# Patient Record
Sex: FEMALE | Race: WHITE | NOT HISPANIC OR LATINO | Employment: UNEMPLOYED | URBAN - METROPOLITAN AREA
[De-identification: names, ages, dates, MRNs, and addresses within clinical notes are randomized per-mention and may not be internally consistent; named-entity substitution may affect disease eponyms.]

---

## 2017-01-21 DIAGNOSIS — E11.9 TYPE 2 DIABETES MELLITUS WITHOUT COMPLICATION: ICD-10-CM

## 2017-01-23 RX ORDER — GLIPIZIDE 10 MG/1
TABLET ORAL
Qty: 20 TABLET | Refills: 0 | Status: SHIPPED | OUTPATIENT
Start: 2017-01-23 | End: 2017-02-22 | Stop reason: SDUPTHER

## 2017-01-24 NOTE — TELEPHONE ENCOUNTER
Called patient and left message to contact office to schedule an appointment with DM and then follow up with Dr. Aquino.

## 2017-01-26 ENCOUNTER — OFFICE VISIT (OUTPATIENT)
Dept: RHEUMATOLOGY | Facility: CLINIC | Age: 42
End: 2017-01-26
Payer: MEDICAID

## 2017-01-26 DIAGNOSIS — M25.561 ACUTE PAIN OF RIGHT KNEE: ICD-10-CM

## 2017-01-26 DIAGNOSIS — M54.32 SCIATICA OF LEFT SIDE: ICD-10-CM

## 2017-01-26 DIAGNOSIS — M17.0 PRIMARY OSTEOARTHRITIS OF BOTH KNEES: Chronic | ICD-10-CM

## 2017-01-26 DIAGNOSIS — E55.9 VITAMIN D DEFICIENCY: ICD-10-CM

## 2017-01-26 DIAGNOSIS — G89.29 OTHER CHRONIC PAIN: Primary | ICD-10-CM

## 2017-01-26 DIAGNOSIS — M50.30 DDD (DEGENERATIVE DISC DISEASE), CERVICAL: Chronic | ICD-10-CM

## 2017-01-26 PROCEDURE — 99213 OFFICE O/P EST LOW 20 MIN: CPT | Mod: 25,S$PBB,, | Performed by: INTERNAL MEDICINE

## 2017-01-26 PROCEDURE — 99211 OFF/OP EST MAY X REQ PHY/QHP: CPT | Mod: PBBFAC,PO | Performed by: INTERNAL MEDICINE

## 2017-01-26 PROCEDURE — 99999 PR PBB SHADOW E&M-EST. PATIENT-LVL I: CPT | Mod: PBBFAC,,, | Performed by: INTERNAL MEDICINE

## 2017-01-26 PROCEDURE — 20610 DRAIN/INJ JOINT/BURSA W/O US: CPT | Mod: PBBFAC,PO | Performed by: INTERNAL MEDICINE

## 2017-01-26 PROCEDURE — 20610 DRAIN/INJ JOINT/BURSA W/O US: CPT | Mod: S$PBB,RT,, | Performed by: INTERNAL MEDICINE

## 2017-01-27 ENCOUNTER — TELEPHONE (OUTPATIENT)
Dept: RHEUMATOLOGY | Facility: CLINIC | Age: 42
End: 2017-01-27

## 2017-01-27 DIAGNOSIS — M25.561 ACUTE PAIN OF RIGHT KNEE: ICD-10-CM

## 2017-01-27 PROBLEM — M17.0 PRIMARY OSTEOARTHRITIS OF BOTH KNEES: Chronic | Status: ACTIVE | Noted: 2017-01-27

## 2017-01-27 PROBLEM — M50.30 DDD (DEGENERATIVE DISC DISEASE), CERVICAL: Chronic | Status: ACTIVE | Noted: 2017-01-27

## 2017-01-27 RX ORDER — METHYLPREDNISOLONE ACETATE 80 MG/ML
80 INJECTION, SUSPENSION INTRA-ARTICULAR; INTRALESIONAL; INTRAMUSCULAR; SOFT TISSUE
Status: COMPLETED | OUTPATIENT
Start: 2017-01-27 | End: 2017-01-27

## 2017-01-27 RX ORDER — BETAMETHASONE SODIUM PHOSPHATE AND BETAMETHASONE ACETATE 3; 3 MG/ML; MG/ML
3 INJECTION, SUSPENSION INTRA-ARTICULAR; INTRALESIONAL; INTRAMUSCULAR; SOFT TISSUE
Status: COMPLETED | OUTPATIENT
Start: 2017-01-27 | End: 2017-01-27

## 2017-01-27 RX ORDER — DICLOFENAC SODIUM 10 MG/G
2 GEL TOPICAL 4 TIMES DAILY
Qty: 300 G | Refills: 3 | Status: SHIPPED | OUTPATIENT
Start: 2017-01-27 | End: 2018-05-17

## 2017-01-27 RX ADMIN — METHYLPREDNISOLONE ACETATE 80 MG: 80 INJECTION, SUSPENSION INTRALESIONAL; INTRAMUSCULAR; INTRASYNOVIAL; SOFT TISSUE at 12:01

## 2017-01-27 RX ADMIN — BETAMETHASONE SODIUM PHOSPHATE AND BETAMETHASONE ACETATE 3 MG: 3; 3 INJECTION, SUSPENSION INTRA-ARTICULAR; INTRALESIONAL; INTRAMUSCULAR at 12:01

## 2017-01-27 NOTE — PROGRESS NOTES
Subjective:       Patient ID: Jenifer Westfall is a 41 y.o. female.    Chief Complaint: knee pain-6/10    HPI Comments: Jenifer is here for routine follow up.  She has chronic pain with severe sciatica on the left side. MRI l spine shows nerve impingement.  She also has had issues with left hip bursitis with injections in the past that help and this is currently controlled.  She also has vit D deficiency and Marfans.  She has had prednisone tapers in the past which help her sciatic pain but it returns when she is off the steroids.  She takes gabapentin 400mg TID and tramadol prn for her pain.    Today she is complaining of sever right knee pain  mainly on the lateral aspect of her knee. She denies trauma to her knee.  Her pain has been present for about 3-4 days.  She can't take nsaids due to GI upset.  She also has issues with neck pain and has DDD on Cspine xray.     Review of Systems   Constitutional: Negative for activity change, fatigue, fever and unexpected weight change.   HENT: Negative for mouth sores, tinnitus and trouble swallowing.    Eyes: Negative for itching and visual disturbance.   Respiratory: Negative for cough and shortness of breath.    Cardiovascular: Negative for chest pain, palpitations and leg swelling.   Gastrointestinal: Negative for blood in stool, diarrhea, nausea and vomiting.   Genitourinary: Negative for dysuria, flank pain, frequency and hematuria.   Musculoskeletal: Positive for arthralgias, back pain, gait problem and joint swelling. Negative for myalgias.   Skin: Negative for rash and wound.   Neurological: Negative for dizziness, weakness, light-headedness and headaches.   Hematological: Negative for adenopathy. Does not bruise/bleed easily.   Psychiatric/Behavioral: Negative for confusion and sleep disturbance.         Objective:   There were no vitals taken for this visit.     Physical Exam   Constitutional: She is oriented to person, place, and time and well-developed,  well-nourished, and in no distress. No distress.   HENT:   Head: Normocephalic.   Eyes: EOM are normal. Pupils are equal, round, and reactive to light.   Neck: Normal range of motion. Neck supple. No thyromegaly present.   Cardiovascular: Normal rate, regular rhythm and normal heart sounds.  Exam reveals no gallop and no friction rub.    No murmur heard.  Pulmonary/Chest: Breath sounds normal. She has no wheezes. She has no rales. She exhibits no tenderness.   Abdominal: Soft. There is no tenderness. There is no rebound.       Right Side Rheumatological Exam     Hip Exam   Tenderness Location: no tenderness    Left Side Rheumatological Exam     Hip Exam   Tenderness Location: no tenderness      Lymphadenopathy:     She has no cervical adenopathy.   Neurological: She is alert and oriented to person, place, and time. She displays normal reflexes. She exhibits normal muscle tone. Gait normal.   Skin: Skin is warm and dry. No rash noted. No erythema.     Psychiatric: Mood, memory and affect normal.   Musculoskeletal: Normal range of motion. She exhibits no edema or tenderness.   Right knee has no real effusion, but her right leg is swollen compared to the left just proximal to the knee.  She has significant tenderness to the lateral knee along the LCL.  R knee is slightly warm compared to left.  Positive crepitus.      Right hip with decreased internal rotation, left hip normal           Assessment:       1. Other chronic pain    2. Sciatica of left side    3. Acute pain of right knee    4. Vitamin D deficiency    5. Primary osteoarthritis of both knees        1. Left side sciatica: MRI evidence of nerve impingement, unable to take nsaids due to GI upset, improves with steroids, gabapentin 400mg tid  2. Right knee pain, acute: due to osteoarthritis  3. Chronic pain:  Tramadol prn,   4. Vit D deficiency: 10/15, level was 24, taking ergocalciferol daily  5. DDD C spine by xray: C6-7, 7-1  Plan:        Inject right knee as  below  Will order xrays catherine knees for next visit  Will refer to dr. Stein for DENY evaluation  Recommend weight loss, exersice more, mediterranean diet  Voltaren Gel for knee pain  Return in 6 mos with vit D level    Procedure note: After verbal consent was obtained.  The right knee was prepared with sterile prep.  The skin was anesthetized with 1% ethyl chloride.  The knee joint was injected with 2.5 cc of 1% lidocaine to anesthetize the knee.  The joint was then injected with 0.5 mL celestone/soluspan 6 mg/ mL, 1.0 mL Depo-Medrol 80 mg/mL and 1.5 mL of 1% lidocaine.  Hemostasis was obtained.  The patient tolerated procedure well with no complications.  discharge and  icing instructions given to jeffrey

## 2017-01-29 ENCOUNTER — HOSPITAL ENCOUNTER (EMERGENCY)
Facility: HOSPITAL | Age: 42
Discharge: HOME | End: 2017-01-30
Attending: EMERGENCY MEDICINE
Payer: MEDICAID

## 2017-01-29 DIAGNOSIS — M25.561 RIGHT KNEE PAIN: ICD-10-CM

## 2017-01-29 LAB
ALBUMIN SERPL BCP-MCNC: 3.9 G/DL
ALP SERPL-CCNC: 100 U/L
ALT SERPL W/O P-5'-P-CCNC: 27 U/L
ANION GAP SERPL CALC-SCNC: 14 MMOL/L
AST SERPL-CCNC: 18 U/L
BASOPHILS # BLD AUTO: 0.03 K/UL
BASOPHILS NFR BLD: 0.2 %
BILIRUB SERPL-MCNC: 0.3 MG/DL
BUN SERPL-MCNC: 11 MG/DL
CALCIUM SERPL-MCNC: 9.6 MG/DL
CHLORIDE SERPL-SCNC: 104 MMOL/L
CO2 SERPL-SCNC: 18 MMOL/L
CREAT SERPL-MCNC: 0.9 MG/DL
CRP SERPL-MCNC: 3.3 MG/L
DIFFERENTIAL METHOD: ABNORMAL
EOSINOPHIL # BLD AUTO: 0 K/UL
EOSINOPHIL NFR BLD: 0.2 %
ERYTHROCYTE [DISTWIDTH] IN BLOOD BY AUTOMATED COUNT: 12.8 %
ERYTHROCYTE [SEDIMENTATION RATE] IN BLOOD BY WESTERGREN METHOD: 20 MM/HR
EST. GFR  (AFRICAN AMERICAN): >60 ML/MIN/1.73 M^2
EST. GFR  (NON AFRICAN AMERICAN): >60 ML/MIN/1.73 M^2
GLUCOSE SERPL-MCNC: 289 MG/DL
HCT VFR BLD AUTO: 44.4 %
HGB BLD-MCNC: 15.7 G/DL
LYMPHOCYTES # BLD AUTO: 4.3 K/UL
LYMPHOCYTES NFR BLD: 22.8 %
MCH RBC QN AUTO: 31.9 PG
MCHC RBC AUTO-ENTMCNC: 35.4 %
MCV RBC AUTO: 90 FL
MONOCYTES # BLD AUTO: 1.3 K/UL
MONOCYTES NFR BLD: 6.6 %
NEUTROPHILS # BLD AUTO: 13.3 K/UL
NEUTROPHILS NFR BLD: 70.2 %
PLATELET # BLD AUTO: 315 K/UL
PMV BLD AUTO: 10.2 FL
POTASSIUM SERPL-SCNC: 4.1 MMOL/L
PROT SERPL-MCNC: 7.6 G/DL
RBC # BLD AUTO: 4.92 M/UL
SODIUM SERPL-SCNC: 136 MMOL/L
WBC # BLD AUTO: 18.92 K/UL

## 2017-01-29 PROCEDURE — 96376 TX/PRO/DX INJ SAME DRUG ADON: CPT

## 2017-01-29 PROCEDURE — 96361 HYDRATE IV INFUSION ADD-ON: CPT

## 2017-01-29 PROCEDURE — 99284 EMERGENCY DEPT VISIT MOD MDM: CPT | Mod: 25

## 2017-01-29 PROCEDURE — 80053 COMPREHEN METABOLIC PANEL: CPT

## 2017-01-29 PROCEDURE — 86140 C-REACTIVE PROTEIN: CPT

## 2017-01-29 PROCEDURE — 63600175 PHARM REV CODE 636 W HCPCS: Performed by: EMERGENCY MEDICINE

## 2017-01-29 PROCEDURE — 36415 COLL VENOUS BLD VENIPUNCTURE: CPT

## 2017-01-29 PROCEDURE — 96374 THER/PROPH/DIAG INJ IV PUSH: CPT

## 2017-01-29 PROCEDURE — 87070 CULTURE OTHR SPECIMN AEROBIC: CPT

## 2017-01-29 PROCEDURE — 87205 SMEAR GRAM STAIN: CPT

## 2017-01-29 PROCEDURE — 25000003 PHARM REV CODE 250: Performed by: EMERGENCY MEDICINE

## 2017-01-29 PROCEDURE — 96375 TX/PRO/DX INJ NEW DRUG ADDON: CPT

## 2017-01-29 PROCEDURE — 85025 COMPLETE CBC W/AUTO DIFF WBC: CPT

## 2017-01-29 PROCEDURE — 85651 RBC SED RATE NONAUTOMATED: CPT

## 2017-01-29 PROCEDURE — 20610 DRAIN/INJ JOINT/BURSA W/O US: CPT

## 2017-01-29 RX ORDER — HYDROCODONE BITARTRATE AND ACETAMINOPHEN 10; 325 MG/1; MG/1
1 TABLET ORAL EVERY 4 HOURS PRN
Qty: 11 TABLET | Refills: 0 | Status: SHIPPED | OUTPATIENT
Start: 2017-01-29 | End: 2017-03-02 | Stop reason: ALTCHOICE

## 2017-01-29 RX ORDER — HYDROMORPHONE HYDROCHLORIDE 2 MG/ML
0.5 INJECTION, SOLUTION INTRAMUSCULAR; INTRAVENOUS; SUBCUTANEOUS
Status: COMPLETED | OUTPATIENT
Start: 2017-01-29 | End: 2017-01-29

## 2017-01-29 RX ORDER — ONDANSETRON 2 MG/ML
4 INJECTION INTRAMUSCULAR; INTRAVENOUS
Status: COMPLETED | OUTPATIENT
Start: 2017-01-29 | End: 2017-01-29

## 2017-01-29 RX ADMIN — HYDROMORPHONE HYDROCHLORIDE 0.5 MG: 2 INJECTION, SOLUTION INTRAMUSCULAR; INTRAVENOUS; SUBCUTANEOUS at 05:01

## 2017-01-29 RX ADMIN — ONDANSETRON 4 MG: 2 INJECTION INTRAMUSCULAR; INTRAVENOUS at 05:01

## 2017-01-29 RX ADMIN — SODIUM CHLORIDE 1000 ML: 0.9 INJECTION, SOLUTION INTRAVENOUS at 05:01

## 2017-01-29 RX ADMIN — HYDROMORPHONE HYDROCHLORIDE 0.5 MG: 2 INJECTION, SOLUTION INTRAMUSCULAR; INTRAVENOUS; SUBCUTANEOUS at 08:01

## 2017-01-29 NOTE — ED PROVIDER NOTES
SCRIBE #1 NOTE: I, Martin Perkins, am scribing for, and in the presence of, Clint Bardales MD. I have scribed the entire note.      History      Chief Complaint   Patient presents with    Knee Pain     saw rheumatologist on Thursday, got cortizone shot, since yesterday pt unable to put weight on knee d/t pain        Review of patient's allergies indicates:   Allergen Reactions    Compazine [prochlorperazine edisylate] Rash    Levaquin [levofloxacin] Hives and Itching    Metformin      Upset stomach    Ibuprofen      Stomach pain    Morphine      Irritable        HPI   HPI    1/29/2017, 4:37 PM   History obtained from the patient      History of Present Illness: Jenifer Westfall is a 41 y.o. female patient who presents to the Emergency Department for right knee pain which onset gradually several days ago. Sx are constant and moderate in severity. Sx are described as stabbing pain. Pain exacerbated when bending the joint, and bearing weight. Pt states she was seen by her rheumatologist 3 days ago with same complaint and was tx with Cortizone shot to right lateral knee with relief to sx for a couple hours before pain has returned. She has also been taking prednisone. Associated sx includes gait problem.  Pt denies any fever, chills, knee trauma, CP, SOB, cough, calf pain, N/V, and all other sx at this time. She also reports trying ice, elevation and tylenol.  No further complaints or concerns at this time.       Arrival mode: Personal vehicle    PCP: Evelyn Wynn MD       Past Medical History:  Past Medical History   Diagnosis Date    Arthritis     Diabetes mellitus 2009      11/09/2015 Insulin x 2 years 2013    Hepatitis C     Hyperlipidemia     Hypertension     IBS (irritable bowel syndrome)     Kidney stone     Marfan syndrome     Mitral valve prolapse        Past Surgical History:  Past Surgical History   Procedure Laterality Date    Cholecystectomy      Bunionectomy       both feet  "   Tubal ligation       section       x 2    Appendectomy           Family History:  Family History   Problem Relation Age of Onset    Heart disease Mother     Thrombophilia Father      DVT    Hypertension Father     Stroke Maternal Aunt     Heart disease Maternal Aunt     Stroke Maternal Uncle     Heart disease Maternal Uncle     Breast cancer Neg Hx     Colon cancer Neg Hx     Ovarian cancer Neg Hx        Social History:  Social History     Social History Main Topics    Smoking status: Current Every Day Smoker     Packs/day: 0.20     Years: 20.00     Types: Cigarettes     Last attempt to quit: 2015    Smokeless tobacco: Never Used      Comment: "once in a blue moon"    Alcohol use No    Drug use: No    Sexual activity: No       ROS   Review of Systems   Constitutional: Negative for chills and fever.   HENT: Negative for sore throat and trouble swallowing.    Respiratory: Negative for cough and shortness of breath.    Cardiovascular: Negative for chest pain.   Gastrointestinal: Negative for abdominal pain, diarrhea, nausea and vomiting.   Genitourinary: Negative for dysuria.   Musculoskeletal: Positive for gait problem. Negative for back pain.        + R knee pain  - trauma   Skin: Negative for rash and wound.   Neurological: Negative for weakness and numbness.   Hematological: Does not bruise/bleed easily.   All other systems reviewed and are negative.      Physical Exam    Initial Vitals   BP Pulse Resp Temp SpO2   17 1511 17 1511 17 1511 17 1511 17 1511   132/77 80 16 97.9 °F (36.6 °C) 95 %      Physical Exam  Nursing Notes and Vital Signs Reviewed.  Constitutional: Patient is in no acute distress. Awake and alert. Well-developed and well-nourished.  Head: Atraumatic. Normocephalic.  Eyes: PERRL. EOM intact. Conjunctivae are not pale. No scleral icterus.  ENT: Mucous membranes are moist. Oropharynx is clear and symmetric.    Neck: Supple. Full ROM. No " lymphadenopathy.  Cardiovascular: Regular rate. Regular rhythm. No murmurs, rubs, or gallops. Distal pulses are 2+ and symmetric.  Pulmonary/Chest: No respiratory distress. Clear to auscultation bilaterally. No wheezing, rales, or rhonchi.  Abdominal: Soft and non-distended.     Musculoskeletal: Moves all extremities. No obvious deformities. No edema. No calf tenderness.  Right Knee:  No obvious deformity. There is no swelling.  There is  Tenderness to lateral R knee. Mildly Increased warmth compared to left knee. No erythema, induration or fluctuance.  No ligament laxity.    DP and PT pulses are 2+.  Normal capillary refill.  Distal sensation is intact. ROM is very limited, Pt will barely flex or extend the knee.   Skin: Warm and dry.  Neurological:  Alert, awake, and appropriate.  Normal speech.  No acute focal neurological deficits are appreciated.  Psychiatric: Normal affect. Good eye contact. Appropriate in content.    ED Course    Arthrocentesis  Date/Time: 1/29/2017 9:14 PM  Performed by: DEEPA IRIZARRY.  Authorized by: DEEPA IRIZARRY   Consent Done: Not Needed  Indications: possible septic joint   Body area: knee  Joint: right knee  Local anesthesia used: no    Anesthesia:  Local anesthesia used: no  Patient sedated: no  Preparation: Patient was prepped and draped in the usual sterile fashion.  Needle size: 20 G  Approach: lateral  Aspirate amount: 5 mL  Aspirate: yellow (very slightly clougy, thick, yellow in color)  Patient tolerance: Patient tolerated the procedure well with no immediate complications  Complications: No  Specimens: Yes (5 cc thick, slightly cloudy, yellowish, aspirate )        ED Vital Signs:  Vitals:    01/29/17 1511 01/29/17 2152 01/30/17 0000 01/30/17 0146   BP: 132/77 138/60 132/70 134/60   Pulse: 80 76 80 68   Resp: 16 18 17 16   Temp: 97.9 °F (36.6 °C) 98.2 °F (36.8 °C) 98 °F (36.7 °C) 97.2 °F (36.2 °C)   TempSrc: Oral Oral Oral Oral   SpO2: 95% 96% 98% 99%   Weight: 102.1 kg  "(225 lb)      Height: 5' 10" (1.778 m)          Abnormal Lab Results:  Labs Reviewed   CBC W/ AUTO DIFFERENTIAL - Abnormal; Notable for the following:        Result Value    WBC 18.92 (*)     MCH 31.9 (*)     Gran # 13.3 (*)     Mono # 1.3 (*)     All other components within normal limits   COMPREHENSIVE METABOLIC PANEL - Abnormal; Notable for the following:     CO2 18 (*)     Glucose 289 (*)     All other components within normal limits   CULTURE, BODY FLUID - BACTEC   GRAM STAIN   SEDIMENTATION RATE, MANUAL   C-REACTIVE PROTEIN   WBC & DIFF,BODY FLUID        All Lab Results:  Results for orders placed or performed during the hospital encounter of 01/29/17   Culture, Body Fluid - Bactec   Result Value Ref Range    Body Fluid Culture, Sterile No Growth to date    Gram stain   Result Value Ref Range    Gram Stain Result Rare WBC's     Gram Stain Result No organisms seen    CBC auto differential   Result Value Ref Range    WBC 18.92 (H) 3.90 - 12.70 K/uL    RBC 4.92 4.00 - 5.40 M/uL    Hemoglobin 15.7 12.0 - 16.0 g/dL    Hematocrit 44.4 37.0 - 48.5 %    MCV 90 82 - 98 fL    MCH 31.9 (H) 27.0 - 31.0 pg    MCHC 35.4 32.0 - 36.0 %    RDW 12.8 11.5 - 14.5 %    Platelets 315 150 - 350 K/uL    MPV 10.2 9.2 - 12.9 fL    Gran # 13.3 (H) 1.8 - 7.7 K/uL    Lymph # 4.3 1.0 - 4.8 K/uL    Mono # 1.3 (H) 0.3 - 1.0 K/uL    Eos # 0.0 0.0 - 0.5 K/uL    Baso # 0.03 0.00 - 0.20 K/uL    Gran% 70.2 38.0 - 73.0 %    Lymph% 22.8 18.0 - 48.0 %    Mono% 6.6 4.0 - 15.0 %    Eosinophil% 0.2 0.0 - 8.0 %    Basophil% 0.2 0.0 - 1.9 %    Differential Method Automated    Comprehensive metabolic panel   Result Value Ref Range    Sodium 136 136 - 145 mmol/L    Potassium 4.1 3.5 - 5.1 mmol/L    Chloride 104 95 - 110 mmol/L    CO2 18 (L) 23 - 29 mmol/L    Glucose 289 (H) 70 - 110 mg/dL    BUN, Bld 11 6 - 20 mg/dL    Creatinine 0.9 0.5 - 1.4 mg/dL    Calcium 9.6 8.7 - 10.5 mg/dL    Total Protein 7.6 6.0 - 8.4 g/dL    Albumin 3.9 3.5 - 5.2 g/dL    Total " Bilirubin 0.3 0.1 - 1.0 mg/dL    Alkaline Phosphatase 100 55 - 135 U/L    AST 18 10 - 40 U/L    ALT 27 10 - 44 U/L    Anion Gap 14 8 - 16 mmol/L    eGFR if African American >60 >60 mL/min/1.73 m^2    eGFR if non African American >60 >60 mL/min/1.73 m^2   Sedimentation rate, manual   Result Value Ref Range    Sed Rate 20 0 - 20 mm/Hr   C-reactive protein   Result Value Ref Range    CRP 3.3 0.0 - 8.2 mg/L         Imaging Results:  Imaging Results         X-Ray Knee 3 View Right (Final result) Result time:  01/29/17 17:26:58    Final result by Mimi Calderon MD (Timothy) (01/29/17 17:26:58)    Impression:        Unremarkable right knee.      Electronically signed by: MIMI CALDERON MD  Date:     01/29/17  Time:    17:26     Narrative:    Right knee, 4 views    Clinical History:  Right knee pain    Findings:     There is  no acute bony or joint abnormality. No acute fracture or dislocation.                 The Emergency Provider reviewed the vital signs and test results, which are outlined above.    ED Discussion     7:08 PM: Dr. Bardales discussed the pt's case with Dr. Hook (orthopedics) who recommends arthocentesis.    1:23 AM - Re-evaluation: The patient is resting comfortably and is in no acute distress. She states that her symptoms have improved after treatment within ER. Discussed test results, shared treatment plan, specific conditions for return, and importance of follow up with patient and family.  She understands and agrees with the plan as discussed. Answered  her questions at this time. She has remained hemodynamically stable throughout the ED course and is appropriate for discharge home.     I discussed with patient and/or family/caretaker that negative X-ray does not rule out occult fracture or other soft tissue injury.  Persistent pain greater than 7-10 days or increased pain requires follow up, specifically with orthopedics.         I discussed with patient and/or family/caretaker that evaluation in  the ED does not suggest any emergent or life threatening medical conditions requiring immediate intervention beyond what was provided in the ED, and I believe patient is safe for discharge.  Regardless, an unremarkable evaluation in the ED does not preclude the development or presence of a serious of life threatening condition. As such, patient was instructed to return immediately for any worsening or change in current symptoms.      ED Medication(s):  Medications   sodium chloride 0.9% bolus 1,000 mL (0 mLs Intravenous Stopped 1/29/17 1932)   hydromorphone (PF) injection 0.5 mg (0.5 mg Intravenous Given 1/29/17 1747)   ondansetron injection 4 mg (4 mg Intravenous Given 1/29/17 1747)   hydromorphone (PF) injection 0.5 mg (0.5 mg Intravenous Given 1/29/17 2006)   hydromorphone (PF) injection 0.5 mg (0.5 mg Intravenous Given 1/30/17 0123)       Discharge Medication List as of 1/30/2017  1:47 AM      START taking these medications    Details   clindamycin (CLEOCIN) 150 MG capsule Take 2 capsules (300 mg total) by mouth 3 (three) times daily., Starting 1/30/2017, Until Mon 2/6/17, Print      hydrocodone-acetaminophen 10-325mg (NORCO)  mg Tab Take 1 tablet by mouth every 4 (four) hours as needed., Starting 1/29/2017, Until Discontinued, Print             Follow-up Information     Follow up with Evelyn Wynn MD In 3 days.    Specialty:  Family Medicine    Contact information:    [address] [address] [address] [phone]          Follow up with [facility] - .    Specialty:  Emergency Medicine    Why:  If symptoms worsen, Or worsening condition or any other major concern    Contact information:    [address]  [facility] [address] [phone]             Medical Decision Making              Scribe Attestation:   Scribe #1: I performed the above scribed service and the documentation accurately describes the services I performed. I attest to the  accuracy of the note.    Attending:   Physician Attestation Statement for Scribe #1: I, Clint Bardales MD, personally performed the services described in this documentation, as scribed by Martin Perkins in my presence, and it is both accurate and complete.          Clinical Impression       ICD-10-CM ICD-9-CM   1. Right knee pain M25.561 719.46       Disposition:   Disposition: Discharged  Condition: Stable         Clint Bardales MD  01/30/17 1944

## 2017-01-30 VITALS
DIASTOLIC BLOOD PRESSURE: 60 MMHG | HEIGHT: 70 IN | HEART RATE: 68 BPM | WEIGHT: 225 LBS | TEMPERATURE: 97 F | SYSTOLIC BLOOD PRESSURE: 134 MMHG | RESPIRATION RATE: 16 BRPM | BODY MASS INDEX: 32.21 KG/M2 | OXYGEN SATURATION: 99 %

## 2017-01-30 LAB
GRAM STN SPEC: NORMAL
GRAM STN SPEC: NORMAL

## 2017-01-30 PROCEDURE — 63600175 PHARM REV CODE 636 W HCPCS: Performed by: PHYSICIAN ASSISTANT

## 2017-01-30 RX ORDER — HYDROMORPHONE HYDROCHLORIDE 2 MG/ML
0.5 INJECTION, SOLUTION INTRAMUSCULAR; INTRAVENOUS; SUBCUTANEOUS
Status: COMPLETED | OUTPATIENT
Start: 2017-01-30 | End: 2017-01-30

## 2017-01-30 RX ORDER — CLINDAMYCIN HYDROCHLORIDE 150 MG/1
300 CAPSULE ORAL 3 TIMES DAILY
Qty: 42 CAPSULE | Refills: 0 | Status: SHIPPED | OUTPATIENT
Start: 2017-01-30 | End: 2017-02-06

## 2017-01-30 RX ADMIN — HYDROMORPHONE HYDROCHLORIDE 0.5 MG: 2 INJECTION, SOLUTION INTRAMUSCULAR; INTRAVENOUS; SUBCUTANEOUS at 01:01

## 2017-01-30 NOTE — ED NOTES
"Pt sitting up in bed resting.  Aa0x4, resp e/u skin w/d.  Pt reports, "pain is starting to come back".      "

## 2017-01-30 NOTE — DISCHARGE INSTRUCTIONS
Knee Pain  Knee pain is very common. Its especially common in active people who put a lot of pressure on their knees, like runners. It affects women more often than men.  Your kneecap (patella) is a thick, round bone. It covers and protects the front portion of your knee joint. It moves along a groove in your thighbone (femur) as part of the patellofemoral joint. A layer of cartilage surrounds the underside of your kneecap. This layer protects it from grinding against your femur.  When this cartilage softens and breaks down, it can cause knee pain. This is partly because of repetitive stress. The stress irritates the lining of the joint. This causes pain in the underlying bone.  What causes knee pain?  Many things can cause knee pain. You may have more than one cause. Some of these include:  · Overuse of the knee joint  · The kneecap doesnt line up with the tissue around it  · Damage to small nerves in the area  · Damage to the ligament-like structure that holds the kneecap in place (retinaculum)  · Breakdown of the bone under the cartilage  · Swelling in the soft tissues around the kneecap  · Injury  You might be more likely to have knee pain if you:  · Exercise a lot  · Recently increased the intensity of your workouts  · Have a body mass index (BMI) greater than 25  · Have poor alignment of your kneecap  · Walk with your feet turned overly outward or inward  · Have weakness in surrounding muscle groups (inner quad or hip adductor muscles)  · Have too much tightness in surrounding muscle groups (hamstrings or iliotibial band)  · Have a recent history of injury to the area  · Are female  Symptoms of knee pain  This type of knee pain is a dull, aching pain in the front of the knee in the area under and around the kneecap. This pain may start quickly or slowly. Your pain might be worse when you squat, run, or sit for a long time. You might also sometimes feel like your knee is giving out. You may have symptoms in  one or both of your knees.  Diagnosing knee pain  Your health care provider will ask about your medical history and your symptoms. Be sure to describe any activities that make your knee pain worse. He or she will look at your knee. This will include tests of your range of motion, strength, and areas of pain of your knee. Your knee alignment will be checked.  Your health care provider will need to rule out other causes of your knee pain, such as arthritis. You may need an imaging test, such as an X-ray or MRI.  Treatment for knee pain  Treatments that can help ease your symptoms may include:  · Avoiding activities for a while that make your pain worse, returning to activity over time  · Icing the outside of your knee when it causes you pain  · Taking over-the-counter pain medicine  · Wearing a knee brace or taping your knee to support it  · Wearing special shoe inserts to help keep your feet in the proper alignment  · Doing special exercises to stretch and strengthen the muscles around your hip and your knee  These steps help most people manage knee pain. But some cases of knee pain need to be treated with surgery. You may need surgery right away. Or you may need it later if other treatments dont work. Your health care provider may refer you to an orthopedic surgeon. He or she will talk with you about your choices.  Preventing knee pain  Losing weight and correcting excess muscle tightness or muscle weakness may help lower your risk.  In some cases, you can prevent knee pain. To help prevent a flare-up of knee pain, you do these things:  · Regularly do all the exercises your doctor or physical therapist advises  · Support your knee as advised by your doctor or physical therapist  · Increase training gradually, and ease up on training when needed  · Have an expert check your gait for running or other sporting activities  · Stretch properly before and after exercise  · Replace your running shoes regularly  · Lose  excess weight     When to call your health care provider  Call your health care provider right away if:  · Your symptoms dont get better after a few weeks of treatment  · You have any new symptoms      © 9730-5916 [employer]. [address] [place] [address]. All rights reserved. This information is not intended as a substitute for professional medical care. Always follow your healthcare professional's instructions.          Reducing Knee Pain and Swelling    Many treatments can help reduce pain and swelling in your knee. Your healthcare provider or physical therapist may suggest one or more of the following treatments:  · Icing your knee helps reduce swelling. You may be asked to ice your knee once a day or more. Apply ice for about 15 to 20 minutes at a time, with at least 40 minutes between sessions. Always keep a towel between the ice and your skin.   · Keeping your leg raised above your heart helps excess fluid flow out of your knee joint. This reduces swelling.  · Compression means wrapping an elastic bandage or neoprene sleeve snugly around your knees. This keeps fluid from collecting in your knee joint.  · Electrical stimulation, done by a physical therapist or , can help reduce excess fluid in your knee joint.  · Anti-inflammatory medicines may be prescribed by your healthcare provider. You may take pills or receive injections in your knee.  · Isometric (maru) exercises strengthen the muscles that support your knee joint. They also help reduce excess fluid in your knee.  · Massage helps fluid drain away from your knee.  © 6950-0780 [employer] [address] [place] [address]. All rights reserved. This information is not intended as a substitute for professional medical care. Always follow your healthcare professional's instructions.          Knee Pain of Uncertain Cause    There are several common causes for knee pain. These can  include:  · A sprain of the ligaments that support the joint  · An injury to the cartilage lining of the joint  · Arthritis from wear-and-tear or inflammation  There are other causes as well. There may also be swelling, reduced movement of the knee joint, and pain with walking. A definite diagnosis will still need to be made. If your symptoms do not improve, further follow-up and testing may be needed.  Home care  · Stay off the injured leg as much as possible until pain improves.  · Apply an ice pack over the injured area for 15 to 20 minutes every 3 to 6 hours. You should do this for the first 24 to 48 hours. You can make an ice pack by filling a plastic bag that seals at the top with ice cubes and then wrapping it with a thin towel. Continue to use ice packs for relief of pain and swelling as needed. As the ice melts, be careful to avoid getting your wrap, splint, or cast wet. After 48 hours, apply heat (warm shower or warm bath) for 15 to 20 minutes several times a day, or alternate ice and heat. If you have to wear a hook-and-loop knee brace, you can open it to apply the ice pack, or heat, directly to the knee. Never put ice directly on the skin. Always wrap the ice in a towel or other type of cloth.  · You may use over-the-counter pain medicine to control pain, unless another pain medicine was prescribed. If you have chronic liver or kidney disease or ever had a stomach ulcer or GI bleeding, talk with your healthcare provider using these medicines.  · If crutches or a walker have been recommended, do not put weight on the injured leg until you can do so without pain. Check with your healthcare provider before returning to sports or full work duties.  · If you have a hook-and-loop knee brace, you can remove it to bathe and sleep, unless told otherwise.  Follow-up care  Follow up with your healthcare provider as advised. This is usually within 1-2 weeks.  If X-rays were taken, you will be told of any new  findings that may affect your care.  Call 911  Call 911 if you have:  · Shortness of breath  · Chest pain  When to seek medical advice  Call your healthcare provider right away if any of these occur:  · Toes or foot becomes swollen, cold, blue, numb, or tingly  · Pain or swelling spreads over the knee or calf  · Warmth or redness appears over the knee or calf  · Other joints become painful  · Rash appears  · Fever of 100.4°F (38°C) or above lasting for 24 to 48 hours  © 0370-6348 [employer]. [address] [place] [address]. All rights reserved. This information is not intended as a substitute for professional medical care. Always follow your healthcare professional's instructions.          Medicine for Pain  Medicines can help to block pain, decrease inflammation, and treat related problems. More than one medicine may be used to treat your pain. Medicines may be changed as you feel better, or if they cause side effects.  Medicines What they do Possible side effects   Non-opioid NSAIDs, aspirin, acetaminophen Reduce pain chemicals at the site of pain. NSAIDs can reduce joint and soft tissue inflammation. Nausea, stomach pain, ulcers, indigestion, bleeding, kidney, or liver problems. Certain NSAIDs may increase cardiovascular risk in some patients. Talk with your health care provider.   Opioids (morphine and similar medicines often called narcotics) Reduce feelings or perception of pain. Used for moderate to severe pain. Nausea, vomiting, itching, drowsiness, constipation, slowed breathing.   Other medicines (corticosteroids, antinausea, antidepressant, and antiseizure medicines) Reduce swelling, burning or tingling pain or limit certain side effects of pain medicines, like nausea or vomiting. Your health care provider will explain the possible side effects of these medicines.   Anesthetics (local, injected) include lidocaine, benzocaine, and medicines used by anesthesiologists Stop pain  signals from reaching the brain by blocking feeling in the treated area. Nausea, low blood pressure, fever, slowed breathing, fainting, seizures, heart attack.   When to call the healthcare provider  Call your healthcare provider right away (or have a family member call) if you have:  · Unrelieved pain  · Side effects, including constipation or uncontrolled nausea, that interfere with daily activities  If you have extreme sleepiness or breathing problems, call 911.   © 9962-7882 [employer]. [address] [place] [address], [place] [address]. All rights reserved. This information is not intended as a substitute for professional medical care. Always follow your healthcare professional's instructions.

## 2017-01-31 ENCOUNTER — OFFICE VISIT (OUTPATIENT)
Dept: FAMILY MEDICINE | Facility: CLINIC | Age: 42
End: 2017-01-31
Payer: MEDICAID

## 2017-01-31 VITALS
DIASTOLIC BLOOD PRESSURE: 78 MMHG | BODY MASS INDEX: 31.8 KG/M2 | WEIGHT: 222.13 LBS | SYSTOLIC BLOOD PRESSURE: 120 MMHG | HEIGHT: 70 IN | TEMPERATURE: 98 F | HEART RATE: 94 BPM | OXYGEN SATURATION: 97 % | RESPIRATION RATE: 18 BRPM

## 2017-01-31 DIAGNOSIS — M25.561 CHRONIC PAIN OF RIGHT KNEE: Primary | ICD-10-CM

## 2017-01-31 DIAGNOSIS — E66.9 OBESITY (BMI 30.0-34.9): ICD-10-CM

## 2017-01-31 DIAGNOSIS — J02.9 VIRAL PHARYNGITIS: ICD-10-CM

## 2017-01-31 DIAGNOSIS — E11.49 TYPE II OR UNSPECIFIED TYPE DIABETES MELLITUS WITH NEUROLOGICAL MANIFESTATIONS, NOT STATED AS UNCONTROLLED(250.60): ICD-10-CM

## 2017-01-31 DIAGNOSIS — Z72.0 TOBACCO ABUSE: ICD-10-CM

## 2017-01-31 DIAGNOSIS — G89.29 CHRONIC PAIN OF RIGHT KNEE: Primary | ICD-10-CM

## 2017-01-31 DIAGNOSIS — M00.9 PYOGENIC ARTHRITIS OF RIGHT KNEE JOINT, DUE TO UNSPECIFIED ORGANISM: ICD-10-CM

## 2017-01-31 LAB
CTP QC/QA: YES
S PYO RRNA THROAT QL PROBE: NEGATIVE

## 2017-01-31 PROCEDURE — 99215 OFFICE O/P EST HI 40 MIN: CPT | Mod: PBBFAC,PO | Performed by: NURSE PRACTITIONER

## 2017-01-31 PROCEDURE — 99999 PR PBB SHADOW E&M-EST. PATIENT-LVL V: CPT | Mod: PBBFAC,,, | Performed by: NURSE PRACTITIONER

## 2017-01-31 PROCEDURE — 99213 OFFICE O/P EST LOW 20 MIN: CPT | Mod: S$PBB,,, | Performed by: NURSE PRACTITIONER

## 2017-01-31 RX ORDER — FLUTICASONE PROPIONATE 50 MCG
1 SPRAY, SUSPENSION (ML) NASAL DAILY
Qty: 16 G | Refills: 0 | Status: SHIPPED | OUTPATIENT
Start: 2017-01-31 | End: 2017-07-05

## 2017-01-31 NOTE — MR AVS SNAPSHOT
[facility]  [address]  [facility] [address]  Phone: [phone]  Fax: [phone]                  Jenifer Westfall   2017 2:20 PM   Office Visit    Description:  Female : 1975   Provider:  Stephanie Hernández NP   Department:  [facility]           Reason for Visit     Follow-up     Sore Throat           Diagnoses this Visit        Comments    Chronic pain of right knee    -  Primary     Pyogenic arthritis of right knee joint, due to unspecified organism         Type II or unspecified type diabetes mellitus with neurological manifestations, not stated as uncontrolled         Tobacco abuse         Obesity (BMI 30.0-34.9)         Viral pharyngitis                To Do List           Future Appointments        Provider Department Dept Phone    2/3/2017 8:00 AM LABORATORY, [facility]     2/3/2017 8:10 AM SPECIMEN, [facility] [phone]      Goals (5 Years of Data)     None       These Medications        Disp Refills Start End    fluticasone (FLONASE) 50 mcg/actuation nasal spray 16 g 0 2017     1 spray by Each Nare route once daily. - Each Nare    Pharmacy: [facility] [address] [facility] [address], [place] [address] Ph #: [phone]         [facility]     [facility] Care [facility] -  Assistance  Registered nurses in the [facility] provide clinical advisement, health education, appointment booking, and other advisory services.  Call for this free service at [phone].             Medications           Message regarding Medications     Verify the changes and/or additions to your medication regime listed below are the same as discussed with your clinician today.  If any of these changes or additions are incorrect, please notify your healthcare provider.        START taking these NEW medications        Refills    fluticasone (FLONASE)  "50 mcg/actuation nasal spray 0    Si spray by Each Nare route once daily.    Class: Normal    Route: Each Nare      STOP taking these medications     methylPREDNISolone (MEDROL DOSEPACK) 4 mg tablet use as directed           Verify that the below list of medications is an accurate representation of the medications you are currently taking.  If none reported, the list may be blank. If incorrect, please contact your healthcare provider. Carry this list with you in case of emergency.           Current Medications     ascorbic acid (VITAMIN C) 100 MG tablet Take 100 mg by mouth once daily.    aspirin (ECOTRIN) 81 MG EC tablet Take 81 mg by mouth once daily.    atenolol (TENORMIN) 50 MG tablet Take 1 tablet (50 mg total) by mouth once daily.    atorvastatin (LIPITOR) 40 MG tablet TAKE 1 TABLET BY MOUTH ONCE DAILY    clindamycin (CLEOCIN) 150 MG capsule Take 2 capsules (300 mg total) by mouth 3 (three) times daily.    clopidogrel (PLAVIX) 75 mg tablet TAKE 1 TABLET BY MOUTH ONCE DAILY    colestipol (COLESTID) 1 gram Tab TAKE 2 TABLETS BY MOUTH THREE TIMES DAILY. DO NOT TAKE OTHER MEDICATION WITHIN 1 HOUR BEFORE OR 4 HOURS AFTER TAKING COLESTIPOL.    diclofenac sodium (VOLTAREN) 1 % Gel Apply 2 g topically 4 (four) times daily.    EASY TOUCH 31 gauge x 5/16" Ndle USE FIVE TIMES DAILY WITH LANTUS AND HUMALOG    ERGOCALCIFEROL, VITAMIN D2, (VITAMIN D ORAL) Take 1 tablet by mouth once daily.     fluoxetine (PROZAC) 40 MG capsule TAKE 1 CAPSULE BY MOUTH ONCE DAILY    gabapentin (NEURONTIN) 400 MG capsule TAKE 1 CAPSULE BY MOUTH 3 TIMES DAILY    glipiZIDE (GLUCOTROL) 10 MG tablet TAKE 1 TABLET BY MOUTH TWICE DAILY    hydrocodone-acetaminophen 10-325mg (NORCO)  mg Tab Take 1 tablet by mouth every 4 (four) hours as needed.    insulin glargine (LANTUS SOLOSTAR) 100 unit/mL (3 mL) InPn pen 68 units on am and 66 units on pm    insulin glulisine (APIDRA SOLOSTAR) 100 unit/mL InPn pen Inject 0.15 mLs (15 Units total) into " "the skin 3 (three) times daily.    insulin needles, disposable, (BD INSULIN PEN NEEDLE UF SHORT) 31 X 5/16 " Ndle USE TWICE DAILY AS DIRECTED    LANTUS SOLOSTAR 100 unit/mL (3 mL) InPn pen INJECT 60 UNITS UNDER THE SKIN EVERY MORNING AND AT BEDTIME NOTIFY MEDICAL PROVIDER IF BLOOD SUGAR IS LESS THAN 100    lisinopril 10 MG tablet Take 1 tablet (10 mg total) by mouth once daily.    nicotine (NICODERM CQ) 21 mg/24 hr Place 1 patch onto the skin once daily.    tizanidine (ZANAFLEX) 4 MG tablet Take 1 tablet (4 mg total) by mouth every evening.    fluticasone (FLONASE) 50 mcg/actuation nasal spray 1 spray by Each Nare route once daily.           Clinical Reference Information           Vital Signs - Last Recorded  Most recent update: 1/31/2017  2:46 PM by Zandra Gr LPN    BP Pulse Temp Resp Ht Wt    (!) 169/82 94 97.7 °F (36.5 °C) (Tympanic) 18 5' 10" (1.778 m) 100.8 kg (222 lb 1.8 oz)    SpO2 BMI             97% 31.87 kg/m2         Blood Pressure          Most Recent Value    BP  (!)  169/82      Allergies as of 1/31/2017     Compazine [Prochlorperazine Edisylate]    Levaquin [Levofloxacin]    Metformin    Ibuprofen    Morphine      Immunizations Administered on Date of Encounter - 1/31/2017     None      Orders Placed During Today's Visit      Normal Orders This Visit    POCT Rapid Strep A     Future Labs/Procedures Expected by Expires    CBC auto differential  1/31/2017 4/1/2018    Hemoglobin A1c  1/31/2017 4/1/2018    Lipid panel  1/31/2017 1/31/2018    Microalbumin/creatinine urine ratio  1/31/2017 4/1/2018      Instructions    Return to clinic on Friday for fasting labs.        Smoking Cessation     If you would like to quit smoking:   You may be eligible for free services if you are a [place] resident and started smoking cigarettes before September 1, 1988.  Call the [employer]oking [employer]ess[employer] ([employer]) toll free at ([phone] 539-9873 or ([phone] 775-2266.   Call 4-800-QUIT-NOW if you do not meet the above " criteria.

## 2017-01-31 NOTE — PATIENT INSTRUCTIONS
Ice knee for 30 min 3x day for 3 days    Consider dietary changes for weight loss    To pain management for your back

## 2017-01-31 NOTE — PROGRESS NOTES
Subjective:       Patient ID: Jenifer Westfall is a 41 y.o. female.    Chief Complaint: Follow-up and Sore Throat   Pt reports to clinic with chief complaint of ER follow up and sore throat.  Pt reports she visited the ER 3 days ago for right knee pain.  Pt has chronic right knee pain.  Received steroid injection in the knee prior to onset of pain.  While in ER, knee was drained.  Negative culture as of today.  CBC does show and elevated WBC of 18.92.  Pt was placed on oral clindamycin.  Today pt denies relief of knee pain, continues to complain of intermittent swelling and decreased ROM, denies fever or chills.  Pt reports new onset sore throat.  Has not taken anything for relief.  Denies other URI symptoms.   Sore Throat    This is a new problem. The current episode started today. The problem has been unchanged. There has been no fever. The pain is at a severity of 4/10. Pertinent negatives include no congestion, coughing, headaches, stridor or swollen glands. She has tried nothing for the symptoms.   Knee Pain    The incident occurred more than 1 week ago. The incident occurred at home. There was no injury mechanism. The pain is present in the right knee. The pain is at a severity of 8/10. The pain is severe. Associated symptoms include an inability to bear weight. Pertinent negatives include no numbness or tingling. She reports no foreign bodies present. The symptoms are aggravated by movement and weight bearing.     Review of Systems   Constitutional: Negative for chills and fever.   HENT: Positive for sore throat. Negative for congestion.    Respiratory: Negative for cough and stridor.    Cardiovascular: Negative.    Genitourinary: Negative.    Musculoskeletal: Positive for arthralgias.   Neurological: Negative for tingling, numbness and headaches.   Psychiatric/Behavioral: Negative.        Objective:      Physical Exam   Constitutional: She is oriented to person, place, and time. She appears well-developed and  well-nourished.   HENT:   Head: Normocephalic.   Right Ear: Tympanic membrane normal.   Left Ear: Tympanic membrane normal.   Nose: Rhinorrhea present. Right sinus exhibits no maxillary sinus tenderness and no frontal sinus tenderness. Left sinus exhibits no maxillary sinus tenderness and no frontal sinus tenderness.   Mouth/Throat: No posterior oropharyngeal edema or posterior oropharyngeal erythema.   Eyes: EOM are normal.   Neck: Neck supple.   Cardiovascular: Normal rate and normal heart sounds.    Pulmonary/Chest: Effort normal and breath sounds normal.   Musculoskeletal:        Right knee: She exhibits decreased range of motion. She exhibits no swelling, no effusion and no erythema. Tenderness found. Lateral joint line tenderness noted.   Lymphadenopathy:     She has no cervical adenopathy.   Neurological: She is alert and oriented to person, place, and time.   Skin: Skin is warm and dry.   Psychiatric: She has a normal mood and affect.   Vitals reviewed.      Assessment:       1. Chronic pain of right knee    2. Pyogenic arthritis of right knee joint, due to unspecified organism    3. Type II or unspecified type diabetes mellitus with neurological manifestations, not stated as uncontrolled    4. Tobacco abuse    5. Obesity (BMI 30.0-34.9)    6. Viral pharyngitis        Plan:   Chronic pain of right knee    Pyogenic arthritis of right knee joint, due to unspecified organism  -     CBC auto differential; Future; Expected date: 1/31/17  Complete prescribed dose of clindamycin.  Purchase over the counter probiotics to avoid C.Dfif  Type II or unspecified type diabetes mellitus with neurological manifestations, not stated as uncontrolled  -     Hemoglobin A1c; Future; Expected date: 1/31/17  -     Microalbumin/creatinine urine ratio; Future; Expected date: 1/31/17  UNCONTROLLED.  Last A1C 6/2016=11  -diet and exercise to aid in management  -pt instructed to return to clinic for fasting labs in 5 days.   Tobacco  abuse  -smoking cessation  Obesity (BMI 30.0-34.9)  -     Lipid panel; Future; Expected date: 1/31/17  -uncontrolled. Diet and exercise to aid in management.   Viral pharyngitis  -     POCT Rapid Strep A  -     fluticasone (FLONASE) 50 mcg/actuation nasal spray; 1 spray by Each Nare route once daily.  Dispense: 16 g; Refill: 0  -symptomatic treatment discussed. Verbalized understanding.     Follow up pending lab results

## 2017-02-03 ENCOUNTER — LAB VISIT (OUTPATIENT)
Dept: LAB | Facility: HOSPITAL | Age: 42
End: 2017-02-03
Attending: FAMILY MEDICINE
Payer: MEDICAID

## 2017-02-03 DIAGNOSIS — E11.49 TYPE II OR UNSPECIFIED TYPE DIABETES MELLITUS WITH NEUROLOGICAL MANIFESTATIONS, NOT STATED AS UNCONTROLLED(250.60): ICD-10-CM

## 2017-02-03 DIAGNOSIS — E66.9 OBESITY (BMI 30.0-34.9): ICD-10-CM

## 2017-02-03 DIAGNOSIS — M00.9 PYOGENIC ARTHRITIS OF RIGHT KNEE JOINT, DUE TO UNSPECIFIED ORGANISM: ICD-10-CM

## 2017-02-03 LAB
BASOPHILS # BLD AUTO: 0.02 K/UL
BASOPHILS NFR BLD: 0.2 %
DIFFERENTIAL METHOD: ABNORMAL
EOSINOPHIL # BLD AUTO: 0.3 K/UL
EOSINOPHIL NFR BLD: 3 %
ERYTHROCYTE [DISTWIDTH] IN BLOOD BY AUTOMATED COUNT: 12.4 %
HCT VFR BLD AUTO: 41.7 %
HGB BLD-MCNC: 14.6 G/DL
LYMPHOCYTES # BLD AUTO: 3.3 K/UL
LYMPHOCYTES NFR BLD: 33 %
MCH RBC QN AUTO: 31.4 PG
MCHC RBC AUTO-ENTMCNC: 35 %
MCV RBC AUTO: 90 FL
MONOCYTES # BLD AUTO: 1.1 K/UL
MONOCYTES NFR BLD: 10.8 %
NEUTROPHILS # BLD AUTO: 5.2 K/UL
NEUTROPHILS NFR BLD: 52.8 %
PLATELET # BLD AUTO: 265 K/UL
PMV BLD AUTO: 10.4 FL
RBC # BLD AUTO: 4.65 M/UL
WBC # BLD AUTO: 9.92 K/UL

## 2017-02-03 PROCEDURE — 36415 COLL VENOUS BLD VENIPUNCTURE: CPT | Mod: PO

## 2017-02-03 PROCEDURE — 85025 COMPLETE CBC W/AUTO DIFF WBC: CPT

## 2017-02-03 PROCEDURE — 80061 LIPID PANEL: CPT

## 2017-02-03 PROCEDURE — 83036 HEMOGLOBIN GLYCOSYLATED A1C: CPT

## 2017-02-04 LAB
BACTERIA FLD CULT: NORMAL
CHOLEST/HDLC SERPL: 5.5 {RATIO}
ESTIMATED AVG GLUCOSE: 246 MG/DL
HBA1C MFR BLD HPLC: 10.2 %
HDL/CHOLESTEROL RATIO: 18.3 %
HDLC SERPL-MCNC: 164 MG/DL
HDLC SERPL-MCNC: 30 MG/DL
LDLC SERPL CALC-MCNC: 103.8 MG/DL
NONHDLC SERPL-MCNC: 134 MG/DL
TRIGL SERPL-MCNC: 151 MG/DL

## 2017-02-21 DIAGNOSIS — Q87.40 MARFAN'S SYNDROME: ICD-10-CM

## 2017-02-21 DIAGNOSIS — I10 ESSENTIAL HYPERTENSION: ICD-10-CM

## 2017-02-21 RX ORDER — FLUOXETINE HYDROCHLORIDE 40 MG/1
CAPSULE ORAL
Qty: 30 CAPSULE | Refills: 11 | Status: SHIPPED | OUTPATIENT
Start: 2017-02-21 | End: 2017-11-15 | Stop reason: SDUPTHER

## 2017-02-21 RX ORDER — ATENOLOL 50 MG/1
TABLET ORAL
Qty: 30 TABLET | Refills: 11 | Status: SHIPPED | OUTPATIENT
Start: 2017-02-21 | End: 2020-10-09 | Stop reason: ALTCHOICE

## 2017-02-22 ENCOUNTER — OFFICE VISIT (OUTPATIENT)
Dept: DIABETES | Facility: CLINIC | Age: 42
End: 2017-02-22
Payer: MEDICAID

## 2017-02-22 ENCOUNTER — LAB VISIT (OUTPATIENT)
Dept: LAB | Facility: HOSPITAL | Age: 42
End: 2017-02-22
Attending: NURSE PRACTITIONER
Payer: MEDICAID

## 2017-02-22 VITALS
BODY MASS INDEX: 32.34 KG/M2 | WEIGHT: 225.88 LBS | DIASTOLIC BLOOD PRESSURE: 76 MMHG | HEIGHT: 70 IN | SYSTOLIC BLOOD PRESSURE: 106 MMHG

## 2017-02-22 DIAGNOSIS — Z79.4 TYPE 2 DIABETES MELLITUS WITHOUT COMPLICATION, WITH LONG-TERM CURRENT USE OF INSULIN: ICD-10-CM

## 2017-02-22 DIAGNOSIS — I10 ESSENTIAL HYPERTENSION: ICD-10-CM

## 2017-02-22 DIAGNOSIS — E11.49 TYPE II OR UNSPECIFIED TYPE DIABETES MELLITUS WITH NEUROLOGICAL MANIFESTATIONS, NOT STATED AS UNCONTROLLED(250.60): ICD-10-CM

## 2017-02-22 DIAGNOSIS — G62.9 NEUROPATHY: ICD-10-CM

## 2017-02-22 DIAGNOSIS — E11.49 TYPE II OR UNSPECIFIED TYPE DIABETES MELLITUS WITH NEUROLOGICAL MANIFESTATIONS, NOT STATED AS UNCONTROLLED(250.60): Primary | ICD-10-CM

## 2017-02-22 DIAGNOSIS — E78.5 HYPERLIPIDEMIA, UNSPECIFIED HYPERLIPIDEMIA TYPE: ICD-10-CM

## 2017-02-22 DIAGNOSIS — E11.9 TYPE 2 DIABETES MELLITUS WITHOUT COMPLICATION, WITH LONG-TERM CURRENT USE OF INSULIN: ICD-10-CM

## 2017-02-22 LAB — GLUCOSE SERPL-MCNC: 353 MG/DL (ref 70–110)

## 2017-02-22 PROCEDURE — 84681 ASSAY OF C-PEPTIDE: CPT

## 2017-02-22 PROCEDURE — 86341 ISLET CELL ANTIBODY: CPT

## 2017-02-22 PROCEDURE — 99999 PR PBB SHADOW E&M-EST. PATIENT-LVL III: CPT | Mod: PBBFAC,,, | Performed by: NURSE PRACTITIONER

## 2017-02-22 PROCEDURE — 99214 OFFICE O/P EST MOD 30 MIN: CPT | Mod: S$PBB,,, | Performed by: NURSE PRACTITIONER

## 2017-02-22 PROCEDURE — 36415 COLL VENOUS BLD VENIPUNCTURE: CPT | Mod: PO

## 2017-02-22 PROCEDURE — 86337 INSULIN ANTIBODIES: CPT

## 2017-02-22 RX ORDER — ATORVASTATIN CALCIUM 40 MG/1
TABLET, FILM COATED ORAL
Qty: 30 TABLET | Refills: 11 | Status: SHIPPED | OUTPATIENT
Start: 2017-02-22 | End: 2018-08-10 | Stop reason: SDUPTHER

## 2017-02-22 RX ORDER — GLIPIZIDE 10 MG/1
10 TABLET ORAL 2 TIMES DAILY
Qty: 60 TABLET | Refills: 3 | Status: SHIPPED | OUTPATIENT
Start: 2017-02-22 | End: 2017-11-15 | Stop reason: SDUPTHER

## 2017-02-22 NOTE — PROGRESS NOTES
"PCP: Evelyn Wynn MD    Subjective:     HISTORY OF PRESENT ILLNESS: 41 year old   female patient is in clinic today for diabetes.  She has been lost to follow up > 2 years.  Patient has had Type II diabetes since 2008.  She is to be enrolled in diabetes education classes. Most recent A1C is 10.2, ADA recommends less than 7.0.  She is allergic to metformin.  Blood glucose testing is performed regularly at least 2 x day.  No records today.  Per recall, fasting BGs are 150 - 214; hs 250 - 300.  She reports medication noncompliance.  She has only been taking Lantus once a day and only takes her Apidra once a day.  She voices frustration with having to take multiple shots a day and is wanting an easier regimen.      She has gained 1 pound since last visit.  She currently has social stressors.  She just lost her job and is trying to get on disability. She is also the primary caregiver of her teenage son who has bipolar and  ADHD.  Last month, she was seen in the ER for right knee pain, now followed by rheumatology.      Patient denies polyuria, polydipsia, polyphagia, or blurred vision.  Has a history of IBS, so has frequent episodes of diarrhea.  She has completed Harvoni.  Denies nausea, vomiting, diarrhea or hypoglycemia.    Height: 5" 10', Weight: 225 pounds, BMI 32.88  Blood Glucose reading this AM: Not Taken  Blood Glucose reading in clinic: 353 mg/dl at 11:54 am ( just finished eating sausage biscuit, coffee )    DM MEDICATIONS:   Lantus 60 units once a day ( not BID as prescribed )  Apidra 15 units, usually only once a day ( usually breakfast ), occasionally twice.   Glipizide 10 mg BID ac     Labs Reviewed.    REVIEW OF SYSTEMS:  General: Denies fever, chills, change in appetite.  HEENT: Denies impaired hearing, dysphagia.  Respiratory: Denies shortness of breath, cough or wheezing.  Cardiovascular: Denies chest pain, palpitations.  GI: Denies hematochezia.  : Denies hematuria, dysuria or " frequency. Has hx of IBS.  MS:  Normal gait. Denies difficulty with mobility, muscle or joint pain.  SKIN: Denies rashes and lesions.  Neuro: Has numbness or tingling in the hands or feet.   PSYCH: Denies depression or anxiety. No suicidal ideations.  ENDO: See HPI.    STANDARDS OF CARE:  Eye doctor: Dr. Hopson, Last exam 11 / 2015.  Needs appointment scheduled.   Dental exam: Recommend regular exams; denies gums bleeding.  Podiatry doctor: No podiatrist    ACTIVITY LEVEL: No routine exercise.  MEAL PLANNING: Number of meals per day - 3. Breakfast can be 2 slices of toast with 2 scrambled eggs OR bowl of honey nuts cereal. Lunch can be ham sandwich with occasional chips OR can of soup. Dinner can be baked chicken with macaroni. Number of snacks per day - 2.  Per dietary recall, patient is not limiting carbohydrates, saturated fats and sodium.     BLOOD GLUCOSE TESTING: Self-monitoring with TRUE RESULT meter  SOCIAL HISTORY: Single. Lives with children.  No longer working.  She does not drink. Current smoker.     Objective:     PHYSICAL EXAMINATION:  GENERAL: WDWN  female in no acute distress, ambulatory, responds appropriately. AAO X 3.   NECK: Supple, no thyromegaly, no cervical or supraclavicular lymphadenopathy, no carotid bruit.  HEART: Regular rate and rhythm. No rubs, murmurs or gallops.  LUNGS: CTA bilaterally. Unlabored breathing, no use of accessory muscles.  MUSCULOSKELETAL: Normal gait and muscle strength.  ABDOMEN: Active bowel sounds X 4, no masses or tenderness.   SKIN: Warm, dry skin. No lesions or abrasions. Clean, dry well-healed injection sites.   NEUROLOGIC: Cranial nerves II-XII grossly intact.   FOOT EXAM: .Protective Sensation (w/ 10 gram monofilament):  Right: Intact  Left: Intact  //  Visual Inspection: Normal -  Bilateral  //  Pedal Pulses:   Right: Present  Left: Present    Assessment:     EDUCATIONAL ASSESSMENT:  1. Impediments in learning class environment - NONE.  2. Needs  improvement in self-care management skills.    (1.) Diabetes Type II, neuropathy - uncontrolled on Lantus, Apidra, A1C 10.2  (2.) Hypertension - controlled lisinopril, atenolol  (3.) Hyperlipidemia - controlled on Lipitor  (4.) Obesity, BMI 32.88    Plan:     1.) Patient was instructed to monitor blood glucose 2 - 3 x daily, fasting and ac dinner or at bedtime.   Discussed ADA goal for fasting blood sugar, 80 - 130 mg/dL; pp blood sugars below 180 mg/dl. Also, discussed prevention of hypoglycemia and the need to adjust goals to higher levels if persistent hypoglycemia.  Reminded to bring BG records or meter to each visit for review.  2.) Reviewed pathophysiology of Type 2 diabetes, complications related to the disease, importance of annual dilated eye exam and daily foot examination.  3.) She is no longer counting carbs.  She reports noncompliance with insulin regimen.  She has only been taking Lantus once a day and only takes her Apidra once a day.  She voices frustration with having to take multiple shots a day and is wanting an easier regimen.  Will stop Lantus, Apidra and switch patient to mixed insulin, Humalog 50/50.  Discussed MOA, onset, side effects, dosage of meds.  She agrees to take 40 units BID ac.  I explained that she needs to eat within 15 - 20 minutes of taking insulin.  Continue Glipizide 10 mg BID ac.    4.) Reviewed carb counting, portion control, importance of spacing meals throughout the day to prevent post prandial elevations. Recommended low saturated fat, low sodium diet to aid in control of hypertension and cholesterol.  5.) Discussed activity, benefits, methods, and precautions. Recommended patient start some form of exercise and increase as tolerated to 30 minutes per day to facilitate weight loss and aid in control of BGs.   6.) Labs Today: C peptide, ARNOLD, and insulin antibody.   7.) Discussed need for smoking cessation, ways to break habit.  Patient will try to start cutting back with  intentions of quitting.   Discussed complications associated with diabetes and smoking.  8.) Return to clinic in 2 weeks  for follow up.  Advised patient to call clinic with any questions or concerns.    GERI BaezC, CDE

## 2017-02-23 ENCOUNTER — TELEPHONE (OUTPATIENT)
Dept: GASTROENTEROLOGY | Facility: CLINIC | Age: 42
End: 2017-02-23

## 2017-02-23 ENCOUNTER — TELEPHONE (OUTPATIENT)
Dept: OBSTETRICS AND GYNECOLOGY | Facility: CLINIC | Age: 42
End: 2017-02-23

## 2017-02-23 LAB — C PEPTIDE SERPL-MCNC: 5.3 NG/ML

## 2017-02-23 NOTE — TELEPHONE ENCOUNTER
----- Message from Liz Couch sent at 2/23/2017 10:15 AM CST -----  Patient would like an appointment for a follow up.  Call her at [phone].                                    barrera

## 2017-02-24 NOTE — TELEPHONE ENCOUNTER
----- Message from Jenifer Naranjo sent at 2/24/2017 11:59 AM CST -----  Contact: Patient  Patient is returning a call, please call her back at [phone]. Thank you

## 2017-02-24 NOTE — TELEPHONE ENCOUNTER
Patient states that she wants to schedule next depo.  She has not received a injection since her last appointment 10/05/2016.  What should patient do before she receives next depo blood work or will she need to come in to discuss.  Please advise

## 2017-02-27 LAB — GAD65 AB SER-SCNC: 0 NMOL/L

## 2017-02-28 LAB — INSULIN AB SER-SCNC: 0 NMOL/L (ref 0–0.02)

## 2017-03-02 ENCOUNTER — OFFICE VISIT (OUTPATIENT)
Dept: OPHTHALMOLOGY | Facility: CLINIC | Age: 42
End: 2017-03-02
Payer: MEDICAID

## 2017-03-02 DIAGNOSIS — E11.9 DIABETES MELLITUS TYPE 2 WITHOUT RETINOPATHY: Primary | ICD-10-CM

## 2017-03-02 DIAGNOSIS — H52.4 BILATERAL PRESBYOPIA: ICD-10-CM

## 2017-03-02 DIAGNOSIS — H52.13 MYOPIA, BILATERAL: ICD-10-CM

## 2017-03-02 PROCEDURE — 99211 OFF/OP EST MAY X REQ PHY/QHP: CPT | Mod: PBBFAC | Performed by: OPTOMETRIST

## 2017-03-02 PROCEDURE — 92015 DETERMINE REFRACTIVE STATE: CPT | Mod: ,,, | Performed by: OPTOMETRIST

## 2017-03-02 PROCEDURE — 99999 PR PBB SHADOW E&M-EST. PATIENT-LVL I: CPT | Mod: PBBFAC,,, | Performed by: OPTOMETRIST

## 2017-03-02 PROCEDURE — 92014 COMPRE OPH EXAM EST PT 1/>: CPT | Mod: S$PBB,,, | Performed by: OPTOMETRIST

## 2017-03-02 RX ORDER — CHLORHEXIDINE GLUCONATE ORAL RINSE 1.2 MG/ML
SOLUTION DENTAL
COMMUNITY
Start: 2017-02-20 | End: 2017-11-01

## 2017-03-02 NOTE — PROGRESS NOTES
HPI     IDDM exam. No visual complaints.  Last eye exam 11/10/2015 [facility]. Patient   wears reading glasses .        Last edited by Matilde Espinosa on 3/2/2017 10:54 AM.         Assessment /Plan     For exam results, see Encounter Report.    Diabetes mellitus type 2 without retinopathy    Myopia, bilateral    Bilateral presbyopia      No Background Diabetic Retinopathy    Dispense Final Rx for glasses.  RTC 1 year

## 2017-03-06 ENCOUNTER — OFFICE VISIT (OUTPATIENT)
Dept: GASTROENTEROLOGY | Facility: CLINIC | Age: 42
End: 2017-03-06
Payer: MEDICAID

## 2017-03-06 ENCOUNTER — LAB VISIT (OUTPATIENT)
Dept: LAB | Facility: HOSPITAL | Age: 42
End: 2017-03-06
Attending: NURSE PRACTITIONER
Payer: MEDICAID

## 2017-03-06 VITALS
SYSTOLIC BLOOD PRESSURE: 122 MMHG | DIASTOLIC BLOOD PRESSURE: 70 MMHG | WEIGHT: 224.88 LBS | BODY MASS INDEX: 32.19 KG/M2 | HEIGHT: 70 IN | HEART RATE: 60 BPM

## 2017-03-06 DIAGNOSIS — B18.2 CHRONIC HEPATITIS C WITHOUT HEPATIC COMA: Primary | ICD-10-CM

## 2017-03-06 DIAGNOSIS — B18.2 CHRONIC HEPATITIS C WITHOUT HEPATIC COMA: ICD-10-CM

## 2017-03-06 LAB
ALBUMIN SERPL BCP-MCNC: 3.4 G/DL
ALP SERPL-CCNC: 110 U/L
ALT SERPL W/O P-5'-P-CCNC: 21 U/L
ANION GAP SERPL CALC-SCNC: 11 MMOL/L
AST SERPL-CCNC: 14 U/L
BASOPHILS # BLD AUTO: 0.01 K/UL
BASOPHILS NFR BLD: 0.1 %
BILIRUB SERPL-MCNC: 0.2 MG/DL
BUN SERPL-MCNC: 7 MG/DL
CALCIUM SERPL-MCNC: 9.1 MG/DL
CHLORIDE SERPL-SCNC: 104 MMOL/L
CO2 SERPL-SCNC: 22 MMOL/L
CREAT SERPL-MCNC: 0.9 MG/DL
DIFFERENTIAL METHOD: ABNORMAL
EOSINOPHIL # BLD AUTO: 0.2 K/UL
EOSINOPHIL NFR BLD: 1.6 %
ERYTHROCYTE [DISTWIDTH] IN BLOOD BY AUTOMATED COUNT: 12.7 %
EST. GFR  (AFRICAN AMERICAN): >60 ML/MIN/1.73 M^2
EST. GFR  (NON AFRICAN AMERICAN): >60 ML/MIN/1.73 M^2
GLUCOSE SERPL-MCNC: 380 MG/DL
HCT VFR BLD AUTO: 42.9 %
HGB BLD-MCNC: 14.2 G/DL
INR PPP: 0.9
LYMPHOCYTES # BLD AUTO: 3.9 K/UL
LYMPHOCYTES NFR BLD: 39 %
MCH RBC QN AUTO: 31.1 PG
MCHC RBC AUTO-ENTMCNC: 33.1 %
MCV RBC AUTO: 94 FL
MONOCYTES # BLD AUTO: 0.7 K/UL
MONOCYTES NFR BLD: 7 %
NEUTROPHILS # BLD AUTO: 5.2 K/UL
NEUTROPHILS NFR BLD: 52.1 %
PLATELET # BLD AUTO: 266 K/UL
PMV BLD AUTO: 11 FL
POTASSIUM SERPL-SCNC: 4.2 MMOL/L
PROT SERPL-MCNC: 6.9 G/DL
PROTHROMBIN TIME: 9.9 SEC
RBC # BLD AUTO: 4.56 M/UL
SODIUM SERPL-SCNC: 137 MMOL/L
WBC # BLD AUTO: 9.92 K/UL

## 2017-03-06 PROCEDURE — 99214 OFFICE O/P EST MOD 30 MIN: CPT | Mod: S$PBB,,, | Performed by: NURSE PRACTITIONER

## 2017-03-06 PROCEDURE — 80053 COMPREHEN METABOLIC PANEL: CPT

## 2017-03-06 PROCEDURE — 87522 HEPATITIS C REVRS TRNSCRPJ: CPT

## 2017-03-06 PROCEDURE — 85610 PROTHROMBIN TIME: CPT | Mod: PO

## 2017-03-06 PROCEDURE — 85025 COMPLETE CBC W/AUTO DIFF WBC: CPT

## 2017-03-06 PROCEDURE — 99999 PR PBB SHADOW E&M-EST. PATIENT-LVL III: CPT | Mod: PBBFAC,,, | Performed by: NURSE PRACTITIONER

## 2017-03-06 PROCEDURE — 36415 COLL VENOUS BLD VENIPUNCTURE: CPT | Mod: PO

## 2017-03-06 NOTE — PROGRESS NOTES
Clinic Follow Up:  [facility] Follow Up Note    Reason for Follow Up:  The encounter diagnosis was Chronic hepatitis C without hepatic coma.    PCP: Evelyn Wynn       HPI:  This is a 41 y.o. female here for follow up of the above  She reprots that since her last visit she has been feeling overall well without any complaints.  She was previously treated with Harvoni for her HCV.  SVR as of 3/16.   Denies any abdominal pain.  No nausea or vomiting.  No change in bowel pattern.  No melena or hematochezia. No weight loss.  No upper GI bleeding.  No ascites or BLE.  No overt confusion.      Review of Systems   Constitutional: Negative for chills, fever, malaise/fatigue and weight loss.   Respiratory: Negative for cough.    Cardiovascular: Negative for chest pain.   Gastrointestinal:        Per HPI   Musculoskeletal: Negative for myalgias.   Skin: Negative for itching and rash.   Neurological: Negative for headaches.   Psychiatric/Behavioral: The patient is not nervous/anxious.        Medical History:  Past Medical History:   Diagnosis Date    Arthritis     Diabetes mellitus      2015 Insulin x 2 years     DM (diabetes mellitus)      2017 Insulin x 3 years 2013    Hepatitis C     Hyperlipidemia     Hypertension     IBS (irritable bowel syndrome)     Kidney stone     Marfan syndrome     Mitral valve prolapse        Surgical History:   Past Surgical History:   Procedure Laterality Date    APPENDECTOMY      BUNIONECTOMY      both feet     SECTION      x 2    CHOLECYSTECTOMY      TUBAL LIGATION         Family History:   Family History   Problem Relation Age of Onset    Heart disease Mother     Thrombophilia Father      DVT    Hypertension Father     Stroke Maternal Aunt     Heart disease Maternal Aunt     Stroke Maternal Uncle     Heart disease Maternal Uncle     Breast cancer Neg Hx     Colon cancer Neg Hx     Ovarian cancer Neg  "Hx        Social History:   Social History   Substance Use Topics    Smoking status: Current Every Day Smoker     Packs/day: 0.20     Years: 20.00     Types: Cigarettes     Last attempt to quit: 4/9/2015    Smokeless tobacco: Never Used      Comment: "once in a blue moon"    Alcohol use No       Allergies: Reviewed    Home Medications:  Current Outpatient Prescriptions on File Prior to Visit   Medication Sig Dispense Refill    ascorbic acid (VITAMIN C) 100 MG tablet Take 100 mg by mouth once daily.      aspirin (ECOTRIN) 81 MG EC tablet Take 81 mg by mouth once daily.      atenolol (TENORMIN) 50 MG tablet TAKE 1 TABLET BY MOUTH ONCE DAILY 30 tablet 11    atorvastatin (LIPITOR) 40 MG tablet TAKE 1 TABLET BY MOUTH ONCE DAILY 30 tablet 11    chlorhexidine (PERIDEX) 0.12 % solution       clopidogrel (PLAVIX) 75 mg tablet TAKE 1 TABLET BY MOUTH ONCE DAILY 30 tablet 11    colestipol (COLESTID) 1 gram Tab TAKE 2 TABLETS BY MOUTH THREE TIMES DAILY. DO NOT TAKE OTHER MEDICATION WITHIN 1 HOUR BEFORE OR 4 HOURS AFTER TAKING COLESTIPOL. 180 tablet 5    diclofenac sodium (VOLTAREN) 1 % Gel Apply 2 g topically 4 (four) times daily. 300 g 3    EASY TOUCH 31 gauge x 5/16" Ndle USE FIVE TIMES DAILY WITH LANTUS AND HUMALOG 30 each 3    ERGOCALCIFEROL, VITAMIN D2, (VITAMIN D ORAL) Take 1 tablet by mouth once daily.       fluoxetine (PROZAC) 40 MG capsule TAKE 1 CAPSULE BY MOUTH ONCE DAILY 30 capsule 11    fluticasone (FLONASE) 50 mcg/actuation nasal spray 1 spray by Each Nare route once daily. 16 g 0    gabapentin (NEURONTIN) 400 MG capsule TAKE 1 CAPSULE BY MOUTH 3 TIMES DAILY 90 capsule 6    glipiZIDE (GLUCOTROL) 10 MG tablet Take 1 tablet (10 mg total) by mouth 2 (two) times daily. 60 tablet 3    insulin lispro protamin-lispro 100 unit/mL (50-50) InPn Inject 40 Units into the skin 2 (two) times daily before meals. 2 Box 3    insulin needles, disposable, (BD INSULIN PEN NEEDLE UF SHORT) 31 X 5/16 " Ndle USE TWICE " "DAILY AS DIRECTED 100 each 0    lisinopril 10 MG tablet Take 1 tablet (10 mg total) by mouth once daily. 30 tablet 3    nicotine (NICODERM CQ) 21 mg/24 hr Place 1 patch onto the skin once daily. 28 patch 1    tizanidine (ZANAFLEX) 4 MG tablet Take 1 tablet (4 mg total) by mouth every evening. 30 tablet 4     Current Facility-Administered Medications on File Prior to Visit   Medication Dose Route Frequency Provider Last Rate Last Dose    medroxyPROGESTERone (DEPO-PROVERA) injection 150 mg  150 mg Intramuscular Q90 Days Ky Peralta MD   150 mg at 10/05/16 1154       Physical Exam:  Vital Signs:  /70  Pulse 60  Ht 5' 10" (1.778 m)  Wt 102 kg (224 lb 13.9 oz)  BMI 32.27 kg/m2  Body mass index is 32.27 kg/(m^2).  Physical Exam   Constitutional: She is oriented to person, place, and time. She appears well-developed and well-nourished.   HENT:   Head: Normocephalic.   Eyes: No scleral icterus.   Neck: Normal range of motion.   Cardiovascular: Normal rate and regular rhythm.    Pulmonary/Chest: Effort normal and breath sounds normal.   Abdominal: Soft. Bowel sounds are normal. She exhibits no distension. There is no tenderness.   Musculoskeletal: Normal range of motion.   Neurological: She is alert and oriented to person, place, and time.   Skin: Skin is warm and dry.   Psychiatric: She has a normal mood and affect.   Vitals reviewed.      Labs: Pertinent labs reviewed.    Assessment:  1. Chronic hepatitis C without hepatic coma        Recommendations:  Chronic hepatitis C without hepatic coma  - Will get repeat labs for sureveillance  -     CBC auto differential; Future; Expected date: 3/6/17  -     Comprehensive metabolic panel; Future; Expected date: 3/6/17  -     Protime-INR; Future; Expected date: 3/6/17  -     HEPATITIS C RNA, QUANTITATIVE, PCR; Future; Expected date: 3/6/17        Return to Clinic:    Return if symptoms worsen or fail to improve.      "

## 2017-03-06 NOTE — MR AVS SNAPSHOT
[facility]  [address] [place] [address]  Phone: [phone]  Fax: [phone]                  Jenifer Westfall   3/6/2017 1:00 PM   Office Visit    Description:  Female : 1975   Provider:  DILIP Gupta   Department:  [facility]           Reason for Visit     Hepatitis C           Diagnoses this Visit        Comments    Chronic hepatitis C without hepatic coma    -  Primary            To Do List           Future Appointments        Provider Department Dept Phone    3/6/2017 3:00 PM LAB, SAME DAY [facility] [phone]    3/13/2017 11:30 AM Karla Leon NP-C, [facility] - [facility] [phone]    3/15/2017 11:30 AM Ky Peralta MD [facility]/ -[phone]      Goals (5 Years of Data)     None      Follow-Up and Disposition     Return if symptoms worsen or fail to improve.      [facility]     [facility] Nurse Care Line -  Assistance  Registered nurses in the [facility] provide clinical advisement, health education, appointment booking, and other advisory services.  Call for this free service at [phone].             Medications           Message regarding Medications     Verify the changes and/or additions to your medication regime listed below are the same as discussed with your clinician today.  If any of these changes or additions are incorrect, please notify your healthcare provider.             Verify that the below list of medications is an accurate representation of the medications you are currently taking.  If none reported, the list may be blank. If incorrect, please contact your healthcare provider. Carry this list with you in case of emergency.           Current Medications     ascorbic acid (VITAMIN C) 100 MG tablet Take 100 mg by mouth once daily.    aspirin (ECOTRIN) 81 MG EC tablet Take 81 mg by mouth once daily.    atenolol (TENORMIN) 50 MG tablet TAKE 1  "TABLET BY MOUTH ONCE DAILY    atorvastatin (LIPITOR) 40 MG tablet TAKE 1 TABLET BY MOUTH ONCE DAILY    chlorhexidine (PERIDEX) 0.12 % solution     clopidogrel (PLAVIX) 75 mg tablet TAKE 1 TABLET BY MOUTH ONCE DAILY    colestipol (COLESTID) 1 gram Tab TAKE 2 TABLETS BY MOUTH THREE TIMES DAILY. DO NOT TAKE OTHER MEDICATION WITHIN 1 HOUR BEFORE OR 4 HOURS AFTER TAKING COLESTIPOL.    diclofenac sodium (VOLTAREN) 1 % Gel Apply 2 g topically 4 (four) times daily.    EASY TOUCH 31 gauge x 5/16" Ndle USE FIVE TIMES DAILY WITH LANTUS AND HUMALOG    ERGOCALCIFEROL, VITAMIN D2, (VITAMIN D ORAL) Take 1 tablet by mouth once daily.     fluoxetine (PROZAC) 40 MG capsule TAKE 1 CAPSULE BY MOUTH ONCE DAILY    fluticasone (FLONASE) 50 mcg/actuation nasal spray 1 spray by Each Nare route once daily.    gabapentin (NEURONTIN) 400 MG capsule TAKE 1 CAPSULE BY MOUTH 3 TIMES DAILY    glipiZIDE (GLUCOTROL) 10 MG tablet Take 1 tablet (10 mg total) by mouth 2 (two) times daily.    insulin lispro protamin-lispro 100 unit/mL (50-50) InPn Inject 40 Units into the skin 2 (two) times daily before meals.    insulin needles, disposable, (BD INSULIN PEN NEEDLE UF SHORT) 31 X 5/16 " Ndle USE TWICE DAILY AS DIRECTED    lisinopril 10 MG tablet Take 1 tablet (10 mg total) by mouth once daily.    nicotine (NICODERM CQ) 21 mg/24 hr Place 1 patch onto the skin once daily.    tizanidine (ZANAFLEX) 4 MG tablet Take 1 tablet (4 mg total) by mouth every evening.           Clinical Reference Information           Your Vitals Were     BP Pulse Height Weight BMI    122/70 60 5' 10" (1.778 m) 102 kg (224 lb 13.9 oz) 32.27 kg/m2      Blood Pressure          Most Recent Value    BP  122/70      Allergies as of 3/6/2017     Compazine [Prochlorperazine Edisylate]    Levaquin [Levofloxacin]    Metformin    Ibuprofen    Morphine      Immunizations Administered on Date of Encounter - 3/6/2017     None      Orders Placed During Today's Visit     Future Labs/Procedures " Expected by Expires    CBC auto differential  3/6/2017 5/5/2018    Comprehensive metabolic panel  3/6/2017 5/5/2018    HEPATITIS C RNA, QUANTITATIVE, PCR  3/6/2017 5/5/2018    Protime-INR  3/6/2017 5/5/2018      Smoking Cessation     If you would like to quit smoking:   You may be eligible for free services if you are a [place] resident and started smoking cigarettes before September 1, 1988.  Call the Smoking Cessation Trust (SCT) toll free at ([phone] 572-6294 or (775) 608-5994.   Call 5-428-QUIT-NOW if you do not meet the above criteria.            Language Assistance Services     ATTENTION: Language assistance services are available, free of charge. Please call [phone].      ATENCIÓN: Si nereidala judit, tiene a wilson disposición servicios gratuitos de asistencia lingüística. Llame al [phone].     CHÚ Ý: N?u b?n nói Ti?ng Vi?t, có các d?ch v? h? tr? ngôn ng? mi?n phí dành cho b?n. G?i s? [phone].         [facility] complies with applicable Federal civil rights laws and does not discriminate on the basis of race, color, national origin, age, disability, or sex.

## 2017-03-08 LAB
HCV LOG: <1.08 LOG (10) IU/ML
HCV RNA QUANT PCR: <12 IU/ML
HCV, QUALITATIVE: NOT DETECTED IU/ML

## 2017-03-13 ENCOUNTER — OFFICE VISIT (OUTPATIENT)
Dept: DIABETES | Facility: CLINIC | Age: 42
End: 2017-03-13
Payer: MEDICAID

## 2017-03-13 VITALS
HEIGHT: 70 IN | DIASTOLIC BLOOD PRESSURE: 86 MMHG | BODY MASS INDEX: 31.86 KG/M2 | WEIGHT: 222.56 LBS | SYSTOLIC BLOOD PRESSURE: 132 MMHG

## 2017-03-13 DIAGNOSIS — E78.5 HYPERLIPIDEMIA, UNSPECIFIED HYPERLIPIDEMIA TYPE: ICD-10-CM

## 2017-03-13 DIAGNOSIS — E11.49 TYPE II OR UNSPECIFIED TYPE DIABETES MELLITUS WITH NEUROLOGICAL MANIFESTATIONS, NOT STATED AS UNCONTROLLED(250.60): Primary | ICD-10-CM

## 2017-03-13 DIAGNOSIS — I10 ESSENTIAL HYPERTENSION: ICD-10-CM

## 2017-03-13 LAB — GLUCOSE SERPL-MCNC: 333 MG/DL (ref 70–110)

## 2017-03-13 PROCEDURE — 99213 OFFICE O/P EST LOW 20 MIN: CPT | Mod: PBBFAC,PO | Performed by: NURSE PRACTITIONER

## 2017-03-13 PROCEDURE — 99214 OFFICE O/P EST MOD 30 MIN: CPT | Mod: S$PBB,,, | Performed by: NURSE PRACTITIONER

## 2017-03-13 PROCEDURE — 82948 REAGENT STRIP/BLOOD GLUCOSE: CPT | Mod: PBBFAC,PO | Performed by: NURSE PRACTITIONER

## 2017-03-13 PROCEDURE — 99999 PR PBB SHADOW E&M-EST. PATIENT-LVL III: CPT | Mod: PBBFAC,,, | Performed by: NURSE PRACTITIONER

## 2017-03-13 NOTE — MR AVS SNAPSHOT
[facility]  [address]  [facility] [address]  Phone: [phone]  Fax: [phone]                  Jenifer Westfall   3/13/2017 11:30 AM   Office Visit    Description:  Female : 1975   Provider:  CHRISTIANA Mitchell, CYRIL   Department:  [facility]           Reason for Visit     Diabetes           Diagnoses this Visit        Comments    Type II or unspecified type diabetes mellitus with neurological manifestations, not stated as uncontrolled    -  Primary     Essential hypertension                To Do List           Future Appointments        Provider Department Dept Phone    3/15/2017 11:30 AM MD LANI [facility]/ -[phone]    2017 11:30 AM CHRISTIANA Mitchell, CYRIL [facility] [phone]      Goals (5 Years of Data)     None      Follow-Up and Disposition     Return in about 3 months (around 2017).      [facility]     [facility] Nurse Care Line - 24/ Assistance  Registered nurses in the [facility] Center provide clinical advisement, health education, appointment booking, and other advisory services.  Call for this free service at [phone].             Medications           Message regarding Medications     Verify the changes and/or additions to your medication regime listed below are the same as discussed with your clinician today.  If any of these changes or additions are incorrect, please notify your healthcare provider.             Verify that the below list of medications is an accurate representation of the medications you are currently taking.  If none reported, the list may be blank. If incorrect, please contact your healthcare provider. Carry this list with you in case of emergency.           Current Medications     ascorbic acid (VITAMIN C) 100 MG tablet Take 100 mg by mouth once daily.    aspirin (ECOTRIN) 81 MG EC tablet Take 81 mg by mouth once daily.    atenolol  "(TENORMIN) 50 MG tablet TAKE 1 TABLET BY MOUTH ONCE DAILY    atorvastatin (LIPITOR) 40 MG tablet TAKE 1 TABLET BY MOUTH ONCE DAILY    chlorhexidine (PERIDEX) 0.12 % solution     clopidogrel (PLAVIX) 75 mg tablet TAKE 1 TABLET BY MOUTH ONCE DAILY    colestipol (COLESTID) 1 gram Tab TAKE 2 TABLETS BY MOUTH THREE TIMES DAILY. DO NOT TAKE OTHER MEDICATION WITHIN 1 HOUR BEFORE OR 4 HOURS AFTER TAKING COLESTIPOL.    diclofenac sodium (VOLTAREN) 1 % Gel Apply 2 g topically 4 (four) times daily.    EASY TOUCH 31 gauge x 5/16" Ndle USE FIVE TIMES DAILY WITH LANTUS AND HUMALOG    ERGOCALCIFEROL, VITAMIN D2, (VITAMIN D ORAL) Take 1 tablet by mouth once daily.     fluoxetine (PROZAC) 40 MG capsule TAKE 1 CAPSULE BY MOUTH ONCE DAILY    fluticasone (FLONASE) 50 mcg/actuation nasal spray 1 spray by Each Nare route once daily.    gabapentin (NEURONTIN) 400 MG capsule TAKE 1 CAPSULE BY MOUTH 3 TIMES DAILY    glipiZIDE (GLUCOTROL) 10 MG tablet Take 1 tablet (10 mg total) by mouth 2 (two) times daily.    insulin lispro protamin-lispro 100 unit/mL (50-50) InPn Inject 40 Units into the skin 2 (two) times daily before meals.    insulin needles, disposable, (BD INSULIN PEN NEEDLE UF SHORT) 31 X 5/16 " Ndle USE TWICE DAILY AS DIRECTED    lisinopril 10 MG tablet Take 1 tablet (10 mg total) by mouth once daily.    nicotine (NICODERM CQ) 21 mg/24 hr Place 1 patch onto the skin once daily.    tizanidine (ZANAFLEX) 4 MG tablet Take 1 tablet (4 mg total) by mouth every evening.           Clinical Reference Information           Your Vitals Were     BP Height Weight BMI       132/86 5' 10" (1.778 m) 101 kg (222 lb 8.9 oz) 31.93 kg/m2       Blood Pressure          Most Recent Value    BP  132/86      Allergies as of 3/13/2017     Compazine [Prochlorperazine Edisylate]    Levaquin [Levofloxacin]    Metformin    Ibuprofen    Morphine      Immunizations Administered on Date of Encounter - 3/13/2017     None      Orders Placed During Today's Visit     "  Normal Orders This Visit    POCT glucose          3/13/2017 12:02 PM - Jeffrey Kim LPN      Component Results     Component Value Flag Ref Range Units Status    POC Glucose 333 (A) 70 - 110 mg/dL Final            Smoking Cessation     If you would like to quit smoking:   You may be eligible for free services if you are a [place] resident and started smoking cigarettes before September 1, 1988.  Call the Smoking Cessation Trust ([facility]) toll free at ([phone] [phone] or ([phone] 872-0172.   Call 6-565-QUIT-NOW if you do not meet the above criteria.            Language Assistance Services     ATTENTION: Language assistance services are available, free of charge. Please call [phone].      ATENCIÓN: Si habla español, tiene a wilson disposición servicios gratuitos de asistencia lingüística. Llame al [phone].     CHÚ Ý: N?u b?n nói Ti?ng Vi?t, có các d?ch v? h? tr? ngôn ng? mi?n phí dành cho b?n. G?i s? [phone].         [facility] complies with applicable Federal civil rights laws and does not discriminate on the basis of race, color, national origin, age, disability, or sex.

## 2017-03-13 NOTE — PROGRESS NOTES
"PCP: Evelyn Wynn MD    Subjective:     HISTORY OF PRESENT ILLNESS: 41 year old   female patient is in clinic today for diabetes.   Patient has had Type II diabetes since 2008.  She is to be enrolled in diabetes education classes. Most recent A1C is 10.2, ADA recommends less than 7.0.  She is allergic to metformin.  Blood glucose testing is performed regularly at least 2 x day. She forgot her records.    Per recall, fasting BGs are < 130; ac dinner, low 200. She has lost 3 pounds since last visit.  She continues to have social stressors. She is unemployed and is trying to get on disability. She is also the primary caregiver of her teenage son who has bipolar and  ADHD.  Since switching to mixed insulin, patient admits compliance with BID dosing.     Patient denies polyuria, polydipsia, polyphagia, or blurred vision.  Has a history of IBS, therefore she has frequent episodes of diarrhea. She has completed Harvoni.  Denies nausea, vomiting, or diarrhea. Has occasional hypoglycemia.    Height: 5" 10', Weight:  222 pounds, BMI 31.93  Blood Glucose reading this AM:124 mg/dl fasting  Blood Glucose reading in clinic: 333 mg/dl at 11:54 am ( currently, having IBS flare up)     DM MEDICATIONS:   Humalog 50 / 50  - 40 units BID   Glipizide 10 mg BID ac     Labs Reviewed.    REVIEW OF SYSTEMS:  General: Denies fever, chills, change in appetite.  HEENT: Denies impaired hearing, dysphagia.  Respiratory: Denies shortness of breath, cough or wheezing.  Cardiovascular: Denies chest pain, palpitations.  GI: Denies hematochezia.  : Denies hematuria, dysuria or frequency. Has hx of IBS.  MS:  Normal gait. Denies difficulty with mobility, muscle or joint pain.  SKIN: Denies rashes and lesions.  Neuro: Has numbness or tingling in the hands or feet.   PSYCH: Denies depression or anxiety. No suicidal ideations.  ENDO: See HPI.    STANDARDS OF CARE:  Eye doctor: Dr. Hopson, Last exam 11 / 2016  Dental exam: Recommend " regular exams; denies gums bleeding.  Podiatry doctor: No podiatrist    ACTIVITY LEVEL: No routine exercise.  MEAL PLANNING: Number of meals per day - 3. Breakfast can be 2 slices of toast with 2 scrambled eggs OR bowl of honey nuts cereal. Lunch can be ham sandwich with occasional chips OR can of soup. Dinner can be baked chicken with macaroni. Number of snacks per day - 2.  Per dietary recall, patient is not limiting carbohydrates, saturated fats and sodium.     BLOOD GLUCOSE TESTING: Self-monitoring with TRUE RESULT meter  SOCIAL HISTORY: Single. Lives with children.  No longer working.  She does not drink. Current smoker.     Objective:     PHYSICAL EXAMINATION:  GENERAL: WDWN  female in no acute distress, ambulatory, responds appropriately. AAO X 3.   NECK: Supple, no thyromegaly, no cervical or supraclavicular lymphadenopathy, no carotid bruit.  HEART: Regular rate and rhythm. No rubs, murmurs or gallops.  LUNGS: CTA bilaterally. Unlabored breathing, no use of accessory muscles.  MUSCULOSKELETAL: Normal gait and muscle strength.  ABDOMEN: Active bowel sounds X 4, no masses or tenderness.   SKIN: Warm, dry skin. No lesions or abrasions. Clean, dry well-healed injection sites.   NEUROLOGIC: Cranial nerves II-XII grossly intact.   FOOT EXAM: Deferred, Last exam 2/ 2017    Assessment:     EDUCATIONAL ASSESSMENT:  1. Impediments in learning class environment - NONE.  2. Needs improvement in self-care management skills.    (1.) Diabetes Type II, neuropathy - per patient, improving control on Humalog 50/50, glipizide ( however, no records today ) A1C 10.2  (2.) Hypertension - controlled lisinopril, atenolol  (3.) Hyperlipidemia - controlled on Lipitor  (4.) Obesity, BMI 31.93    Plan:     1.) Patient was instructed to monitor blood glucose 2 - 3 x daily, fasting and ac dinner or at bedtime.   Discussed ADA goal for fasting blood sugar, 80 - 130 mg/dL; pp blood sugars below 180 mg/dl. Also, discussed  prevention of hypoglycemia and the need to adjust goals to higher levels if persistent hypoglycemia.  Reminded to bring BG records or meter to each visit for review.  2.) Reviewed pathophysiology of Type 2 diabetes, complications related to the disease, importance of annual dilated eye exam and daily foot examination.  3.)  Increase Humalog 50 / 50, 45 units before breakfast to help trim before dinner blood sugars; however, continue 40 units before dinner.  Per patient fasting BG are within range.  Patient forgot records today so I am unable to make any other  Adjustments.  I advised to send BG readings via my [email or web address] for review.  Continue Glipizide 10 mg BID ac.    4.) Reviewed carb counting, portion control, importance of spacing meals throughout the day to prevent post prandial elevations. Recommended low saturated fat, low sodium diet to aid in control of hypertension and cholesterol.  5.) Discussed activity, benefits, methods, and precautions. Recommended patient start some form of exercise and increase as tolerated to 30 minutes per day to facilitate weight loss and aid in control of BGs.   6.) Discussed need for smoking cessation, ways to break habit.  Patient will try to start cutting back with intentions of quitting.   Discussed complications associated with diabetes and smoking.  7.) Return to clinic in 3 months for follow up.  Advised patient to call clinic with any questions or concerns.    Karla Leon, NP-C, CDE

## 2017-03-15 ENCOUNTER — OFFICE VISIT (OUTPATIENT)
Dept: OBSTETRICS AND GYNECOLOGY | Facility: CLINIC | Age: 42
End: 2017-03-15
Payer: MEDICAID

## 2017-03-15 VITALS
DIASTOLIC BLOOD PRESSURE: 80 MMHG | SYSTOLIC BLOOD PRESSURE: 130 MMHG | HEIGHT: 70 IN | WEIGHT: 230.63 LBS | BODY MASS INDEX: 33.02 KG/M2

## 2017-03-15 DIAGNOSIS — Z01.419 WELL WOMAN EXAM WITH ROUTINE GYNECOLOGICAL EXAM: Primary | ICD-10-CM

## 2017-03-15 DIAGNOSIS — B35.6 TINEA CRURIS: ICD-10-CM

## 2017-03-15 DIAGNOSIS — N94.6 DYSMENORRHEA: ICD-10-CM

## 2017-03-15 PROCEDURE — 99396 PREV VISIT EST AGE 40-64: CPT | Mod: S$PBB,,, | Performed by: OBSTETRICS & GYNECOLOGY

## 2017-03-15 PROCEDURE — 99999 PR PBB SHADOW E&M-EST. PATIENT-LVL III: CPT | Mod: PBBFAC,,, | Performed by: OBSTETRICS & GYNECOLOGY

## 2017-03-15 PROCEDURE — 99213 OFFICE O/P EST LOW 20 MIN: CPT | Mod: PBBFAC | Performed by: OBSTETRICS & GYNECOLOGY

## 2017-03-15 RX ORDER — KETOCONAZOLE 20 MG/G
CREAM TOPICAL
Qty: 30 G | Refills: 1 | Status: SHIPPED | OUTPATIENT
Start: 2017-03-15 | End: 2017-07-05 | Stop reason: ALTCHOICE

## 2017-03-15 NOTE — MR AVS SNAPSHOT
[facility]  [address] [facility]  [place] [address]  Phone: [phone]  Fax: [phone]                  Jenifer Westfall   3/15/2017 11:30 AM   Office Visit    Description:  Female : 1975   Provider:  Ky Peralta MD   Department:  [facility]           Reason for Visit     Gynecologic Exam           Diagnoses this Visit        Comments    Well woman exam with routine gynecological exam    -  Primary     Dysmenorrhea         Tinea cruris                To Do List           Future Appointments        Provider Department Dept Phone    3/16/2017 3:20 PM ON MAMMO1 [facility] [phone]    2017 11:30 AM GERI MitchellC, FARTUNE [place] [facility] [phone]      Goals (5 Years of Data)     None      Follow-Up and Disposition     Return in about 1 year (around 3/15/2018).       These Medications        Disp Refills Start End    ketoconazole (NIZORAL) 2 % cream 30 g 1 3/15/2017 3/15/2018    Apply to affected area daily for 14 days.    Pharmacy: [facility] in [facility] [address] [place] [address]  #: [phone]         [facility]     [facility] Nurse Care Line - 24/7 Assistance  Registered nurses in the [facility] provide clinical advisement, health education, appointment booking, and other advisory services.  Call for this free service at [phone].             Medications           Message regarding Medications     Verify the changes and/or additions to your medication regime listed below are the same as discussed with your clinician today.  If any of these changes or additions are incorrect, please notify your healthcare provider.        START taking these NEW medications        Refills    ketoconazole (NIZORAL) 2 % cream 1    Sig: Apply to affected area daily for 14 days.    Class: Normal           Verify that the below list of medications is an accurate representation of  "the medications you are currently taking.  If none reported, the list may be blank. If incorrect, please contact your healthcare provider. Carry this list with you in case of emergency.           Current Medications     ascorbic acid (VITAMIN C) 100 MG tablet Take 100 mg by mouth once daily.    aspirin (ECOTRIN) 81 MG EC tablet Take 81 mg by mouth once daily.    atenolol (TENORMIN) 50 MG tablet TAKE 1 TABLET BY MOUTH ONCE DAILY    atorvastatin (LIPITOR) 40 MG tablet TAKE 1 TABLET BY MOUTH ONCE DAILY    clopidogrel (PLAVIX) 75 mg tablet TAKE 1 TABLET BY MOUTH ONCE DAILY    colestipol (COLESTID) 1 gram Tab TAKE 2 TABLETS BY MOUTH THREE TIMES DAILY. DO NOT TAKE OTHER MEDICATION WITHIN 1 HOUR BEFORE OR 4 HOURS AFTER TAKING COLESTIPOL.    diclofenac sodium (VOLTAREN) 1 % Gel Apply 2 g topically 4 (four) times daily.    EASY TOUCH 31 gauge x 5/16" Ndle USE FIVE TIMES DAILY WITH LANTUS AND HUMALOG    ERGOCALCIFEROL, VITAMIN D2, (VITAMIN D ORAL) Take 1 tablet by mouth once daily.     fluoxetine (PROZAC) 40 MG capsule TAKE 1 CAPSULE BY MOUTH ONCE DAILY    fluticasone (FLONASE) 50 mcg/actuation nasal spray 1 spray by Each Nare route once daily.    gabapentin (NEURONTIN) 400 MG capsule TAKE 1 CAPSULE BY MOUTH 3 TIMES DAILY    glipiZIDE (GLUCOTROL) 10 MG tablet Take 1 tablet (10 mg total) by mouth 2 (two) times daily.    insulin lispro protamin-lispro 100 unit/mL (50-50) InPn Inject 40 Units into the skin 2 (two) times daily before meals.    insulin needles, disposable, (BD INSULIN PEN NEEDLE UF SHORT) 31 X 5/16 " Ndle USE TWICE DAILY AS DIRECTED    lisinopril 10 MG tablet Take 1 tablet (10 mg total) by mouth once daily.    nicotine (NICODERM CQ) 21 mg/24 hr Place 1 patch onto the skin once daily.    tizanidine (ZANAFLEX) 4 MG tablet Take 1 tablet (4 mg total) by mouth every evening.    chlorhexidine (PERIDEX) 0.12 % solution     ketoconazole (NIZORAL) 2 % cream Apply to affected area daily for 14 days.           Clinical " "Reference Information           Your Vitals Were     BP Height Weight BMI       130/80 5' 10" (1.778 m) 104.6 kg (230 lb 9.6 oz) 33.09 kg/m2       Blood Pressure          Most Recent Value    BP  130/80      Allergies as of 3/15/2017     Compazine [Prochlorperazine Edisylate]    Levaquin [Levofloxacin]    Metformin    Ibuprofen    Morphine      Immunizations Administered on Date of Encounter - 3/15/2017     None      Orders Placed During Today's Visit     Future Labs/Procedures Expected by Expires    Mammo Digital Screening Bilat with CAD  3/15/2017 5/15/2018      Smoking Cessation     If you would like to quit smoking:   You may be eligible for free services if you are a [place] resident and started smoking cigarettes before September 1, 1988.  Call the Smoking Cessation Trust ([facility]) toll free at ([phone] [phone] or ([phone].   Call 5-832-QUIT-NOW if you do not meet the above criteria.            Language Assistance Services     ATTENTION: Language assistance services are available, free of charge. Please call [phone].      ATENCIÓN: Si habla español, tiene a wilson disposición servicios gratuitos de asistencia lingüística. Llame al [phone].     CHÚ Ý: N?u b?n nói Ti?ng Vi?t, có các d?ch v? h? tr? ngôn ng? mi?n phí dành cho b?n. G?i s? [phone].         [facility] complies with applicable Federal civil rights laws and does not discriminate on the basis of race, color, national origin, age, disability, or sex.        "

## 2017-03-15 NOTE — PROGRESS NOTES
Subjective:       Patient ID: Jenifer Westfall is a 41 y.o. female.    Chief Complaint:  Gynecologic Exam      History of Present Illness  HPI  Annual Exam-Premenopausal  Patient presents for annual exam. Pt complains of external genital pruritis since recent change to her insulin regimen. The patient is not sexually active. GYN screening history: last pap: approximate date  and was normal and last mammogram: approximate date  and was normal. The patient wears seatbelts: yes. The patient participates in regular exercise: no. Has the patient ever been transfused or tattooed?: no. The patient reports that there is not domestic violence in her life.  Pt has been doing well on Depo but did not come in on time for most recent dose.  Pt has no bleeding since Depo and no pains.  Pt would like to discuss options.      GYN & OB History  No LMP recorded. Patient has had an injection.   Date of Last Pap: 2016    OB History    Para Term  AB SAB TAB Ectopic Multiple Living   2 2 2             # Outcome Date GA Lbr Renato/2nd Weight Sex Delivery Anes PTL Lv   2 Term      CS-Unspec      1 Term      CS-Unspec             Review of Systems  Review of Systems   Constitutional: Negative for activity change, appetite change, fatigue, fever and unexpected weight change.   Respiratory: Negative for shortness of breath.    Cardiovascular: Negative for palpitations and leg swelling.   Gastrointestinal: Negative for abdominal pain, constipation, diarrhea, nausea and vomiting.   Genitourinary: Negative for dysuria, genital sores, hematuria, menorrhagia, menstrual problem, pelvic pain, vaginal bleeding, vaginal pain, dysmenorrhea and vaginal odor.   Musculoskeletal: Negative for back pain.   Neurological: Negative for syncope and headaches.   Breast: Negative for breast mass, breast pain, nipple discharge and skin changes          Objective:    Physical Exam:   Constitutional: She is oriented to person, place, and  time. She appears well-developed and well-nourished. No distress.    HENT:   Head: Normocephalic and atraumatic.    Eyes: EOM are normal. Pupils are equal, round, and reactive to light.    Neck: Normal range of motion. Neck supple.    Cardiovascular: Normal rate, regular rhythm and normal heart sounds.     Pulmonary/Chest: Effort normal and breath sounds normal.        Abdominal: Soft. Bowel sounds are normal. She exhibits no distension. There is no tenderness.     Genitourinary: Vagina normal and uterus normal.       Pelvic exam was performed with patient supine. There is rash on the right labia. There is no tenderness, lesion or injury on the right labia. There is rash on the left labia. There is no tenderness, lesion or injury on the left labia. Uterus is not deviated, not enlarged and not tender. Cervix is normal. Right adnexum displays no mass, no tenderness and no fullness. Left adnexum displays no mass, no tenderness and no fullness. No erythema, tenderness or bleeding in the vagina. No foreign body in the vagina. No signs of injury around the vagina. No vaginal discharge found. Cervix exhibits no motion tenderness, no discharge and no friability.           Musculoskeletal: Normal range of motion and moves all extremeties. She exhibits no edema or tenderness.       Neurological: She is alert and oriented to person, place, and time.    Skin: Skin is warm and dry.    Psychiatric: She has a normal mood and affect. Her behavior is normal. Thought content normal.          Assessment:        1. Well woman exam with routine gynecological exam    2. Dysmenorrhea    3. Tinea cruris             Plan:      Well woman exam with routine gynecological exam  -     Mammo Digital Screening Bilat with CAD; Future; Expected date: 3/15/17  -     Pt was counseled on cervical/vaginal screening guidelines and recommendations.  Last pap NILM on 2016.  As per current ASCCP guidelines, next pap is due 2019.  -     Follow up with PCP  for routine health maintenance needs.  -     Pt was advised on current breast cancer screening recommendations.  Pt desires to proceed with screening MMG.    Dysmenorrhea  -     Pt had a favorable response to Depo Provera for over 1 yr.  Pt is not sexually active and was using Depo for symptom relief (cramps and ovarian cysts).  Pt was counseled on treatment options.  Pt desires to discontinue Depo Provera and proceed with expectant management.  Pt may consider restarting Depo if symptoms recur.    Tinea cruris  -     ketoconazole (NIZORAL) 2 % cream; Apply to affected area daily for 14 days.  Dispense: 30 g; Refill: 1      Return in about 1 year (around 3/15/2018).

## 2017-03-16 ENCOUNTER — HOSPITAL ENCOUNTER (OUTPATIENT)
Dept: RADIOLOGY | Facility: HOSPITAL | Age: 42
Discharge: HOME | End: 2017-03-16
Attending: OBSTETRICS & GYNECOLOGY
Payer: MEDICAID

## 2017-03-16 DIAGNOSIS — Z01.419 WELL WOMAN EXAM WITH ROUTINE GYNECOLOGICAL EXAM: ICD-10-CM

## 2017-03-16 PROCEDURE — 77067 SCR MAMMO BI INCL CAD: CPT | Mod: 26,,, | Performed by: RADIOLOGY

## 2017-03-16 PROCEDURE — 77067 SCR MAMMO BI INCL CAD: CPT | Mod: TC

## 2017-03-16 PROCEDURE — 77063 BREAST TOMOSYNTHESIS BI: CPT | Mod: 26,,, | Performed by: RADIOLOGY

## 2017-03-17 NOTE — TELEPHONE ENCOUNTER
Spoke with Ms. Burgess and scheduled the follow up appointments below. Reminder letter mailed for Rheumatology follow up.

## 2017-03-21 ENCOUNTER — OFFICE VISIT (OUTPATIENT)
Dept: PAIN MEDICINE | Facility: CLINIC | Age: 42
End: 2017-03-21
Payer: MEDICAID

## 2017-03-21 ENCOUNTER — HOSPITAL ENCOUNTER (OUTPATIENT)
Dept: RADIOLOGY | Facility: HOSPITAL | Age: 42
Discharge: HOME | End: 2017-03-21
Attending: INTERNAL MEDICINE
Payer: MEDICAID

## 2017-03-21 VITALS
DIASTOLIC BLOOD PRESSURE: 62 MMHG | HEART RATE: 72 BPM | HEIGHT: 70 IN | RESPIRATION RATE: 18 BRPM | WEIGHT: 230 LBS | SYSTOLIC BLOOD PRESSURE: 114 MMHG | BODY MASS INDEX: 32.93 KG/M2

## 2017-03-21 DIAGNOSIS — M47.817 SPONDYLOSIS OF LUMBOSACRAL REGION WITHOUT MYELOPATHY OR RADICULOPATHY: Primary | ICD-10-CM

## 2017-03-21 DIAGNOSIS — M25.561 ACUTE PAIN OF RIGHT KNEE: ICD-10-CM

## 2017-03-21 DIAGNOSIS — M79.18 MYOFASCIAL PAIN: ICD-10-CM

## 2017-03-21 PROCEDURE — 99999 PR PBB SHADOW E&M-EST. PATIENT-LVL IV: CPT | Mod: PBBFAC,,, | Performed by: ANESTHESIOLOGY

## 2017-03-21 PROCEDURE — 73562 X-RAY EXAM OF KNEE 3: CPT | Mod: 26,50,, | Performed by: RADIOLOGY

## 2017-03-21 PROCEDURE — 73562 X-RAY EXAM OF KNEE 3: CPT | Mod: TC,50

## 2017-03-21 PROCEDURE — 99204 OFFICE O/P NEW MOD 45 MIN: CPT | Mod: S$PBB,,, | Performed by: ANESTHESIOLOGY

## 2017-03-21 RX ORDER — HYDROCODONE BITARTRATE AND ACETAMINOPHEN 5; 325 MG/1; MG/1
1 TABLET ORAL 2 TIMES DAILY PRN
Qty: 60 TABLET | Refills: 0 | Status: SHIPPED | OUTPATIENT
Start: 2017-03-21 | End: 2017-03-28 | Stop reason: ALTCHOICE

## 2017-03-21 NOTE — LETTER
March 21, 2017      Romaine Lawrence MD  [address] [facility] [address]  [facility] [address]           [facility]  [id number] [place] [address]  Phone: [phone]  Fax: [phone]          Patient: Jenifer Westfall   MR Number: [id number]   YOB: 1975   Date of Visit: 3/21/2017       Dear Dr. Romaine Lawrence:    Thank you for referring Jenifer Westfall to me for evaluation. Attached you will find relevant portions of my assessment and plan of care.    If you have questions, please do not hesitate to call me. I look forward to following Jenifer Westfall along with you.    Sincerely,    Rick Stein MD    Enclosure  CC:  No Recipients    If you would like to receive this communication electronically, please contact [email or web address] or [phone] to request more information on [facility] access.    For providers and/or their staff who would like to refer a patient to [facility], please contact us through our one-stop-shop provider referral line, [facility], at [phone].    If you feel you have received this communication in error or would no longer like to receive these types of communications, please e-mail [email or web address]
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
## 2017-03-21 NOTE — MR AVS SNAPSHOT
[facility]  [address]  [place] [address]  Phone: [phone]  Fax: [phone]                  Jenifer Westfall   3/21/2017 1:00 PM   Office Visit    Description:  Female : 1975   Provider:  Rick Stein MD   Department:  [facility]           Reason for Visit     Low-back Pain                To Do List           Future Appointments        Provider Department Dept Phone    3/21/2017  2:30 PM ON[facility] XR1- [facility] [phone]    2017 1:20 PM Rick Stein MD [facility] [phone]    2017 11:30 AM Karla Leon NP-C, [facility] - Diabetes [phone]    2017 3:00 PM Adriana Mcneal PA-C [facility] - Rheumatology [phone]    3/8/2018 10:45 AM Dale Hopson OD [facility] [phone]      Goals (5 Years of Data)     None      Follow-Up and Disposition     Return in about 4 weeks (around 2017).       These Medications        Disp Refills Start End    hydrocodone-acetaminophen 5-325mg (NORCO) 5-325 mg per tablet 60 tablet 0 3/21/2017 2017    Take 1 tablet by mouth 2 (two) times daily as needed. - Oral    Pharmacy: [facility] in [facility] [address] [address]  #: [phone]         [facility]     [facility] Nurse Care [facility] -  Assistance  Registered nurses in the [facility] Center provide clinical advisement, health education, appointment booking, and other advisory services.  Call for this free service at [phone].             Medications           Message regarding Medications     Verify the changes and/or additions to your medication regime listed below are the same as discussed with your clinician today.  If any of these changes or additions are incorrect, please notify your healthcare provider.        START taking these NEW medications        Refills    hydrocodone-acetaminophen  "5-325mg (NORCO) 5-325 mg per tablet 0    Sig: Take 1 tablet by mouth 2 (two) times daily as needed.    Class: Print    Route: Oral           Verify that the below list of medications is an accurate representation of the medications you are currently taking.  If none reported, the list may be blank. If incorrect, please contact your healthcare provider. Carry this list with you in case of emergency.           Current Medications     ascorbic acid (VITAMIN C) 100 MG tablet Take 100 mg by mouth once daily.    aspirin (ECOTRIN) 81 MG EC tablet Take 81 mg by mouth once daily.    atenolol (TENORMIN) 50 MG tablet TAKE 1 TABLET BY MOUTH ONCE DAILY    atorvastatin (LIPITOR) 40 MG tablet TAKE 1 TABLET BY MOUTH ONCE DAILY    chlorhexidine (PERIDEX) 0.12 % solution     clopidogrel (PLAVIX) 75 mg tablet TAKE 1 TABLET BY MOUTH ONCE DAILY    colestipol (COLESTID) 1 gram Tab TAKE 2 TABLETS BY MOUTH THREE TIMES DAILY. DO NOT TAKE OTHER MEDICATION WITHIN 1 HOUR BEFORE OR 4 HOURS AFTER TAKING COLESTIPOL.    diclofenac sodium (VOLTAREN) 1 % Gel Apply 2 g topically 4 (four) times daily.    EASY TOUCH 31 gauge x 5/16" Ndle USE FIVE TIMES DAILY WITH LANTUS AND HUMALOG    ERGOCALCIFEROL, VITAMIN D2, (VITAMIN D ORAL) Take 1 tablet by mouth once daily.     fluoxetine (PROZAC) 40 MG capsule TAKE 1 CAPSULE BY MOUTH ONCE DAILY    fluticasone (FLONASE) 50 mcg/actuation nasal spray 1 spray by Each Nare route once daily.    gabapentin (NEURONTIN) 400 MG capsule TAKE 1 CAPSULE BY MOUTH 3 TIMES DAILY    glipiZIDE (GLUCOTROL) 10 MG tablet Take 1 tablet (10 mg total) by mouth 2 (two) times daily.    insulin lispro protamin-lispro 100 unit/mL (50-50) InPn Inject 40 Units into the skin 2 (two) times daily before meals.    insulin needles, disposable, (BD INSULIN PEN NEEDLE UF SHORT) 31 X 5/16 " Ndle USE TWICE DAILY AS DIRECTED    ketoconazole (NIZORAL) 2 % cream Apply to affected area daily for 14 days.    lisinopril 10 MG tablet Take 1 tablet (10 mg " "total) by mouth once daily.    nicotine (NICODERM CQ) 21 mg/24 hr Place 1 patch onto the skin once daily.    tizanidine (ZANAFLEX) 4 MG tablet Take 1 tablet (4 mg total) by mouth every evening.    hydrocodone-acetaminophen 5-325mg (NORCO) 5-325 mg per tablet Take 1 tablet by mouth 2 (two) times daily as needed.           Clinical Reference Information           Your Vitals Were     BP Pulse Resp Height Weight BMI    114/62 (BP Location: Right arm, Patient Position: Sitting, BP Method: Automatic) 72 18 5' 10" (1.778 m) 104.3 kg (230 lb) 33 kg/m2      Blood Pressure          Most Recent Value    BP  114/62      Allergies as of 3/21/2017     Compazine [Prochlorperazine Edisylate]    Levaquin [Levofloxacin]    Metformin    Ibuprofen    Morphine      Immunizations Administered on Date of Encounter - 3/21/2017     None      Smoking Cessation     If you would like to quit smoking:   You may be eligible for free services if you are a [place] resident and started smoking cigarettes before September 1, 1988.  Call the Smoking Cessation Trust ([facility]) toll free at ([phone] or ([phone].   Call 0-812-QUIT-NOW if you do not meet the above criteria.            Language Assistance Services     ATTENTION: Language assistance services are available, free of charge. Please call [phone].      ATENCIÓN: Si stefania spain, tiene a wilson disposición servicios gratuitos de asistencia lingüística. Llame al [phone].     CHÚ Ý: N?u b?n nói Ti?ng Vi?t, có các d?ch v? h? tr? ngôn ng? mi?n phí dành cho b?n. G?i s? [phone].         [facility] complies with applicable Federal civil rights laws and does not discriminate on the basis of race, color, national origin, age, disability, or sex.        "

## 2017-03-24 DIAGNOSIS — I10 ESSENTIAL HYPERTENSION: ICD-10-CM

## 2017-03-27 ENCOUNTER — HOSPITAL ENCOUNTER (EMERGENCY)
Facility: HOSPITAL | Age: 42
Discharge: HOME | End: 2017-03-27
Payer: MEDICAID

## 2017-03-27 VITALS
WEIGHT: 225 LBS | DIASTOLIC BLOOD PRESSURE: 56 MMHG | HEART RATE: 71 BPM | RESPIRATION RATE: 16 BRPM | OXYGEN SATURATION: 98 % | SYSTOLIC BLOOD PRESSURE: 137 MMHG | TEMPERATURE: 98 F | HEIGHT: 70 IN | BODY MASS INDEX: 32.21 KG/M2

## 2017-03-27 DIAGNOSIS — L02.214 SOFT TISSUE ABSCESS OF INGUINAL REGION: Primary | ICD-10-CM

## 2017-03-27 DIAGNOSIS — Z86.39 HISTORY OF DIABETES MELLITUS: ICD-10-CM

## 2017-03-27 DIAGNOSIS — F41.9 ANXIETY: ICD-10-CM

## 2017-03-27 PROCEDURE — 10061 I&D ABSCESS COMP/MULTIPLE: CPT

## 2017-03-27 PROCEDURE — 99283 EMERGENCY DEPT VISIT LOW MDM: CPT | Mod: 25

## 2017-03-27 PROCEDURE — 25000003 PHARM REV CODE 250: Performed by: PHYSICIAN ASSISTANT

## 2017-03-27 RX ORDER — TRAMADOL HYDROCHLORIDE 50 MG/1
50 TABLET ORAL EVERY 4 HOURS PRN
Qty: 15 TABLET | Refills: 0 | Status: SHIPPED | OUTPATIENT
Start: 2017-03-27 | End: 2017-03-28 | Stop reason: ALTCHOICE

## 2017-03-27 RX ORDER — LORAZEPAM 0.5 MG/1
0.5 TABLET ORAL
Status: DISCONTINUED | OUTPATIENT
Start: 2017-03-27 | End: 2017-03-27

## 2017-03-27 RX ORDER — HYDROCODONE BITARTRATE AND ACETAMINOPHEN 5; 325 MG/1; MG/1
1 TABLET ORAL
Status: DISCONTINUED | OUTPATIENT
Start: 2017-03-27 | End: 2017-03-27

## 2017-03-27 RX ORDER — SULFAMETHOXAZOLE AND TRIMETHOPRIM 800; 160 MG/1; MG/1
1 TABLET ORAL EVERY 12 HOURS
Qty: 19 TABLET | Refills: 0 | Status: SHIPPED | OUTPATIENT
Start: 2017-03-27 | End: 2017-06-16

## 2017-03-27 RX ORDER — LIDOCAINE HYDROCHLORIDE 10 MG/ML
10 INJECTION INFILTRATION; PERINEURAL
Status: COMPLETED | OUTPATIENT
Start: 2017-03-27 | End: 2017-03-27

## 2017-03-27 RX ORDER — SULFAMETHOXAZOLE AND TRIMETHOPRIM 800; 160 MG/1; MG/1
1 TABLET ORAL
Status: COMPLETED | OUTPATIENT
Start: 2017-03-27 | End: 2017-03-27

## 2017-03-27 RX ADMIN — LIDOCAINE HYDROCHLORIDE 10 ML: 10 INJECTION, SOLUTION INFILTRATION; PERINEURAL at 04:03

## 2017-03-27 RX ADMIN — SULFAMETHOXAZOLE AND TRIMETHOPRIM 1 TABLET: 800; 160 TABLET ORAL at 04:03

## 2017-03-27 NOTE — ED NOTES
Pt states she doesn't want to wait for a ride home and will get her prescription filled on the way home.

## 2017-03-27 NOTE — ED NOTES
Srinivasan DAVIDSON advised pt that she needed to have  here at the hospital before giving pain medication.

## 2017-03-27 NOTE — DISCHARGE INSTRUCTIONS
Abscess (Incision & Drainage)  An abscess (sometimes called a boil) occurs when bacteria get trapped under the skin and start to grow. Pus forms inside the abscess as the body responds to the bacteria. An abscess can happen with an insect bite, ingrown hair, blocked oil gland, pimple, cyst, or puncture wound.  Your healthcare provider has drained the pus from your abscess. If the abscess pocket was large, your healthcare provider may have inserted gauze packing. Your provider will need to remove and possibly replace it on your next visit. You may not need antibiotics to treat a simple abscess, unless the infection is spreading into the skin around the wound (cellulitis).  Healing of the wound will take about 1 to 2 weeks, depending on the size of the abscess. Healthy tissue will grow from the bottom and sides of the opening until it seals over.  Home care  These tips can help your wound heal:  · The wound may drain for the first 2 days. Cover the wound with a clean dry dressing. If the dressing becomes soaked with blood or pus, change it.  · If a gauze packing was placed inside the abscess cavity, you may be told to remove it yourself. You may do this in the shower. Once the packing is removed, you should wash the area in the shower or bath 3 to 4 times a day, until the skin opening has closed. Make sure you wash your hands after changing the packing or cleaning the wound.  · If you were prescribed antibiotics, take them as directed until they are all gone.  · You may use acetaminophen or ibuprofen to control pain, unless another pain medicine was prescribed. If you have liver disease or ever had a stomach ulcer, talk with your doctor before using these medicines.  Follow-up care  Follow up with your healthcare provider, or as advised. If a gauze packing was inserted in your wound, it should be removed in 1 to 2 days. Check your wound every day for the signs of worsening infection listed below.  When to seek  medical advice  Call your healthcare provider right away if any of these occur:  · Increasing redness or swelling  · Red streaks in the skin leading away from the wound  · Increasing local pain or swelling  · Continued pus draining from the wound 2 days after treatment  · Fever of 100.4ºF (38ºC) or higher, or as directed by your healthcare provider  · Boil returns when you are at home  Date Last Reviewed: 9/1/2016  © 6858-0447 [employer]. [address] [place] [address]. All rights reserved. This information is not intended as a substitute for professional medical care. Always follow your healthcare professional's instructions.

## 2017-03-27 NOTE — ED NOTES
Abscess to right upper thigh in crease of groin, started this morning,  Now pain is severe and unable to put on underwear.

## 2017-03-27 NOTE — ED AVS SNAPSHOT
[facility]  [address] [address]               Jenifer Westfall   3/27/2017  4:09 PM   ED    Description:  Female : 1975   Department:  [facility]            Your Care was Coordinated By:     Provider Role From To    Nelson Pope PA-C Physician Assistant 17 [id number] --      Reason for Visit     Abscess           Diagnoses this Visit        Comments    Soft tissue abscess of inguinal region    -  Primary     History of diabetes mellitus         Anxiety           ED Disposition     ED Disposition Condition Comment    Discharge             To Do List           Follow-up Information     Follow up with Evelyn Wynn MD In 1 day.    Specialty:  Family Medicine    Why:  For wound re-check and packing removal. If unable to get in with PCP tomorrow, return to the ER for packing removal and wound re-check.     Contact information:    [address] [address] [address] [phone]         These Medications        Disp Refills Start End    sulfamethoxazole-trimethoprim 800-160mg (BACTRIM DS) 800-160 mg Tab 19 tablet 0 3/27/2017     Take 1 tablet by mouth every 12 (twelve) hours. - Oral    tramadol (ULTRAM) 50 mg tablet 15 tablet 0 3/27/2017 2017    Take 1 tablet (50 mg total) by mouth every 4 (four) hours as needed for Pain. - Oral      [facility]     [facility] On Call Nurse Care Line -  Assistance  Registered nurses in the [facility] provide clinical advisement, health education, appointment booking, and other advisory services.  Call for this free service at [phone].             Medications           Message regarding Medications     Verify the changes and/or additions to your medication regime listed below are the same as discussed with your clinician today.  If any of these changes or additions are incorrect, please notify your healthcare provider.        START taking these NEW  "medications        Refills    sulfamethoxazole-trimethoprim 800-160mg (BACTRIM DS) 800-160 mg Tab 0    Sig: Take 1 tablet by mouth every 12 (twelve) hours.    Class: Print    Route: Oral    tramadol (ULTRAM) 50 mg tablet 0    Sig: Take 1 tablet (50 mg total) by mouth every 4 (four) hours as needed for Pain.    Class: Print    Route: Oral      These medications were administered today        Dose Freq    lidocaine HCL 10 mg/ml (1%) injection 10 mL 10 mL ED 1 Time    Sig: 10 mLs by Infiltration route ED 1 Time.    Class: Normal    Route: Infiltration    Cosign for Ordering: Required by Raj Garcia MD    sulfamethoxazole-trimethoprim 800-160mg per tablet 1 tablet 1 tablet ED 1 Time    Sig: Take 1 tablet by mouth ED 1 Time.    Class: Normal    Route: Oral    Cosign for Ordering: Required by Raj Garcia MD           Verify that the below list of medications is an accurate representation of the medications you are currently taking.  If none reported, the list may be blank. If incorrect, please contact your healthcare provider. Carry this list with you in case of emergency.           Current Medications     ascorbic acid (VITAMIN C) 100 MG tablet Take 100 mg by mouth once daily.    aspirin (ECOTRIN) 81 MG EC tablet Take 81 mg by mouth once daily.    atenolol (TENORMIN) 50 MG tablet TAKE 1 TABLET BY MOUTH ONCE DAILY    atorvastatin (LIPITOR) 40 MG tablet TAKE 1 TABLET BY MOUTH ONCE DAILY    clopidogrel (PLAVIX) 75 mg tablet TAKE 1 TABLET BY MOUTH ONCE DAILY    colestipol (COLESTID) 1 gram Tab TAKE 2 TABLETS BY MOUTH THREE TIMES DAILY. DO NOT TAKE OTHER MEDICATION WITHIN 1 HOUR BEFORE OR 4 HOURS AFTER TAKING COLESTIPOL.    diclofenac sodium (VOLTAREN) 1 % Gel Apply 2 g topically 4 (four) times daily.    EASY TOUCH 31 gauge x 5/16" Ndle USE FIVE TIMES DAILY WITH LANTUS AND HUMALOG    ERGOCALCIFEROL, VITAMIN D2, (VITAMIN D ORAL) Take 1 tablet by mouth once daily.     fluoxetine (PROZAC) 40 MG capsule TAKE 1 CAPSULE BY " "MOUTH ONCE DAILY    fluticasone (FLONASE) 50 mcg/actuation nasal spray 1 spray by Each Nare route once daily.    gabapentin (NEURONTIN) 400 MG capsule TAKE 1 CAPSULE BY MOUTH 3 TIMES DAILY    glipiZIDE (GLUCOTROL) 10 MG tablet Take 1 tablet (10 mg total) by mouth 2 (two) times daily.    hydrocodone-acetaminophen 5-325mg (NORCO) 5-325 mg per tablet Take 1 tablet by mouth 2 (two) times daily as needed.    insulin lispro protamin-lispro 100 unit/mL (50-50) InPn Inject 40 Units into the skin 2 (two) times daily before meals.    insulin needles, disposable, (BD INSULIN PEN NEEDLE UF SHORT) 31 X 5/16 " Ndle USE TWICE DAILY AS DIRECTED    ketoconazole (NIZORAL) 2 % cream Apply to affected area daily for 14 days.    lisinopril 10 MG tablet Take 1 tablet (10 mg total) by mouth once daily.    tizanidine (ZANAFLEX) 4 MG tablet Take 1 tablet (4 mg total) by mouth every evening.    chlorhexidine (PERIDEX) 0.12 % solution     medroxyPROGESTERone (DEPO-PROVERA) injection 150 mg Inject 1 mL (150 mg total) into the muscle every 3 (three) months.    nicotine (NICODERM CQ) 21 mg/24 hr Place 1 patch onto the skin once daily.    sulfamethoxazole-trimethoprim 800-160mg (BACTRIM DS) 800-160 mg Tab Take 1 tablet by mouth every 12 (twelve) hours.    tramadol (ULTRAM) 50 mg tablet Take 1 tablet (50 mg total) by mouth every 4 (four) hours as needed for Pain.           Clinical Reference Information           Your Vitals Were     BP Pulse Temp Resp Height Weight    136/66 (BP Location: Left arm, Patient Position: Sitting) 80 98.2 °F (36.8 °C) (Oral) 18 5' 10" (1.778 m) 102.1 kg (225 lb)    SpO2 BMI             95% 32.28 kg/m2         Allergies as of 3/27/2017        Reactions    Compazine [Prochlorperazine Edisylate] Rash    Levaquin [Levofloxacin] Hives, Itching    Metformin     Upset stomach    Ibuprofen     Stomach pain    Morphine     Irritable      Immunizations Administered on Date of Encounter - 3/27/2017     None      ED Micro, Lab, " POCT     None      ED Imaging Orders     None        Discharge Instructions         Abscess (Incision & Drainage)  An abscess (sometimes called a boil) occurs when bacteria get trapped under the skin and start to grow. Pus forms inside the abscess as the body responds to the bacteria. An abscess can happen with an insect bite, ingrown hair, blocked oil gland, pimple, cyst, or puncture wound.  Your healthcare provider has drained the pus from your abscess. If the abscess pocket was large, your healthcare provider may have inserted gauze packing. Your provider will need to remove and possibly replace it on your next visit. You may not need antibiotics to treat a simple abscess, unless the infection is spreading into the skin around the wound (cellulitis).  Healing of the wound will take about 1 to 2 weeks, depending on the size of the abscess. Healthy tissue will grow from the bottom and sides of the opening until it seals over.  Home care  These tips can help your wound heal:  · The wound may drain for the first 2 days. Cover the wound with a clean dry dressing. If the dressing becomes soaked with blood or pus, change it.  · If a gauze packing was placed inside the abscess cavity, you may be told to remove it yourself. You may do this in the shower. Once the packing is removed, you should wash the area in the shower or bath 3 to 4 times a day, until the skin opening has closed. Make sure you wash your hands after changing the packing or cleaning the wound.  · If you were prescribed antibiotics, take them as directed until they are all gone.  · You may use acetaminophen or ibuprofen to control pain, unless another pain medicine was prescribed. If you have liver disease or ever had a stomach ulcer, talk with your doctor before using these medicines.  Follow-up care  Follow up with your healthcare provider, or as advised. If a gauze packing was inserted in your wound, it should be removed in 1 to 2 days. Check your  wound every day for the signs of worsening infection listed below.  When to seek medical advice  Call your healthcare provider right away if any of these occur:  · Increasing redness or swelling  · Red streaks in the skin leading away from the wound  · Increasing local pain or swelling  · Continued pus draining from the wound 2 days after treatment  · Fever of 100.4ºF (38ºC) or higher, or as directed by your healthcare provider  · Boil returns when you are at home  Date Last Reviewed: 9/1/2016 © 2000-2016 [employer]. [address] [place] [address]. All rights reserved. This information is not intended as a substitute for professional medical care. Always follow your healthcare professional's instructions.          Your Scheduled Appointments     Apr 18, 2017  1:20 PM CDT   Established Patient Visit with MD LANI [facility] ([facility]    [address] [place] [id number] [phone]            Jun 19, 2017 11:30 AM CDT   Established Patient Visit with CHRISTIANA Mitchell, FARTUNE   [place] - Diabetes ([facility])    [address] [facility] [id number] [phone]            Jul 05, 2017  3:00 PM CDT   Established Patient Visit with Adriana Mcneal PA-C   [facility] - Rheumatology ([facility])    [address] [facility] [id number] [phone]            Mar 08, 2018 10:45 AM CST   Established Patient Visit with MYRNA Posada - Ophthalmology ([facility])    [address] [place] [address] [phone]              Smoking Cessation     If you would like to quit smoking:   You may be eligible for free services if you are a [place] resident and started smoking cigarettes before September 1, 1988.  Call the Smoking Cessation Trust ([facility]) toll free at (644) 981-4198 or (159) 888-6934.   Call 2-177-QUIT-NOW if you do not meet the above criteria.             [facility] complies with applicable Federal civil rights laws and does not discriminate on the basis of race, color, national origin, age, disability, or sex.        Language Assistance Services     ATTENTION: Language assistance services are available, free of charge. Please call [phone].      ATENCIÓN: Si habla español, tiene a wilson disposición servicios gratuitos de asistencia lingüística. Llame al [phone].     CHÚ Ý: N?u b?n nói Ti?ng Vi?t, có các d?ch v? h? tr? ngôn ng? mi?n phí dành cho b?n. G?i s? [phone].

## 2017-03-27 NOTE — ED PROVIDER NOTES
SCRIBE #1 NOTE: I, Ana Luisa Hunter, am scribing for, and in the presence of, STUART Betancourt. I have scribed the entire note.      History      Chief Complaint   Patient presents with    Abscess     R inner thigh       Review of patient's allergies indicates:   Allergen Reactions    Compazine [prochlorperazine edisylate] Rash    Levaquin [levofloxacin] Hives and Itching    Metformin      Upset stomach    Ibuprofen      Stomach pain    Morphine      Irritable        HPI   HPI    3/27/2017, 4:30 PM   History obtained from the patient      History of Present Illness: Jenifer Westfall is a 41 y.o. female patient with DM who presents to the Emergency Department for R inguinal crease abscess which she noticed this morning while showering. She describes it as painful and red. Symptoms are constant and moderate in severity. No pre-arrival treatment reported. Pain is exacerbated with palpation. No mitigating factors reported. Patient denies fever, chills, drainage, and all other sxs at this time. Patient has DM and reports compliance with insulin and Glipizide. She did not check her blood glucose this morning but did take her medications. Patient is a smoker. No further complaints or concerns at this time.     Arrival mode: Personal vehicle    PCP: Evelyn Wynn MD       Past Medical History:  Past Medical History:   Diagnosis Date    Arthritis     Diabetes mellitus      2015 Insulin x 2 years     DM (diabetes mellitus)      2017 Insulin x 3 years     Hepatitis C     Hyperlipidemia     Hypertension     IBS (irritable bowel syndrome)     Kidney stone     Marfan syndrome     Mitral valve prolapse        Past Surgical History:  Past Surgical History:   Procedure Laterality Date    APPENDECTOMY      BUNIONECTOMY      both feet     SECTION      x 2    CHOLECYSTECTOMY      TUBAL LIGATION           Family History:  Family History   Problem Relation Age of  "Onset    Heart disease Mother     Thrombophilia Father      DVT    Hypertension Father     Stroke Maternal Aunt     Heart disease Maternal Aunt     Stroke Maternal Uncle     Heart disease Maternal Uncle     Breast cancer Neg Hx     Colon cancer Neg Hx     Ovarian cancer Neg Hx        Social History:  Social History     Social History Main Topics    Smoking status: Current Every Day Smoker     Packs/day: 0.20     Years: 20.00     Types: Cigarettes     Last attempt to quit: 4/9/2015    Smokeless tobacco: Never Used      Comment: "once in a blue moon"    Alcohol use No    Drug use: No    Sexual activity: No       ROS   Review of Systems   Constitutional: Negative for chills and fever.   HENT: Negative for sore throat.    Respiratory: Negative for shortness of breath.    Cardiovascular: Negative for chest pain.   Gastrointestinal: Negative for nausea.   Genitourinary: Negative for dysuria.   Musculoskeletal: Negative for back pain.   Skin: Positive for color change (erythema). Negative for rash.        (+) R painful inguinal crease abscess, (-) drainage   Neurological: Negative for weakness.   Hematological: Does not bruise/bleed easily.   All other systems reviewed and are negative.      Physical Exam    Initial Vitals   BP Pulse Resp Temp SpO2   03/27/17 1502 03/27/17 1502 03/27/17 1502 03/27/17 1502 03/27/17 1502   136/66 80 18 98.2 °F (36.8 °C) 95 %      Physical Exam  Nursing Notes and Vital Signs Reviewed.  Constitutional: Patient is in mild distress. Awake and alert. Well-developed and well-nourished. Antalgic gait.   Head: Atraumatic. Normocephalic.  Eyes: Conjunctivae are not pale. No scleral icterus.  ENT: Mucous membranes are moist. Oropharynx is clear and symmetric.    Neck: Supple.   Cardiovascular: Regular rate. Regular rhythm. Distal pulses are 2+ and symmetric.  Pulmonary/Chest: No respiratory distress.  Abdominal: Soft. Non-distended.   Musculoskeletal: Moves all extremities.   Female : " "A female chaperone was present for this examination. 3 cm fluctuant, erythematous abscess to R inguinal crease. No evidence of cellulitis. No vaginal swelling.  Skin: Warm and dry.   Neurological:  Alert, awake, and appropriate.  Normal speech. No acute focal neurological deficits are appreciated.  Psychiatric: Normal affect. Good eye contact. Appropriate in content.    ED Course    I & D - Incision and Drainage  Date/Time: 3/27/2017 5:00 PM  Performed by: TY FIELDS  Authorized by: TERRENCE PALACIOS   Consent Done: Yes  Consent: Verbal consent obtained.  Risks and benefits: risks, benefits and alternatives were discussed  Consent given by: patient  Type: abscess  Location: R inguinal crease abscess.  Anesthesia: local infiltration    Anesthesia:  Anesthesia: local infiltration  Local Anesthetic: lidocaine 1% without epinephrine   Scalpel size: 11  Incision type: single straight  Complexity: complex  Drainage: pus  Drainage amount: moderate  Wound treatment: incision,  drainage,  expression of material,  deloculation and  wound packed  Packing material: 1/4 in gauze  Complications: No  Specimens: No  Implants: No  Patient tolerance: Patient tolerated the procedure well with no immediate complications        ED Vital Signs:  Vitals:    03/27/17 1502   BP: 136/66   Pulse: 80   Resp: 18   Temp: 98.2 °F (36.8 °C)   TempSrc: Oral   SpO2: 95%   Weight: 102.1 kg (225 lb)   Height: 5' 10" (1.778 m)     The Emergency Provider reviewed the vital signs and test results, which are outlined above.    ED Discussion     5:13 PM: Reassessed pt at this time. Pt states her condition has improved at this time. Discussed pt dx and plan of tx. Informed pt to follow up to have packing removed. All questions and concerns were addressed at this time. Pt expresses understanding of information and instructions, and is comfortable with plan to discharge. Pt is stable for discharge.    I discussed wound care precautions with " patient and/or family/caretaker; specifically that all wounds have risk of infection despite efforts to cleanse and debride the wound; and there is a risk of an occult foreign body (and thus increased risk of infection) despite a negative examination.  I discussed with patient need to return for any signs of infection, specifically redness, increased pain, fever, drainage of pus, or any concern, immediately.    ED Medication(s):  Medications   lidocaine HCL 10 mg/ml (1%) injection 10 mL (10 mLs Infiltration Given by Other 3/27/17 1632)   sulfamethoxazole-trimethoprim 800-160mg per tablet 1 tablet (1 tablet Oral Given 3/27/17 1632)       New Prescriptions    SULFAMETHOXAZOLE-TRIMETHOPRIM 800-160MG (BACTRIM DS) 800-160 MG TAB    Take 1 tablet by mouth every 12 (twelve) hours.    TRAMADOL (ULTRAM) 50 MG TABLET    Take 1 tablet (50 mg total) by mouth every 4 (four) hours as needed for Pain.       Follow-up Information     Follow up with Evelyn Wynn MD In 1 day.    Specialty:  Family Medicine    Why:  For wound re-check and packing removal. If unable to get in with PCP tomorrow, return to the ER for packing removal and wound re-check.     Contact information:    [address] [address] [address]  [phone]             Medical Decision Making        Additional MDM:   Smoking Cessation: The patient is a smoker. The patient was counseled on smoking cessation for: 3 minutes. The patient was counseled on tobacco related  health complications. Appropriate patient literature was given to the patient concerning tobacco cessation. The patient was counseled on the adverse effects of second hand smoke.        Scribe Attestation:   Scribe #1: I performed the above scribed service and the documentation accurately describes the services I performed. I attest to the accuracy of the note.    Attending:   Physician Attestation Statement for Scribe #1: I, STUART Betancourt, personally performed the services  described in this documentation, as scribed by Ana Luisa Hunter, in my presence, and it is both accurate and complete.          Clinical Impression       ICD-10-CM ICD-9-CM   1. Soft tissue abscess of inguinal region L02.214 682.2   2. History of diabetes mellitus Z86.39 V12.29   3. Anxiety F41.9 300.00       Disposition:   Disposition: Discharged  Condition: Stable           Nelson Pope PA-C  03/27/17 1717

## 2017-03-28 ENCOUNTER — TELEPHONE (OUTPATIENT)
Dept: FAMILY MEDICINE | Facility: CLINIC | Age: 42
End: 2017-03-28

## 2017-03-28 ENCOUNTER — HOSPITAL ENCOUNTER (EMERGENCY)
Facility: HOSPITAL | Age: 42
Discharge: HOME | End: 2017-03-28
Attending: SPECIALIST
Payer: MEDICAID

## 2017-03-28 VITALS
DIASTOLIC BLOOD PRESSURE: 65 MMHG | SYSTOLIC BLOOD PRESSURE: 144 MMHG | WEIGHT: 220 LBS | OXYGEN SATURATION: 95 % | RESPIRATION RATE: 18 BRPM | TEMPERATURE: 98 F | BODY MASS INDEX: 31.5 KG/M2 | HEART RATE: 71 BPM | HEIGHT: 70 IN

## 2017-03-28 DIAGNOSIS — L02.214 ABSCESS OF GROIN, RIGHT: Primary | ICD-10-CM

## 2017-03-28 PROCEDURE — 99281 EMR DPT VST MAYX REQ PHY/QHP: CPT

## 2017-03-28 RX ORDER — OXYCODONE AND ACETAMINOPHEN 10; 325 MG/1; MG/1
.5-1 TABLET ORAL EVERY 4 HOURS PRN
Qty: 15 TABLET | Refills: 0 | Status: SHIPPED | OUTPATIENT
Start: 2017-03-28 | End: 2017-06-16

## 2017-03-28 RX ORDER — LISINOPRIL 10 MG/1
TABLET ORAL
Qty: 30 TABLET | Refills: 3 | Status: SHIPPED | OUTPATIENT
Start: 2017-03-28 | End: 2017-07-27 | Stop reason: SDUPTHER

## 2017-03-28 NOTE — ED AVS SNAPSHOT
[facility]  [address] [facility] [address]               Jenifer Westfall   3/28/2017  3:53 PM   ED    Description:  Female : 1975   Department:  [facility]           Your Care was Coordinated By:     Provider Role From To    Inocencia Weiner MD Attending Provider 17 1536 --      Reason for Visit     Wound Check           Diagnoses this Visit        Comments    Abscess of groin, right    -  Primary       ED Disposition     None           To Do List           Follow-up Information     Schedule an appointment as soon as possible for a visit with Evelyn Wynn MD.    Specialty:  Family Medicine    Why:  For wound re-check in 48 hours    Contact information:    [address] [address] [address] [phone]          Follow up with [facility].    Specialty:  Emergency Medicine    Why:  If symptoms worsen or if you can't see PCP in 48hours, For wound re-check    Contact information:    [address] [place] [address] [phone]       These Medications        Disp Refills Start End    oxycodone-acetaminophen (PERCOCET)  mg per tablet 15 tablet 0 3/28/2017     Take 0.5-1 tablets by mouth every 4 (four) hours as needed for Pain. - Oral    Pharmacy: [facility] [address] [facility] [address], [place] [address] Ph #: [phone]         [facility]     [facility] Care [facility] -  Assistance  Registered nurses in the [facility] provide clinical advisement, health education, appointment booking, and other advisory services.  Call for this free service at [phone].             Medications           Message regarding Medications     Verify the changes and/or additions to your medication regime listed below are the same as discussed with your clinician today.  If any of these changes or additions are incorrect, please notify  "your healthcare provider.        START taking these NEW medications        Refills    oxycodone-acetaminophen (PERCOCET)  mg per tablet 0    Sig: Take 0.5-1 tablets by mouth every 4 (four) hours as needed for Pain.    Class: Print    Route: Oral      STOP taking these medications     hydrocodone-acetaminophen 5-325mg (NORCO) 5-325 mg per tablet Take 1 tablet by mouth 2 (two) times daily as needed.    tramadol (ULTRAM) 50 mg tablet Take 1 tablet (50 mg total) by mouth every 4 (four) hours as needed for Pain.           Verify that the below list of medications is an accurate representation of the medications you are currently taking.  If none reported, the list may be blank. If incorrect, please contact your healthcare provider. Carry this list with you in case of emergency.           Current Medications     ascorbic acid (VITAMIN C) 100 MG tablet Take 100 mg by mouth once daily.    aspirin (ECOTRIN) 81 MG EC tablet Take 81 mg by mouth once daily.    atenolol (TENORMIN) 50 MG tablet TAKE 1 TABLET BY MOUTH ONCE DAILY    atorvastatin (LIPITOR) 40 MG tablet TAKE 1 TABLET BY MOUTH ONCE DAILY    chlorhexidine (PERIDEX) 0.12 % solution     clopidogrel (PLAVIX) 75 mg tablet TAKE 1 TABLET BY MOUTH ONCE DAILY    colestipol (COLESTID) 1 gram Tab TAKE 2 TABLETS BY MOUTH THREE TIMES DAILY. DO NOT TAKE OTHER MEDICATION WITHIN 1 HOUR BEFORE OR 4 HOURS AFTER TAKING COLESTIPOL.    diclofenac sodium (VOLTAREN) 1 % Gel Apply 2 g topically 4 (four) times daily.    EASY TOUCH 31 gauge x 5/16" Ndle USE FIVE TIMES DAILY WITH LANTUS AND HUMALOG    ERGOCALCIFEROL, VITAMIN D2, (VITAMIN D ORAL) Take 1 tablet by mouth once daily.     fluoxetine (PROZAC) 40 MG capsule TAKE 1 CAPSULE BY MOUTH ONCE DAILY    fluticasone (FLONASE) 50 mcg/actuation nasal spray 1 spray by Each Nare route once daily.    gabapentin (NEURONTIN) 400 MG capsule TAKE 1 CAPSULE BY MOUTH 3 TIMES DAILY    glipiZIDE (GLUCOTROL) 10 MG tablet Take 1 tablet (10 mg total) by " "mouth 2 (two) times daily.    insulin lispro protamin-lispro 100 unit/mL (50-50) InPn Inject 40 Units into the skin 2 (two) times daily before meals.    insulin needles, disposable, (BD INSULIN PEN NEEDLE UF SHORT) 31 X 5/16 " Ndle USE TWICE DAILY AS DIRECTED    ketoconazole (NIZORAL) 2 % cream Apply to affected area daily for 14 days.    lisinopril 10 MG tablet TAKE 1 TABLET (10 MG TOTAL) BY MOUTH ONCE DAILY.    nicotine (NICODERM CQ) 21 mg/24 hr Place 1 patch onto the skin once daily.    oxycodone-acetaminophen (PERCOCET)  mg per tablet Take 0.5-1 tablets by mouth every 4 (four) hours as needed for Pain.    sulfamethoxazole-trimethoprim 800-160mg (BACTRIM DS) 800-160 mg Tab Take 1 tablet by mouth every 12 (twelve) hours.    tizanidine (ZANAFLEX) 4 MG tablet Take 1 tablet (4 mg total) by mouth every evening.           Clinical Reference Information           Your Vitals Were     BP Pulse Temp Resp Height Weight    144/65 (BP Location: Right arm, Patient Position: Sitting) 71 98.2 °F (36.8 °C) (Oral) 18 5' 10" (1.778 m) 99.8 kg (220 lb)    SpO2 BMI             95% 31.57 kg/m2         Allergies as of 3/28/2017        Reactions    Compazine [Prochlorperazine Edisylate] Rash    Levaquin [Levofloxacin] Hives, Itching    Metformin     Upset stomach    Ibuprofen     Stomach pain    Morphine     Irritable      Immunizations Administered on Date of Encounter - 3/28/2017     None      ED Micro, Lab, POCT     None      ED Imaging Orders     None        Discharge Instructions         Wound Care After Packing Removal or Replacement  Packing is a special type of dressing placed inside a wound to help it heal. Once the packing is removed, you need to care for your wound. Good wound care helps prevent infection. Be sure to keep all follow-up appointments with your healthcare provider. Follow these instructions to take care of the wound once youre at home.  Home care  Your healthcare provider may prescribe medicines for " pain. Or he or she may suggest an over-the-counter (OTC) pain reliever, such as ibuprofen or acetaminophen. Talk with your healthcare provider before taking any OTC medicines if you have chronic liver or kidney disease, a stomach ulcer, or gastrointestinal bleeding. In certain cases, you may also need to take antibiotics to help prevent infection. If so, take them exactly as directed for as long as directed. Dont stop taking your antibiotics until they are all gone, even if you feel better.  Here are some general care guidelines for your wound:  · Follow your healthcare providers instructions on how to care for your wound. Always wash your hands with soap and warm water before and after tending to your wound.  · If a bandage was put on, remove and change it once a day or as directed. If the bandage gets wet or dirty, replace it as soon as possible with a new bandage. Use a clean cloth to gently pat the wound dry.  · If your packing was replaced, a small piece of gauze may hang from the wound. It allows fluid to continue draining from the wound. You may need to use an ointment or cream to keep the packing from sticking to the bandage.  · Don't bathe in a tub or soak your wound until your healthcare provider says its OK. Take showers or sponge baths instead. Avoid swimming.  · Dont scratch, rub, scrub, or pick your wound.  · Check your wound daily for the signs of infection listed below.  If your wound was closed, it was likely with one of four types of closures. These include stitches (sutures), strips of surgical tape, skin glue, and staples. Your healthcare provider will decide on the best closure based on the size and location of your wound. Each type of closure needs specific care.  · Sutures. You may want to clean the wound daily after the first 2 to 3 days. To do this, remove the bandage and gently wash the area with soap and warm water. After cleaning, apply a thin layer of antibiotic ointment if  recommended. Then put on a new bandage. Sutures on the outside of the skin usually need to be removed by your healthcare provider.  · Surgical tape. Keep the area dry. If it gets wet, blot it dry with a towel. Surgical tape closures usually fall off within 7 to 10 days. If they have not fallen off after 10 days, you can remove them yourself. To remove the tape, use mineral oil or petroleum jelly on a cotton ball to gently rub the adhesive.  · Skin glue. You may shower or bathe as usual. But do not use soaps, lotions, or ointments on the wound area. Do not scrub the wound. After bathing, pat the wound dry with a soft towel. Do not apply liquids like peroxide, ointments, or creams to the wound while the strips or film is in place. Don't scratch, rub, or pick at the strips or film. Don't put tape directly over the strips or film. Skin adhesive film will fall off naturally in 5 to 10 days. If it does not peel off in 10 days, gently rub petroleum jelly or an ointment onto the film.  · Staples. Take showers or sponge baths. Don't take tub baths. Don't use lotions on the wound area. The area may be cleaned with soap and water 2 to 3 days after the wound was stapled. Don't scrub the wound. Pat it dry with a clean soft cloth or towel. You can use antibiotic ointment if your healthcare provider tells you to. Staples will need to be removed in 10 to 14 days.  Follow-up care  Follow up with your healthcare provider, or as directed. If your packing was replaced, you may need another visit within 1 to 3 days to remove or replace it. If you have sutures or staples, return for their removal as directed.  When to seek medical advice  Call your health care provider right away if you have signs of infection:  · Fever of 1 degree or more above your normal temperature, or as directed by your healthcare provider  · Increasing pain in the wound or pain that doesnt get better even with pain medicine  · Increasing redness or  swelling  · Pus or bad-smelling drainage from the wound  Also call your healthcare provider right away if any of these occur:  · Your wound bleeds more than a small amount or wont stop bleeding.  · The edges of your wound come apart.  · You have numbness or weakness in the wound area that doesnt go away.  Date Last Reviewed: 10/2/2015  © 5259-6787 [employer]. [address] [place] [address], [place] [address]. All rights reserved. This information is not intended as a substitute for professional medical care. Always follow your healthcare professional's instructions.          Your Scheduled Appointments     Apr 18, 2017  1:20 PM CDT   Established Patient Visit with MD LANI [facility] - Interventional Pain ([facility])    [address] [place] [id number] [phone]            Jun 19, 2017 11:30 AM CDT   Established Patient Visit with CHRISTIANA Mitchell, FARTUNE   [place] - Diabetes ([facility])    [address] [facility] [id number] [phone]            Jul 05, 2017  3:00 PM CDT   Established Patient Visit with Adriana Mcneal PA-C   [facility] - Rheumatology ([facility])    [address] [facility] [id number] [phone]            Mar 08, 2018 10:45 AM CST   Established Patient Visit with MYRNA [facility] - Ophthalmology ([facility])    [address] [place] [address] [phone]              Smoking Cessation     If you would like to quit smoking:   You may be eligible for free services if you are a [place] resident and started smoking cigarettes before September 1, 1988.  Call the Smoking Cessation Trust (SCT) toll free at (139) 243-1137 or (805) 478-4089.   Call 4-800-QUIT-NOW if you do not meet the above criteria.             [facility] complies with applicable Federal civil rights laws and does not discriminate on the basis of race, color, national origin, age, disability, or  sex.        Language Assistance Services     ATTENTION: Language assistance services are available, free of charge. Please call [phone].      ATENCIÓN: Si habla español, tiene a wilson disposición servicios gratuitos de asistencia lingüística. Llame al [phone].     CHÚ Ý: N?u b?n nói Ti?ng Vi?t, có các d?ch v? h? tr? ngôn ng? mi?n phí dành cho b?n. G?i s? [phone].

## 2017-03-28 NOTE — TELEPHONE ENCOUNTER
----- Message from Jonnathan Logan sent at 3/27/2017  1:48 PM CDT -----  Pt is requesting a call from nurse to discuss a possible boil on right side growing area.            Please call pt back at [phone]

## 2017-03-28 NOTE — ED NOTES
Bed: Dispo  Expected date:   Expected time:   Means of arrival:   Comments:     Inocencia Weiner MD  03/28/17 2334

## 2017-03-28 NOTE — DISCHARGE INSTRUCTIONS
Wound Care After Packing Removal or Replacement  Packing is a special type of dressing placed inside a wound to help it heal. Once the packing is removed, you need to care for your wound. Good wound care helps prevent infection. Be sure to keep all follow-up appointments with your healthcare provider. Follow these instructions to take care of the wound once youre at home.  Home care  Your healthcare provider may prescribe medicines for pain. Or he or she may suggest an over-the-counter (OTC) pain reliever, such as ibuprofen or acetaminophen. Talk with your healthcare provider before taking any OTC medicines if you have chronic liver or kidney disease, a stomach ulcer, or gastrointestinal bleeding. In certain cases, you may also need to take antibiotics to help prevent infection. If so, take them exactly as directed for as long as directed. Dont stop taking your antibiotics until they are all gone, even if you feel better.  Here are some general care guidelines for your wound:  · Follow your healthcare providers instructions on how to care for your wound. Always wash your hands with soap and warm water before and after tending to your wound.  · If a bandage was put on, remove and change it once a day or as directed. If the bandage gets wet or dirty, replace it as soon as possible with a new bandage. Use a clean cloth to gently pat the wound dry.  · If your packing was replaced, a small piece of gauze may hang from the wound. It allows fluid to continue draining from the wound. You may need to use an ointment or cream to keep the packing from sticking to the bandage.  · Don't bathe in a tub or soak your wound until your healthcare provider says its OK. Take showers or sponge baths instead. Avoid swimming.  · Dont scratch, rub, scrub, or pick your wound.  · Check your wound daily for the signs of infection listed below.  If your wound was closed, it was likely with one of four types of closures. These include  stitches (sutures), strips of surgical tape, skin glue, and staples. Your healthcare provider will decide on the best closure based on the size and location of your wound. Each type of closure needs specific care.  · Sutures. You may want to clean the wound daily after the first 2 to 3 days. To do this, remove the bandage and gently wash the area with soap and warm water. After cleaning, apply a thin layer of antibiotic ointment if recommended. Then put on a new bandage. Sutures on the outside of the skin usually need to be removed by your healthcare provider.  · Surgical tape. Keep the area dry. If it gets wet, blot it dry with a towel. Surgical tape closures usually fall off within 7 to 10 days. If they have not fallen off after 10 days, you can remove them yourself. To remove the tape, use mineral oil or petroleum jelly on a cotton ball to gently rub the adhesive.  · Skin glue. You may shower or bathe as usual. But do not use soaps, lotions, or ointments on the wound area. Do not scrub the wound. After bathing, pat the wound dry with a soft towel. Do not apply liquids like peroxide, ointments, or creams to the wound while the strips or film is in place. Don't scratch, rub, or pick at the strips or film. Don't put tape directly over the strips or film. Skin adhesive film will fall off naturally in 5 to 10 days. If it does not peel off in 10 days, gently rub petroleum jelly or an ointment onto the film.  · Staples. Take showers or sponge baths. Don't take tub baths. Don't use lotions on the wound area. The area may be cleaned with soap and water 2 to 3 days after the wound was stapled. Don't scrub the wound. Pat it dry with a clean soft cloth or towel. You can use antibiotic ointment if your healthcare provider tells you to. Staples will need to be removed in 10 to 14 days.  Follow-up care  Follow up with your healthcare provider, or as directed. If your packing was replaced, you may need another visit within 1 to  3 days to remove or replace it. If you have sutures or staples, return for their removal as directed.  When to seek medical advice  Call your health care provider right away if you have signs of infection:  · Fever of 1 degree or more above your normal temperature, or as directed by your healthcare provider  · Increasing pain in the wound or pain that doesnt get better even with pain medicine  · Increasing redness or swelling  · Pus or bad-smelling drainage from the wound  Also call your healthcare provider right away if any of these occur:  · Your wound bleeds more than a small amount or wont stop bleeding.  · The edges of your wound come apart.  · You have numbness or weakness in the wound area that doesnt go away.  Date Last Reviewed: 10/2/2015  © 7525-4544 [employer]. [address] [place] [address], [place] [address]. All rights reserved. This information is not intended as a substitute for professional medical care. Always follow your healthcare professional's instructions.

## 2017-03-28 NOTE — ED PROVIDER NOTES
SCRIBE #1 NOTE: I, Senthil Francis, am scribing for, and in the presence of, Inocencia Weiner MD. I have scribed the entire note.      History      Chief Complaint   Patient presents with    Wound Check     abscess was lanced and packed and told to come back here to remove packing        Review of patient's allergies indicates:   Allergen Reactions    Compazine [prochlorperazine edisylate] Rash    Levaquin [levofloxacin] Hives and Itching    Metformin      Upset stomach    Ibuprofen      Stomach pain    Morphine      Irritable        HPI   HPI    3/28/2017, 3:56 PM   History obtained from the patient      History of Present Illness: Jenifer Westfall is a 41 y.o. female patient who presents to the Emergency Department for a wound recheck of an abscess to her right groin. Pt states she had a abscess laced and packed yesterday evening and that she was told to come into the ER to have the packing removed. Pt states the pain medication she was prescribed yesterday is not helping. No further complaints or concerns at this time.      Arrival mode: Personal vehicle    PCP: Evelyn yWnn MD       Past Medical History:  Past Medical History:   Diagnosis Date    Arthritis     Diabetes mellitus      2015 Insulin x 2 years     DM (diabetes mellitus)      2017 Insulin x 3 years     Hepatitis C     Hyperlipidemia     Hypertension     IBS (irritable bowel syndrome)     Kidney stone     Marfan syndrome     Mitral valve prolapse        Past Surgical History:  Past Surgical History:   Procedure Laterality Date    APPENDECTOMY      BUNIONECTOMY      both feet     SECTION      x 2    CHOLECYSTECTOMY      TUBAL LIGATION           Family History:  Family History   Problem Relation Age of Onset    Heart disease Mother     Thrombophilia Father      DVT    Hypertension Father     Stroke Maternal Aunt     Heart disease Maternal Aunt     Stroke Maternal Uncle      "Heart disease Maternal Uncle     Breast cancer Neg Hx     Colon cancer Neg Hx     Ovarian cancer Neg Hx        Social History:  Social History     Social History Main Topics    Smoking status: Current Every Day Smoker     Packs/day: 0.20     Years: 20.00     Types: Cigarettes     Last attempt to quit: 4/9/2015    Smokeless tobacco: Never Used      Comment: "once in a blue moon"    Alcohol use No    Drug use: No    Sexual activity: No       ROS   Review of Systems   Constitutional: Negative for fever.   HENT: Negative for sore throat.    Respiratory: Negative for shortness of breath.    Cardiovascular: Negative for chest pain.   Gastrointestinal: Negative for nausea.   Genitourinary: Negative for dysuria.   Musculoskeletal: Negative for back pain, neck pain and neck stiffness.   Skin: Negative for rash.   Neurological: Negative for weakness.   Hematological: Does not bruise/bleed easily.     Physical Exam    Initial Vitals   BP Pulse Resp Temp SpO2   03/28/17 1446 03/28/17 1446 03/28/17 1446 03/28/17 1446 03/28/17 1446   144/65 71 18 98.2 °F (36.8 °C) 95 %      Physical Exam  Nursing Notes and Vital Signs Reviewed.  Constitutional: Patient is in no acute distress. Awake and alert. Well-developed and well-nourished.  Head: Atraumatic. Normocephalic.  Eyes: PERRL. EOM intact. Conjunctivae are not pale. No scleral icterus.  ENT: Mucous membranes are moist. Oropharynx is clear and symmetric.    Neck: Supple. Full ROM. No lymphadenopathy.  Cardiovascular: Regular rate. Regular rhythm.  Pulmonary/Chest: No respiratory distress. Clear to auscultation bilaterally. No wheezing, rales, or rhonchi.  Abdominal: Soft and non-distended.  There is no tenderness.  No rebound, guarding, or rigidity. Good bowel sounds.  Musculoskeletal: Moves all extremities. No obvious deformities. No edema. No calf tenderness.  Skin: Warm and dry. Well healing abscess to right groin noted. Packing in place.  Neurological:  Alert, awake, and " "appropriate.  Normal speech.  No acute focal neurological deficits are appreciated.  Psychiatric: Normal affect. Good eye contact. Appropriate in content.    ED Course    Procedures  ED Vital Signs:  Vitals:    03/28/17 1446   BP: (!) 144/65   Pulse: 71   Resp: 18   Temp: 98.2 °F (36.8 °C)   TempSrc: Oral   SpO2: 95%   Weight: 99.8 kg (220 lb)   Height: 5' 10" (1.778 m)            The Emergency Provider reviewed the vital signs and test results, which are outlined above.    ED Discussion     4:23 PM: Packing was removed from abscess and the wound was dressed.      4:28 PM: Reassessed pt at this time. Pt is awake, alert, and in NAD. Discussed with pt all pertinent ED information. Abscess was examined by PA who examined pt yesterday who states pt's abscess has improved. Discussed pt dx of abscess of right groin and plan of tx. Gave pt all f/u and return to the ED instructions. Pt was advised to f/u in 48 hours for  Another wound check due to her DM. All questions and concerns were addressed at this time. Pt expresses understanding of information and instructions, and is comfortable with plan to discharge. Pt is stable for discharge.    I discussed wound care precautions with patient and/or family/caretaker; specifically that all wounds have risk of infection despite efforts to cleanse and debride the wound; and there is a risk of an occult foreign body (and thus increased risk of infection) despite a negative examination.  I discussed with patient need to return for any signs of infection, specifically redness, increased pain, fever, drainage of pus, or any concern, immediately.    Pre-hypertension/Hypertension: The pt has been informed that they may have pre-hypertension or hypertension based on a blood pressure reading in the ED. I recommend that the pt call the PCP listed on their discharge instructions or a physician of their choice this week to arrange f/u for further evaluation of possible pre-hypertension or " hypertension.       ED Medication(s):  Medications - No data to display    Discharge Medication List as of 3/28/2017  4:21 PM      START taking these medications    Details   oxycodone-acetaminophen (PERCOCET)  mg per tablet Take 0.5-1 tablets by mouth every 4 (four) hours as needed for Pain., Starting 3/28/2017, Until Discontinued, Print             Follow-up Information     Schedule an appointment as soon as possible for a visit with Evelyn Wynn MD.    Specialty:  Family Medicine    Why:  For wound re-check in 48 hours    Contact information:    [address] [address] [address] [phone]          Follow up with [facility] - .    Specialty:  Emergency Medicine    Why:  If symptoms worsen or if you can't see PCP in 48hours, For wound re-check    Contact information:    [address] [facility] [address] [phone]            Medical Decision Making              Scribe Attestation:   Scribe #1: I performed the above scribed service and the documentation accurately describes the services I performed. I attest to the accuracy of the note.    Attending:   Physician Attestation Statement for Scribe #1: I, Inocencia Weiner MD, personally performed the services described in this documentation, as scribed by Senthil Francis, in my presence, and it is both accurate and complete.          Clinical Impression       ICD-10-CM ICD-9-CM   1. Abscess of groin, right L02.214 682.2               Inocencia Weiner MD  03/28/17 1940

## 2017-03-29 ENCOUNTER — OFFICE VISIT (OUTPATIENT)
Dept: FAMILY MEDICINE | Facility: CLINIC | Age: 42
End: 2017-03-29
Payer: MEDICAID

## 2017-03-29 VITALS
SYSTOLIC BLOOD PRESSURE: 110 MMHG | TEMPERATURE: 98 F | HEIGHT: 70 IN | OXYGEN SATURATION: 96 % | HEART RATE: 85 BPM | DIASTOLIC BLOOD PRESSURE: 58 MMHG | WEIGHT: 222 LBS | BODY MASS INDEX: 31.78 KG/M2

## 2017-03-29 DIAGNOSIS — L02.214 ABSCESS OF RIGHT GROIN: Primary | ICD-10-CM

## 2017-03-29 DIAGNOSIS — E66.9 OBESITY (BMI 30.0-34.9): ICD-10-CM

## 2017-03-29 DIAGNOSIS — Z72.0 TOBACCO ABUSE: ICD-10-CM

## 2017-03-29 PROCEDURE — 99213 OFFICE O/P EST LOW 20 MIN: CPT | Mod: PBBFAC,PO | Performed by: FAMILY MEDICINE

## 2017-03-29 PROCEDURE — 99213 OFFICE O/P EST LOW 20 MIN: CPT | Mod: S$PBB,,, | Performed by: FAMILY MEDICINE

## 2017-03-29 PROCEDURE — 99999 PR PBB SHADOW E&M-EST. PATIENT-LVL III: CPT | Mod: PBBFAC,,, | Performed by: FAMILY MEDICINE

## 2017-03-29 RX ORDER — MUPIROCIN 20 MG/G
OINTMENT TOPICAL 3 TIMES DAILY
Qty: 30 G | Refills: 0 | Status: SHIPPED | OUTPATIENT
Start: 2017-03-29 | End: 2017-04-08

## 2017-03-29 NOTE — PROGRESS NOTES
"Subjective:       Patient ID: Jenifer Westfall is a 41 y.o. female.    Chief Complaint: Abscess    HPI   Abscess  42yo female presents today for assessment of an abscess to the right groin. She was assessed initially in the ER on 3/27 initially at which time incision and drainage was performed. Septra was prescribed and patient reports compliance with use. She returned the next day (yesterday, 3/28) for a packing change and reassessment and was advised to be seen in 2 days for wound check. No wound cultures were obtained. Patient is reporting concern with regard to redness and swelling to the area since I&D. There is no packing in place at present. She has been taking Bactrim without adverse effects. Pain is rated at 2/10 and she has been provided with Ultram as well as Percocet for pain relief. No additional areas of concern are reported elsewhere to the body.    Review of Systems   Constitutional: Positive for fatigue. Negative for chills and fever.   Cardiovascular: Negative for leg swelling.   Gastrointestinal: Negative for abdominal pain, diarrhea and nausea.   Genitourinary: Negative for decreased urine volume and difficulty urinating.   Musculoskeletal: Negative for arthralgias.   Skin: Positive for color change. Negative for rash.   Hematological: Negative for adenopathy.   Psychiatric/Behavioral: Negative for sleep disturbance. The patient is nervous/anxious.        Objective:   BP (!) 110/58  Pulse 85  Temp 98.3 °F (36.8 °C) (Oral)   Ht 5' 10" (1.778 m)  Wt 100.7 kg (222 lb)  LMP Comment: depo provera  SpO2 96%  BMI 31.85 kg/m2  Physical Exam   Constitutional: She appears well-developed. No distress.   Obese, uncomfortable appearing, non-toxic   HENT:   Head: Normocephalic and atraumatic.   Mouth/Throat: Oropharynx is clear and moist.   Cardiovascular: Normal rate, regular rhythm and normal heart sounds.    Pulmonary/Chest: Effort normal and breath sounds normal.   Abdominal: Soft. Bowel sounds are " normal.   Musculoskeletal: She exhibits no edema.   Skin: Skin is warm and dry. She is not diaphoretic.        Psychiatric: She has a normal mood and affect.       Assessment:       1. Abscess of right groin    2. Obesity (BMI 30.0-34.9)    3. Tobacco abuse        Plan:   Abscess of right groin  ER notes reviewed from visit date 3/27 and 3/28. Area appears to be improving clinically. Reassured patient with regard to concerns of induration. Have advised warm compress application. Discussed Bactroban along the outer margins. Continuation of Bactrim is recommended until course completion. Follow-up in 2 days with BANDAR Hernández.  -     mupirocin (BACTROBAN) 2 % ointment; Apply topically 3 (three) times daily.  Dispense: 30 g; Refill: 0    Obesity (BMI 30.0-34.9)  Weight loss encouraged.    Tobacco abuse  Smoking cessation is advised.

## 2017-03-29 NOTE — PATIENT INSTRUCTIONS
Abscess (Incision & Drainage)  An abscess (sometimes called a boil) occurs when bacteria get trapped under the skin and start to grow. Pus forms inside the abscess as the body responds to the bacteria. An abscess can happen with an insect bite, ingrown hair, blocked oil gland, pimple, cyst, or puncture wound.  Your healthcare provider has drained the pus from your abscess. If the abscess pocket was large, your healthcare provider may have inserted gauze packing. Your provider will need to remove and possibly replace it on your next visit. You may not need antibiotics to treat a simple abscess, unless the infection is spreading into the skin around the wound (cellulitis).  Healing of the wound will take about 1 to 2 weeks, depending on the size of the abscess. Healthy tissue will grow from the bottom and sides of the opening until it seals over.  Home care  These tips can help your wound heal:  · The wound may drain for the first 2 days. Cover the wound with a clean dry dressing. If the dressing becomes soaked with blood or pus, change it.  · If a gauze packing was placed inside the abscess cavity, you may be told to remove it yourself. You may do this in the shower. Once the packing is removed, you should wash the area in the shower or bath 3 to 4 times a day, until the skin opening has closed. Make sure you wash your hands after changing the packing or cleaning the wound.  · If you were prescribed antibiotics, take them as directed until they are all gone.  · You may use acetaminophen or ibuprofen to control pain, unless another pain medicine was prescribed. If you have liver disease or ever had a stomach ulcer, talk with your doctor before using these medicines.  Follow-up care  Follow up with your healthcare provider, or as advised. If a gauze packing was inserted in your wound, it should be removed in 1 to 2 days. Check your wound every day for the signs of worsening infection listed below.  When to seek  medical advice  Call your healthcare provider right away if any of these occur:  · Increasing redness or swelling  · Red streaks in the skin leading away from the wound  · Increasing local pain or swelling  · Continued pus draining from the wound 2 days after treatment  · Fever of 100.4ºF (38ºC) or higher, or as directed by your healthcare provider  · Boil returns when you are at home  Date Last Reviewed: 9/1/2016  © 3114-6953 [employer] [address] [place] [address], [place] [address]. All rights reserved. This information is not intended as a substitute for professional medical care. Always follow your healthcare professional's instructions.      Hibiclens  Warm compresses

## 2017-04-25 RX ORDER — HYDROCODONE BITARTRATE AND ACETAMINOPHEN 5; 325 MG/1; MG/1
1 TABLET ORAL 2 TIMES DAILY PRN
Qty: 60 TABLET | Refills: 0 | Status: SHIPPED | OUTPATIENT
Start: 2017-05-24 | End: 2017-04-27 | Stop reason: CLARIF

## 2017-04-25 RX ORDER — HYDROCODONE BITARTRATE AND ACETAMINOPHEN 5; 325 MG/1; MG/1
1 TABLET ORAL 2 TIMES DAILY PRN
Qty: 60 TABLET | Refills: 0 | Status: SHIPPED | OUTPATIENT
Start: 2017-04-25 | End: 2017-05-25

## 2017-04-25 RX ORDER — CLOPIDOGREL BISULFATE 75 MG/1
TABLET ORAL
Qty: 30 TABLET | Refills: 11 | Status: SHIPPED | OUTPATIENT
Start: 2017-04-25 | End: 2018-04-18 | Stop reason: SDUPTHER
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
## 2017-04-26 ENCOUNTER — OFFICE VISIT (OUTPATIENT)
Dept: PAIN MEDICINE | Facility: CLINIC | Age: 42
End: 2017-04-26
Payer: MEDICAID

## 2017-04-26 VITALS
DIASTOLIC BLOOD PRESSURE: 80 MMHG | BODY MASS INDEX: 31.78 KG/M2 | SYSTOLIC BLOOD PRESSURE: 136 MMHG | WEIGHT: 222 LBS | HEIGHT: 70 IN | HEART RATE: 101 BPM | RESPIRATION RATE: 16 BRPM

## 2017-04-26 DIAGNOSIS — M06.9 RHEUMATOID ARTHRITIS INVOLVING MULTIPLE SITES, UNSPECIFIED RHEUMATOID FACTOR PRESENCE: ICD-10-CM

## 2017-04-26 DIAGNOSIS — M79.18 MYOFASCIAL PAIN: ICD-10-CM

## 2017-04-26 DIAGNOSIS — M54.16 LUMBAR RADICULOPATHY: ICD-10-CM

## 2017-04-26 DIAGNOSIS — G89.4 CHRONIC PAIN DISORDER: ICD-10-CM

## 2017-04-26 DIAGNOSIS — M47.817 SPONDYLOSIS OF LUMBOSACRAL REGION WITHOUT MYELOPATHY OR RADICULOPATHY: Primary | ICD-10-CM

## 2017-04-26 PROCEDURE — 99214 OFFICE O/P EST MOD 30 MIN: CPT | Mod: S$PBB,,, | Performed by: PHYSICIAN ASSISTANT

## 2017-04-26 PROCEDURE — 99214 OFFICE O/P EST MOD 30 MIN: CPT | Mod: PBBFAC | Performed by: PHYSICIAN ASSISTANT

## 2017-04-26 PROCEDURE — 99999 PR PBB SHADOW E&M-EST. PATIENT-LVL IV: CPT | Mod: PBBFAC,,, | Performed by: PHYSICIAN ASSISTANT

## 2017-04-26 NOTE — TELEPHONE ENCOUNTER
Patient's prescriptions were printed and signed by Dr. Stein prior to the patient's clinic appointment.     Patient only given 1 month of Rx for April. Rx for May was shredded.
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
## 2017-04-26 NOTE — MR AVS SNAPSHOT
[facility]  [address]  [place] [address]  Phone: [phone]  Fax: [phone]                  Jenifer Westfall   2017 2:00 PM   Office Visit    Description:  Female : 1975   Provider:  Marilyn Cotto PA-C   Department:  [facility]           Reason for Visit     Low-back Pain                To Do List           Future Appointments        Provider Department Dept Phone    2017 2:00 PM Marilyn Cotto PA-C [facility] [phone]    2017 11:30 AM CHRISTIANA Mitchell, [facility] - Diabetes [phone]    2017 3:00 PM Adriana Mcneal PA-C [facility] - Rheumatology [phone]    3/8/2018 10:45 AM Dale Hopson OD [facility] Ophthalmology [phone]      Goals (5 Years of Data)     None      [facility]     [facility] On Call Nurse Care Line -  Assistance  Unless otherwise directed by your provider, please contact [facility], our nurse care line that is available for  assistance.     Registered nurses in the [facility] provide: appointment scheduling, clinical advisement, health education, and other advisory services.  Call: [phone] (toll free)               Medications           Message regarding Medications     Verify the changes and/or additions to your medication regime listed below are the same as discussed with your clinician today.  If any of these changes or additions are incorrect, please notify your healthcare provider.             Verify that the below list of medications is an accurate representation of the medications you are currently taking.  If none reported, the list may be blank. If incorrect, please contact your healthcare provider. Carry this list with you in case of emergency.           Current Medications     ascorbic acid (VITAMIN C) 100 MG tablet Take 100 mg by mouth once daily.    aspirin (ECOTRIN) 81 MG EC tablet Take 81 mg by  "mouth once daily.    atenolol (TENORMIN) 50 MG tablet TAKE 1 TABLET BY MOUTH ONCE DAILY    atorvastatin (LIPITOR) 40 MG tablet TAKE 1 TABLET BY MOUTH ONCE DAILY    chlorhexidine (PERIDEX) 0.12 % solution     clopidogrel (PLAVIX) 75 mg tablet TAKE 1 TABLET BY MOUTH ONCE DAILY    colestipol (COLESTID) 1 gram Tab TAKE 2 TABLETS BY MOUTH THREE TIMES DAILY. DO NOT TAKE OTHER MEDICATION WITHIN 1 HOUR BEFORE OR 4 HOURS AFTER TAKING COLESTIPOL.    diclofenac sodium (VOLTAREN) 1 % Gel Apply 2 g topically 4 (four) times daily.    EASY TOUCH 31 gauge x 5/16" Ndle USE FIVE TIMES DAILY WITH LANTUS AND HUMALOG    ERGOCALCIFEROL, VITAMIN D2, (VITAMIN D ORAL) Take 1 tablet by mouth once daily.     fluoxetine (PROZAC) 40 MG capsule TAKE 1 CAPSULE BY MOUTH ONCE DAILY    fluticasone (FLONASE) 50 mcg/actuation nasal spray 1 spray by Each Nare route once daily.    gabapentin (NEURONTIN) 400 MG capsule TAKE 1 CAPSULE BY MOUTH 3 TIMES DAILY    glipiZIDE (GLUCOTROL) 10 MG tablet Take 1 tablet (10 mg total) by mouth 2 (two) times daily.    hydrocodone-acetaminophen 5-325mg (NORCO) 5-325 mg per tablet Take 1 tablet by mouth 2 (two) times daily as needed for Pain.    hydrocodone-acetaminophen 5-325mg (NORCO) 5-325 mg per tablet Starting on May 24, 2017. Take 1 tablet by mouth 2 (two) times daily as needed for Pain.    insulin lispro protamin-lispro 100 unit/mL (50-50) InPn Inject 40 Units into the skin 2 (two) times daily before meals.    insulin needles, disposable, (BD INSULIN PEN NEEDLE UF SHORT) 31 X 5/16 " Ndle USE TWICE DAILY AS DIRECTED    ketoconazole (NIZORAL) 2 % cream Apply to affected area daily for 14 days.    lisinopril 10 MG tablet TAKE 1 TABLET (10 MG TOTAL) BY MOUTH ONCE DAILY.    nicotine (NICODERM CQ) 21 mg/24 hr Place 1 patch onto the skin once daily.    oxycodone-acetaminophen (PERCOCET)  mg per tablet Take 0.5-1 tablets by mouth every 4 (four) hours as needed for Pain.    sulfamethoxazole-trimethoprim 800-160mg " "(BACTRIM DS) 800-160 mg Tab Take 1 tablet by mouth every 12 (twelve) hours.    tizanidine (ZANAFLEX) 4 MG tablet Take 1 tablet (4 mg total) by mouth every evening.           Clinical Reference Information           Your Vitals Were     BP Pulse Resp Height Weight BMI    136/80 (BP Location: Right arm, Patient Position: Sitting, BP Method: Automatic) 101 16 5' 10" (1.778 m) 100.7 kg (222 lb) 31.85 kg/m2      Blood Pressure          Most Recent Value    BP  136/80      Allergies as of 4/26/2017     Compazine [Prochlorperazine Edisylate]    Levaquin [Levofloxacin]    Metformin    Ibuprofen    Morphine      Immunizations Administered on Date of Encounter - 4/26/2017     None      Smoking Cessation     If you would like to quit smoking:   You may be eligible for free services if you are a [place] resident and started smoking cigarettes before September 1, 1988.  Call the Smoking Cessation Trust ([employer]UST) toll free at (172) 820-6373 or (079) 093-9070.   Call 1-800-QUIT-NOW if you do not meet the above criteria.   Contact us via email: [email or web address]"   View our website for more information: [email or web address]        Language Assistance Services     ATTENTION: Language assistance services are available, free of charge. Please call [phone].      ATENCIÓN: Si habla bashirañol, tiene a wilson disposición servicios gratuitos de asistencia lingüística. Llame al [phone].     CHÚ Ý: N?u b?n nói Ti?ng Vi?t, có các d?ch v? h? tr? ngôn ng? mi?n phí dành cho b?n. G?i s? [phone].         [facility] complies with applicable Federal civil rights laws and does not discriminate on the basis of race, color, national origin, age, disability, or sex.        "

## 2017-04-27 ENCOUNTER — DOCUMENTATION ONLY (OUTPATIENT)
Dept: PAIN MEDICINE | Facility: CLINIC | Age: 42
End: 2017-04-27

## 2017-04-27 NOTE — PROGRESS NOTES
Patient came in for an office visit on 4-26-17. She requested to be switched from [place] to Percocet. Per , only Rx shown to be filled since last visit was a refill of tramadol on 3-21-17 from Dr. Lawrence (written in January).   YANIRA Siddiqui, called [facility], and they verified 2 other Rx filled ([place] from Dr. Stein on 3-21-17 and Percocet 5mg #15 from ER on 4-18-17).  After talking with Dr. Stein, we decided best that we will no longer continue opioids.     * Discharge letter submitted today on 4-27-17.

## 2017-05-01 ENCOUNTER — PATIENT MESSAGE (OUTPATIENT)
Dept: PAIN MEDICINE | Facility: CLINIC | Age: 42
End: 2017-05-01

## 2017-05-18 ENCOUNTER — HOSPITAL ENCOUNTER (EMERGENCY)
Facility: HOSPITAL | Age: 42
Discharge: HOME | End: 2017-05-18
Attending: EMERGENCY MEDICINE
Payer: MEDICAID

## 2017-05-18 VITALS
HEART RATE: 74 BPM | WEIGHT: 220 LBS | OXYGEN SATURATION: 99 % | DIASTOLIC BLOOD PRESSURE: 76 MMHG | RESPIRATION RATE: 18 BRPM | SYSTOLIC BLOOD PRESSURE: 132 MMHG | BODY MASS INDEX: 31.5 KG/M2 | TEMPERATURE: 99 F | HEIGHT: 70 IN

## 2017-05-18 DIAGNOSIS — G89.29 CHRONIC MIDLINE LOW BACK PAIN WITHOUT SCIATICA: ICD-10-CM

## 2017-05-18 DIAGNOSIS — M54.50 CHRONIC MIDLINE LOW BACK PAIN WITHOUT SCIATICA: ICD-10-CM

## 2017-05-18 DIAGNOSIS — R10.32 ABDOMINAL WALL PAIN IN LEFT LOWER QUADRANT: Primary | ICD-10-CM

## 2017-05-18 LAB
ANION GAP SERPL CALC-SCNC: 8 MMOL/L
B-HCG UR QL: NEGATIVE
BASOPHILS # BLD AUTO: 0.02 K/UL
BASOPHILS NFR BLD: 0.2 %
BILIRUB UR QL STRIP: NEGATIVE
BUN SERPL-MCNC: 8 MG/DL
CALCIUM SERPL-MCNC: 9.6 MG/DL
CHLORIDE SERPL-SCNC: 106 MMOL/L
CLARITY UR: CLEAR
CO2 SERPL-SCNC: 24 MMOL/L
COLOR UR: YELLOW
CREAT SERPL-MCNC: 0.8 MG/DL
DIFFERENTIAL METHOD: ABNORMAL
EOSINOPHIL # BLD AUTO: 0.2 K/UL
EOSINOPHIL NFR BLD: 1.6 %
ERYTHROCYTE [DISTWIDTH] IN BLOOD BY AUTOMATED COUNT: 12.5 %
EST. GFR  (AFRICAN AMERICAN): >60 ML/MIN/1.73 M^2
EST. GFR  (NON AFRICAN AMERICAN): >60 ML/MIN/1.73 M^2
GLUCOSE SERPL-MCNC: 173 MG/DL
GLUCOSE UR QL STRIP: ABNORMAL
HCT VFR BLD AUTO: 41.9 %
HGB BLD-MCNC: 14.7 G/DL
HGB UR QL STRIP: NEGATIVE
KETONES UR QL STRIP: NEGATIVE
LEUKOCYTE ESTERASE UR QL STRIP: NEGATIVE
LYMPHOCYTES # BLD AUTO: 4 K/UL
LYMPHOCYTES NFR BLD: 40.1 %
MCH RBC QN AUTO: 31.6 PG
MCHC RBC AUTO-ENTMCNC: 35.1 %
MCV RBC AUTO: 90 FL
MONOCYTES # BLD AUTO: 0.8 K/UL
MONOCYTES NFR BLD: 7.5 %
NEUTROPHILS # BLD AUTO: 5.1 K/UL
NEUTROPHILS NFR BLD: 50.6 %
NITRITE UR QL STRIP: NEGATIVE
PH UR STRIP: 6 [PH] (ref 5–8)
PLATELET # BLD AUTO: 271 K/UL
PMV BLD AUTO: 10.1 FL
POTASSIUM SERPL-SCNC: 3.8 MMOL/L
PROT UR QL STRIP: NEGATIVE
RBC # BLD AUTO: 4.65 M/UL
SODIUM SERPL-SCNC: 138 MMOL/L
SP GR UR STRIP: >=1.03 (ref 1–1.03)
URN SPEC COLLECT METH UR: ABNORMAL
UROBILINOGEN UR STRIP-ACNC: NEGATIVE EU/DL
WBC # BLD AUTO: 10.06 K/UL

## 2017-05-18 PROCEDURE — 81003 URINALYSIS AUTO W/O SCOPE: CPT

## 2017-05-18 PROCEDURE — 96375 TX/PRO/DX INJ NEW DRUG ADDON: CPT

## 2017-05-18 PROCEDURE — 25000003 PHARM REV CODE 250: Performed by: EMERGENCY MEDICINE

## 2017-05-18 PROCEDURE — 63600175 PHARM REV CODE 636 W HCPCS: Performed by: EMERGENCY MEDICINE

## 2017-05-18 PROCEDURE — 81025 URINE PREGNANCY TEST: CPT

## 2017-05-18 PROCEDURE — 85025 COMPLETE CBC W/AUTO DIFF WBC: CPT

## 2017-05-18 PROCEDURE — 96376 TX/PRO/DX INJ SAME DRUG ADON: CPT

## 2017-05-18 PROCEDURE — 96374 THER/PROPH/DIAG INJ IV PUSH: CPT

## 2017-05-18 PROCEDURE — 80048 BASIC METABOLIC PNL TOTAL CA: CPT

## 2017-05-18 PROCEDURE — 99284 EMERGENCY DEPT VISIT MOD MDM: CPT | Mod: 25

## 2017-05-18 RX ORDER — ONDANSETRON 2 MG/ML
4 INJECTION INTRAMUSCULAR; INTRAVENOUS
Status: COMPLETED | OUTPATIENT
Start: 2017-05-18 | End: 2017-05-18

## 2017-05-18 RX ORDER — MEPERIDINE HYDROCHLORIDE 50 MG/ML
50 INJECTION INTRAMUSCULAR; INTRAVENOUS; SUBCUTANEOUS
Status: COMPLETED | OUTPATIENT
Start: 2017-05-18 | End: 2017-05-18

## 2017-05-18 RX ORDER — MEPERIDINE HYDROCHLORIDE 50 MG/ML
25 INJECTION INTRAMUSCULAR; INTRAVENOUS; SUBCUTANEOUS
Status: COMPLETED | OUTPATIENT
Start: 2017-05-18 | End: 2017-05-18

## 2017-05-18 RX ADMIN — MEPERIDINE HYDROCHLORIDE 50 MG: 50 INJECTION INTRAMUSCULAR; INTRAVENOUS; SUBCUTANEOUS at 05:05

## 2017-05-18 RX ADMIN — MEPERIDINE HYDROCHLORIDE 25 MG: 50 INJECTION INTRAMUSCULAR; INTRAVENOUS; SUBCUTANEOUS at 04:05

## 2017-05-18 RX ADMIN — ONDANSETRON 4 MG: 2 INJECTION INTRAMUSCULAR; INTRAVENOUS at 04:05

## 2017-05-18 NOTE — ED AVS SNAPSHOT
[facility] -   [address] [address]               Jenifer Westfall   2017  3:31 PM   ED    Description:  Female : 1975   Department:  [facility]           Your Care was Coordinated By:     Provider Role From To    Carla Astorga MD Attending Provider 17 1603 --      Reason for Visit     Back Pain           Diagnoses this Visit        Comments    Abdominal wall pain in left lower quadrant    -  Primary     Chronic midline low back pain without sciatica           ED Disposition     None           To Do List           Follow-up Information     Follow up with Evelyn Wynn MD. Schedule an appointment as soon as possible for a visit in 1 day.    Specialty:  Family Medicine    Why:  Return to the Emergency Room, If symptoms worsen    Contact information:    [address] [address] [address]  [phone]        [facility]     [facility] Nurse Care [facility] -  Assistance  Unless otherwise directed by your provider, please contact [facility], our nurse care line that is available for  assistance.     Registered nurses in the [facility] provide: appointment scheduling, clinical advisement, health education, and other advisory services.  Call: [phone] (toll free)               Medications           Message regarding Medications     Verify the changes and/or additions to your medication regime listed below are the same as discussed with your clinician today.  If any of these changes or additions are incorrect, please notify your healthcare provider.        These medications were administered today        Dose Freq    meperidine 50 mg/mL injection 25 mg 25 mg ED 1 Time    Sig: Inject 0.5 mLs (25 mg total) into the vein ED 1 Time.    Class: Normal    Route: Intravenous    ondansetron injection 4 mg 4 mg ED 1 Time    Sig: Inject 4 mg into the vein ED 1 Time.    Class: Normal     "Route: Intravenous    meperidine 50 mg/mL injection 50 mg 50 mg ED 1 Time    Sig: Inject 1 mL (50 mg total) into the vein ED 1 Time.    Class: Normal    Route: Intravenous           Verify that the below list of medications is an accurate representation of the medications you are currently taking.  If none reported, the list may be blank. If incorrect, please contact your healthcare provider. Carry this list with you in case of emergency.           Current Medications     ascorbic acid (VITAMIN C) 100 MG tablet Take 100 mg by mouth once daily.    aspirin (ECOTRIN) 81 MG EC tablet Take 81 mg by mouth once daily.    atenolol (TENORMIN) 50 MG tablet TAKE 1 TABLET BY MOUTH ONCE DAILY    atorvastatin (LIPITOR) 40 MG tablet TAKE 1 TABLET BY MOUTH ONCE DAILY    chlorhexidine (PERIDEX) 0.12 % solution     clopidogrel (PLAVIX) 75 mg tablet TAKE 1 TABLET BY MOUTH ONCE DAILY    colestipol (COLESTID) 1 gram Tab TAKE 2 TABLETS BY MOUTH THREE TIMES DAILY. DO NOT TAKE OTHER MEDICATION WITHIN 1 HOUR BEFORE OR 4 HOURS AFTER TAKING COLESTIPOL.    diclofenac sodium (VOLTAREN) 1 % Gel Apply 2 g topically 4 (four) times daily.    EASY TOUCH 31 gauge x 5/16" Ndle USE FIVE TIMES DAILY WITH LANTUS AND HUMALOG    ERGOCALCIFEROL, VITAMIN D2, (VITAMIN D ORAL) Take 1 tablet by mouth once daily.     fluoxetine (PROZAC) 40 MG capsule TAKE 1 CAPSULE BY MOUTH ONCE DAILY    fluticasone (FLONASE) 50 mcg/actuation nasal spray 1 spray by Each Nare route once daily.    gabapentin (NEURONTIN) 400 MG capsule TAKE 1 CAPSULE BY MOUTH 3 TIMES DAILY    glipiZIDE (GLUCOTROL) 10 MG tablet Take 1 tablet (10 mg total) by mouth 2 (two) times daily.    hydrocodone-acetaminophen 5-325mg (NORCO) 5-325 mg per tablet Take 1 tablet by mouth 2 (two) times daily as needed for Pain.    insulin lispro protamin-lispro 100 unit/mL (50-50) InPn Inject 40 Units into the skin 2 (two) times daily before meals.    insulin needles, disposable, (BD INSULIN PEN NEEDLE UF SHORT) 31 X " "5/16 " Ndle USE TWICE DAILY AS DIRECTED    ketoconazole (NIZORAL) 2 % cream Apply to affected area daily for 14 days.    lisinopril 10 MG tablet TAKE 1 TABLET (10 MG TOTAL) BY MOUTH ONCE DAILY.    nicotine (NICODERM CQ) 21 mg/24 hr Place 1 patch onto the skin once daily.    oxycodone-acetaminophen (PERCOCET)  mg per tablet Take 0.5-1 tablets by mouth every 4 (four) hours as needed for Pain.    sulfamethoxazole-trimethoprim 800-160mg (BACTRIM DS) 800-160 mg Tab Take 1 tablet by mouth every 12 (twelve) hours.    tizanidine (ZANAFLEX) 4 MG tablet Take 1 tablet (4 mg total) by mouth every evening.           Clinical Reference Information           Your Vitals Were     BP Pulse Temp Resp Height Weight    135/66 (BP Location: Left arm, Patient Position: Sitting) 71 98.7 °F (37.1 °C) (Oral) 18 5' 10" (1.778 m) 99.8 kg (220 lb)    SpO2 BMI             97% 31.57 kg/m2         Allergies as of 5/18/2017        Reactions    Compazine [Prochlorperazine Edisylate] Rash    Levaquin [Levofloxacin] Hives, Itching    Metformin     Upset stomach    Ibuprofen     Stomach pain    Morphine     Irritable      Immunizations Administered on Date of Encounter - 5/18/2017     None      ED Micro, Lab, POCT     Start Ordered       Status Ordering Provider    05/18/17 1610 05/18/17 1609  CBC auto differential  STAT      Final result     05/18/17 1610 05/18/17 1609  Basic metabolic panel  STAT      Final result     05/18/17 1610 05/18/17 1609  Urinalysis Clean Catch  STAT      Final result     05/18/17 1610 05/18/17 1610  Pregnancy, urine rapid (UPT)  Once      Final result       ED Imaging Orders     Start Ordered       Status Ordering Provider    05/18/17 1610 05/18/17 1609  CT Renal Stone Study ABD Pelvis WO  1 time imaging      Final result       Discharge References/Attachments     ABDOMINAL PAIN, UNKNOWN CAUSE, (FEMALE) (ENGLISH)    BACK PAIN (LOW): SELF-CARE (ENGLISH)      Your Scheduled Appointments     May 25, 2017  2:00 PM CDT "   Established Patient Visit with ABENA Franks - Interventional Pain ([facility])    [address] [facility] [id number] [phone]            Jun 19, 2017 11:30 AM CDT   Established Patient Visit with CHRISTIANA Mitchell, CDE   [facility] Diabetes ([facility])    [address]  [facility] [address]   [phone]            Jul 05, 2017  3:00 PM CDT   Established Patient Visit with Adriana Mcneal PA-C   [facility] - Rheumatology ([facility])    [address] [address] [facility] [id number] [phone]            Mar 08, 2018 10:45 AM CST   Established Patient Visit with MYRNA Posada - Ophthalmology ([facility]    [address] [facility] [id number] [phone]              Smoking Cessation     If you would like to quit smoking:   You may be eligible for free services if you are a [place] resident and started smoking cigarettes before September 1, 1988.  Call the Smoking Cessation Trust ([facility]) toll free at (136) 793-1609 or (256) 823-8543.   Call 1-800-QUIT-NOW if you do not meet the above criteria.   Contact us via email: [email or web address]   View our website for more information: [email or web address]         [facility] complies with applicable Federal civil rights laws and does not discriminate on the basis of race, color, national origin, age, disability, or sex.        Language Assistance Services     ATTENTION: Language assistance services are available, free of charge. Please call [phone].      ATENCIÓN: Si habla judit, tiene a wilson disposición servicios gratuitos de asistencia lingüística. Llame al [phone].     CHÚ Ý: N?u b?n nói Ti?ng Vi?t, có các d?ch v? h? tr? ngôn ng? mi?n phí dành cho b?n. G?i s? [phone].

## 2017-05-18 NOTE — ED PROVIDER NOTES
SCRIBE #1 NOTE: I, Myriam Hunter, am scribing for, and in the presence of, Carla Astorga MD. I have scribed the entire note.      History      Chief Complaint   Patient presents with    Back Pain     generalized back pains, pt smells of urine, she states she has hx of kidney stones       Review of patient's allergies indicates:   Allergen Reactions    Compazine [prochlorperazine edisylate] Rash    Levaquin [levofloxacin] Hives and Itching    Metformin      Upset stomach    Ibuprofen      Stomach pain    Morphine      Irritable        HPI   HPI    2017, 4:10 PM   History obtained from the patient      History of Present Illness: Jenifer Westfall is a 41 y.o. female patient who presents to the Emergency Department for generalized chronic back pain which worsened this am. Symptoms are constant and moderate in severity.  No mitigating or exacerbating factors reported. Pt reports a hx of kidney stones and states that today's sxs feel similar to a kidney stone. Associated sxs include nausea and abd pain. Pt states the pain actually radiates from her back to her abd. Patient denies any fever, chills, v/d, dysuria, hematuria, and all other sxs at this time. Prior Tx includes Norco 5mg with no relief. No further complaints or concerns at this time.       Arrival mode: Personal vehicle     PCP: Evelyn Wynn MD       Past Medical History:  Past Medical History:   Diagnosis Date    Arthritis     Diabetes mellitus      2015 Insulin x 2 years     DM (diabetes mellitus)      2017 Insulin x 3 years 2013    Hepatitis C     Hyperlipidemia     Hypertension     IBS (irritable bowel syndrome)     Kidney stone     Marfan syndrome     Mitral valve prolapse        Past Surgical History:  Past Surgical History:   Procedure Laterality Date    APPENDECTOMY      BUNIONECTOMY      both feet     SECTION      x 2    CHOLECYSTECTOMY      TUBAL LIGATION        "    Family History:  Family History   Problem Relation Age of Onset    Heart disease Mother     Thrombophilia Father      DVT    Hypertension Father     Stroke Maternal Aunt     Heart disease Maternal Aunt     Stroke Maternal Uncle     Heart disease Maternal Uncle     Breast cancer Neg Hx     Colon cancer Neg Hx     Ovarian cancer Neg Hx        Social History:  Social History     Social History Main Topics    Smoking status: Current Every Day Smoker     Packs/day: 0.20     Years: 20.00     Types: Cigarettes     Last attempt to quit: 4/9/2015    Smokeless tobacco: Never Used      Comment: "once in a blue moon"    Alcohol use No    Drug use: No    Sexual activity: No       ROS   Review of Systems   Constitutional: Negative for chills and fever.   HENT: Negative for sore throat.    Respiratory: Negative for shortness of breath.    Cardiovascular: Negative for chest pain.   Gastrointestinal: Positive for abdominal pain (generalized) and nausea. Negative for diarrhea and vomiting.   Genitourinary: Negative for dysuria and hematuria.   Musculoskeletal: Positive for back pain (generalized).   Skin: Negative for rash.   Neurological: Negative for weakness.   Hematological: Does not bruise/bleed easily.   All other systems reviewed and are negative.    Physical Exam    Initial Vitals   BP Pulse Resp Temp SpO2   05/18/17 1530 05/18/17 1530 05/18/17 1530 05/18/17 1530 05/18/17 1530   135/66 71 18 98.7 °F (37.1 °C) 97 %      Physical Exam  Nursing Notes and Vital Signs Reviewed.  Constitutional: Patient is in mild distress. Well-developed and well-nourished. Morbidly obese.  Head: Atraumatic. Normocephalic.  Eyes: PERRL. EOM intact. Conjunctivae are not pale. No scleral icterus.  ENT: Mucous membranes are moist. Oropharynx is clear and symmetric.    Neck: Supple. Full ROM. No lymphadenopathy.  Cardiovascular: Regular rate. Regular rhythm. No murmurs, rubs, or gallops. Distal pulses are 2+ and " "symmetric.  Pulmonary/Chest: No respiratory distress. Clear to auscultation bilaterally. No wheezing, rales, or rhonchi.  Abdominal: Soft and non-distended.  There is LLQ tenderness.  No rebound, guarding, or rigidity. Good bowel sounds.  Musculoskeletal: Moves all extremities. No obvious deformities. No edema. No calf tenderness.  Skin: Warm and dry.  Neurological:  Alert, awake, and appropriate.  Normal speech.  No acute focal neurological deficits are appreciated.  Psychiatric: Normal affect. Good eye contact. Appropriate in content.    ED Course    Procedures  ED Vital Signs:  Vitals:    05/18/17 1529 05/18/17 1530   BP:  135/66   Pulse:  71   Resp:  18   Temp:  98.7 °F (37.1 °C)   TempSrc: Oral Oral   SpO2:  97%   Weight:  99.8 kg (220 lb)   Height:  5' 10" (1.778 m)       Abnormal Lab Results:  Labs Reviewed   CBC W/ AUTO DIFFERENTIAL - Abnormal; Notable for the following:        Result Value    MCH 31.6 (*)     All other components within normal limits   BASIC METABOLIC PANEL - Abnormal; Notable for the following:     Glucose 173 (*)     All other components within normal limits   URINALYSIS - Abnormal; Notable for the following:     Specific Gravity, UA >=1.030 (*)     Glucose, UA 1+ (*)     All other components within normal limits   PREGNANCY TEST, URINE RAPID        All Lab Results:  Results for orders placed or performed during the hospital encounter of 05/18/17   CBC auto differential   Result Value Ref Range    WBC 10.06 3.90 - 12.70 K/uL    RBC 4.65 4.00 - 5.40 M/uL    Hemoglobin 14.7 12.0 - 16.0 g/dL    Hematocrit 41.9 37.0 - 48.5 %    MCV 90 82 - 98 fL    MCH 31.6 (H) 27.0 - 31.0 pg    MCHC 35.1 32.0 - 36.0 %    RDW 12.5 11.5 - 14.5 %    Platelets 271 150 - 350 K/uL    MPV 10.1 9.2 - 12.9 fL    Gran # 5.1 1.8 - 7.7 K/uL    Lymph # 4.0 1.0 - 4.8 K/uL    Mono # 0.8 0.3 - 1.0 K/uL    Eos # 0.2 0.0 - 0.5 K/uL    Baso # 0.02 0.00 - 0.20 K/uL    Gran% 50.6 38.0 - 73.0 %    Lymph% 40.1 18.0 - 48.0 %    " Mono% 7.5 4.0 - 15.0 %    Eosinophil% 1.6 0.0 - 8.0 %    Basophil% 0.2 0.0 - 1.9 %    Differential Method Automated    Basic metabolic panel   Result Value Ref Range    Sodium 138 136 - 145 mmol/L    Potassium 3.8 3.5 - 5.1 mmol/L    Chloride 106 95 - 110 mmol/L    CO2 24 23 - 29 mmol/L    Glucose 173 (H) 70 - 110 mg/dL    BUN, Bld 8 6 - 20 mg/dL    Creatinine 0.8 0.5 - 1.4 mg/dL    Calcium 9.6 8.7 - 10.5 mg/dL    Anion Gap 8 8 - 16 mmol/L    eGFR if African American >60 >60 mL/min/1.73 m^2    eGFR if non African American >60 >60 mL/min/1.73 m^2   Urinalysis Clean Catch   Result Value Ref Range    Specimen UA Urine, Clean Catch     Color, UA Yellow Yellow, Straw, Ashli    Appearance, UA Clear Clear    pH, UA 6.0 5.0 - 8.0    Specific Gravity, UA >=1.030 (A) 1.005 - 1.030    Protein, UA Negative Negative    Glucose, UA 1+ (A) Negative    Ketones, UA Negative Negative    Bilirubin (UA) Negative Negative    Occult Blood UA Negative Negative    Nitrite, UA Negative Negative    Urobilinogen, UA Negative <2.0 EU/dL    Leukocytes, UA Negative Negative   Pregnancy, urine rapid (UPT)   Result Value Ref Range    Preg Test, Ur Negative          Imaging Results:  Imaging Results         CT Renal Stone Study ABD Pelvis WO (Final result) Result time:  05/18/17 17:43:36    Final result by SHAWN Mayberry Sr., MD (05/18/17 17:43:36)    Impression:      1.  The liver has become heterogeneous in appearance.  If additional imaging evaluation is clinically indicated, I recommend consideration of an MRI examination of the liver without and with IV contrast.  2.  The common bile duct is dilated.  It has a diameter of 12 mm.  If additional imaging evaluation is clinically indicated, I recommend consideration of an ERCP or MRCP.  3.  The liver is enlarged.  It measures 21.9 cm in craniocaudal dimension.    4.  The uterus and ovaries are not well seen.  If additional imaging evaluation is clinically indicated, I recommend consideration of  an ultrasound examination of the pelvis.      All CT scans at this facility use dose modulation, iterative reconstruction, and/or weight base dosing when appropriate to reduce radiation dose when appropriate to reduce radiation dose to as low as reasonably achievable.      Electronically signed by: SHAWN GEORGE MD  Date:     05/18/17  Time:    17:43     Narrative:    CT of abdomen  and pelvis without IV Contrast    History: Left flank pain    Technique: Standard abdomen and pelvis CT protocol without oral or IV contrast was performed.    Finding: Comparison was made to a prior examination performed on 11/6/2016.  The size of the heart is within normal limits. The lungs are clear. There is no pneumothorax or pleural effusion.    The liver has become heterogeneous in appearance.  The liver is enlarged.  It measures 21.9 cm in craniocaudal dimension.  The gallbladder is absent.  There are surgical clips in the gallbladder fossa.  The common bile duct is dilated.  It has a diameter of 12 mm.  The pancreas, spleen, adrenals, and kidneys are normal in appearance. The ureters and the urinary bladder are normal in appearance. The appendix is absent.  There are surgical changes in the expected location of the appendix.  The rest of the gastrointestinal system is normal in appearance. The uterus and ovaries are not well seen. There is no free fluid within the abdomen or pelvis. There is no pneumoperitoneum.  There is a moderate amount of atherosclerosis.                      The Emergency Provider reviewed the vital signs and test results, which are outlined above.    ED Discussion     Ct results reviewed clinically symptoms do not correlate with CT findings, patient with low back pain and left lower abdominal pain.     5:53 PM: Reassessed pt at this time.  Pt is resting comfortably in ED bed and is in NAD at this time. Discussed with pt all pertinent ED information and results. Discussed pt dx and plan of tx. Gave pt all f/u  and return to the ED instructions. All questions and concerns were addressed at this time. Pt expresses understanding of information and instructions, and is comfortable with plan to discharge. Pt is stable for discharge.    I discussed with patient and/or family/caretaker that evaluation in the ED does not suggest any emergent or life threatening medical conditions requiring immediate intervention beyond what was provided in the ED, and I believe patient is safe for discharge.  Regardless, an unremarkable evaluation in the ED does not preclude the development or presence of a serious of life threatening condition. As such, patient was instructed to return immediately for any worsening or change in current symptoms.      ED Medication(s):  Medications   meperidine 50 mg/mL injection 25 mg (25 mg Intravenous Given 5/18/17 1621)   ondansetron injection 4 mg (4 mg Intravenous Given 5/18/17 1620)   meperidine 50 mg/mL injection 50 mg (50 mg Intravenous Given 5/18/17 1740)       New Prescriptions    No medications on file       Follow-up Information     Follow up with Evelyn Wynn MD. Schedule an appointment as soon as possible for a visit in 1 day.    Specialty:  Family Medicine    Why:  Return to the Emergency Room, If symptoms worsen    Contact information:    [address] [address] [address]  [phone]              Medical Decision Making    Medical Decision Making:   Clinical Tests:   Lab Tests: Ordered and Reviewed  Radiological Study: Ordered and Reviewed           Scribe Attestation:   Scribe #1: I performed the above scribed service and the documentation accurately describes the services I performed. I attest to the accuracy of the note.    Attending:   Physician Attestation Statement for Scribe #1: I, Carla Astorga MD, personally performed the services described in this documentation, as scribed by Myriam Hunter, in my presence, and it is both accurate and complete.           Clinical Impression       ICD-10-CM ICD-9-CM   1. Abdominal wall pain in left lower quadrant R10.32 789.04   2. Chronic midline low back pain without sciatica M54.5 724.2    G89.29 338.29       Disposition:   Disposition: Discharged  Condition: Stable       Carla Astorga MD  05/18/17 [id number]

## 2017-05-18 NOTE — ED NOTES
Inquired with daughter regarding code status of patient. Daughter not aware of code status, she stated that her brother is medical power of  and he has her advanced directives, the brother is en route to the hospital at this time.

## 2017-06-16 ENCOUNTER — OFFICE VISIT (OUTPATIENT)
Dept: FAMILY MEDICINE | Facility: CLINIC | Age: 42
End: 2017-06-16
Payer: MEDICAID

## 2017-06-16 VITALS
HEIGHT: 70 IN | RESPIRATION RATE: 18 BRPM | BODY MASS INDEX: 31.59 KG/M2 | DIASTOLIC BLOOD PRESSURE: 64 MMHG | WEIGHT: 220.69 LBS | OXYGEN SATURATION: 98 % | HEART RATE: 97 BPM | SYSTOLIC BLOOD PRESSURE: 99 MMHG | TEMPERATURE: 99 F

## 2017-06-16 DIAGNOSIS — G89.29 CHRONIC RIGHT SHOULDER PAIN: Primary | ICD-10-CM

## 2017-06-16 DIAGNOSIS — M25.511 CHRONIC RIGHT SHOULDER PAIN: Primary | ICD-10-CM

## 2017-06-16 PROCEDURE — 99215 OFFICE O/P EST HI 40 MIN: CPT | Mod: PBBFAC,PO | Performed by: NURSE PRACTITIONER

## 2017-06-16 PROCEDURE — 99999 PR PBB SHADOW E&M-EST. PATIENT-LVL V: CPT | Mod: PBBFAC,,, | Performed by: NURSE PRACTITIONER

## 2017-06-16 PROCEDURE — 99213 OFFICE O/P EST LOW 20 MIN: CPT | Mod: S$PBB,,, | Performed by: NURSE PRACTITIONER

## 2017-06-16 RX ORDER — HYDROCODONE BITARTRATE AND ACETAMINOPHEN 5; 325 MG/1; MG/1
1 TABLET ORAL EVERY 6 HOURS PRN
Qty: 20 TABLET | Refills: 0 | Status: SHIPPED | OUTPATIENT
Start: 2017-06-16 | End: 2017-06-26

## 2017-06-16 RX ORDER — TRAMADOL HYDROCHLORIDE 50 MG/1
TABLET ORAL
COMMUNITY
Start: 2017-05-25 | End: 2018-02-27 | Stop reason: SDUPTHER

## 2017-06-16 NOTE — PROGRESS NOTES
Subjective:       Patient ID: Jenifer Westfall is a 41 y.o. female.    Chief Complaint: Shoulder Pain  Pt reports to clinic with chief complaint of right shoulder pain. This is a reoccurring issue.  Reports she was evaluated by ortho several months ago and was told she had significant arthritis.  Negative for remote trauma or heavy lifting.  Has applied diclofenac gel and taken tramadol without any relief. Pain worsens with movement.   Shoulder Pain    The pain is present in the right shoulder. This is a chronic problem. The current episode started in the past 7 days. There has been no history of extremity trauma. The problem occurs constantly. The problem has been gradually worsening. The quality of the pain is described as aching. The pain is at a severity of 10/10. The symptoms are aggravated by activity. She has tried oral narcotics for the symptoms. The treatment provided no relief. Family history includes arthritis.     Review of Systems   Constitutional: Negative.    HENT: Negative.    Respiratory: Negative.    Cardiovascular: Negative.    Musculoskeletal: Positive for arthralgias.       Objective:      Physical Exam   Constitutional: She appears well-developed and well-nourished.   HENT:   Head: Normocephalic.   Eyes: EOM are normal.   Neck: Neck supple.   Cardiovascular: Normal rate and normal heart sounds.    Pulmonary/Chest: Effort normal and breath sounds normal.   Musculoskeletal:        Right shoulder: She exhibits decreased range of motion, tenderness and crepitus. She exhibits no effusion.       Assessment:       1. Chronic right shoulder pain        Plan:   Chronic right shoulder pain  -     hydrocodone-acetaminophen 5-325mg (NORCO) 5-325 mg per tablet; Take 1 tablet by mouth every 6 (six) hours as needed for Pain.  Dispense: 20 tablet; Refill: 0  RICE. Continue application of diclofenac gel.  Keep follow up appt with orth    No Follow-up on file.

## 2017-06-20 DIAGNOSIS — M25.511 RIGHT SHOULDER PAIN, UNSPECIFIED CHRONICITY: Primary | ICD-10-CM

## 2017-06-23 ENCOUNTER — HOSPITAL ENCOUNTER (OUTPATIENT)
Dept: RADIOLOGY | Facility: HOSPITAL | Age: 42
Discharge: HOME | End: 2017-06-23
Attending: PHYSICIAN ASSISTANT
Payer: MEDICAID

## 2017-06-23 ENCOUNTER — OFFICE VISIT (OUTPATIENT)
Dept: ORTHOPEDICS | Facility: CLINIC | Age: 42
End: 2017-06-23
Payer: MEDICAID

## 2017-06-23 VITALS
DIASTOLIC BLOOD PRESSURE: 66 MMHG | HEART RATE: 77 BPM | BODY MASS INDEX: 31.78 KG/M2 | SYSTOLIC BLOOD PRESSURE: 115 MMHG | HEIGHT: 70 IN | WEIGHT: 222 LBS

## 2017-06-23 DIAGNOSIS — M25.511 RIGHT SHOULDER PAIN, UNSPECIFIED CHRONICITY: ICD-10-CM

## 2017-06-23 DIAGNOSIS — M19.019 AC JOINT ARTHROPATHY: ICD-10-CM

## 2017-06-23 DIAGNOSIS — M75.31 CALCIFIC TENDINITIS OF RIGHT SHOULDER REGION: Primary | ICD-10-CM

## 2017-06-23 DIAGNOSIS — M75.41 IMPINGEMENT SYNDROME OF RIGHT SHOULDER: ICD-10-CM

## 2017-06-23 DIAGNOSIS — M54.2 CERVICAL PAIN (NECK): ICD-10-CM

## 2017-06-23 PROCEDURE — 99213 OFFICE O/P EST LOW 20 MIN: CPT | Mod: S$PBB,,, | Performed by: PHYSICIAN ASSISTANT

## 2017-06-23 PROCEDURE — 99215 OFFICE O/P EST HI 40 MIN: CPT | Mod: PBBFAC,25 | Performed by: PHYSICIAN ASSISTANT

## 2017-06-23 PROCEDURE — 99999 PR PBB SHADOW E&M-EST. PATIENT-LVL V: CPT | Mod: PBBFAC,,, | Performed by: PHYSICIAN ASSISTANT

## 2017-06-23 PROCEDURE — 73030 X-RAY EXAM OF SHOULDER: CPT | Mod: 26,RT,, | Performed by: RADIOLOGY

## 2017-06-23 RX ORDER — METHYLPREDNISOLONE 4 MG/1
TABLET ORAL
Qty: 21 PACKAGE | Refills: 0 | Status: SHIPPED | OUTPATIENT
Start: 2017-06-23 | End: 2017-07-05 | Stop reason: ALTCHOICE

## 2017-06-23 NOTE — PROGRESS NOTES
Subjective:      Patient ID: Jenifer Westfall is a 41 y.o. female.    Chief Complaint: Pain of the Right Shoulder    HPI  The patient presents to clinic for follow-up of dominant right shoulder pain. She's had shoulder pain for about 2 years. Her pain is now worse.   She was seen by me in the fall of 2016 and also Eri Broussard PA-C. She states she has a shooting pain all down her right arm. The patient uses a cream from her rheumatologist (believes it's diclofenac) and states that is it not giving her lasting relief.     Her ROM has also decreased. Her pain is also affecting her right elbow and neck. The patient denies numbness and tingling. X-rays were taken of her shoulder today.       Review of Systems   Constitution: Negative for chills, fever and night sweats.   Respiratory: Negative for cough, shortness of breath and wheezing.    Musculoskeletal: Positive for joint pain and stiffness.   Gastrointestinal: Negative for diarrhea, nausea and vomiting.   Neurological: Negative for brief paralysis.   Psychiatric/Behavioral: Negative for altered mental status.         Objective:            General    Constitutional: She is oriented to person, place, and time. She appears well-developed and well-nourished.   Neck: Normal range of motion.   Cardiovascular: Normal rate and regular rhythm.    Pulmonary/Chest: Effort normal.   Abdominal: Soft.   Neurological: She is alert and oriented to person, place, and time.   Psychiatric: She has a normal mood and affect. Her behavior is normal.         Back (L-Spine & T-Spine) / Neck (C-Spine) Exam     Tenderness   The patient is tender to palpation of the right trapezial, right scapular and right SCM.   Right Elbow Exam     Tenderness   The patient is tender to palpation of the lateral epicondyle.     Range of Motion   The patient has normal right elbow ROM.      Right Shoulder Exam     Tenderness   The patient is tender to palpation of the acromioclavicular joint, acromion and  biceps tendon.    Range of Motion   Active Abduction:  60 abnormal   Forward Flexion:  90 abnormal   Forward Elevation: 90 abnormal  External Rotation 0 degrees: 50   Internal Rotation 0 degrees: L4     Tests & Signs   Cross Arm: positive  Impingement: positive  Bear Hug: positive                Right shoulder three views.    Findings: There is mild degenerative change of the glenohumeral and acromioclavicular joints.  Soft tissue calcification noted adjacent to the greater tuberosity of the humerus suggesting calcific tendinitis or bursitis.  No acute fracture or dislocation.    Assessment:       Encounter Diagnoses   Name Primary?    Calcific tendinitis of right shoulder region Yes    AC joint arthropathy     Cervical pain (neck)     Right shoulder pain, unspecified chronicity     Impingement syndrome of right shoulder           Plan:       Jenifer was seen today for pain.    Diagnoses and all orders for this visit:    Calcific tendinitis of right shoulder region  -     Ambulatory Referral to Physical/Occupational Therapy    AC joint arthropathy  -     Ambulatory Referral to Physical/Occupational Therapy    Cervical pain (neck)  -     Ambulatory Referral to Physical/Occupational Therapy    Right shoulder pain, unspecified chronicity    Impingement syndrome of right shoulder    Other orders  -     methylPREDNISolone (MEDROL DOSEPACK) 4 mg tablet; use as directed        Patient presents with multiple issues regarding her neck, right shoulder and elbow.  I would like to treat her with a Medrol Dosepak to help with this inflammation and flareup of pain.  I would also like the patient to return to physical therapy twice a week to work on her neck and right shoulder.  In addition to the above measures, she will also utilize warm, damp compresses.    The patient will return for a follow-up in 4 weeks.  If her symptoms have not improved, we'll consider further imaging for shoulder and/or neck with possible injections  for shoulder and elbow.          Lucero Landeros PA-C

## 2017-06-24 DIAGNOSIS — G89.4 CHRONIC PAIN DISORDER: ICD-10-CM

## 2017-06-25 RX ORDER — TIZANIDINE 4 MG/1
4 TABLET ORAL NIGHTLY
Qty: 30 TABLET | Refills: 4 | Status: SHIPPED | OUTPATIENT
Start: 2017-06-25 | End: 2018-02-27 | Stop reason: SDUPTHER

## 2017-06-26 RX ORDER — PEN NEEDLE, DIABETIC 31 GX5/16"
NEEDLE, DISPOSABLE MISCELLANEOUS
Qty: 100 EACH | Refills: 3 | Status: ON HOLD | OUTPATIENT
Start: 2017-06-26 | End: 2018-06-27 | Stop reason: HOSPADM

## 2017-07-05 ENCOUNTER — OFFICE VISIT (OUTPATIENT)
Dept: RHEUMATOLOGY | Facility: CLINIC | Age: 42
End: 2017-07-05
Payer: MEDICAID

## 2017-07-05 VITALS
SYSTOLIC BLOOD PRESSURE: 128 MMHG | DIASTOLIC BLOOD PRESSURE: 69 MMHG | HEART RATE: 74 BPM | BODY MASS INDEX: 31.59 KG/M2 | HEIGHT: 70 IN | WEIGHT: 220.69 LBS

## 2017-07-05 DIAGNOSIS — M75.51 CHRONIC BURSITIS OF RIGHT SHOULDER: Chronic | ICD-10-CM

## 2017-07-05 DIAGNOSIS — M70.62 TROCHANTERIC BURSITIS OF LEFT HIP: Primary | ICD-10-CM

## 2017-07-05 DIAGNOSIS — M50.30 DDD (DEGENERATIVE DISC DISEASE), CERVICAL: Chronic | ICD-10-CM

## 2017-07-05 DIAGNOSIS — M54.32 SCIATICA OF LEFT SIDE: ICD-10-CM

## 2017-07-05 PROCEDURE — 20610 DRAIN/INJ JOINT/BURSA W/O US: CPT | Mod: S$PBB,,, | Performed by: PHYSICIAN ASSISTANT

## 2017-07-05 PROCEDURE — 99214 OFFICE O/P EST MOD 30 MIN: CPT | Mod: PBBFAC,PO | Performed by: PHYSICIAN ASSISTANT

## 2017-07-05 PROCEDURE — 20610 DRAIN/INJ JOINT/BURSA W/O US: CPT | Mod: PBBFAC,PO | Performed by: PHYSICIAN ASSISTANT

## 2017-07-05 PROCEDURE — 99999 PR PBB SHADOW E&M-EST. PATIENT-LVL IV: CPT | Mod: PBBFAC,,, | Performed by: PHYSICIAN ASSISTANT

## 2017-07-05 PROCEDURE — 99214 OFFICE O/P EST MOD 30 MIN: CPT | Mod: 25,S$PBB,, | Performed by: PHYSICIAN ASSISTANT

## 2017-07-05 RX ORDER — METHYLPREDNISOLONE ACETATE 80 MG/ML
80 INJECTION, SUSPENSION INTRA-ARTICULAR; INTRALESIONAL; INTRAMUSCULAR; SOFT TISSUE
Status: COMPLETED | OUTPATIENT
Start: 2017-07-05 | End: 2017-07-05

## 2017-07-05 RX ORDER — BETAMETHASONE SODIUM PHOSPHATE AND BETAMETHASONE ACETATE 3; 3 MG/ML; MG/ML
3 INJECTION, SUSPENSION INTRA-ARTICULAR; INTRALESIONAL; INTRAMUSCULAR; SOFT TISSUE
Status: COMPLETED | OUTPATIENT
Start: 2017-07-05 | End: 2017-07-05

## 2017-07-05 RX ADMIN — BETAMETHASONE ACETATE AND BETAMETHASONE SODIUM PHOSPHATE 3 MG: 3; 3 INJECTION, SUSPENSION INTRA-ARTICULAR; INTRALESIONAL; INTRAMUSCULAR; SOFT TISSUE at 02:07

## 2017-07-05 RX ADMIN — METHYLPREDNISOLONE ACETATE 80 MG: 80 INJECTION, SUSPENSION INTRALESIONAL; INTRAMUSCULAR; INTRASYNOVIAL; SOFT TISSUE at 02:07

## 2017-07-05 NOTE — PATIENT INSTRUCTIONS
Physical therapy dry needling, ask to address left hip bursitis     Over the counter topical with lidocaine, ask pharmacist    Ortho for right shoulder

## 2017-07-05 NOTE — PROGRESS NOTES
Subjective:       Patient ID: Jenifer Westfall is a 41 y.o. female.    Chief Complaint: shoulder pain and hip pain    Jenifer is here for routine follow up.  She has chronic pain with chronic sciatica on the left side. MRI l spine shows nerve impingement.  She also has had issues with chronic left hip bursitis with injections in the past.  She also has Marfans. She also has issues with neck pain and has DDD on Cspine xray.  She has had prednisone tapers in the past which help her sciatic pain but it returns when she is off the steroids.  She takes gabapentin 400mg TID and tramadol prn for her pain.  She has seen orthopedics recently for issues with right shoulder pain. They said she has tendonitis and treated with a steroid dosepack.  Ortho wants her to get into physical therapy for her right shoulder. They did discuss possible MRI if shoulder issues continue.  She was seen by Dr. Stein for possible DENY for her spine issues but insurance would not cover these.      She can't take nsaids due to GI upset. Today she complains of right shoulder pain and left hip pain.  The shoulder didn't improve much with the steroid dosepack given by ortho.  Pain with overhead activities.  Pain in the left hip laterally and tender to touch.   Pain 8/10.        Review of Systems   Constitutional: Negative for activity change, fatigue, fever and unexpected weight change.   HENT: Negative for mouth sores, tinnitus and trouble swallowing.    Eyes: Negative for itching and visual disturbance.   Respiratory: Negative for cough and shortness of breath.    Cardiovascular: Negative for chest pain, palpitations and leg swelling.   Gastrointestinal: Negative for blood in stool, diarrhea, nausea and vomiting.   Genitourinary: Negative for dysuria, flank pain, frequency and hematuria.   Musculoskeletal: Positive for arthralgias, back pain, gait problem, joint swelling and neck pain. Negative for myalgias.   Skin: Negative for rash and wound.  "  Neurological: Negative for dizziness, weakness, light-headedness and headaches.   Hematological: Negative for adenopathy. Does not bruise/bleed easily.   Psychiatric/Behavioral: Negative for confusion and sleep disturbance.         Objective:   /69   Pulse 74   Ht 5' 10" (1.778 m)   Wt 100.1 kg (220 lb 10.9 oz)   LMP  (Approximate)   BMI 31.66 kg/m²      Physical Exam   Constitutional: She is oriented to person, place, and time and well-developed, well-nourished, and in no distress. No distress.   HENT:   Head: Normocephalic.   Eyes: EOM are normal. Pupils are equal, round, and reactive to light.   Neck: Normal range of motion. Neck supple. No thyromegaly present.   Cardiovascular: Normal rate, regular rhythm and normal heart sounds.  Exam reveals no gallop and no friction rub.    No murmur heard.  Pulmonary/Chest: Breath sounds normal. She has no wheezes. She has no rales. She exhibits no tenderness.   Abdominal: Soft. There is no tenderness. There is no rebound.   Lymphadenopathy:     She has no cervical adenopathy.   Neurological: She is alert and oriented to person, place, and time. She displays normal reflexes. She exhibits normal muscle tone. Gait normal.   Skin: Skin is warm and dry. No rash noted. No erythema.     Psychiatric: Mood, memory and affect normal.   Musculoskeletal: Normal range of motion. She exhibits tenderness. She exhibits no edema.   Left low  to bilateral paraspinal muscles    Left hip significant tenderness to palpation over the greater trochanter, left hip rom full   right hip normal    Right shoulder limited forward flexion due to pain, passive rom is better but still painful around 100-140 degrees, painful internal rotation and adduction, tender to palpation to the shoulder diffusely, no swelling appreciated         no labs today  Assessment:       1. Trochanteric bursitis of left hip    2. DDD (degenerative disc disease), cervical    3. Chronic bursitis of right " shoulder    4. Sciatica of left side        Impression:    Left hip bursitis: acute exacerbation today, cortisone injections in the past  Chronic Left side sciatica: MRI evidence of nerve impingement, unable to take nsaids due to GI upset, improves with steroids, gabapentin 400mg tid, saw Dr. Stein, insurance denied DENY; this is ok today  DDD C spine by xray: C6-7, 7-1, could also be contributing to shoulder with referred pain  Right shoulder pain: seen by ortho, dx's with bursitis/calcific tendonitis, ortho wants to start PT, had medrol dosepack with no relief  Plan:       Will defer right shoulder to ortho as they are seeing her and addressing, they have discussed MRI as possible next step after PT  Agree w/ physical therapy for shoulder as ortho recommended and also PT can address lumbar and cervical spine issue and left hip bursitis and sciatic issues  Inject left hip as below     Procedure note: After verbal consent was obtained.  The left greater trochanteric bursa area was prepared with sterile prep.  The skin was anesthetized with 1% ethyl chloride.  The greater trochanteric bursa injected with 2.5 cc of 1% lidocaine to anesthetize the area.  Followed by a mixture of 3mg celestone/soluspan, 80mg Depo-Medrol and 1 mL of 1% lidocaine.  Hemostasis was obtained.  The patient tolerated procedure well with no complications.  discharge and icing instructions given to patient    rtc 6 mos no labs

## 2017-07-19 ENCOUNTER — CLINICAL SUPPORT (OUTPATIENT)
Dept: REHABILITATION | Facility: HOSPITAL | Age: 42
End: 2017-07-19
Attending: ORTHOPAEDIC SURGERY
Payer: MEDICAID

## 2017-07-19 DIAGNOSIS — M25.511 CHRONIC RIGHT SHOULDER PAIN: Primary | ICD-10-CM

## 2017-07-19 DIAGNOSIS — G89.29 CHRONIC RIGHT SHOULDER PAIN: Primary | ICD-10-CM

## 2017-07-19 PROCEDURE — 97014 ELECTRIC STIMULATION THERAPY: CPT | Performed by: PHYSICAL THERAPIST

## 2017-07-19 PROCEDURE — 97162 PT EVAL MOD COMPLEX 30 MIN: CPT | Performed by: PHYSICAL THERAPIST

## 2017-07-19 PROCEDURE — 97140 MANUAL THERAPY 1/> REGIONS: CPT | Performed by: PHYSICAL THERAPIST

## 2017-07-20 NOTE — PROGRESS NOTES
PHYSICAL THERAPY INITIAL OUTPATIENT EVALUATION    Referring Provider:  Dr. Brian Gotti    Diagnosis:       ICD-10-CM ICD-9-CM    1. Chronic right shoulder pain M25.511 719.41     G89.29 338.29             Orders:  Evaluate and Treat    Date of Initial Evaluation: 7/19/17      Visit # 1 of    SUBJECTIVE:  Patient reports long history of R shoulder pain that she has managed with OTC meds but recently, 2-3 months ago, pain has progressively worsening. Pt reports that she was given dx of R RTC mm tear  Main complaint: pain when lifting the arm any direction. Pt also reports upper shoulder pain that radiates into neck when moving her arm. She denies pain below her elbow, pain numbness into R hand.    Current Pain: 8 /10, located anterior and lateral shoulder  Worse pain  10/10  Best pain 4/10    OBJECTIVE:    Sitting posture fair  Standing posture   Moderate Protruded head  Increased t/sp Kyphosis  SCAPULAR DYSKINESIA- unable to assess today      Sensation: WNL     Shoulder A/PROM: Flexion     90/150      Extension   NT     Abduction   60/160      Adduction   NT     Internal Rotation  30/ 45     External Rotation  50/70  Moderate to severe mm guarding during PROM    Strength: NT today due to pain    STT (selective tension test) pain and weakness in abd/ER/IR    Special test:  NT    GHJ mobility: decreased inferior and AP glides  AC mobility  ST mobility      Tenderness to palpation:  Moderate tenderness to all RTC mm        Function: Patient reports 81% disability based on score of the DASH        ASSESSMENT:  The patient is a 41 y.o. year old female who presents to physical therapy with complaints of R shoulder pain.  Patient's impairments include decreased active and passive ROM, moderate to severe pain, moderate to severe mm guarding.  These impairments are limiting patient's ability to perform ADL/IADL  Patient's prognosis is fair to good.  Patient will benefit from skilled physical therapy intervention to improve  mobility, pain and function. Pt has Co-morbidities which may impact the plan of care and potentially impede the patient's progress in therapy include:  DM, possible frozen shoulder, RTC tear, OA  Patients CLINICAL PRESENTATION is evolving.     Short Term Goals:  1-3 weeks  1. I with HEP  2. Full and pain free R Shoulder PROM     Long Term Goals: 5-12 weeks  1. Return to activities with R arm without complains of pain and dysfunction  2.Pt demo fair to good RTC mm strength and scapula stabilizer strength to perform overhead activities without pain  3. Pt demo full AROM to perform ADL's without pain    TREATMENT PROVIDED:  -Manual Therapy:  PROM to R shoulder, STM to RTC mm 15 min  -Therapeutic Exercise:  NT  -Modalities: MHP and estim to R shoulder  -Evaluation  -Education on HEP, condition and posture, activity modification 10 min    PLAN:  Patient will benefit from physical therapy (2) x/week for (8) weeks including manual therapy, therapeutic exercise, functional activities, modalities, and patient education.    Thank you for this referral.    These services are reasonable and necessary for the conditions set forth above while under my care.

## 2017-07-26 ENCOUNTER — CLINICAL SUPPORT (OUTPATIENT)
Dept: REHABILITATION | Facility: HOSPITAL | Age: 42
End: 2017-07-26
Attending: ORTHOPAEDIC SURGERY
Payer: MEDICAID

## 2017-07-26 DIAGNOSIS — M25.511 CHRONIC RIGHT SHOULDER PAIN: Primary | ICD-10-CM

## 2017-07-26 DIAGNOSIS — G89.29 CHRONIC RIGHT SHOULDER PAIN: Primary | ICD-10-CM

## 2017-07-26 PROCEDURE — 97140 MANUAL THERAPY 1/> REGIONS: CPT | Performed by: PHYSICAL THERAPIST

## 2017-07-26 PROCEDURE — 97110 THERAPEUTIC EXERCISES: CPT | Performed by: PHYSICAL THERAPIST

## 2017-07-26 NOTE — PROGRESS NOTES
PHYSICAL THERAPY INITIAL OUTPATIENT EVALUATION    Referring Provider:  Dr. Brian Gotti    Diagnosis:       ICD-10-CM ICD-9-CM    1. Chronic right shoulder pain M25.511 719.41     G89.29 338.29             Orders:  Evaluate and Treat    Date of Initial Evaluation: 7/19/17      Visit # 2 of    SUBJECTIVE: Pt reports no new complaints    Hx:  Patient reports long history of R shoulder pain that she has managed with OTC meds but recently, 2-3 months ago, pain has progressively worsening. Pt reports that she was given dx of R RTC mm tear  Main complaint: pain when lifting the arm any direction. Pt also reports upper shoulder pain that radiates into neck when moving her arm. She denies pain below her elbow, pain numbness into R hand.    Current Pain: 8 /10, located anterior and lateral shoulder  Worse pain  10/10  Best pain 4/10    OBJECTIVE:    Sitting posture fair  Standing posture   Moderate Protruded head  Increased t/sp Kyphosis  SCAPULAR DYSKINESIA- unable to assess today      Sensation: WNL     Shoulder A/PROM: Flexion     90/150      Extension   NT     Abduction   60/160      Adduction   NT     Internal Rotation  30/ 45     External Rotation  50/70  Moderate to severe mm guarding during PROM    Strength: NT today due to pain    STT (selective tension test) pain and weakness in abd/ER/IR    Special test:  NT    GHJ mobility: decreased inferior and AP glides  AC mobility  ST mobility      Tenderness to palpation:  Moderate tenderness to all RTC mm        Function: Patient reports 81% disability based on score of the DASH        ASSESSMENT:  Pt demo increased passive ROM today to WNL- slight pain at end range flexion    Short Term Goals:  1-3 weeks  1. I with HEP  2. Full and pain free R Shoulder PROM     Long Term Goals: 5-12 weeks  1. Return to activities with R arm without complains of pain and dysfunction  2.Pt demo fair to good RTC mm strength and scapula stabilizer strength to perform overhead activities  without pain  3. Pt demo full AROM to perform ADL's without pain    TREATMENT PROVIDED:  -Manual Therapy:  PROM to R shoulder, STM to RTC mm and rhythmic stab 15 min  -Therapeutic Exercise:  UBE 3 min, pulley abd and flex 3/3, scap retractions 20x, shoulder rolls 20x, supine IR/ER 30x, bicep curls 20x, isomerics IR/ER 10x  -Modalities: MHP  R shoulder  -Education on HEP, condition and posture, activity modification 0 min    PLAN:  Patient will benefit from physical therapy (2) x/week for (8) weeks including manual therapy, therapeutic exercise, functional activities, modalities, and patient education.    Thank you for this referral.    These services are reasonable and necessary for the conditions set forth above while under my care.

## 2017-07-27 DIAGNOSIS — I10 ESSENTIAL HYPERTENSION: ICD-10-CM

## 2017-07-27 RX ORDER — LISINOPRIL 10 MG/1
TABLET ORAL
Qty: 30 TABLET | Refills: 3 | Status: SHIPPED | OUTPATIENT
Start: 2017-07-27

## 2017-07-31 ENCOUNTER — CLINICAL SUPPORT (OUTPATIENT)
Dept: REHABILITATION | Facility: HOSPITAL | Age: 42
End: 2017-07-31
Attending: ORTHOPAEDIC SURGERY
Payer: MEDICAID

## 2017-07-31 DIAGNOSIS — M25.511 CHRONIC RIGHT SHOULDER PAIN: Primary | ICD-10-CM

## 2017-07-31 DIAGNOSIS — G89.29 CHRONIC RIGHT SHOULDER PAIN: Primary | ICD-10-CM

## 2017-07-31 PROCEDURE — 97140 MANUAL THERAPY 1/> REGIONS: CPT | Performed by: PHYSICAL THERAPIST

## 2017-07-31 PROCEDURE — 97110 THERAPEUTIC EXERCISES: CPT | Performed by: PHYSICAL THERAPIST

## 2017-07-31 NOTE — PROGRESS NOTES
PHYSICAL THERAPY INITIAL OUTPATIENT EVALUATION    Referring Provider:  Dr. Brian Gotti    Diagnosis:       ICD-10-CM ICD-9-CM    1. Chronic right shoulder pain M25.511 719.41     G89.29 338.29             Orders:  Evaluate and Treat    Date of Initial Evaluation: 7/19/17      Visit # 3 of    SUBJECTIVE: Pt reports no new complaints    Hx:  Patient reports long history of R shoulder pain that she has managed with OTC meds but recently, 2-3 months ago, pain has progressively worsening. Pt reports that she was given dx of R RTC mm tear  Main complaint: pain when lifting the arm any direction. Pt also reports upper shoulder pain that radiates into neck when moving her arm. She denies pain below her elbow, pain numbness into R hand.    Current Pain: 8 /10, located anterior and lateral shoulder  Worse pain  10/10  Best pain 4/10    OBJECTIVE:    Sitting posture fair  Standing posture   Moderate Protruded head  Increased t/sp Kyphosis  SCAPULAR DYSKINESIA- unable to assess today      Sensation: WNL     Shoulder A/PROM: Flexion     90/150      Extension   NT     Abduction   60/160      Adduction   NT     Internal Rotation  30/ 45     External Rotation  50/70  Moderate to severe mm guarding during PROM    Strength: NT today due to pain    STT (selective tension test) pain and weakness in abd/ER/IR    Special test:  NT    GHJ mobility: decreased inferior and AP glides  AC mobility  ST mobility      Tenderness to palpation:  Moderate tenderness to all RTC mm        Function: Patient reports 81% disability based on score of the DASH        ASSESSMENT:  Pt demo increased passive ROM today to WNL- slight pain at end range flexion    Short Term Goals:  1-3 weeks  1. I with HEP  2. Full and pain free R Shoulder PROM     Long Term Goals: 5-12 weeks  1. Return to activities with R arm without complains of pain and dysfunction  2.Pt demo fair to good RTC mm strength and scapula stabilizer strength to perform overhead activities  without pain  3. Pt demo full AROM to perform ADL's without pain    TREATMENT PROVIDED:  -Manual Therapy:  PROM to R shoulder, STM to RTC mm and rhythmic stab 15 min  -Therapeutic Exercise: 20 min UBE 3 min, pulley abd and flex 3/3, scap retractions 20x, shoulder rolls 20x, supine IR/ER 30x, bicep curls 20x, isomerics IR/ER 10x  -Modalities: MHP  R shoulder  -Education on HEP, condition and posture, activity modification 0 min    PLAN:  Patient will benefit from physical therapy (2) x/week for (8) weeks including manual therapy, therapeutic exercise, functional activities, modalities, and patient education.    Thank you for this referral.    These services are reasonable and necessary for the conditions set forth above while under my care.

## 2017-08-02 ENCOUNTER — OFFICE VISIT (OUTPATIENT)
Dept: ORTHOPEDICS | Facility: CLINIC | Age: 42
End: 2017-08-02
Payer: MEDICAID

## 2017-08-02 VITALS
HEIGHT: 70 IN | SYSTOLIC BLOOD PRESSURE: 104 MMHG | HEART RATE: 73 BPM | DIASTOLIC BLOOD PRESSURE: 60 MMHG | WEIGHT: 220.69 LBS | BODY MASS INDEX: 31.59 KG/M2

## 2017-08-02 DIAGNOSIS — M54.2 CERVICAL PAIN (NECK): ICD-10-CM

## 2017-08-02 DIAGNOSIS — M75.51 BURSITIS OF RIGHT SHOULDER: ICD-10-CM

## 2017-08-02 DIAGNOSIS — M75.31 CALCIFIC TENDINITIS OF RIGHT SHOULDER REGION: ICD-10-CM

## 2017-08-02 DIAGNOSIS — M25.511 RIGHT SHOULDER PAIN, UNSPECIFIED CHRONICITY: ICD-10-CM

## 2017-08-02 DIAGNOSIS — M75.41 IMPINGEMENT SYNDROME OF RIGHT SHOULDER: ICD-10-CM

## 2017-08-02 DIAGNOSIS — M19.019 AC JOINT ARTHROPATHY: Primary | ICD-10-CM

## 2017-08-02 PROCEDURE — 99214 OFFICE O/P EST MOD 30 MIN: CPT | Mod: PBBFAC | Performed by: PHYSICIAN ASSISTANT

## 2017-08-02 PROCEDURE — 99999 PR PBB SHADOW E&M-EST. PATIENT-LVL IV: CPT | Mod: PBBFAC,,, | Performed by: PHYSICIAN ASSISTANT

## 2017-08-02 PROCEDURE — 99213 OFFICE O/P EST LOW 20 MIN: CPT | Mod: S$PBB,,, | Performed by: PHYSICIAN ASSISTANT

## 2017-08-02 NOTE — LETTER
August 2, 2017      Evelyn Bello MD  [address] [facility] [address]           [facility]  [address] [place] [id number]  Phone: [phone]  Fax: [phone]          Patient: Jenifer Westfall   MR Number: [id number]   YOB: 1975   Date of Visit: 8/2/2017       Dear Dr. Evelyn Bello:    Thank you for referring Jenifer Westfall to me for evaluation. Attached you will find relevant portions of my assessment and plan of care.    If you have questions, please do not hesitate to call me. I look forward to following Jenifer Westfall along with you.    Sincerely,    Lucero Landeros PA-C    Enclosure  CC:  No Recipients    If you would like to receive this communication electronically, please contact [email or web address] or [phone] to request more information on [employer] Link access.    For providers and/or their staff who would like to refer a patient to [facility], please contact us through our one-stop-shop provider referral line, [facility], at [phone].    If you feel you have received this communication in error or would no longer like to receive these types of communications, please e-mail [email or web address]

## 2017-08-02 NOTE — PROGRESS NOTES
Subjective:      Patient ID: Jenifer Westfall is a 42 y.o. female.    Chief Complaint: Pain of the Right Shoulder    HPI  The patient presents to clinic for follow-up of dominant right shoulder pain. She's had shoulder pain for about 2 years. Her pain is now worse.   She was seen by me in the fall of 2016 and also Eri Broussard PA-C.      The patient has been attending physical therapy at [facility] for about 2-3 weeks and has not noticed significant difference so for however it is still early on to  anything.  The patient was prescribed a Medrol Dosepak during her last visit which has helped but did not give her lasting relief.  The patient continues to have shoulder and neck pain.  She is also being treated for her left hip with rheumatology.  She's had several injections before but not for her shoulder but states that she does not want to try any more injections since they have not been working for her.    Patient also has a history of back pain.  She was being seen by pain management but was dismissed since she was treated in the emergency room and had pain medications.    Patient rates her current pain a 5 on a pain scale of 1-10.  She's had a MRI but it has been several years ago which did not show any evidence for rotator cuff tear but it did show bursitis and degenerative changes.    Review of Systems   Constitution: Negative for chills, fever and night sweats.   Respiratory: Negative for cough, shortness of breath and wheezing.    Musculoskeletal: Positive for back pain, joint pain and neck pain.   Gastrointestinal: Negative for diarrhea, nausea and vomiting.   Neurological: Negative for brief paralysis.   Psychiatric/Behavioral: Negative for altered mental status.         Objective:            General    Constitutional: She is oriented to person, place, and time. She appears well-developed and well-nourished.   Neck: Normal range of motion.   Cardiovascular: Normal rate and regular rhythm.     Pulmonary/Chest: Effort normal.   Abdominal: Soft.   Neurological: She is alert and oriented to person, place, and time.   Psychiatric: She has a normal mood and affect. Her behavior is normal.         Back (L-Spine & T-Spine) / Neck (C-Spine) Exam     Tenderness   The patient is tender to palpation of the right trapezial, right scapular and right SCM. Posterior midline palpation reveals tenderness of the Lower C-Spine and Upper C-Spine. Right paramedian tenderness of the Upper C-Spine and Lower C-Spine.     Neck (C-Spine) Range of Motion   Flexion:     Mild  Extension: Mild    Neck (C-Spine) Tests   Spurling's Test   Left:  Negative  Right: negative  Right Shoulder Exam     Tenderness   The patient is tender to palpation of the acromioclavicular joint, acromion and biceps tendon.    Range of Motion   Active Abduction:  80 abnormal   Forward Flexion:  50 abnormal   Forward Elevation: 50 abnormal  External Rotation 0 degrees:  40 abnormal   Internal Rotation 0 degrees:  L5 abnormal     Tests & Signs   Cross Arm: positive  Impingement: positive  Rotator Cuff Painful Arc/Range: moderate                    Assessment:       Encounter Diagnoses   Name Primary?    AC joint arthropathy Yes    Right shoulder pain, unspecified chronicity     Calcific tendinitis of right shoulder region     Impingement syndrome of right shoulder     Bursitis of right shoulder     Cervical pain (neck)           Plan:       Jenifer was seen today for pain.    Diagnoses and all orders for this visit:    AC joint arthropathy  -     MRI Upper Extremity Joint Without Contraast Right; Future    Right shoulder pain, unspecified chronicity  -     MRI Upper Extremity Joint Without Contraast Right; Future    Calcific tendinitis of right shoulder region  -     MRI Upper Extremity Joint Without Contraast Right; Future    Impingement syndrome of right shoulder  -     MRI Upper Extremity Joint Without Contraast Right; Future    Bursitis of right  shoulder  -     MRI Upper Extremity Joint Without Contraast Right; Future    Cervical pain (neck)        The patient was instructed to continue going to outpatient physical therapy.  I will put in an order for her to obtain a MRI of her right shoulder since it has been several years.    I offered to give the patient an injection today however she refuses to have another injection even though she has not had one for her shoulder area.  I reminded the patient that she has multiple issues regarding joint pain and is being seen by several specialists.  I do not have an easy or fast fix for her.  She needs to get back with pain management but has been dismissed from [facility].  Due to her insurance, it will be very hard for her to find a pain clinic that will accept her.    Patient will return in approximately 4 weeks after she has had some more therapy and also to review her MRI results once her study has been completed.          Lucero Landeros PA-C

## 2017-08-09 ENCOUNTER — HOSPITAL ENCOUNTER (OUTPATIENT)
Dept: RADIOLOGY | Facility: HOSPITAL | Age: 42
Discharge: HOME | End: 2017-08-09
Attending: ORTHOPAEDIC SURGERY
Payer: MEDICAID

## 2017-08-09 DIAGNOSIS — M19.019 AC JOINT ARTHROPATHY: ICD-10-CM

## 2017-08-09 DIAGNOSIS — M25.511 RIGHT SHOULDER PAIN, UNSPECIFIED CHRONICITY: ICD-10-CM

## 2017-08-09 DIAGNOSIS — M75.41 IMPINGEMENT SYNDROME OF RIGHT SHOULDER: ICD-10-CM

## 2017-08-09 DIAGNOSIS — M75.31 CALCIFIC TENDINITIS OF RIGHT SHOULDER REGION: ICD-10-CM

## 2017-08-09 DIAGNOSIS — M75.51 BURSITIS OF RIGHT SHOULDER: ICD-10-CM

## 2017-08-09 PROCEDURE — 73221 MRI JOINT UPR EXTREM W/O DYE: CPT | Mod: TC,RT

## 2017-08-14 DIAGNOSIS — E11.9 TYPE 2 DIABETES MELLITUS WITHOUT COMPLICATION: ICD-10-CM

## 2017-08-30 DIAGNOSIS — K52.9 CHRONIC DIARRHEA: ICD-10-CM

## 2017-08-31 RX ORDER — MONTELUKAST SODIUM 4 MG/1
TABLET, CHEWABLE ORAL
Qty: 180 TABLET | Refills: 5 | Status: SHIPPED | OUTPATIENT
Start: 2017-08-31 | End: 2018-11-19 | Stop reason: SDUPTHER

## 2017-09-06 ENCOUNTER — OFFICE VISIT (OUTPATIENT)
Dept: ORTHOPEDICS | Facility: CLINIC | Age: 42
End: 2017-09-06
Payer: MEDICAID

## 2017-09-06 VITALS
BODY MASS INDEX: 31.59 KG/M2 | HEIGHT: 70 IN | HEART RATE: 74 BPM | SYSTOLIC BLOOD PRESSURE: 123 MMHG | WEIGHT: 220.69 LBS | DIASTOLIC BLOOD PRESSURE: 71 MMHG

## 2017-09-06 DIAGNOSIS — M19.019 AC JOINT ARTHROPATHY: ICD-10-CM

## 2017-09-06 DIAGNOSIS — M75.51 BURSITIS OF RIGHT SHOULDER: ICD-10-CM

## 2017-09-06 DIAGNOSIS — M54.2 CERVICAL PAIN (NECK): ICD-10-CM

## 2017-09-06 DIAGNOSIS — M75.101 TEAR OF RIGHT ROTATOR CUFF, UNSPECIFIED TEAR EXTENT: Primary | ICD-10-CM

## 2017-09-06 DIAGNOSIS — M75.41 IMPINGEMENT SYNDROME OF RIGHT SHOULDER: ICD-10-CM

## 2017-09-06 PROCEDURE — 3008F BODY MASS INDEX DOCD: CPT | Mod: ,,, | Performed by: PHYSICIAN ASSISTANT

## 2017-09-06 PROCEDURE — 99214 OFFICE O/P EST MOD 30 MIN: CPT | Mod: PBBFAC | Performed by: PHYSICIAN ASSISTANT

## 2017-09-06 PROCEDURE — 3074F SYST BP LT 130 MM HG: CPT | Mod: ,,, | Performed by: PHYSICIAN ASSISTANT

## 2017-09-06 PROCEDURE — 3078F DIAST BP <80 MM HG: CPT | Mod: ,,, | Performed by: PHYSICIAN ASSISTANT

## 2017-09-06 PROCEDURE — 99213 OFFICE O/P EST LOW 20 MIN: CPT | Mod: S$PBB,,, | Performed by: PHYSICIAN ASSISTANT

## 2017-09-06 PROCEDURE — 99999 PR PBB SHADOW E&M-EST. PATIENT-LVL IV: CPT | Mod: PBBFAC,,, | Performed by: PHYSICIAN ASSISTANT

## 2017-09-06 NOTE — PROGRESS NOTES
Subjective:      Patient ID: Jenifer Westfall is a 42 y.o. female.    Chief Complaint: Pain of the Right Shoulder    HPI   The patient presents to clinic for follow-up of dominant right shoulder pain. She's had shoulder pain for about 2 years. Her pain is now worse.   She was seen by me in the fall of 2016 and also Eri Broussard PA-C.  The patient had a recent MRI which shows a focal full-thickness tear of the supraspinatus tendon, bursitis of the subacromial region, arthritic changes of her acromioclavicular joint and a lateral downsloping type II acromion.  Her pain has not improved.  She is at an unacceptable level of discomfort and function.        The patient has been attending physical therapy at [facility]. The patient was prescribed a Medrol Dosepak several weeks ago which  helped but did not give her lasting relief.  The patient continues to have shoulder and neck pain.  She is also being treated for her left hip with rheumatology.  She's had several injections before but not for her shoulder but states that she does not want to try any more injections since they have not been working for her.     Patient also has a history of back pain.  She was being seen by pain management but was dismissed since she was treated in the emergency room and had pain medications.      Review of Systems   Constitution: Negative for chills, fever and night sweats.   Respiratory: Negative for cough, shortness of breath and wheezing.    Musculoskeletal: Positive for arthritis, back pain, joint pain and neck pain.   Gastrointestinal: Negative for diarrhea, nausea and vomiting.   Neurological: Negative for brief paralysis.   Psychiatric/Behavioral: Negative for altered mental status.         Objective:            General    Constitutional: She is oriented to person, place, and time. She appears well-developed and well-nourished.   Neck: Normal range of motion.   Cardiovascular: Normal rate and regular rhythm.    Pulmonary/Chest:  Effort normal.   Abdominal: Soft.   Neurological: She is alert and oriented to person, place, and time.   Psychiatric: She has a normal mood and affect. Her behavior is normal.         Back (L-Spine & T-Spine) / Neck (C-Spine) Exam     Tenderness   The patient is tender to palpation of the right trapezial.   Right Shoulder Exam     Tenderness   The patient is tender to palpation of the acromioclavicular joint, acromion, biceps tendon and supraspinatus.    Range of Motion   Active Abduction:  90 abnormal   Forward Flexion:  60 abnormal   Forward Elevation: 60 abnormal  External Rotation 0 degrees:  40 abnormal     Tests & Signs   Cross Arm: positive  Hawkin's test: positive  Impingement: positive  Rotator Cuff Painful Arc/Range: moderate        MRI of the right shoulder without IV contrast    Clinical History: M25.9 Joint disorder, unspecified; M25.511 Pain in right shoulder; M75.31 Calcific tendinitis of right shoulder; M75.41 Impingement syndrome of right shoulder; M75.51 Bursitis of right shoulder    Technique: Standard shoulder MRI protocol without IV contrast was performed.       Finding: There is a focal full-thickness tear involving the anterior half of the distal portion of the supraspinatus tendon. The rest of the rotator cuff appears to be intact. There is a small joint effusion in the right glenohumeral joint. This communicates with the subacromial/subdeltoid bursa. The glenoid labrum is normal in appearance. The intra-articular portion of the long head of the biceps tendon is normal in appearance. There are mild osteoarthritic changes in the right acromioclavicular joint with a mild amount of downward proliferation. There is a type II acromion process with a mild amount of lateral downsloping.   Impression       1. There is a focal full-thickness tear involving the anterior half of the distal portion of the supraspinatus tendon. There is a small joint effusion in the right glenohumeral joint. This  communicates with the subacromial/subdeltoid bursa.   2. There are mild osteoarthritic changes in the right acromioclavicular joint with a mild amount of downward proliferation.  3. There is a type II acromion process with a mild amount of lateral downsloping.                            Assessment:       Encounter Diagnoses   Name Primary?    AC joint arthropathy     Bursitis of right shoulder     Impingement syndrome of right shoulder     Cervical pain (neck)     Tear of right rotator cuff, unspecified tear extent Yes          Plan:       Jenifer was seen today for pain.    Diagnoses and all orders for this visit:    Tear of right rotator cuff, unspecified tear extent    AC joint arthropathy    Bursitis of right shoulder    Impingement syndrome of right shoulder    Cervical pain (neck)        The patient has failed conservative treatment including physical therapy and prescription medications.  The patient is at an unacceptable level of discomfort and function and has made the decision to address the above issues with an outpatient, elective right shoulder arthroscopy.  The patient is scheduled for this for October 19, 2017.  She understands the material risks of surgery, what all is involved and desires to proceed.          Lucero Landeros PA-C

## 2017-09-06 NOTE — LETTER
September 9, 2017      Evelyn Bello MD  [address] [facility] [address]           [facility]  [address] [place] [address]  Phone: [phone]  Fax: [phone]          Patient: Jenifer Westfall   MR Number: [id number]   YOB: 1975   Date of Visit: 9/6/2017       Dear Dr. Evelyn Bello:    Thank you for referring Jenifer Westfall to me for evaluation. Attached you will find relevant portions of my assessment and plan of care.    If you have questions, please do not hesitate to call me. I look forward to following Jenifer Westfall along with you.    Sincerely,        Enclosure  CC:  No Recipients    If you would like to receive this communication electronically, please contact [email or web address] or [phone] to request more information on [employer] access.    For providers and/or their staff who would like to refer a patient to [facility], please contact us through our one-stop-shop provider referral line, [facility], at [phone].    If you feel you have received this communication in error or would no longer like to receive these types of communications, please e-mail [email or web address]

## 2017-09-12 ENCOUNTER — TELEPHONE (OUTPATIENT)
Dept: ORTHOPEDICS | Facility: CLINIC | Age: 42
End: 2017-09-12

## 2017-09-12 DIAGNOSIS — Z01.818 PREOP TESTING: Primary | ICD-10-CM

## 2017-09-12 DIAGNOSIS — M75.41 IMPINGEMENT SYNDROME OF RIGHT SHOULDER: Primary | ICD-10-CM

## 2017-09-12 NOTE — TELEPHONE ENCOUNTER
----- Message from Sherman Mcgrath sent at 9/12/2017 11:11 AM CDT -----  Contact: pt  Pt is calling nurse staff regarding when is patient surgery schedule. Pt call back [phone] thanks

## 2017-09-14 ENCOUNTER — HOSPITAL ENCOUNTER (OUTPATIENT)
Dept: RADIOLOGY | Facility: HOSPITAL | Age: 42
Discharge: HOME | End: 2017-09-14
Attending: NURSE PRACTITIONER
Payer: MEDICAID

## 2017-09-14 ENCOUNTER — OFFICE VISIT (OUTPATIENT)
Dept: URGENT CARE | Facility: CLINIC | Age: 42
End: 2017-09-14
Payer: MEDICAID

## 2017-09-14 VITALS
HEART RATE: 76 BPM | SYSTOLIC BLOOD PRESSURE: 112 MMHG | DIASTOLIC BLOOD PRESSURE: 64 MMHG | OXYGEN SATURATION: 97 % | TEMPERATURE: 97 F | HEIGHT: 70 IN | WEIGHT: 223 LBS | BODY MASS INDEX: 31.92 KG/M2

## 2017-09-14 DIAGNOSIS — M25.562 LEFT KNEE PAIN, UNSPECIFIED CHRONICITY: Primary | ICD-10-CM

## 2017-09-14 PROCEDURE — 99213 OFFICE O/P EST LOW 20 MIN: CPT | Mod: PBBFAC,25,PO | Performed by: NURSE PRACTITIONER

## 2017-09-14 PROCEDURE — 96372 THER/PROPH/DIAG INJ SC/IM: CPT | Mod: PBBFAC,PO

## 2017-09-14 PROCEDURE — 73562 X-RAY EXAM OF KNEE 3: CPT | Mod: 26,LT,, | Performed by: RADIOLOGY

## 2017-09-14 PROCEDURE — 3008F BODY MASS INDEX DOCD: CPT | Mod: ,,, | Performed by: NURSE PRACTITIONER

## 2017-09-14 PROCEDURE — 3078F DIAST BP <80 MM HG: CPT | Mod: ,,, | Performed by: NURSE PRACTITIONER

## 2017-09-14 PROCEDURE — 99999 PR PBB SHADOW E&M-EST. PATIENT-LVL III: CPT | Mod: PBBFAC,,, | Performed by: NURSE PRACTITIONER

## 2017-09-14 PROCEDURE — 99213 OFFICE O/P EST LOW 20 MIN: CPT | Mod: S$PBB,,, | Performed by: NURSE PRACTITIONER

## 2017-09-14 PROCEDURE — 73560 X-RAY EXAM OF KNEE 1 OR 2: CPT | Mod: TC,PO,RT

## 2017-09-14 PROCEDURE — 3074F SYST BP LT 130 MM HG: CPT | Mod: ,,, | Performed by: NURSE PRACTITIONER

## 2017-09-14 PROCEDURE — 73560 X-RAY EXAM OF KNEE 1 OR 2: CPT | Mod: 26,RT,, | Performed by: RADIOLOGY

## 2017-09-14 RX ORDER — DIPHENOXYLATE HYDROCHLORIDE AND ATROPINE SULFATE 2.5; .025 MG/1; MG/1
1 TABLET ORAL
COMMUNITY
End: 2017-11-01

## 2017-09-14 RX ORDER — KETOROLAC TROMETHAMINE 30 MG/ML
30 INJECTION, SOLUTION INTRAMUSCULAR; INTRAVENOUS
Status: COMPLETED | OUTPATIENT
Start: 2017-09-14 | End: 2017-09-14

## 2017-09-14 RX ORDER — CETIRIZINE HYDROCHLORIDE 10 MG/1
10 TABLET ORAL
COMMUNITY
End: 2021-06-01

## 2017-09-14 RX ADMIN — KETOROLAC TROMETHAMINE 30 MG: 30 INJECTION, SOLUTION INTRAMUSCULAR; INTRAVENOUS at 02:09

## 2017-09-14 NOTE — PROGRESS NOTES
Subjective:       Patient ID: Jenifer Westfall is a 42 y.o. female.    Chief Complaint: Knee Pain    Knee Pain    The incident occurred 3 to 5 days ago. There was no injury mechanism. The pain is present in the left knee. The pain is severe. Pertinent negatives include no inability to bear weight (pain with standing), loss of motion, loss of sensation, muscle weakness, numbness or tingling. The symptoms are aggravated by movement, palpation and weight bearing. She has tried heat and acetaminophen (tramadol) for the symptoms. The treatment provided mild relief.     Review of Systems   Constitutional: Negative for fever.   Musculoskeletal: Positive for arthralgias and gait problem. Negative for joint swelling.   Skin: Negative for wound.   Neurological: Negative for tingling and numbness.       Objective:      Physical Exam   Constitutional: She is oriented to person, place, and time. She appears well-developed and well-nourished. No distress.   Musculoskeletal:        Right knee: She exhibits normal range of motion, no swelling, no effusion, no ecchymosis, no deformity, no laceration, no erythema, normal alignment and no LCL laxity. No tenderness found.        Left knee: She exhibits decreased range of motion (pain with extension; she can fully extend). She exhibits no swelling, no effusion, no ecchymosis, no deformity, no laceration, no erythema, normal alignment, no LCL laxity and normal patellar mobility. Tenderness (anterior/posterior ) found.        Right upper leg: Normal.        Left upper leg: Normal.        Right lower leg: Normal.        Left lower leg: Normal.   - bruising, - minimal warmth near anterior knee; remainder of LLE has normal warmth; - numbness; skin intact. Varicose veins noted;    Neurological: She is alert and oriented to person, place, and time. Gait normal.   Skin: Skin is warm and dry. She is not diaphoretic.       Assessment:       1. Left knee pain, unspecified chronicity        Plan:    Jenifer was seen today for knee pain.    Diagnoses and all orders for this visit:    Left knee pain, unspecified chronicity  Comments:  knee brace apply, RICE discussed   Orders:  -     X-ray Knee Ortho Left  -     Uric acid; Future  -     Sedimentation rate, manual; Future  -     CBC auto differential; Future  -     C-reactive protein; Future  -     ketorolac injection 30 mg; Inject 30 mg into the muscle one time.  -     Basic metabolic panel; Future        -     Diagnosis and treatment discussed, AVS provided  -     Will do testing based on possible warmth and significant pain  -     Knee hinge brace applied, patient reported moderate relief after applied  -     Has tolerated toradol in the past without adverse reactions; advised to hold NSAIDs  -     Follow up with PCP or ER immediately for worsening, new or no improvement of symptoms.   -     Patient understands and agrees with plan

## 2017-09-14 NOTE — PATIENT INSTRUCTIONS
R.I.C.E.    R.I.C.E. stands for Rest, Ice, Compression, and Elevation. Doing these things helps limit pain and swelling after an injury. R.I.C.E. also helps injuries heal faster. Use R.I.C.E. for sprains, strains, and severe bruises or bumps. Follow the tips on this handout and begin R.I.C.E. as soon as possible after an injury.  ? Rest  Pain is your bodys way of telling you to rest an injured area. Whether you have hurt an elbow, hand, foot, or knee, limiting its use will prevent further injury and help you heal.  ? Ice  Applying ice right after an injury helps prevent swelling and reduce pain. Dont place ice directly on your skin.  · Wrap a cold pack or bag of ice in a thin cloth. Place it over the injured area.  · Ice for 10 minutes every 3 hours. Dont ice for more than 20 minutes at a time.  ? Compression  Putting pressure (compression) on an injury helps prevent swelling and provides support.  · Wrap the injured area firmly with an elastic bandage. If your hand or foot tingles, becomes discolored, or feels cold to the touch, the bandage may be too tight. Rewrap it more loosely.  · If your bandage becomes too loose, rewrap it.  · Do not wear an elastic bandage overnight.  ? Elevation  Keeping an injury elevated helps reduce swelling, pain, and throbbing. Elevation is most effective when the injury is kept elevated higher than the heart.     Call your healthcare provider if you notice any of the following:  · Fingers or toes feel numb, are cold to the touch, or change color  · Skin looks shiny or tight  · Pain, swelling, or bruising worsens and is not improved with elevation   Date Last Reviewed: 9/3/2015  © 4390-0506 [employer]. [address] [place] [address], [place] [address]. All rights reserved. This information is not intended as a substitute for professional medical care. Always follow your healthcare professional's instructions.        Knee Pain  Knee pain is very common. Its especially  common in active people who put a lot of pressure on their knees, like runners. It affects women more often than men.  Your kneecap (patella) is a thick, round bone. It covers and protects the front portion of your knee joint. It moves along a groove in your thighbone (femur) as part of the patellofemoral joint. A layer of cartilage surrounds the underside of your kneecap. This layer protects it from grinding against your femur.  When this cartilage softens and breaks down, it can cause knee pain. This is partly because of repetitive stress. The stress irritates the lining of the joint. This causes pain in the underlying bone.  What causes knee pain?  Many things can cause knee pain. You may have more than one cause. Some of these include:  · Overuse of the knee joint  · The kneecap doesnt line up with the tissue around it  · Damage to small nerves in the area  · Damage to the ligament-like structure that holds the kneecap in place (retinaculum)  · Breakdown of the bone under the cartilage  · Swelling in the soft tissues around the kneecap  · Injury  You might be more likely to have knee pain if you:  · Exercise a lot  · Recently increased the intensity of your workouts  · Have a body mass index (BMI) greater than 25  · Have poor alignment of your kneecap  · Walk with your feet turned overly outward or inward  · Have weakness in surrounding muscle groups (inner quad or hip adductor muscles)  · Have too much tightness in surrounding muscle groups (hamstrings or iliotibial band)  · Have a recent history of injury to the area  · Are female  Symptoms of knee pain  This type of knee pain is a dull, aching pain in the front of the knee in the area under and around the kneecap. This pain may start quickly or slowly. Your pain might be worse when you squat, run, or sit for a long time. You might also sometimes feel like your knee is giving out. You may have symptoms in one or both of your knees.  Diagnosing knee pain  Your  healthcare provider will ask about your medical history and your symptoms. Be sure to describe any activities that make your knee pain worse. He or she will look at your knee. This will include tests of your range of motion, strength, and areas of pain of your knee. Your knee alignment will be checked.  Your healthcare provider will need to rule out other causes of your knee pain, such as arthritis. You may need an imaging test, such as an X-ray or MRI.  Treatment for knee pain  Treatments that can help ease your symptoms may include:  · Avoiding activities for a while that make your pain worse, returning to activity over time  · Icing the outside of your knee when it causes you pain  · Taking over-the-counter pain medicine  · Wearing a knee brace or taping your knee to support it  · Wearing special shoe inserts to help keep your feet in the proper alignment  · Doing special exercises to stretch and strengthen the muscles around your hip and your knee  These steps help most people manage knee pain. But some cases of knee pain need to be treated with surgery. You may need surgery right away. Or you may need it later if other treatments dont work. Your healthcare provider may refer you to an orthopedic surgeon. He or she will talk with you about your choices.  Preventing knee pain  Losing weight and correcting excess muscle tightness or muscle weakness may help lower your risk.  In some cases, you can prevent knee pain. To help prevent a flare-up of knee pain, you do these things:  · Regularly do all the exercises your doctor or physical therapist advises  · Support your knee as advised by your doctor or physical therapist  · Increase training gradually, and ease up on training when needed  · Have an expert check your gait for running or other sporting activities  · Stretch properly before and after exercise  · Replace your running shoes regularly  · Lose excess weight     When to call your healthcare provider  Call  your healthcare provider right away if:  · Your symptoms dont get better after a few weeks of treatment  · You have any new symptoms   Date Last Reviewed: 4/1/2017  © 3262-4240 [employer]. [address] [place] [address], [place] [address]. All rights reserved. This information is not intended as a substitute for professional medical care. Always follow your healthcare professional's instructions.

## 2017-09-20 ENCOUNTER — OFFICE VISIT (OUTPATIENT)
Dept: CARDIOLOGY | Facility: CLINIC | Age: 42
End: 2017-09-20
Payer: MEDICAID

## 2017-09-20 VITALS
SYSTOLIC BLOOD PRESSURE: 102 MMHG | HEART RATE: 90 BPM | DIASTOLIC BLOOD PRESSURE: 64 MMHG | WEIGHT: 220.69 LBS | OXYGEN SATURATION: 98 % | BODY MASS INDEX: 31.59 KG/M2 | HEIGHT: 70 IN

## 2017-09-20 DIAGNOSIS — Z79.4 DIABETES MELLITUS DUE TO UNDERLYING CONDITION WITH DIABETIC NEPHROPATHY, WITH LONG-TERM CURRENT USE OF INSULIN: ICD-10-CM

## 2017-09-20 DIAGNOSIS — Z01.810 PREOP CARDIOVASCULAR EXAM: Primary | ICD-10-CM

## 2017-09-20 DIAGNOSIS — E78.5 HYPERLIPIDEMIA, UNSPECIFIED HYPERLIPIDEMIA TYPE: ICD-10-CM

## 2017-09-20 DIAGNOSIS — F17.200 SMOKING: ICD-10-CM

## 2017-09-20 DIAGNOSIS — I73.9 PVD (PERIPHERAL VASCULAR DISEASE): ICD-10-CM

## 2017-09-20 DIAGNOSIS — M25.472 LEFT ANKLE SWELLING: ICD-10-CM

## 2017-09-20 DIAGNOSIS — Q87.40 MARFAN'S SYNDROME: ICD-10-CM

## 2017-09-20 DIAGNOSIS — I10 ESSENTIAL HYPERTENSION: ICD-10-CM

## 2017-09-20 DIAGNOSIS — E08.21 DIABETES MELLITUS DUE TO UNDERLYING CONDITION WITH DIABETIC NEPHROPATHY, WITH LONG-TERM CURRENT USE OF INSULIN: ICD-10-CM

## 2017-09-20 PROCEDURE — 3078F DIAST BP <80 MM HG: CPT | Mod: ,,, | Performed by: INTERNAL MEDICINE

## 2017-09-20 PROCEDURE — 99213 OFFICE O/P EST LOW 20 MIN: CPT | Mod: PBBFAC,PO | Performed by: INTERNAL MEDICINE

## 2017-09-20 PROCEDURE — 99214 OFFICE O/P EST MOD 30 MIN: CPT | Mod: S$PBB,,, | Performed by: INTERNAL MEDICINE

## 2017-09-20 PROCEDURE — 3008F BODY MASS INDEX DOCD: CPT | Mod: ,,, | Performed by: INTERNAL MEDICINE

## 2017-09-20 PROCEDURE — 99999 PR PBB SHADOW E&M-EST. PATIENT-LVL III: CPT | Mod: PBBFAC,,, | Performed by: INTERNAL MEDICINE

## 2017-09-20 PROCEDURE — 3074F SYST BP LT 130 MM HG: CPT | Mod: ,,, | Performed by: INTERNAL MEDICINE

## 2017-09-20 NOTE — PROGRESS NOTES
"Subjective:   Patient ID:  Jenifer Westfall is a 42 y.o. female who presents for follow up of Hypertension      40 yo ,female, came in for preop of right shoulder rotator cuff repair by Dr. Gotti.  PMH PAD (right SUBL occluded by Cath in ), Marfan's Dz, DM, HTN, HLD.   She has Marfan's Dz dx'ed at the age of 9, with ectopia lentis. She is the first one in the family having Marfan's and her younger son has Dx. Her ophthalmologist is at [facility].   Off work due to OA,   Smoking 1/2 ppd for 20 yrs and no drinking.   She denied chest pain, dyspnea on exertion, palpitation, fainting, PND, orthopnea, syncope and claudication.   Right arm weaker than left. She has right arm low BP compared to left one.  Left ankle swelling for a week. Left knee pain.    ECHO on 2015:  1 - Normal left ventricular systolic function (EF 55-60%).   2 - Normal left ventricular diastolic function.   3 - Normal right ventricular systolic function .   UE US on 2015:  Cannot exclude hemodynamically significant inflow stenosis on right side.   Left WBI 1.0; right WBI 0.87  EKG on 2014: NSR          Past Medical History:   Diagnosis Date    Arthritis     Diabetes mellitus      2015 Insulin x 2 years     DM (diabetes mellitus)      2017 Insulin x 3 years 2013    Hepatitis C     Hyperlipidemia     Hypertension     IBS (irritable bowel syndrome)     Kidney stone     Marfan syndrome     Mitral valve prolapse        Past Surgical History:   Procedure Laterality Date    APPENDECTOMY      BUNIONECTOMY      both feet     SECTION      x 2    CHOLECYSTECTOMY      TUBAL LIGATION         Social History   Substance Use Topics    Smoking status: Current Every Day Smoker     Packs/day: 0.20     Years: 20.00     Types: Cigarettes     Last attempt to quit: 2015    Smokeless tobacco: Never Used      Comment: "once in a blue moon"    Alcohol use No       Family History   Problem " Relation Age of Onset    Heart disease Mother     Thrombophilia Father      DVT    Hypertension Father     Stroke Maternal Aunt     Heart disease Maternal Aunt     Stroke Maternal Uncle     Heart disease Maternal Uncle     Breast cancer Neg Hx     Colon cancer Neg Hx     Ovarian cancer Neg Hx          Review of Systems   Constitution: Negative for decreased appetite, diaphoresis, fever, weakness, malaise/fatigue and night sweats.   HENT: Negative for nosebleeds.    Eyes: Negative for blurred vision and double vision.   Cardiovascular: Negative for chest pain, claudication, dyspnea on exertion, irregular heartbeat, leg swelling, near-syncope, orthopnea, palpitations, paroxysmal nocturnal dyspnea and syncope.   Respiratory: Negative for cough, shortness of breath, sleep disturbances due to breathing, snoring, sputum production and wheezing.    Endocrine: Negative for cold intolerance and polyuria.   Hematologic/Lymphatic: Does not bruise/bleed easily.   Skin: Negative for rash.   Musculoskeletal: Negative for back pain, falls, joint pain, joint swelling and neck pain.   Gastrointestinal: Negative for abdominal pain, heartburn, nausea and vomiting.   Genitourinary: Negative for dysuria, frequency and hematuria.   Neurological: Negative for difficulty with concentration, dizziness, focal weakness, headaches, light-headedness, numbness and seizures.   Psychiatric/Behavioral: Negative for depression, memory loss and substance abuse. The patient does not have insomnia.    Allergic/Immunologic: Negative for HIV exposure and hives.       Objective:   Physical Exam   Constitutional: She is oriented to person, place, and time. She appears well-developed and well-nourished.   HENT:   Head: Normocephalic.   Eyes: Pupils are equal, round, and reactive to light.   Neck: Normal carotid pulses and no JVD present. Carotid bruit is not present. No thyromegaly present.   Cardiovascular: Normal rate, regular rhythm, normal  heart sounds, intact distal pulses and normal pulses.   No extrasystoles are present. PMI is not displaced.  Exam reveals no gallop, no S3 and no friction rub.    No murmur heard.  Pulses:       Dorsalis pedis pulses are 2+ on the right side, and 2+ on the left side.   Pulmonary/Chest: Effort normal and breath sounds normal. No stridor. No respiratory distress. She has no wheezes. She has no rales.   Abdominal: Soft. Bowel sounds are normal. There is no tenderness. There is no rebound.   Musculoskeletal: Normal range of motion. She exhibits edema. She exhibits no tenderness.   + left ankle   Neurological: She is alert and oriented to person, place, and time.   Skin: Skin is warm, dry and intact. No rash noted. No erythema.   Psychiatric: She has a normal mood and affect. Her behavior is normal.   Nursing note and vitals reviewed.      Lab Results   Component Value Date    CHOL 164 02/03/2017    CHOL 175 10/07/2015    CHOL 184 07/08/2015     Lab Results   Component Value Date    HDL 30 (L) 02/03/2017    HDL 33 (L) 10/07/2015    HDL 32 (L) 07/08/2015     Lab Results   Component Value Date    LDLCALC 103.8 02/03/2017    LDLCALC 115.0 10/07/2015    LDLCALC 115.4 07/08/2015     Lab Results   Component Value Date    TRIG 151 (H) 02/03/2017    TRIG 135 10/07/2015    TRIG 183 (H) 07/08/2015     Lab Results   Component Value Date    CHOLHDL 18.3 (L) 02/03/2017    CHOLHDL 18.9 (L) 10/07/2015    CHOLHDL 17.4 (L) 07/08/2015       Chemistry        Component Value Date/Time     09/14/2017 1434    K 4.1 09/14/2017 1434     09/14/2017 1434    CO2 19 (L) 09/14/2017 1434    BUN 8 09/14/2017 1434    CREATININE 0.8 09/14/2017 1434     (H) 09/14/2017 1434        Component Value Date/Time    CALCIUM 8.7 09/14/2017 1434    ALKPHOS 110 03/06/2017 1346    AST 14 03/06/2017 1346    ALT 21 03/06/2017 1346    BILITOT 0.2 03/06/2017 1346    ESTGFRAFRICA >60.0 09/14/2017 1434    EGFRNONAA >60.0 09/14/2017 1434          Lab  Results   Component Value Date    TSH 0.849 10/07/2015     Lab Results   Component Value Date    INR 0.9 03/06/2017    INR 0.9 10/16/2016    INR 0.9 09/05/2016     Lab Results   Component Value Date    WBC 10.90 09/14/2017    HGB 14.2 09/14/2017    HCT 41.0 09/14/2017    MCV 90 09/14/2017     09/14/2017     BMP  Sodium   Date Value Ref Range Status   09/14/2017 138 136 - 145 mmol/L Final     Potassium   Date Value Ref Range Status   09/14/2017 4.1 3.5 - 5.1 mmol/L Final     Chloride   Date Value Ref Range Status   09/14/2017 107 95 - 110 mmol/L Final     CO2   Date Value Ref Range Status   09/14/2017 19 (L) 23 - 29 mmol/L Final     BUN, Bld   Date Value Ref Range Status   09/14/2017 8 6 - 20 mg/dL Final     Creatinine   Date Value Ref Range Status   09/14/2017 0.8 0.5 - 1.4 mg/dL Final     Calcium   Date Value Ref Range Status   09/14/2017 8.7 8.7 - 10.5 mg/dL Final     Anion Gap   Date Value Ref Range Status   09/14/2017 12 8 - 16 mmol/L Final     eGFR if    Date Value Ref Range Status   09/14/2017 >60.0 >60 mL/min/1.73 m^2 Final     eGFR if non    Date Value Ref Range Status   09/14/2017 >60.0 >60 mL/min/1.73 m^2 Final     Comment:     Calculation used to obtain the estimated glomerular filtration  rate (eGFR) is the CKD-EPI equation. Since race is unknown   in our information system, the eGFR values for   -American and Non--American patients are given   for each creatinine result.       Estimated Creatinine Clearance: 116.1 mL/min (based on SCr of 0.8 mg/dL).     Assessment:      1. Preop cardiovascular exam    2. Marfan's syndrome    3. Essential hypertension    4. Hyperlipidemia, unspecified hyperlipidemia type    5. PVD (peripheral vascular disease)    6. Diabetes mellitus due to underlying condition with diabetic nephropathy, with long-term current use of insulin    7. Smoking      BP controlled    Plan:   Repeat Echo before the surgery due ot [place]  LE  Venous US to r/o DVT  Continue ASA, Plavix, Lipitor, BB and Lisinopril  RTC in 6 months.      ADDENDUM ON 09/28  ECHO SHOWED NORMAL FUNCTION AND ASCENDING AORTA  LE US SHOWED NO DVT  LOW periop risk of CV events for moderate risk procedure.  Good functional and exercise capacity.  No chest pain, active arrhythmia and CHF symptoms.  Ok to proceed the scheduled surgery without further cardiac study.  OK to hold Aspirin 5 to 7 days before the procedure and resume ASAP postop.  Continue Atenolol and Statin periop.  Avoid periop fluid overloaded.

## 2017-09-26 ENCOUNTER — CLINICAL SUPPORT (OUTPATIENT)
Dept: CARDIOLOGY | Facility: CLINIC | Age: 42
End: 2017-09-26
Payer: MEDICAID

## 2017-09-26 ENCOUNTER — HOSPITAL ENCOUNTER (OUTPATIENT)
Dept: RADIOLOGY | Facility: HOSPITAL | Age: 42
Discharge: HOME | End: 2017-09-26
Attending: ORTHOPAEDIC SURGERY
Payer: MEDICAID

## 2017-09-26 DIAGNOSIS — Z01.818 PREOP TESTING: ICD-10-CM

## 2017-09-26 PROCEDURE — 93010 ELECTROCARDIOGRAM REPORT: CPT | Mod: S$PBB,,, | Performed by: INTERNAL MEDICINE

## 2017-09-26 PROCEDURE — 93005 ELECTROCARDIOGRAM TRACING: CPT | Mod: PBBFAC | Performed by: INTERNAL MEDICINE

## 2017-09-26 PROCEDURE — 71020 XR CHEST PA AND LATERAL PRE-OP: CPT | Mod: TC

## 2017-09-26 PROCEDURE — 71020 XR CHEST PA AND LATERAL PRE-OP: CPT | Mod: 26,,, | Performed by: RADIOLOGY

## 2017-09-27 ENCOUNTER — CLINICAL SUPPORT (OUTPATIENT)
Dept: CARDIOLOGY | Facility: CLINIC | Age: 42
End: 2017-09-27
Payer: MEDICAID

## 2017-09-27 DIAGNOSIS — Z01.810 PREOP CARDIOVASCULAR EXAM: ICD-10-CM

## 2017-09-27 DIAGNOSIS — M25.472 LEFT ANKLE SWELLING: ICD-10-CM

## 2017-09-27 DIAGNOSIS — Q87.40 MARFAN'S SYNDROME: ICD-10-CM

## 2017-09-27 LAB
DIASTOLIC DYSFUNCTION: NO
RETIRED EF AND QEF - SEE NOTES: 60 (ref 55–65)

## 2017-09-27 PROCEDURE — 93971 EXTREMITY STUDY: CPT | Mod: PBBFAC | Performed by: INTERNAL MEDICINE

## 2017-09-27 PROCEDURE — 93306 TTE W/DOPPLER COMPLETE: CPT | Mod: PBBFAC | Performed by: INTERNAL MEDICINE

## 2017-10-03 ENCOUNTER — TELEPHONE (OUTPATIENT)
Dept: CARDIOLOGY | Facility: CLINIC | Age: 42
End: 2017-10-03

## 2017-10-03 NOTE — TELEPHONE ENCOUNTER
----- Message from Kaleigh Espinosa sent at 10/3/2017  2:47 PM CDT -----  Contact: [facility]  Request a call concerning a clearance for the pt dental appt, can be reached at [phone]///[email or web address]

## 2017-10-06 ENCOUNTER — LAB VISIT (OUTPATIENT)
Dept: LAB | Facility: HOSPITAL | Age: 42
End: 2017-10-06
Attending: FAMILY MEDICINE
Payer: MEDICAID

## 2017-10-06 DIAGNOSIS — E11.9 TYPE 2 DIABETES MELLITUS WITHOUT COMPLICATION: ICD-10-CM

## 2017-10-06 LAB
ESTIMATED AVG GLUCOSE: 283 MG/DL
HBA1C MFR BLD HPLC: 11.5 %

## 2017-10-06 PROCEDURE — 36415 COLL VENOUS BLD VENIPUNCTURE: CPT | Mod: PO

## 2017-10-06 PROCEDURE — 83036 HEMOGLOBIN GLYCOSYLATED A1C: CPT

## 2017-10-12 ENCOUNTER — TELEPHONE (OUTPATIENT)
Dept: ORTHOPEDICS | Facility: CLINIC | Age: 42
End: 2017-10-12

## 2017-10-12 NOTE — TELEPHONE ENCOUNTER
----- Message from María Person sent at 10/12/2017 10:09 AM CDT -----  Contact: [phone]   Pt  Would like to consult with the nurse about scheduled surgery.  Please call back at [phone]. Thx-[facility]

## 2017-10-12 NOTE — TELEPHONE ENCOUNTER
Spoke with patient and informed her that we will advise Dr Gotti of her illness and let her know what he advises. Patient verbalized understanding.

## 2017-10-20 ENCOUNTER — TELEPHONE (OUTPATIENT)
Dept: ORTHOPEDICS | Facility: CLINIC | Age: 42
End: 2017-10-20

## 2017-10-20 NOTE — TELEPHONE ENCOUNTER
----- Message from Yuliya Leyva sent at 10/20/2017  1:10 PM CDT -----  Contact: Pt  States she's calling rg wanting to explain what another Dr is telling her / has been admitted into the hospital at [facility] and wants to discuss and can be reached at [phone]//[email or web address]/dbw

## 2017-10-20 NOTE — TELEPHONE ENCOUNTER
Spoke with patient and she will be seeing an [facility] Physician on 11/01/2017 to be checked out and cleared for surgery. Patient will call back after this appointment.

## 2017-10-20 NOTE — TELEPHONE ENCOUNTER
----- Message from Jade Tay sent at 10/20/2017  1:36 PM CDT -----  Contact: Pt  Pt was returning the nurse call. Please give pt a call at ..[phone] (home)

## 2017-11-01 ENCOUNTER — OFFICE VISIT (OUTPATIENT)
Dept: GASTROENTEROLOGY | Facility: CLINIC | Age: 42
End: 2017-11-01
Payer: MEDICAID

## 2017-11-01 VITALS
WEIGHT: 211.88 LBS | HEART RATE: 82 BPM | BODY MASS INDEX: 30.33 KG/M2 | SYSTOLIC BLOOD PRESSURE: 126 MMHG | HEIGHT: 70 IN | DIASTOLIC BLOOD PRESSURE: 78 MMHG

## 2017-11-01 DIAGNOSIS — Z90.49 STATUS POST CHOLECYSTECTOMY: ICD-10-CM

## 2017-11-01 DIAGNOSIS — K52.9 COLITIS: Primary | ICD-10-CM

## 2017-11-01 DIAGNOSIS — K58.0 IRRITABLE BOWEL SYNDROME WITH DIARRHEA: ICD-10-CM

## 2017-11-01 PROCEDURE — 99213 OFFICE O/P EST LOW 20 MIN: CPT | Mod: PBBFAC,PO | Performed by: INTERNAL MEDICINE

## 2017-11-01 PROCEDURE — 99999 PR PBB SHADOW E&M-EST. PATIENT-LVL III: CPT | Mod: PBBFAC,,, | Performed by: INTERNAL MEDICINE

## 2017-11-01 PROCEDURE — 99214 OFFICE O/P EST MOD 30 MIN: CPT | Mod: S$PBB,,, | Performed by: INTERNAL MEDICINE

## 2017-11-01 RX ORDER — ONDANSETRON 4 MG/1
8 TABLET, FILM COATED ORAL 2 TIMES DAILY
COMMUNITY
End: 2018-05-17

## 2017-11-01 RX ORDER — SODIUM, POTASSIUM,MAG SULFATES 17.5-3.13G
1 SOLUTION, RECONSTITUTED, ORAL ORAL ONCE
Qty: 1 BOTTLE | Refills: 0 | Status: SHIPPED | OUTPATIENT
Start: 2017-11-01 | End: 2017-11-01

## 2017-11-01 RX ORDER — FAMOTIDINE 20 MG/1
20 TABLET, FILM COATED ORAL 2 TIMES DAILY
COMMUNITY
End: 2018-12-28 | Stop reason: SDUPTHER

## 2017-11-01 RX ORDER — DICYCLOMINE HYDROCHLORIDE 20 MG/1
20 TABLET ORAL 4 TIMES DAILY
COMMUNITY
End: 2019-01-08 | Stop reason: SDUPTHER

## 2017-11-01 NOTE — PROGRESS NOTES
Clinic Follow Up:  [facility] Follow Up Note    Reason for Follow Up:  The primary encounter diagnosis was Colitis. Diagnoses of Irritable bowel syndrome with diarrhea and Status post cholecystectomy were also pertinent to this visit.    PCP: LIZABETH HUNTLEY       HPI:  This is a 42 y.o. female here for follow up of the above issues.  This my first time seeing this patient.  She has seen several other providers within our department in the past.  She has primarily been followed by the liver clinic because of a history of hepatitis C which was treated a few years ago.  She also saw Dr. Westfall at one point because of chronic diarrhea issues.  On October 7 she developed vomiting, severe abdominal pain, and diarrhea.  There was no blood in the stools and there were no sick contacts at that time.  She had a CT scan performed which showed a fatty liver as well as a large amount of fluid in the small and large intestines possibly consistent with enteritis or colitis.  She had an elevated white blood cell count as well as an elevated C-reactive protein.  Stool studies for C. difficile and routine cultures were negative.  She did have a positive fecal lactoferrin test.  She was admitted to the hospital for 3 days and was given IV antibiotics.  During the CT scan she actually had an anaphylactic reaction to the IV contrast.  She has been doing better since her discharge.  She reports that prior to this episode she was diagnosed with diverticulitis a little over a month prior to the admission.  She was given antibiotics to treat that.  A CT scan showed diverticulosis at that time but no evidence of active diverticulitis.  She continues to have diarrhea ranging anywhere from 1-3 times a day which is around her baseline for the last 20 years.  Most of her stools are loose but some are formed.  She's had chronic diarrhea and abdominal cramping since her cholecystectomy 21 years ago.  She takes  colestipol 1 g twice daily and reports that this helps but doesn't completely control her symptoms.  She was taking this 3 times daily at one point but it caused constipation issues.  She takes Imodium as needed.  She has some left-sided abdominal cramping and epigastric cramping that Bentyl helped relieve.  Her primary reason for scheduling a visit today is because she needs clearance prior to his shoulder surgery that has been rescheduled because of her issues with colitis.  Essentially her symptoms are back to her baseline and the symptoms been stable for over 20 years.    Review of Systems:  CONSTITUTIONAL: Denies weight change,  fatigue, fevers, chills, night sweats.  CARDIOVASCULAR: Denies chest pain, shortness of breath, orthopnea and edema.  RESPIRATORY: Denies cough, hemoptysis, dyspnea, and wheezing.  GI: See HPI.  : Denies dysuria and hematuria  MUSCULOSKELETAL: Positive for musculoskeletal pain.    Medical History:  Past Medical History:   Diagnosis Date    Arthritis     Diabetes mellitus      2015 Insulin x 2 years     DM (diabetes mellitus)      2017 Insulin x 3 years 2013    Hepatitis C     Hyperlipidemia     Hypertension     IBS (irritable bowel syndrome)     Kidney stone     Marfan syndrome     Mitral valve prolapse        Surgical History:   Past Surgical History:   Procedure Laterality Date    APPENDECTOMY      BUNIONECTOMY      both feet     SECTION      x 2    CHOLECYSTECTOMY      TUBAL LIGATION         Family History:   Family History   Problem Relation Age of Onset    Heart disease Mother     Thrombophilia Father      DVT    Hypertension Father     Stroke Maternal Aunt     Heart disease Maternal Aunt     Stroke Maternal Uncle     Heart disease Maternal Uncle     Breast cancer Neg Hx     Colon cancer Neg Hx     Ovarian cancer Neg Hx        Social History:   Social History   Substance Use Topics    Smoking status: Current  "Every Day Smoker     Packs/day: 0.20     Years: 20.00     Types: Cigarettes     Last attempt to quit: 4/9/2015    Smokeless tobacco: Never Used      Comment: "once in a blue moon"    Alcohol use No       Allergies: Reviewed    Home Medications:  Medication List with Changes/Refills   New Medications    SODIUM,POTASSIUM,MAG SULFATES (SUPREP BOWEL PREP KIT) 17.5-3.13-1.6 GRAM SOLR    Take 1 kit by mouth once.   Current Medications    ASCORBIC ACID (VITAMIN C) 100 MG TABLET    Take 100 mg by mouth once daily.    ASPIRIN (ECOTRIN) 81 MG EC TABLET    Take 81 mg by mouth once daily.    ATENOLOL (TENORMIN) 50 MG TABLET    TAKE 1 TABLET BY MOUTH ONCE DAILY    ATORVASTATIN (LIPITOR) 40 MG TABLET    TAKE 1 TABLET BY MOUTH ONCE DAILY    BD INSULIN PEN NEEDLE UF SHORT 31 GAUGE X 5/16" NDLE    USE 5 TIMES DAILY WITH LANTUS AND HUMALOG    CETIRIZINE (ZYRTEC) 10 MG TABLET    Take 10 mg by mouth.    CLOPIDOGREL (PLAVIX) 75 MG TABLET    TAKE 1 TABLET BY MOUTH ONCE DAILY    COLESTIPOL (COLESTID) 1 GRAM TAB    TAKE 2 TABLETS BY MOUTH THREE TIMES DAILY. DO NOT TAKE OTHER MEDICATION WITHIN 1 HOUR BEFORE OR 4 HOURS AFTER TAKING COLESTIPOL.    DICLOFENAC SODIUM (VOLTAREN) 1 % GEL    Apply 2 g topically 4 (four) times daily.    DICYCLOMINE (BENTYL) 20 MG TABLET    Take 20 mg by mouth 4 (four) times daily.    ERGOCALCIFEROL, VITAMIN D2, (VITAMIN D ORAL)    Take 1 tablet by mouth once daily.     FAMOTIDINE (PEPCID) 20 MG TABLET    Take 20 mg by mouth 2 (two) times daily.    FLUOXETINE (PROZAC) 40 MG CAPSULE    TAKE 1 CAPSULE BY MOUTH ONCE DAILY    GABAPENTIN (NEURONTIN) 400 MG CAPSULE    TAKE 1 CAPSULE BY MOUTH 3 TIMES DAILY    GLIPIZIDE (GLUCOTROL) 10 MG TABLET    Take 1 tablet (10 mg total) by mouth 2 (two) times daily.    INSULIN LISPRO PROTAMIN-LISPRO 100 UNIT/ML (50-50) INPN    Inject 40 Units into the skin 2 (two) times daily before meals.    INSULIN NEEDLES, DISPOSABLE, (BD INSULIN PEN NEEDLE UF SHORT) 31 X 5/16 " NDLE    USE TWICE " "DAILY AS DIRECTED    LISINOPRIL 10 MG TABLET    TAKE 1 TABLET (10 MG TOTAL) BY MOUTH ONCE DAILY.    ONDANSETRON (ZOFRAN) 4 MG TABLET    Take 8 mg by mouth 2 (two) times daily.    TIZANIDINE (ZANAFLEX) 4 MG TABLET    TAKE 1 TABLET (4 MG TOTAL) BY MOUTH EVERY EVENING.    TRAMADOL (ULTRAM) 50 MG TABLET       Discontinued Medications    CHLORHEXIDINE (PERIDEX) 0.12 % SOLUTION        DIPHENOXYLATE-ATROPINE 2.5-0.025 MG (LOMOTIL) 2.5-0.025 MG PER TABLET    Take 1 tablet by mouth.    NICOTINE (NICODERM CQ) 21 MG/24 HR    Place 1 patch onto the skin once daily.       Physical Exam:  Vital Signs:  /78   Pulse 82   Ht 5' 10" (1.778 m)   Wt 96.1 kg (211 lb 13.8 oz)   LMP  (Approximate) Comment: 2 years  BMI 30.40 kg/m²   Body mass index is 30.4 kg/m².      GENERAL: No acute distress, Alert and oriented x 4  EYES: Anicteric, no pallor noted.  ENT: Oropharynx is clear  NECK: Supple, no masses, no thyromegally.  CHEST: Equal breath sounds bilaterally, no wheezing.  CARDIOVASCULAR: Regular rate and rhythm. Murmurs, rubs and gallops absent.  ABDOMEN: soft, non-tender, non-distended, normal bowel sounds.  EXTREMITIES: No clubbing, cyanosis or edema.  SKIN: Without lesion.  LYMPH: No cervical, axillary lymphadenopathy palpable.   NEUROLOGICAL: Grossly normal.    Labs: Pertinent labs reviewed.  Elevated white blood cell count.  See history of present illness for summary of other lab abnormalities.    Endoscopy:  Not applicable    CRC Screening: Not applicable.  She has had a colonoscopy several years ago but doesn't recall how long ago it was.    Assessment:  1. Colitis    2. Irritable bowel syndrome with diarrhea    3. Status post cholecystectomy        Recommendations:  1.  Colitis: It sounds like she had an episode of infectious colitis.  Stool studies were negative for C. difficile.  She completed a course of antibiotics and her gastrointestinal symptoms seem to be back at baseline.  At this time no further " investigation is warranted but in the future she would likely benefit from a colonoscopy to follow-up the colitis.  A colonoscopy was recommended several years ago but never completed.  There was some question of colitis at that time as well.  Given the fact that her gastrointestinal symptoms have been chronic and stable for over 20 years I don't feel that a colonoscopy is urgent.  It should be completed no sooner than 6-8 weeks after resolution of her recent episode but can be delayed until after her shoulder surgery. The order for colonoscopy was placed and she was given the phone number to contact to schedule this in the future.    2.  IBS: She has a component of IBS as well as postcholecystectomy diarrhea.  Continue colestipol.  Evaluate with colonoscopy     Retu after her shoulder surgery.rn to Clinic:    Follow up based on the above evaluation.

## 2017-11-01 NOTE — Clinical Note
I saw her today for her recent episode of colitis. Her symptoms have returned back to her baseline. She has chronic diarrhea since her cholecystectomy and has a component of IBS. She needs a colonoscopy in the future, but given the chronic nature of her symptoms I would recommend deferring this until she has completed her shoulder surgery. From a GI standpoint she can proceed with surgery.  Asael Newton

## 2017-11-02 ENCOUNTER — TELEPHONE (OUTPATIENT)
Dept: ORTHOPEDICS | Facility: CLINIC | Age: 42
End: 2017-11-02

## 2017-11-06 ENCOUNTER — TELEPHONE (OUTPATIENT)
Dept: FAMILY MEDICINE | Facility: CLINIC | Age: 42
End: 2017-11-06

## 2017-11-06 DIAGNOSIS — Z01.818 PREOP TESTING: Primary | ICD-10-CM

## 2017-11-06 DIAGNOSIS — M75.41 IMPINGEMENT SYNDROME OF RIGHT SHOULDER: Primary | ICD-10-CM

## 2017-11-06 DIAGNOSIS — M75.101 TEAR OF RIGHT ROTATOR CUFF, UNSPECIFIED TEAR EXTENT: ICD-10-CM

## 2017-11-06 RX ORDER — CLOPIDOGREL BISULFATE 75 MG/1
75 TABLET ORAL
COMMUNITY
End: 2017-11-15 | Stop reason: SDUPTHER

## 2017-11-06 RX ORDER — SULFAMETHOXAZOLE AND TRIMETHOPRIM 800; 160 MG/1; MG/1
TABLET ORAL
COMMUNITY
Start: 2017-08-31 | End: 2018-04-18

## 2017-11-06 RX ORDER — HYDROCODONE BITARTRATE AND ACETAMINOPHEN 5; 325 MG/1; MG/1
TABLET ORAL
COMMUNITY
Start: 2017-10-11 | End: 2018-05-17

## 2017-11-06 RX ORDER — CIPROFLOXACIN 500 MG/1
TABLET ORAL
COMMUNITY
Start: 2017-10-11 | End: 2018-04-18

## 2017-11-06 RX ORDER — FLUOXETINE HYDROCHLORIDE 20 MG/1
40 CAPSULE ORAL
COMMUNITY
End: 2018-03-15 | Stop reason: SDUPTHER

## 2017-11-06 RX ORDER — ONDANSETRON 4 MG/1
TABLET, ORALLY DISINTEGRATING ORAL
COMMUNITY
Start: 2017-10-11 | End: 2017-11-15 | Stop reason: SDUPTHER

## 2017-11-06 RX ORDER — FAMOTIDINE 20 MG/1
20 TABLET, FILM COATED ORAL
COMMUNITY
Start: 2017-10-11 | End: 2017-11-15 | Stop reason: SDUPTHER

## 2017-11-06 RX ORDER — DICYCLOMINE HYDROCHLORIDE 20 MG/1
20 TABLET ORAL
COMMUNITY
Start: 2017-10-11 | End: 2017-11-15 | Stop reason: SDUPTHER

## 2017-11-06 RX ORDER — ATORVASTATIN CALCIUM 40 MG/1
40 TABLET, FILM COATED ORAL
COMMUNITY
End: 2018-03-15 | Stop reason: SDUPTHER

## 2017-11-06 NOTE — TELEPHONE ENCOUNTER
We will have her see DM educ . She has been very Non compliant  and has had several Cancellations which makes it difficult to address .

## 2017-11-06 NOTE — TELEPHONE ENCOUNTER
----- Message from Brian Gotti MD sent at 11/5/2017  6:16 AM CST -----  Evelyn,    I need your help.  This patient's outpatient right shoulder arthroscopy was canceled due to an elevated glucose of 581.  It appears that her glucose level has come down, however she still has significantly elevated hemoglobin A1c values.  I know that anesthesia is going to want to hear from you that her diabetes is under the best control we can expect.  Would you mind addressing her diabetes issue in a preoperative clearance please?    Thanks in advance.  I appreciate you!

## 2017-11-06 NOTE — TELEPHONE ENCOUNTER
Called patient. No answer. Left message for patient to call office back.     Will also send message to DM to see if they can try to contact patient also.       DM, please see previous messages in encounter. Patient is needing diabetic education. Dr Gotti states that patients right shoulder arthroscopy was canceled due to high glucose level of 581. Dr Aquino states that patient is very noncompliant and has multiple appt cancellations so care is difficult. I have called patient 3 times with no luck of reaching her. Left message for her to call office and speak with me, just want to forward message to DM to see if you guys could aid in helping trying to get this patient in for some help diabetes.

## 2017-11-07 ENCOUNTER — TELEPHONE (OUTPATIENT)
Dept: ORTHOPEDICS | Facility: CLINIC | Age: 42
End: 2017-11-07

## 2017-11-07 NOTE — TELEPHONE ENCOUNTER
Spoke with pt letting her know that Dr. Gotti has reviewed her chart and has decided that he will not do an out patient procedure on her unless her DM is controlled. Pt states that she has changed her insulin.

## 2017-11-07 NOTE — TELEPHONE ENCOUNTER
Attempted to call patient to schedule a diabetes follow up appointment.  No response.  Left VM to call back so we can schedule her appointment.  I also called her mother 's phone number and spoke with her.  I asked her to tell Ms. Burgess to call us back.

## 2017-11-15 ENCOUNTER — OFFICE VISIT (OUTPATIENT)
Dept: DIABETES | Facility: CLINIC | Age: 42
End: 2017-11-15
Payer: MEDICAID

## 2017-11-15 VITALS
DIASTOLIC BLOOD PRESSURE: 90 MMHG | WEIGHT: 212.94 LBS | BODY MASS INDEX: 30.49 KG/M2 | HEIGHT: 70 IN | SYSTOLIC BLOOD PRESSURE: 136 MMHG

## 2017-11-15 DIAGNOSIS — I10 ESSENTIAL HYPERTENSION: ICD-10-CM

## 2017-11-15 DIAGNOSIS — Z79.4 TYPE 2 DIABETES MELLITUS WITHOUT COMPLICATION, WITH LONG-TERM CURRENT USE OF INSULIN: ICD-10-CM

## 2017-11-15 DIAGNOSIS — E78.5 HYPERLIPIDEMIA, UNSPECIFIED HYPERLIPIDEMIA TYPE: ICD-10-CM

## 2017-11-15 DIAGNOSIS — E08.21 DIABETES MELLITUS DUE TO UNDERLYING CONDITION WITH DIABETIC NEPHROPATHY, WITH LONG-TERM CURRENT USE OF INSULIN: Primary | ICD-10-CM

## 2017-11-15 DIAGNOSIS — F17.200 SMOKING: ICD-10-CM

## 2017-11-15 DIAGNOSIS — E11.9 TYPE 2 DIABETES MELLITUS WITHOUT COMPLICATION, WITH LONG-TERM CURRENT USE OF INSULIN: ICD-10-CM

## 2017-11-15 DIAGNOSIS — Z79.4 DIABETES MELLITUS DUE TO UNDERLYING CONDITION WITH DIABETIC NEPHROPATHY, WITH LONG-TERM CURRENT USE OF INSULIN: Primary | ICD-10-CM

## 2017-11-15 DIAGNOSIS — G62.9 NEUROPATHY: ICD-10-CM

## 2017-11-15 LAB — GLUCOSE SERPL-MCNC: 127 MG/DL (ref 70–110)

## 2017-11-15 PROCEDURE — 99215 OFFICE O/P EST HI 40 MIN: CPT | Mod: PBBFAC,PO | Performed by: NURSE PRACTITIONER

## 2017-11-15 PROCEDURE — 82948 REAGENT STRIP/BLOOD GLUCOSE: CPT | Mod: PBBFAC,PO | Performed by: NURSE PRACTITIONER

## 2017-11-15 PROCEDURE — 99999 PR PBB SHADOW E&M-EST. PATIENT-LVL V: CPT | Mod: PBBFAC,,, | Performed by: NURSE PRACTITIONER

## 2017-11-15 PROCEDURE — 99214 OFFICE O/P EST MOD 30 MIN: CPT | Mod: S$PBB,,, | Performed by: NURSE PRACTITIONER

## 2017-11-15 RX ORDER — INSULIN GLARGINE 100 [IU]/ML
40 INJECTION, SOLUTION SUBCUTANEOUS 2 TIMES DAILY
COMMUNITY
End: 2017-12-13 | Stop reason: SDUPTHER

## 2017-11-15 RX ORDER — GLIPIZIDE 10 MG/1
10 TABLET ORAL 2 TIMES DAILY
Qty: 60 TABLET | Refills: 3 | Status: SHIPPED | OUTPATIENT
Start: 2017-11-15 | End: 2018-04-18 | Stop reason: SDUPTHER

## 2017-11-15 NOTE — PROGRESS NOTES
"PCP: LIZABETH HUNTLEY MD    Subjective:     HISTORY OF PRESENT ILLNESS: 42 year old   female patient is in clinic today for diabetes.  Patient has had Type II diabetes since 2008.  She is to be enrolled in diabetes education classes. Most recent A1C is 11.5, ADA recommends less than 7.0.  She is allergic to metformin. Blood glucose testing is not performed routinely.  The last time she recalls checking was a " couple of weeks ago. "  She has lost 10 pounds since last visit.      Last month ( October ), she was hospitalized with colitis and at that time was switched back to MDI with Lantus and Apidra.  However, she is only taking Apidra BID and Lantus once daily.  She is suppose to be taking Apidra TID and Lantus BID. She continues to have social stressors at home.  She is also the primary caregiver of her teenage son who has bipolar and  ADHD.      Patient denies polyuria, polydipsia, polyphagia, or blurred vision.  Has history of IBS, therefore she has frequent episodes of diarrhea. She has completed Harvoni.  Denies nausea, vomiting, or diarrhea. Has occasional hypoglycemia.    Height: 5 " 10', Weight: 212 pounds, BMI 30.56  Blood Glucose reading this AM: 124 mg/dl fasting  Blood Glucose reading in clinic: 127 mg/dl at 10:09 am     DM MEDICATIONS:   Lantus 40 units daily  Apidra per sliding scale ( she cannot recall the scale )  Glipizide 10 mg BID ac ( not taking )    Labs Reviewed.    REVIEW OF SYSTEMS:  General: Denies fever, chills, change in appetite.  HEENT: Denies impaired hearing, dysphagia.  Respiratory: Denies shortness of breath, cough or wheezing.  Cardiovascular: Denies chest pain, palpitations.  GI: Denies hematochezia.  : Denies hematuria, dysuria or frequency. Has hx of IBS.  MS:  Normal gait. Denies difficulty with mobility, muscle or joint pain.  SKIN: Denies rashes and lesions.  Neuro: Has numbness or tingling in the hands or feet.   PSYCH: Denies depression or anxiety. No " suicidal ideations.  ENDO: See HPI.    STANDARDS OF CARE:  Eye doctor: Dr. Hopson, Last exam 03 / 2017  Dental exam: Recommend regular exams; denies gums bleeding.  Podiatry doctor: No podiatrist    ACTIVITY LEVEL: No routine exercise.  MEAL PLANNING: Number of meals per day - 3. Breakfast can be 2 slices of toast with 2 scrambled eggs OR bowl of honey nuts cereal. Lunch can be ham sandwich with occasional chips OR can of soup. Dinner can be baked chicken with macaroni. Number of snacks per day - 2.  Per dietary recall, patient is not limiting carbohydrates, saturated fats and sodium.     BLOOD GLUCOSE TESTING: Self-monitoring with TRUE RESULT meter  SOCIAL HISTORY: Single. Lives with children.  No longer working.  She does not drink. Current smoker.     Objective:     PHYSICAL EXAMINATION:  GENERAL: WDWN  female in no acute distress, ambulatory, responds appropriately. AAO X 3.   NECK: Supple, no thyromegaly, no cervical or supraclavicular lymphadenopathy, no carotid bruit.  HEART: Regular rate and rhythm. No rubs, murmurs or gallops.  LUNGS: CTA bilaterally. Unlabored breathing, no use of accessory muscles.  MUSCULOSKELETAL: Normal gait and muscle strength.  ABDOMEN: Active bowel sounds X 4, no masses or tenderness.   SKIN: Warm, dry skin. No lesions or abrasions. Clean, dry well-healed injection sites.   NEUROLOGIC: Cranial nerves II-XII grossly intact.   FOOT EXAM: Protective Sensation (w/ 10 gram monofilament):  Right: Intact  Left: Intact  //  Visual Inspection: Normal -  Bilateral  //  Pedal Pulses:   Right: Present  Left: Present    Assessment:     (1.) Diabetes Type II, neuropathy - per patient, uncontrolled ( non compliant ) on Lantus, Apidra, glipizide ( not taking ) A1C 11.5  (2.) Hypertension - controlled lisinopril, atenolol  (3.) Hyperlipidemia - controlled on Lipitor  (4.) Obesity, BMI 30.56    Plan:     1.) Patient was instructed to monitor blood glucose 2 - 3 x daily, fasting and ac  dinner or at bedtime.   Discussed ADA goal for fasting blood sugar, 80 - 130 mg/dL; pp blood sugars below 180 mg/dl. Also, discussed prevention of hypoglycemia and the need to adjust goals to higher levels if persistent hypoglycemia.  Reminded to bring BG records or meter to each visit for review.  2.) Reviewed pathophysiology of Type 2 diabetes, complications related to the disease, importance of annual dilated eye exam and daily foot examination.  3.) Continue Lantus 40 units daily, same time every day.  Continue Apidra per sliding scale.   She cannot recall her sliding scale.  Patient did not bring BG records to clinic today, so once again, I am unable to make any other adjustments.  Resume glipizide 10 mg BID ac.  She has a history of IBS and has GI symptoms, so will avoid GLP-1 for now.  Compliance with lifestyle is a huge barrier to controlling her diabetes.  She inconsistently takes her insulin and is unable to follow strict diet.  She also does not monitor blood sugars routinely.  We had a long discussion about taking insulin as prescribed.  She voiced understanding.   4.) Meal planning teaching: Carbohydrate definition - one serving is 15 gms. Carbohydrate spacing - carbohydrates should be spaced into approximately 3 meals with 2 snacks ( of one carbohydrate ) between meals or at bedtime. Increase vegetable intake to 2 or more cups of vegetables per day as well as 2 fruit servings. Recommended low saturated fat, low sodium diet to aid in control of hypertension and cholesterol.  5.) Discussed activity, benefits, methods, and precautions. Recommended patient start some form of exercise and increase as tolerated to 30 minutes per day to facilitate weight loss and aid in control of BGs.   6.) Discussed need for smoking cessation, ways to break habit.  Patient will try to start cutting back with intentions of quitting.   Discussed complications associated with diabetes and smoking.  7.) Return to clinic in 2  weeks for follow up with BG readings for review.  Advised patient to call clinic with any questions or concerns.    Karla Leon, BANDAR-C, CDE

## 2017-12-07 ENCOUNTER — OUTPATIENT CASE MANAGEMENT (OUTPATIENT)
Dept: ADMINISTRATIVE | Facility: OTHER | Age: 42
End: 2017-12-07

## 2017-12-07 NOTE — PROGRESS NOTES
Please note the following patients information has been forwarded to Medicaid for Case Management.    Please see the  tab for documentation that was sent to the Medicaid case mgmt dept.    Please contact [facility] Management at ext [phone] with any questions.    Thank you      Elke Moran, SSC

## 2017-12-13 RX ORDER — INSULIN GLARGINE 100 [IU]/ML
40 INJECTION, SOLUTION SUBCUTANEOUS 2 TIMES DAILY
Qty: 15 ML | Refills: 2 | Status: SHIPPED | OUTPATIENT
Start: 2017-12-13 | End: 2018-04-13 | Stop reason: SDUPTHER

## 2017-12-13 NOTE — TELEPHONE ENCOUNTER
Last office visit 06/16/2017. Last refill date 11/22/2016. Pt is requesting a refill on lantus solostar 40 units bid, and apidra solostar 15 units tid

## 2017-12-27 DIAGNOSIS — M75.101 TEAR OF RIGHT ROTATOR CUFF, UNSPECIFIED TEAR EXTENT: Primary | ICD-10-CM

## 2018-02-15 ENCOUNTER — TELEPHONE (OUTPATIENT)
Dept: RHEUMATOLOGY | Facility: CLINIC | Age: 43
End: 2018-02-15

## 2018-02-19 ENCOUNTER — TELEPHONE (OUTPATIENT)
Dept: RHEUMATOLOGY | Facility: CLINIC | Age: 43
End: 2018-02-19

## 2018-02-19 NOTE — TELEPHONE ENCOUNTER
----- Message from Rosalina Foote sent at 2/19/2018  1:17 PM CST -----  Contact: pt  She's calling in regards to a missed call pls call pt back at [phone] (home)

## 2018-02-27 ENCOUNTER — OFFICE VISIT (OUTPATIENT)
Dept: RHEUMATOLOGY | Facility: CLINIC | Age: 43
End: 2018-02-27
Payer: MEDICAID

## 2018-02-27 VITALS
HEIGHT: 70 IN | HEART RATE: 81 BPM | SYSTOLIC BLOOD PRESSURE: 168 MMHG | WEIGHT: 228.63 LBS | BODY MASS INDEX: 32.73 KG/M2 | DIASTOLIC BLOOD PRESSURE: 84 MMHG

## 2018-02-27 DIAGNOSIS — Z79.4 TYPE 2 DIABETES MELLITUS WITH DIABETIC NEUROPATHY, WITH LONG-TERM CURRENT USE OF INSULIN: ICD-10-CM

## 2018-02-27 DIAGNOSIS — I10 ESSENTIAL HYPERTENSION: ICD-10-CM

## 2018-02-27 DIAGNOSIS — Q87.40 MARFAN'S SYNDROME: Primary | ICD-10-CM

## 2018-02-27 DIAGNOSIS — M17.0 PRIMARY OSTEOARTHRITIS OF BOTH KNEES: Chronic | ICD-10-CM

## 2018-02-27 DIAGNOSIS — M54.32 SCIATICA OF LEFT SIDE: ICD-10-CM

## 2018-02-27 DIAGNOSIS — M70.62 TROCHANTERIC BURSITIS OF LEFT HIP: ICD-10-CM

## 2018-02-27 DIAGNOSIS — M75.51 CHRONIC BURSITIS OF RIGHT SHOULDER: Chronic | ICD-10-CM

## 2018-02-27 DIAGNOSIS — E11.40 TYPE 2 DIABETES MELLITUS WITH DIABETIC NEUROPATHY, WITH LONG-TERM CURRENT USE OF INSULIN: ICD-10-CM

## 2018-02-27 DIAGNOSIS — G89.4 CHRONIC PAIN DISORDER: ICD-10-CM

## 2018-02-27 PROCEDURE — 20610 DRAIN/INJ JOINT/BURSA W/O US: CPT | Mod: S$PBB,LT,, | Performed by: INTERNAL MEDICINE

## 2018-02-27 PROCEDURE — 99213 OFFICE O/P EST LOW 20 MIN: CPT | Mod: PBBFAC,PO | Performed by: INTERNAL MEDICINE

## 2018-02-27 PROCEDURE — 99999 PR PBB SHADOW E&M-EST. PATIENT-LVL III: CPT | Mod: PBBFAC,,, | Performed by: INTERNAL MEDICINE

## 2018-02-27 PROCEDURE — 99214 OFFICE O/P EST MOD 30 MIN: CPT | Mod: 25,S$PBB,, | Performed by: INTERNAL MEDICINE

## 2018-02-27 PROCEDURE — 3008F BODY MASS INDEX DOCD: CPT | Mod: ,,, | Performed by: INTERNAL MEDICINE

## 2018-02-27 PROCEDURE — 20610 DRAIN/INJ JOINT/BURSA W/O US: CPT | Mod: PBBFAC,PO | Performed by: INTERNAL MEDICINE

## 2018-02-27 RX ORDER — BETAMETHASONE SODIUM PHOSPHATE AND BETAMETHASONE ACETATE 3; 3 MG/ML; MG/ML
6 INJECTION, SUSPENSION INTRA-ARTICULAR; INTRALESIONAL; INTRAMUSCULAR; SOFT TISSUE
Status: COMPLETED | OUTPATIENT
Start: 2018-02-27 | End: 2018-02-27

## 2018-02-27 RX ORDER — LIDOCAINE HYDROCHLORIDE 10 MG/ML
1 INJECTION INFILTRATION; PERINEURAL
Status: COMPLETED | OUTPATIENT
Start: 2018-02-27 | End: 2018-02-27

## 2018-02-27 RX ORDER — GABAPENTIN 400 MG/1
400 CAPSULE ORAL 3 TIMES DAILY
Qty: 270 CAPSULE | Refills: 4 | Status: SHIPPED | OUTPATIENT
Start: 2018-02-27 | End: 2019-04-04 | Stop reason: SDUPTHER

## 2018-02-27 RX ORDER — TIZANIDINE 4 MG/1
4 TABLET ORAL NIGHTLY
Qty: 90 TABLET | Refills: 4 | Status: SHIPPED | OUTPATIENT
Start: 2018-02-27 | End: 2018-03-06

## 2018-02-27 RX ORDER — TRAMADOL HYDROCHLORIDE 50 MG/1
50 TABLET ORAL EVERY 12 HOURS PRN
Qty: 60 TABLET | Refills: 3 | Status: SHIPPED | OUTPATIENT
Start: 2018-02-27 | End: 2018-03-06

## 2018-02-27 RX ORDER — BETAMETHASONE SODIUM PHOSPHATE AND BETAMETHASONE ACETATE 3; 3 MG/ML; MG/ML
3 INJECTION, SUSPENSION INTRA-ARTICULAR; INTRALESIONAL; INTRAMUSCULAR; SOFT TISSUE
Status: DISCONTINUED | OUTPATIENT
Start: 2018-02-27 | End: 2018-02-27

## 2018-02-27 RX ORDER — LIDOCAINE HYDROCHLORIDE 10 MG/ML
1.5 INJECTION INFILTRATION; PERINEURAL
Status: DISCONTINUED | OUTPATIENT
Start: 2018-02-27 | End: 2018-02-27

## 2018-02-27 RX ORDER — METHYLPREDNISOLONE ACETATE 80 MG/ML
80 INJECTION, SUSPENSION INTRA-ARTICULAR; INTRALESIONAL; INTRAMUSCULAR; SOFT TISSUE
Status: COMPLETED | OUTPATIENT
Start: 2018-02-27 | End: 2018-02-27

## 2018-02-27 RX ADMIN — BETAMETHASONE SODIUM PHOSPHATE AND BETAMETHASONE ACETATE 6 MG: 3; 3 INJECTION, SUSPENSION INTRA-ARTICULAR; INTRALESIONAL; INTRAMUSCULAR at 05:02

## 2018-02-27 RX ADMIN — LIDOCAINE HYDROCHLORIDE 1 ML: 10 INJECTION INFILTRATION; PERINEURAL at 05:02

## 2018-02-27 RX ADMIN — METHYLPREDNISOLONE ACETATE 80 MG: 80 INJECTION, SUSPENSION INTRALESIONAL; INTRAMUSCULAR; INTRASYNOVIAL; SOFT TISSUE at 05:02

## 2018-02-27 NOTE — PATIENT INSTRUCTIONS
Ice hip area for 30 min three x a day    See PT about hip and Orthro about your R shoulder    Get back on gabapentin

## 2018-03-06 ENCOUNTER — HOSPITAL ENCOUNTER (EMERGENCY)
Facility: HOSPITAL | Age: 43
Discharge: HOME | End: 2018-03-06
Payer: MEDICAID

## 2018-03-06 VITALS
BODY MASS INDEX: 31.5 KG/M2 | HEART RATE: 70 BPM | DIASTOLIC BLOOD PRESSURE: 69 MMHG | WEIGHT: 220 LBS | RESPIRATION RATE: 21 BRPM | TEMPERATURE: 99 F | SYSTOLIC BLOOD PRESSURE: 140 MMHG | OXYGEN SATURATION: 96 % | HEIGHT: 70 IN

## 2018-03-06 DIAGNOSIS — M75.51 CHRONIC BURSITIS OF RIGHT SHOULDER: Chronic | ICD-10-CM

## 2018-03-06 DIAGNOSIS — M54.32 SCIATICA OF LEFT SIDE: ICD-10-CM

## 2018-03-06 DIAGNOSIS — R07.81 RIB PAIN ON LEFT SIDE: ICD-10-CM

## 2018-03-06 DIAGNOSIS — G89.4 CHRONIC PAIN DISORDER: ICD-10-CM

## 2018-03-06 DIAGNOSIS — M70.62 TROCHANTERIC BURSITIS OF LEFT HIP: ICD-10-CM

## 2018-03-06 LAB
ALBUMIN SERPL BCP-MCNC: 3.6 G/DL
ALP SERPL-CCNC: 113 U/L
ALT SERPL W/O P-5'-P-CCNC: 26 U/L
ANION GAP SERPL CALC-SCNC: 11 MMOL/L
AST SERPL-CCNC: 24 U/L
B-HCG UR QL: NEGATIVE
BACTERIA #/AREA URNS HPF: NORMAL /HPF
BASOPHILS # BLD AUTO: 0.02 K/UL
BASOPHILS NFR BLD: 0.2 %
BILIRUB SERPL-MCNC: 0.1 MG/DL
BILIRUB UR QL STRIP: NEGATIVE
BUN SERPL-MCNC: 9 MG/DL
CALCIUM SERPL-MCNC: 9.1 MG/DL
CHLORIDE SERPL-SCNC: 101 MMOL/L
CLARITY UR: CLEAR
CO2 SERPL-SCNC: 24 MMOL/L
COLOR UR: YELLOW
CREAT SERPL-MCNC: 0.7 MG/DL
DIFFERENTIAL METHOD: ABNORMAL
EOSINOPHIL # BLD AUTO: 0.2 K/UL
EOSINOPHIL NFR BLD: 1.6 %
ERYTHROCYTE [DISTWIDTH] IN BLOOD BY AUTOMATED COUNT: 12.6 %
EST. GFR  (AFRICAN AMERICAN): >60 ML/MIN/1.73 M^2
EST. GFR  (NON AFRICAN AMERICAN): >60 ML/MIN/1.73 M^2
GLUCOSE SERPL-MCNC: 283 MG/DL
GLUCOSE UR QL STRIP: ABNORMAL
HCT VFR BLD AUTO: 42.4 %
HGB BLD-MCNC: 14.7 G/DL
HGB UR QL STRIP: ABNORMAL
KETONES UR QL STRIP: NEGATIVE
LEUKOCYTE ESTERASE UR QL STRIP: NEGATIVE
LIPASE SERPL-CCNC: 13 U/L
LYMPHOCYTES # BLD AUTO: 4.7 K/UL
LYMPHOCYTES NFR BLD: 35.6 %
MCH RBC QN AUTO: 31.9 PG
MCHC RBC AUTO-ENTMCNC: 34.7 G/DL
MCV RBC AUTO: 92 FL
MICROSCOPIC COMMENT: NORMAL
MONOCYTES # BLD AUTO: 0.9 K/UL
MONOCYTES NFR BLD: 7.1 %
NEUTROPHILS # BLD AUTO: 7.3 K/UL
NEUTROPHILS NFR BLD: 55.5 %
NITRITE UR QL STRIP: NEGATIVE
PH UR STRIP: 6 [PH] (ref 5–8)
PLATELET # BLD AUTO: 149 K/UL
PMV BLD AUTO: 11 FL
POTASSIUM SERPL-SCNC: 4.7 MMOL/L
PROT SERPL-MCNC: 7.2 G/DL
PROT UR QL STRIP: NEGATIVE
RBC # BLD AUTO: 4.61 M/UL
RBC #/AREA URNS HPF: 2 /HPF (ref 0–4)
SODIUM SERPL-SCNC: 136 MMOL/L
SP GR UR STRIP: 1.02 (ref 1–1.03)
SQUAMOUS #/AREA URNS HPF: 10 /HPF
TROPONIN I SERPL DL<=0.01 NG/ML-MCNC: <0.006 NG/ML
URN SPEC COLLECT METH UR: ABNORMAL
UROBILINOGEN UR STRIP-ACNC: NEGATIVE EU/DL
WBC # BLD AUTO: 13.16 K/UL
WBC #/AREA URNS HPF: 1 /HPF (ref 0–5)
YEAST URNS QL MICRO: NORMAL

## 2018-03-06 PROCEDURE — 93005 ELECTROCARDIOGRAM TRACING: CPT

## 2018-03-06 PROCEDURE — 99285 EMERGENCY DEPT VISIT HI MDM: CPT | Mod: 25

## 2018-03-06 PROCEDURE — 96374 THER/PROPH/DIAG INJ IV PUSH: CPT

## 2018-03-06 PROCEDURE — 81025 URINE PREGNANCY TEST: CPT

## 2018-03-06 PROCEDURE — 63600175 PHARM REV CODE 636 W HCPCS: Performed by: PHYSICIAN ASSISTANT

## 2018-03-06 PROCEDURE — 85025 COMPLETE CBC W/AUTO DIFF WBC: CPT

## 2018-03-06 PROCEDURE — 25000003 PHARM REV CODE 250: Performed by: PHYSICIAN ASSISTANT

## 2018-03-06 PROCEDURE — 96375 TX/PRO/DX INJ NEW DRUG ADDON: CPT

## 2018-03-06 PROCEDURE — 83690 ASSAY OF LIPASE: CPT

## 2018-03-06 PROCEDURE — 81000 URINALYSIS NONAUTO W/SCOPE: CPT

## 2018-03-06 PROCEDURE — 93010 ELECTROCARDIOGRAM REPORT: CPT | Mod: ,,, | Performed by: INTERNAL MEDICINE

## 2018-03-06 PROCEDURE — 80053 COMPREHEN METABOLIC PANEL: CPT

## 2018-03-06 PROCEDURE — 84484 ASSAY OF TROPONIN QUANT: CPT

## 2018-03-06 RX ORDER — HYDROCODONE BITARTRATE AND ACETAMINOPHEN 7.5; 325 MG/1; MG/1
1 TABLET ORAL ONCE
Status: COMPLETED | OUTPATIENT
Start: 2018-03-06 | End: 2018-03-06

## 2018-03-06 RX ORDER — FENTANYL CITRATE 50 UG/ML
25 INJECTION, SOLUTION INTRAMUSCULAR; INTRAVENOUS
Status: COMPLETED | OUTPATIENT
Start: 2018-03-06 | End: 2018-03-06

## 2018-03-06 RX ORDER — TRAMADOL HYDROCHLORIDE 50 MG/1
50 TABLET ORAL EVERY 12 HOURS PRN
Qty: 10 TABLET | Refills: 3 | Status: SHIPPED | OUTPATIENT
Start: 2018-03-06 | End: 2018-09-07 | Stop reason: SDUPTHER

## 2018-03-06 RX ORDER — ONDANSETRON 2 MG/ML
4 INJECTION INTRAMUSCULAR; INTRAVENOUS
Status: COMPLETED | OUTPATIENT
Start: 2018-03-06 | End: 2018-03-06

## 2018-03-06 RX ORDER — KETOROLAC TROMETHAMINE 30 MG/ML
30 INJECTION, SOLUTION INTRAMUSCULAR; INTRAVENOUS
Status: COMPLETED | OUTPATIENT
Start: 2018-03-06 | End: 2018-03-06

## 2018-03-06 RX ORDER — TIZANIDINE 4 MG/1
4 TABLET ORAL NIGHTLY
Qty: 12 TABLET | Refills: 4 | Status: SHIPPED | OUTPATIENT
Start: 2018-03-06 | End: 2019-03-11 | Stop reason: SDUPTHER

## 2018-03-06 RX ADMIN — FENTANYL CITRATE 25 MCG: 50 INJECTION, SOLUTION INTRAMUSCULAR; INTRAVENOUS at 05:03

## 2018-03-06 RX ADMIN — HYDROCODONE BITARTRATE AND ACETAMINOPHEN 1 TABLET: 7.5; 325 TABLET ORAL at 07:03

## 2018-03-06 RX ADMIN — KETOROLAC TROMETHAMINE 30 MG: 30 INJECTION, SOLUTION INTRAMUSCULAR; INTRAVENOUS at 07:03

## 2018-03-06 RX ADMIN — ONDANSETRON 4 MG: 2 INJECTION INTRAMUSCULAR; INTRAVENOUS at 05:03

## 2018-03-07 NOTE — ED PROVIDER NOTES
"   History      Chief Complaint   Patient presents with    Abdominal Pain     Pt states, "I amhaving severe pain on the left upper side up under my ribs."       Review of patient's allergies indicates:   Allergen Reactions    Compazine [prochlorperazine edisylate] Rash    Contrast media Anaphylaxis    Dye Anaphylaxis    Levaquin [levofloxacin] Hives and Itching    Metformin      Upset stomach    Ibuprofen      Stomach pain    Morphine      Irritable        HPI   HPI    3/6/2018, 6:27 PM   History obtained from the patient      History of Present Illness: Jenifer Westfall is a 42 y.o. female patient who presents to the Emergency Department for constant pain to left upper quadrant since yesterday. Symptoms are moderate in severity.  She denies f/n/v/d, dysuria, cough or sob.   No further complaints or concerns at this time.           PCP: LIZABETH HUNTLEY MD       Past Medical History:  Past Medical History:   Diagnosis Date    Arthritis     Diabetes mellitus      2015 Insulin x 2 years     DM (diabetes mellitus)      2017 Insulin x 3 years 2013    Hepatitis C     Hyperlipidemia     Hypertension     IBS (irritable bowel syndrome)     Kidney stone     Marfan syndrome     Mitral valve prolapse          Past Surgical History:  Past Surgical History:   Procedure Laterality Date    APPENDECTOMY      BUNIONECTOMY      both feet     SECTION      x 2    CHOLECYSTECTOMY      TUBAL LIGATION             Family History:  Family History   Problem Relation Age of Onset    Heart disease Mother     Thrombophilia Father      DVT    Hypertension Father     Stroke Maternal Aunt     Heart disease Maternal Aunt     Stroke Maternal Uncle     Heart disease Maternal Uncle     Breast cancer Neg Hx     Colon cancer Neg Hx     Ovarian cancer Neg Hx            Social History:  Social History     Social History Main Topics    Smoking status: Current Every Day " "Smoker     Packs/day: 0.20     Years: 20.00     Types: Cigarettes     Last attempt to quit: 4/9/2015    Smokeless tobacco: Never Used      Comment: "once in a blue moon"    Alcohol use No    Drug use: No    Sexual activity: No       ROS     Review of Systems   Constitutional: Negative for chills and fever.   HENT: Negative for sore throat.    Respiratory: Negative for cough and shortness of breath.    Cardiovascular: Negative for chest pain, palpitations and leg swelling.   Gastrointestinal: Positive for abdominal pain. Negative for nausea and vomiting.   Genitourinary: Negative for dysuria and flank pain.   Musculoskeletal: Negative for back pain.   Skin: Negative for rash.   Neurological: Negative for weakness.   Hematological: Does not bruise/bleed easily.   All other systems reviewed and are negative.      Physical Exam      Initial Vitals [03/06/18 1448]   BP Pulse Resp Temp SpO2   (!) 148/59 89 20 98.6 °F (37 °C) (!) 94 %      MAP       88.67         Physical Exam  Vital signs and nursing notes reviewed.  Constitutional: Patient is in NAD. Awake and alert. Well-developed and well-nourished.  Head: Atraumatic. Normocephalic.  Eyes: PERRL. EOM intact. Conjunctivae nl. No scleral icterus.  ENT: Mucous membranes are moist. Oropharynx is clear.  Neck: Supple. No JVD. No lymphadenopathy.  No meningismus  Cardiovascular: Regular rate and rhythm. No murmurs, rubs, or gallops. Distal pulses are 2+ and symmetric.  Pulmonary/Chest: No respiratory distress. Clear to auscultation bilaterally. No wheezing, rales, or rhonchi.  Abdominal: Soft. Non-distended. No TTP. No rebound, guarding, or rigidity. Good bowel sounds.  Genitourinary: No CVA tenderness  Musculoskeletal: Moves all extremities. No edema.  Patient tender over left lateral rib cage.  Skin: Warm and dry.  Neurological: Awake and alert. No acute focal neurological deficits are appreciated.  Psychiatric: Normal affect. Good eye contact. Appropriate in " "content.      ED Course          Procedures  ED Vital Signs:  Vitals:    03/06/18 1448 03/06/18 1838 03/06/18 2010   BP: (!) 148/59 (!) 140/69    Pulse: 89 70    Resp: 20 (!) 21    Temp: 98.6 °F (37 °C) 98.6 °F (37 °C) 98.6 °F (37 °C)   TempSrc: Oral Oral Oral   SpO2: (!) 94% 96%    Weight: 99.8 kg (220 lb)     Height: 5' 10" (1.778 m)           Results for orders placed or performed during the hospital encounter of 03/06/18   CBC auto differential   Result Value Ref Range    WBC 13.16 (H) 3.90 - 12.70 K/uL    RBC 4.61 4.00 - 5.40 M/uL    Hemoglobin 14.7 12.0 - 16.0 g/dL    Hematocrit 42.4 37.0 - 48.5 %    MCV 92 82 - 98 fL    MCH 31.9 (H) 27.0 - 31.0 pg    MCHC 34.7 32.0 - 36.0 g/dL    RDW 12.6 11.5 - 14.5 %    Platelets 149 (L) 150 - 350 K/uL    MPV 11.0 9.2 - 12.9 fL    Gran # (ANC) 7.3 1.8 - 7.7 K/uL    Lymph # 4.7 1.0 - 4.8 K/uL    Mono # 0.9 0.3 - 1.0 K/uL    Eos # 0.2 0.0 - 0.5 K/uL    Baso # 0.02 0.00 - 0.20 K/uL    Gran% 55.5 38.0 - 73.0 %    Lymph% 35.6 18.0 - 48.0 %    Mono% 7.1 4.0 - 15.0 %    Eosinophil% 1.6 0.0 - 8.0 %    Basophil% 0.2 0.0 - 1.9 %    Differential Method Automated    Comprehensive metabolic panel   Result Value Ref Range    Sodium 136 136 - 145 mmol/L    Potassium 4.7 3.5 - 5.1 mmol/L    Chloride 101 95 - 110 mmol/L    CO2 24 23 - 29 mmol/L    Glucose 283 (H) 70 - 110 mg/dL    BUN, Bld 9 6 - 20 mg/dL    Creatinine 0.7 0.5 - 1.4 mg/dL    Calcium 9.1 8.7 - 10.5 mg/dL    Total Protein 7.2 6.0 - 8.4 g/dL    Albumin 3.6 3.5 - 5.2 g/dL    Total Bilirubin 0.1 0.1 - 1.0 mg/dL    Alkaline Phosphatase 113 55 - 135 U/L    AST 24 10 - 40 U/L    ALT 26 10 - 44 U/L    Anion Gap 11 8 - 16 mmol/L    eGFR if African American >60 >60 mL/min/1.73 m^2    eGFR if non African American >60 >60 mL/min/1.73 m^2   Lipase   Result Value Ref Range    Lipase 13 4 - 60 U/L   Urinalysis   Result Value Ref Range    Specimen UA Urine, Clean Catch     Color, UA Yellow Yellow, Straw, Ashli    Appearance, UA Clear Clear "    pH, UA 6.0 5.0 - 8.0    Specific Gravity, UA 1.020 1.005 - 1.030    Protein, UA Negative Negative    Glucose, UA 3+ (A) Negative    Ketones, UA Negative Negative    Bilirubin (UA) Negative Negative    Occult Blood UA Trace (A) Negative    Nitrite, UA Negative Negative    Urobilinogen, UA Negative <2.0 EU/dL    Leukocytes, UA Negative Negative   Troponin I   Result Value Ref Range    Troponin I <0.006 0.000 - 0.026 ng/mL   Urinalysis Microscopic   Result Value Ref Range    RBC, UA 2 0 - 4 /hpf    WBC, UA 1 0 - 5 /hpf    Bacteria, UA Occasional None-Occ /hpf    Yeast, UA None None    Squam Epithel, UA 10 /hpf    Microscopic Comment SEE COMMENT    Pregnancy, urine rapid   Result Value Ref Range    Preg Test, Ur Negative              Imaging Results:  Imaging Results          CT Renal Stone Study ABD Pelvis WO (Final result)  Result time 03/06/18 19:26:55    Final result by Tomás Basilio MD (03/06/18 19:26:55)                 Impression:       No radiopaque renal calculus or evidence of urinary tract obstruction.    Hepatomegaly with attenuation of the liver that is somewhat heterogeneous in but less conspicuous on the current study. Correlate with LFTs and followup MRI or ultrasound can be obtained as clinically warranted.    All CT scans at this facility use dose modulation, iterative reconstruction and/or weight based dosing when appropriate to reduce radiation dose to as low as reasonably achievable.      Electronically signed by: TOMÁS BASILIO MD  Date:     03/06/18  Time:    19:26              Narrative:    Indication: Abdominal pain.    Routine noncontrast CT images of the abdomen and pelvis were obtained.    Findings:    The lung bases are well aerated.  Heart size is normal.  No pericardial or pleural effusion.      The liver is enlarged in size with heterogeneous attenuation on this noncontrast exam.  Gallbladder is surgically absent. The  stomach, spleen, pancreas, adrenal glands are normal.  Aorta  demonstrates moderate atherosclerotic disease.      The kidneys are normal in size shape and position without radiopaque calculus or signs of hydronephrosis. The bladder is incompletely distended.     The visualized loops of small bowel are unremarkable.The appendix is not visualized in the right lower quadrant.  There is no inflammation. Colon is unremarkable.      Bones appear intact .                             X-Ray Chest PA And Lateral (Final result)  Result time 03/06/18 16:31:54    Final result by Page Mccall MD (03/06/18 16:31:54)                 Impression:      No acute findings.      Electronically signed by: PAGE MCCALL MD  Date:     03/06/18  Time:    16:31              Narrative:    Exam: XR CHEST PA AND LATERAL,    Date:  03/06/18 16:09:54    History: Chest pain    Comparison:  09/26/2017.    Findings: Stable chest x-ray.  Heart size is normal.  Lung fields are clear.                             The EKG was ordered, reviewed, and independently interpreted by the ED provider.  Interpretation time: 16:09  Rate: 76 BPM  Rhythm: normal sinus rhythm  Interpretation: No acute ST changes. No STEMI.      The Emergency Provider reviewed the vital signs and test results, which are outlined above.    ED Discussion             Medication(s) given in the ER:  Medications   fentaNYL injection 25 mcg (25 mcg Intravenous Given 3/6/18 1717)   ondansetron injection 4 mg (4 mg Intravenous Given 3/6/18 1718)   ketorolac injection 30 mg (30 mg Intravenous Given 3/6/18 1956)   hydrocodone-acetaminophen 7.5-325mg per tablet 1 tablet (1 tablet Oral Given 3/6/18 1957)           Follow-up Information     LIZABETH HUNTLEY MD In 2 days.    Specialty:  Family Medicine  Contact information:  [address] [place] [id number]  [phone]                       Discharge Medication List as of 3/6/2018  7:53 PM             Medical Decision Making      Pain seems musculoskeletal.  Liver abnormality on  CT is chronic and patient is being followed for this.  No abdominal tenderness.  Only tenderness is over left ribs.  All findings were reviewed with the patient/family in detail.   All remaining questions and concerns were addressed at that time.  Patient/family has been counseled regarding the need for follow-up as well as the indication to return to the emergency room should new or worrisome developments occur.        MDM               Clinical Impression:        Rib pain on left side        Disposition  Stable  Discharged          Pily Barnard PA-C  03/06/18 3905

## 2018-03-12 ENCOUNTER — CLINICAL SUPPORT (OUTPATIENT)
Dept: REHABILITATION | Facility: HOSPITAL | Age: 43
End: 2018-03-12
Payer: MEDICAID

## 2018-03-12 DIAGNOSIS — M70.62 TROCHANTERIC BURSITIS OF LEFT HIP: Primary | ICD-10-CM

## 2018-03-12 DIAGNOSIS — M54.32 SCIATICA, LEFT SIDE: ICD-10-CM

## 2018-03-12 PROCEDURE — 97161 PT EVAL LOW COMPLEX 20 MIN: CPT

## 2018-03-13 RX ORDER — FLUOXETINE HYDROCHLORIDE 40 MG/1
CAPSULE ORAL
Qty: 30 CAPSULE | Refills: 11 | OUTPATIENT
Start: 2018-03-13

## 2018-03-13 RX ORDER — ATORVASTATIN CALCIUM 40 MG/1
TABLET, FILM COATED ORAL
Qty: 30 TABLET | Refills: 11 | OUTPATIENT
Start: 2018-03-13

## 2018-03-13 NOTE — TELEPHONE ENCOUNTER
Med refill request has been denied. patidenisset has not been seen in 6months. Schedule a chronic care appt with Dr. gross or myself

## 2018-03-14 NOTE — TELEPHONE ENCOUNTER
Attempted to call patient. Pt number listed has an automated response that caller has restrictions that will not allow our number to go through. No option to leave a message.

## 2018-03-15 ENCOUNTER — TELEPHONE (OUTPATIENT)
Dept: FAMILY MEDICINE | Facility: CLINIC | Age: 43
End: 2018-03-15

## 2018-03-15 RX ORDER — ATORVASTATIN CALCIUM 40 MG/1
40 TABLET, FILM COATED ORAL DAILY
Qty: 12 TABLET | Refills: 0 | Status: SHIPPED | OUTPATIENT
Start: 2018-03-15 | End: 2018-04-18 | Stop reason: SDUPTHER

## 2018-03-15 RX ORDER — FLUOXETINE HYDROCHLORIDE 20 MG/1
40 CAPSULE ORAL DAILY
Qty: 20 CAPSULE | Refills: 0 | Status: SHIPPED | OUTPATIENT
Start: 2018-03-15 | End: 2018-04-18 | Stop reason: SDUPTHER

## 2018-03-15 NOTE — TELEPHONE ENCOUNTER
Last office visit 06/16/2017. Last refill date 02/07/2018.Pt is requesting a refill on fluoxetine 40mg qd, atorvastatin 40mg qd

## 2018-03-15 NOTE — TELEPHONE ENCOUNTER
----- Message from Luci Hahn sent at 3/15/2018  4:55 PM CDT -----  Contact: [facility]  She is calling in regards to wanting to clarify the e-scripts  Fluoxetine 20mg  Atorvastatin 40mg      She would like a call back at .  [facility] in [facility] [address]  [address]  [facility] [address]  Phone: [phone] Fax: [phone]

## 2018-03-15 NOTE — TELEPHONE ENCOUNTER
Returned call and spoke with pharmacy. Informed that partial prescriptions that were sent over were because pt needs appt. Pharmacist verbalized understanding.

## 2018-03-29 RX ORDER — ATORVASTATIN CALCIUM 40 MG/1
TABLET, FILM COATED ORAL
Qty: 12 TABLET | Refills: 0 | OUTPATIENT
Start: 2018-03-29

## 2018-04-02 ENCOUNTER — TELEPHONE (OUTPATIENT)
Dept: FAMILY MEDICINE | Facility: CLINIC | Age: 43
End: 2018-04-02

## 2018-04-02 ENCOUNTER — CLINICAL SUPPORT (OUTPATIENT)
Dept: REHABILITATION | Facility: HOSPITAL | Age: 43
End: 2018-04-02
Payer: MEDICAID

## 2018-04-02 DIAGNOSIS — M54.32 SCIATICA, LEFT SIDE: ICD-10-CM

## 2018-04-02 DIAGNOSIS — M70.62 TROCHANTERIC BURSITIS OF LEFT HIP: Primary | ICD-10-CM

## 2018-04-02 PROCEDURE — 97140 MANUAL THERAPY 1/> REGIONS: CPT

## 2018-04-02 PROCEDURE — 97110 THERAPEUTIC EXERCISES: CPT

## 2018-04-02 NOTE — TELEPHONE ENCOUNTER
----- Message from Cata Jolly sent at 4/2/2018  1:18 PM CDT -----  Contact: [facility]   Would like to know status of medication request atorvastatin. Please call back at [phone].      Pt uses;  [facility] in [facility] [address], [address]  [address], [address]  [facility] [address]  Phone: [phone] Fax: [phone]    Thanks,  Cata Jolly

## 2018-04-02 NOTE — PROGRESS NOTES
"PHYSICAL THERAPY DAILY NOTE    Referring Provider:  Dr. Romaine Lawrence    Diagnosis:       ICD-10-CM ICD-9-CM    1. Trochanteric bursitis of left hip M70.62 726.5    2. Sciatica, left side M54.32 724.3      Orders:  Evaluate and Treat    Date of Initial Evaluation: 3/12/18      Visit # 2 of 7    BACKGROUND:  Patient is a 43 y/o female with c/o left hip pain that radiates down her L leg at times. She states that the pain has been hurting on and off for years.  She states that she has increased pain with standing and walking activities.  She states that she has been diagnosed with sciatic nerve damage in 2005.  She has received injections in her L hip before with good results, but she states that the shots are not helping anymore.  The patient reports that her current pain level is a 2/10 but when she walks for a while, it can get to a 10/10.      Subjective 4/2/18 - Patient reports that she's felt much better after her initial treatment, but then she got sick and was unable to come back in and her symptoms eventually worsened.  She states that her pain is back in the same place now.    OBJECTIVE      TREATMENT PROVIDED:  -Therapeutic exercises:  25 min   - Recumbent bike - 8 min   - Bridging - 30x   - DKTC/LTRs with ball - 30x   - HS/piriformis str - 30" x 3 ea L LE   - Hip abd/add with resistance - 30x  -Manual Therapy:  15 min    - TDN to left piriformis, glute med, glute min   - TPR to left piriformis, glute med  -Modalities:  MH and estim to L hip - 15 min (no charge)  -Education on proper posture, body mechanics and condition    ASSESSMENT:  Patient with increased pain in L hip with therex including bridging and LTRs but patient able to complete activities with slight modifications.  Patient did experience significant decrease in pain following her first visit that lasted a week, however, the pain returned in the week following but the patient was not able to return to therapy due to illness.  Patient should " see more long lasting results with more consistent treatment.    PLAN:  Patient will benefit from physical therapy (2-3) x/week for (4-6) weeks including manual therapy, therapeutic exercise, functional activities, modalities, and patient education.

## 2018-04-02 NOTE — TELEPHONE ENCOUNTER
Called pharm and informed patient has not been seen since September 2016 so pt needs appt. Called pt, no answer. Unable to leave message.

## 2018-04-05 ENCOUNTER — CLINICAL SUPPORT (OUTPATIENT)
Dept: REHABILITATION | Facility: HOSPITAL | Age: 43
End: 2018-04-05
Payer: MEDICAID

## 2018-04-05 DIAGNOSIS — M70.62 TROCHANTERIC BURSITIS OF LEFT HIP: Primary | ICD-10-CM

## 2018-04-05 DIAGNOSIS — M54.32 SCIATICA, LEFT SIDE: ICD-10-CM

## 2018-04-05 PROCEDURE — 97014 ELECTRIC STIMULATION THERAPY: CPT

## 2018-04-05 PROCEDURE — 97140 MANUAL THERAPY 1/> REGIONS: CPT

## 2018-04-05 PROCEDURE — 97110 THERAPEUTIC EXERCISES: CPT

## 2018-04-05 NOTE — PROGRESS NOTES
"PHYSICAL THERAPY DAILY NOTE    Referring Provider:  Dr. Romaine Lawrence    Diagnosis:       ICD-10-CM ICD-9-CM    1. Trochanteric bursitis of left hip M70.62 726.5    2. Sciatica, left side M54.32 724.3      Orders:  Evaluate and Treat    Date of Initial Evaluation: 3/12/18      Visit # 3 of 7    BACKGROUND:  Patient is a 43 y/o female with c/o left hip pain that radiates down her L leg at times. She states that the pain has been hurting on and off for years.  She states that she has increased pain with standing and walking activities.  She states that she has been diagnosed with sciatic nerve damage in 2005.  She has received injections in her L hip before with good results, but she states that the shots are not helping anymore.  The patient reports that her current pain level is a 2/10 but when she walks for a while, it can get to a 10/10.      Subjective 4/5/18 - Patient reports having increased pain today after being on her feet a lot and going up and down stairs    OBJECTIVE      TREATMENT PROVIDED:  -Therapeutic exercises:  25 min   - Recumbent bike - 8 min   - Bridging - 30x   - DKTC/LTRs with ball - 30x   - HS/piriformis str - 30" x 3 ea L LE  -Manual Therapy:  10 min    - TPR to left piriformis, glute med  -Modalities:  MH and estim to L hip - 15 min (no charge)  -Education on proper posture, body mechanics and condition    ASSESSMENT:  Patient with increased pain today which affected her gait.    PLAN:  Patient will benefit from physical therapy (2-3) x/week for (4-6) weeks including manual therapy, therapeutic exercise, functional activities, modalities, and patient education.        "

## 2018-04-12 ENCOUNTER — TELEPHONE (OUTPATIENT)
Dept: OBSTETRICS AND GYNECOLOGY | Facility: CLINIC | Age: 43
End: 2018-04-12

## 2018-04-12 ENCOUNTER — TELEPHONE (OUTPATIENT)
Dept: FAMILY MEDICINE | Facility: CLINIC | Age: 43
End: 2018-04-12

## 2018-04-12 DIAGNOSIS — Z12.39 BREAST CANCER SCREENING: Primary | ICD-10-CM

## 2018-04-12 NOTE — TELEPHONE ENCOUNTER
----- Message from Jade Tay sent at 4/12/2018  1:35 PM CDT -----  Contact: Pt  Please give pt a call at ..[phone] (erxk) regarding orders being put in for the mammogram.

## 2018-04-12 NOTE — TELEPHONE ENCOUNTER
----- Message from Jade Tay sent at 4/12/2018  1:33 PM CDT -----  Contact: Pt  Please give pt a call at [phone] regarding an appt for medication refills but before the next available.

## 2018-04-12 NOTE — TELEPHONE ENCOUNTER
Patient is requesting a order for mammogram.  Can you order exam?  She was last seen on 03/15/2017.

## 2018-04-13 ENCOUNTER — TELEPHONE (OUTPATIENT)
Dept: FAMILY MEDICINE | Facility: CLINIC | Age: 43
End: 2018-04-13

## 2018-04-13 RX ORDER — INSULIN GLARGINE 100 [IU]/ML
40 INJECTION, SOLUTION SUBCUTANEOUS 2 TIMES DAILY
Qty: 15 ML | Refills: 2 | Status: SHIPPED | OUTPATIENT
Start: 2018-04-13 | End: 2019-04-26 | Stop reason: ALTCHOICE

## 2018-04-13 NOTE — TELEPHONE ENCOUNTER
----- Message from Jade Tay sent at 4/13/2018  1:57 PM CDT -----  Contact: [facility]  Caller is calling to verify the quantity of the scripts that were sent over.         [facility] in [facility] [address]  [address]  [facility] [address]  Phone: [phone] Fax: [phone]

## 2018-04-13 NOTE — TELEPHONE ENCOUNTER
----- Message from Cata Jolly sent at 4/13/2018 11:26 AM CDT -----  Contact: self  Patient returning call. Please call back at [phone].      Thanks,  Cata Jolly

## 2018-04-13 NOTE — TELEPHONE ENCOUNTER
----- Message from Mickie Delgado sent at 4/13/2018 11:16 AM CDT -----  Contact: pt  Calling with questions about her appointment and please advise  [phone]

## 2018-04-13 NOTE — TELEPHONE ENCOUNTER
Spoke with patient and she would like to know if a refill could be called in for her Lantus and Apidra, she has an appointment on 04/18/2018.

## 2018-04-16 ENCOUNTER — TELEPHONE (OUTPATIENT)
Dept: FAMILY MEDICINE | Facility: CLINIC | Age: 43
End: 2018-04-16

## 2018-04-16 NOTE — TELEPHONE ENCOUNTER
----- Message from Jonnathan Logan sent at 4/16/2018  9:31 AM CDT -----  Lucita ( [facility]) is requesting a call from nurse to discuss question regarding the apidra .      Please call Lucita ( [facility]) [phone]

## 2018-04-16 NOTE — TELEPHONE ENCOUNTER
Spoke with Lucita at the pharmacy and she would like to know if they can fill the whole script for Apidra because they come in boxes and they cannot be broken down. Please advise.

## 2018-04-18 ENCOUNTER — CLINICAL SUPPORT (OUTPATIENT)
Dept: REHABILITATION | Facility: HOSPITAL | Age: 43
End: 2018-04-18
Payer: MEDICAID

## 2018-04-18 ENCOUNTER — OFFICE VISIT (OUTPATIENT)
Dept: FAMILY MEDICINE | Facility: CLINIC | Age: 43
End: 2018-04-18
Payer: MEDICAID

## 2018-04-18 VITALS
OXYGEN SATURATION: 96 % | TEMPERATURE: 97 F | WEIGHT: 222.13 LBS | HEIGHT: 70 IN | SYSTOLIC BLOOD PRESSURE: 118 MMHG | BODY MASS INDEX: 31.8 KG/M2 | HEART RATE: 89 BPM | DIASTOLIC BLOOD PRESSURE: 66 MMHG

## 2018-04-18 DIAGNOSIS — F32.4 MAJOR DEPRESSIVE DISORDER WITH SINGLE EPISODE, IN PARTIAL REMISSION: Primary | ICD-10-CM

## 2018-04-18 DIAGNOSIS — E11.9 TYPE 2 DIABETES MELLITUS WITHOUT COMPLICATION, WITH LONG-TERM CURRENT USE OF INSULIN: ICD-10-CM

## 2018-04-18 DIAGNOSIS — M54.32 SCIATICA, LEFT SIDE: ICD-10-CM

## 2018-04-18 DIAGNOSIS — I10 HYPERTENSION, UNSPECIFIED TYPE: ICD-10-CM

## 2018-04-18 DIAGNOSIS — Z79.4 TYPE 2 DIABETES MELLITUS WITHOUT COMPLICATION, WITH LONG-TERM CURRENT USE OF INSULIN: ICD-10-CM

## 2018-04-18 DIAGNOSIS — M70.62 TROCHANTERIC BURSITIS OF LEFT HIP: Primary | ICD-10-CM

## 2018-04-18 PROCEDURE — 99213 OFFICE O/P EST LOW 20 MIN: CPT | Mod: PBBFAC,PO | Performed by: FAMILY MEDICINE

## 2018-04-18 PROCEDURE — 97110 THERAPEUTIC EXERCISES: CPT

## 2018-04-18 PROCEDURE — 99999 PR PBB SHADOW E&M-EST. PATIENT-LVL III: CPT | Mod: PBBFAC,,, | Performed by: FAMILY MEDICINE

## 2018-04-18 PROCEDURE — 97140 MANUAL THERAPY 1/> REGIONS: CPT

## 2018-04-18 PROCEDURE — 99214 OFFICE O/P EST MOD 30 MIN: CPT | Mod: S$PBB,,, | Performed by: FAMILY MEDICINE

## 2018-04-18 RX ORDER — FLUOXETINE HYDROCHLORIDE 40 MG/1
40 CAPSULE ORAL DAILY
Qty: 90 CAPSULE | Refills: 0 | Status: SHIPPED | OUTPATIENT
Start: 2018-04-18 | End: 2018-08-08 | Stop reason: SDUPTHER

## 2018-04-18 RX ORDER — CLOPIDOGREL BISULFATE 75 MG/1
75 TABLET ORAL DAILY
Qty: 90 TABLET | Refills: 2 | Status: SHIPPED | OUTPATIENT
Start: 2018-04-18 | End: 2019-01-09 | Stop reason: SDUPTHER

## 2018-04-18 RX ORDER — ATORVASTATIN CALCIUM 40 MG/1
40 TABLET, FILM COATED ORAL DAILY
Qty: 90 TABLET | Refills: 0 | Status: SHIPPED | OUTPATIENT
Start: 2018-04-18 | End: 2018-05-17

## 2018-04-18 RX ORDER — GLIPIZIDE 10 MG/1
10 TABLET ORAL 2 TIMES DAILY
Qty: 180 TABLET | Refills: 0 | Status: SHIPPED | OUTPATIENT
Start: 2018-04-18 | End: 2018-08-10 | Stop reason: SDUPTHER

## 2018-04-18 NOTE — PROGRESS NOTES
Subjective:       Patient ID: Jenifer Westfall is a 42 y.o. female.    Chief Complaint: Follow-up (health maint/ discuss medications)    42 y old here for f/u on DM ,R subclavian stenosis ,  HTN , dlp and depression , On aspirin and plavix ,   immunizations are UTD  , Opthalmology past due to see [facility]  ,checking  bs 1 hr  After lunch : 213,also before and after dinner(cant  remember  Pre dinner readings )1 hrs after dinner : 140 . depression is stable on Prozac . HAs stopped smoking .      Review of Systems   Constitutional: Negative.    HENT: Negative.    Eyes: Negative.    Respiratory: Negative.    Cardiovascular: Negative.    Gastrointestinal: Negative.    Genitourinary: Negative.    Musculoskeletal: Negative.    Skin: Negative.    Hematological: Negative.        Objective:      Physical Exam   Constitutional: She is oriented to person, place, and time. She appears well-developed and well-nourished. No distress.   HENT:   Head: Normocephalic and atraumatic.   Right Ear: External ear normal.   Left Ear: External ear normal.   Mouth/Throat: No oropharyngeal exudate.   Eyes: Conjunctivae and EOM are normal. Pupils are equal, round, and reactive to light. Right eye exhibits no discharge. Left eye exhibits no discharge. No scleral icterus.   Neck: Normal range of motion. Neck supple. No JVD present. No tracheal deviation present. No thyromegaly present.   Cardiovascular: Normal rate, regular rhythm and normal heart sounds.  Exam reveals no gallop and no friction rub.    No murmur heard.  Pulses:       Dorsalis pedis pulses are 2+ on the right side, and 1+ on the left side.        Posterior tibial pulses are 2+ on the right side, and 1+ on the left side.   Pulmonary/Chest: Effort normal and breath sounds normal. No stridor. No respiratory distress. She has no wheezes. She has no rales. She exhibits no tenderness.   Abdominal: Soft. Bowel sounds are normal. She exhibits no distension. There is no tenderness. There is  no rebound and no guarding.   Musculoskeletal: Normal range of motion.        Right foot: There is normal range of motion and no deformity.        Left foot: There is normal range of motion.   Feet:   Right Foot:   Protective Sensation: 10 sites tested. 0 sites sensed.   Skin Integrity: Negative for ulcer, blister, skin breakdown, erythema, warmth or callus.   Left Foot:   Protective Sensation: 10 sites tested. 0 sites sensed.   Skin Integrity: Negative for ulcer, blister, skin breakdown, erythema, warmth, callus or dry skin.   Lymphadenopathy:     She has no cervical adenopathy.   Neurological: She is alert and oriented to person, place, and time.   Skin: Skin is warm and dry. She is not diaphoretic.   Psychiatric: She has a normal mood and affect. Her behavior is normal. Judgment and thought content normal.       Assessment:       Jenifer was seen today for follow-up.    Diagnoses and all orders for this visit:    Major depressive disorder with single episode, in partial remission    Type 2 diabetes mellitus without complication, with long-term current use of insulin  -     glipiZIDE (GLUCOTROL) 10 MG tablet; Take 1 tablet (10 mg total) by mouth 2 (two) times daily.  -     CBC auto differential; Future  -     Comprehensive metabolic panel; Future  -     Hemoglobin A1c; Future  -     Lipid panel; Future  -     Microalbumin/creatinine urine ratio  -     CAR US Ankle Brachial Indices Ext LTD WO Str; Future    Hypertension, unspecified type    Other orders  -     FLUoxetine (PROZAC) 40 MG capsule; Take 1 capsule (40 mg total) by mouth once daily.  -     atorvastatin (LIPITOR) 40 MG tablet; Take 1 tablet (40 mg total) by mouth once daily.  -     clopidogrel (PLAVIX) 75 mg tablet; Take 1 tablet (75 mg total) by mouth once daily.  -     Mammo Digital Screening Bilateral With CAD; Future      Plan:   Stable , med rf   .   Pt. encouraged to follow a strict diabetic type diet.   Encouraged daily physical activity/exercise for  at least 30mins if tolerated.   Weight control recommenced as always.   Discussed how lifestyle changes make biggest impact on diabetes control.   Continue home glucose monitoring once daily and keep log.   Counseling provided today about hypoglycemia as well as about how diabetes increases risk of cardiovascular, cerebrovascular ,peripheral vascular disease and other serious health complications. He has been instructed to call if developing any new symptoms or hypoglycemia related symptoms.   See encounter for associated orders/medications.   - Lipid panel; Future    Controlled. Cont med

## 2018-04-18 NOTE — PROGRESS NOTES
"PHYSICAL THERAPY DAILY NOTE    Referring Provider:  Dr. Romaine Lawrence    Diagnosis:       ICD-10-CM ICD-9-CM    1. Trochanteric bursitis of left hip M70.62 726.5    2. Sciatica, left side M54.32 724.3      Orders:  Evaluate and Treat    Date of Initial Evaluation: 3/12/18      Visit # 4 of 7    BACKGROUND:  Patient is a 41 y/o female with c/o left hip pain that radiates down her L leg at times. She states that the pain has been hurting on and off for years.  She states that she has increased pain with standing and walking activities.  She states that she has been diagnosed with sciatic nerve damage in 2005.  She has received injections in her L hip before with good results, but she states that the shots are not helping anymore.  The patient reports that her current pain level is a 2/10 but when she walks for a while, it can get to a 10/10.      Subjective 4/18/18 - Patient reports having less soreness in her L hip today but still about a 3-4/10.    OBJECTIVE      TREATMENT PROVIDED:  -Therapeutic exercises:  25 min   - Recumbent bike - 8 min   - Bridging - 30x   - DKTC/LTRs with ball - 30x   - HS/piriformis str - 30" x 3 ea L LE  -Manual therapy - 10 min   - TDN to left TFL, glute med, glute min, piriformis   - TPR to left piriformis  -Modalities:  MH and estim to L hip - 15 min (no charge)  -Education on proper posture, body mechanics and condition    ASSESSMENT:  Patient still with lingering hip pain but overall symptoms have decreased with PT. Patient with significant relief with TDN.    PLAN:  Patient will benefit from physical therapy (2-3) x/week for (4-6) weeks including manual therapy, therapeutic exercise, functional activities, modalities, and patient education.        "

## 2018-04-20 ENCOUNTER — CLINICAL SUPPORT (OUTPATIENT)
Dept: CARDIOLOGY | Facility: CLINIC | Age: 43
End: 2018-04-20
Attending: FAMILY MEDICINE
Payer: MEDICAID

## 2018-04-20 DIAGNOSIS — Z79.4 TYPE 2 DIABETES MELLITUS WITHOUT COMPLICATION, WITH LONG-TERM CURRENT USE OF INSULIN: ICD-10-CM

## 2018-04-20 DIAGNOSIS — I73.9 PAD (PERIPHERAL ARTERY DISEASE): Primary | ICD-10-CM

## 2018-04-20 DIAGNOSIS — E11.9 TYPE 2 DIABETES MELLITUS WITHOUT COMPLICATION, WITH LONG-TERM CURRENT USE OF INSULIN: ICD-10-CM

## 2018-04-20 LAB — VASCULAR ANKLE BRACHIAL INDEX (ABI) LEFT: 0.57 (ref 0.9–1.2)

## 2018-04-20 PROCEDURE — 93922 UPR/L XTREMITY ART 2 LEVELS: CPT | Mod: PBBFAC | Performed by: INTERNAL MEDICINE

## 2018-04-24 ENCOUNTER — TELEPHONE (OUTPATIENT)
Dept: FAMILY MEDICINE | Facility: CLINIC | Age: 43
End: 2018-04-24

## 2018-04-24 NOTE — TELEPHONE ENCOUNTER
----- Message from Jonnathan Logan sent at 4/24/2018 11:35 AM CDT -----  Pt is returning a call to nurse.        Please call pt back at [phone]

## 2018-05-02 ENCOUNTER — TELEPHONE (OUTPATIENT)
Dept: OBSTETRICS AND GYNECOLOGY | Facility: CLINIC | Age: 43
End: 2018-05-02

## 2018-05-02 NOTE — TELEPHONE ENCOUNTER
Spoke with patient regarding rescheduling appointment per the patient's request. Patient rescheduled for 5/17/18. Patient verbalized understanding of time and location.

## 2018-05-02 NOTE — TELEPHONE ENCOUNTER
----- Message from Demetra Tan sent at 5/2/2018  8:48 AM CDT -----  Contact: pt  States she has an appt at 11:30 and she started her cycle and she needs to know if she still needs to come in. Please call pt at [phone]. Thank you

## 2018-05-10 ENCOUNTER — TELEPHONE (OUTPATIENT)
Dept: FAMILY MEDICINE | Facility: CLINIC | Age: 43
End: 2018-05-10

## 2018-05-10 NOTE — TELEPHONE ENCOUNTER
----- Message from Ruba Estes sent at 5/10/2018 11:15 AM CDT -----  Contact: Patient  Patient called to discuss her lab results with the nurse. She can be contacted at [phone] cell or [phone].    Thanks,  Ruba

## 2018-05-10 NOTE — TELEPHONE ENCOUNTER
Spoke with patient and gave her lab results, patient made aware that she will need to schedule with vascular. Appt made 06/06/2018

## 2018-05-17 ENCOUNTER — OFFICE VISIT (OUTPATIENT)
Dept: OBSTETRICS AND GYNECOLOGY | Facility: CLINIC | Age: 43
End: 2018-05-17
Payer: MEDICAID

## 2018-05-17 VITALS
DIASTOLIC BLOOD PRESSURE: 78 MMHG | WEIGHT: 227.75 LBS | BODY MASS INDEX: 32.61 KG/M2 | SYSTOLIC BLOOD PRESSURE: 130 MMHG | HEIGHT: 70 IN

## 2018-05-17 DIAGNOSIS — Z01.419 WELL WOMAN EXAM WITH ROUTINE GYNECOLOGICAL EXAM: Primary | ICD-10-CM

## 2018-05-17 DIAGNOSIS — Z30.013 ENCOUNTER FOR INITIAL PRESCRIPTION OF INJECTABLE CONTRACEPTIVE: ICD-10-CM

## 2018-05-17 PROCEDURE — 99999 PR PBB SHADOW E&M-EST. PATIENT-LVL III: CPT | Mod: PBBFAC,,, | Performed by: OBSTETRICS & GYNECOLOGY

## 2018-05-17 PROCEDURE — 99213 OFFICE O/P EST LOW 20 MIN: CPT | Mod: PBBFAC | Performed by: OBSTETRICS & GYNECOLOGY

## 2018-05-17 PROCEDURE — 99396 PREV VISIT EST AGE 40-64: CPT | Mod: S$PBB,,, | Performed by: OBSTETRICS & GYNECOLOGY

## 2018-05-17 RX ORDER — MEDROXYPROGESTERONE ACETATE 150 MG/ML
150 INJECTION, SUSPENSION INTRAMUSCULAR
Status: DISCONTINUED | OUTPATIENT
Start: 2018-05-17 | End: 2019-04-26

## 2018-05-17 NOTE — PROGRESS NOTES
Subjective:       Patient ID: Jenifer Westfall is a 42 y.o. female.    Chief Complaint:  Annual Exam      History of Present Illness  HPI  Annual Exam-Premenopausal  Patient presents for annual exam. The patient has no complaints today. The patient is not sexually active. GYN screening history: last pap: approximate date  and was normal and last mammogram: approximate date 2017 and was normal. The patient wears seatbelts: yes. The patient participates in regular exercise: no. Has the patient ever been transfused or tattooed?: no. The patient reports that there is not domestic violence in her life.  Menses are regular and periods last 4-5 days.  Has significant cramping with menses and desires to restart Depo Provera for this.      GYN & OB History  Patient's last menstrual period was 2018.   Date of Last Pap: 2016    OB History    Para Term  AB Living   2 2 2         SAB TAB Ectopic Multiple Live Births                  # Outcome Date GA Lbr Renato/2nd Weight Sex Delivery Anes PTL Lv   2 Term      CS-Unspec      1 Term      CS-Unspec             Review of Systems  Review of Systems   Constitutional: Negative for activity change, appetite change, fatigue, fever and unexpected weight change.   Respiratory: Negative for shortness of breath.    Cardiovascular: Negative for chest pain, palpitations and leg swelling.   Gastrointestinal: Negative for abdominal pain, bloating, blood in stool, constipation, diarrhea, nausea and vomiting.   Genitourinary: Positive for dysmenorrhea. Negative for dysuria, flank pain, frequency, genital sores, hematuria, menorrhagia, menstrual problem, pelvic pain, vaginal bleeding, vaginal discharge, vaginal pain, urinary incontinence and vaginal odor.   Musculoskeletal: Negative for back pain.   Neurological: Negative for syncope and headaches.   Breast: Negative for breast mass, breast pain, nipple discharge and skin changes          Objective:    Physical Exam:    Constitutional: She is oriented to person, place, and time. She appears well-developed and well-nourished. No distress.    HENT:   Head: Normocephalic and atraumatic.    Eyes: EOM are normal. Pupils are equal, round, and reactive to light.    Neck: Normal range of motion. Neck supple.    Cardiovascular: Normal rate, regular rhythm and normal heart sounds.     Pulmonary/Chest: Effort normal and breath sounds normal.        Abdominal: Soft. Bowel sounds are normal. She exhibits no distension. There is no tenderness.     Genitourinary: Vagina normal and uterus normal. Pelvic exam was performed with patient supine. There is no rash, tenderness, lesion or injury on the right labia. There is no rash, tenderness, lesion or injury on the left labia. Uterus is not deviated, not enlarged and not tender. Cervix is normal. Right adnexum displays no mass, no tenderness and no fullness. Left adnexum displays no mass, no tenderness and no fullness. No erythema, tenderness or bleeding in the vagina. No foreign body in the vagina. No signs of injury around the vagina. No vaginal discharge found. Cervix exhibits no motion tenderness, no discharge and no friability.           Musculoskeletal: Normal range of motion and moves all extremeties. She exhibits no edema or tenderness.       Neurological: She is alert and oriented to person, place, and time.    Skin: Skin is warm and dry.    Psychiatric: She has a normal mood and affect. Her behavior is normal. Thought content normal.          Assessment:        1. Well woman exam with routine gynecological exam    2. Encounter for initial prescription of injectable contraceptive             Plan:      Well woman exam with routine gynecological exam  -     Mammo Digital Screening Bilat with CAD; Future; Expected date: 05/17/2018  -     Pt was counseled on cervical/vaginal screening guidelines and recommendations.  Last pap NILM on 2016.  As per current ASCCP guidelines, next pap is due  2019.  -     Pt was advised on current breast cancer screening recommendations.  Pt desires to proceed with screening MMG alone.  -     Follow up with PCP for routine health maintenance needs.    Encounter for initial prescription of injectable contraceptive  -     medroxyPROGESTERone (DEPO-PROVERA) syringe 150 mg; Inject 1 mL (150 mg total) into the muscle every 3 (three) months.  -     Pt was counseled on contraception options, including associated risks and benefits of each.  Pt voiced understanding and desires to proceed with Depo Provera.  Medication dosing, side-effects, risks, benefits, and alternatives were discussed.  Medical history was reviewed and pt is a candidate for Depo Provera use.  Pt will need to return with next menses for first injection.  Will also need negative UPT result prior to injection.      Follow-up in about 1 year (around 5/17/2019).

## 2018-06-04 ENCOUNTER — OFFICE VISIT (OUTPATIENT)
Dept: OPHTHALMOLOGY | Facility: CLINIC | Age: 43
End: 2018-06-04
Payer: MEDICAID

## 2018-06-04 DIAGNOSIS — E11.9 DIABETES MELLITUS TYPE 2 WITHOUT RETINOPATHY: Primary | ICD-10-CM

## 2018-06-04 DIAGNOSIS — H52.13 MYOPIA, BILATERAL: ICD-10-CM

## 2018-06-04 DIAGNOSIS — H25.042 POSTERIOR SUBCAPSULAR POLAR SENILE CATARACT, LEFT: ICD-10-CM

## 2018-06-04 DIAGNOSIS — H52.4 BILATERAL PRESBYOPIA: ICD-10-CM

## 2018-06-04 PROCEDURE — 92014 COMPRE OPH EXAM EST PT 1/>: CPT | Mod: S$PBB,,, | Performed by: OPTOMETRIST

## 2018-06-04 PROCEDURE — 99999 PR PBB SHADOW E&M-EST. PATIENT-LVL II: CPT | Mod: PBBFAC,,, | Performed by: OPTOMETRIST

## 2018-06-04 PROCEDURE — 99212 OFFICE O/P EST SF 10 MIN: CPT | Mod: PBBFAC | Performed by: OPTOMETRIST

## 2018-06-04 PROCEDURE — 92015 DETERMINE REFRACTIVE STATE: CPT | Mod: ,,, | Performed by: OPTOMETRIST

## 2018-06-04 NOTE — PATIENT INSTRUCTIONS
Diabetes mellitus type 2 without retinopathy     Myopia, bilateral     Bilateral presbyopia     Posterior subcapsular polar senile cataract, left        No Background Diabetic Retinopathy     Mild cataracts OS, not surgical.     Dispense Final Rx for glasses.  RTC 1 year  Discussed above and answered questions.

## 2018-06-04 NOTE — PROGRESS NOTES
HPI     IDDM exam.  No visual complaints.  Last eye exam 03/02/2017 TRF.   Update glasses RX.    Last edited by Matilde Espinosa on 6/4/2018  2:17 PM. (History)            Assessment /Plan     For exam results, see Encounter Report.    Diabetes mellitus type 2 without retinopathy    Myopia, bilateral    Bilateral presbyopia    Posterior subcapsular polar senile cataract, left      No Background Diabetic Retinopathy    Mild cataracts OS, not surgical.    Dispense Final Rx for glasses.  RTC 1 year  Discussed above and answered questions.

## 2018-06-07 ENCOUNTER — HOSPITAL ENCOUNTER (EMERGENCY)
Facility: HOSPITAL | Age: 43
Discharge: HOME | End: 2018-06-08
Attending: EMERGENCY MEDICINE
Payer: MEDICAID

## 2018-06-07 DIAGNOSIS — R07.9 CHEST PAIN: ICD-10-CM

## 2018-06-07 LAB
ALBUMIN SERPL BCP-MCNC: 3.4 G/DL
ALP SERPL-CCNC: 126 U/L
ALT SERPL W/O P-5'-P-CCNC: 21 U/L
ANION GAP SERPL CALC-SCNC: 13 MMOL/L
AST SERPL-CCNC: 18 U/L
BASOPHILS # BLD AUTO: 0.02 K/UL
BASOPHILS NFR BLD: 0.1 %
BILIRUB SERPL-MCNC: 0.4 MG/DL
BNP SERPL-MCNC: <10 PG/ML
BUN SERPL-MCNC: 11 MG/DL
CALCIUM SERPL-MCNC: 9.2 MG/DL
CHLORIDE SERPL-SCNC: 105 MMOL/L
CO2 SERPL-SCNC: 20 MMOL/L
CREAT SERPL-MCNC: 1 MG/DL
D DIMER PPP IA.FEU-MCNC: 0.45 MG/L FEU
DIFFERENTIAL METHOD: ABNORMAL
EOSINOPHIL # BLD AUTO: 0.1 K/UL
EOSINOPHIL NFR BLD: 0.6 %
ERYTHROCYTE [DISTWIDTH] IN BLOOD BY AUTOMATED COUNT: 12.5 %
EST. GFR  (AFRICAN AMERICAN): >60 ML/MIN/1.73 M^2
EST. GFR  (NON AFRICAN AMERICAN): >60 ML/MIN/1.73 M^2
GLUCOSE SERPL-MCNC: 266 MG/DL
HCT VFR BLD AUTO: 41.9 %
HGB BLD-MCNC: 14.5 G/DL
LYMPHOCYTES # BLD AUTO: 4.5 K/UL
LYMPHOCYTES NFR BLD: 29.8 %
MCH RBC QN AUTO: 31.7 PG
MCHC RBC AUTO-ENTMCNC: 34.6 G/DL
MCV RBC AUTO: 92 FL
MONOCYTES # BLD AUTO: 0.9 K/UL
MONOCYTES NFR BLD: 5.9 %
NEUTROPHILS # BLD AUTO: 9.6 K/UL
NEUTROPHILS NFR BLD: 63.6 %
PLATELET # BLD AUTO: 283 K/UL
PMV BLD AUTO: 9.8 FL
POTASSIUM SERPL-SCNC: 3.6 MMOL/L
PROT SERPL-MCNC: 7.1 G/DL
RBC # BLD AUTO: 4.58 M/UL
SODIUM SERPL-SCNC: 138 MMOL/L
TROPONIN I SERPL DL<=0.01 NG/ML-MCNC: <0.006 NG/ML
WBC # BLD AUTO: 15.12 K/UL

## 2018-06-07 PROCEDURE — 85379 FIBRIN DEGRADATION QUANT: CPT

## 2018-06-07 PROCEDURE — 93005 ELECTROCARDIOGRAM TRACING: CPT

## 2018-06-07 PROCEDURE — 93010 ELECTROCARDIOGRAM REPORT: CPT | Mod: ,,, | Performed by: INTERNAL MEDICINE

## 2018-06-07 PROCEDURE — 85025 COMPLETE CBC W/AUTO DIFF WBC: CPT

## 2018-06-07 PROCEDURE — 83880 ASSAY OF NATRIURETIC PEPTIDE: CPT

## 2018-06-07 PROCEDURE — 84484 ASSAY OF TROPONIN QUANT: CPT

## 2018-06-07 PROCEDURE — 99285 EMERGENCY DEPT VISIT HI MDM: CPT | Mod: 25

## 2018-06-07 PROCEDURE — 80053 COMPREHEN METABOLIC PANEL: CPT

## 2018-06-08 VITALS
OXYGEN SATURATION: 97 % | HEIGHT: 70 IN | DIASTOLIC BLOOD PRESSURE: 69 MMHG | RESPIRATION RATE: 19 BRPM | TEMPERATURE: 99 F | SYSTOLIC BLOOD PRESSURE: 153 MMHG | HEART RATE: 84 BPM

## 2018-06-08 LAB — TROPONIN I SERPL DL<=0.01 NG/ML-MCNC: <0.006 NG/ML

## 2018-06-08 PROCEDURE — 84484 ASSAY OF TROPONIN QUANT: CPT
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
## 2018-06-13 ENCOUNTER — TELEPHONE (OUTPATIENT)
Dept: OBSTETRICS AND GYNECOLOGY | Facility: CLINIC | Age: 43
End: 2018-06-13

## 2018-06-13 NOTE — TELEPHONE ENCOUNTER
Spoke with pt. Assisted pt with scheduling her depo shot. Pt stated Dr. Peralta told her to come in for her shot once she started her cycle.

## 2018-06-13 NOTE — TELEPHONE ENCOUNTER
----- Message from Jessica Mascorro sent at 6/13/2018 12:42 PM CDT -----  Pt at [phone]//states she is calling to ask if possible to come in today for a depo shot//please call//thanks/kelli

## 2018-06-13 NOTE — TELEPHONE ENCOUNTER
----- Message from Yanet Newton sent at 6/13/2018  1:32 PM CDT -----  Contact: pt  Call pt at [phone]  Pt was returning a missed call

## 2018-06-14 ENCOUNTER — CLINICAL SUPPORT (OUTPATIENT)
Dept: OBSTETRICS AND GYNECOLOGY | Facility: CLINIC | Age: 43
End: 2018-06-14
Payer: MEDICAID

## 2018-06-14 DIAGNOSIS — Z30.42 ENCOUNTER FOR DEPO-PROVERA CONTRACEPTION: Primary | ICD-10-CM

## 2018-06-14 PROCEDURE — 96372 THER/PROPH/DIAG INJ SC/IM: CPT | Mod: PBBFAC

## 2018-06-14 PROCEDURE — 99999 PR PBB SHADOW E&M-EST. PATIENT-LVL III: CPT | Mod: PBBFAC,,,

## 2018-06-14 PROCEDURE — 99213 OFFICE O/P EST LOW 20 MIN: CPT | Mod: PBBFAC,25

## 2018-06-14 RX ADMIN — MEDROXYPROGESTERONE ACETATE 150 MG: 150 INJECTION, SUSPENSION, EXTENDED RELEASE INTRAMUSCULAR at 02:06

## 2018-06-15 NOTE — PROGRESS NOTES
Verified pt by two identifiers: name and date of birth.Allergies and medications reviewed.     Depo Provera 150 mg/ml Given IM to right ventrogluteal using aseptic technique. No discomfort noted, pt tolerated well. Advised to wait 15 minutes in clinic to monitor. Next injection is 08/30/18 8:30 AM.Pt verbalized understanding.

## 2018-06-20 ENCOUNTER — TELEPHONE (OUTPATIENT)
Dept: FAMILY MEDICINE | Facility: CLINIC | Age: 43
End: 2018-06-20

## 2018-06-20 NOTE — TELEPHONE ENCOUNTER
[facility] note received from [facility], Dr. Landeros  DX of left iliac artery stenosis, pt is scheduled for angiogram with stent placement

## 2018-06-26 ENCOUNTER — HOSPITAL ENCOUNTER (OUTPATIENT)
Facility: HOSPITAL | Age: 43
Discharge: HOME | End: 2018-06-27
Attending: SURGERY | Admitting: SURGERY
Payer: MEDICAID

## 2018-06-26 DIAGNOSIS — Z30.013 ENCOUNTER FOR INITIAL PRESCRIPTION OF INJECTABLE CONTRACEPTIVE: ICD-10-CM

## 2018-06-26 DIAGNOSIS — Q87.40 MARFAN'S SYNDROME: Primary | ICD-10-CM

## 2018-06-26 DIAGNOSIS — I73.9 PVD (PERIPHERAL VASCULAR DISEASE): ICD-10-CM

## 2018-06-26 DIAGNOSIS — I73.9 PERIPHERAL VASCULAR DISEASE: ICD-10-CM

## 2018-06-26 DIAGNOSIS — M79.606 PAIN OF LOWER EXTREMITY, UNSPECIFIED LATERALITY: ICD-10-CM

## 2018-06-26 LAB
ANION GAP SERPL CALC-SCNC: 8 MMOL/L
B-HCG UR QL: NEGATIVE
BUN SERPL-MCNC: 8 MG/DL
CALCIUM SERPL-MCNC: 9.3 MG/DL
CHLORIDE SERPL-SCNC: 106 MMOL/L
CO2 SERPL-SCNC: 22 MMOL/L
CREAT SERPL-MCNC: 0.7 MG/DL
EST. GFR  (AFRICAN AMERICAN): >60 ML/MIN/1.73 M^2
EST. GFR  (NON AFRICAN AMERICAN): >60 ML/MIN/1.73 M^2
GLUCOSE SERPL-MCNC: 157 MG/DL
PERIPHERAL PTA: YES
PERIPHERAL STENT: YES
POC ACTIVATED CLOTTING TIME K: 158 SEC (ref 74–137)
POTASSIUM SERPL-SCNC: 3.8 MMOL/L
SAMPLE: ABNORMAL
SODIUM SERPL-SCNC: 136 MMOL/L

## 2018-06-26 PROCEDURE — 85025 COMPLETE CBC W/AUTO DIFF WBC: CPT

## 2018-06-26 PROCEDURE — 25500020 PHARM REV CODE 255

## 2018-06-26 PROCEDURE — 25000003 PHARM REV CODE 250: Performed by: SURGERY

## 2018-06-26 PROCEDURE — 83036 HEMOGLOBIN GLYCOSYLATED A1C: CPT

## 2018-06-26 PROCEDURE — 63600175 PHARM REV CODE 636 W HCPCS

## 2018-06-26 PROCEDURE — 25000003 PHARM REV CODE 250

## 2018-06-26 PROCEDURE — S0028 INJECTION, FAMOTIDINE, 20 MG: HCPCS

## 2018-06-26 PROCEDURE — 80048 BASIC METABOLIC PNL TOTAL CA: CPT

## 2018-06-26 PROCEDURE — G0378 HOSPITAL OBSERVATION PER HR: HCPCS

## 2018-06-26 PROCEDURE — 36415 COLL VENOUS BLD VENIPUNCTURE: CPT

## 2018-06-26 PROCEDURE — C1769 GUIDE WIRE: HCPCS

## 2018-06-26 PROCEDURE — 81025 URINE PREGNANCY TEST: CPT

## 2018-06-26 RX ORDER — ONDANSETRON 2 MG/ML
4 INJECTION INTRAMUSCULAR; INTRAVENOUS EVERY 12 HOURS PRN
Status: DISCONTINUED | OUTPATIENT
Start: 2018-06-26 | End: 2018-06-27 | Stop reason: HOSPADM

## 2018-06-26 RX ORDER — GABAPENTIN 400 MG/1
400 CAPSULE ORAL 3 TIMES DAILY
Status: DISCONTINUED | OUTPATIENT
Start: 2018-06-26 | End: 2018-06-27 | Stop reason: HOSPADM

## 2018-06-26 RX ORDER — METHYLPREDNISOLONE SOD SUCC 125 MG
125 VIAL (EA) INJECTION ONCE
Status: COMPLETED | OUTPATIENT
Start: 2018-06-26 | End: 2018-06-26

## 2018-06-26 RX ORDER — LISINOPRIL 10 MG/1
10 TABLET ORAL DAILY
Status: DISCONTINUED | OUTPATIENT
Start: 2018-06-27 | End: 2018-06-27 | Stop reason: HOSPADM

## 2018-06-26 RX ORDER — CLOPIDOGREL BISULFATE 75 MG/1
75 TABLET ORAL DAILY
Status: DISCONTINUED | OUTPATIENT
Start: 2018-06-27 | End: 2018-06-27 | Stop reason: HOSPADM

## 2018-06-26 RX ORDER — TIZANIDINE 4 MG/1
4 TABLET ORAL NIGHTLY
Status: DISCONTINUED | OUTPATIENT
Start: 2018-06-26 | End: 2018-06-27 | Stop reason: HOSPADM

## 2018-06-26 RX ORDER — GLIPIZIDE 5 MG/1
10 TABLET ORAL 2 TIMES DAILY
Status: DISCONTINUED | OUTPATIENT
Start: 2018-06-26 | End: 2018-06-27 | Stop reason: HOSPADM

## 2018-06-26 RX ORDER — ASPIRIN 81 MG/1
81 TABLET ORAL DAILY
Status: DISCONTINUED | OUTPATIENT
Start: 2018-06-27 | End: 2018-06-27 | Stop reason: HOSPADM

## 2018-06-26 RX ORDER — HYDROMORPHONE HYDROCHLORIDE 1 MG/ML
0.5 INJECTION, SOLUTION INTRAMUSCULAR; INTRAVENOUS; SUBCUTANEOUS
Status: DISCONTINUED | OUTPATIENT
Start: 2018-06-26 | End: 2018-06-27 | Stop reason: HOSPADM

## 2018-06-26 RX ORDER — FLUOXETINE HYDROCHLORIDE 20 MG/1
40 CAPSULE ORAL DAILY
Status: DISCONTINUED | OUTPATIENT
Start: 2018-06-27 | End: 2018-06-27 | Stop reason: HOSPADM

## 2018-06-26 RX ORDER — DIPHENHYDRAMINE HYDROCHLORIDE 50 MG/ML
50 INJECTION INTRAMUSCULAR; INTRAVENOUS ONCE
Status: COMPLETED | OUTPATIENT
Start: 2018-06-26 | End: 2018-06-26

## 2018-06-26 RX ORDER — FAMOTIDINE 20 MG/1
20 TABLET, FILM COATED ORAL 2 TIMES DAILY
Status: DISCONTINUED | OUTPATIENT
Start: 2018-06-26 | End: 2018-06-27 | Stop reason: HOSPADM

## 2018-06-26 RX ORDER — DICYCLOMINE HYDROCHLORIDE 20 MG/1
20 TABLET ORAL 4 TIMES DAILY
Status: DISCONTINUED | OUTPATIENT
Start: 2018-06-26 | End: 2018-06-27 | Stop reason: HOSPADM

## 2018-06-26 RX ORDER — SODIUM CHLORIDE, SODIUM LACTATE, POTASSIUM CHLORIDE, CALCIUM CHLORIDE 600; 310; 30; 20 MG/100ML; MG/100ML; MG/100ML; MG/100ML
INJECTION, SOLUTION INTRAVENOUS CONTINUOUS
Status: CANCELLED | OUTPATIENT
Start: 2018-06-26

## 2018-06-26 RX ORDER — ATORVASTATIN CALCIUM 40 MG/1
40 TABLET, FILM COATED ORAL DAILY
Status: DISCONTINUED | OUTPATIENT
Start: 2018-06-27 | End: 2018-06-27 | Stop reason: HOSPADM

## 2018-06-26 RX ORDER — ATENOLOL 50 MG/1
50 TABLET ORAL DAILY
Status: DISCONTINUED | OUTPATIENT
Start: 2018-06-27 | End: 2018-06-27 | Stop reason: HOSPADM

## 2018-06-26 RX ORDER — OXYCODONE HYDROCHLORIDE 5 MG/1
5 TABLET ORAL EVERY 4 HOURS PRN
Status: DISCONTINUED | OUTPATIENT
Start: 2018-06-26 | End: 2018-06-27 | Stop reason: HOSPADM

## 2018-06-26 RX ORDER — FAMOTIDINE 10 MG/ML
20 INJECTION INTRAVENOUS 2 TIMES DAILY
Status: DISCONTINUED | OUTPATIENT
Start: 2018-06-26 | End: 2018-06-26

## 2018-06-26 RX ORDER — SODIUM CHLORIDE 9 MG/ML
INJECTION, SOLUTION INTRAVENOUS CONTINUOUS
Status: ACTIVE | OUTPATIENT
Start: 2018-06-26 | End: 2018-06-27

## 2018-06-26 RX ADMIN — FAMOTIDINE 20 MG: 10 INJECTION INTRAVENOUS at 01:06

## 2018-06-26 RX ADMIN — OXYCODONE HYDROCHLORIDE 5 MG: 5 TABLET ORAL at 09:06

## 2018-06-26 RX ADMIN — DICYCLOMINE HYDROCHLORIDE 20 MG: 20 TABLET ORAL at 09:06

## 2018-06-26 RX ADMIN — HYDROMORPHONE HYDROCHLORIDE 0.5 MG: 1 INJECTION, SOLUTION INTRAMUSCULAR; INTRAVENOUS; SUBCUTANEOUS at 05:06

## 2018-06-26 RX ADMIN — Medication 125 MG: at 01:06

## 2018-06-26 RX ADMIN — FAMOTIDINE 20 MG: 20 TABLET ORAL at 09:06

## 2018-06-26 RX ADMIN — GLIPIZIDE 10 MG: 5 TABLET ORAL at 09:06

## 2018-06-26 RX ADMIN — TIZANIDINE 4 MG: 4 TABLET ORAL at 09:06

## 2018-06-26 RX ADMIN — DIPHENHYDRAMINE HYDROCHLORIDE 50 MG: 50 INJECTION INTRAMUSCULAR; INTRAVENOUS at 01:06

## 2018-06-26 RX ADMIN — GABAPENTIN 400 MG: 400 CAPSULE ORAL at 09:06

## 2018-06-26 RX ADMIN — SODIUM CHLORIDE: 0.9 INJECTION, SOLUTION INTRAVENOUS at 09:06

## 2018-06-26 NOTE — H&P
"[facility] -   Vascular Surgery  History & Physical    Patient Name: Jenifer Westfall  MRN: [id number]  Admission Date: (Not on file)  Attending Physician: Roscoe Landeros MD  Primary Care Provider: LIZABETH HUNTLEY MD    Subjective:     Chief Complaint/Reason for Admission: L leg pain    History of Present Illness: Marfan's syndrome, R subclavian artery stenosis, MVP,DM, HTN on plavix    No prescriptions prior to admission.       Review of patient's allergies indicates:   Allergen Reactions    Compazine [prochlorperazine edisylate] Rash    Contrast media Anaphylaxis    Dye Anaphylaxis    Levaquin [levofloxacin] Hives and Itching    Metformin Other (See Comments)     Upset stomach    Ibuprofen Other (See Comments)     Stomach pain    Morphine Other (See Comments)     Irritable       Past Medical History:   Diagnosis Date    Arthritis     Diabetes mellitus      2015 Insulin x 2 years     DM (diabetes mellitus)      2017 Insulin x 3 years 2013    DM (diabetes mellitus)      am  Insulin x 4 years    Hepatitis C     Hyperlipidemia     Hypertension     IBS (irritable bowel syndrome)     Kidney stone     Marfan syndrome     Mitral valve prolapse      Past Surgical History:   Procedure Laterality Date    APPENDECTOMY      BUNIONECTOMY      both feet     SECTION      x 2    CHOLECYSTECTOMY      TUBAL LIGATION       Family History     Problem Relation (Age of Onset)    Heart disease Mother, Maternal Aunt, Maternal Uncle    Hypertension Father    Stroke Maternal Aunt, Maternal Uncle    Thrombophilia Father        Social History Main Topics    Smoking status: Former Smoker     Packs/day: 0.20     Years: 20.00     Types: Cigarettes     Quit date: 2015    Smokeless tobacco: Never Used      Comment: "once in a blue moon"    Alcohol use No    Drug use: No    Sexual activity: Yes     Partners: Male     Birth control/ " protection: Injection     Review of Systems   Constitutional: Negative.    HENT: Negative for congestion, dental problem, sinus pain and sinus pressure.    Eyes: Negative for pain, discharge and visual disturbance.   Respiratory: Negative for apnea, cough, chest tightness, shortness of breath and wheezing.    Cardiovascular: Negative for chest pain and palpitations.   Gastrointestinal: Negative for abdominal distention, abdominal pain, constipation and nausea.   Endocrine: Negative for polydipsia.   Genitourinary: Negative for dysuria, hematuria and urgency.   Musculoskeletal: Negative for back pain and myalgias.   Skin: Negative for rash and wound.   Neurological: Negative for dizziness, speech difficulty, numbness and headaches.   Hematological: Negative.    Psychiatric/Behavioral: Negative for agitation, confusion and hallucinations.     Objective:     Vital Signs (Most Recent):    Vital Signs (24h Range):  BP: ()/()   Arterial Line BP: ()/()         There is no height or weight on file to calculate BMI.    Physical Exam   Constitutional: She is oriented to person, place, and time. She appears well-developed and well-nourished.   HENT:   Head: Normocephalic.   Eyes: Pupils are equal, round, and reactive to light.   Neck: Normal range of motion. Neck supple.   Cardiovascular: Regular rhythm, normal heart sounds and intact distal pulses.    Pulmonary/Chest: Effort normal and breath sounds normal.   Abdominal: Soft. Bowel sounds are normal.   Musculoskeletal: Normal range of motion.   Neurological: She is alert and oriented to person, place, and time.   Skin: Skin is warm and dry.   Psychiatric: She has a normal mood and affect.       Significant Labs:  All pertinent labs from the last 24 hours have been reviewed.    Significant Diagnostics:  I have reviewed all pertinent imaging results/findings within the past 24 hours.    Assessment/Plan:     There are no hospital problems to display for this patient.      Roscoe  MEGAN Landeros MD  Vascular Surgery  [facility] [place]

## 2018-06-26 NOTE — NURSING TRANSFER
Nursing Transfer Note      6/26/2018     Transfer To: 224    Transfer via stretcher    Transfer with cardiac monitoring    Transported by valerie foster rn    Medicines sent: none    Chart send with patient: Yes    Notified: mother    Patient reassessed at: TRANSFERRED TO ROOM. TRANSFERRED TO BED.  TELEMETRY MONITER ON.  IV FLUIDS ON PUMP AT PRESCRIBED RATE.  PROCEDURAL ACCESS SITE WITHOUT BLEEDING OR HEMATOMA.  DRESSING DRY AND INTACT.  CARE HANDED OFF TO bibiana 6/26/18 2603.......... (date, time)    Upon arrival to floor: cardiac monitor applied, patient oriented to room, call bell in reach and bed in lowest position

## 2018-06-26 NOTE — BRIEF OP NOTE
[facility] -   Brief Operative Note    SUMMARY     Surgery Date: 6/26/2018     Surgeon(s) and Role:     * Roscoe Landeros MD - Primary    Assisting Surgeon: None    Pre-op Diagnosis:  Claudication [I73.9]    Post-op Diagnosis:  Post-Op Diagnosis Codes:     * Claudication [I73.9]    Procedure(s) (LRB):  ANGIOGRAM, LOWER EXTREMITY- LEFT LEG (N/A)    Anesthesia: RN IV Sedation    Description of Procedure: aortic arch and aortogram with runoff and right 9mm X 39 mm icast and 9mm X 39 mm and 9mm X 59 mm and 8 mm X 18 mm balloon expandable stent    Description of the findings of the procedure: occluded left common iliac artery and right subclavian artery     Estimated Blood Loss: * No values recorded between 6/26/2018 12:00 AM and 6/26/2018  3:52 PM *         Specimens:   Specimen (12h ago through future)    None

## 2018-06-27 VITALS
RESPIRATION RATE: 18 BRPM | HEIGHT: 70 IN | BODY MASS INDEX: 31.43 KG/M2 | TEMPERATURE: 98 F | SYSTOLIC BLOOD PRESSURE: 117 MMHG | HEART RATE: 80 BPM | WEIGHT: 219.56 LBS | DIASTOLIC BLOOD PRESSURE: 58 MMHG | OXYGEN SATURATION: 96 %

## 2018-06-27 PROBLEM — I73.9 CLAUDICATION IN PERIPHERAL VASCULAR DISEASE: Status: ACTIVE | Noted: 2018-06-27

## 2018-06-27 PROBLEM — I77.1 ILIAC ARTERY STENOSIS, LEFT: Status: ACTIVE | Noted: 2018-06-27

## 2018-06-27 LAB
BASOPHILS # BLD AUTO: 0 K/UL
BASOPHILS NFR BLD: 0 %
DIFFERENTIAL METHOD: ABNORMAL
EOSINOPHIL # BLD AUTO: 0 K/UL
EOSINOPHIL NFR BLD: 0 %
ERYTHROCYTE [DISTWIDTH] IN BLOOD BY AUTOMATED COUNT: 12.1 %
ESTIMATED AVG GLUCOSE: 266 MG/DL
HBA1C MFR BLD HPLC: 10.9 %
HCT VFR BLD AUTO: 39.5 %
HGB BLD-MCNC: 13.7 G/DL
LYMPHOCYTES # BLD AUTO: 0.9 K/UL
LYMPHOCYTES NFR BLD: 6.2 %
MCH RBC QN AUTO: 31.1 PG
MCHC RBC AUTO-ENTMCNC: 34.7 G/DL
MCV RBC AUTO: 90 FL
MONOCYTES # BLD AUTO: 0.2 K/UL
MONOCYTES NFR BLD: 1.2 %
NEUTROPHILS # BLD AUTO: 13.5 K/UL
NEUTROPHILS NFR BLD: 92.6 %
PLATELET # BLD AUTO: 254 K/UL
PMV BLD AUTO: 9.7 FL
POCT GLUCOSE: 314 MG/DL (ref 70–110)
POCT GLUCOSE: 322 MG/DL (ref 70–110)
RBC # BLD AUTO: 4.41 M/UL
WBC # BLD AUTO: 14.61 K/UL

## 2018-06-27 PROCEDURE — G0378 HOSPITAL OBSERVATION PER HR: HCPCS

## 2018-06-27 PROCEDURE — 63600175 PHARM REV CODE 636 W HCPCS: Performed by: NURSE PRACTITIONER

## 2018-06-27 PROCEDURE — 94761 N-INVAS EAR/PLS OXIMETRY MLT: CPT

## 2018-06-27 PROCEDURE — 36415 COLL VENOUS BLD VENIPUNCTURE: CPT

## 2018-06-27 PROCEDURE — 25000003 PHARM REV CODE 250: Performed by: SURGERY

## 2018-06-27 RX ORDER — GLUCAGON 1 MG
1 KIT INJECTION
Status: DISCONTINUED | OUTPATIENT
Start: 2018-06-27 | End: 2018-06-27 | Stop reason: HOSPADM

## 2018-06-27 RX ORDER — INSULIN ASPART 100 [IU]/ML
0-5 INJECTION, SOLUTION INTRAVENOUS; SUBCUTANEOUS
Status: DISCONTINUED | OUTPATIENT
Start: 2018-06-27 | End: 2018-06-27 | Stop reason: HOSPADM

## 2018-06-27 RX ORDER — IBUPROFEN 200 MG
16 TABLET ORAL
Status: DISCONTINUED | OUTPATIENT
Start: 2018-06-27 | End: 2018-06-27 | Stop reason: HOSPADM

## 2018-06-27 RX ORDER — IBUPROFEN 200 MG
24 TABLET ORAL
Status: DISCONTINUED | OUTPATIENT
Start: 2018-06-27 | End: 2018-06-27 | Stop reason: HOSPADM

## 2018-06-27 RX ADMIN — INSULIN ASPART 4 UNITS: 100 INJECTION, SOLUTION INTRAVENOUS; SUBCUTANEOUS at 06:06

## 2018-06-27 RX ADMIN — ASPIRIN 81 MG: 81 TABLET, COATED ORAL at 09:06

## 2018-06-27 RX ADMIN — CLOPIDOGREL BISULFATE 75 MG: 75 TABLET ORAL at 09:06

## 2018-06-27 RX ADMIN — FLUOXETINE 40 MG: 20 CAPSULE ORAL at 09:06

## 2018-06-27 RX ADMIN — GABAPENTIN 400 MG: 400 CAPSULE ORAL at 09:06

## 2018-06-27 RX ADMIN — LISINOPRIL 10 MG: 10 TABLET ORAL at 09:06

## 2018-06-27 RX ADMIN — DICYCLOMINE HYDROCHLORIDE 20 MG: 20 TABLET ORAL at 09:06

## 2018-06-27 RX ADMIN — FAMOTIDINE 20 MG: 20 TABLET ORAL at 09:06

## 2018-06-27 RX ADMIN — INSULIN ASPART 4 UNITS: 100 INJECTION, SOLUTION INTRAVENOUS; SUBCUTANEOUS at 10:06

## 2018-06-27 RX ADMIN — ATENOLOL 50 MG: 50 TABLET ORAL at 09:06

## 2018-06-27 RX ADMIN — ATORVASTATIN CALCIUM 40 MG: 40 TABLET, FILM COATED ORAL at 09:06

## 2018-06-27 RX ADMIN — OXYCODONE HYDROCHLORIDE 5 MG: 5 TABLET ORAL at 10:06

## 2018-06-27 RX ADMIN — GLIPIZIDE 10 MG: 5 TABLET ORAL at 09:06

## 2018-06-27 RX ADMIN — OXYCODONE HYDROCHLORIDE 5 MG: 5 TABLET ORAL at 01:06

## 2018-06-27 NOTE — OP NOTE
DATE OF PROCEDURE:  6/26/2018    PREOPERATIVE DIAGNOSES:  Disabling left leg claudication.    POSTOPERATIVE DIAGNOSIS:  Disabling left leg claudication.    OPERATIVE PROCEDURE PERFORMED:  Aortic arch and flush abdominal aortogram with   runoff, right common iliac 9 mm x 39 mm iCAST stent, left common iliac 9 mm x 39   mm iCAST stent and also left common iliac 9 mm x 59 mm iCAST stent and left   common iliac 8 mm x 18 mm balloon expandable stent.    SURGEON:  Roscoe Landeros M.D.    ANESTHESIA:  Local with sedation.    HISTORY:  This is a 42-year-old patient with Marfan syndrome who presented with   disabling left leg claudication.  She also appeared to have occlusion or   stenosis of the right subclavian artery that was asymptomatic with the right   arm.  She therefore was prepared for an aortic arch and aortogram with runoff   and possible intervention.    DESCRIPTION OF PROCEDURE:  With the patient in supine position, placed under   satisfactory IV sedation, the groins were prepped and draped in sterile fashion,   anesthetized the area, the right groin with Xylocaine, placed a micropuncture   needle into the right femoral artery through which a wire was placed, placed a   dilator wire, placed a Glidewire through the dilator, replaced it with a   5-Cambodian sheath and placed a pigtail catheter over the Glidewire, advanced it up   into the proximal aortic arch and a flush aortogram and did aortic arch   angiogram.  This revealed a patent innominate artery and right common carotid   artery, but the right subclavian artery was occluded near its origin.  The left   common carotid and left subclavian arteries were patent.  I did not intervene   since this patient is asymptomatic in the right arm.  There was reconstitution   of the subclavian artery distally past the occlusion.  I then pulled the pigtail   catheter down to the abdominal aorta and flush aortogram.  This revealed a   widely patent distal aorta.  The right  common iliac artery is patent.  The left   common iliac was occluded about 1 cm from its origin.  There appeared to be   reconstitution of the iliac bifurcation on the left side.  I then pulled the   pigtail catheter down to the aortic bifurcation, the aortogram with runoff in   usual fashion.  This revealed again occluded left common iliac artery.  The   right iliac arteries were patent.  Both common femoral arteries SFA and   popliteal arteries are patent with tibial runoff.  It appeared to be normal.  I   felt that we had to try to treat the left side, so I did using ultrasound   guidance, 1% Xylocaine, placed a needle into the left common femoral artery   through which a wire was placed, placed dilator wire, placed a Glidewire through   the dilator and placed a 7-Vietnamese sheath.  Then, using a Quick-Cross catheter   with manipulation, advanced a stiff Glidewire through the occlusion into the   aorta, I advanced the Quick-Cross catheter, injected contrast, appeared to be in   good position.  We then gave the patient 8000 units of heparin intravenously.    I then balloon dilated the length of this left common iliac artery with a 6 mm x   8 cm balloon.  This required 2 separate balloon dilatations to 10 atmospheres   of pressure.  After removal of the catheter there still appeared to occlusion,   so I chose a 9 mm x 59 mm iCAST stent and deployed it from the origin of the   common iliac artery on the left side down distally.  We then injected contrast   distally.  This still revealed some occlusion down, still little bit longer had   to go to the iliac bifurcation, so I chose an 8 mm x 18 mm balloon expandable   stent and deployed it down to the left iliac bifurcation with some overlapping   of the iCAST stent.  A pigtail catheter was on the right side so did again flush   aortogram.  This revealed patent iCAST stent and iliac stent, but there   appeared to be some disease proximally the origin of the iCAST stent in  the   common iliac artery, which was appropriately placed, so I measured great   arterial gradient and appeared to be about 30 mm gradient across the top part of   the stent, so I felt the only other option I had was place kissing iliac stents   so I replaced the right 5-Bolivian sheath for a 7-Bolivian sheath, gave the patient   an additional 2000 units of heparin.  I then placed a stiff wire up the pigtail   catheter and then deployed kissing 9 mm x 39 mm iCAST stent that began in the   distal aorta down distally.  There was good overlapping on the previous iCAST   stent, we had inflated both to 12 atmospheres of pressure.  We then replaced the   pigtail catheter did a flush aortogram.  This revealed a widely patent iliac   stent with good flow.  There also we measured gradient, there was no gradient   whatsoever, satisfied with the result.  Since this is a difficult case, I felt   we should remove the sheath manually and she was sent back to the cardiac cath   unit in satisfactory condition.      PATTIE  dd: 06/26/2018 16:49:04 (CDT)  td: 06/26/2018 21:57:36 (CDT)  Doc ID   #[id number]  Job ID #[id number]    CC:

## 2018-06-27 NOTE — NURSING
Patient assessed for diabetes educational needs following chart review  She reports was diagnosed over 5 years ago and has attended diabetes education class in the past  She has a home glucose monitor and checks her glucose 2-3 times daily with averages 100-150  She is consistent with administering her insulin using the insulin pen    She is prescribed Lantus and Apidra at home  She does not identify any diabetes educational needs at this time

## 2018-06-27 NOTE — PLAN OF CARE
Problem: Patient Care Overview  Goal: Plan of Care Review  Outcome: Ongoing (interventions implemented as appropriate)  POC reviewed with pt, verbalized understanding. S/p aortogram with peripheral stents placed bilaterally; bilateral groin sites c/d/i and soft on palpation no swelling noted. Pedal pulses checked frequently. SR on cardiac monitor. C/o persistent chronic back pain, prn oxycodone given with full relief. Ambulates in room/to restroom independently. Blood glucose monitored/managed. Remains free from injury or falls this shift. Instructed to call for assistance.

## 2018-06-27 NOTE — NURSING
Pt admitted to Room 225 from [facility] post Aortagram with 2 stents to the left common iliac and 1 stent to the right common iliac. VSS. Tele monitor in place. Gauze to bilateral groins without drainage, pain or swelling. Bed rest up at 10 pm.

## 2018-06-28 NOTE — DISCHARGE SUMMARY
DATE OF DISCHARGE:  06/27/2018    HISTORY OF PRESENT ILLNESS:  This s a 42-year-old female with Marfan's syndrome   who presented with disabling left leg claudication.  She also had evidence of a   right subclavian artery stenosis by noninvasive studies.  She underwent an   angiogram with a finding of a left common iliac artery occlusion.  The stenosis   also involved the distal aorta, so I had to place kissing stents in the common   iliac artery and also the left common iliac artery.  I did reopen the artery.    In addition, I did do an aortic arch of shoulder, right subclavian artery   occlusion; however, since she is asymptomatic with the right arm, I did not feel   like it needed intervention.  The procedure went well.  She is doing fine at   this time.  She is able to ambulate.  She is on a regular diet.  She has   excellent pedal pulses.  She therefore will be discharged and will follow in my   office in 1 week.  I have no restrictions.  She may return to normal activity.      PATTIE  dd: 06/27/2018 12:43:46 (CDT)  td: 06/28/2018 04:56:39 (CDT)  Doc ID   #[id number]  Job ID #[id number]    CC:

## 2018-08-08 RX ORDER — FLUOXETINE HYDROCHLORIDE 40 MG/1
CAPSULE ORAL
Qty: 90 CAPSULE | Refills: 0 | Status: SHIPPED | OUTPATIENT
Start: 2018-08-08 | End: 2018-11-12 | Stop reason: SDUPTHER

## 2018-08-08 RX ORDER — SYRINGE-NEEDLE,INSULIN,0.5 ML 30 GX5/16"
SYRINGE, EMPTY DISPOSABLE MISCELLANEOUS
Qty: 100 EACH | Refills: 3 | Status: SHIPPED | OUTPATIENT
Start: 2018-08-08

## 2018-08-10 DIAGNOSIS — E11.9 TYPE 2 DIABETES MELLITUS WITHOUT COMPLICATION, WITH LONG-TERM CURRENT USE OF INSULIN: ICD-10-CM

## 2018-08-10 DIAGNOSIS — Z79.4 TYPE 2 DIABETES MELLITUS WITHOUT COMPLICATION, WITH LONG-TERM CURRENT USE OF INSULIN: ICD-10-CM

## 2018-08-10 RX ORDER — GLIPIZIDE 10 MG/1
10 TABLET ORAL 2 TIMES DAILY
Qty: 60 TABLET | Refills: 0 | Status: SHIPPED | OUTPATIENT
Start: 2018-08-10

## 2018-08-10 RX ORDER — ATORVASTATIN CALCIUM 40 MG/1
40 TABLET, FILM COATED ORAL DAILY
Qty: 30 TABLET | Refills: 0 | Status: SHIPPED | OUTPATIENT
Start: 2018-08-10 | End: 2018-11-28 | Stop reason: SDUPTHER

## 2018-08-10 NOTE — TELEPHONE ENCOUNTER
1 month supply of medications sent to pharmacy.  She needs a DM appointment with  before any more refills will be granted.  DM is uncontrolled.  Please bring readings to appt

## 2018-08-13 ENCOUNTER — PATIENT MESSAGE (OUTPATIENT)
Dept: FAMILY MEDICINE | Facility: CLINIC | Age: 43
End: 2018-08-13

## 2018-08-30 ENCOUNTER — CLINICAL SUPPORT (OUTPATIENT)
Dept: OBSTETRICS AND GYNECOLOGY | Facility: CLINIC | Age: 43
End: 2018-08-30
Payer: MEDICAID

## 2018-08-30 PROCEDURE — 96372 THER/PROPH/DIAG INJ SC/IM: CPT | Mod: PBBFAC

## 2018-08-30 RX ADMIN — MEDROXYPROGESTERONE ACETATE 150 MG: 150 INJECTION, SUSPENSION, EXTENDED RELEASE INTRAMUSCULAR at 01:08

## 2018-08-30 NOTE — PROGRESS NOTES
Used two patient identifiers. Verified allergies. Depo Provera 150mg IM injection given to right ventrogluteal; patient tolerated well. Asked patient to remain in clinic for 15 minutes to be monitored for adverse reaction. Patient verbalized understanding.

## 2018-09-07 ENCOUNTER — PATIENT OUTREACH (OUTPATIENT)
Dept: ADMINISTRATIVE | Facility: HOSPITAL | Age: 43
End: 2018-09-07

## 2018-09-07 DIAGNOSIS — E11.9 TYPE 2 DIABETES MELLITUS WITHOUT COMPLICATION, WITH LONG-TERM CURRENT USE OF INSULIN: ICD-10-CM

## 2018-09-07 DIAGNOSIS — M75.51 CHRONIC BURSITIS OF RIGHT SHOULDER: Chronic | ICD-10-CM

## 2018-09-07 DIAGNOSIS — Z79.4 TYPE 2 DIABETES MELLITUS WITHOUT COMPLICATION, WITH LONG-TERM CURRENT USE OF INSULIN: ICD-10-CM

## 2018-09-07 DIAGNOSIS — M70.62 TROCHANTERIC BURSITIS OF LEFT HIP: ICD-10-CM

## 2018-09-10 RX ORDER — TRAMADOL HYDROCHLORIDE 50 MG/1
TABLET ORAL
Qty: 60 TABLET | Refills: 0 | Status: SHIPPED | OUTPATIENT
Start: 2018-09-10 | End: 2018-10-12 | Stop reason: SDUPTHER

## 2018-09-11 RX ORDER — ATORVASTATIN CALCIUM 40 MG/1
TABLET, FILM COATED ORAL
Qty: 30 TABLET | Refills: 0 | OUTPATIENT
Start: 2018-09-11

## 2018-09-11 RX ORDER — GLIPIZIDE 10 MG/1
TABLET ORAL
Qty: 60 TABLET | Refills: 0 | OUTPATIENT
Start: 2018-09-11

## 2018-09-13 DIAGNOSIS — E11.9 TYPE 2 DIABETES MELLITUS WITHOUT COMPLICATION, WITH LONG-TERM CURRENT USE OF INSULIN: ICD-10-CM

## 2018-09-13 DIAGNOSIS — Z79.4 TYPE 2 DIABETES MELLITUS WITHOUT COMPLICATION, WITH LONG-TERM CURRENT USE OF INSULIN: ICD-10-CM

## 2018-09-13 RX ORDER — ATORVASTATIN CALCIUM 40 MG/1
TABLET, FILM COATED ORAL
Qty: 30 TABLET | Refills: 0 | OUTPATIENT
Start: 2018-09-13

## 2018-09-13 RX ORDER — GLIPIZIDE 10 MG/1
TABLET ORAL
Qty: 60 TABLET | Refills: 0 | OUTPATIENT
Start: 2018-09-13

## 2018-10-11 DIAGNOSIS — M75.51 CHRONIC BURSITIS OF RIGHT SHOULDER: Chronic | ICD-10-CM

## 2018-10-11 DIAGNOSIS — M70.62 TROCHANTERIC BURSITIS OF LEFT HIP: ICD-10-CM

## 2018-10-11 RX ORDER — TRAMADOL HYDROCHLORIDE 50 MG/1
TABLET ORAL
Qty: 60 TABLET | Refills: 0 | OUTPATIENT
Start: 2018-10-11

## 2018-10-14 ENCOUNTER — HOSPITAL ENCOUNTER (EMERGENCY)
Facility: HOSPITAL | Age: 43
Discharge: HOME | End: 2018-10-14
Attending: EMERGENCY MEDICINE
Payer: MEDICAID

## 2018-10-14 VITALS
WEIGHT: 210.63 LBS | HEART RATE: 78 BPM | DIASTOLIC BLOOD PRESSURE: 77 MMHG | OXYGEN SATURATION: 95 % | SYSTOLIC BLOOD PRESSURE: 139 MMHG | BODY MASS INDEX: 30.15 KG/M2 | TEMPERATURE: 99 F | RESPIRATION RATE: 20 BRPM | HEIGHT: 70 IN

## 2018-10-14 DIAGNOSIS — N30.01 ACUTE CYSTITIS WITH HEMATURIA: Primary | ICD-10-CM

## 2018-10-14 DIAGNOSIS — R73.9 HYPERGLYCEMIA: ICD-10-CM

## 2018-10-14 DIAGNOSIS — R10.30 LOWER ABDOMINAL PAIN: ICD-10-CM

## 2018-10-14 LAB
ALBUMIN SERPL BCP-MCNC: 3.6 G/DL
ALP SERPL-CCNC: 112 U/L
ALT SERPL W/O P-5'-P-CCNC: 38 U/L
ANION GAP SERPL CALC-SCNC: 14 MMOL/L
AST SERPL-CCNC: 33 U/L
B-HCG UR QL: NEGATIVE
BACTERIA #/AREA URNS HPF: ABNORMAL /HPF
BASOPHILS # BLD AUTO: 0.02 K/UL
BASOPHILS NFR BLD: 0.2 %
BILIRUB SERPL-MCNC: 0.4 MG/DL
BILIRUB UR QL STRIP: NEGATIVE
BUN SERPL-MCNC: 12 MG/DL
CALCIUM SERPL-MCNC: 9.6 MG/DL
CHLORIDE SERPL-SCNC: 103 MMOL/L
CLARITY UR: CLEAR
CO2 SERPL-SCNC: 18 MMOL/L
COLOR UR: YELLOW
CREAT SERPL-MCNC: 0.8 MG/DL
DIFFERENTIAL METHOD: ABNORMAL
EOSINOPHIL # BLD AUTO: 0.1 K/UL
EOSINOPHIL NFR BLD: 1.1 %
ERYTHROCYTE [DISTWIDTH] IN BLOOD BY AUTOMATED COUNT: 12.8 %
EST. GFR  (AFRICAN AMERICAN): >60 ML/MIN/1.73 M^2
EST. GFR  (NON AFRICAN AMERICAN): >60 ML/MIN/1.73 M^2
GLUCOSE SERPL-MCNC: 276 MG/DL
GLUCOSE UR QL STRIP: ABNORMAL
HCT VFR BLD AUTO: 44.9 %
HGB BLD-MCNC: 16.1 G/DL
HGB UR QL STRIP: ABNORMAL
KETONES UR QL STRIP: NEGATIVE
LEUKOCYTE ESTERASE UR QL STRIP: NEGATIVE
LIPASE SERPL-CCNC: 6 U/L
LYMPHOCYTES # BLD AUTO: 5.5 K/UL
LYMPHOCYTES NFR BLD: 47.8 %
MCH RBC QN AUTO: 32.1 PG
MCHC RBC AUTO-ENTMCNC: 35.9 G/DL
MCV RBC AUTO: 90 FL
MICROSCOPIC COMMENT: ABNORMAL
MONOCYTES # BLD AUTO: 0.9 K/UL
MONOCYTES NFR BLD: 7.3 %
NEUTROPHILS # BLD AUTO: 5.1 K/UL
NEUTROPHILS NFR BLD: 43.6 %
NITRITE UR QL STRIP: NEGATIVE
PH UR STRIP: 6 [PH] (ref 5–8)
PLATELET # BLD AUTO: 278 K/UL
PMV BLD AUTO: 9.8 FL
POTASSIUM SERPL-SCNC: 3.7 MMOL/L
PROT SERPL-MCNC: 7.2 G/DL
PROT UR QL STRIP: NEGATIVE
RBC # BLD AUTO: 5.01 M/UL
RBC #/AREA URNS HPF: 3 /HPF (ref 0–4)
SODIUM SERPL-SCNC: 135 MMOL/L
SP GR UR STRIP: 1.02 (ref 1–1.03)
SQUAMOUS #/AREA URNS HPF: 20 /HPF
URN SPEC COLLECT METH UR: ABNORMAL
UROBILINOGEN UR STRIP-ACNC: NEGATIVE EU/DL
WBC # BLD AUTO: 11.59 K/UL
WBC #/AREA URNS HPF: 4 /HPF (ref 0–5)
YEAST URNS QL MICRO: ABNORMAL

## 2018-10-14 PROCEDURE — 81025 URINE PREGNANCY TEST: CPT

## 2018-10-14 PROCEDURE — 36415 COLL VENOUS BLD VENIPUNCTURE: CPT

## 2018-10-14 PROCEDURE — 99284 EMERGENCY DEPT VISIT MOD MDM: CPT

## 2018-10-14 PROCEDURE — 85025 COMPLETE CBC W/AUTO DIFF WBC: CPT

## 2018-10-14 PROCEDURE — 81000 URINALYSIS NONAUTO W/SCOPE: CPT

## 2018-10-14 PROCEDURE — 25000003 PHARM REV CODE 250: Performed by: REGISTERED NURSE

## 2018-10-14 PROCEDURE — 83690 ASSAY OF LIPASE: CPT

## 2018-10-14 PROCEDURE — 80053 COMPREHEN METABOLIC PANEL: CPT

## 2018-10-14 RX ORDER — HYDROCODONE BITARTRATE AND ACETAMINOPHEN 10; 325 MG/1; MG/1
1 TABLET ORAL
Status: COMPLETED | OUTPATIENT
Start: 2018-10-14 | End: 2018-10-14

## 2018-10-14 RX ORDER — TRAMADOL HYDROCHLORIDE 50 MG/1
50 TABLET ORAL EVERY 6 HOURS PRN
Qty: 12 TABLET | Refills: 0 | Status: SHIPPED | OUTPATIENT
Start: 2018-10-14 | End: 2018-10-24

## 2018-10-14 RX ORDER — NITROFURANTOIN 25; 75 MG/1; MG/1
100 CAPSULE ORAL 2 TIMES DAILY
Qty: 10 CAPSULE | Refills: 0 | Status: SHIPPED | OUTPATIENT
Start: 2018-10-14 | End: 2018-10-19

## 2018-10-14 RX ADMIN — HYDROCODONE BITARTRATE AND ACETAMINOPHEN 1 TABLET: 10; 325 TABLET ORAL at 04:10

## 2018-10-14 NOTE — ED PROVIDER NOTES
"SCRIBE #1 NOTE: I, Romaine Goldberg, am scribing for, and in the presence of, Jonah Mcknight Jr., [facility]. I have scribed the entire note.       History     Chief Complaint   Patient presents with    Pelvic Pain     Pt states, "I am hurting in my lower abdomen and started spotting."     Review of patient's allergies indicates:   Allergen Reactions    Compazine [prochlorperazine edisylate] Rash    Contrast media Anaphylaxis    Dye Anaphylaxis    Levaquin [levofloxacin] Hives and Itching    Metformin Other (See Comments)     Upset stomach    Ibuprofen Other (See Comments)     Stomach pain    Morphine Other (See Comments)     Irritable         History of Present Illness     HPI    10/14/2018, 3:54 PM  History obtained from the patient      History of Present Illness: Jenifer Westfall is a 43 y.o. female patient with a PMHx including DM, HLD, IBS, ovarian cysts, who presents to the Emergency Department for evaluation of bilateral abd pain which onset gradually on thursday. She states that this pain feels similar to her ovarian cysts. Symptoms are constant and moderate in severity. No mitigating or exacerbating factors reported. Associated sxs include light spotting which she only notices when she urinates. Patient denies any n/v/d, hematochezia, dysuria, hematuria, constipation, frequency, flank pain, fever, chills, CP, and all other sxs at this time. No further complaints or concerns at this time.         Arrival mode: Personal vehicle    PCP: Evelyn Bello MD        Past Medical History:  Past Medical History:   Diagnosis Date    Arthritis     Diabetes mellitus 2009     11/09/2015 Insulin x 2 years 2013    DM (diabetes mellitus) 2009     03/01/2017 Insulin x 3 years 2013    DM (diabetes mellitus) 2009     am 06/04/218 Insulin x 4 years    Hepatitis C     Hyperlipidemia     Hypertension     IBS (irritable bowel syndrome)     Kidney stone     Marfan syndrome     Mitral valve " "prolapse        Past Surgical History:  Past Surgical History:   Procedure Laterality Date    ANGIOGRAM, LOWER EXTREMITY- LEFT LEG N/A 2018    Performed by Roscoe Landeros MD at [facility] CATH LAB    ANGIOGRAPHY OF LOWER EXTREMITY N/A 2018    Procedure: ANGIOGRAM, LOWER EXTREMITY- LEFT LEG;  Surgeon: Roscoe Landeros MD;  Location: [facility] CATH LAB;  Service: Peripheral Vascular;  Laterality: N/A;    APPENDECTOMY      APPENDECTOMY-LAPAROSCOPIC N/A 2015    Performed by Naseem Anne MD at [facility] OR    BUNIONECTOMY      both feet     SECTION      x 2    CHOLECYSTECTOMY      INCISION AND DRAINAGE (I&D), ABSCESS Left 2015    Performed by Uriel Daniel MD at [facility] OR    TUBAL LIGATION           Family History:  Family History   Problem Relation Age of Onset    Heart disease Mother     Thrombophilia Father         DVT    Hypertension Father     Stroke Maternal Aunt     Heart disease Maternal Aunt     Stroke Maternal Uncle     Heart disease Maternal Uncle     Breast cancer Neg Hx     Colon cancer Neg Hx     Ovarian cancer Neg Hx        Social History:  Social History     Tobacco Use    Smoking status: Former Smoker     Packs/day: 0.20     Years: 20.00     Pack years: 4.00     Types: Cigarettes     Last attempt to quit: 2015     Years since quitting: 3.5    Smokeless tobacco: Never Used    Tobacco comment: "once in a blue moon"   Substance and Sexual Activity    Alcohol use: No     Alcohol/week: 0.0 oz    Drug use: No    Sexual activity: Yes     Partners: Male     Birth control/protection: Injection        Review of Systems     Review of Systems   Constitutional: Negative for chills and fever.   HENT: Negative for sore throat.    Respiratory: Negative for shortness of breath.    Cardiovascular: Negative for chest pain.   Gastrointestinal: Positive for abdominal pain (lower, bilateral). Negative for anal bleeding, blood in stool, constipation, diarrhea, nausea and vomiting. " "  Genitourinary: Positive for vaginal bleeding (spotting). Negative for difficulty urinating, dysuria, flank pain, frequency and hematuria.   Musculoskeletal: Negative for back pain.   Skin: Negative for rash.   Neurological: Negative for dizziness, weakness and headaches.   Hematological: Does not bruise/bleed easily.   All other systems reviewed and are negative.       Physical Exam     Initial Vitals [10/14/18 1452]   BP Pulse Resp Temp SpO2   (!) 148/69 94 20 98.4 °F (36.9 °C) 95 %      MAP       --          Physical Exam  Nursing Notes and Vital Signs Reviewed.  Constitutional: Patient is in no acute distress. Well-developed and well-nourished.  Head: Atraumatic. Normocephalic.  Eyes: PERRL. EOM intact. Conjunctivae are not pale. No scleral icterus.  ENT: Mucous membranes are moist. Oropharynx is clear and symmetric.    Neck: Supple. Full ROM. No lymphadenopathy.  Cardiovascular: Regular rate. Regular rhyth. No murmurs, rubs, or gallops. Distal pulses are 2+ and symmetric.  Pulmonary/Chest: No respiratory distress. Clear to auscultation bilaterally. No wheezing or rales.  Abdominal: Soft and non-distended.  There is lower abdominal tenderness.  No rebound, guarding, or rigidity. Good bowel sounds.  Genitourinary: No CVA tenderness  Musculoskeletal: Moves all extremities. No obvious deformities. No edema. No calf tenderness.  Skin: Warm and dry.  Neurological:  Alert, awake, and appropriate.  Normal speech.  No acute focal neurological deficits are appreciated.  Psychiatric: Normal affect. Good eye contact. Appropriate in content.     ED Course   Procedures  ED Vital Signs:  Vitals:    10/14/18 1452 10/14/18 1836   BP: (!) 148/69 139/77   Pulse: 94 78   Resp: 20    Temp: 98.4 °F (36.9 °C) 99.3 °F (37.4 °C)   TempSrc: Oral Oral   SpO2: 95% 95%   Weight: 95.5 kg (210 lb 10.4 oz)    Height: 5' 10" (1.778 m)        Abnormal Lab Results:  Labs Reviewed   CBC W/ AUTO DIFFERENTIAL - Abnormal; Notable for the following " components:       Result Value    Hemoglobin 16.1 (*)     MCH 32.1 (*)     Lymph # 5.5 (*)     All other components within normal limits   URINALYSIS, REFLEX TO URINE CULTURE - Abnormal; Notable for the following components:    Glucose, UA 3+ (*)     Occult Blood UA 2+ (*)     All other components within normal limits    Narrative:     Preferred Collection Type->Urine, Clean Catch   URINALYSIS MICROSCOPIC - Abnormal; Notable for the following components:    Bacteria, UA Few (*)     All other components within normal limits    Narrative:     Preferred Collection Type->Urine, Clean Catch   COMPREHENSIVE METABOLIC PANEL - Abnormal; Notable for the following components:    Sodium 135 (*)     CO2 18 (*)     Glucose 276 (*)     All other components within normal limits   PREGNANCY TEST, URINE RAPID   LIPASE        All Lab Results:  Results for orders placed or performed during the hospital encounter of 10/14/18   CBC auto differential   Result Value Ref Range    WBC 11.59 3.90 - 12.70 K/uL    RBC 5.01 4.00 - 5.40 M/uL    Hemoglobin 16.1 (H) 12.0 - 16.0 g/dL    Hematocrit 44.9 37.0 - 48.5 %    MCV 90 82 - 98 fL    MCH 32.1 (H) 27.0 - 31.0 pg    MCHC 35.9 32.0 - 36.0 g/dL    RDW 12.8 11.5 - 14.5 %    Platelets 278 150 - 350 K/uL    MPV 9.8 9.2 - 12.9 fL    Gran # (ANC) 5.1 1.8 - 7.7 K/uL    Lymph # 5.5 (H) 1.0 - 4.8 K/uL    Mono # 0.9 0.3 - 1.0 K/uL    Eos # 0.1 0.0 - 0.5 K/uL    Baso # 0.02 0.00 - 0.20 K/uL    Gran% 43.6 38.0 - 73.0 %    Lymph% 47.8 18.0 - 48.0 %    Mono% 7.3 4.0 - 15.0 %    Eosinophil% 1.1 0.0 - 8.0 %    Basophil% 0.2 0.0 - 1.9 %    Differential Method Automated    Urinalysis, Reflex to Urine Culture Urine, Clean Catch   Result Value Ref Range    Specimen UA Urine, Clean Catch     Color, UA Yellow Yellow, Straw, Ashli    Appearance, UA Clear Clear    pH, UA 6.0 5.0 - 8.0    Specific Gravity, UA 1.025 1.005 - 1.030    Protein, UA Negative Negative    Glucose, UA 3+ (A) Negative    Ketones, UA Negative  Negative    Bilirubin (UA) Negative Negative    Occult Blood UA 2+ (A) Negative    Nitrite, UA Negative Negative    Urobilinogen, UA Negative <2.0 EU/dL    Leukocytes, UA Negative Negative   Rapid Pregnancy, Urine   Result Value Ref Range    Preg Test, Ur Negative    Urinalysis Microscopic   Result Value Ref Range    RBC, UA 3 0 - 4 /hpf    WBC, UA 4 0 - 5 /hpf    Bacteria, UA Few (A) None-Occ /hpf    Yeast, UA None None    Squam Epithel, UA 20 /hpf    Microscopic Comment SEE COMMENT    Comprehensive metabolic panel   Result Value Ref Range    Sodium 135 (L) 136 - 145 mmol/L    Potassium 3.7 3.5 - 5.1 mmol/L    Chloride 103 95 - 110 mmol/L    CO2 18 (L) 23 - 29 mmol/L    Glucose 276 (H) 70 - 110 mg/dL    BUN, Bld 12 6 - 20 mg/dL    Creatinine 0.8 0.5 - 1.4 mg/dL    Calcium 9.6 8.7 - 10.5 mg/dL    Total Protein 7.2 6.0 - 8.4 g/dL    Albumin 3.6 3.5 - 5.2 g/dL    Total Bilirubin 0.4 0.1 - 1.0 mg/dL    Alkaline Phosphatase 112 55 - 135 U/L    AST 33 10 - 40 U/L    ALT 38 10 - 44 U/L    Anion Gap 14 8 - 16 mmol/L    eGFR if African American >60 >60 mL/min/1.73 m^2    eGFR if non African American >60 >60 mL/min/1.73 m^2   Lipase   Result Value Ref Range    Lipase 6 4 - 60 U/L         Imaging Results:  Imaging Results          CT Renal Stone Study ABD Pelvis WO (Final result)  Result time 10/14/18 18:13:58    Final result by Tomás Boland MD (10/14/18 18:13:58)                 Impression:      Bladder is collapsed nonspecific wall thickening.  Findings can be seen with cystitis.    No evidence of obstructive uropathy.    See above for additional findings.    All CT scans at this facility use dose modulation, iterative reconstruction and/or weight based dosing when appropriate to reduce radiation dose to as low as reasonably achievable.      Electronically signed by: Tomás Boland MD  Date:    10/14/2018  Time:    18:13             Narrative:    EXAMINATION:  CT RENAL STONE STUDY ABD PELVIS WO    CLINICAL  HISTORY:  Abdominal pain, unspecified;Hematuria;    TECHNIQUE:  Routine 5 mm non-contrast images of the abdomen and pelvis were done.  Sagittal and coronal reformats were also submitted for interpretation.    COMPARISON:  03/06/2018    FINDINGS:  The lung bases are unremarkable.  There is no pleural fluid present.  The visualized portions of the heart appear normal.    The liver is normal in size and attenuation with no focal hepatic abnormality.  Focus of decreased attenuation along the falciform ligament was not seen on prior exam and is of unknown clinical significance but could reflect focal fatty infiltration..  Recommend follow-up ultrasound.  Post cholecystectomy.  There is no intra-or extrahepatic biliary ductal dilatation.    The spleen, pancreas, and adrenal glands are unremarkable.    The kidneys are normal in size and location.  There is no evidence of hydronephrosis. Bladder is grossly unremarkable.    Stomach is unremarkable.   The visualized loops of small bowel show no evidence of obstruction or inflammation. Large bowel demonstrates mild constipation.  Appendix is surgically absent.  There is no ascites, free fluid, or intraperitoneal free air.    There is no evidence of lymph node enlargement in the abdomen or pelvis.    The abdominal aorta is normal in course and caliber with moderate atherosclerotic calcifications.  Bilateral iliac stents.    Mild degenerative changes throughout the lumbar spine.    The extraperitoneal soft tissues are unremarkable.                                        The Emergency Provider reviewed the vital signs and test results, which are outlined above.     ED Discussion     6:55 PM: Reassessed pt at this time.  Pt states her condition has improved at this time. Discussed with pt all pertinent ED information and results. Discussed pt dx and plan of tx. Gave pt all f/u and return to the ED instructions. All questions and concerns were addressed at this time. Pt expresses  understanding of information and instructions, and is comfortable with plan to discharge. Pt is stable for discharge.        I discussed with patient and/or family/caretaker that evaluation in the ED does not suggest any emergent or life threatening medical conditions requiring immediate intervention beyond what was provided in the ED, and I believe patient is safe for discharge.  Regardless, an unremarkable evaluation in the ED does not preclude the development or presence of a serious of life threatening condition. As such, patient was instructed to return immediately for any worsening or change in current symptoms.      ED Medication(s):  Medications   HYDROcodone-acetaminophen  mg per tablet 1 tablet (1 tablet Oral Given 10/14/18 2939)     Current Discharge Medication List      START taking these medications    Details   nitrofurantoin, macrocrystal-monohydrate, (MACROBID) 100 MG capsule Take 1 capsule (100 mg total) by mouth 2 (two) times daily. for 5 days  Qty: 10 capsule, Refills: 0      !! traMADol (ULTRAM) 50 mg tablet Take 1 tablet (50 mg total) by mouth every 6 (six) hours as needed for Pain.  Qty: 12 tablet, Refills: 0       !! - Potential duplicate medications found. Please discuss with provider.            Follow-up Information     Evelyn Bello MD In 3 days.    Specialty:  Family Medicine  Contact information:  [address] [facility] [address] [phone]             [facility] - .    Specialty:  Emergency Medicine  Why:  If symptoms worsen  Contact information:  [address] [facility] [address] [phone]                    Medical Decision Making     Medical Decision Making:   Clinical Tests:   Lab Tests: Ordered and Reviewed             Scribe Attestation:   Scribe #1: I performed the above scribed service and the documentation accurately describes the services I performed. I attest to the accuracy of the note.  10/14/2018    Attending:   Physician Attestation Statement for Scribe #1: I, DILIP Manley Jr., personally performed the services described in this documentation, as scribed by Romaine Goldberg, in my presence, and it is both accurate and complete.           Clinical Impression       ICD-10-CM ICD-9-CM   1. Acute cystitis with hematuria N30.01 595.0   2. Lower abdominal pain R10.30 789.09   3. Hyperglycemia R73.9 790.29       Disposition:   Disposition: Discharged  Condition: Stable               DILIP Manley Jr.  10/15/18 1123

## 2018-10-15 ENCOUNTER — TELEPHONE (OUTPATIENT)
Dept: RHEUMATOLOGY | Facility: CLINIC | Age: 43
End: 2018-10-15

## 2018-10-15 RX ORDER — TRAMADOL HYDROCHLORIDE 50 MG/1
50 TABLET ORAL EVERY 12 HOURS
Qty: 60 TABLET | Refills: 0 | Status: SHIPPED | OUTPATIENT
Start: 2018-10-15 | End: 2019-07-01 | Stop reason: SDUPTHER

## 2018-10-15 NOTE — TELEPHONE ENCOUNTER
----- Message from Romaine Lawrence MD sent at 10/15/2018  8:35 AM CDT -----  Needs appt with norbert early nxt month

## 2018-11-01 ENCOUNTER — TELEPHONE (OUTPATIENT)
Dept: CARDIOLOGY | Facility: CLINIC | Age: 43
End: 2018-11-01

## 2018-11-01 NOTE — TELEPHONE ENCOUNTER
Spoke with pt appt has been scheduled to see Dr. Hsu 11/28/18 [facility]. I have sent pt deepthi letter reminder in mail.

## 2018-11-01 NOTE — TELEPHONE ENCOUNTER
----- Message from Delaney Chong sent at 11/1/2018  1:59 PM CDT -----  Contact: self [phone]  Would like to be worked in for an annual follow-up appointment with Dr. Hsu at the [facility]; not showing any availability in epic. Please call back at [phone].  Flex [place]

## 2018-11-12 ENCOUNTER — OFFICE VISIT (OUTPATIENT)
Dept: GASTROENTEROLOGY | Facility: CLINIC | Age: 43
End: 2018-11-12
Payer: MEDICAID

## 2018-11-12 VITALS
BODY MASS INDEX: 30.62 KG/M2 | SYSTOLIC BLOOD PRESSURE: 122 MMHG | HEIGHT: 70 IN | HEART RATE: 80 BPM | WEIGHT: 213.88 LBS | DIASTOLIC BLOOD PRESSURE: 72 MMHG

## 2018-11-12 DIAGNOSIS — K58.0 IRRITABLE BOWEL SYNDROME WITH DIARRHEA: ICD-10-CM

## 2018-11-12 DIAGNOSIS — R19.7 DIARRHEA, UNSPECIFIED TYPE: Primary | ICD-10-CM

## 2018-11-12 PROCEDURE — 99999 PR PBB SHADOW E&M-EST. PATIENT-LVL IV: CPT | Mod: PBBFAC,,, | Performed by: INTERNAL MEDICINE

## 2018-11-12 PROCEDURE — 99214 OFFICE O/P EST MOD 30 MIN: CPT | Mod: PBBFAC,PO | Performed by: INTERNAL MEDICINE

## 2018-11-12 PROCEDURE — 99213 OFFICE O/P EST LOW 20 MIN: CPT | Mod: S$PBB,,, | Performed by: INTERNAL MEDICINE

## 2018-11-12 RX ORDER — SODIUM, POTASSIUM,MAG SULFATES 17.5-3.13G
1 SOLUTION, RECONSTITUTED, ORAL ORAL ONCE
Qty: 1 BOTTLE | Refills: 0 | Status: SHIPPED | OUTPATIENT
Start: 2018-11-12 | End: 2018-11-12

## 2018-11-12 RX ORDER — FLUOXETINE HYDROCHLORIDE 40 MG/1
CAPSULE ORAL
Qty: 90 CAPSULE | Refills: 0 | Status: SHIPPED | OUTPATIENT
Start: 2018-11-12

## 2018-11-12 RX ORDER — CHOLESTYRAMINE 4 G/9G
4 POWDER, FOR SUSPENSION ORAL
Qty: 90 PACKET | Refills: 11 | Status: SHIPPED | OUTPATIENT
Start: 2018-11-12 | End: 2019-04-26

## 2018-11-12 NOTE — PATIENT INSTRUCTIONS
- start metamucil 1-2 times a day  - avoid sugary drinks as much as possible  - schedule colonoscopy

## 2018-11-15 ENCOUNTER — CLINICAL SUPPORT (OUTPATIENT)
Dept: OBSTETRICS AND GYNECOLOGY | Facility: CLINIC | Age: 43
End: 2018-11-15
Payer: MEDICAID

## 2018-11-15 PROCEDURE — 99999 PR PBB SHADOW E&M-EST. PATIENT-LVL II: CPT | Mod: PBBFAC,,,

## 2018-11-15 PROCEDURE — 99212 OFFICE O/P EST SF 10 MIN: CPT | Mod: PBBFAC

## 2018-11-15 PROCEDURE — 96372 THER/PROPH/DIAG INJ SC/IM: CPT | Mod: PBBFAC

## 2018-11-15 RX ADMIN — MEDROXYPROGESTERONE ACETATE 150 MG: 150 INJECTION, SUSPENSION, EXTENDED RELEASE INTRAMUSCULAR at 02:11

## 2018-11-19 ENCOUNTER — OFFICE VISIT (OUTPATIENT)
Dept: ORTHOPEDICS | Facility: CLINIC | Age: 43
End: 2018-11-19
Payer: MEDICAID

## 2018-11-19 ENCOUNTER — HOSPITAL ENCOUNTER (OUTPATIENT)
Dept: RADIOLOGY | Facility: HOSPITAL | Age: 43
Discharge: HOME | End: 2018-11-19
Attending: PHYSICIAN ASSISTANT
Payer: MEDICAID

## 2018-11-19 VITALS
HEIGHT: 70 IN | DIASTOLIC BLOOD PRESSURE: 70 MMHG | HEART RATE: 84 BPM | WEIGHT: 213.88 LBS | SYSTOLIC BLOOD PRESSURE: 126 MMHG | BODY MASS INDEX: 30.62 KG/M2 | RESPIRATION RATE: 12 BRPM

## 2018-11-19 DIAGNOSIS — M25.511 RIGHT SHOULDER PAIN, UNSPECIFIED CHRONICITY: ICD-10-CM

## 2018-11-19 DIAGNOSIS — G89.29 CHRONIC RIGHT SHOULDER PAIN: ICD-10-CM

## 2018-11-19 DIAGNOSIS — M75.51 CHRONIC BURSITIS OF RIGHT SHOULDER: Chronic | ICD-10-CM

## 2018-11-19 DIAGNOSIS — M25.511 RIGHT SHOULDER PAIN, UNSPECIFIED CHRONICITY: Primary | ICD-10-CM

## 2018-11-19 DIAGNOSIS — M25.511 CHRONIC RIGHT SHOULDER PAIN: ICD-10-CM

## 2018-11-19 DIAGNOSIS — M75.81 ROTATOR CUFF TENDONITIS, RIGHT: ICD-10-CM

## 2018-11-19 DIAGNOSIS — K52.9 CHRONIC DIARRHEA: ICD-10-CM

## 2018-11-19 DIAGNOSIS — I70.8 OCCLUSION OF RIGHT SUBCLAVIAN ARTERY: ICD-10-CM

## 2018-11-19 DIAGNOSIS — M75.41 ROTATOR CUFF IMPINGEMENT SYNDROME OF RIGHT SHOULDER: Primary | ICD-10-CM

## 2018-11-19 PROCEDURE — 99214 OFFICE O/P EST MOD 30 MIN: CPT | Mod: PBBFAC,25,PO | Performed by: PHYSICIAN ASSISTANT

## 2018-11-19 PROCEDURE — 99999 PR PBB SHADOW E&M-EST. PATIENT-LVL IV: CPT | Mod: PBBFAC,,, | Performed by: PHYSICIAN ASSISTANT

## 2018-11-19 PROCEDURE — 73030 X-RAY EXAM OF SHOULDER: CPT | Mod: TC,FY,PO,RT

## 2018-11-19 PROCEDURE — 73030 X-RAY EXAM OF SHOULDER: CPT | Mod: 26,RT,, | Performed by: RADIOLOGY

## 2018-11-19 PROCEDURE — 20610 DRAIN/INJ JOINT/BURSA W/O US: CPT | Mod: S$PBB,RT,, | Performed by: PHYSICIAN ASSISTANT

## 2018-11-19 PROCEDURE — 99214 OFFICE O/P EST MOD 30 MIN: CPT | Mod: 25,S$PBB,, | Performed by: PHYSICIAN ASSISTANT

## 2018-11-19 PROCEDURE — 20610 DRAIN/INJ JOINT/BURSA W/O US: CPT | Mod: PBBFAC,PO | Performed by: PHYSICIAN ASSISTANT

## 2018-11-19 RX ORDER — DICLOFENAC SODIUM 10 MG/G
2 GEL TOPICAL 4 TIMES DAILY
Qty: 1 TUBE | Refills: 3 | Status: SHIPPED | OUTPATIENT
Start: 2018-11-19 | End: 2020-06-18

## 2018-11-19 RX ORDER — TRIAMCINOLONE ACETONIDE 40 MG/ML
40 INJECTION, SUSPENSION INTRA-ARTICULAR; INTRAMUSCULAR
Status: COMPLETED | OUTPATIENT
Start: 2018-11-19 | End: 2018-11-19

## 2018-11-19 RX ORDER — INSULIN GLARGINE 100 [IU]/ML
INJECTION, SOLUTION SUBCUTANEOUS
Qty: 15 ML | Refills: 2 | OUTPATIENT
Start: 2018-11-19

## 2018-11-19 RX ADMIN — TRIAMCINOLONE ACETONIDE 40 MG: 40 INJECTION, SUSPENSION INTRA-ARTICULAR; INTRAMUSCULAR at 10:11

## 2018-11-19 NOTE — PROGRESS NOTES
Subjective:     Patient ID: Jenifer Westfall is a 43 y.o. female.    Chief Complaint: No chief complaint on file.    Ms. Westfall is a 42 yo female who presents for right shoulder pain. This is a chronic problem. Episode began 3-4 years ago. She was seen by Dr. Gotti and Lucero Landeros PA-C in our department in the past for this problem. 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 She was scheduled for a shoulder arthroscopy in October 2017, but had to cancel due to being hospitalized for anaphylaxis in response to contrast dye. Of note since that time she has had several stents placed by Vascular Surgery including 3 in her bilateral legs in June 2018. She was also told that she has a 100% blockage in one of the vessels in her right arm and will need to have another stent placed. She is currently being referred to a new vascular surgeon to complete this procedure.     Today, she rates her pain as 8/10 and describes it as stabbing in quality. Pain is worsened by movement of the arm. She feels that her pain limits the function of her arm and her activity level. She has tried Tramadol and Physical Therapy in the past without relief of pain. She has not tried steroid injections. She is unable to take NSAIDs due to gastritis.Patient denies swelling, numbness, and tingling.            Past Medical History:   Diagnosis Date    Arthritis     Diabetes mellitus 2009     11/09/2015 Insulin x 2 years 2013    DM (diabetes mellitus) 2009     03/01/2017 Insulin x 3 years 2013    DM (diabetes mellitus) 2009     am 06/04/218 Insulin x 4 years    Hepatitis C     Hyperlipidemia     Hypertension     IBS (irritable bowel syndrome)     Kidney stone     Marfan syndrome     Mitral valve prolapse      Past Surgical History:   Procedure Laterality Date    ANGIOGRAM, LOWER EXTREMITY- LEFT LEG N/A 6/26/2018    Performed by Roscoe [facility]os [facility] at [facility] CATH LAB    ANGIOGRAPHY OF LOWER EXTREMITY N/A  "2018    Procedure: ANGIOGRAM, LOWER EXTREMITY- LEFT LEG;  Surgeon: Roscoe Landeros MD;  Location: [facility] CATH LAB;  Service: Peripheral Vascular;  Laterality: N/A;    APPENDECTOMY      APPENDECTOMY-LAPAROSCOPIC N/A 2015    Performed by Naseem Anne MD at [facility] OR    BUNIONECTOMY      both feet     SECTION      x 2    CHOLECYSTECTOMY      INCISION AND DRAINAGE (I&D), ABSCESS Left 2015    Performed by Uriel Daniel [facility] at [facility] OR    TUBAL LIGATION       Family History   Problem Relation Age of Onset    Heart disease Mother     Thrombophilia Father         DVT    Hypertension Father     Stroke Maternal Aunt     Heart disease Maternal Aunt     Stroke Maternal Uncle     Heart disease Maternal Uncle     Breast cancer Neg Hx     Colon cancer Neg Hx     Ovarian cancer Neg Hx      Social History     Socioeconomic History    Marital status: Single     Spouse name: Not on file    Number of children: Not on file    Years of education: Not on file    Highest education level: Not on file   Social Needs    Financial resource strain: Not on file    Food insecurity - worry: Not on file    Food insecurity - inability: Not on file    Transportation needs - medical: Not on file    Transportation needs - non-medical: Not on file   Occupational History    Not on file   Tobacco Use    Smoking status: Former Smoker     Packs/day: 0.20     Years: 20.00     Pack years: 4.00     Types: Cigarettes     Last attempt to quit: 2015     Years since quitting: 3.6    Smokeless tobacco: Never Used    Tobacco comment: "once in a blue moon"   Substance and Sexual Activity    Alcohol use: No     Alcohol/week: 0.0 oz    Drug use: No    Sexual activity: Yes     Partners: Male     Birth control/protection: Injection   Other Topics Concern    Not on file   Social History Narrative    Not on file        Medication List           Accurate as of 18  8:08 AM. If you have any questions, ask your " "nurse or doctor.               CONTINUE taking these medications    ascorbic acid (vitamin C) 100 MG tablet  Commonly known as:  VITAMIN C     aspirin 81 MG EC tablet  Commonly known as:  ECOTRIN     atenolol 50 MG tablet  Commonly known as:  TENORMIN  TAKE 1 TABLET BY MOUTH ONCE DAILY     atorvastatin 40 MG tablet  Commonly known as:  LIPITOR  Take 1 tablet (40 mg total) by mouth once daily.     cetirizine 10 MG tablet  Commonly known as:  ZYRTEC     cholestyramine 4 gram packet  Commonly known as:  QUESTRAN  Take 1 packet (4 g total) by mouth 3 (three) times daily with meals.     clopidogrel 75 mg tablet  Commonly known as:  PLAVIX  Take 1 tablet (75 mg total) by mouth once daily.     colestipol 1 gram Tab  Commonly known as:  COLESTID  TAKE 2 TABLETS BY MOUTH THREE TIMES DAILY. DO NOT TAKE OTHER MEDICATION WITHIN 1 HOUR BEFORE OR 4 HOURS AFTER TAKING COLESTIPOL.     dicyclomine 20 mg tablet  Commonly known as:  BENTYL     famotidine 20 MG tablet  Commonly known as:  PEPCID     FLUoxetine 40 MG capsule  Commonly known as:  PROZAC  TAKE 1 CAPSULE BY MOUTH ONCE DAILY     gabapentin 400 MG capsule  Commonly known as:  NEURONTIN  Take 1 capsule (400 mg total) by mouth 3 (three) times daily.     glipiZIDE 10 MG tablet  Commonly known as:  GLUCOTROL  Take 1 tablet (10 mg total) by mouth 2 (two) times daily.     insulin glargine 100 unit/mL (3 mL) Inpn pen  Commonly known as:  LANTUS SOLOSTAR U-100 INSULIN  Inject 40 Units into the skin 2 (two) times daily.     insulin glulisine U-100 100 unit/mL Inpn pen  Commonly known as:  APIDRA SOLOSTAR U-100 INSULIN  Inject 0.15 mLs (15 Units total) into the skin 3 (three) times daily.     lisinopril 10 MG tablet  TAKE 1 TABLET (10 MG TOTAL) BY MOUTH ONCE DAILY.     PEN NEEDLE 31 gauge x 5/16" Ndle  Generic drug:  pen needle, diabetic  USE 5 TIMES DAILY WITH LANTUS AND HUMALOG     tiZANidine 4 MG tablet  Commonly known as:  ZANAFLEX  Take 1 tablet (4 mg total) by mouth every " evening.     traMADol 50 mg tablet  Commonly known as:  ULTRAM  Take 1 tablet (50 mg total) by mouth every 12 (twelve) hours.     VITAMIN D ORAL          Review of patient's allergies indicates:   Allergen Reactions    Compazine [prochlorperazine edisylate] Rash    Contrast media Anaphylaxis    Dye Anaphylaxis    Levaquin [levofloxacin] Hives and Itching    Metformin Other (See Comments)     Upset stomach    Ibuprofen Other (See Comments)     Stomach pain    Morphine Other (See Comments)     Irritable     Review of Systems   Constitution: Negative for fever and night sweats.   HENT: Negative for hearing loss.    Eyes: Negative for blurred vision and visual disturbance.   Cardiovascular: Negative for chest pain and leg swelling.   Respiratory: Negative for shortness of breath.    Endocrine: Negative for polyuria.   Hematologic/Lymphatic: Negative for bleeding problem.   Skin: Negative for rash.   Musculoskeletal: Positive for joint pain. Negative for back pain, joint swelling, muscle cramps and muscle weakness.   Gastrointestinal: Negative for melena.   Genitourinary: Negative for hematuria.   Neurological: Negative for loss of balance, numbness and paresthesias.   Psychiatric/Behavioral: Negative for altered mental status.       Objective:   There is no height or weight on file to calculate BMI.  There were no vitals filed for this visit.    General: Jenifer is well-developed, well-nourished, appears stated age, in no acute distress, alert and oriented to time, place and person.       General    Constitutional: She is oriented to person, place, and time. She appears well-developed and well-nourished.   HENT:   Head: Normocephalic and atraumatic.   Right Ear: External ear normal.   Left Ear: External ear normal.   Nose: Nose normal.   Eyes: EOM are normal. Pupils are equal, round, and reactive to light. Right eye exhibits no discharge. Left eye exhibits no discharge.   Neck: Normal range of motion.    Cardiovascular: Normal heart sounds.    Pulmonary/Chest: Effort normal. No respiratory distress.   Abdominal: Soft.   Neurological: She is alert and oriented to person, place, and time.   Psychiatric: She has a normal mood and affect. Her behavior is normal. Judgment and thought content normal.         Right Shoulder Exam     Inspection/Observation   Swelling: absent  Bruising: absent  Scars: absent  Deformity: absent  Scapular Winging: absent  Scapular Dyskinesia: negative  Atrophy: absent    Tenderness   The patient is tender to palpation of the acromioclavicular joint, acromion, biceps tendon and supraspinatus.    Range of Motion   Active abduction:  70 abnormal   Passive abduction:  100 normal   Extension:  0 normal   Forward Flexion:  90 abnormal   Forward Elevation: 90 abnormal  Adduction: 40 normal  External Rotation 0 degrees:  50 normal   Internal rotation 0 degrees:  Sacrum normal     Tests & Signs   Cross arm: positive  Drop arm: negative  Gordon test: positive  Impingement: positive  Rotator Cuff Painful Arc/Range: severe  Lift Off Sign: positive  Active Compression Test ([place]'s Sign): positive  Yergason's Test: positive  Speed's Test: positive    Other   Sensation: normal    Left Shoulder Exam     Inspection/Observation   Swelling: absent  Bruising: absent  Scars: absent  Deformity: absent  Scapular Winging: absent  Scapular Dyskinesia: negative  Atrophy: absent    Tenderness   The patient is experiencing no tenderness.     Range of Motion   Active abduction:  90 normal   Passive abduction:  100 normal   Extension:  0 normal   Forward Flexion:  180 normal   Forward Elevation: 180 normal  Adduction: 40 normal  External Rotation 0 degrees:  50 normal   External Rotation 90 degrees: 90 normal  Internal rotation 0 degrees:  T8 normal   Internal rotation 90 degrees:  30 normal     Tests & Signs   Cross arm: negative  Drop arm: negative  Gordon test: negative  Impingement: negative  Lift Off Sign:  negative  Active Compression test ([place]'s Sign): negative  Yergasons's Test: negative  Speed's Test: negative    Other   Sensation: normal       Muscle Strength   Right Upper Extremity   Shoulder Abduction: 2/5   Shoulder Internal Rotation: 2/5   Shoulder External Rotation: 2/5   Supraspinatus: 2/5/5   Subscapularis: 2/5/5   Biceps: 2/5/5   Left Upper Extremity  Shoulder Abduction: 5/5   Shoulder Internal Rotation: 5/5   Shoulder External Rotation: 5/5   Supraspinatus: 5/5/5   Subscapularis: 5/5/5   Biceps: 5/5/5     Vascular Exam     Right Pulses      Radial:                    1+      Left Pulses      Radial:                    2+      Capillary Refill  Right Hand: normal capillary refill  Left Hand: normal capillary refill    I have personally reviewed the following imaging and agree with the radiologist's findings of:   XR Shoulder: No acute findings. Mild degenerative changes.     Assessment:     Encounter Diagnoses   Name Primary?    Rotator cuff impingement syndrome of right shoulder Yes    Rotator cuff tendonitis, right     Chronic right shoulder pain     Chronic bursitis of right shoulder     Occlusion of right subclavian artery         Plan:     F/u with Cardiology - Referral for Vascular Surgery   Patients declines Physical Therapy  Shoulder injection   Voltaren gel - no NSAIDs due to history of gastritis and liver fibrosis  Will plan for repeat MRI after Stent and cleared by Vascular Surgery    Right Shoulder Injection Report:  After verbal consent was obtained for right shoulder injection, patient ID, site, and side were verified.  The  right  Shoulder was sterilly prepped in the standard fashion.  A 22-gauge needle was introduced into right subacromial space from the posterior portal approach without complication. The right shoulder was then injected with 2 cc of bupivicaine/xylocaine and 1 cc of kenalog.  A sterile bandaid was applied.  The patient was informed to apply an ice pack  approximately 10min once arriving home and not to do anything strenuous for 24hours. She was instructed to call if there were any problems. The patient was discharged in stable condition.      Patient verbalizes understanding and agrees with the above plan.    Vane Garcia PA-C  Orthopedic Surgery/Sports Medicine

## 2018-11-19 NOTE — PROGRESS NOTES
2 pt identifiers verified, pt notified to wait 15 minutes after injection in [facility], 150 mg of Depo Provera administered IM to pt's left ventrogluteal. Pt tolerated well. Next injection scheduled.

## 2018-11-20 RX ORDER — MONTELUKAST SODIUM 4 MG/1
TABLET, CHEWABLE ORAL
Qty: 180 TABLET | Refills: 5 | Status: SHIPPED | OUTPATIENT
Start: 2018-11-20 | End: 2020-01-15

## 2018-11-28 ENCOUNTER — OFFICE VISIT (OUTPATIENT)
Dept: CARDIOLOGY | Facility: CLINIC | Age: 43
End: 2018-11-28
Payer: MEDICAID

## 2018-11-28 ENCOUNTER — TELEPHONE (OUTPATIENT)
Dept: ORTHOPEDICS | Facility: CLINIC | Age: 43
End: 2018-11-28

## 2018-11-28 VITALS
HEART RATE: 102 BPM | HEIGHT: 70 IN | WEIGHT: 217.38 LBS | DIASTOLIC BLOOD PRESSURE: 72 MMHG | BODY MASS INDEX: 31.12 KG/M2 | SYSTOLIC BLOOD PRESSURE: 108 MMHG

## 2018-11-28 DIAGNOSIS — Z79.4 TYPE 2 DIABETES MELLITUS WITH DIABETIC NEUROPATHY, WITH LONG-TERM CURRENT USE OF INSULIN: ICD-10-CM

## 2018-11-28 DIAGNOSIS — I70.8 OCCLUSION OF RIGHT SUBCLAVIAN ARTERY: ICD-10-CM

## 2018-11-28 DIAGNOSIS — E78.5 HYPERLIPIDEMIA, UNSPECIFIED HYPERLIPIDEMIA TYPE: ICD-10-CM

## 2018-11-28 DIAGNOSIS — Q87.40 MARFAN'S SYNDROME: Primary | ICD-10-CM

## 2018-11-28 DIAGNOSIS — I10 ESSENTIAL HYPERTENSION: ICD-10-CM

## 2018-11-28 DIAGNOSIS — I73.9 PAD (PERIPHERAL ARTERY DISEASE): ICD-10-CM

## 2018-11-28 DIAGNOSIS — E11.40 TYPE 2 DIABETES MELLITUS WITH DIABETIC NEUROPATHY, WITH LONG-TERM CURRENT USE OF INSULIN: ICD-10-CM

## 2018-11-28 PROCEDURE — 99213 OFFICE O/P EST LOW 20 MIN: CPT | Mod: PBBFAC,PO | Performed by: INTERNAL MEDICINE

## 2018-11-28 PROCEDURE — 99214 OFFICE O/P EST MOD 30 MIN: CPT | Mod: S$PBB,,, | Performed by: INTERNAL MEDICINE

## 2018-11-28 PROCEDURE — 99999 PR PBB SHADOW E&M-EST. PATIENT-LVL III: CPT | Mod: PBBFAC,,, | Performed by: INTERNAL MEDICINE

## 2018-11-28 RX ORDER — ATORVASTATIN CALCIUM 80 MG/1
80 TABLET, FILM COATED ORAL DAILY
Qty: 30 TABLET | Refills: 5 | Status: SHIPPED | OUTPATIENT
Start: 2018-11-28 | End: 2019-06-08 | Stop reason: SDUPTHER

## 2018-11-28 NOTE — PROGRESS NOTES
Subjective:   Patient ID:  Jenifer Westfall is a 43 y.o. female who presents for follow up of Annual Exam and Hypertension      40 yo ,female, 6 months f/u  PMH PAD (right SUBL occluded by Cath in ), s/o bilateral STEPHEN PTCA by Dr. Landeros in , Marfan's Dz, DM, HTN, HLD.  Quit smoking now  She has Marfan's Dz dx'ed at the age of 9, with ectopia lentis. She is the first one in the family having Marfan's and her younger son has Dx. Her ophthalmologist is at [facility].   Off work due to OA,   ECHO on 01-:  1 - Normal left ventricular systolic function (EF 55-60%).   2 - Normal left ventricular diastolic function.   3 - Normal right ventricular systolic function .   UE US on 01-:  Cannot exclude hemodynamically significant inflow stenosis on right side.   Left WBI 1.0; right WBI 0.87  EKG on 12-: NSR    She denied chest pain, dyspnea on exertion, palpitation, fainting, PND, orthopnea, syncope.  Still left hip pain.  Right arm weaker than left. She has right arm low BP compared to left one.  Left ankle swelling for a week. Left knee pain.                Past Medical History:   Diagnosis Date    Arthritis     Diabetes mellitus 2009     11/09/2015 Insulin x 2 years 2013    DM (diabetes mellitus) 2009     03/01/2017 Insulin x 3 years 2013    DM (diabetes mellitus) 2009     am 06/04/218 Insulin x 4 years    Hepatitis C     Hyperlipidemia     Hypertension     IBS (irritable bowel syndrome)     Kidney stone     Marfan syndrome     Mitral valve prolapse        Past Surgical History:   Procedure Laterality Date    ANGIOGRAM, LOWER EXTREMITY- LEFT LEG N/A 6/26/2018    Performed by [facility] at [facility] CATH LAB    ANGIOGRAPHY OF LOWER EXTREMITY N/A 6/26/2018    Procedure: ANGIOGRAM, LOWER EXTREMITY- LEFT LEG;  Surgeon: Roscoe Landeros MD;  Location: [facility] CATH LAB;  Service: Peripheral Vascular;  Laterality: N/A;    APPENDECTOMY      APPENDECTOMY-LAPAROSCOPIC  "N/A 2015    Performed by Naseem Anne MD at [facility] OR    BUNIONECTOMY      both feet     SECTION      x 2    CHOLECYSTECTOMY      INCISION AND DRAINAGE (I&D), ABSCESS Left 2015    Performed by Uriel Daniel MD at [facility] OR    TUBAL LIGATION         Social History     Tobacco Use    Smoking status: Former Smoker     Packs/day: 0.20     Years: 20.00     Pack years: 4.00     Types: Cigarettes     Last attempt to quit: 2015     Years since quitting: 3.6    Smokeless tobacco: Never Used    Tobacco comment: "once in a blue moon"   Substance Use Topics    Alcohol use: No     Alcohol/week: 0.0 oz    Drug use: No       Family History   Problem Relation Age of Onset    Heart disease Mother     Thrombophilia Father         DVT    Hypertension Father     Stroke Maternal Aunt     Heart disease Maternal Aunt     Stroke Maternal Uncle     Heart disease Maternal Uncle     Breast cancer Neg Hx     Colon cancer Neg Hx     Ovarian cancer Neg Hx          Review of Systems   Constitution: Negative for decreased appetite, diaphoresis, fever, weakness, malaise/fatigue and night sweats.   HENT: Negative for nosebleeds.    Eyes: Negative for blurred vision and double vision.   Cardiovascular: Negative for chest pain, claudication, dyspnea on exertion, irregular heartbeat, leg swelling, near-syncope, orthopnea, palpitations, paroxysmal nocturnal dyspnea and syncope.   Respiratory: Negative for cough, shortness of breath, sleep disturbances due to breathing, snoring, sputum production and wheezing.    Endocrine: Negative for cold intolerance and polyuria.   Hematologic/Lymphatic: Does not bruise/bleed easily.   Skin: Negative for rash.   Musculoskeletal: Positive for arthritis and joint pain. Negative for back pain, falls, joint swelling and neck pain.   Gastrointestinal: Negative for abdominal pain, heartburn, nausea and vomiting.   Genitourinary: Negative for dysuria, frequency and hematuria. "   Neurological: Negative for difficulty with concentration, dizziness, focal weakness, headaches, light-headedness, numbness and seizures.   Psychiatric/Behavioral: Negative for depression, memory loss and substance abuse. The patient does not have insomnia.    Allergic/Immunologic: Negative for HIV exposure and hives.       Objective:   Physical Exam   Constitutional: She is oriented to person, place, and time. She appears well-developed and well-nourished.   HENT:   Head: Normocephalic.   Eyes: Pupils are equal, round, and reactive to light.   Neck: Normal carotid pulses and no JVD present. Carotid bruit is not present. No thyromegaly present.   Cardiovascular: Normal rate, regular rhythm, normal heart sounds, intact distal pulses and normal pulses.  No extrasystoles are present. PMI is not displaced. Exam reveals no gallop, no S3 and no friction rub.   No murmur heard.  Pulses:       Dorsalis pedis pulses are 2+ on the right side, and 2+ on the left side.   Pulmonary/Chest: Effort normal and breath sounds normal. No stridor. No respiratory distress. She has no wheezes. She has no rales.   Abdominal: Soft. Bowel sounds are normal. There is no tenderness. There is no rebound.   Musculoskeletal: Normal range of motion. She exhibits no edema or tenderness.   Neurological: She is alert and oriented to person, place, and time.   Skin: Skin is warm, dry and intact. No rash noted. No erythema.   Psychiatric: She has a normal mood and affect. Her behavior is normal.   Nursing note and vitals reviewed.      Lab Results   Component Value Date    CHOL 164 02/03/2017    CHOL 175 10/07/2015    CHOL 184 07/08/2015     Lab Results   Component Value Date    HDL 30 (L) 02/03/2017    HDL 33 (L) 10/07/2015    HDL 32 (L) 07/08/2015     Lab Results   Component Value Date    LDLCALC 103.8 02/03/2017    LDLCALC 115.0 10/07/2015    LDLCALC 115.4 07/08/2015     Lab Results   Component Value Date    TRIG 151 (H) 02/03/2017    TRIG 135  10/07/2015    TRIG 183 (H) 07/08/2015     Lab Results   Component Value Date    CHOLHDL 18.3 (L) 02/03/2017    CHOLHDL 18.9 (L) 10/07/2015    CHOLHDL 17.4 (L) 07/08/2015       Chemistry        Component Value Date/Time     (L) 10/14/2018 1820    K 3.7 10/14/2018 1820     10/14/2018 1820    CO2 18 (L) 10/14/2018 1820    BUN 12 10/14/2018 1820    CREATININE 0.8 10/14/2018 1820     (H) 10/14/2018 1820        Component Value Date/Time    CALCIUM 9.6 10/14/2018 1820    ALKPHOS 112 10/14/2018 1820    AST 33 10/14/2018 1820    ALT 38 10/14/2018 1820    BILITOT 0.4 10/14/2018 1820    ESTGFRAFRICA >60 10/14/2018 1820    EGFRNONAA >60 10/14/2018 1820          Lab Results   Component Value Date    HGBA1C 10.9 (H) 06/26/2018     Lab Results   Component Value Date    TSH 0.849 10/07/2015     Lab Results   Component Value Date    INR 0.9 03/06/2017    INR 0.9 10/16/2016    INR 0.9 09/05/2016     Lab Results   Component Value Date    WBC 11.59 10/14/2018    HGB 16.1 (H) 10/14/2018    HCT 44.9 10/14/2018    MCV 90 10/14/2018     10/14/2018     BMP  Sodium   Date Value Ref Range Status   10/14/2018 135 (L) 136 - 145 mmol/L Final     Potassium   Date Value Ref Range Status   10/14/2018 3.7 3.5 - 5.1 mmol/L Final     Chloride   Date Value Ref Range Status   10/14/2018 103 95 - 110 mmol/L Final     CO2   Date Value Ref Range Status   10/14/2018 18 (L) 23 - 29 mmol/L Final     BUN, Bld   Date Value Ref Range Status   10/14/2018 12 6 - 20 mg/dL Final     Creatinine   Date Value Ref Range Status   10/14/2018 0.8 0.5 - 1.4 mg/dL Final     Calcium   Date Value Ref Range Status   10/14/2018 9.6 8.7 - 10.5 mg/dL Final     Anion Gap   Date Value Ref Range Status   10/14/2018 14 8 - 16 mmol/L Final     eGFR if    Date Value Ref Range Status   10/14/2018 >60 >60 mL/min/1.73 m^2 Final     eGFR if non    Date Value Ref Range Status   10/14/2018 >60 >60 mL/min/1.73 m^2 Final     Comment:      Calculation used to obtain the estimated glomerular filtration  rate (eGFR) is the CKD-EPI equation.        BNP  @LABRCNTIP(BNP,BNPTRIAGEBLO)@  @LABRCNTIP(troponini)@  CrCl cannot be calculated (Patient's most recent lab result is older than the maximum 7 days allowed.).  No results found in the last 24 hours.  No results found in the last 24 hours.  No results found in the last 24 hours.    Assessment:      1. Marfan's syndrome    2. Essential hypertension    3. Hyperlipidemia, unspecified hyperlipidemia type    4. PAD (peripheral artery disease)    5. Occlusion of right subclavian artery    6. Type 2 diabetes mellitus with diabetic neuropathy, with long-term current use of insulin      BP controlled      A1c 10.1 in 2017  Plan:   Increase Lipitor from 40 mg to 80 mg   Check CMP, lipid profile and A1C in 3 months  Continue ASA, Plavix, BB and Lisinopril.  Repeat echo  Refer to endo  RTC in 6 months

## 2018-11-28 NOTE — MR AVS SNAPSHOT
[facility]  [address]  [facility] [address]  Phone: [phone]  Fax: [phone]                  Jenifer Westfall   3/29/2017 1:20 PM   Office Visit    Description:  Female : 1975   Provider:  Rosalinda Huerta MD   Department:  [facility]           Reason for Visit     Abscess           Diagnoses this Visit        Comments    Abscess of right groin    -  Primary     Obesity (BMI 30.0-34.9)         Tobacco abuse                To Do List           Future Appointments        Provider Department Dept Phone    3/31/2017 10:20 AM Stephanie Hernández NP [facility] [phone]    2017 1:20 PM Rick Stein MD [facility] - Interventional Pain [phone]    2017 11:30 AM GERI MitchellC, CDE [facility] - Diabetes [phone]    2017 3:00 PM MITCH GarciaC [facility] - Rheumatology [phone]    3/8/2018 10:45 AM Dale Hopson OD [facility] - Ophthalmology [phone]      Goals (5 Years of Data)     None       These Medications        Disp Refills Start End    mupirocin (BACTROBAN) 2 % ointment 30 g 0 3/29/2017 2017    Apply topically 3 (three) times daily. - Topical (Top)    Pharmacy: [facility] in [facility] [address], [place] [address] Ph #: [phone]         [facility]     [facility] -  Assistance  Registered nurses in the [facility] provide clinical advisement, health education, appointment booking, and other advisory services.  Call for this free service at [phone].             Medications           Message regarding Medications     Verify the changes and/or additions to your medication regime listed below are the same as discussed with your clinician today.  If any of these changes or additions are incorrect, please notify your healthcare provider.        START taking these NEW medications        Refills     "mupirocin (BACTROBAN) 2 % ointment 0    Sig: Apply topically 3 (three) times daily.    Class: Normal    Route: Topical (Top)           Verify that the below list of medications is an accurate representation of the medications you are currently taking.  If none reported, the list may be blank. If incorrect, please contact your healthcare provider. Carry this list with you in case of emergency.           Current Medications     ascorbic acid (VITAMIN C) 100 MG tablet Take 100 mg by mouth once daily.    aspirin (ECOTRIN) 81 MG EC tablet Take 81 mg by mouth once daily.    atenolol (TENORMIN) 50 MG tablet TAKE 1 TABLET BY MOUTH ONCE DAILY    atorvastatin (LIPITOR) 40 MG tablet TAKE 1 TABLET BY MOUTH ONCE DAILY    clopidogrel (PLAVIX) 75 mg tablet TAKE 1 TABLET BY MOUTH ONCE DAILY    colestipol (COLESTID) 1 gram Tab TAKE 2 TABLETS BY MOUTH THREE TIMES DAILY. DO NOT TAKE OTHER MEDICATION WITHIN 1 HOUR BEFORE OR 4 HOURS AFTER TAKING COLESTIPOL.    diclofenac sodium (VOLTAREN) 1 % Gel Apply 2 g topically 4 (four) times daily.    EASY TOUCH 31 gauge x 5/16" Ndle USE FIVE TIMES DAILY WITH LANTUS AND HUMALOG    ERGOCALCIFEROL, VITAMIN D2, (VITAMIN D ORAL) Take 1 tablet by mouth once daily.     fluoxetine (PROZAC) 40 MG capsule TAKE 1 CAPSULE BY MOUTH ONCE DAILY    fluticasone (FLONASE) 50 mcg/actuation nasal spray 1 spray by Each Nare route once daily.    gabapentin (NEURONTIN) 400 MG capsule TAKE 1 CAPSULE BY MOUTH 3 TIMES DAILY    glipiZIDE (GLUCOTROL) 10 MG tablet Take 1 tablet (10 mg total) by mouth 2 (two) times daily.    insulin lispro protamin-lispro 100 unit/mL (50-50) InPn Inject 40 Units into the skin 2 (two) times daily before meals.    insulin needles, disposable, (BD INSULIN PEN NEEDLE UF SHORT) 31 X 5/16 " Ndle USE TWICE DAILY AS DIRECTED    ketoconazole (NIZORAL) 2 % cream Apply to affected area daily for 14 days.    lisinopril 10 MG tablet TAKE 1 TABLET (10 MG TOTAL) BY MOUTH ONCE DAILY.    nicotine (NICODERM " "CQ) 21 mg/24 hr Place 1 patch onto the skin once daily.    oxycodone-acetaminophen (PERCOCET)  mg per tablet Take 0.5-1 tablets by mouth every 4 (four) hours as needed for Pain.    sulfamethoxazole-trimethoprim 800-160mg (BACTRIM DS) 800-160 mg Tab Take 1 tablet by mouth every 12 (twelve) hours.    tizanidine (ZANAFLEX) 4 MG tablet Take 1 tablet (4 mg total) by mouth every evening.    chlorhexidine (PERIDEX) 0.12 % solution     mupirocin (BACTROBAN) 2 % ointment Apply topically 3 (three) times daily.           Clinical Reference Information           Your Vitals Were     BP Pulse Temp Height Weight       110/58 85 98.3 °F (36.8 °C) (Oral) 5' 10" (1.778 m) 100.7 kg (222 lb)     SpO2 BMI             96% 31.85 kg/m2         Blood Pressure          Most Recent Value    BP  (!)  110/58      Allergies as of 3/29/2017     Compazine [Prochlorperazine Edisylate]    Levaquin [Levofloxacin]    Metformin    Ibuprofen    Morphine      Immunizations Administered on Date of Encounter - 3/29/2017     None      Instructions      Abscess (Incision & Drainage)  An abscess (sometimes called a boil) occurs when bacteria get trapped under the skin and start to grow. Pus forms inside the abscess as the body responds to the bacteria. An abscess can happen with an insect bite, ingrown hair, blocked oil gland, pimple, cyst, or puncture wound.  Your healthcare provider has drained the pus from your abscess. If the abscess pocket was large, your healthcare provider may have inserted gauze packing. Your provider will need to remove and possibly replace it on your next visit. You may not need antibiotics to treat a simple abscess, unless the infection is spreading into the skin around the wound (cellulitis).  Healing of the wound will take about 1 to 2 weeks, depending on the size of the abscess. Healthy tissue will grow from the bottom and sides of the opening until it seals over.  Home care  These tips can help your wound heal:  · The " wound may drain for the first 2 days. Cover the wound with a clean dry dressing. If the dressing becomes soaked with blood or pus, change it.  · If a gauze packing was placed inside the abscess cavity, you may be told to remove it yourself. You may do this in the shower. Once the packing is removed, you should wash the area in the shower or bath 3 to 4 times a day, until the skin opening has closed. Make sure you wash your hands after changing the packing or cleaning the wound.  · If you were prescribed antibiotics, take them as directed until they are all gone.  · You may use acetaminophen or ibuprofen to control pain, unless another pain medicine was prescribed. If you have liver disease or ever had a stomach ulcer, talk with your doctor before using these medicines.  Follow-up care  Follow up with your healthcare provider, or as advised. If a gauze packing was inserted in your wound, it should be removed in 1 to 2 days. Check your wound every day for the signs of worsening infection listed below.  When to seek medical advice  Call your healthcare provider right away if any of these occur:  · Increasing redness or swelling  · Red streaks in the skin leading away from the wound  · Increasing local pain or swelling  · Continued pus draining from the wound 2 days after treatment  · Fever of 100.4ºF (38ºC) or higher, or as directed by your healthcare provider  · Boil returns when you are at home  Date Last Reviewed: 9/1/2016  © 8095-0478 [employer]. [address] [place] [address], [place] [address]. All rights reserved. This information is not intended as a substitute for professional medical care. Always follow your healthcare professional's instructions.      Hibiclens  Warm compresses       Smoking Cessation     If you would like to quit smoking:   You may be eligible for free services if you are a [place] resident and started smoking cigarettes before September 1, 1988.  Call the Smoking Cessation Trust  (SCT) toll free at (825) 630-7841 or (144) 817-6551.   Call 1-800-QUIT-NOW if you do not meet the above criteria.            Language Assistance Services     ATTENTION: Language assistance services are available, free of charge. Please call [phone].      ATENCIÓN: Si habla judit, tiene a wilson disposición servicios gratuitos de asistencia lingüística. Llame al [phone].     CHÚ Ý: N?u b?n nói Ti?ng Vi?t, có các d?ch v? h? tr? ngôn ng? mi?n phí dành cho b?n. G?i s? [phone].         [facility] complies with applicable Federal civil rights laws and does not discriminate on the basis of race, color, national origin, age, disability, or sex.         =

## 2018-11-29 ENCOUNTER — PATIENT MESSAGE (OUTPATIENT)
Dept: ORTHOPEDICS | Facility: CLINIC | Age: 43
End: 2018-11-29

## 2018-11-29 NOTE — TELEPHONE ENCOUNTER
Attempted to contact pt regarding scheduling a 7 week follow up with Vane Garcia PA-C. No answer. Left voicemail. AL, LPN.

## 2018-12-04 ENCOUNTER — TELEPHONE (OUTPATIENT)
Dept: PHARMACY | Facility: CLINIC | Age: 43
End: 2018-12-04

## 2018-12-04 NOTE — TELEPHONE ENCOUNTER
Called and spoke with patient notifying her Diclofenac Sodium 1% Gel PA approved for a copayment of $2.00.    We will ship out once patient calls back with credit card information.    PA Information:  DENY  [phone]  PA Case ID # [id number]  PA approval dates: 12/4/18-12/4/19  PA Approved for Diclofenac Sodium 1% Gel 100 grams per 13 days    Thanks,   Margoth Cervantes CPhT, B.A  Patient Care Advocate   [facility]  Phone: [phone] Ext [phone]  Fax: [phone]

## 2018-12-10 ENCOUNTER — DOCUMENTATION ONLY (OUTPATIENT)
Dept: CARDIOLOGY | Facility: CLINIC | Age: 43
End: 2018-12-10

## 2018-12-10 ENCOUNTER — CLINICAL SUPPORT (OUTPATIENT)
Dept: CARDIOLOGY | Facility: CLINIC | Age: 43
End: 2018-12-10
Attending: INTERNAL MEDICINE
Payer: MEDICAID

## 2018-12-10 DIAGNOSIS — Q87.40 MARFAN'S SYNDROME: ICD-10-CM

## 2018-12-10 LAB
DIASTOLIC DYSFUNCTION: NO
ESTIMATED PA SYSTOLIC PRESSURE: 43.2
RETIRED EF AND QEF - SEE NOTES: 55 (ref 55–65)
TRICUSPID VALVE REGURGITATION: ABNORMAL

## 2018-12-10 PROCEDURE — 93306 TTE W/DOPPLER COMPLETE: CPT | Mod: 26,S$PBB,, | Performed by: NUCLEAR MEDICINE

## 2018-12-10 NOTE — PROGRESS NOTES
Pt presented for an echocardiogram today.  This study was performed in conjunction with Optison contrast agent because of poor endocardial visualization.  Procedure was explained to the patient, she verbalized understanding and signed the consent.  IV, 24ga x 1 attempts, was started in the right forearm using aseptic technique.  Administered a total of 4 ml of Optison (lot # [id number], expiration date 02/27/2020).  Patient tolerated the procedure well.  IV discontinued, pressure dressing applied.

## 2018-12-11 ENCOUNTER — TELEPHONE (OUTPATIENT)
Dept: FAMILY MEDICINE | Facility: CLINIC | Age: 43
End: 2018-12-11

## 2018-12-11 NOTE — TELEPHONE ENCOUNTER
Message left for patient to return call in re: received form from dental office regarding clearance for surgery. Will need an appointment with Cardiology.

## 2018-12-13 DIAGNOSIS — M70.62 TROCHANTERIC BURSITIS OF LEFT HIP: ICD-10-CM

## 2018-12-13 DIAGNOSIS — M75.51 CHRONIC BURSITIS OF RIGHT SHOULDER: Chronic | ICD-10-CM

## 2018-12-13 RX ORDER — INSULIN GLARGINE 100 [IU]/ML
INJECTION, SOLUTION SUBCUTANEOUS
Qty: 15 ML | Refills: 2 | OUTPATIENT
Start: 2018-12-13

## 2018-12-13 RX ORDER — INSULIN GLULISINE 100 [IU]/ML
INJECTION, SOLUTION SUBCUTANEOUS
Qty: 15 ML | Refills: 2 | OUTPATIENT
Start: 2018-12-13

## 2018-12-13 RX ORDER — FLUOXETINE HYDROCHLORIDE 40 MG/1
CAPSULE ORAL
Qty: 90 CAPSULE | Refills: 0 | OUTPATIENT
Start: 2018-12-13

## 2018-12-13 NOTE — TELEPHONE ENCOUNTER
Med refill denied.  Pt was notified on 8/13/18 that she needed an appt with current fasting labs before she could receive any more medication.

## 2018-12-18 RX ORDER — TRAMADOL HYDROCHLORIDE 50 MG/1
50 TABLET ORAL EVERY 12 HOURS
Qty: 60 TABLET | Refills: 0 | OUTPATIENT
Start: 2018-12-18

## 2018-12-18 RX ORDER — INSULIN GLULISINE 100 [IU]/ML
INJECTION, SOLUTION SUBCUTANEOUS
Qty: 15 ML | Refills: 2 | OUTPATIENT
Start: 2018-12-18

## 2018-12-18 RX ORDER — INSULIN GLARGINE 100 [IU]/ML
INJECTION, SOLUTION SUBCUTANEOUS
Qty: 15 ML | Refills: 2 | OUTPATIENT
Start: 2018-12-18

## 2018-12-27 ENCOUNTER — PATIENT OUTREACH (OUTPATIENT)
Dept: ADMINISTRATIVE | Facility: HOSPITAL | Age: 43
End: 2018-12-27

## 2018-12-28 RX ORDER — FAMOTIDINE 20 MG/1
20 TABLET, FILM COATED ORAL 2 TIMES DAILY
Qty: 60 TABLET | Refills: 11 | Status: SHIPPED | OUTPATIENT
Start: 2018-12-28 | End: 2019-08-06

## 2018-12-31 ENCOUNTER — TELEPHONE (OUTPATIENT)
Dept: OBSTETRICS AND GYNECOLOGY | Facility: CLINIC | Age: 43
End: 2018-12-31

## 2018-12-31 NOTE — TELEPHONE ENCOUNTER
Spoke to patient and she states that she has been having some irregular bleeding and wanted to know if this was normal being on the depo injection. Informed patient that this is normal and that it can cause spotting or irregular bleeding for a long period of time. Told patient if she starts feeling weak, dizzy, or starts bleeding to the point where she needs to wear a pad and change it every hour to let us know. Patient verbalized understanding.

## 2018-12-31 NOTE — TELEPHONE ENCOUNTER
----- Message from Tish Wernerite sent at 12/31/2018  9:37 AM CST -----  Contact: Pt   Pt called and stated that she started her cycle and it is not time for her depo shot but the shot is supposed to keep her from having a cycle. She can be reached at [phone].  Thanks,  TF

## 2019-01-08 RX ORDER — DICYCLOMINE HYDROCHLORIDE 20 MG/1
20 TABLET ORAL 4 TIMES DAILY
Qty: 120 TABLET | Refills: 0 | Status: SHIPPED | OUTPATIENT
Start: 2019-01-08 | End: 2019-03-11 | Stop reason: SDUPTHER

## 2019-01-10 RX ORDER — CLOPIDOGREL BISULFATE 75 MG/1
75 TABLET ORAL DAILY
Qty: 90 TABLET | Refills: 3 | Status: SHIPPED | OUTPATIENT
Start: 2019-01-10 | End: 2020-01-28

## 2019-01-23 ENCOUNTER — OFFICE VISIT (OUTPATIENT)
Dept: ORTHOPEDICS | Facility: CLINIC | Age: 44
End: 2019-01-23
Payer: MEDICAID

## 2019-01-23 VITALS
HEART RATE: 80 BPM | WEIGHT: 217 LBS | RESPIRATION RATE: 18 BRPM | DIASTOLIC BLOOD PRESSURE: 80 MMHG | BODY MASS INDEX: 31.07 KG/M2 | TEMPERATURE: 98 F | SYSTOLIC BLOOD PRESSURE: 137 MMHG | HEIGHT: 70 IN

## 2019-01-23 DIAGNOSIS — I70.8 OCCLUSION OF RIGHT SUBCLAVIAN ARTERY: ICD-10-CM

## 2019-01-23 DIAGNOSIS — M75.41 ROTATOR CUFF IMPINGEMENT SYNDROME OF RIGHT SHOULDER: ICD-10-CM

## 2019-01-23 DIAGNOSIS — G89.29 CHRONIC RIGHT SHOULDER PAIN: Primary | ICD-10-CM

## 2019-01-23 DIAGNOSIS — M25.512 ACUTE PAIN OF LEFT SHOULDER: ICD-10-CM

## 2019-01-23 DIAGNOSIS — M25.511 CHRONIC RIGHT SHOULDER PAIN: Primary | ICD-10-CM

## 2019-01-23 DIAGNOSIS — M75.81 ROTATOR CUFF TENDONITIS, RIGHT: ICD-10-CM

## 2019-01-23 PROCEDURE — 99214 OFFICE O/P EST MOD 30 MIN: CPT | Mod: S$PBB,,, | Performed by: PHYSICIAN ASSISTANT

## 2019-01-23 PROCEDURE — 99999 PR PBB SHADOW E&M-EST. PATIENT-LVL V: CPT | Mod: PBBFAC,,, | Performed by: PHYSICIAN ASSISTANT

## 2019-01-23 PROCEDURE — 99999 PR PBB SHADOW E&M-EST. PATIENT-LVL V: ICD-10-PCS | Mod: PBBFAC,,, | Performed by: PHYSICIAN ASSISTANT

## 2019-01-23 PROCEDURE — 99214 PR OFFICE/OUTPT VISIT, EST, LEVL IV, 30-39 MIN: ICD-10-PCS | Mod: S$PBB,,, | Performed by: PHYSICIAN ASSISTANT

## 2019-01-23 PROCEDURE — 99215 OFFICE O/P EST HI 40 MIN: CPT | Mod: PBBFAC,PN | Performed by: PHYSICIAN ASSISTANT

## 2019-01-23 NOTE — PROGRESS NOTES
Subjective:     Patient ID: Jenifer Westfall is a 43 y.o. female.    Chief Complaint: Pain of the Right Shoulder    Ms. Westfall is a 42 yo female who presents for right shoulder pain. This is a chronic problem. Episode began 3-4 years ago. She was seen by Dr. Gotti and Lucero Landeros PA-C in our department in the past for this problem. 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 She was scheduled for a shoulder arthroscopy in October 2017, but had to cancel due to being hospitalized for anaphylaxis in response to contrast dye. Of note since that time she has had several stents placed by Vascular Surgery including 3 in her bilateral legs in June 2018. She was also told that she has a 100% blockage in one of the vessels in her right arm and will need to have another stent placed. She is currently being referred to a new vascular surgeon to complete this procedure.     Today, she rates her pain as 6/10 and describes it as shooting in quality. Pain is worsened by movement of the arm. She feels that her pain limits the function of her arm and her activity level. Patient had injection at last visit with some relief of symptoms for approximately a week. Patient saw Cardiology who said the occlusion of right subclavian artery is nonoperative. She is unable to take NSAIDs due to gastritis. Patient denies swelling, numbness, and tingling. Patient has been putting Voltaren gel and Tramadol with minimal relief of symptoms.    Patient also complains of new onset pain of left shoulder. Pain is intermittent. Episode began in the last 6 weeks. Pain worsened by ROM. Patient denies PT for left shoulder.         Pain   Pertinent negatives include no chest pain, fever, joint swelling, numbness or rash.       Past Medical History:   Diagnosis Date    Arthritis     Diabetes mellitus 2009     11/09/2015 Insulin x 2 years 2013    DM (diabetes mellitus) 2009     03/01/2017 Insulin x 3 years 2013    DM (diabetes  "mellitus)      am  Insulin x 4 years    Hepatitis C     Hyperlipidemia     Hypertension     IBS (irritable bowel syndrome)     Kidney stone     Marfan syndrome     Mitral valve prolapse      Past Surgical History:   Procedure Laterality Date    ANGIOGRAM, LOWER EXTREMITY- LEFT LEG N/A 2018    [facility]    APPENDECTOMY      APPENDECTOMY-LAPAROSCOPIC N/A 2015    Per[facility]aseem Anne [facility] at [facility] OR    BUNIONECTOMY      both feet     SECTION      x 2    CHOLECYSTECTOMY      INCISION AND DRAINAGE (I&D), ABSCESS Left 2015    [facility] OR    TUBAL LIGATION       Family History   Problem Relation Age of Onset    Heart disease Mother     Thrombophilia Father         DVT    Hypertension Father     Stroke Maternal Aunt     Heart disease Maternal Aunt     Stroke Maternal Uncle     Heart disease Maternal Uncle     Breast cancer Neg Hx     Colon cancer Neg Hx     Ovarian cancer Neg Hx      Social History     Socioeconomic History    Marital status: Single     Spouse name: Not on file    Number of children: Not on file    Years of education: Not on file    Highest education level: Not on file   Social Needs    Financial resource strain: Not on file    Food insecurity - worry: Not on file    Food insecurity - inability: Not on file    Transportation needs - medical: Not on file    Transportation needs - non-medical: Not on file   Occupational History    Not on file   Tobacco Use    Smoking status: Former Smoker     Packs/day: 0.20     Years: 20.00     Pack years: 4.00     Types: Cigarettes     Last attempt to quit: 2015     Years since quitting: 3.7    Smokeless tobacco: Never Used    Tobacco comment: "once in a blue moon"   Substance and Sexual Activity    Alcohol use: No     Alcohol/week: 0.0 oz    Drug use: No    Sexual activity: Yes     Partners: Male     Birth " control/protection: Injection   Other Topics Concern    Not on file   Social History Narrative    Not on file        Medication List           Accurate as of 1/23/19 10:52 AM. If you have any questions, ask your nurse or doctor.               CONTINUE taking these medications    ascorbic acid (vitamin C) 100 MG tablet  Commonly known as:  VITAMIN C     aspirin 81 MG EC tablet  Commonly known as:  ECOTRIN     atenolol 50 MG tablet  Commonly known as:  TENORMIN  TAKE 1 TABLET BY MOUTH ONCE DAILY     atorvastatin 80 MG tablet  Commonly known as:  LIPITOR  Take 1 tablet (80 mg total) by mouth once daily.     cetirizine 10 MG tablet  Commonly known as:  ZYRTEC     cholestyramine 4 gram packet  Commonly known as:  QUESTRAN  Take 1 packet (4 g total) by mouth 3 (three) times daily with meals.     clopidogrel 75 mg tablet  Commonly known as:  PLAVIX  Take 1 tablet (75 mg total) by mouth once daily.     colestipol 1 gram Tab  Commonly known as:  COLESTID  TAKE 2 TABLETS BY MOUTH THREE TIMES DAILY. DO NOT TAKE OTHER MEDICATION WITHIN 1 HOUR BEFORE OR 4 HOURS AFTER TAKING COLESTIPOL.     diclofenac sodium 1 % Gel  Commonly known as:  VOLTAREN  Apply 2 grams topically 4 (four) times daily. for 10 days     dicyclomine 20 mg tablet  Commonly known as:  BENTYL  Take 1 tablet (20 mg total) by mouth 4 (four) times daily.     famotidine 20 MG tablet  Commonly known as:  PEPCID  Take 1 tablet (20 mg total) by mouth 2 (two) times daily.     FLUoxetine 40 MG capsule  TAKE 1 CAPSULE BY MOUTH ONCE DAILY     gabapentin 400 MG capsule  Commonly known as:  NEURONTIN  Take 1 capsule (400 mg total) by mouth 3 (three) times daily.     glipiZIDE 10 MG tablet  Commonly known as:  GLUCOTROL  Take 1 tablet (10 mg total) by mouth 2 (two) times daily.     insulin glargine 100 units/mL (3mL) SubQ pen  Commonly known as:  LANTUS SOLOSTAR U-100 INSULIN  Inject 40 Units into the skin 2 (two) times daily.     insulin glulisine U-100 100 unit/mL Inpn  "pen  Commonly known as:  APIDRA SOLOSTAR U-100 INSULIN  Inject 0.15 mLs (15 Units total) into the skin 3 (three) times daily.     lisinopril 10 MG tablet  TAKE 1 TABLET (10 MG TOTAL) BY MOUTH ONCE DAILY.     PEN NEEDLE 31 gauge x 5/16" Ndle  Generic drug:  pen needle, diabetic  USE 5 TIMES DAILY WITH LANTUS AND HUMALOG     tiZANidine 4 MG tablet  Commonly known as:  ZANAFLEX  Take 1 tablet (4 mg total) by mouth every evening.     traMADol 50 mg tablet  Commonly known as:  ULTRAM  Take 1 tablet (50 mg total) by mouth every 12 (twelve) hours.     VITAMIN D ORAL          Review of patient's allergies indicates:   Allergen Reactions    Compazine [prochlorperazine edisylate] Rash    Contrast media Anaphylaxis    Dye Anaphylaxis    Levaquin [levofloxacin] Hives and Itching    Metformin Other (See Comments)     Upset stomach    Ibuprofen Other (See Comments)     Stomach pain    Morphine Other (See Comments)     Irritable     Review of Systems   Constitution: Negative for fever and night sweats.   HENT: Negative for hearing loss.    Eyes: Negative for blurred vision and visual disturbance.   Cardiovascular: Negative for chest pain and leg swelling.   Respiratory: Negative for shortness of breath.    Endocrine: Negative for polyuria.   Hematologic/Lymphatic: Negative for bleeding problem.   Skin: Negative for rash.   Musculoskeletal: Positive for joint pain. Negative for back pain, joint swelling, muscle cramps and muscle weakness.   Gastrointestinal: Negative for melena.   Genitourinary: Negative for hematuria.   Neurological: Negative for loss of balance, numbness and paresthesias.   Psychiatric/Behavioral: Negative for altered mental status.       Objective:   Body mass index is 31.14 kg/m².  Vitals:    01/23/19 1031   BP: 137/80   Pulse: 80   Resp: 18   Temp: 98.3 °F (36.8 °C)       General: Jenifer is well-developed, well-nourished, appears stated age, in no acute distress, alert and oriented to time, place and " person.       General    Constitutional: She is oriented to person, place, and time. She appears well-developed and well-nourished.   HENT:   Head: Normocephalic and atraumatic.   Right Ear: External ear normal.   Left Ear: External ear normal.   Nose: Nose normal.   Eyes: EOM are normal. Pupils are equal, round, and reactive to light. Right eye exhibits no discharge. Left eye exhibits no discharge.   Neck: Normal range of motion.   Cardiovascular: Normal heart sounds.    Pulmonary/Chest: Effort normal. No respiratory distress.   Abdominal: Soft.   Neurological: She is alert and oriented to person, place, and time.   Psychiatric: She has a normal mood and affect. Her behavior is normal. Judgment and thought content normal.         Right Shoulder Exam     Inspection/Observation   Swelling: absent  Bruising: absent  Scars: absent  Deformity: absent  Scapular Winging: absent  Scapular Dyskinesia: negative  Atrophy: absent    Tenderness   The patient is tender to palpation of the acromioclavicular joint, acromion and supraspinatus.    Range of Motion   Active abduction:  80 abnormal   Passive abduction:  100 normal   Extension:  0 normal   Forward Flexion:  100 abnormal   Forward Elevation: 100 abnormal  Adduction: 40 normal  External Rotation 0 degrees:  50 normal   Internal rotation 0 degrees:  Sacrum normal     Tests & Signs   Cross arm: positive  Drop arm: negative  Gordon test: positive  Impingement: positive  Rotator Cuff Painful Arc/Range: severe  Lift Off Sign: positive  Active Compression Test ([place]'s Sign): positive  Yergason's Test: positive  Speed's Test: positive    Other   Sensation: normal    Left Shoulder Exam     Inspection/Observation   Swelling: absent  Bruising: absent  Scars: absent  Deformity: absent  Scapular Winging: absent  Scapular Dyskinesia: negative  Atrophy: absent    Tenderness   The patient is tender to palpation of the acromioclavicular joint.    Range of Motion   Active abduction:   90 normal   Passive abduction:  100 normal   Extension:  0 normal   Forward Flexion:  180 normal   Forward Elevation: 180 normal  Adduction: 40 normal  External Rotation 0 degrees:  50 normal   External Rotation 90 degrees: 90 normal  Internal rotation 0 degrees:  T8 normal   Internal rotation 90 degrees:  30 normal     Tests & Signs   Cross arm: negative  Drop arm: negative  Gordon test: positive  Impingement: positive  Lift Off Sign: negative  Active Compression test ([place]'s Sign): negative  Yergasons's Test: negative  Speed's Test: negative    Other   Sensation: normal       Muscle Strength   Right Upper Extremity   Shoulder Abduction: 2/5   Shoulder Internal Rotation: 2/5   Shoulder External Rotation: 2/5   Supraspinatus: 2/5/5   Subscapularis: 2/5/5   Biceps: 2/5/5   Left Upper Extremity  Shoulder Abduction: 5/5   Shoulder Internal Rotation: 5/5   Shoulder External Rotation: 5/5   Supraspinatus: 5/5/5   Subscapularis: 5/5/5   Biceps: 5/5/5     Vascular Exam     Right Pulses      Radial:                    1+      Left Pulses      Radial:                    2+      Capillary Refill  Right Hand: normal capillary refill  Left Hand: normal capillary refill    I have personally reviewed the following imaging and agree with the radiologist's findings of:   XR Shoulder: No acute findings. Mild degenerative changes.     Assessment:     Encounter Diagnoses   Name Primary?    Chronic right shoulder pain Yes    Rotator cuff impingement syndrome of right shoulder     Rotator cuff tendonitis, right     Occlusion of right subclavian artery     Acute pain of left shoulder         Plan:     XR Left Shoulder   Patients declines Physical Therapy for R and L shoulder   Voltaren gel - no NSAIDs due to history of gastritis and liver fibrosis  Patient declined steroid injection of L shoulder today  Will plan for repeat MRI after cleared by Vascular Surgery  Patient will see Dr. Landeros in vascular surgery to get letter  clearing her for shoulder surgery with right subclavian artery blockage  Pain clinic referral for chronic shoulder pain      Patient verbalizes understanding and agrees with the above plan.    Vane Garcia PA-C  Orthopedic Surgery/Sports Medicine

## 2019-01-29 ENCOUNTER — TELEPHONE (OUTPATIENT)
Dept: CARDIOLOGY | Facility: CLINIC | Age: 44
End: 2019-01-29

## 2019-01-29 NOTE — TELEPHONE ENCOUNTER
Attempted without success to contact pt.  Left vm to return call.    ----- Message from Yuliya Leyva sent at 1/29/2019 11:47 AM CST -----  Contact: pt  Pt states she's calling to be referred to another Vascular Surgeon. Doesn't wish to see Dr Landeros again and can be reached at [phone]//thanks/dbw

## 2019-02-01 ENCOUNTER — TELEPHONE (OUTPATIENT)
Dept: ORTHOPEDICS | Facility: CLINIC | Age: 44
End: 2019-02-01

## 2019-02-01 DIAGNOSIS — M25.511 CHRONIC RIGHT SHOULDER PAIN: ICD-10-CM

## 2019-02-01 DIAGNOSIS — G89.29 OTHER CHRONIC PAIN: Primary | ICD-10-CM

## 2019-02-01 DIAGNOSIS — G89.29 CHRONIC RIGHT SHOULDER PAIN: ICD-10-CM

## 2019-02-01 DIAGNOSIS — I70.8 OCCLUSION OF RIGHT SUBCLAVIAN ARTERY: ICD-10-CM

## 2019-02-06 ENCOUNTER — TELEPHONE (OUTPATIENT)
Dept: GASTROENTEROLOGY | Facility: CLINIC | Age: 44
End: 2019-02-06

## 2019-02-06 NOTE — TELEPHONE ENCOUNTER
----- Message from Yuliya Leyva sent at 1/29/2019 11:58 AM CST -----  Contact: Pt   States she was unable to drink the prep for the colonoscopy and wnts to discuss what to do /pt states she brings it back up and can be reached at [phone]//thanks/dbw

## 2019-02-08 ENCOUNTER — TELEPHONE (OUTPATIENT)
Dept: ORTHOPEDICS | Facility: CLINIC | Age: 44
End: 2019-02-08

## 2019-02-08 ENCOUNTER — TELEPHONE (OUTPATIENT)
Dept: OBSTETRICS AND GYNECOLOGY | Facility: CLINIC | Age: 44
End: 2019-02-08

## 2019-02-08 NOTE — TELEPHONE ENCOUNTER
Returned this patients call. Patient states that she went for a second opinion to [facility] Doctor, patient says the doctor says its ok for her to have surgery on her Right shoulder/arm with the blockage.. I spoke with Jose DAVIDSON, she says we need that in written from the vascular doctor clearing her for surgery. Patient states the doctor told her we can get the record though our  system here at [facility]. I informed patient that  We can  Not see records from other facilities.. Patient states she will get the documentation and bring it to us..                             ----- Message from Jenifer Guadarrama sent at 2/8/2019 11:01 AM CST -----  Contact: Patient  Type:  Needs Medical Advice    Who Called: Patient  Symptoms (please be specific): blockage in arm  How long has patient had these symptoms:   Pharmacy name and phone #:   Would the patient rather a call back or a response via [facility]? Call  Best Call Back Number: [phone]  Additional Information: Patient states she needs to talk to nurse about her visit with another physician about her blockage in her arm. Thank you

## 2019-02-08 NOTE — TELEPHONE ENCOUNTER
----- Message from Jenifer Guadarrama sent at 2/8/2019 11:03 AM CST -----  Contact: Patient  Patient needs to reschedule a cancelled depo shot, please call her back at [phone]. Thank you

## 2019-02-08 NOTE — TELEPHONE ENCOUNTER
Returned call to patient.  Depo injection nurse visit appt rescheduled to Monday, 02/11/19 at 1:30 pm, patient confirmed appointment.  Made her aware that last date of depo window is 02/14/19, she verbalized understanding.

## 2019-02-08 NOTE — TELEPHONE ENCOUNTER
Spoke to pt - gave instructions for Miralax prep.  Will mail to her after hearing from Dr. Hsu re Plavix.

## 2019-02-11 ENCOUNTER — CLINICAL SUPPORT (OUTPATIENT)
Dept: OBSTETRICS AND GYNECOLOGY | Facility: CLINIC | Age: 44
End: 2019-02-11
Payer: MEDICAID

## 2019-02-11 PROCEDURE — 99999 PR PBB SHADOW E&M-EST. PATIENT-LVL III: ICD-10-PCS | Mod: PBBFAC,,,

## 2019-02-11 PROCEDURE — 99999 PR PBB SHADOW E&M-EST. PATIENT-LVL III: CPT | Mod: PBBFAC,,,

## 2019-02-11 PROCEDURE — 96372 THER/PROPH/DIAG INJ SC/IM: CPT | Mod: PBBFAC

## 2019-02-11 PROCEDURE — 99213 OFFICE O/P EST LOW 20 MIN: CPT | Mod: PBBFAC,25

## 2019-02-11 RX ADMIN — MEDROXYPROGESTERONE ACETATE 150 MG: 150 INJECTION, SUSPENSION, EXTENDED RELEASE INTRAMUSCULAR at 01:02

## 2019-02-11 NOTE — PROGRESS NOTES
Two pt identifiers verified  pt notified to wait in clinic for 15 minutes after receiving injection, pt verbalized understanding  150mg/mL of Depo Provera administered IM to pt's right ventrogluteal.   Pt tolerated well  Next injection scheduled.

## 2019-02-19 ENCOUNTER — TELEPHONE (OUTPATIENT)
Dept: GASTROENTEROLOGY | Facility: CLINIC | Age: 44
End: 2019-02-19

## 2019-02-19 NOTE — TELEPHONE ENCOUNTER
----- Message from Daniel Hsu MD sent at 2/18/2019  3:49 PM CST -----  Regarding: RE: Anticoag Meds  Ok to hold Plavix for 5 days before the scheduled procedure. Please resume post-op ASAP.  ----- Message -----  From: Daija Worthington LPN  Sent: 2/18/2019  11:06 AM  To: Daniel Hsu MD  Subject: Anticoag Meds                                    Tang Hsu,  This patient is scheduled for a colonoscopy on 03/14/2019.  Ok with you if she stops her Plavix prior to procedure?  How long?  Thank you,  Daija/Shayla Barriga NP

## 2019-02-19 NOTE — TELEPHONE ENCOUNTER
Pt returned call - she is told that Dr. Hsu has sent notification that it is OK to stop her Plavix for 5 days prior to her procedure.  She has verbalized her understanding.

## 2019-02-26 ENCOUNTER — PATIENT MESSAGE (OUTPATIENT)
Dept: ORTHOPEDICS | Facility: CLINIC | Age: 44
End: 2019-02-26

## 2019-03-04 ENCOUNTER — OFFICE VISIT (OUTPATIENT)
Dept: RHEUMATOLOGY | Facility: CLINIC | Age: 44
End: 2019-03-04
Payer: MEDICAID

## 2019-03-04 ENCOUNTER — TELEPHONE (OUTPATIENT)
Dept: ORTHOPEDICS | Facility: CLINIC | Age: 44
End: 2019-03-04

## 2019-03-04 VITALS
HEART RATE: 83 BPM | WEIGHT: 205.94 LBS | HEIGHT: 70 IN | BODY MASS INDEX: 29.48 KG/M2 | SYSTOLIC BLOOD PRESSURE: 136 MMHG | DIASTOLIC BLOOD PRESSURE: 75 MMHG

## 2019-03-04 DIAGNOSIS — Q87.40 MARFAN'S SYNDROME: ICD-10-CM

## 2019-03-04 DIAGNOSIS — M70.62 TROCHANTERIC BURSITIS OF LEFT HIP: ICD-10-CM

## 2019-03-04 DIAGNOSIS — M54.32 SCIATICA OF LEFT SIDE: ICD-10-CM

## 2019-03-04 DIAGNOSIS — M51.36 LUMBAR DEGENERATIVE DISC DISEASE: ICD-10-CM

## 2019-03-04 DIAGNOSIS — M17.0 PRIMARY OSTEOARTHRITIS OF BOTH KNEES: ICD-10-CM

## 2019-03-04 DIAGNOSIS — G89.4 CHRONIC PAIN DISORDER: Primary | ICD-10-CM

## 2019-03-04 PROCEDURE — 99215 OFFICE O/P EST HI 40 MIN: CPT | Mod: PBBFAC,PN | Performed by: PHYSICIAN ASSISTANT

## 2019-03-04 PROCEDURE — 99214 OFFICE O/P EST MOD 30 MIN: CPT | Mod: S$PBB,,, | Performed by: PHYSICIAN ASSISTANT

## 2019-03-04 PROCEDURE — 99999 PR PBB SHADOW E&M-EST. PATIENT-LVL V: CPT | Mod: PBBFAC,,, | Performed by: PHYSICIAN ASSISTANT

## 2019-03-04 PROCEDURE — 99999 PR PBB SHADOW E&M-EST. PATIENT-LVL V: ICD-10-PCS | Mod: PBBFAC,,, | Performed by: PHYSICIAN ASSISTANT

## 2019-03-04 PROCEDURE — 99214 PR OFFICE/OUTPT VISIT, EST, LEVL IV, 30-39 MIN: ICD-10-PCS | Mod: S$PBB,,, | Performed by: PHYSICIAN ASSISTANT

## 2019-03-04 RX ORDER — KETOROLAC TROMETHAMINE 30 MG/ML
30 INJECTION, SOLUTION INTRAMUSCULAR; INTRAVENOUS
Status: COMPLETED | OUTPATIENT
Start: 2019-03-04 | End: 2019-03-04

## 2019-03-04 RX ORDER — PREDNISONE 10 MG/1
TABLET ORAL
Qty: 36 TABLET | Refills: 0 | Status: SHIPPED | OUTPATIENT
Start: 2019-03-04 | End: 2019-04-26 | Stop reason: ALTCHOICE

## 2019-03-04 RX ADMIN — KETOROLAC TROMETHAMINE 30 MG: 30 INJECTION, SOLUTION INTRAMUSCULAR; INTRAVENOUS at 03:03

## 2019-03-04 NOTE — PROGRESS NOTES
Administered 1cc Toradol 30mg/cc to LUOQGluteal. Pt. Tolerated well. No acute reaction noted to site. Pt. Instructed on S/S to report. Pt. Verbalized understanding.    Lot: -[id number]  Exp:10/19

## 2019-03-04 NOTE — PROGRESS NOTES
Subjective:       Patient ID: Jenifer Westfall is a 43 y.o. female.    Chief Complaint: shoulder pain and hip pain    Jenifer is here urgently for back/hip pain, seen about a year ago. She has chronic pain with chronic sciatica on the left side. MRI l spine done several years ago shows disc disease and nerve impingement.  She also has had issues with chronic left hip bursitis with injections in the past.  She also has Marfans. She also has issues with neck pain and has DDD on Cspine xray.  She has had prednisone tapers in the past which help her sciatic pain but it returns when she is off the steroids.  She takes gabapentin 400mg TID and tramadol prn for her pain. She was seen by Dr. Stein in past for possible DENY for her spine issues but insurance would not cover these.      She can't take nsaids due to GI upset. Today she complains of severe pain in her left hip and low back, she is having difficulty walking due to the pain.  Pain is sharp, burning, and in her lateral hip radiating into her leg.  Her pain is rated 8/10 today and she is in obvious discomfort.     Prev issues with djd in her catherine knees with injections.  Not an issue today       Review of Systems   Constitutional: Negative for activity change, fatigue, fever and unexpected weight change.   HENT: Negative for mouth sores, tinnitus and trouble swallowing.    Eyes: Negative for itching and visual disturbance.   Respiratory: Negative for cough and shortness of breath.    Cardiovascular: Negative for chest pain, palpitations and leg swelling.   Gastrointestinal: Negative for blood in stool, diarrhea, nausea and vomiting.   Genitourinary: Negative for dysuria, flank pain, frequency and hematuria.   Musculoskeletal: Positive for arthralgias, back pain, gait problem, joint swelling and neck pain. Negative for myalgias.   Skin: Negative for rash and wound.   Neurological: Positive for numbness. Negative for dizziness, weakness, light-headedness and headaches.  "  Hematological: Negative for adenopathy. Does not bruise/bleed easily.   Psychiatric/Behavioral: Negative for confusion and sleep disturbance.         Objective:   /75   Pulse 83   Ht 5' 10" (1.778 m)   Wt 93.4 kg (205 lb 14.6 oz)   BMI 29.54 kg/m²      Physical Exam   Constitutional: She is oriented to person, place, and time and well-developed, well-nourished, and in no distress. No distress.   HENT:   Head: Normocephalic.   Eyes: EOM are normal. Pupils are equal, round, and reactive to light.   Neck: Normal range of motion. Neck supple. No thyromegaly present.   Cardiovascular: Normal rate, regular rhythm and normal heart sounds.  Exam reveals no gallop and no friction rub.    No murmur heard.  Pulmonary/Chest: Breath sounds normal. She has no wheezes. She has no rales. She exhibits no tenderness.   Abdominal: Soft. There is no tenderness. There is no rebound.   Lymphadenopathy:     She has no cervical adenopathy.   Neurological: She is alert and oriented to person, place, and time. She displays normal reflexes. She exhibits normal muscle tone. Gait normal.   Skin: Skin is warm and dry. No rash noted. No erythema.     Psychiatric: Mood, memory and affect normal.   Musculoskeletal: Normal range of motion. She exhibits tenderness. She exhibits no edema.   Left low  to bilateral paraspinal muscles    Left hip significant tenderness to palpation over the greater trochanter, rom flex/ext of the hip ellicits pain laterally with radiation into the leg, +straight leg raise      right hip normal             no labs today  Assessment:       1. Chronic pain disorder    2. Trochanteric bursitis of left hip    3. Primary osteoarthritis of both knees    4. Marfan's syndrome    5. Sciatica of left side    6. Lumbar degenerative disc disease        Impression:  Left side sciatica: acute exacerbation of a chronic issue, mri in past showing nerve impingement on L5-S1, done PT, medicaid denies ariella's, steroid " packs help briefly; no nsaids due to gi upset; gabapentin 400mg in the am and 400-800mg q hs    Left hip bursitis: acute exacerbation today of a chronic issue, cortisone injections in the past help briefly    catherine knee djd: prev injections worked in the past, not an issue today     Chronic pain disorder: due to all of the above      Plan:       toradol 30mg IM today   Prednisone taper 2 days each of 30mg, 20mg, 10mg then off--she knows to monitor her BG  Refer to NS, is she a surgical candidate, something needs to be done and insurance denies interventional pain treatment options  Consider return to PT but helps briefly   MRI lumbar spine see if/how her damage has progressed    See me back in 3 mos for recheck

## 2019-03-04 NOTE — PATIENT INSTRUCTIONS
Shot of toradol today    Prednisone taper as directed on bottle     Neurosurgery referral     Lumbar spine MRI ordered, have ortho schedule it when they schedule your shoulder MRI

## 2019-03-05 ENCOUNTER — TELEPHONE (OUTPATIENT)
Dept: ORTHOPEDICS | Facility: CLINIC | Age: 44
End: 2019-03-05

## 2019-03-05 ENCOUNTER — TELEPHONE (OUTPATIENT)
Dept: RHEUMATOLOGY | Facility: CLINIC | Age: 44
End: 2019-03-05

## 2019-03-05 ENCOUNTER — TELEPHONE (OUTPATIENT)
Dept: NEUROLOGY | Facility: CLINIC | Age: 44
End: 2019-03-05

## 2019-03-05 NOTE — TELEPHONE ENCOUNTER
----- Message from Reta Villanueva LPN sent at 3/5/2019  9:24 AM CST -----  It is not letting me schedule her can you please schedule her for me thanks  ----- Message -----  From: Jodi Wagner LPN  Sent: 3/5/2019   9:16 AM  To: Reta Villanueva LPN    Our schedule is open for scheduling. Just place new pts in 1 hour slots and ep in 20 min slots.thanks  ----- Message -----  From: Reta Villanueva LPN  Sent: 3/5/2019   9:14 AM  To: [facility]    Adriana would like this pt seen for lumbar disease please call pt to schedule an appt thanks

## 2019-03-05 NOTE — TELEPHONE ENCOUNTER
----- Message from Linda Gonzalez LPN sent at 3/5/2019  8:30 AM CST -----  Hello, pt will need to be worked up with pain or neurology     Thanks   ----- Message -----  From: Reta Villanueva LPN  Sent: 3/4/2019   3:17 PM  To: [facility] Clinical Support    Adriana would like pt seen for lumbar disease please call pt to schedule an appt thanks

## 2019-03-11 DIAGNOSIS — G89.4 CHRONIC PAIN DISORDER: ICD-10-CM

## 2019-03-11 DIAGNOSIS — M54.32 SCIATICA OF LEFT SIDE: ICD-10-CM

## 2019-03-11 RX ORDER — TIZANIDINE 4 MG/1
TABLET ORAL
Qty: 90 TABLET | Refills: 4 | Status: SHIPPED | OUTPATIENT
Start: 2019-03-11 | End: 2020-04-01

## 2019-03-12 RX ORDER — FLUOXETINE HYDROCHLORIDE 40 MG/1
CAPSULE ORAL
Qty: 90 CAPSULE | Refills: 0 | OUTPATIENT
Start: 2019-03-12

## 2019-03-12 RX ORDER — DICYCLOMINE HYDROCHLORIDE 20 MG/1
20 TABLET ORAL 4 TIMES DAILY
Qty: 120 TABLET | Refills: 0 | Status: SHIPPED | OUTPATIENT
Start: 2019-03-12 | End: 2019-05-13 | Stop reason: SDUPTHER

## 2019-03-14 ENCOUNTER — TELEPHONE (OUTPATIENT)
Dept: FAMILY MEDICINE | Facility: CLINIC | Age: 44
End: 2019-03-14

## 2019-03-14 NOTE — TELEPHONE ENCOUNTER
Needs to see DM educ 1st .She has been very non compliant with recommendation is the past . I can provide  Few refills but she has to wait until next available slot

## 2019-03-14 NOTE — TELEPHONE ENCOUNTER
----- Message from Tita Green MA sent at 3/14/2019  4:20 PM CDT -----  Contact: pt      ----- Message -----  From: Kaleigh Espinosa  Sent: 3/14/2019   3:46 PM  To: [facility]na Rivas Staff    Type:  Sooner Apoointment Request    Caller is requesting a sooner appointment.  Caller declined first available appointment listed below.  Caller will not accept being placed on the waitlist and is requesting a message be sent to doctor.  Name of Caller: the pt  When is the first available appointment? 04/22/2019  Symptoms: med refill / follow up  Would the patient rather a call back or a response via [facility]? Call back  Best Call Back Number: [phone]  Additional Information: The pt wants to be worked in on tomorrow

## 2019-03-20 ENCOUNTER — TELEPHONE (OUTPATIENT)
Dept: ORTHOPEDICS | Facility: CLINIC | Age: 44
End: 2019-03-20

## 2019-03-20 NOTE — TELEPHONE ENCOUNTER
----- Message from Abbie Galvin MA sent at 3/20/2019 11:27 AM CDT -----  Contact: pt  Neuro ...  ----- Message -----  From: Keyshawn Gao  Sent: 3/20/2019  11:22 AM  To: Jose Cifuentes Staff    She's calling in regards to orders to schedule a MRI, pls call pt when orders have been entered, [phone] (cell)

## 2019-03-21 ENCOUNTER — TELEPHONE (OUTPATIENT)
Dept: RADIOLOGY | Facility: HOSPITAL | Age: 44
End: 2019-03-21

## 2019-03-21 ENCOUNTER — TELEPHONE (OUTPATIENT)
Dept: ORTHOPEDICS | Facility: CLINIC | Age: 44
End: 2019-03-21

## 2019-03-21 NOTE — TELEPHONE ENCOUNTER
----- Message from Luci Triana sent at 3/21/2019 11:19 AM CDT -----  Contact: Patient   Type:  Patient Returning Call    Who Called:Jenifer  Who Left Message for Patient: Not sure   Does the patient know what this is regarding?: Her MRI  Would the patient rather a call back or a response via [facility]? Call back   Best Call Back Number: Please call her at [phone]  Additional Information: n/a

## 2019-03-21 NOTE — TELEPHONE ENCOUNTER
Spoke with patient and she will get her clearance from vascular surgery per Vane Garcia's note so that her MRI can be ordered.

## 2019-03-22 ENCOUNTER — TELEPHONE (OUTPATIENT)
Dept: ORTHOPEDICS | Facility: CLINIC | Age: 44
End: 2019-03-22

## 2019-03-22 NOTE — TELEPHONE ENCOUNTER
Spoke with pt, and explained that we need, in written form, that she is cleared to have Sx from a vascular standpoint. She verbalizes understanding. Gave her our fax number and explained to have them fax this to us. I also explained that we would not get MRI until we had this, AL, LPN.     ----- Message from Fiona Garcia sent at 3/22/2019 10:13 AM CDT -----  Contact: Pt  Pt would like nurse to return call regarding a fax that was supposed to be received for another provider. Pt asked that nurse return call at ([phone])

## 2019-04-03 ENCOUNTER — OFFICE VISIT (OUTPATIENT)
Dept: NEUROLOGY | Facility: CLINIC | Age: 44
End: 2019-04-03
Payer: MEDICAID

## 2019-04-03 VITALS
HEART RATE: 80 BPM | HEIGHT: 70 IN | BODY MASS INDEX: 27.61 KG/M2 | WEIGHT: 192.88 LBS | DIASTOLIC BLOOD PRESSURE: 62 MMHG | SYSTOLIC BLOOD PRESSURE: 100 MMHG

## 2019-04-03 DIAGNOSIS — E11.42 DIABETIC PERIPHERAL NEUROPATHY ASSOCIATED WITH TYPE 2 DIABETES MELLITUS: Primary | ICD-10-CM

## 2019-04-03 DIAGNOSIS — M75.41 ROTATOR CUFF IMPINGEMENT SYNDROME OF RIGHT SHOULDER: ICD-10-CM

## 2019-04-03 DIAGNOSIS — G89.29 CHRONIC RIGHT SHOULDER PAIN: ICD-10-CM

## 2019-04-03 DIAGNOSIS — M25.511 CHRONIC RIGHT SHOULDER PAIN: ICD-10-CM

## 2019-04-03 DIAGNOSIS — M54.32 SCIATICA OF LEFT SIDE: ICD-10-CM

## 2019-04-03 PROCEDURE — 99999 PR PBB SHADOW E&M-EST. PATIENT-LVL III: CPT | Mod: PBBFAC,,, | Performed by: PSYCHIATRY & NEUROLOGY

## 2019-04-03 PROCEDURE — 99205 PR OFFICE/OUTPT VISIT, NEW, LEVL V, 60-74 MIN: ICD-10-PCS | Mod: S$PBB,,, | Performed by: PSYCHIATRY & NEUROLOGY

## 2019-04-03 PROCEDURE — 99205 OFFICE O/P NEW HI 60 MIN: CPT | Mod: S$PBB,,, | Performed by: PSYCHIATRY & NEUROLOGY

## 2019-04-03 PROCEDURE — 99999 PR PBB SHADOW E&M-EST. PATIENT-LVL III: ICD-10-PCS | Mod: PBBFAC,,, | Performed by: PSYCHIATRY & NEUROLOGY

## 2019-04-03 PROCEDURE — 99213 OFFICE O/P EST LOW 20 MIN: CPT | Mod: PBBFAC | Performed by: PSYCHIATRY & NEUROLOGY
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
## 2019-04-04 DIAGNOSIS — M54.32 SCIATICA OF LEFT SIDE: ICD-10-CM

## 2019-04-05 ENCOUNTER — TELEPHONE (OUTPATIENT)
Dept: ORTHOPEDICS | Facility: CLINIC | Age: 44
End: 2019-04-05

## 2019-04-05 DIAGNOSIS — M54.40 BACK PAIN OF LUMBOSACRAL REGION WITH SCIATICA: Primary | ICD-10-CM

## 2019-04-05 NOTE — TELEPHONE ENCOUNTER
----- Message from Abbie Galvin MA sent at 4/5/2019  2:11 PM CDT -----  Contact: pt      ----- Message -----  From: Kaleigh Espinosa  Sent: 4/5/2019   1:27 PM  To: Jose Cifuentes Staff    Type:  Patient Returning Call    Who Called:the pt  Who Left Message for Patient:unknown  Does the patient know what this is regarding?:no  Would the patient rather a call back or a response via [facility]? Call back  Best Call Back Number: [phone]  Additional Information: n/a

## 2019-04-05 NOTE — TELEPHONE ENCOUNTER
----- Message from Jonnathan Logan sent at 4/5/2019 10:03 AM CDT -----  ..Type:  Patient Returning Call    Who Called: pt   Who Left Message for Patient:  Does the patient know what this is regarding?:MRI  Would the patient rather a call back or a response via [facility]? Call back   Best Call Back Number [phone]  Additional Information: pt is requesting a call from nurse to discuss MRI apt

## 2019-04-05 NOTE — TELEPHONE ENCOUNTER
Returned call to patient and left a message for her to call back. Patient will need vascular clearance before MRI is ordered.

## 2019-04-08 RX ORDER — GABAPENTIN 400 MG/1
CAPSULE ORAL
Qty: 270 CAPSULE | Refills: 4 | Status: SHIPPED | OUTPATIENT
Start: 2019-04-08 | End: 2020-04-22

## 2019-04-09 ENCOUNTER — TELEPHONE (OUTPATIENT)
Dept: ORTHOPEDICS | Facility: CLINIC | Age: 44
End: 2019-04-09

## 2019-04-09 NOTE — TELEPHONE ENCOUNTER
----- Message from Meera Pozo sent at 4/9/2019 10:48 AM CDT -----  Contact: pt  Type:  Patient Returning Call    Who Called:patient  Who Left Message for Patient:nurse  Does the patient know what this is regarding?:MRI/shoulder  Would the patient rather a call back or a response via [facility]? Call back  Best Call Back Number:[phone]  Additional Information: na

## 2019-04-11 ENCOUNTER — TELEPHONE (OUTPATIENT)
Dept: ORTHOPEDICS | Facility: CLINIC | Age: 44
End: 2019-04-11

## 2019-04-11 NOTE — TELEPHONE ENCOUNTER
----- Message from Abbie Galvin MA sent at 4/11/2019 12:03 PM CDT -----  Contact: Patient      ----- Message -----  From: Jenifer Guadarrama  Sent: 4/11/2019  11:10 AM  To: Jose Cifuentes Staff    Patient states she spoke to nurse on Monday about getting a release to have an MRI done, and she has not heard back, please call her back at [phone]. Thank you

## 2019-04-11 NOTE — TELEPHONE ENCOUNTER
Spoke with patient and informed her that I have not received her clearance from Dr Tian's office. Patient and I  will call Dr Tian's office again on tomorrow. Understanding verbalized.

## 2019-04-16 ENCOUNTER — TELEPHONE (OUTPATIENT)
Dept: OBSTETRICS AND GYNECOLOGY | Facility: CLINIC | Age: 44
End: 2019-04-16

## 2019-04-16 NOTE — TELEPHONE ENCOUNTER
Pt c/o of external itching of the vagina, no discharge, no odor. Pt states she thinks this is related to her blood sugars being elevated(already consulted her PCP) Pt stated she used Vagisil anti-itch cream with no relief. Pt wants to know if something can be called in. Please advise.

## 2019-04-16 NOTE — TELEPHONE ENCOUNTER
----- Message from María Person sent at 4/16/2019  1:31 PM CDT -----  Contact: Self- [phone]   Pt would like something called in in for a yeast infection.   Please call back at [phone]. x-             Pt Uses:  .  [facility] in [place] - [facility] [address]  [address]  [facility] [address]  Phone: [phone] Fax: [phone]

## 2019-04-16 NOTE — TELEPHONE ENCOUNTER
I recommend that pt first use OTC Lotrimin Cream and apply it twice daily for the next 10 days.  I recommend that she make an appointment for evaluation if no improvement after 3 days of treatment.

## 2019-04-17 NOTE — TELEPHONE ENCOUNTER
Spoke to patient and informed her that Dr Peralta recommends that she use OTC Lotrimin cream twice daily for the next 10 days.  If no improvement in 3 days Dr Peralta recommends to schedule an appointment for evaluation.  She voiced understanding.

## 2019-04-25 ENCOUNTER — TELEPHONE (OUTPATIENT)
Dept: ORTHOPEDICS | Facility: CLINIC | Age: 44
End: 2019-04-25

## 2019-04-25 ENCOUNTER — OFFICE VISIT (OUTPATIENT)
Dept: ORTHOPEDICS | Facility: CLINIC | Age: 44
End: 2019-04-25
Payer: MEDICAID

## 2019-04-25 VITALS
HEART RATE: 80 BPM | HEIGHT: 70 IN | WEIGHT: 192 LBS | DIASTOLIC BLOOD PRESSURE: 78 MMHG | SYSTOLIC BLOOD PRESSURE: 145 MMHG | BODY MASS INDEX: 27.49 KG/M2

## 2019-04-25 DIAGNOSIS — M75.81 ROTATOR CUFF TENDONITIS, RIGHT: ICD-10-CM

## 2019-04-25 DIAGNOSIS — I70.8 OCCLUSION OF RIGHT SUBCLAVIAN ARTERY: ICD-10-CM

## 2019-04-25 DIAGNOSIS — G89.29 CHRONIC RIGHT SHOULDER PAIN: Primary | ICD-10-CM

## 2019-04-25 DIAGNOSIS — M75.41 ROTATOR CUFF IMPINGEMENT SYNDROME OF RIGHT SHOULDER: ICD-10-CM

## 2019-04-25 DIAGNOSIS — M25.511 CHRONIC RIGHT SHOULDER PAIN: Primary | ICD-10-CM

## 2019-04-25 PROCEDURE — 99214 OFFICE O/P EST MOD 30 MIN: CPT | Mod: S$PBB,,, | Performed by: PHYSICIAN ASSISTANT

## 2019-04-25 PROCEDURE — 99214 OFFICE O/P EST MOD 30 MIN: CPT | Mod: PBBFAC,PN | Performed by: PHYSICIAN ASSISTANT

## 2019-04-25 PROCEDURE — 99214 PR OFFICE/OUTPT VISIT, EST, LEVL IV, 30-39 MIN: ICD-10-PCS | Mod: S$PBB,,, | Performed by: PHYSICIAN ASSISTANT

## 2019-04-25 PROCEDURE — 99999 PR PBB SHADOW E&M-EST. PATIENT-LVL IV: CPT | Mod: PBBFAC,,, | Performed by: PHYSICIAN ASSISTANT

## 2019-04-25 PROCEDURE — 99999 PR PBB SHADOW E&M-EST. PATIENT-LVL IV: ICD-10-PCS | Mod: PBBFAC,,, | Performed by: PHYSICIAN ASSISTANT

## 2019-04-25 NOTE — TELEPHONE ENCOUNTER
----- Message from Jessica Mascorro sent at 4/25/2019 12:59 PM CDT -----  Type:  Needs Medical Advice    Who Called:  Pt Jenifer  Symptoms (please be specific):     How long has patient had these symptoms:     Pharmacy name and phone #:     Would the patient rather a call back or a response via [facility]?  Call back  Best Call Back Number:  [phone]  Additional Information:  States she has an appt this afternoon at 2:00pm//she is calling to ask if your office received the [facility] clearance to have the MRI done//please call to discuss//yvon/kelli

## 2019-04-25 NOTE — PROGRESS NOTES
Subjective:     Patient ID: Jenifer Westfall is a 43 y.o. female.    Chief Complaint: No chief complaint on file.    Ms. Westfall is a 42 yo female who presents for bilateral shoulder pain. This is a chronic problem. Episode began 3-4 years ago. She was seen by Dr. Gotti and Lucero Landeros PA-C in our department in the past for this problem. 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 She was scheduled for a shoulder arthroscopy in October 2017, but had to cancel due to being hospitalized for anaphylaxis in response to contrast dye. Of note since that time she has had several stents placed by Vascular Surgery including 3 in her bilateral legs in June 2018. She was also told that she has a 100% blockage in one of the vessels in her right arm and will need to have another stent placed. She is currently being referred to a new vascular surgeon to complete this procedure.   Her pain was rated as 6/10 and described it as shooting in quality. Pain is worsened by movement of the arm. She feels that her pain limits the function of her arm and her activity level. Patient had steroid injection  with some relief of symptoms for approximately a week. She is unable to take NSAIDs due to gastritis. Patient denies swelling, numbness, and tingling. Patient has been putting Voltaren gel and Tramadol with minimal relief of symptoms.    Patient also complains of pain of left shoulder. Pain is intermittent. Episode began in the last 6 weeks. Pain worsened by ROM. Patient denies PT for left shoulder.     Patient presents for 3 month follow-up today. Patient has vascular clearance for right shoulder surgery if needed. Overall, patient states that her pain in her right shoulder is not improved  Patient would like to move forward with MRI imaging to further evaluate the shoulder at this time. Patient was able to get into an outside pain clinic for right shoulder pain since last visit.     Pain   Pertinent negatives include no chest  pain, fever, joint swelling, numbness or rash.       Past Medical History:   Diagnosis Date    Arthritis     Diabetes mellitus      2015 Insulin x 2 years     DM (diabetes mellitus)      2017 Insulin x 3 years     DM (diabetes mellitus)      am  Insulin x 4 years    Hepatitis C     Hyperlipidemia     Hypertension     IBS (irritable bowel syndrome)     Kidney stone     Marfan syndrome     Mitral valve prolapse      Past Surgical History:   Procedure Laterality Date    ANGIOGRAM, LOWER EXTREMITY- LEFT LEG N/A 2018    Per[facility] at [facility] C[facility]    APPENDECTOMY      APPENDECTOMY-LAPAROSCOPIC N/A 2015    [facility]aseem Anne [facility] at [facility] OR    BUNIONECTOMY      both feet     SECTION      x 2    CHOLECYSTECTOMY      INCISION AND DRAINAGE (I&D), ABSCESS Left 2015    [facility] at [facility] OR    TUBAL LIGATION       Family History   Problem Relation Age of Onset    Heart disease Mother     Thrombophilia Father         DVT    Hypertension Father     Stroke Maternal Aunt     Heart disease Maternal Aunt     Stroke Maternal Uncle     Heart disease Maternal Uncle     Breast cancer Neg Hx     Colon cancer Neg Hx     Ovarian cancer Neg Hx      Social History     Socioeconomic History    Marital status: Single     Spouse name: Not on file    Number of children: Not on file    Years of education: Not on file    Highest education level: Not on file   Occupational History    Not on file   Social Needs    Financial resource strain: Not on file    Food insecurity:     Worry: Not on file     Inability: Not on file    Transportation needs:     Medical: Not on file     Non-medical: Not on file   Tobacco Use    Smoking status: Former Smoker     Packs/day: 0.20     Years: 20.00     Pack years: 4.00     Types: Cigarettes     Last attempt to quit: 2015     Years since quitting:  "4.0    Smokeless tobacco: Never Used    Tobacco comment: "once in a blue moon"   Substance and Sexual Activity    Alcohol use: No     Alcohol/week: 0.0 oz    Drug use: No    Sexual activity: Yes     Partners: Male     Birth control/protection: Injection   Lifestyle    Physical activity:     Days per week: Not on file     Minutes per session: Not on file    Stress: Not on file   Relationships    Social connections:     Talks on phone: Not on file     Gets together: Not on file     Attends Church service: Not on file     Active member of club or organization: Not on file     Attends meetings of clubs or organizations: Not on file     Relationship status: Not on file   Other Topics Concern    Not on file   Social History Narrative    Not on file     Medication List with Changes/Refills   Current Medications    ASCORBIC ACID (VITAMIN C) 100 MG TABLET    Take 100 mg by mouth once daily.    ASPIRIN (ECOTRIN) 81 MG EC TABLET    Take 81 mg by mouth once daily.    ATENOLOL (TENORMIN) 50 MG TABLET    TAKE 1 TABLET BY MOUTH ONCE DAILY    ATORVASTATIN (LIPITOR) 80 MG TABLET    Take 1 tablet (80 mg total) by mouth once daily.    CETIRIZINE (ZYRTEC) 10 MG TABLET    Take 10 mg by mouth.    CHOLESTYRAMINE (QUESTRAN) 4 GRAM PACKET    Take 1 packet (4 g total) by mouth 3 (three) times daily with meals.    CLOPIDOGREL (PLAVIX) 75 MG TABLET    Take 1 tablet (75 mg total) by mouth once daily.    COLESTIPOL (COLESTID) 1 GRAM TAB    TAKE 2 TABLETS BY MOUTH THREE TIMES DAILY. DO NOT TAKE OTHER MEDICATION WITHIN 1 HOUR BEFORE OR 4 HOURS AFTER TAKING COLESTIPOL.    DICLOFENAC SODIUM (VOLTAREN) 1 % GEL    Apply 2 grams topically 4 (four) times daily. for 10 days    DICYCLOMINE (BENTYL) 20 MG TABLET    TAKE 1 TABLET (20 MG TOTAL) BY MOUTH 4 (FOUR) TIMES DAILY.    ERGOCALCIFEROL, VITAMIN D2, (VITAMIN D ORAL)    Take 1 tablet by mouth once daily.     FAMOTIDINE (PEPCID) 20 MG TABLET    Take 1 tablet (20 mg total) by mouth 2 (two) " "times daily.    FLUOXETINE (PROZAC) 40 MG CAPSULE    TAKE 1 CAPSULE BY MOUTH ONCE DAILY    GABAPENTIN (NEURONTIN) 400 MG CAPSULE    TAKE 1 CAPSULE BY MOUTH 3 TIMES DAILY    GLIPIZIDE (GLUCOTROL) 10 MG TABLET    Take 1 tablet (10 mg total) by mouth 2 (two) times daily.    INSULIN GLARGINE (LANTUS SOLOSTAR U-100 INSULIN) 100 UNIT/ML (3 ML) INPN PEN    Inject 40 Units into the skin 2 (two) times daily.    INSULIN GLULISINE U-100 (APIDRA SOLOSTAR U-100 INSULIN) 100 UNIT/ML INPN PEN    Inject 0.15 mLs (15 Units total) into the skin 3 (three) times daily.    LISINOPRIL 10 MG TABLET    TAKE 1 TABLET (10 MG TOTAL) BY MOUTH ONCE DAILY.    PEN NEEDLE 31 GAUGE X 5/16" NDLE    USE 5 TIMES DAILY WITH LANTUS AND HUMALOG    PREDNISONE (DELTASONE) 10 MG TABLET    Take by mouth 30mg/d x 2 days, then 20mg/d x 2 days, then 10mg/d x 2 days then off    TIZANIDINE (ZANAFLEX) 4 MG TABLET    TAKE 1 TABLET BY MOUTH EVERY EVENING    TRAMADOL (ULTRAM) 50 MG TABLET    Take 1 tablet (50 mg total) by mouth every 12 (twelve) hours.     Review of patient's allergies indicates:   Allergen Reactions    Compazine [prochlorperazine edisylate] Rash    Contrast media Anaphylaxis    Dye Anaphylaxis    Levaquin [levofloxacin] Hives and Itching    Metformin Other (See Comments)     Upset stomach    Ibuprofen Other (See Comments)     Stomach pain    Morphine Other (See Comments)     Irritable     Review of Systems   Constitution: Negative for fever and night sweats.   HENT: Negative for hearing loss.    Eyes: Negative for blurred vision and visual disturbance.   Cardiovascular: Negative for chest pain and leg swelling.   Respiratory: Negative for shortness of breath.    Endocrine: Negative for polyuria.   Hematologic/Lymphatic: Negative for bleeding problem.   Skin: Negative for rash.   Musculoskeletal: Positive for joint pain. Negative for back pain, joint swelling, muscle cramps and muscle weakness.   Gastrointestinal: Negative for melena. "   Genitourinary: Negative for hematuria.   Neurological: Negative for loss of balance, numbness and paresthesias.   Psychiatric/Behavioral: Negative for altered mental status.       Objective:   There is no height or weight on file to calculate BMI.  There were no vitals filed for this visit.    General: Jenifer is well-developed, well-nourished, appears stated age, in no acute distress, alert and oriented to time, place and person.       General    Constitutional: She is oriented to person, place, and time. She appears well-developed and well-nourished.   HENT:   Head: Normocephalic and atraumatic.   Right Ear: External ear normal.   Left Ear: External ear normal.   Nose: Nose normal.   Eyes: EOM are normal. Pupils are equal, round, and reactive to light. Right eye exhibits no discharge. Left eye exhibits no discharge.   Neck: Normal range of motion.   Cardiovascular: Normal heart sounds.    Pulmonary/Chest: Effort normal. No respiratory distress.   Abdominal: Soft.   Neurological: She is alert and oriented to person, place, and time.   Psychiatric: She has a normal mood and affect. Her behavior is normal. Judgment and thought content normal.         Right Shoulder Exam     Inspection/Observation   Swelling: absent  Bruising: absent  Scars: absent  Deformity: absent  Scapular Winging: absent  Scapular Dyskinesia: negative  Atrophy: absent    Tenderness   The patient is tender to palpation of the acromioclavicular joint, acromion and supraspinatus.    Range of Motion   Active abduction:  80 abnormal   Passive abduction:  100 normal   Extension:  0 normal   Forward Flexion:  100 abnormal   Forward Elevation: 100 abnormal  Adduction: 40 normal  External Rotation 0 degrees:  50 normal   Internal rotation 0 degrees:  Sacrum normal     Tests & Signs   Cross arm: positive  Drop arm: negative  Gordon test: positive  Impingement: positive  Rotator Cuff Painful Arc/Range: severe  Lift Off Sign: positive  Active Compression  Test ([place]'s Sign): positive  Yergason's Test: positive  Speed's Test: positive    Other   Sensation: normal    Left Shoulder Exam     Inspection/Observation   Swelling: absent  Bruising: absent  Scars: absent  Deformity: absent  Scapular Winging: absent  Scapular Dyskinesia: negative  Atrophy: absent    Tenderness   The patient is tender to palpation of the acromioclavicular joint.    Range of Motion   Active abduction:  90 normal   Passive abduction:  100 normal   Extension:  0 normal   Forward Flexion:  180 normal   Forward Elevation: 180 normal  Adduction: 40 normal  External Rotation 0 degrees:  50 normal   External Rotation 90 degrees: 90 normal  Internal rotation 0 degrees:  T8 normal   Internal rotation 90 degrees:  30 normal     Tests & Signs   Cross arm: negative  Drop arm: negative  Gordon test: positive  Impingement: positive  Lift Off Sign: negative  Active Compression test ([place]'s Sign): negative  Yergasons's Test: negative  Speed's Test: negative    Other   Sensation: normal       Muscle Strength   Right Upper Extremity   Shoulder Abduction: 2/5   Shoulder Internal Rotation: 2/5   Shoulder External Rotation: 2/5   Supraspinatus: 2/5/5   Subscapularis: 2/5/5   Biceps: 2/5/5   Left Upper Extremity  Shoulder Abduction: 4/5   Shoulder Internal Rotation: 4/5   Shoulder External Rotation: 4/5   Supraspinatus: 4/5/5   Subscapularis: 4/5/5   Biceps: 4/5/5     Vascular Exam     Right Pulses      Radial:                    1+      Left Pulses      Radial:                    2+      Capillary Refill  Right Hand: normal capillary refill  Left Hand: normal capillary refill    I have personally reviewed the following imaging and agree with the radiologist's findings of:   XR Shoulder: No acute findings. Mild degenerative changes.     Assessment:     Encounter Diagnoses   Name Primary?    Chronic right shoulder pain Yes    Rotator cuff impingement syndrome of right shoulder     Rotator cuff tendonitis,  right     Occlusion of right subclavian artery         Plan:     Patients declines Physical Therapy for R and L shoulder   Voltaren gel - no NSAIDs due to history of gastritis and liver fibrosis  Vascular surgery cleared for possible right shoulder surgery - scanned into media  MRI right shoulder without contrast  Follow up after imaging    Patient verbalizes understanding and agrees with the above plan.    Vane Garcia PA-C  Orthopedic Surgery/Sports Medicine

## 2019-04-25 NOTE — TELEPHONE ENCOUNTER
----- Message from Vane Garcia PA-C sent at 4/25/2019  9:50 AM CDT -----  Can someone call my patient coming at 2 pm today and tell her if she has received vascular clearance to have shoulder surgery to please bring a copy of her letter stating so?   We have never received one.     Thank you .

## 2019-04-26 ENCOUNTER — OFFICE VISIT (OUTPATIENT)
Dept: GASTROENTEROLOGY | Facility: CLINIC | Age: 44
End: 2019-04-26
Payer: MEDICAID

## 2019-04-26 VITALS
WEIGHT: 196.63 LBS | HEART RATE: 84 BPM | BODY MASS INDEX: 28.15 KG/M2 | DIASTOLIC BLOOD PRESSURE: 66 MMHG | SYSTOLIC BLOOD PRESSURE: 116 MMHG | HEIGHT: 70 IN

## 2019-04-26 DIAGNOSIS — Z79.4 TYPE 2 DIABETES MELLITUS WITHOUT COMPLICATION, WITH LONG-TERM CURRENT USE OF INSULIN: ICD-10-CM

## 2019-04-26 DIAGNOSIS — R10.13 EPIGASTRIC PAIN: Primary | ICD-10-CM

## 2019-04-26 DIAGNOSIS — E11.9 TYPE 2 DIABETES MELLITUS WITHOUT COMPLICATION, WITH LONG-TERM CURRENT USE OF INSULIN: ICD-10-CM

## 2019-04-26 DIAGNOSIS — K21.9 GASTROESOPHAGEAL REFLUX DISEASE, ESOPHAGITIS PRESENCE NOT SPECIFIED: ICD-10-CM

## 2019-04-26 PROCEDURE — 99214 OFFICE O/P EST MOD 30 MIN: CPT | Mod: S$PBB,,, | Performed by: PHYSICIAN ASSISTANT

## 2019-04-26 PROCEDURE — 99999 PR PBB SHADOW E&M-EST. PATIENT-LVL IV: CPT | Mod: PBBFAC,,, | Performed by: PHYSICIAN ASSISTANT

## 2019-04-26 PROCEDURE — 99214 PR OFFICE/OUTPT VISIT, EST, LEVL IV, 30-39 MIN: ICD-10-PCS | Mod: S$PBB,,, | Performed by: PHYSICIAN ASSISTANT

## 2019-04-26 PROCEDURE — 99214 OFFICE O/P EST MOD 30 MIN: CPT | Mod: PBBFAC | Performed by: PHYSICIAN ASSISTANT

## 2019-04-26 PROCEDURE — 99999 PR PBB SHADOW E&M-EST. PATIENT-LVL IV: ICD-10-PCS | Mod: PBBFAC,,, | Performed by: PHYSICIAN ASSISTANT

## 2019-04-26 NOTE — PROGRESS NOTES
Subjective:      Patient ID: Jenifer Westfall is a 43 y.o. female.    Chief Complaint: Gastroesophageal Reflux and Abdominal Pain    HPI  The patient has a history of IBS with diarrhea. She was last seen by Dr. Lunsford in November. Recommendations included cholestyramine, metamucil and colonoscopy, but the colonoscopy was not done. She feels her bowel movements have been more regular lately. She is taking Colestipol three times a day. She has had a colonoscopy in the past, but not clear on the timing.     Today she complains of epigastric pain and reflux. She says it feels like food isn't digesting. Sometimes she is only able to tolerate liquids. She denies nausea or vomiting but reports heartburn, especially at night. She takes Pepcid twice a day. She is making sure she doesn't eat too late. She has tried Bentyl with +/- results. She denies hematochezia or melena. Her A1C in June 2018 was 10.9. She has a new PCP and her insulin was recently changed.     Review of Systems  As per HPI.     Objective:     Physical Exam   Constitutional: She is oriented to person, place, and time. She appears well-developed and well-nourished. No distress.   HENT:   Head: Normocephalic and atraumatic.   Eyes: EOM are normal.   Cardiovascular: Normal rate and regular rhythm.   Pulmonary/Chest: Effort normal and breath sounds normal. No respiratory distress. She has no wheezes.   Abdominal: Soft. Bowel sounds are normal. She exhibits no distension. There is tenderness in the epigastric area.   Neurological: She is alert and oriented to person, place, and time. No cranial nerve deficit.   Skin: She is not diaphoretic.   Psychiatric: Her behavior is normal.       Assessment:     1. Epigastric pain    2. Gastroesophageal reflux disease, esophagitis presence not specified    3. Type 2 diabetes mellitus without complication, with long-term current use of insulin        Plan:     Her symptoms are suspicious for gastroparesis. If she has this, EGD  may be difficult to do at this time. Will order GES.   Recommend improved blood glucose levels. It sounds that her PCP is working on this now.   Orders Placed This Encounter   Procedures    NM Gastric Emptying       Follow up in about 1 month (around 5/24/2019).    Thank you for the opportunity to participate in the care of this patient.   Lizandro Larson PA-C.

## 2019-05-06 ENCOUNTER — CLINICAL SUPPORT (OUTPATIENT)
Dept: OBSTETRICS AND GYNECOLOGY | Facility: CLINIC | Age: 44
End: 2019-05-06
Payer: MEDICAID

## 2019-05-06 DIAGNOSIS — Z30.42 ENCOUNTER FOR DEPO-PROVERA CONTRACEPTION: Primary | ICD-10-CM

## 2019-05-06 PROCEDURE — 99213 OFFICE O/P EST LOW 20 MIN: CPT | Mod: PBBFAC,25

## 2019-05-06 PROCEDURE — 99999 PR PBB SHADOW E&M-EST. PATIENT-LVL III: ICD-10-PCS | Mod: PBBFAC,,,

## 2019-05-06 PROCEDURE — 99999 PR PBB SHADOW E&M-EST. PATIENT-LVL III: CPT | Mod: PBBFAC,,,

## 2019-05-06 PROCEDURE — 96372 THER/PROPH/DIAG INJ SC/IM: CPT | Mod: PBBFAC

## 2019-05-06 RX ORDER — MEDROXYPROGESTERONE ACETATE 150 MG/ML
150 INJECTION, SUSPENSION INTRAMUSCULAR
Status: COMPLETED | OUTPATIENT
Start: 2019-05-06 | End: 2019-05-06

## 2019-05-06 RX ADMIN — MEDROXYPROGESTERONE ACETATE 150 MG: 150 INJECTION, SUSPENSION INTRAMUSCULAR at 02:05

## 2019-05-06 NOTE — PROGRESS NOTES
Verified patient with 2 patient identifiers. Allergies and medications reviewed.   Depo Provera 150mg/ml given IM to left ventrogluteal using aseptic technique.   No discomfort noted. Patient tolerated well.     Next Depo injection scheduled.    Patient advised to wait 15 minutes in lobby to monitor for reaction.   Patient verbalized understanding.

## 2019-05-07 ENCOUNTER — TELEPHONE (OUTPATIENT)
Dept: CARDIOLOGY | Facility: CLINIC | Age: 44
End: 2019-05-07

## 2019-05-07 NOTE — TELEPHONE ENCOUNTER
Dale Parada DDS, at [facility] has requested clearance for this pt for a single tooth extraction. Pt has stents in both legs and pt is on asa and Plavix.  Should pt hold thinners?  If so what are the instructions.  Is antibiotic prophylaxis needed and if so pt will need Rx.  Will ask Dr Hsu to advise. Fax response to  or phone

## 2019-05-08 ENCOUNTER — TELEPHONE (OUTPATIENT)
Dept: CARDIOLOGY | Facility: CLINIC | Age: 44
End: 2019-05-08

## 2019-05-08 NOTE — TELEPHONE ENCOUNTER
Ok to hold Plavix for 5 days before the scheduled procedure. Please resume post-op ASAP.  No need for antibiotics

## 2019-05-13 RX ORDER — DICYCLOMINE HYDROCHLORIDE 20 MG/1
TABLET ORAL
Qty: 120 TABLET | Refills: 0 | Status: SHIPPED | OUTPATIENT
Start: 2019-05-13 | End: 2019-05-14 | Stop reason: SDUPTHER

## 2019-05-14 ENCOUNTER — HOSPITAL ENCOUNTER (OUTPATIENT)
Dept: RADIOLOGY | Facility: HOSPITAL | Age: 44
Discharge: HOME | End: 2019-05-14
Attending: PHYSICIAN ASSISTANT
Payer: MEDICAID

## 2019-05-14 DIAGNOSIS — E11.9 TYPE 2 DIABETES MELLITUS WITHOUT COMPLICATION, WITH LONG-TERM CURRENT USE OF INSULIN: ICD-10-CM

## 2019-05-14 DIAGNOSIS — R10.13 EPIGASTRIC PAIN: ICD-10-CM

## 2019-05-14 DIAGNOSIS — K21.9 GASTROESOPHAGEAL REFLUX DISEASE, ESOPHAGITIS PRESENCE NOT SPECIFIED: ICD-10-CM

## 2019-05-14 DIAGNOSIS — Z79.4 TYPE 2 DIABETES MELLITUS WITHOUT COMPLICATION, WITH LONG-TERM CURRENT USE OF INSULIN: ICD-10-CM

## 2019-05-14 PROCEDURE — 78264 GASTRIC EMPTYING IMG STUDY: CPT | Mod: TC

## 2019-05-14 PROCEDURE — A9541 TC99M SULFUR COLLOID: HCPCS

## 2019-05-14 RX ORDER — DICYCLOMINE HYDROCHLORIDE 20 MG/1
TABLET ORAL
Qty: 120 TABLET | Refills: 3 | Status: SHIPPED | OUTPATIENT
Start: 2019-05-14 | End: 2019-11-04 | Stop reason: SDUPTHER

## 2019-05-16 ENCOUNTER — TELEPHONE (OUTPATIENT)
Dept: GASTROENTEROLOGY | Facility: CLINIC | Age: 44
End: 2019-05-16

## 2019-05-16 DIAGNOSIS — K21.9 GASTROESOPHAGEAL REFLUX DISEASE, ESOPHAGITIS PRESENCE NOT SPECIFIED: Primary | ICD-10-CM

## 2019-05-16 DIAGNOSIS — R11.2 NON-INTRACTABLE VOMITING WITH NAUSEA, UNSPECIFIED VOMITING TYPE: ICD-10-CM

## 2019-05-16 NOTE — TELEPHONE ENCOUNTER
----- Message from Luci Triana sent at 5/16/2019  2:57 PM CDT -----  Contact: Patient   Type:  Test Results    Who Called:   Name of Test (Lab/Mammo/Etc):  NM GASTRIC EMPTYING STUDY  Date of Test: Tuesday 5/14/19  Ordering Provider:  Lizandro Larson/PA  Where the test was performed: [facility] in [place]  Would the patient rather a call back or a response via [facility]? Call back   Best Call Back Number: Please call her at [phone]  Additional Information: n/a

## 2019-05-16 NOTE — TELEPHONE ENCOUNTER
Pt is checking the status of the results of her gastric emptying study.   She reports she is still very nauseated and is only being able to drinks some liquids no solid food. She wants to see if something can be called in for the nausea? ([facility])

## 2019-05-17 RX ORDER — ONDANSETRON 4 MG/1
4 TABLET, FILM COATED ORAL 2 TIMES DAILY
Qty: 20 TABLET | Refills: 1 | Status: SHIPPED | OUTPATIENT
Start: 2019-05-17 | End: 2020-05-20

## 2019-05-17 NOTE — TELEPHONE ENCOUNTER
Pt informed of normal GES and scheduled for EGD on 07/19/2019.  She is also told that medication for nausea was sent in to her pharmacy.

## 2019-05-17 NOTE — TELEPHONE ENCOUNTER
Her Gastric emptying study was normal. She needs an EGD. I will put in orders. I will also send something to the pharmacy for her nausea.

## 2019-05-20 ENCOUNTER — TELEPHONE (OUTPATIENT)
Dept: RADIOLOGY | Facility: HOSPITAL | Age: 44
End: 2019-05-20

## 2019-05-20 ENCOUNTER — PROCEDURE VISIT (OUTPATIENT)
Dept: NEUROLOGY | Facility: CLINIC | Age: 44
End: 2019-05-20
Payer: MEDICAID

## 2019-05-20 DIAGNOSIS — R20.2 NUMBNESS AND TINGLING: ICD-10-CM

## 2019-05-20 DIAGNOSIS — R20.0 NUMBNESS AND TINGLING: ICD-10-CM

## 2019-05-20 DIAGNOSIS — E11.42 DIABETIC PERIPHERAL NEUROPATHY ASSOCIATED WITH TYPE 2 DIABETES MELLITUS: ICD-10-CM

## 2019-05-20 DIAGNOSIS — M54.32 SCIATICA OF LEFT SIDE: ICD-10-CM

## 2019-05-20 PROCEDURE — 95909 PR NERVE CONDUCTION STUDY; 5-6 STUDIES: ICD-10-PCS | Mod: 26,S$PBB,, | Performed by: PSYCHIATRY & NEUROLOGY

## 2019-05-20 PROCEDURE — 95909 NRV CNDJ TST 5-6 STUDIES: CPT | Mod: 26,S$PBB,, | Performed by: PSYCHIATRY & NEUROLOGY

## 2019-05-20 PROCEDURE — 95909 NRV CNDJ TST 5-6 STUDIES: CPT | Mod: PBBFAC

## 2019-05-21 ENCOUNTER — HOSPITAL ENCOUNTER (OUTPATIENT)
Dept: RADIOLOGY | Facility: HOSPITAL | Age: 44
Discharge: HOME | End: 2019-05-21
Attending: PHYSICIAN ASSISTANT
Payer: MEDICAID

## 2019-05-21 DIAGNOSIS — G89.29 CHRONIC RIGHT SHOULDER PAIN: ICD-10-CM

## 2019-05-21 DIAGNOSIS — M25.511 CHRONIC RIGHT SHOULDER PAIN: ICD-10-CM

## 2019-05-21 DIAGNOSIS — M51.36 LUMBAR DEGENERATIVE DISC DISEASE: ICD-10-CM

## 2019-05-21 DIAGNOSIS — M54.32 SCIATICA OF LEFT SIDE: ICD-10-CM

## 2019-05-21 PROCEDURE — 72148 MRI LUMBAR SPINE WITHOUT CONTRAST: ICD-10-PCS | Mod: 26,,, | Performed by: RADIOLOGY

## 2019-05-21 PROCEDURE — 72148 MRI LUMBAR SPINE W/O DYE: CPT | Mod: 26,,, | Performed by: RADIOLOGY

## 2019-05-21 PROCEDURE — 73221 MRI JOINT UPR EXTREM W/O DYE: CPT | Mod: TC,RT

## 2019-05-21 PROCEDURE — 72148 MRI LUMBAR SPINE W/O DYE: CPT | Mod: TC

## 2019-05-21 PROCEDURE — 73221 MRI SHOULDER WITHOUT CONTRAST RIGHT: ICD-10-PCS | Mod: 26,RT,, | Performed by: RADIOLOGY

## 2019-05-21 PROCEDURE — 73221 MRI JOINT UPR EXTREM W/O DYE: CPT | Mod: 26,RT,, | Performed by: RADIOLOGY

## 2019-05-23 ENCOUNTER — TELEPHONE (OUTPATIENT)
Dept: RHEUMATOLOGY | Facility: CLINIC | Age: 44
End: 2019-05-23

## 2019-05-24 ENCOUNTER — PATIENT MESSAGE (OUTPATIENT)
Dept: ORTHOPEDICS | Facility: CLINIC | Age: 44
End: 2019-05-24

## 2019-05-29 ENCOUNTER — OFFICE VISIT (OUTPATIENT)
Dept: CARDIOLOGY | Facility: CLINIC | Age: 44
End: 2019-05-29
Payer: MEDICAID

## 2019-05-29 ENCOUNTER — LAB VISIT (OUTPATIENT)
Dept: LAB | Facility: HOSPITAL | Age: 44
End: 2019-05-29
Attending: INTERNAL MEDICINE
Payer: MEDICAID

## 2019-05-29 VITALS
BODY MASS INDEX: 28.91 KG/M2 | WEIGHT: 201.94 LBS | HEART RATE: 62 BPM | DIASTOLIC BLOOD PRESSURE: 74 MMHG | SYSTOLIC BLOOD PRESSURE: 136 MMHG | HEIGHT: 70 IN

## 2019-05-29 DIAGNOSIS — E78.5 HYPERLIPIDEMIA, UNSPECIFIED HYPERLIPIDEMIA TYPE: ICD-10-CM

## 2019-05-29 DIAGNOSIS — I73.9 PAD (PERIPHERAL ARTERY DISEASE): ICD-10-CM

## 2019-05-29 DIAGNOSIS — I77.1 INNOMINATE ARTERY STENOSIS: ICD-10-CM

## 2019-05-29 DIAGNOSIS — I10 ESSENTIAL HYPERTENSION: ICD-10-CM

## 2019-05-29 DIAGNOSIS — I70.8 OCCLUSION OF RIGHT SUBCLAVIAN ARTERY: ICD-10-CM

## 2019-05-29 DIAGNOSIS — Q87.40 MARFAN'S SYNDROME: Primary | ICD-10-CM

## 2019-05-29 LAB
CHOLEST SERPL-MCNC: 199 MG/DL (ref 120–199)
CHOLEST/HDLC SERPL: 6.2 {RATIO} (ref 2–5)
HDLC SERPL-MCNC: 32 MG/DL (ref 40–75)
HDLC SERPL: 16.1 % (ref 20–50)
LDLC SERPL CALC-MCNC: 108 MG/DL (ref 63–159)
NONHDLC SERPL-MCNC: 167 MG/DL
TRIGL SERPL-MCNC: 295 MG/DL (ref 30–150)

## 2019-05-29 PROCEDURE — 99213 OFFICE O/P EST LOW 20 MIN: CPT | Mod: PBBFAC,PO | Performed by: INTERNAL MEDICINE

## 2019-05-29 PROCEDURE — 99214 OFFICE O/P EST MOD 30 MIN: CPT | Mod: S$PBB,,, | Performed by: INTERNAL MEDICINE

## 2019-05-29 PROCEDURE — 80061 LIPID PANEL: CPT

## 2019-05-29 PROCEDURE — 99214 PR OFFICE/OUTPT VISIT, EST, LEVL IV, 30-39 MIN: ICD-10-PCS | Mod: S$PBB,,, | Performed by: INTERNAL MEDICINE

## 2019-05-29 PROCEDURE — 99999 PR PBB SHADOW E&M-EST. PATIENT-LVL III: ICD-10-PCS | Mod: PBBFAC,,, | Performed by: INTERNAL MEDICINE

## 2019-05-29 PROCEDURE — 99999 PR PBB SHADOW E&M-EST. PATIENT-LVL III: CPT | Mod: PBBFAC,,, | Performed by: INTERNAL MEDICINE

## 2019-05-29 PROCEDURE — 36415 COLL VENOUS BLD VENIPUNCTURE: CPT | Mod: PO

## 2019-05-29 RX ORDER — CALCIUM CITRATE/VITAMIN D3 200MG-6.25
TABLET ORAL
COMMUNITY
Start: 2019-05-15

## 2019-05-29 RX ORDER — INSULIN LISPRO 100 U/ML
INJECTION, SOLUTION SUBCUTANEOUS
Status: ON HOLD | COMMUNITY
Start: 2019-05-27 | End: 2024-01-22 | Stop reason: HOSPADM

## 2019-05-29 NOTE — PROGRESS NOTES
Subjective:   Patient ID:  Jenifer Westfall is a 43 y.o. female who presents for follow up of 6 month follow up      44 yo ,female, 6 months f/u  PMH PAD (right SUBL occluded by Cath in ), s/o bilateral STEPHEN PTCA by Dr. Landeros in , Marfan's Dz, DM, HTN, HLD.  Quit smoking now  She has Marfan's Dz dx'ed at the age of 9, with ectopia lentis. She is the first one in the family having Marfan's and her younger son has Dx. Her ophthalmologist is at [facility].   05/07/2019 had AO angiogram at [facility], showing both common iliac arteries are patent however there are stents involving the common iliac vessels bilaterally there is marked narrowing on the left side in comparison to the right. There are tandem stents involving the left common and external iliac arteries. There is a high-grade stenosis distal to the second stent on the left side. The common femoral arteries are patent bilateral the profunda femoris and superficial femoral arteries are patent in both lower extremities the popliteal arteries and tibial vessels are patent and there is good perfusion runoff and perfusion to both feet. Had angioplasty of the in-stent recurrent stenosis on the left side.     Off work due to OA,   ECHO on 01-:  1 - Normal left ventricular systolic function (EF 55-60%).   2 - Normal left ventricular diastolic function.   3 - Normal right ventricular systolic function .   UE US on 01-:  Cannot exclude hemodynamically significant inflow stenosis on right side.   Left WBI 1.0; right WBI 0.87  EKG on 12-: NSR    She denied chest pain, dyspnea on exertion, palpitation, fainting, PND, orthopnea, syncope.  Right arm weaker than left. She has right arm low BP compared to left one.  No claudication              Past Medical History:   Diagnosis Date    Arthritis     DM (diabetes mellitus) 2009     03/01/2017 Insulin x 3 years 2013    Hepatitis C antibody positive in blood     Hyperlipidemia     Hypertension   "   IBS (irritable bowel syndrome)     Kidney stone     Marfan syndrome     Mitral valve prolapse        Past Surgical History:   Procedure Laterality Date    ANGIOGRAM, LOWER EXTREMITY- LEFT LEG N/A 2018    Per[facility] C[facility] LAB    APPENDECTOMY      APPENDECTOMY-LAPAROSCOPIC N/A 2015    Performed by Naseem Anne MD at [facility] OR    BUNIONECTOMY      both feet     SECTION      x 2    CHOLECYSTECTOMY      INCISION AND DRAINAGE (I&D), ABSCESS Left 2015    Performed by [facility]riel Daniel MD at [facility] OR    TUBAL LIGATION         Social History     Tobacco Use    Smoking status: Former Smoker     Packs/day: 0.20     Years: 20.00     Pack years: 4.00     Types: Cigarettes     Last attempt to quit: 2015     Years since quittin.1    Smokeless tobacco: Never Used    Tobacco comment: "once in a blue moon"   Substance Use Topics    Alcohol use: No     Alcohol/week: 0.0 oz    Drug use: No       Family History   Problem Relation Age of Onset    Heart disease Mother     Thrombophilia Father         DVT    Hypertension Father     Stroke Maternal Aunt     Heart disease Maternal Aunt     Stroke Maternal Uncle     Heart disease Maternal Uncle     Breast cancer Neg Hx     Colon cancer Neg Hx     Ovarian cancer Neg Hx          Review of Systems   Constitution: Negative for decreased appetite, diaphoresis, fever, malaise/fatigue and night sweats.   HENT: Negative for nosebleeds.    Eyes: Negative for blurred vision and double vision.   Cardiovascular: Negative for chest pain, claudication, dyspnea on exertion, irregular heartbeat, leg swelling, near-syncope, orthopnea, palpitations, paroxysmal nocturnal dyspnea and syncope.   Respiratory: Negative for cough, shortness of breath, sleep disturbances due to breathing, snoring, sputum production and wheezing.    Endocrine: Negative for cold intolerance and polyuria.   Hematologic/Lymphatic: Does not bruise/bleed " easily.   Skin: Negative for rash.   Musculoskeletal: Positive for arthritis and joint pain. Negative for back pain, falls, joint swelling and neck pain.   Gastrointestinal: Negative for abdominal pain, heartburn, nausea and vomiting.   Genitourinary: Negative for dysuria, frequency and hematuria.   Neurological: Negative for difficulty with concentration, dizziness, focal weakness, headaches, light-headedness, numbness, seizures and weakness.   Psychiatric/Behavioral: Negative for depression, memory loss and substance abuse. The patient does not have insomnia.    Allergic/Immunologic: Negative for HIV exposure and hives.       Objective:   Physical Exam   Constitutional: She is oriented to person, place, and time. She appears well-developed and well-nourished.   HENT:   Head: Normocephalic.   Eyes: Pupils are equal, round, and reactive to light.   Neck: Normal carotid pulses and no JVD present. Carotid bruit is not present. No thyromegaly present.   Cardiovascular: Normal rate, regular rhythm, normal heart sounds, intact distal pulses and normal pulses.  No extrasystoles are present. PMI is not displaced. Exam reveals no gallop, no S3 and no friction rub.   No murmur heard.  Pulses:       Dorsalis pedis pulses are 2+ on the right side, and 2+ on the left side.   Pulmonary/Chest: Effort normal and breath sounds normal. No stridor. No respiratory distress. She has no wheezes. She has no rales.   Abdominal: Soft. Bowel sounds are normal. There is no tenderness. There is no rebound.   Musculoskeletal: Normal range of motion. She exhibits no edema or tenderness.   Neurological: She is alert and oriented to person, place, and time.   Skin: Skin is warm, dry and intact. No rash noted. No erythema.   Psychiatric: She has a normal mood and affect. Her behavior is normal.   Nursing note and vitals reviewed.      Lab Results   Component Value Date    CHOL 164 02/03/2017    CHOL 175 10/07/2015    CHOL 184 07/08/2015     Lab  Results   Component Value Date    HDL 30 (L) 02/03/2017    HDL 33 (L) 10/07/2015    HDL 32 (L) 07/08/2015     Lab Results   Component Value Date    LDLCALC 103.8 02/03/2017    LDLCALC 115.0 10/07/2015    LDLCALC 115.4 07/08/2015     Lab Results   Component Value Date    TRIG 151 (H) 02/03/2017    TRIG 135 10/07/2015    TRIG 183 (H) 07/08/2015     Lab Results   Component Value Date    CHOLHDL 18.3 (L) 02/03/2017    CHOLHDL 18.9 (L) 10/07/2015    CHOLHDL 17.4 (L) 07/08/2015       Chemistry        Component Value Date/Time     (L) 10/14/2018 1820    K 3.7 10/14/2018 1820     10/14/2018 1820    CO2 18 (L) 10/14/2018 1820    BUN 12 10/14/2018 1820    CREATININE 0.8 10/14/2018 1820     (H) 10/14/2018 1820        Component Value Date/Time    CALCIUM 9.6 10/14/2018 1820    ALKPHOS 112 10/14/2018 1820    AST 33 10/14/2018 1820    ALT 38 10/14/2018 1820    BILITOT 0.4 10/14/2018 1820    ESTGFRAFRICA >60 10/14/2018 1820    EGFRNONAA >60 10/14/2018 1820          Lab Results   Component Value Date    HGBA1C 10.9 (H) 06/26/2018     Lab Results   Component Value Date    TSH 0.849 10/07/2015     Lab Results   Component Value Date    INR 0.9 03/06/2017    INR 0.9 10/16/2016    INR 0.9 09/05/2016     Lab Results   Component Value Date    WBC 11.59 10/14/2018    HGB 16.1 (H) 10/14/2018    HCT 44.9 10/14/2018    MCV 90 10/14/2018     10/14/2018     BMP  Sodium   Date Value Ref Range Status   10/14/2018 135 (L) 136 - 145 mmol/L Final     Potassium   Date Value Ref Range Status   10/14/2018 3.7 3.5 - 5.1 mmol/L Final     Chloride   Date Value Ref Range Status   10/14/2018 103 95 - 110 mmol/L Final     CO2   Date Value Ref Range Status   10/14/2018 18 (L) 23 - 29 mmol/L Final     BUN, Bld   Date Value Ref Range Status   10/14/2018 12 6 - 20 mg/dL Final     Creatinine   Date Value Ref Range Status   10/14/2018 0.8 0.5 - 1.4 mg/dL Final     Calcium   Date Value Ref Range Status   10/14/2018 9.6 8.7 - 10.5 mg/dL  Final     Anion Gap   Date Value Ref Range Status   10/14/2018 14 8 - 16 mmol/L Final     eGFR if    Date Value Ref Range Status   10/14/2018 >60 >60 mL/min/1.73 m^2 Final     eGFR if non    Date Value Ref Range Status   10/14/2018 >60 >60 mL/min/1.73 m^2 Final     Comment:     Calculation used to obtain the estimated glomerular filtration  rate (eGFR) is the CKD-EPI equation.        BNP  @LABRCNTIP(BNP,BNPTRIAGEBLO)@  @LABRCNTIP(troponini)@  CrCl cannot be calculated (Patient's most recent lab result is older than the maximum 7 days allowed.).  No results found in the last 24 hours.  No results found in the last 24 hours.  No results found in the last 24 hours.    Assessment:      1. Marfan's syndrome    2. Essential hypertension    3. Hyperlipidemia, unspecified hyperlipidemia type    4. Innominate artery stenosis    5. PAD (peripheral artery disease)    6. Occlusion of right subclavian artery        Plan:   Check Lipid profile  DM rx by PCP  Continue ASA, plavix, atenolol, lipitor and ACEI  Continue current meds.  Recommend heart-healthy diet, weight control and regular exercise.  Laya. Risk modification.   RTC in1 yr    I have reviewed all pertinent labs and cardiac studies. Plans and recommendations have been formulated under my direct supervision. All questions answered and patient voiced understanding. Patient to continue current medications.

## 2019-05-30 ENCOUNTER — TELEPHONE (OUTPATIENT)
Dept: CARDIOLOGY | Facility: CLINIC | Age: 44
End: 2019-05-30

## 2019-05-30 DIAGNOSIS — E78.2 MIXED HYPERLIPIDEMIA: Primary | ICD-10-CM

## 2019-05-30 RX ORDER — EZETIMIBE 10 MG/1
10 TABLET ORAL DAILY
Qty: 90 TABLET | Refills: 3 | Status: SHIPPED | OUTPATIENT
Start: 2019-05-30 | End: 2020-06-18

## 2019-05-31 ENCOUNTER — TELEPHONE (OUTPATIENT)
Dept: CARDIOLOGY | Facility: CLINIC | Age: 44
End: 2019-05-31

## 2019-05-31 NOTE — TELEPHONE ENCOUNTER
Called and left v/m to rtc to review labs and new orders from Dr Hsu. Cm    ----- Message from Daniel Hsu MD sent at 5/30/2019  4:38 PM CDT -----    Add zetia 10 mg daily  Check Lipid profile and CMP in 3 months

## 2019-05-31 NOTE — TELEPHONE ENCOUNTER
----- Message from Gama Nayak sent at 5/31/2019 10:13 AM CDT -----  Contact: Pt   Pt is calling .Type:  Test Results    Who Called:  Pt   Name of Test (Lab/Mammo/Etc):  Blood Work   Date of Test: 05/29/2019   Ordering Provider: Dr. Hsu  [facility]  Would the patient rather a call back or a response via [facility]? Call Back   Best Call Back Number: [phone]         .Thank You  Priyanka Nayak

## 2019-05-31 NOTE — TELEPHONE ENCOUNTER
Returned call and gave the following information    Add zetia 10 mg daily  Check Lipid profile and CMP in 3 months

## 2019-06-06 ENCOUNTER — PATIENT MESSAGE (OUTPATIENT)
Dept: ORTHOPEDICS | Facility: CLINIC | Age: 44
End: 2019-06-06

## 2019-06-10 ENCOUNTER — TELEPHONE (OUTPATIENT)
Dept: RHEUMATOLOGY | Facility: CLINIC | Age: 44
End: 2019-06-10

## 2019-06-10 RX ORDER — ATORVASTATIN CALCIUM 80 MG/1
TABLET, FILM COATED ORAL
Qty: 30 TABLET | Refills: 5 | Status: SHIPPED | OUTPATIENT
Start: 2019-06-10 | End: 2019-12-04 | Stop reason: SDUPTHER

## 2019-06-10 NOTE — TELEPHONE ENCOUNTER
Called pt to reschedule appt , provider ill. No answer left message to return call to be rescheduled

## 2019-06-13 ENCOUNTER — TELEPHONE (OUTPATIENT)
Dept: NEUROLOGY | Facility: CLINIC | Age: 44
End: 2019-06-13

## 2019-06-13 NOTE — TELEPHONE ENCOUNTER
----- Message from Gama Nayak sent at 6/13/2019  1:14 PM CDT -----  Contact: Pt   Pt is calling regarding requesting to have nurse call pt back. Pt states that call is concerning an update on what's next for Pt care with Dr. Mcdonald. .[phone] (home)         .Thank You  Priyanka Nayak

## 2019-06-13 NOTE — TELEPHONE ENCOUNTER
Returned call to give info per gurmeet about her procedure and  did not order the next part of emg/ncs.so she is good iwith seeing the neurosurgeon for back and shoulder.6/13/1119/1400/sf

## 2019-06-19 NOTE — PROGRESS NOTES
Subjective:     Patient ID: Jenifer Westfall is a 43 y.o. female.    Chief Complaint: No chief complaint on file.    Ms. Westfall is a 42 yo female who presents for bilateral shoulder pain. This is a chronic problem. Episode began 3-4 years ago. She was seen by Dr. Gotti and Lucero Landeros PA-C in our department in the past for this problem. 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 She was scheduled for a shoulder arthroscopy in October 2017, but had to cancel due to being hospitalized for anaphylaxis in response to contrast dye. Of note since that time she has had several stents placed by Vascular Surgery including 3 in her bilateral legs in June 2018. She was also told that she has a 100% blockage in one of the vessels in her right arm and will need to have another stent placed. She is currently being referred to a new vascular surgeon to complete this procedure.   Her pain was rated as 6/10 and described it as shooting in quality. Pain is worsened by movement of the arm. She feels that her pain limits the function of her arm and her activity level. Patient had steroid injection  with some relief of symptoms for approximately a week. She is unable to take NSAIDs due to gastritis. Patient denies swelling, numbness, and tingling. Patient has been putting Voltaren gel and Tramadol with minimal relief of symptoms.  Patient also complains of pain of left shoulder. Pain is intermittent. Episode began in the last 6 weeks. Pain worsened by ROM. Patient denies PT for left shoulder.       Patient presents for follow up of right shoulder pain. Patient has vascular clearance for right shoulder surgery if needed. Patient is here for MRI follow up. Patient states that pain is still severe and diminishing her quality of life.    Pain   Pertinent negatives include no chest pain, fever, joint swelling, numbness or rash.       Past Medical History:   Diagnosis Date    Arthritis     DM (diabetes mellitus) 2009      "2017 Insulin x 3 years     Hepatitis C antibody positive in blood     Hyperlipidemia     Hypertension     IBS (irritable bowel syndrome)     Kidney stone     Marfan syndrome     Mitral valve prolapse      Past Surgical History:   Procedure Laterality Date    ANGIOGRAM, LOWER EXTREMITY- LEFT LEG N/A 2018    [facility] CATH LAB    APPENDECTOMY      APPENDECTOMY-LAPAROSCOPIC N/A 2015    Performed by [facility]aseem Anne [facility] at [facility] OR    BUNIONECTOMY      both feet     SECTION      x 2    CHOLECYSTECTOMY      INCISION AND DRAINAGE (I&D), ABSCESS Left 2015    [facility] by [facility] at [facility] OR    TUBAL LIGATION       Family History   Problem Relation Age of Onset    Heart disease Mother     Thrombophilia Father         DVT    Hypertension Father     Stroke Maternal Aunt     Heart disease Maternal Aunt     Stroke Maternal Uncle     Heart disease Maternal Uncle     Breast cancer Neg Hx     Colon cancer Neg Hx     Ovarian cancer Neg Hx      Social History     Socioeconomic History    Marital status: Single     Spouse name: Not on file    Number of children: Not on file    Years of education: Not on file    Highest education level: Not on file   Occupational History    Not on file   Social Needs    Financial resource strain: Not on file    Food insecurity:     Worry: Not on file     Inability: Not on file    Transportation needs:     Medical: Not on file     Non-medical: Not on file   Tobacco Use    Smoking status: Former Smoker     Packs/day: 0.20     Years: 20.00     Pack years: 4.00     Types: Cigarettes     Last attempt to quit: 2015     Years since quittin.1    Smokeless tobacco: Never Used    Tobacco comment: "once in a blue moon"   Substance and Sexual Activity    Alcohol use: No     Alcohol/week: 0.0 oz    Drug use: No    Sexual activity: Yes     Partners: Male     Birth control/protection: Injection "   Lifestyle    Physical activity:     Days per week: Not on file     Minutes per session: Not on file    Stress: Not on file   Relationships    Social connections:     Talks on phone: Not on file     Gets together: Not on file     Attends [employer] service: Not on file     Active member of club or organization: Not on file     Attends meetings of clubs or organizations: Not on file     Relationship status: Not on file   Other Topics Concern    Not on file   Social History Narrative    Not on file     Medication List with Changes/Refills   Current Medications    ADMELOG SOLOSTAR U-100 INSULIN 100 UNIT/ML PEN        ASCORBIC ACID (VITAMIN C) 100 MG TABLET    Take 100 mg by mouth once daily.    ASPIRIN (ECOTRIN) 81 MG EC TABLET    Take 81 mg by mouth once daily.    ATENOLOL (TENORMIN) 50 MG TABLET    TAKE 1 TABLET BY MOUTH ONCE DAILY    ATORVASTATIN (LIPITOR) 80 MG TABLET    TAKE 1 TABLET BY MOUTH ONCE DAILY.    CETIRIZINE (ZYRTEC) 10 MG TABLET    Take 10 mg by mouth.    CLOPIDOGREL (PLAVIX) 75 MG TABLET    Take 1 tablet (75 mg total) by mouth once daily.    COLESTIPOL (COLESTID) 1 GRAM TAB    TAKE 2 TABLETS BY MOUTH THREE TIMES DAILY. DO NOT TAKE OTHER MEDICATION WITHIN 1 HOUR BEFORE OR 4 HOURS AFTER TAKING COLESTIPOL.    DICLOFENAC SODIUM (VOLTAREN) 1 % GEL    Apply 2 grams topically 4 (four) times daily. for 10 days    DICYCLOMINE (BENTYL) 20 MG TABLET    TAKE 1 TABLET (20 MG TOTAL) BY MOUTH 4 TIMES DAILY.    ERGOCALCIFEROL, VITAMIN D2, (VITAMIN D ORAL)    Take 1 tablet by mouth once daily.     EZETIMIBE (ZETIA) 10 MG TABLET    Take 1 tablet (10 mg total) by mouth once daily.    FAMOTIDINE (PEPCID) 20 MG TABLET    Take 1 tablet (20 mg total) by mouth 2 (two) times daily.    FLUOXETINE (PROZAC) 40 MG CAPSULE    TAKE 1 CAPSULE BY MOUTH ONCE DAILY    GABAPENTIN (NEURONTIN) 400 MG CAPSULE    TAKE 1 CAPSULE BY MOUTH 3 TIMES DAILY    GLIPIZIDE (GLUCOTROL) 10 MG TABLET    Take 1 tablet (10 mg total) by mouth 2 (two)  "times daily.    INSULIN GLARGINE,HUM.REC.ANLOG (BASAGLAR KWIKPEN U-100 INSULIN SUBQ)    Inject 40 Units into the skin 2 (two) times daily.    LIRAGLUTIDE 0.6 MG/0.1 ML, 18 MG/3 ML, SUBQ PNIJ (VICTOZA 2-DIAN) 0.6 MG/0.1 ML (18 MG/3 ML) PNIJ    Inject 1.2 Units into the skin.    LISINOPRIL 10 MG TABLET    TAKE 1 TABLET (10 MG TOTAL) BY MOUTH ONCE DAILY.    ONDANSETRON (ZOFRAN) 4 MG TABLET    Take 1 tablet (4 mg total) by mouth 2 (two) times daily.    PEN NEEDLE 31 GAUGE X 5/16" NDLE    USE 5 TIMES DAILY WITH LANTUS AND HUMALOG    TIZANIDINE (ZANAFLEX) 4 MG TABLET    TAKE 1 TABLET BY MOUTH EVERY EVENING    TRAMADOL (ULTRAM) 50 MG TABLET    Take 1 tablet (50 mg total) by mouth every 12 (twelve) hours.    TRUE METRIX GLUCOSE TEST STRIP STRP         Review of patient's allergies indicates:   Allergen Reactions    Compazine [prochlorperazine edisylate] Rash    Contrast media Anaphylaxis    Dye Anaphylaxis    Levaquin [levofloxacin] Hives and Itching    Metformin Other (See Comments)     Upset stomach    Ibuprofen Other (See Comments)     Stomach pain    Morphine Other (See Comments)     Irritable     Review of Systems   Constitution: Negative for fever and night sweats.   HENT: Negative for hearing loss.    Eyes: Negative for blurred vision and visual disturbance.   Cardiovascular: Negative for chest pain and leg swelling.   Respiratory: Negative for shortness of breath.    Endocrine: Negative for polyuria.   Hematologic/Lymphatic: Negative for bleeding problem.   Skin: Negative for rash.   Musculoskeletal: Positive for joint pain. Negative for back pain, joint swelling, muscle cramps and muscle weakness.   Gastrointestinal: Negative for melena.   Genitourinary: Negative for hematuria.   Neurological: Negative for loss of balance, numbness and paresthesias.   Psychiatric/Behavioral: Negative for altered mental status.       Objective:   There is no height or weight on file to calculate BMI.  There were no vitals " filed for this visit.    General: Jenifer is well-developed, well-nourished, appears stated age, in no acute distress, alert and oriented to time, place and person.       General    Constitutional: She is oriented to person, place, and time. She appears well-developed and well-nourished.   HENT:   Head: Normocephalic and atraumatic.   Right Ear: External ear normal.   Left Ear: External ear normal.   Nose: Nose normal.   Eyes: EOM are normal. Pupils are equal, round, and reactive to light. Right eye exhibits no discharge. Left eye exhibits no discharge.   Neck: Normal range of motion.   Cardiovascular: Normal heart sounds.    Pulmonary/Chest: Effort normal. No respiratory distress.   Abdominal: Soft.   Neurological: She is alert and oriented to person, place, and time.   Psychiatric: She has a normal mood and affect. Her behavior is normal. Judgment and thought content normal.         Back (L-Spine & T-Spine) / Neck (C-Spine) Exam     Tenderness   The patient is tender to palpation of the right trapezial.   Right Shoulder Exam     Inspection/Observation   Swelling: absent  Bruising: absent  Scars: absent  Deformity: absent  Scapular Winging: absent  Scapular Dyskinesia: negative  Atrophy: absent    Tenderness   The patient is tender to palpation of the acromioclavicular joint, acromion and supraspinatus.    Range of Motion   Active abduction:  80 abnormal   Passive abduction:  100 normal   Extension:  0 normal   Forward Flexion:  100 abnormal   Forward Elevation: 100 abnormal  Adduction: 40 normal  External Rotation 0 degrees:  50 normal   Internal rotation 0 degrees:  Sacrum normal     Tests & Signs   Cross arm: positive  Drop arm: positive  Gordon test: positive  Impingement: positive  Rotator Cuff Painful Arc/Range: severe  Lift Off Sign: positive  Active Compression Test (Weatherby's Sign): positive  Yergason's Test: positive  Speed's Test: positive    Other   Sensation: normal    Left Shoulder Exam      Inspection/Observation   Swelling: absent  Bruising: absent  Scars: absent  Deformity: absent  Scapular Winging: absent  Scapular Dyskinesia: negative  Atrophy: absent    Tenderness   The patient is tender to palpation of the acromioclavicular joint.    Range of Motion   Active abduction:  90 normal   Passive abduction:  100 normal   Extension:  0 normal   Forward Flexion:  180 normal   Forward Elevation: 180 normal  Adduction: 40 normal  External Rotation 0 degrees:  50 normal   External Rotation 90 degrees: 90 normal  Internal rotation 0 degrees:  T8 normal   Internal rotation 90 degrees:  30 normal     Tests & Signs   Cross arm: negative  Drop arm: negative  Gordon test: positive  Impingement: positive  Lift Off Sign: negative  Active Compression test (Marathon's Sign): negative  Yergasons's Test: negative  Speed's Test: negative    Other   Sensation: normal       Muscle Strength   Right Upper Extremity   Shoulder Abduction: 2/5   Shoulder Internal Rotation: 2/5   Shoulder External Rotation: 2/5   Supraspinatus: 2/5/5   Subscapularis: 2/5/5   Biceps: 2/5/5   Left Upper Extremity  Shoulder Abduction: 4/5   Shoulder Internal Rotation: 4/5   Shoulder External Rotation: 4/5   Supraspinatus: 4/5/5   Subscapularis: 4/5/5   Biceps: 4/5/5     Vascular Exam     Right Pulses      Radial:                    1+      Left Pulses      Radial:                    2+      Capillary Refill  Right Hand: normal capillary refill  Left Hand: normal capillary refill    I have personally reviewed the following imaging and agree with the radiologist's findings of:   XR Shoulder: No acute findings. Mild degenerative changes.       MRI Shoulder: Complete tear of the supraspinatus tendon with muscle atrophy.  Infraspinatus tendinosis.    Acromioclavicular joint and degenerative hypertrophy changes with undersurface spurring.  Tiny posterior-inferior glenoid subcortical cyst with minimal overlying cartilage fissuring.    Possible mild  subacromial/subdeltoid bursitis.    Assessment:     Encounter Diagnoses   Name Primary?    Supraspinatus tendon tear, right, initial encounter Yes    Chronic right shoulder pain         Plan:     Discussed with patient her MRI findings which were similar to MRI findings in 2017. Patient was scheduled for Shoulder Arthroscopy with RCR when she had anaphylaxis and was hospitalized 1 day prior to her surgery.   Patient has vascular clearance acknowledgement which can be seen in the media tab.   Discussed with patient that she would likely also need clearance from Cardiology and her primary care provider.     Referral to Dr. Hammond for evaluation for right shoulder arthroscopy with possible RCR      Patient verbalizes understanding and agrees with the above plan.    Vane Garcia PA-C  Orthopedic Surgery/Sports Medicine

## 2019-06-20 ENCOUNTER — OFFICE VISIT (OUTPATIENT)
Dept: ORTHOPEDICS | Facility: CLINIC | Age: 44
End: 2019-06-20
Payer: MEDICAID

## 2019-06-20 VITALS
WEIGHT: 201 LBS | HEIGHT: 70 IN | HEART RATE: 89 BPM | SYSTOLIC BLOOD PRESSURE: 119 MMHG | RESPIRATION RATE: 18 BRPM | BODY MASS INDEX: 28.77 KG/M2 | DIASTOLIC BLOOD PRESSURE: 72 MMHG

## 2019-06-20 DIAGNOSIS — M25.511 CHRONIC RIGHT SHOULDER PAIN: ICD-10-CM

## 2019-06-20 DIAGNOSIS — G89.29 CHRONIC RIGHT SHOULDER PAIN: ICD-10-CM

## 2019-06-20 PROCEDURE — 99999 PR PBB SHADOW E&M-EST. PATIENT-LVL IV: ICD-10-PCS | Mod: PBBFAC,,, | Performed by: PHYSICIAN ASSISTANT

## 2019-06-20 PROCEDURE — 99214 OFFICE O/P EST MOD 30 MIN: CPT | Mod: PBBFAC | Performed by: PHYSICIAN ASSISTANT

## 2019-06-20 PROCEDURE — 99214 OFFICE O/P EST MOD 30 MIN: CPT | Mod: S$PBB,,, | Performed by: PHYSICIAN ASSISTANT

## 2019-06-20 PROCEDURE — 99999 PR PBB SHADOW E&M-EST. PATIENT-LVL IV: CPT | Mod: PBBFAC,,, | Performed by: PHYSICIAN ASSISTANT

## 2019-06-20 PROCEDURE — 99214 PR OFFICE/OUTPT VISIT, EST, LEVL IV, 30-39 MIN: ICD-10-PCS | Mod: S$PBB,,, | Performed by: PHYSICIAN ASSISTANT

## 2019-06-20 NOTE — PROGRESS NOTES
Subjective:       Patient ID: Jenifer Westfall is a 43 y.o. female.    Chief Complaint: shoulder pain and hip pain    Jenifer is here for f/u.  She has chronic pain with chronic sciatica on the left side. MRI l spine done several years ago shows disc disease and nerve impingement.  She also has had issues with chronic left hip bursitis with injections in the past.  She also has Marfans. She also has issues with neck pain and has DDD on Cspine xray.  She has had prednisone tapers in the past which help her sciatic pain but it returns when she is off the steroids.  She takes gabapentin 400mg TID and tramadol prn for her pain. She was seen by Dr. Stein in past for possible DENY for her spine issues but insurance would not cover these.      She can't take nsaids due to GI upset.  Prev issues with djd in her catherine knees with injections.  Not an issue today.  Main c/o is her right shoulder with a rtc tear on MRI, seeing Dr. Hammond next week. Her pain is 8/10.     Review of Systems   Constitutional: Negative for activity change, fatigue, fever and unexpected weight change.   HENT: Negative for mouth sores, tinnitus and trouble swallowing.    Eyes: Negative for itching and visual disturbance.   Respiratory: Negative for cough and shortness of breath.    Cardiovascular: Negative for chest pain, palpitations and leg swelling.   Gastrointestinal: Negative for blood in stool, diarrhea, nausea and vomiting.   Genitourinary: Negative for dysuria, flank pain, frequency and hematuria.   Musculoskeletal: Positive for arthralgias, back pain, gait problem, joint swelling and neck pain. Negative for myalgias.   Skin: Negative for rash and wound.   Neurological: Positive for numbness. Negative for dizziness, weakness, light-headedness and headaches.   Hematological: Negative for adenopathy. Does not bruise/bleed easily.   Psychiatric/Behavioral: Negative for confusion and sleep disturbance.         Objective:   /64   Pulse 79   Ht  "5' 10" (1.778 m)   Wt 93.1 kg (205 lb 4 oz)   BMI 29.45 kg/m²      Physical Exam   Constitutional: She is oriented to person, place, and time and well-developed, well-nourished, and in no distress. No distress.   HENT:   Head: Normocephalic.   Eyes: EOM are normal. Pupils are equal, round, and reactive to light.   Neck: Normal range of motion. Neck supple. No thyromegaly present.   Cardiovascular: Normal rate, regular rhythm and normal heart sounds.  Exam reveals no gallop and no friction rub.    No murmur heard.  Pulmonary/Chest: Breath sounds normal. She has no wheezes. She has no rales. She exhibits no tenderness.   Abdominal: Soft. There is no tenderness. There is no rebound.   Lymphadenopathy:     She has no cervical adenopathy.   Neurological: She is alert and oriented to person, place, and time. She displays normal reflexes. She exhibits normal muscle tone. Gait normal.   Skin: Skin is warm and dry. No rash noted. No erythema.     Psychiatric: Mood, memory and affect normal.   Musculoskeletal: Normal range of motion. She exhibits tenderness. She exhibits no edema.   Left low  to bilateral paraspinal muscles    Left hip significant tenderness to palpation over the greater trochanter, rom flex/ext of the hip ellicits pain laterally with radiation into the leg, +straight leg raise      right hip normal            MRI lumbar spine 5/2019:   Similar appearance of the spine with mild degenerative changes.  No high-grade spinal canal stenosis or neural foraminal narrowing.  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 Primary osteoarthritis of both knees    2. Chronic pain disorder    3. Lumbar degenerative disc disease    4. Sciatica of left side    5. Trochanteric bursitis of left hip    6. Chronic right shoulder pain        Impression:  Left side sciatica: stable mri in past showing nerve impingement on L5-S1, done PT, medicaid denies ariella's, steroid packs help briefly; no nsaids due to " gi upset; gabapentin 400mg in the am and 400-800mg q hs    Left hip bursitis: cortisone injections in the past help, not an issue today     catherine knee djd: prev injections worked in the past, not an issue today     Chronic pain disorder: due to all of the above    Right shoulder pain: due to RTC tear on MRI, seeing ortho     Lumbar deg disease: with sig radiation and neuropathy into the left leg, taking gabapentin      Plan:         Cont f/u with ortho for shoulder   Can inject knees anytime  rec vit B6 for her neuropathy     F/u with us prn or in a year

## 2019-06-21 ENCOUNTER — OFFICE VISIT (OUTPATIENT)
Dept: RHEUMATOLOGY | Facility: CLINIC | Age: 44
End: 2019-06-21
Payer: MEDICAID

## 2019-06-21 VITALS
HEART RATE: 79 BPM | HEIGHT: 70 IN | WEIGHT: 205.25 LBS | DIASTOLIC BLOOD PRESSURE: 64 MMHG | SYSTOLIC BLOOD PRESSURE: 118 MMHG | BODY MASS INDEX: 29.38 KG/M2

## 2019-06-21 DIAGNOSIS — G89.29 CHRONIC RIGHT SHOULDER PAIN: ICD-10-CM

## 2019-06-21 DIAGNOSIS — M25.511 CHRONIC RIGHT SHOULDER PAIN: ICD-10-CM

## 2019-06-21 DIAGNOSIS — M70.62 TROCHANTERIC BURSITIS OF LEFT HIP: ICD-10-CM

## 2019-06-21 DIAGNOSIS — M51.36 LUMBAR DEGENERATIVE DISC DISEASE: ICD-10-CM

## 2019-06-21 DIAGNOSIS — M54.32 SCIATICA OF LEFT SIDE: ICD-10-CM

## 2019-06-21 DIAGNOSIS — M17.0 PRIMARY OSTEOARTHRITIS OF BOTH KNEES: Primary | ICD-10-CM

## 2019-06-21 DIAGNOSIS — G89.4 CHRONIC PAIN DISORDER: ICD-10-CM

## 2019-06-21 PROCEDURE — 99214 OFFICE O/P EST MOD 30 MIN: CPT | Mod: PBBFAC | Performed by: PHYSICIAN ASSISTANT

## 2019-06-21 PROCEDURE — 99999 PR PBB SHADOW E&M-EST. PATIENT-LVL IV: CPT | Mod: PBBFAC,,, | Performed by: PHYSICIAN ASSISTANT

## 2019-06-21 PROCEDURE — 99999 PR PBB SHADOW E&M-EST. PATIENT-LVL IV: ICD-10-PCS | Mod: PBBFAC,,, | Performed by: PHYSICIAN ASSISTANT

## 2019-06-21 PROCEDURE — 99214 OFFICE O/P EST MOD 30 MIN: CPT | Mod: S$PBB,,, | Performed by: PHYSICIAN ASSISTANT

## 2019-06-21 PROCEDURE — 99214 PR OFFICE/OUTPT VISIT, EST, LEVL IV, 30-39 MIN: ICD-10-PCS | Mod: S$PBB,,, | Performed by: PHYSICIAN ASSISTANT

## 2019-06-26 ENCOUNTER — TELEPHONE (OUTPATIENT)
Dept: ORTHOPEDICS | Facility: CLINIC | Age: 44
End: 2019-06-26

## 2019-06-26 DIAGNOSIS — M25.511 RIGHT SHOULDER PAIN, UNSPECIFIED CHRONICITY: Primary | ICD-10-CM

## 2019-06-26 NOTE — TELEPHONE ENCOUNTER
1st attempt to contact pt regarding X-rays prior to appt today. Pt needs to arrive at 2:30 to have Rt Shoulder X-Ray before appt. No answer, left detailed voicemail, AL, RIGON.

## 2019-06-26 NOTE — TELEPHONE ENCOUNTER
Called Pt to Confirm her 3:00 appt today 6/26/2019 and let her know to come no later than 3:15 . Pt confirmed and understood.     ----- Message from Delaney Chong sent at 6/26/2019  1:33 PM CDT -----  Contact: self [phone]  .Type:  Patient Returning Call    Who Called:Jenifer Westfall  Who Left Message for Patient:pt does not know  Does the patient know what this is regarding?:appt time  Would the patient rather a call back or a response via [facility]? Call back  Best Call Back Number:[phone]  Additional Information: pt states she received message saying Xray was added, but relies on a transportation service, will be at appoinment as soon as oksana

## 2019-06-26 NOTE — TELEPHONE ENCOUNTER
Returned patient's phone call. Patient states that they will not be able to make their appointment today for 3:15 due to transportation. Informed the patient we have to reschedule their appointment for another day. I got the patient reschedule to see Dr. Hammond on 8/2/19 at 8:45 with an 8:15 x-ray. Patient verbalized understanding. Patient was thankful for the call back.FP        ----- Message from Jennifer Renteria sent at 6/26/2019  3:04 PM CDT -----  Contact: pt  Pt request a call back, she wont make it for 3:15 due to transportation issues ...call back : [phone] (home)

## 2019-06-27 ENCOUNTER — TELEPHONE (OUTPATIENT)
Dept: CARDIOLOGY | Facility: CLINIC | Age: 44
End: 2019-06-27

## 2019-06-27 NOTE — TELEPHONE ENCOUNTER
Dr Hsu is working on a peer to peer.    ----- Message from Fiona Garcia sent at 6/24/2019  1:41 PM CDT -----  Contact: Pt  Pt would like nurse to please return call regarding medication. Please contact pt at ([phone])

## 2019-07-01 ENCOUNTER — DOCUMENTATION ONLY (OUTPATIENT)
Dept: RHEUMATOLOGY | Facility: CLINIC | Age: 44
End: 2019-07-01

## 2019-07-01 DIAGNOSIS — M70.62 TROCHANTERIC BURSITIS OF LEFT HIP: ICD-10-CM

## 2019-07-01 DIAGNOSIS — M75.51 CHRONIC BURSITIS OF RIGHT SHOULDER: Chronic | ICD-10-CM

## 2019-07-01 RX ORDER — TRAMADOL HYDROCHLORIDE 50 MG/1
TABLET ORAL
Qty: 60 TABLET | Refills: 0 | Status: SHIPPED | OUTPATIENT
Start: 2019-07-01 | End: 2020-12-10

## 2019-07-19 NOTE — H&P (VIEW-ONLY)
Short Stay Endoscopy History and Physical    PCP - Primary Doctor No    Procedure - EGD  ASA - 3  Mallampati - per anesthesia  History of Anesthesia problems - no  Family history Anesthesia problems -  no     HPI:  This is a 43 y.o.female here for evaluation of : GERD; Nausea and vomiting    Reflux - yes  Dysphagia - no  Abdominal pain - no  Diarrhea - no  Anemia - no  GI bleeding - no  Nausea and vomiting-yes  Early satiety-no  aversion to sight or smell of food-no    ROS:  Constitutional: No fevers, chills, No weight loss  ENT: No allergies  CV: No chest pain  Pulm: No cough, No shortness of breath  Ophtho: No vision changes  GI: see HPI  Derm: No rash  Heme: No lymphadenopathy, No bruising  MSK: No arthritis  : No dysuria, No hematuria  Endo: No hot or cold intolerance  Neuro: No syncope, No seizure  Psych: No anxiety, No depression    Medical History:  Past Medical History:   Diagnosis Date    Arthritis     DM (diabetes mellitus)      2017 Insulin x 3 years     Hepatitis C antibody positive in blood     Hyperlipidemia     Hypertension     IBS (irritable bowel syndrome)     Kidney stone     Marfan syndrome     Mitral valve prolapse        Surgical History:  Past Surgical History:   Procedure Laterality Date    ANGIOGRAM, LOWER EXTREMITY- LEFT LEG N/A 2018    [facility] C[facility]    APPENDECTOMY      APPENDECTOMY-LAPAROSCOPIC N/A 2015    [facility] at [facility] OR    BUNIONECTOMY      both feet     SECTION      x 2    CHOLECYSTECTOMY      INCISION AND DRAINAGE (I&D), ABSCESS Left 2015    [facility] at [facility] OR    TUBAL LIGATION         Family History:  Family History   Problem Relation Age of Onset    Heart disease Mother     Thrombophilia Father         DVT    Hypertension Father     Stroke Maternal Aunt     Heart disease Maternal Aunt     Stroke Maternal Uncle     Heart disease  "Maternal Uncle     Breast cancer Neg Hx     Colon cancer Neg Hx     Ovarian cancer Neg Hx        Social History:  Social History     Socioeconomic History    Marital status: Single     Spouse name: Not on file    Number of children: Not on file    Years of education: Not on file    Highest education level: Not on file   Occupational History    Not on file   Social Needs    Financial resource strain: Not on file    Food insecurity:     Worry: Not on file     Inability: Not on file    Transportation needs:     Medical: Not on file     Non-medical: Not on file   Tobacco Use    Smoking status: Former Smoker     Packs/day: 0.20     Years: 20.00     Pack years: 4.00     Types: Cigarettes     Last attempt to quit: 2015     Years since quittin.2    Smokeless tobacco: Never Used    Tobacco comment: "once in a blue moon"   Substance and Sexual Activity    Alcohol use: No     Alcohol/week: 0.0 oz    Drug use: No    Sexual activity: Yes     Partners: Male     Birth control/protection: Injection   Lifestyle    Physical activity:     Days per week: Not on file     Minutes per session: Not on file    Stress: Not on file   Relationships    Social connections:     Talks on phone: Not on file     Gets together: Not on file     Attends Advent service: Not on file     Active member of club or organization: Not on file     Attends meetings of clubs or organizations: Not on file     Relationship status: Not on file   Other Topics Concern    Not on file   Social History Narrative    Not on file       Allergies:   Review of patient's allergies indicates:   Allergen Reactions    Compazine [prochlorperazine edisylate] Rash    Contrast media Anaphylaxis    Dye Anaphylaxis    Levaquin [levofloxacin] Hives and Itching    Metformin Other (See Comments)     Upset stomach    Ibuprofen Other (See Comments)     Stomach pain    Morphine Other (See Comments)     Irritable       Medications:   No current " "facility-administered medications on file prior to encounter.      Current Outpatient Medications on File Prior to Encounter   Medication Sig Dispense Refill    ascorbic acid (VITAMIN C) 100 MG tablet Take 100 mg by mouth once daily.      aspirin (ECOTRIN) 81 MG EC tablet Take 81 mg by mouth once daily.      atenolol (TENORMIN) 50 MG tablet TAKE 1 TABLET BY MOUTH ONCE DAILY 30 tablet 11    cetirizine (ZYRTEC) 10 MG tablet Take 10 mg by mouth.      clopidogrel (PLAVIX) 75 mg tablet Take 1 tablet (75 mg total) by mouth once daily. 90 tablet 3    colestipol (COLESTID) 1 gram Tab TAKE 2 TABLETS BY MOUTH THREE TIMES DAILY. DO NOT TAKE OTHER MEDICATION WITHIN 1 HOUR BEFORE OR 4 HOURS AFTER TAKING COLESTIPOL. 180 tablet 5    diclofenac sodium (VOLTAREN) 1 % Gel Apply 2 grams topically 4 (four) times daily. for 10 days 1 Tube 3    dicyclomine (BENTYL) 20 mg tablet TAKE 1 TABLET (20 MG TOTAL) BY MOUTH 4 TIMES DAILY. 120 tablet 3    ERGOCALCIFEROL, VITAMIN D2, (VITAMIN D ORAL) Take 1 tablet by mouth once daily.       famotidine (PEPCID) 20 MG tablet Take 1 tablet (20 mg total) by mouth 2 (two) times daily. 60 tablet 11    FLUoxetine (PROZAC) 40 MG capsule TAKE 1 CAPSULE BY MOUTH ONCE DAILY 90 capsule 0    gabapentin (NEURONTIN) 400 MG capsule TAKE 1 CAPSULE BY MOUTH 3 TIMES DAILY 270 capsule 4    glipiZIDE (GLUCOTROL) 10 MG tablet Take 1 tablet (10 mg total) by mouth 2 (two) times daily. 60 tablet 0    insulin glargine,hum.rec.anlog (BASAGLAR KWIKPEN U-100 INSULIN SUBQ) Inject 40 Units into the skin 2 (two) times daily.      lisinopril 10 MG tablet TAKE 1 TABLET (10 MG TOTAL) BY MOUTH ONCE DAILY. 30 tablet 3    ondansetron (ZOFRAN) 4 MG tablet Take 1 tablet (4 mg total) by mouth 2 (two) times daily. 20 tablet 1    PEN NEEDLE 31 gauge x 5/16" Ndle USE 5 TIMES DAILY WITH LANTUS AND HUMALOG 100 each 3    tiZANidine (ZANAFLEX) 4 MG tablet TAKE 1 TABLET BY MOUTH EVERY EVENING 90 tablet 4       Objective " Findings:    Vital Signs:There were no vitals filed for this visit.        Physical Exam:  General Appearance: Well appearing in no acute distress  Eyes:    No scleral icterus  ENT: Neck supple, Lips, mucosa, and tongue normal; teeth and gums normal  Lungs: CTA bilaterally in anterior and posterior fields, no wheezes, no crackles.  Heart:  Regular rate, S1, S2 normal, no murmurs heard.  Abdomen: Soft, non tender, non distended with normal bowel sounds. No hepatosplenomegaly, ascites, or mass.  Extremities: No clubbing, cyanosis or edema  Skin: No rash    Labs:  Reviewed    Plan:EGD  I have explained the risks and benefits of endoscopy procedures to the patient including but not limited to bleeding, perforation, infection, and death. The patient wishes to proceed.

## 2019-07-24 ENCOUNTER — OFFICE VISIT (OUTPATIENT)
Dept: OBSTETRICS AND GYNECOLOGY | Facility: CLINIC | Age: 44
End: 2019-07-24
Payer: MEDICAID

## 2019-07-24 VITALS
BODY MASS INDEX: 30.17 KG/M2 | HEIGHT: 70 IN | WEIGHT: 210.75 LBS | DIASTOLIC BLOOD PRESSURE: 70 MMHG | SYSTOLIC BLOOD PRESSURE: 132 MMHG

## 2019-07-24 DIAGNOSIS — Z01.419 WELL WOMAN EXAM WITH ROUTINE GYNECOLOGICAL EXAM: Primary | ICD-10-CM

## 2019-07-24 DIAGNOSIS — Z30.42 ENCOUNTER FOR SURVEILLANCE OF INJECTABLE CONTRACEPTIVE: ICD-10-CM

## 2019-07-24 PROCEDURE — 99999 PR PBB SHADOW E&M-EST. PATIENT-LVL III: CPT | Mod: PBBFAC,,, | Performed by: OBSTETRICS & GYNECOLOGY

## 2019-07-24 PROCEDURE — 99396 PR PREVENTIVE VISIT,EST,40-64: ICD-10-PCS | Mod: S$PBB,,, | Performed by: OBSTETRICS & GYNECOLOGY

## 2019-07-24 PROCEDURE — 99213 OFFICE O/P EST LOW 20 MIN: CPT | Mod: PBBFAC | Performed by: OBSTETRICS & GYNECOLOGY

## 2019-07-24 PROCEDURE — 88175 CYTOPATH C/V AUTO FLUID REDO: CPT | Performed by: PATHOLOGY

## 2019-07-24 PROCEDURE — 99999 PR PBB SHADOW E&M-EST. PATIENT-LVL III: ICD-10-PCS | Mod: PBBFAC,,, | Performed by: OBSTETRICS & GYNECOLOGY

## 2019-07-24 PROCEDURE — 88141 LIQUID-BASED PAP SMEAR, SCREENING: ICD-10-PCS | Mod: ,,, | Performed by: PATHOLOGY

## 2019-07-24 PROCEDURE — 87624 HPV HI-RISK TYP POOLED RSLT: CPT

## 2019-07-24 PROCEDURE — 99396 PREV VISIT EST AGE 40-64: CPT | Mod: S$PBB,,, | Performed by: OBSTETRICS & GYNECOLOGY

## 2019-07-24 PROCEDURE — 88141 CYTOPATH C/V INTERPRET: CPT | Mod: ,,, | Performed by: PATHOLOGY

## 2019-07-24 RX ORDER — KETOROLAC TROMETHAMINE 5 MG/ML
SOLUTION OPHTHALMIC
COMMUNITY
Start: 2019-07-10 | End: 2020-12-10

## 2019-07-24 RX ORDER — MEDROXYPROGESTERONE ACETATE 150 MG/ML
150 INJECTION, SUSPENSION INTRAMUSCULAR
Status: ACTIVE | OUTPATIENT
Start: 2019-07-24 | End: 2020-10-16

## 2019-07-24 RX ORDER — TOBRAMYCIN 3 MG/ML
SOLUTION/ DROPS OPHTHALMIC
COMMUNITY
Start: 2019-07-02 | End: 2020-06-18

## 2019-07-24 RX ORDER — PREDNISOLONE ACETATE 10 MG/ML
SUSPENSION/ DROPS OPHTHALMIC
COMMUNITY
Start: 2019-07-02 | End: 2020-06-18

## 2019-07-24 RX ADMIN — MEDROXYPROGESTERONE ACETATE 150 MG: 150 INJECTION, SUSPENSION, EXTENDED RELEASE INTRAMUSCULAR at 03:07

## 2019-07-24 NOTE — PROGRESS NOTES
Subjective:       Patient ID: Jenifer Westfall is a 44 y.o. female.    Chief Complaint:  Well Woman      History of Present Illness  HPI  Annual Exam-Premenopausal  Patient presents for annual exam. The patient has no complaints today. The patient is sexually active. GYN screening history: last pap: approximate date  and was normal and last mammogram: was normal. The patient wears seatbelts: yes. The patient participates in regular exercise: no. Has the patient ever been transfused or tattooed?: no. The patient reports that there is not domestic violence in her life.  Pt has been doing well on Depo Provera and desires to continue use.    GYN & OB History  No LMP recorded. Patient has had an injection.   Date of Last Pap: 2016    OB History    Para Term  AB Living   2 2 2         SAB TAB Ectopic Multiple Live Births                  # Outcome Date GA Lbr Renato/2nd Weight Sex Delivery Anes PTL Lv   2 Term      CS-Unspec      1 Term      CS-Unspec          Review of Systems  Review of Systems   Constitutional: Negative for activity change, appetite change, chills, fatigue, fever and unexpected weight change.   Respiratory: Negative for shortness of breath.    Cardiovascular: Negative for chest pain, palpitations and leg swelling.   Gastrointestinal: Positive for constipation (IBS) and diarrhea. Negative for abdominal pain, bloating, blood in stool, nausea and vomiting.   Genitourinary: Negative for dysmenorrhea, dyspareunia, dysuria, flank pain, frequency, genital sores, hematuria, menorrhagia, menstrual problem, pelvic pain, urgency, vaginal bleeding, vaginal discharge, vaginal pain, urinary incontinence, postcoital bleeding, vaginal dryness and vaginal odor.   Musculoskeletal: Negative for back pain.   Integumentary:  Negative for breast mass, nipple discharge, breast skin changes and breast tenderness.   Neurological: Negative for syncope and headaches.   Breast: Negative for asymmetry, lump,  mass, mastodynia, nipple discharge, skin changes and tenderness          Objective:    Physical Exam:   Constitutional: She is oriented to person, place, and time. She appears well-developed and well-nourished. No distress.    HENT:   Head: Normocephalic and atraumatic.    Eyes: Pupils are equal, round, and reactive to light. EOM are normal.    Neck: Normal range of motion.    Cardiovascular: Normal rate, regular rhythm and normal heart sounds.     Pulmonary/Chest: Effort normal and breath sounds normal. Right breast exhibits no inverted nipple, no mass, no nipple discharge, no skin change, no tenderness, no bleeding and no swelling. Left breast exhibits no inverted nipple, no mass, no nipple discharge, no skin change, no tenderness, no bleeding and no swelling. Breasts are symmetrical.        Abdominal: Soft. Bowel sounds are normal. She exhibits no distension. There is no tenderness.     Genitourinary: Vagina normal and uterus normal. Pelvic exam was performed with patient supine. There is no rash, tenderness, lesion or injury on the right labia. There is no rash, tenderness, lesion or injury on the left labia. Uterus is not deviated, not enlarged and not tender. Cervix is normal. Right adnexum displays no mass, no tenderness and no fullness. Left adnexum displays no mass, no tenderness and no fullness. No erythema, tenderness or bleeding in the vagina. No foreign body in the vagina. No signs of injury around the vagina. No vaginal discharge found. Cervix exhibits no motion tenderness, no discharge and no friability. Additional cervical findings: pap smear done          Musculoskeletal: Normal range of motion and moves all extremeties. She exhibits no edema or tenderness.       Neurological: She is alert and oriented to person, place, and time.    Skin: Skin is warm and dry.    Psychiatric: She has a normal mood and affect. Her behavior is normal. Thought content normal.          Assessment:        1. Well woman  exam with routine gynecological exam    2. Encounter for surveillance of injectable contraceptive              Plan:      Well woman exam with routine gynecological exam  -     Mammo Digital Screening Bilat; Future; Expected date: 07/24/2019  -     Liquid-based pap smear, screening  -     HPV High Risk Genotypes, PCR  -     Pt was counseled on cervical/vaginal screening guidelines and recommendations.  Last pap NILM on 2016.  If today's pap smear result is negative, next pap smear will be due in 2022.  -     Pt was advised on current breast cancer screening recommendations.  Pt desires to proceed with breast exam today and screening MMG.  -     Follow up with PCP for routine health maintenance needs.    Encounter for surveillance of injectable contraceptive  -     medroxyPROGESTERone (DEPO-PROVERA) syringe 150 mg  -     Pt was counseled on contraception options, including associated risks and benefits of each.  Pt voiced understanding and desires to proceed with Depo Provera.  Medication dosing, side-effects, risks, benefits, and alternatives were discussed.  Risks associated with long term Depo Provera use, including bone density loss, delayed fertility, and delayed return to regular menses were discussed.  Recommend daily Calcium/Vitamin D supplementation and regular exercise.  Medical history was reviewed and pt is a candidate for Depo Provera use.      Follow up in about 1 year (around 7/24/2020).

## 2019-07-24 NOTE — PROGRESS NOTES
Two patient identifiers verified  Patient tolerated well.   Patient notified to wait 15 minutes in clinic after injection, patient verbalized understanding.   150mg/ml of depo provera administered IM to Right ventrogluteal   Next injection scheduled for 10/09/19 at the [facility] location.

## 2019-07-29 ENCOUNTER — TELEPHONE (OUTPATIENT)
Dept: GASTROENTEROLOGY | Facility: CLINIC | Age: 44
End: 2019-07-29

## 2019-07-29 LAB
HPV HR 12 DNA CVX QL NAA+PROBE: POSITIVE
HPV16 AG SPEC QL: NEGATIVE
HPV18 DNA SPEC QL NAA+PROBE: NEGATIVE

## 2019-07-31 ENCOUNTER — HOSPITAL ENCOUNTER (OUTPATIENT)
Facility: HOSPITAL | Age: 44
Discharge: HOME | End: 2019-07-31
Attending: INTERNAL MEDICINE | Admitting: INTERNAL MEDICINE
Payer: MEDICAID

## 2019-07-31 ENCOUNTER — ANESTHESIA EVENT (OUTPATIENT)
Dept: ENDOSCOPY | Facility: HOSPITAL | Age: 44
End: 2019-07-31
Payer: MEDICAID

## 2019-07-31 ENCOUNTER — ANESTHESIA (OUTPATIENT)
Dept: ENDOSCOPY | Facility: HOSPITAL | Age: 44
End: 2019-07-31
Payer: MEDICAID

## 2019-07-31 VITALS
BODY MASS INDEX: 29.51 KG/M2 | WEIGHT: 206.13 LBS | HEIGHT: 70 IN | SYSTOLIC BLOOD PRESSURE: 128 MMHG | DIASTOLIC BLOOD PRESSURE: 73 MMHG | HEART RATE: 78 BPM | TEMPERATURE: 98 F | OXYGEN SATURATION: 100 % | RESPIRATION RATE: 18 BRPM

## 2019-07-31 DIAGNOSIS — K29.30 CHRONIC SUPERFICIAL GASTRITIS WITHOUT BLEEDING: ICD-10-CM

## 2019-07-31 DIAGNOSIS — R11.15 NON-INTRACTABLE CYCLICAL VOMITING WITH NAUSEA: Primary | ICD-10-CM

## 2019-07-31 DIAGNOSIS — K21.9 GERD (GASTROESOPHAGEAL REFLUX DISEASE): ICD-10-CM

## 2019-07-31 DIAGNOSIS — K21.9 GASTROESOPHAGEAL REFLUX DISEASE, ESOPHAGITIS PRESENCE NOT SPECIFIED: ICD-10-CM

## 2019-07-31 LAB
B-HCG UR QL: NEGATIVE
CTP QC/QA: YES
POCT GLUCOSE: 173 MG/DL (ref 70–110)

## 2019-07-31 PROCEDURE — 63600175 PHARM REV CODE 636 W HCPCS: Performed by: INTERNAL MEDICINE

## 2019-07-31 PROCEDURE — 27201012 HC FORCEPS, HOT/COLD, DISP: Performed by: INTERNAL MEDICINE

## 2019-07-31 PROCEDURE — 43239 EGD BIOPSY SINGLE/MULTIPLE: CPT | Mod: ,,, | Performed by: INTERNAL MEDICINE

## 2019-07-31 PROCEDURE — 25000003 PHARM REV CODE 250: Performed by: NURSE ANESTHETIST, CERTIFIED REGISTERED

## 2019-07-31 PROCEDURE — 81025 URINE PREGNANCY TEST: CPT | Performed by: INTERNAL MEDICINE

## 2019-07-31 PROCEDURE — 43239 EGD BIOPSY SINGLE/MULTIPLE: CPT | Performed by: INTERNAL MEDICINE

## 2019-07-31 PROCEDURE — 82962 GLUCOSE BLOOD TEST: CPT | Performed by: INTERNAL MEDICINE

## 2019-07-31 PROCEDURE — 63600175 PHARM REV CODE 636 W HCPCS: Performed by: NURSE ANESTHETIST, CERTIFIED REGISTERED

## 2019-07-31 PROCEDURE — 88305 TISSUE EXAM BY PATHOLOGIST: CPT | Mod: 26,,, | Performed by: PATHOLOGY

## 2019-07-31 PROCEDURE — 88305 TISSUE SPECIMEN TO PATHOLOGY - SURGERY: ICD-10-PCS | Mod: 26,,, | Performed by: PATHOLOGY

## 2019-07-31 PROCEDURE — 00731 ANES UPR GI NDSC PX NOS: CPT | Performed by: INTERNAL MEDICINE

## 2019-07-31 PROCEDURE — 37000008 HC ANESTHESIA 1ST 15 MINUTES: Performed by: INTERNAL MEDICINE

## 2019-07-31 PROCEDURE — 88305 TISSUE EXAM BY PATHOLOGIST: CPT | Performed by: PATHOLOGY

## 2019-07-31 PROCEDURE — 43239 PR EGD, FLEX, W/BIOPSY, SGL/MULTI: ICD-10-PCS | Mod: ,,, | Performed by: INTERNAL MEDICINE

## 2019-07-31 PROCEDURE — 37000009 HC ANESTHESIA EA ADD 15 MINS: Performed by: INTERNAL MEDICINE

## 2019-07-31 RX ORDER — SODIUM CHLORIDE, SODIUM LACTATE, POTASSIUM CHLORIDE, CALCIUM CHLORIDE 600; 310; 30; 20 MG/100ML; MG/100ML; MG/100ML; MG/100ML
INJECTION, SOLUTION INTRAVENOUS CONTINUOUS
Status: DISCONTINUED | OUTPATIENT
Start: 2019-07-31 | End: 2019-07-31 | Stop reason: HOSPADM

## 2019-07-31 RX ORDER — SODIUM CHLORIDE 0.9 % (FLUSH) 0.9 %
10 SYRINGE (ML) INJECTION
Status: DISCONTINUED | OUTPATIENT
Start: 2019-07-31 | End: 2019-07-31 | Stop reason: HOSPADM

## 2019-07-31 RX ORDER — LIDOCAINE HYDROCHLORIDE 20 MG/ML
INJECTION, SOLUTION EPIDURAL; INFILTRATION; INTRACAUDAL; PERINEURAL
Status: DISCONTINUED | OUTPATIENT
Start: 2019-07-31 | End: 2019-07-31

## 2019-07-31 RX ORDER — PROPOFOL 10 MG/ML
VIAL (ML) INTRAVENOUS
Status: DISCONTINUED | OUTPATIENT
Start: 2019-07-31 | End: 2019-07-31

## 2019-07-31 RX ADMIN — PROPOFOL 100 MG: 10 INJECTION, EMULSION INTRAVENOUS at 09:07

## 2019-07-31 RX ADMIN — PROPOFOL 105 MG: 10 INJECTION, EMULSION INTRAVENOUS at 09:07

## 2019-07-31 RX ADMIN — LIDOCAINE HYDROCHLORIDE 100 MG: 20 INJECTION, SOLUTION EPIDURAL; INFILTRATION; INTRACAUDAL; PERINEURAL at 09:07

## 2019-07-31 RX ADMIN — SODIUM CHLORIDE, SODIUM LACTATE, POTASSIUM CHLORIDE, AND CALCIUM CHLORIDE: 600; 310; 30; 20 INJECTION, SOLUTION INTRAVENOUS at 08:07

## 2019-07-31 RX ADMIN — PROPOFOL 200 MG: 10 INJECTION, EMULSION INTRAVENOUS at 09:07

## 2019-07-31 RX ADMIN — SODIUM CHLORIDE, SODIUM LACTATE, POTASSIUM CHLORIDE, AND CALCIUM CHLORIDE: 600; 310; 30; 20 INJECTION, SOLUTION INTRAVENOUS at 09:07

## 2019-07-31 NOTE — DISCHARGE SUMMARY
[facility]  Brief Operative Note     SUMMARY     Surgery Date: 7/31/2019     Surgeon(s) and Role:     * Jaron Sanders III, MD - Primary    Assisting Surgeon: None    Pre-op Diagnosis:  Gastroesophageal reflux disease, esophagitis presence not specified [K21.9]  Non-intractable vomiting with nausea, unspecified vomiting type [R11.2]    Post-op Diagnosis:  Post-Op Diagnosis Codes:     * Gastroesophageal reflux disease, esophagitis presence not specified [K21.9]     * Non-intractable vomiting with nausea, unspecified vomiting type [R11.2]      - Gastritis  Procedure(s) (LRB):  ESOPHAGOGASTRODUODENOSCOPY (EGD) (N/A)    Anesthesia: Choice    Description of the findings of the procedure: Procedures completed. See Procedure note for full details.    Findings/Key Components: Procedures completed. See Procedure note for full details.    Prosthetics/Devices: None    Estimated Blood Loss: * No values recorded between 7/31/2019 12:00 AM and 7/31/2019  9:15 AM *         Specimens:   Specimen (12h ago, onward)    Start     Ordered    07/31/19 0914  Specimen to Pathology - Surgery  Once     Comments:  #1 Antrum Bx R/O Erosive Gastritis     Start Status     07/31/19 0914 Collected (07/31/19 0917) Order ID: [id number]       07/31/19 0916          Discharge Note    SUMMARY     Admit Date: 7/31/2019    Discharge Date and Time: 7/31/2019    Hospital Course (synopsis of major diagnoses, care, treatment, and services provided during the course of the hospital stay):  Procedures completed. See Procedure note for full details. Discharge patient when discharge criteria met.    Final Diagnosis: Post-Op Diagnosis Codes:     * Gastroesophageal reflux disease, esophagitis presence not specified [K21.9]     * Non-intractable vomiting with nausea, unspecified vomiting type [R11.2]      - Gastritis  Disposition: Discharge patient when discharge criteria met.    Follow Up/Patient Instructions:       Medications:  Reconciled Home  Medications:   Current Discharge Medication List      CONTINUE these medications which have NOT CHANGED    Details   ADMELOG SOLOSTAR U-100 INSULIN 100 unit/mL pen       ascorbic acid (VITAMIN C) 100 MG tablet Take 100 mg by mouth once daily.      aspirin (ECOTRIN) 81 MG EC tablet Take 81 mg by mouth once daily.      atenolol (TENORMIN) 50 MG tablet TAKE 1 TABLET BY MOUTH ONCE DAILY  Qty: 30 tablet, Refills: 11    Comments: Generic For:TENORMIN 50 MG TABLET  Associated Diagnoses: Essential hypertension; Marfan's syndrome      atorvastatin (LIPITOR) 80 MG tablet TAKE 1 TABLET BY MOUTH ONCE DAILY.  Qty: 30 tablet, Refills: 5    Comments: Generic For:LIPITOR 80 MG TABLET  06/08/2019 10:58:07 AM      cetirizine (ZYRTEC) 10 MG tablet Take 10 mg by mouth.      colestipol (COLESTID) 1 gram Tab TAKE 2 TABLETS BY MOUTH THREE TIMES DAILY. DO NOT TAKE OTHER MEDICATION WITHIN 1 HOUR BEFORE OR 4 HOURS AFTER TAKING COLESTIPOL.  Qty: 180 tablet, Refills: 5    Comments: Generic For:COLESTID 1 GM TABLET  Associated Diagnoses: Chronic diarrhea      diclofenac sodium (VOLTAREN) 1 % Gel Apply 2 grams topically 4 (four) times daily. for 10 days  Qty: 1 Tube, Refills: 3    Associated Diagnoses: Chronic right shoulder pain      dicyclomine (BENTYL) 20 mg tablet TAKE 1 TABLET (20 MG TOTAL) BY MOUTH 4 TIMES DAILY.  Qty: 120 tablet, Refills: 3    Comments: Generic For:*BENTYL 20 MG TABLET patient needs refills  04/12/2019 3:41:52 PM  05/13/2019 10:10:50 AM      ERGOCALCIFEROL, VITAMIN D2, (VITAMIN D ORAL) Take 1 tablet by mouth once daily.       famotidine (PEPCID) 20 MG tablet Take 1 tablet (20 mg total) by mouth 2 (two) times daily.  Qty: 60 tablet, Refills: 11      FLUoxetine (PROZAC) 40 MG capsule TAKE 1 CAPSULE BY MOUTH ONCE DAILY  Qty: 90 capsule, Refills: 0    Comments: Generic For:PROZAC 40 MG PULVULE  11/12/2018 10:08:44 AM  N O T I C E    Last quantity doesn't match original quantity      gabapentin (NEURONTIN) 400 MG capsule TAKE  "1 CAPSULE BY MOUTH 3 TIMES DAILY  Qty: 270 capsule, Refills: 4    Comments: Generic For:NEURONTIN 400 MG CAPSULE  04/04/2019 2:48:40 PM  Associated Diagnoses: Sciatica of left side      glipiZIDE (GLUCOTROL) 10 MG tablet Take 1 tablet (10 mg total) by mouth 2 (two) times daily.  Qty: 60 tablet, Refills: 0    Associated Diagnoses: Type 2 diabetes mellitus without complication, with long-term current use of insulin      insulin glargine,hum.rec.anlog (BASAGLAR KWIKPEN U-100 INSULIN SUBQ) Inject 40 Units into the skin 2 (two) times daily.      ketorolac 0.5% (ACULAR) 0.5 % Drop       liraglutide 0.6 mg/0.1 mL, 18 mg/3 mL, subq PNIJ (VICTOZA 2-DIAN) 0.6 mg/0.1 mL (18 mg/3 mL) PnIj Inject 1.2 Units into the skin.      lisinopril 10 MG tablet TAKE 1 TABLET (10 MG TOTAL) BY MOUTH ONCE DAILY.  Qty: 30 tablet, Refills: 3    Comments: Generic For:ZESTRIL 10 MG TABLET NEED REFILLS AUTHORIZED!!  07/27/2017 2:44:51 PM  Associated Diagnoses: Essential hypertension      ondansetron (ZOFRAN) 4 MG tablet Take 1 tablet (4 mg total) by mouth 2 (two) times daily.  Qty: 20 tablet, Refills: 1      PEN NEEDLE 31 gauge x 5/16" Ndle USE 5 TIMES DAILY WITH LANTUS AND HUMALOG  Qty: 100 each, Refills: 3    Comments: Generic For:BD SHORT PEN NEEDLES      tiZANidine (ZANAFLEX) 4 MG tablet TAKE 1 TABLET BY MOUTH EVERY EVENING  Qty: 90 tablet, Refills: 4    Comments: Generic For:*ZANAFLEX 4 MG TABLET  03/11/2019 4:02:34 PM  Associated Diagnoses: Chronic pain disorder; Sciatica of left side      tobramycin sulfate 0.3% (TOBREX) 0.3 % ophthalmic solution       traMADol (ULTRAM) 50 mg tablet TAKE 1 TABLET BY MOUTH EVERY 12 HOURS  Qty: 60 tablet, Refills: 0    Comments: Generic For:*ULTRAM 50 MG TABLET Ptn needs refills..thanks!  07/01/2019 12:00:37 PM  Associated Diagnoses: Trochanteric bursitis of left hip; Chronic bursitis of right shoulder      TRUE METRIX GLUCOSE TEST STRIP Strp       clopidogrel (PLAVIX) 75 mg tablet Take 1 tablet (75 mg total) " by mouth once daily.  Qty: 90 tablet, Refills: 3    Comments: Generic For:*PLAVIX 75MG      ezetimibe (ZETIA) 10 mg tablet Take 1 tablet (10 mg total) by mouth once daily.  Qty: 90 tablet, Refills: 3      prednisoLONE acetate (PRED FORTE) 1 % DrpS             Discharge Procedure Orders   Diet general     Activity as tolerated

## 2019-07-31 NOTE — DISCHARGE INSTRUCTIONS
Gastritis (Adult)    Gastritis is inflammation and irritation of the stomach lining. It can be present for a short time (acute) or be long lasting (chronic). Gastritis is often caused by infection with bacteria called H pylori. More than a third of people in the [place] have this bacteria in their bodies. In many cases, H pylori causes no problems or symptoms. In some people, though, the infection irritates the stomach lining and causes gastritis. Other causes of stomach irritation include drinking alcohol or taking pain-relieving medicines called NSAIDs (such as aspirin or ibuprofen).   Symptoms of gastritis can include:  · Abdominal pain or bloating  · Loss of appetite  · Nausea or vomiting  · Vomiting blood or having black stools  · Feeling more tired than usual  An inflamed and irritated stomach lining is more likely to develop a sore called an ulcer. To help prevent this, gastritis should be treated.  Home care  If needed, medicines may be prescribed. If you have H pylori infection, treating it will likely relieve your symptoms. Other changes can help reduce stomach irritation and help it heal.  · If you have been prescribed medicines for H pylori infection, take them as directed. Take all of the medicine until it is finished or your healthcare provider tells you to stop, even if you feel better.  · Your healthcare provider may recommend avoiding NSAIDs. If you take daily aspirin for your heart or other medical reasons, do not stop without talking to your healthcare provider first.  · Avoid drinking alcohol.  · Stop smoking. Smoking can irritate the stomach and delay healing. As much as possible, stay away from second hand smoke.  Follow-up care  Follow up with your healthcare provider, or as advised by our staff. Testing may be needed to check for inflammation or an ulcer.  When to seek medical advice  Call your healthcare provider for any of the following:  · Stomach pain that gets worse or moves to the lower  right abdomen (appendix area)  · Chest pain that appears or gets worse, or spreads to the back, neck, shoulder, or arm  · Frequent vomiting (cant keep down liquids)  · Blood in the stool or vomit (red or black in color)  · Feeling weak or dizzy  · Fever of 100.4ºF (38ºC) or higher, or as directed by your healthcare provider  Date Last Reviewed: 6/22/2015 © 2000-2017 [employer]. [address] [place] [address]. All rights reserved. This information is not intended as a substitute for professional medical care. Always follow your healthcare professional's instructions.        Upper GI Endoscopy     During endoscopy, a long, flexible tube is used to view the inside of your upper GI tract.      Upper GI endoscopy allows your healthcare provider to look directly into the beginning of your gastrointestinal (GI) tract. The esophagus, stomach, and duodenum (the first part of the small intestine) make up the upper GI tract.   Before the exam  Follow these and any other instructions you are given before your endoscopy. If you dont follow the healthcare providers instructions carefully, the test may need to be canceled or done over:  · Don't eat or drink anything after midnight the night before your exam. If your exam is in the afternoon, drink only clear liquids in the morning. Don't eat or drink anything for 8 hours before the exam. In some cases, you may be able to take medicines with sips of water until 2 hours before the procedure. Speak with your healthcare provider about this.   · Bring your X-rays and any other test results you have.  · Because you will be sedated, arrange for an adult to drive you home after the exam.  · Tell your healthcare provider before the exam if you are taking any medicines or have any medical problems.  The procedure  Here is what to expect:  · You will lie on the endoscopy table. Usually patients lie on the left side.  · You will be monitored and given  oxygen.  · Your throat may be numbed with a spray or gargle. You are given medicine through an intravenous (IV) line that will help you relax and remain comfortable. You may be awake or asleep during the procedure.  · The healthcare provider will put the endoscope in your mouth and down your esophagus. It is thinner than most pieces of food that you swallow. It will not affect your breathing. The medicine helps keep you from gagging.  · Air is put into your GI tract to expand it. It can make you burp.  · During the procedure, the healthcare provider can take biopsies (tissue samples), remove abnormalities, such as polyps, or treat abnormalities through a variety of devices placed through the endoscope. You will not feel this.   · The endoscope carries images of your upper GI tract to a video screen. If you are awake, you may be able to look at the images.  · After the procedure is done, you will rest for a time. An adult must drive you home.  When to call your healthcare provider  Contact your healthcare provider if you have:  · Black or tarry stools, or blood in your stool  · Fever  · Pain in your belly that does not go away  · Nausea and vomiting, or vomiting blood   Date Last Reviewed: 7/1/2016  © 0625-4966 [employer]. [address] [place] [address], [place] [address]. All rights reserved. This information is not intended as a substitute for professional medical care. Always follow your healthcare professional's instructions.

## 2019-07-31 NOTE — PLAN OF CARE
Dr Sanders came to bedside and discussed findings. NO N/V,  no abdominal pain, no GI bleeding, and vitals stable.  Pt discharged from unit.

## 2019-07-31 NOTE — ANESTHESIA PREPROCEDURE EVALUATION
07/31/2019  Jenifer Westfall is a 44 y.o., female.    Pre-op Assessment    I have reviewed the Patient Summary Reports.     I have reviewed the Nursing Notes.   I have reviewed the Medications.     Review of Systems  Anesthesia Hx:  Denies Family Hx of Anesthesia complications.   Denies Personal Hx of Anesthesia complications.   Social:  Former Smoker    Cardiovascular:   Exercise tolerance: good Hypertension, well controlled Valvular problems/Murmurs Denies MI.  Denies CAD.    PVD ECG has been reviewed. CONCLUSIONS     1 - No wall motion abnormalities.     2 - Normal left ventricular systolic function (EF 55-60%).     3 - Normal left ventricular diastolic function.     4 - Normal right ventricular systolic function .     5 - Pulmonary hypertension. The estimated PA systolic pressure is 43 mmHg.     6 - Mild to moderate tricuspid reg   Pulmonary:  Pulmonary Normal    Renal/:   renal calculi    Hepatic/GI:   Liver Disease, Hepatitis, C    Neurological:   Neuromuscular Disease, Marfan syndrome,eye problem   Endocrine:   Diabetes, well controlled, type 2    Psych:   Psychiatric History          Physical Exam  General:  Well nourished    Airway/Jaw/Neck:  Airway Findings: Mouth Opening: Small, but > 3cm Tongue: Normal  Mallampati: II       Chest/Lungs:  Chest/Lungs Findings: Clear to auscultation     Heart/Vascular:  Heart Findings: Rhythm: Regular Rhythm             Anesthesia Plan  Type of Anesthesia, risks & benefits discussed:  Anesthesia Type:  MAC  Patient's Preference:   Intra-op Monitoring Plan:   Intra-op Monitoring Plan Comments:   Post Op Pain Control Plan:   Post Op Pain Control Plan Comments:   Induction:   IV  Beta Blocker:  Patient is not currently on a Beta-Blocker (No further documentation required).       Informed Consent: Patient understands risks and agrees with Anesthesia plan.  Questions  answered.   ASA Score: 3  emergent   Day of Surgery Review of History & Physical: I have interviewed and examined the patient. I have reviewed the patient's H&P dated:        Anesthesia Plan Notes: GETA/RSI. Grade 4 view with Mac 3 previously.Glidescope available. 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 Will avoid morphine(excessive N/V) Pt receiving Plt for Plavix reversal. Will give Decadron/zofran.        Ready For Surgery From Anesthesia Perspective.

## 2019-07-31 NOTE — ANESTHESIA POSTPROCEDURE EVALUATION
Anesthesia Post Evaluation    Patient: Jenifer Westfall    Procedure(s) Performed: Procedure(s) (LRB):  ESOPHAGOGASTRODUODENOSCOPY (EGD) (N/A)    Final Anesthesia Type: MAC  Patient location during evaluation: PACU  Patient participation: Yes- Able to Participate  Level of consciousness: awake and alert and oriented  Post-procedure vital signs: reviewed and stable  Pain management: adequate  Airway patency: patent  PONV status at discharge: No PONV  Anesthetic complications: no      Cardiovascular status: blood pressure returned to baseline, hemodynamically stable and stable  Respiratory status: unassisted, spontaneous ventilation and room air  Hydration status: euvolemic  Follow-up not needed.          Vitals Value Taken Time   /61 7/31/2019  9:23 AM   Temp 37.1 °C (98.8 °F) 7/31/2019  8:48 AM   Pulse 83 7/31/2019  9:23 AM   Resp 22 7/31/2019  9:23 AM   SpO2 100 % 7/31/2019  9:23 AM         No case tracking events are documented in the log.      Pain/Abdullahi Score: Abdullahi Score: 9 (7/31/2019  9:20 AM)

## 2019-07-31 NOTE — ANESTHESIA RELEASE NOTE
"Anesthesia Release from PACU Note    Patient: Jenifer Westfall    Procedure(s) Performed: Procedure(s) (LRB):  ESOPHAGOGASTRODUODENOSCOPY (EGD) (N/A)    Anesthesia type: MAC    Post pain: Adequate analgesia    Post assessment: no apparent anesthetic complications, tolerated procedure well and no evidence of recall    Last Vitals:   Visit Vitals  /61   Pulse 83   Temp 37.1 °C (98.8 °F) (Oral)   Resp (!) 22   Ht 5' 10" (1.778 m)   Wt 93.5 kg (206 lb 2.1 oz)   SpO2 100%   Breastfeeding? No   BMI 29.58 kg/m²       Post vital signs: stable    Level of consciousness: awake, alert  and oriented    Nausea/Vomiting: no nausea/no vomiting    Complications: none    Airway Patency: patent    Respiratory: unassisted, spontaneous ventilation, room air    Cardiovascular: stable and blood pressure at baseline    Hydration: euvolemic  "

## 2019-07-31 NOTE — [employer] PATIENT INSTRUCTIONS
Discharge Summary/Instructions after an Endoscopic Procedure  Patient Name: Jenifer Westfall  Patient MRN: [id number]  Patient YOB: 1975 Wednesday, July 31, 2019 Jaron Sanders III, MD  RESTRICTIONS:  During your procedure today, you received medications for sedation.  These   medications may affect your judgment, balance and coordination.  Therefore,   for 24 hours, you have the following restrictions:   - DO NOT drive a car, operate machinery, make legal/financial decisions,   sign important papers or drink alcohol.    ACTIVITY:  Today: no heavy lifting, straining or running due to procedural   sedation/anesthesia.  The following day: return to full activity including work.  DIET:  Eat and drink normally unless instructed otherwise.     TREATMENT FOR COMMON SIDE EFFECTS:  - Mild abdominal pain, nausea, belching, bloating or excessive gas:  rest,   eat lightly and use a heating pad.  - Sore Throat: treat with throat lozenges and/or gargle with warm salt   water.  - Because air was used during the procedure, expelling large amounts of air   from your rectum or belching is normal.  - If a bowel prep was taken, you may not have a bowel movement for 1-3 days.    This is normal.  SYMPTOMS TO WATCH FOR AND REPORT TO YOUR PHYSICIAN:  1. Abdominal pain or bloating, other than gas cramps.  2. Chest pain.  3. Back pain.  4. Signs of infection such as: chills or fever occurring within 24 hours   after the procedure.  5. Rectal bleeding, which would show as bright red, maroon, or black stools.   (A tablespoon of blood from the rectum is not serious, especially if   hemorrhoids are present.)  6. Vomiting.  7. Weakness or dizziness.  GO DIRECTLY TO THE NEAREST EMERGENCY ROOM IF YOU HAVE ANY OF THE FOLLOWING:      Difficulty breathing              Chills and/or fever over 101 F   Persistent vomiting and/or vomiting blood   Severe abdominal pain   Severe chest pain   Black, tarry stools   Bleeding- more than one  tablespoon   Any other symptom or condition that you feel may need urgent attention  Your doctor recommends these additional instructions:  If any biopsies were taken, your doctors clinic will contact you in 1 to 2   weeks with any results.  - Discharge patient to home (via wheelchair).   - Resume previous diet.   - Continue present medications.   - Await pathology results.   - Use Protonix (pantoprazole) 40 mg PO daily for 8 weeks.  For questions, problems or results please call your physician Jaron Sanders III, MD at Work:  [phone]  If you have any questions about the above instructions, call the GI   department at [phone] or call the endoscopy unit at [phone]   from 7am until 3 pm.  [facility], EMERGENCY ROOM PHONE NUMBER:   [phone]  IF A COMPLICATION OR EMERGENCY SITUATION ARISES AND YOU ARE UNABLE TO REACH   YOUR PHYSICIAN - GO DIRECTLY TO THE EMERGENCY ROOM.  I have read or have had read to me these discharge instructions for my   procedure and have received a written copy.  I understand these   instructions and will follow-up with my physician if I have any questions.     __________________________________       _____________________________________  Nurse Signature                                          Patient/Designated   Responsible Party Signature  Jaron Sanders III, MD  7/31/2019 9:26:49 AM  This report has been verified and signed electronically.  [employer]

## 2019-08-02 ENCOUNTER — HOSPITAL ENCOUNTER (OUTPATIENT)
Dept: RADIOLOGY | Facility: HOSPITAL | Age: 44
Discharge: HOME | End: 2019-08-02
Attending: ORTHOPAEDIC SURGERY
Payer: MEDICAID

## 2019-08-02 ENCOUNTER — OFFICE VISIT (OUTPATIENT)
Dept: ORTHOPEDICS | Facility: CLINIC | Age: 44
End: 2019-08-02
Payer: MEDICAID

## 2019-08-02 ENCOUNTER — TELEPHONE (OUTPATIENT)
Dept: ORTHOPEDICS | Facility: CLINIC | Age: 44
End: 2019-08-02

## 2019-08-02 VITALS
SYSTOLIC BLOOD PRESSURE: 143 MMHG | WEIGHT: 206 LBS | DIASTOLIC BLOOD PRESSURE: 76 MMHG | BODY MASS INDEX: 29.49 KG/M2 | HEART RATE: 80 BPM | HEIGHT: 70 IN

## 2019-08-02 DIAGNOSIS — M75.121 COMPLETE TEAR OF RIGHT ROTATOR CUFF: ICD-10-CM

## 2019-08-02 DIAGNOSIS — M25.511 RIGHT SHOULDER PAIN, UNSPECIFIED CHRONICITY: ICD-10-CM

## 2019-08-02 DIAGNOSIS — G89.29 CHRONIC RIGHT SHOULDER PAIN: Primary | ICD-10-CM

## 2019-08-02 DIAGNOSIS — M25.511 CHRONIC RIGHT SHOULDER PAIN: Primary | ICD-10-CM

## 2019-08-02 PROCEDURE — 73030 X-RAY EXAM OF SHOULDER: CPT | Mod: 26,RT,, | Performed by: RADIOLOGY

## 2019-08-02 PROCEDURE — 99214 PR OFFICE/OUTPT VISIT, EST, LEVL IV, 30-39 MIN: ICD-10-PCS | Mod: S$PBB,,, | Performed by: ORTHOPAEDIC SURGERY

## 2019-08-02 PROCEDURE — 99214 OFFICE O/P EST MOD 30 MIN: CPT | Mod: S$PBB,,, | Performed by: ORTHOPAEDIC SURGERY

## 2019-08-02 PROCEDURE — 73030 X-RAY EXAM OF SHOULDER: CPT | Mod: TC,RT

## 2019-08-02 PROCEDURE — 99213 OFFICE O/P EST LOW 20 MIN: CPT | Mod: PBBFAC,25 | Performed by: ORTHOPAEDIC SURGERY

## 2019-08-02 PROCEDURE — 73030 XR SHOULDER COMPLETE 2 OR MORE VIEWS RIGHT: ICD-10-PCS | Mod: 26,RT,, | Performed by: RADIOLOGY

## 2019-08-02 PROCEDURE — 99999 PR PBB SHADOW E&M-EST. PATIENT-LVL III: CPT | Mod: PBBFAC,,, | Performed by: ORTHOPAEDIC SURGERY

## 2019-08-02 PROCEDURE — 99999 PR PBB SHADOW E&M-EST. PATIENT-LVL III: ICD-10-PCS | Mod: PBBFAC,,, | Performed by: ORTHOPAEDIC SURGERY

## 2019-08-02 NOTE — TELEPHONE ENCOUNTER
1st attempt to contact patient regarding her results. Left voicemail for patient to call the office back. DD              ----- Message from Chon Hammond MD sent at 8/2/2019  4:32 PM CDT -----  Ensure she has follow up with her PMD for this uncontrolled diabetes. Will need better control prior to surgical intervention

## 2019-08-02 NOTE — PROGRESS NOTES
Subjective:     Patient ID: Jenifer Westfall is a 44 y.o. female.    Chief Complaint: Pain and Follow-up of the Right Shoulder    Jenifer Westfall is a 44 y.o. female here for eval for R rotator cuff tear.  Was poked if years back for rotator cuff tear but had case canceled secondary complications from reaction to dye.        Shoulder Pain    The pain is present in the right shoulder. This is a recurrent problem. The injury was the result of a lifting action The problem occurs constantly. The problem has been gradually worsening. The quality of the pain is described as aching. The pain is at a severity of 2/10. Associated symptoms include an inability to bear weight and a limited range of motion. She has tried nothing for the symptoms. Physical therapy was not tried.      Past Medical History:   Diagnosis Date    Arthritis     DM (diabetes mellitus)      2017 Insulin x 3 years     Hepatitis C antibody positive in blood     Hyperlipidemia     Hypertension     IBS (irritable bowel syndrome)     Kidney stone     Marfan syndrome     Mitral valve prolapse      Past Surgical History:   Procedure Laterality Date    ANGIOGRAM, LOWER EXTREMITY- LEFT LEG N/A 2018    Performed by Roscoe Landeros MD at [facility] CATH LAB    APPENDECTOMY      APPENDECTOMY-LAPAROSCOPIC N/A 2015    Performed by Naseem Anne MD at [facility] OR    BUNIONECTOMY      both feet    cataract surgery  2019     SECTION      x 2    CHOLECYSTECTOMY      ESOPHAGOGASTRODUODENOSCOPY (EGD) N/A 2019    Performed by Jaron Sanders III, MD at [facility] ENDO    INCISION AND DRAINAGE (I&D), ABSCESS Left 2015    Performed by Uriel Daniel MD at [facility] OR    TUBAL LIGATION       Family History   Problem Relation Age of Onset    Heart disease Mother     Thrombophilia Father         DVT    Hypertension Father     Stroke Maternal Aunt     Heart disease Maternal Aunt     Stroke Maternal Uncle     Heart disease  "Maternal Uncle     Breast cancer Neg Hx     Colon cancer Neg Hx     Ovarian cancer Neg Hx      Social History     Socioeconomic History    Marital status: Single     Spouse name: Not on file    Number of children: Not on file    Years of education: Not on file    Highest education level: Not on file   Occupational History    Not on file   Social Needs    Financial resource strain: Not on file    Food insecurity:     Worry: Not on file     Inability: Not on file    Transportation needs:     Medical: Not on file     Non-medical: Not on file   Tobacco Use    Smoking status: Former Smoker     Packs/day: 0.20     Years: 20.00     Pack years: 4.00     Types: Cigarettes     Last attempt to quit: 2015     Years since quittin.3    Smokeless tobacco: Never Used    Tobacco comment: "once in a blue moon"   Substance and Sexual Activity    Alcohol use: No     Alcohol/week: 0.0 oz    Drug use: No    Sexual activity: Yes     Partners: Male     Birth control/protection: Injection   Lifestyle    Physical activity:     Days per week: Not on file     Minutes per session: Not on file    Stress: Not on file   Relationships    Social connections:     Talks on phone: Not on file     Gets together: Not on file     Attends Protestant service: Not on file     Active member of club or organization: Not on file     Attends meetings of clubs or organizations: Not on file     Relationship status: Not on file   Other Topics Concern    Not on file   Social History Narrative    Not on file     Medication List with Changes/Refills   Current Medications    ADMELOG SOLOSTAR U-100 INSULIN 100 UNIT/ML PEN        ASCORBIC ACID (VITAMIN C) 100 MG TABLET    Take 100 mg by mouth once daily.    ASPIRIN (ECOTRIN) 81 MG EC TABLET    Take 81 mg by mouth once daily.    ATENOLOL (TENORMIN) 50 MG TABLET    TAKE 1 TABLET BY MOUTH ONCE DAILY    ATORVASTATIN (LIPITOR) 80 MG TABLET    TAKE 1 TABLET BY MOUTH ONCE DAILY.    CETIRIZINE " "(ZYRTEC) 10 MG TABLET    Take 10 mg by mouth.    CLOPIDOGREL (PLAVIX) 75 MG TABLET    Take 1 tablet (75 mg total) by mouth once daily.    COLESTIPOL (COLESTID) 1 GRAM TAB    TAKE 2 TABLETS BY MOUTH THREE TIMES DAILY. DO NOT TAKE OTHER MEDICATION WITHIN 1 HOUR BEFORE OR 4 HOURS AFTER TAKING COLESTIPOL.    DICLOFENAC SODIUM (VOLTAREN) 1 % GEL    Apply 2 grams topically 4 (four) times daily. for 10 days    DICYCLOMINE (BENTYL) 20 MG TABLET    TAKE 1 TABLET (20 MG TOTAL) BY MOUTH 4 TIMES DAILY.    ERGOCALCIFEROL, VITAMIN D2, (VITAMIN D ORAL)    Take 1 tablet by mouth once daily.     EZETIMIBE (ZETIA) 10 MG TABLET    Take 1 tablet (10 mg total) by mouth once daily.    FAMOTIDINE (PEPCID) 20 MG TABLET    Take 1 tablet (20 mg total) by mouth 2 (two) times daily.    FLUOXETINE (PROZAC) 40 MG CAPSULE    TAKE 1 CAPSULE BY MOUTH ONCE DAILY    GABAPENTIN (NEURONTIN) 400 MG CAPSULE    TAKE 1 CAPSULE BY MOUTH 3 TIMES DAILY    GLIPIZIDE (GLUCOTROL) 10 MG TABLET    Take 1 tablet (10 mg total) by mouth 2 (two) times daily.    INSULIN GLARGINE,HUM.REC.ANLOG (BASAGLAR KWIKPEN U-100 INSULIN SUBQ)    Inject 40 Units into the skin 2 (two) times daily.    KETOROLAC 0.5% (ACULAR) 0.5 % DROP        LIRAGLUTIDE 0.6 MG/0.1 ML, 18 MG/3 ML, SUBQ PNIJ (VICTOZA 2-DIAN) 0.6 MG/0.1 ML (18 MG/3 ML) PNIJ    Inject 1.2 Units into the skin.    LISINOPRIL 10 MG TABLET    TAKE 1 TABLET (10 MG TOTAL) BY MOUTH ONCE DAILY.    ONDANSETRON (ZOFRAN) 4 MG TABLET    Take 1 tablet (4 mg total) by mouth 2 (two) times daily.    PEN NEEDLE 31 GAUGE X 5/16" NDLE    USE 5 TIMES DAILY WITH LANTUS AND HUMALOG    PREDNISOLONE ACETATE (PRED FORTE) 1 % DRPS        TIZANIDINE (ZANAFLEX) 4 MG TABLET    TAKE 1 TABLET BY MOUTH EVERY EVENING    TOBRAMYCIN SULFATE 0.3% (TOBREX) 0.3 % OPHTHALMIC SOLUTION        TRAMADOL (ULTRAM) 50 MG TABLET    TAKE 1 TABLET BY MOUTH EVERY 12 HOURS    TRUE METRIX GLUCOSE TEST STRIP STRP         Review of patient's allergies indicates:   Allergen " Reactions    Compazine [prochlorperazine edisylate] Rash    Contrast media Anaphylaxis    Dye Anaphylaxis    Levaquin [levofloxacin] Hives and Itching    Metformin Other (See Comments)     Upset stomach    Ibuprofen Other (See Comments)     Stomach pain    Morphine Other (See Comments)     Irritable     Review of Systems   Musculoskeletal: Positive for joint pain.       Objective:   Body mass index is 29.56 kg/m².  Vitals:    08/02/19 0857   BP: (!) 143/76   Pulse: 80                General    Vitals reviewed.  Constitutional: She is oriented to person, place, and time. She appears well-developed and well-nourished. No distress.   HENT:   Head: Atraumatic.   Nose: Nose normal.   Eyes: Pupils are equal, round, and reactive to light. Right eye exhibits no discharge. Left eye exhibits no discharge.   Neck: Normal range of motion.   Cardiovascular: Normal rate and intact distal pulses.    Pulmonary/Chest: Effort normal. No respiratory distress.   Neurological: She is alert and oriented to person, place, and time. She has normal reflexes. She displays normal reflexes. No cranial nerve deficit. Coordination normal.   Psychiatric: She has a normal mood and affect. Her behavior is normal. Judgment and thought content normal.         Right Shoulder Exam     Inspection/Observation   Swelling: absent  Bruising: absent  Scars: absent  Deformity: absent  Scapular Winging: absent  Scapular Dyskinesia: negative  Atrophy: absent    Tenderness   The patient is tender to palpation of the biceps tendon and acromioclavicular joint.    Range of Motion   Active abduction: 90   Passive abduction: 100   Extension: 0   Forward Flexion: 160   Forward Elevation: 160Adduction: 40   External Rotation 0 degrees: 50   Internal rotation 0 degrees: L1     Tests & Signs   Drop arm: negative  Gordon test: positive  Impingement: positive  Lift Off Sign: negative  Active Compression Test (Rothbury's Sign): positive  Speed's Test:  positive    Other   Sensation: normal    Left Shoulder Exam     Inspection/Observation   Swelling: absent  Bruising: absent  Scars: absent  Deformity: absent  Scapular Winging: absent  Scapular Dyskinesia: negative  Atrophy: absent    Tenderness   The patient is experiencing no tenderness.     Range of Motion   Active abduction: 90   Passive abduction: 100   Extension: 0   Forward Flexion: 160   Forward Elevation: 160Adduction: 40   External Rotation 0 degrees: 50   Internal rotation 0 degrees: T10     Tests & Signs   Drop arm: negative  Gordon test: negative  Impingement: negative  Lift Off Sign: negative  Active Compression test (Renville's Sign): negative  Speed's Test: negative  Bear Hug: negative    Other   Sensation: normal       Muscle Strength   Right Upper Extremity   Shoulder Abduction: 4/5   Shoulder Internal Rotation: 4/5   Shoulder External Rotation: 4/5   Supraspinatus: 4/5/5   Subscapularis: 4/5/5   Biceps: 4/5/5   Left Upper Extremity  Shoulder Abduction: 5/5   Shoulder Internal Rotation: 5/5   Shoulder External Rotation: 5/5   Supraspinatus: 5/5/5   Subscapularis: 5/5/5   Biceps: 5/5/5     Reflexes     Left Side  Biceps:  2+  Triceps:  2+    Right Side   Biceps:  2+  Triceps:  2+    Vascular Exam     Right Pulses      Radial:                    2+      Left Pulses      Radial:                    2+      Capillary Refill  Right Hand: normal capillary refill  Left Hand: normal capillary refill      Relevant imaging results reviewed and interpreted by me, discussed with the patient and / or family today   Zachariah Teixeira III, MD 8/2/2019 Routine      Narrative     EXAMINATION:  XR SHOULDER COMPLETE 2 OR MORE VIEWS RIGHT    CLINICAL HISTORY:  Pain in right shoulder    TECHNIQUE:  Two or three views of the right shoulder were performed.    COMPARISON:  November 19, 2018 and June 23rd 2017    FINDINGS:  Degenerative change again noted at the AC and glenohumeral joints with minimal inferior spurring.   Calcifications identified adjacent to the greater tuberosity as well as the area of the coracoclavicular ligaments.  No acute fracture or dislocation.  Remaining osseous structures intact.  Visualized right upper lung field is clear.      Impression       Stable exam without acute fracture or dislocation.  See above.      Electronically signed by: Zachariah Teixeira MD  Date: 08/02/2019  Time: 08:35     Reading Physician Reading Date Result Priority   SHAWN Mayberry Sr., MD 8/9/2017       Narrative     MRI of the right shoulder without IV contrast    Clinical History: M25.9 Joint disorder, unspecified; M25.511 Pain in right shoulder; M75.31 Calcific tendinitis of right shoulder; M75.41 Impingement syndrome of right shoulder; M75.51 Bursitis of right shoulder    Technique: Standard shoulder MRI protocol without IV contrast was performed.       Finding: There is a focal full-thickness tear involving the anterior half of the distal portion of the supraspinatus tendon. The rest of the rotator cuff appears to be intact. There is a small joint effusion in the right glenohumeral joint. This communicates with the subacromial/subdeltoid bursa. The glenoid labrum is normal in appearance. The intra-articular portion of the long head of the biceps tendon is normal in appearance. There are mild osteoarthritic changes in the right acromioclavicular joint with a mild amount of downward proliferation. There is a type II acromion process with a mild amount of lateral downsloping.      Impression       1. There is a focal full-thickness tear involving the anterior half of the distal portion of the supraspinatus tendon. There is a small joint effusion in the right glenohumeral joint. This communicates with the subacromial/subdeltoid bursa.   2. There are mild osteoarthritic changes in the right acromioclavicular joint with a mild amount of downward proliferation.  3. There is a type II acromion process with a mild amount of lateral  stefania.          Electronically signed by: SHAWN GEORGE MD  Date: 08/09/17  Time: 14:00          Assessment:     Encounter Diagnosis   Name Primary?    Chronic right shoulder pain Yes        Plan:     We reviewed with Jenifer today, the pathology and natural history of her diagnosis. We had an extensive discussion as to the conservative treatment and management of their condition. We also discussed the variety of treatment options to include medication, physical therapy, diagnostic testing as well as other treatments.The decision was made to go forward with:    -reviewed MRI with the patient. She has some atrophy of supraspinatus muscle.  We get her set up for an EMG to evaluate for muscle response  -had surgery cancel for 2 to very poorly controlled diabetes, discussed the possibility of complications with elective surgery in the setting of something that could be correctable prior.  Will get a new A1c on her, discussed that she needed to have her sugars under control and will need follow-up with her primary doctor  -will get her set up with PT/OT in the interim                Disclaimer: This note was prepared using a voice recognition system and is likely to have sound alike errors within the text.

## 2019-08-05 ENCOUNTER — TELEPHONE (OUTPATIENT)
Dept: GASTROENTEROLOGY | Facility: CLINIC | Age: 44
End: 2019-08-05

## 2019-08-05 NOTE — TELEPHONE ENCOUNTER
LV 04/26/19  Pt states at her last visit with Ms Larson she was supposed to send a RX for Protonix to the pharmacy however, the pharmacy states that they never got one for her. Was you going to prescribe Protonix for her?

## 2019-08-05 NOTE — TELEPHONE ENCOUNTER
----- Message from Meera Pozo sent at 8/5/2019 11:55 AM CDT -----  Contact: pt  Type:  Pharmacy Calling to Clarify an RX    Name of Caller:Tita  Pharmacy Name:[facility]  Prescription Name:Prontonix   What do they need to clarify?:New Rx  Best Call Back Number:[phone]  Additional Information: na

## 2019-08-06 ENCOUNTER — TELEPHONE (OUTPATIENT)
Dept: GASTROENTEROLOGY | Facility: CLINIC | Age: 44
End: 2019-08-06

## 2019-08-06 ENCOUNTER — PATIENT MESSAGE (OUTPATIENT)
Dept: GASTROENTEROLOGY | Facility: CLINIC | Age: 44
End: 2019-08-06

## 2019-08-06 DIAGNOSIS — G89.29 CHRONIC RIGHT SHOULDER PAIN: Primary | ICD-10-CM

## 2019-08-06 DIAGNOSIS — M25.511 CHRONIC RIGHT SHOULDER PAIN: Primary | ICD-10-CM

## 2019-08-06 RX ORDER — PANTOPRAZOLE SODIUM 40 MG/1
40 TABLET, DELAYED RELEASE ORAL DAILY
Qty: 30 TABLET | Refills: 1 | Status: SHIPPED | OUTPATIENT
Start: 2019-08-06 | End: 2019-10-04 | Stop reason: SDUPTHER

## 2019-08-06 NOTE — TELEPHONE ENCOUNTER
I haven't seen her since April. It looks like Dr. Sanders did an EGD in July and recommended Protonix at that time. He recommended daily for 8 weeks. I'm happy to send it to her pharmacy. It will replace the Pepcid.   Lizandro

## 2019-08-06 NOTE — TELEPHONE ENCOUNTER
----- Message from Jade Tay sent at 8/6/2019  9:18 AM CDT -----  Contact: Pt  Please give pt a call at [phone] regarding a test she had done and when she is required to have a follow up appt. Pt also states that her medication wasn't called in

## 2019-08-07 ENCOUNTER — TELEPHONE (OUTPATIENT)
Dept: OBSTETRICS AND GYNECOLOGY | Facility: CLINIC | Age: 44
End: 2019-08-07

## 2019-08-07 ENCOUNTER — PATIENT MESSAGE (OUTPATIENT)
Dept: GASTROENTEROLOGY | Facility: CLINIC | Age: 44
End: 2019-08-07

## 2019-08-07 NOTE — TELEPHONE ENCOUNTER
----- Message from Ky Peralta MD sent at 8/2/2019  9:34 AM CDT -----  LSIL HR HPV +.  Needs colposcopy.  Will have nurse contact to schedule.  Portal message sent.

## 2019-08-20 ENCOUNTER — CLINICAL SUPPORT (OUTPATIENT)
Dept: REHABILITATION | Facility: HOSPITAL | Age: 44
End: 2019-08-20
Attending: ORTHOPAEDIC SURGERY
Payer: MEDICAID

## 2019-08-20 DIAGNOSIS — R53.1 WEAKNESS: ICD-10-CM

## 2019-08-20 DIAGNOSIS — M25.511 CHRONIC RIGHT SHOULDER PAIN: Primary | ICD-10-CM

## 2019-08-20 DIAGNOSIS — M25.611 SHOULDER STIFFNESS, RIGHT: ICD-10-CM

## 2019-08-20 DIAGNOSIS — G89.29 CHRONIC RIGHT SHOULDER PAIN: Primary | ICD-10-CM

## 2019-08-20 PROCEDURE — 97161 PT EVAL LOW COMPLEX 20 MIN: CPT

## 2019-08-20 PROCEDURE — 97110 THERAPEUTIC EXERCISES: CPT

## 2019-08-20 PROCEDURE — 97014 ELECTRIC STIMULATION THERAPY: CPT

## 2019-08-20 NOTE — PLAN OF CARE
[facility]  Physical Therapy Initial Evaluation    Name: Jenifer Westfall  Clinic Number: [id number]    Therapy Diagnosis:   Encounter Diagnoses   Name Primary?    Weakness     Shoulder stiffness, right     Chronic right shoulder pain Yes     Physician: Chon Hammond, *    Physician Orders: PT Eval and Treat   Medical Diagnosis from Referral: M25.511,G89.29 (ICD-10-CM) - Chronic right shoulder pain  Evaluation Date: 2019  Authorization Period Expiration: 19  Plan of Care Expiration: 19  Visit # / Visits authorized:   Time In: 11:00a  Time Out: 12:00p  Total Billable Time: 15 minutes    Precautions: Standard    Subjective   Date of onset: about 5 yr ago  History of current condition - Jenifer reports: chronic R shoulder pain, rotator cuff tear. She notes she will eventually have R rotator cuff repair, but MD wants to improve her R shoulder motion prior to surgery. She notes pain along sup/lat shoulder and difficulty/pain with reaching overhead.      Pain:  Current 6/10, worst 10/10, best 2/10   Location: right shoulder   Description: Burning  Aggravating Factors: reaching overhead  Easing Factors: pain medication    Prior Therapy: none to R shoulder  Social History:  lives with their family  Occupation: not working  Prior Level of Function: limited use of R UE  Current Level of Function: limited use of R UE    Imaging, MRI studies, x-ray:     Medical History:   Past Medical History:   Diagnosis Date    Arthritis     DM (diabetes mellitus)      2017 Insulin x 3 years     Hepatitis C antibody positive in blood     Hyperlipidemia     Hypertension     IBS (irritable bowel syndrome)     Kidney stone     Marfan syndrome     Mitral valve prolapse        Surgical History:   Jenifer Westfall  has a past surgical history that includes Cholecystectomy; Bunionectomy; Tubal ligation;  section; Appendectomy; Angiography of lower extremity (N/A,  6/26/2018); cataract surgery (07/2019); and Esophagogastroduodenoscopy (N/A, 7/31/2019).    Medications:   Jenifer has a current medication list which includes the following prescription(s): admelog solostar u-100 insulin, ascorbic acid (vitamin c), aspirin, atenolol, atorvastatin, cetirizine, clopidogrel, colestipol, diclofenac sodium, dicyclomine, ergocalciferol (vitamin d2), ezetimibe, fluoxetine, gabapentin, glipizide, insulin glargine,hum.rec.anlog, ketorolac 0.5%, liraglutide 0.6 mg/0.1 ml (18 mg/3 ml) subq pnij, lisinopril, ondansetron, pantoprazole, pen needle, prednisolone acetate, tizanidine, tobramycin sulfate 0.3%, tramadol, and true metrix glucose test strip, and the following Facility-Administered Medications: medroxyprogesterone.    Allergies:   Review of patient's allergies indicates:   Allergen Reactions    Compazine [prochlorperazine edisylate] Rash    Contrast media Anaphylaxis    Dye Anaphylaxis    Levaquin [levofloxacin] Hives and Itching    Metformin Other (See Comments)     Upset stomach    Ibuprofen Other (See Comments)     Stomach pain    Morphine Other (See Comments)     Irritable        Pts goals: improve R shoulder motion    Objective     Posture: Pt noted to present with forward head/rounded shoulder posture, elevated R shoulder.       Shoulder ROM:   Active/Passive Joint Range Right Left   Flexion 90/95    ABDuction 86/90    External Rotation 48/(90 deg abd)68    Internal Rotation L5/(90 deg abd)30    Extension       Strength:  Muscle (Myotome) Right Left   Shoulder Flex 4-/5    Shoulder Abduction 4-/5    Shoulder ER 4/5    Shoulder IR 4/5             Special Test:  Gordon pos       Empty Can pos  Drop Arm neg         Joint Mobility: limited R glenohumeral post,inf      Function: Patient reports 70% disability based on score of the Upper Extremity Functional Scale.  Quick DASH Shoulder Questionnaire           Score 1-5  1. Opening a tight jar       4  2. Do heavy household chores      4  3. Carry a shopping bag or briefcase     3  4. Wash your back      4  5. Use a knife to cut food     2  6. Recreactional activities requiring force   4  7. Social Limitation      4  8. Work/ADL limitation      4  9. Arm, Shoulder or hand Pain    4  10. Tingling       5  11. Sleeping Limitation      4    Tenderness to palpation:  Patient tender to palpate R upper trap, supraspinatus, infraspinatus, pec minor, subacromial area    TREATMENT       Jenifer received therapeutic exercises to develop strength, ROM and flexibility for 15 minutes including:  Pendulums  Shoulder AAROM with wand  Scapular retraction  HEP instruction  Manual PROM/STM    Jenifer received the following manual therapy techniques: Joint mobilizations and Soft tissue Mobilization were applied to the:  for 0 minutes, including:      Jenifer participated in neuromuscular re-education activities to improve: Balance, Proprioception and Posture for 0 minutes. The following activities were included:      Jenifer received the following direct contact modalities after being cleared for contraindications: Ultrasound:  Jenifer received ultrasound to manage pain and inflammation at  % duty cycle applied to the  at an intensity of  number entry box W/cm2  for a duration of 0 minutes. Patient tolerated treatment well without adverse effects. Therapist was in attendance throughout intervention.    Jenifer received the following supervised modalities after being cleared for contradictions: IFC Electrical Stimulation:  Jenifer received IFC Electrical Stimulation for pain control applied to the R shoulder. Pt received stimulation at  % scan at a frequency of  for 10 minutes. Jenifer tolderated treatment well without any adverse effects.      Jenifer received hot pack for 10 minutes to R shoulder.    Jenifer received cold pack for 0 minutes to .    Home Exercises and Patient Education Provided    Education provided:   -Education on condition, HEP    Written Home Exercises  Provided: yes.  Exercises were reviewed and Jenifer was able to demonstrate them prior to the end of the session.  Jenifer demonstrated good  understanding of the education provided.     See EMR under Patient Instructions for exercises provided 8/20/2019.    Assessment   Jenifer is a 44 y.o. female referred to outpatient Physical Therapy with a medical diagnosis of chronic R shoulder pain/rotator cuff tear. Pt presents with significant R shoulder pain, stiffness, weakness. Pt quite guarded with A/ROM. Attempted light joint mobility/STM to pec minor, which pt tolerated with mod discomfort. Started pt with light HEP including pendulums, AAROM ER, scap retract.    Pt prognosis is Good.   Pt will benefit from skilled outpatient Physical Therapy to address the deficits stated above and in the chart below, provide pt/family education, and to maximize pt's level of independence.     Plan of care discussed with patient: Yes  Pt's spiritual, cultural and educational needs considered and patient is agreeable to the plan of care and goals as stated below:     Anticipated Barriers for therapy: chronic nature of shoulder pain/cuff tear.    Medical Necessity is demonstrated by the following  History  Co-morbidities and personal factors that may impact the plan of care Co-morbidities:   diabetes and chronic nature of injury    Personal Factors:   no deficits     low   Examination  Body Structures and Functions, activity limitations and participation restrictions that may impact the plan of care Body Regions:   upper extremities    Body Systems:    none    Participation Restrictions:   none    Activity limitations:   Learning and applying knowledge  no deficits    General Tasks and Commands  no deficits    Communication  no deficits    Mobility  no deficits    Self care  no deficits    Domestic Life  no deficits    Interactions/Relationships  no deficits    Life Areas  no deficits    Community and Social Life  no deficits         low    Clinical Presentation stable and uncomplicated low   Decision Making/ Complexity Score: low     Goals:  Short Term Goals: In 3 weeks   1.I with HEP  2.decrease R shoulder pain to 5/10  3. Increase AROM R shoulder ER to 60 deg, flex 110 deg, abd 105 deg  4. Increase PROM R shoulder flex and abd to 115 deg      Long Term Goals: In 6 weeks  1. Decrease R shoulder pain to 3/10  2. Increase AROM R shoulder flex 120 deg, abd 115 deg, ER 75deg.  3. Increase PROM R shoulder flex, abd to 130 deg  4. R shoulder flex, abd MMT 4/5      Plan   Plan of care Certification: 8/20/2019 to 9/20/19.    Outpatient Physical Therapy 2 times weekly for 6 weeks to include the following interventions: Electrical Stimulation IFC, Manual Therapy, Moist Heat/ Ice, Neuromuscular Re-ed, Patient Education, Therapeutic Exercise and Ultrasound.     Ryan Uribe, PT    Thank you for this referral.    These services are reasonable and necessary for the conditions set forth above while under my care.

## 2019-08-22 ENCOUNTER — CLINICAL SUPPORT (OUTPATIENT)
Dept: REHABILITATION | Facility: HOSPITAL | Age: 44
End: 2019-08-22
Attending: ORTHOPAEDIC SURGERY
Payer: MEDICAID

## 2019-08-22 DIAGNOSIS — M25.611 SHOULDER STIFFNESS, RIGHT: ICD-10-CM

## 2019-08-22 DIAGNOSIS — G89.29 CHRONIC RIGHT SHOULDER PAIN: Primary | ICD-10-CM

## 2019-08-22 DIAGNOSIS — R53.1 WEAKNESS: ICD-10-CM

## 2019-08-22 DIAGNOSIS — M25.511 CHRONIC RIGHT SHOULDER PAIN: Primary | ICD-10-CM

## 2019-08-22 PROCEDURE — 97110 THERAPEUTIC EXERCISES: CPT

## 2019-08-22 PROCEDURE — 97014 ELECTRIC STIMULATION THERAPY: CPT

## 2019-08-22 NOTE — PROGRESS NOTES
Physical Therapy Daily Treatment Note     Name: Jenifer Westfall  Clinic Number: [id number]    Therapy Diagnosis:   Encounter Diagnoses   Name Primary?    Weakness     Shoulder stiffness, right     Chronic right shoulder pain Yes     Physician: Chon Hammond, *    Visit Date: 8/22/2019    Physician Orders: PT Eval and Treat   Medical Diagnosis from Referral: M25.511,G89.29 (ICD-10-CM) - Chronic right shoulder pain  Evaluation Date: 8/20/2019  Authorization Period Expiration: 9/30/19  Plan of Care Expiration: 9/20/19  Visit # / Visits authorized: 2/ 12  Time In: 2:33a  Time Out: 3:35p  Total Billable Time: 33 minutes      Precautions: Standard    Subjective     Pt reports: R shoulder was sore after initial visit, but this did settle down. She performed HEP without much difficulty.  She was compliant with home exercise program.  Response to previous treatment: soreness  Functional change: none at this time    Pain: nt  Location: right shoulder      Objective     Jenifer received therapeutic exercises to develop strength, ROM and flexibility for 25 minutes including:  Pendulums  Shoulder AAROM with wand  Overhead pulley flex, scaption  Isometric R shoulder ER, flex,abd  UBE forw/back  R seated row with khloe    Jenifer received the following manual therapy techniques: Joint mobilizations were applied to the: R shoulder for 8 minutes, including:  Grade II post and inf GH joint mobs, PROM IR/ER, flex, abd    Jenifer received the following supervised modalities after being cleared for contradictions: IFC Electrical Stimulation:  Jenifer received IFC Electrical Stimulation for pain control applied to the R shoulder. Pt received stimulation at 100 % scan at a frequency of  for 10 minutes. Jenifer tolderated treatment well without any adverse effects.      Jenifer received hot pack for 10 minutes to R shoulder.        Home Exercises Provided and Patient Education Provided     Education provided:   -     Written Home Exercises  Provided: Patient instructed to cont prior HEP.  Exercises were reviewed and Jenifer was able to demonstrate them prior to the end of the session.  Jenifer demonstrated good  understanding of the education provided.     See EMR under Patient Instructions for exercises provided prior visit.    Assessment     Progressed treatment today with UBE, overhead pulley, manual joint mobs/PROM, isometric shoulder strengthening and seated row. Pt noted moderate discomfort with therex, but displayed much improved overhead reaching PROM and AROM to about 115 deg.    Jenifer is progressing well towards her goals.   Pt prognosis is Good.     Pt will continue to benefit from skilled outpatient physical therapy to address the deficits listed in the problem list box on initial evaluation, provide pt/family education and to maximize pt's level of independence in the home and community environment.     Pt's spiritual, cultural and educational needs considered and pt agreeable to plan of care and goals.     Anticipated barriers to physical therapy: none    Goals:  Short Term Goals: In 3 weeks   1.I with HEP  2.decrease R shoulder pain to 5/10  3. Increase AROM R shoulder ER to 60 deg, flex 110 deg, abd 105 deg  4. Increase PROM R shoulder flex and abd to 115 deg        Long Term Goals: In 6 weeks  1. Decrease R shoulder pain to 3/10  2. Increase AROM R shoulder flex 120 deg, abd 115 deg, ER 75deg.  3. Increase PROM R shoulder flex, abd to 130 deg  4. R shoulder flex, abd MMT 4/5    Plan   Outpatient Physical Therapy 2 times weekly for 6 weeks to include the following interventions: Electrical Stimulation IFC, Manual Therapy, Moist Heat/ Ice, Neuromuscular Re-ed, Patient Education, Therapeutic Exercise and Ultrasound      Ryan Uribe, PT

## 2019-08-27 ENCOUNTER — CLINICAL SUPPORT (OUTPATIENT)
Dept: REHABILITATION | Facility: HOSPITAL | Age: 44
End: 2019-08-27
Attending: ORTHOPAEDIC SURGERY
Payer: MEDICAID

## 2019-08-27 DIAGNOSIS — R53.1 WEAKNESS: Primary | ICD-10-CM

## 2019-08-27 DIAGNOSIS — M25.611 SHOULDER STIFFNESS, RIGHT: ICD-10-CM

## 2019-08-27 PROCEDURE — 97140 MANUAL THERAPY 1/> REGIONS: CPT

## 2019-08-27 PROCEDURE — 97110 THERAPEUTIC EXERCISES: CPT

## 2019-08-29 ENCOUNTER — CLINICAL SUPPORT (OUTPATIENT)
Dept: REHABILITATION | Facility: HOSPITAL | Age: 44
End: 2019-08-29
Attending: ORTHOPAEDIC SURGERY
Payer: MEDICAID

## 2019-08-29 DIAGNOSIS — R53.1 WEAKNESS: Primary | ICD-10-CM

## 2019-08-29 DIAGNOSIS — M25.611 SHOULDER STIFFNESS, RIGHT: ICD-10-CM

## 2019-08-29 PROCEDURE — 97014 ELECTRIC STIMULATION THERAPY: CPT

## 2019-08-29 PROCEDURE — 97140 MANUAL THERAPY 1/> REGIONS: CPT

## 2019-08-29 PROCEDURE — 97110 THERAPEUTIC EXERCISES: CPT

## 2019-08-29 NOTE — PROGRESS NOTES
Physical Therapy Daily Treatment Note     Name: Jenifer Westfall  Clinic Number: [id number]    Therapy Diagnosis:   Encounter Diagnoses   Name Primary?    Weakness Yes    Shoulder stiffness, right      Physician: Chon Hammond, *    Visit Date: 8/27/2019    Physician Orders: PT Eval and Treat   Medical Diagnosis from Referral: M25.511,G89.29 (ICD-10-CM) - Chronic right shoulder pain  Evaluation Date: 8/20/2019  Authorization Period Expiration: 9/30/19  Plan of Care Expiration: 9/20/19  Visit # / Visits authorized:7/12  Time In: 3:25 pm  Time Out: 3:15 pm  Total Billable Time: 35 minutes      Precautions: Standard    Subjective     Pt reports: right shoulder weakness and discomfort  She was compliant with home exercise program.  Response to previous treatment: soreness  Functional change: none at this time    Pain: nt  Location: right shoulder      Objective     Jenifer received therapeutic exercises to develop strength, ROM and flexibility for 25 minutes including:  Pendulums  Shoulder AAROM with wand  Overhead pulley flex, scaption  Isometric R shoulder ER, flex,abd  UBE forw/back  R seated row with khloe    Jenifer received the following manual therapy techniques: Joint mobilizations were applied to the: R shoulder for 8 minutes, including:  Grade II post and inf GH joint mobs, PROM IR/ER, flex, abd    Jenifer received the following supervised modalities after being cleared for contradictions: IFC Electrical Stimulation:  Jenifer received IFC Electrical Stimulation for pain control applied to the R shoulder. Pt received stimulation at 100 % scan at a frequency of  for 10 minutes. Jenifer tolderated treatment well without any adverse effects.      Jenifer received hot pack for 10 minutes to R shoulder.        Home Exercises Provided and Patient Education Provided     Education provided:   -     Written Home Exercises Provided: Patient instructed to cont prior HEP.  Exercises were reviewed and Jenifer was able to  demonstrate them prior to the end of the session.  Jenifer demonstrated good  understanding of the education provided.     See EMR under Patient Instructions for exercises provided prior visit.    Assessment     Progressed treatment today with UBE, overhead pulley, manual joint mobs/PROM, isometric shoulder strengthening and seated row. Pt noted moderate discomfort with therex, but displayed much improved overhead reaching PROM and AROM to about 115 deg.    Jenifer is progressing well towards her goals.   Pt prognosis is Good.     Pt will continue to benefit from skilled outpatient physical therapy to address the deficits listed in the problem list box on initial evaluation, provide pt/family education and to maximize pt's level of independence in the home and community environment.     Pt's spiritual, cultural and educational needs considered and pt agreeable to plan of care and goals.     Anticipated barriers to physical therapy: none    Goals:  Short Term Goals: In 3 weeks   1.I with HEP  2.decrease R shoulder pain to 5/10  3. Increase AROM R shoulder ER to 60 deg, flex 110 deg, abd 105 deg  4. Increase PROM R shoulder flex and abd to 115 deg        Long Term Goals: In 6 weeks  1. Decrease R shoulder pain to 3/10  2. Increase AROM R shoulder flex 120 deg, abd 115 deg, ER 75deg.  3. Increase PROM R shoulder flex, abd to 130 deg  4. R shoulder flex, abd MMT 4/5    Plan   Outpatient Physical Therapy 2 times weekly for 6 weeks to include the following interventions: Electrical Stimulation IFC, Manual Therapy, Moist Heat/ Ice, Neuromuscular Re-ed, Patient Education, Therapeutic Exercise and Ultrasound      William Uribe, PT

## 2019-08-29 NOTE — PROGRESS NOTES
Physical Therapy Daily Treatment Note     Name: Jenifer Westflal  Clinic Number: [id number]    Therapy Diagnosis:   Encounter Diagnoses   Name Primary?    Weakness Yes    Shoulder stiffness, right      Physician: Chon Hammond, *    Visit Date: 8/29/2019    Physician Orders: PT Eval and Treat   Medical Diagnosis from Referral: M25.511,G89.29 (ICD-10-CM) - Chronic right shoulder pain  Evaluation Date: 8/20/2019  Authorization Period Expiration: 9/30/19  Plan of Care Expiration: 9/20/19  Visit # / Visits authorized:4/12  Time In: 2:30 pm  Time Out:  pm  Total Billable Time: 38 minutes      Precautions: Standard    Subjective     Pt reports:  Her R shoulder mobility is improving. She notes not much soreness after last treatment.  She was compliant with home exercise program.  Response to previous treatment: soreness  Functional change: none at this time    Pain: nt  Location: right shoulder      Objective     Jenifer received therapeutic exercises to develop strength, ROM and flexibility for 28 minutes including:  R shoulder IR/ER with yellow tband 2-3/10 ea  Overhead pulley flex, scaption  Isometric R shoulder ER, flex,abd  UBE forw/back  R seated row with pulley  R shoulder wall slides flexion, scaption 2/10 ea  R seated row 3/10/20    Jenifer received the following manual therapy techniques: Joint mobilizations were applied to the: R shoulder for 8 minutes, including:  Grade II post and inf GH joint mobs, PROM IR/ER, flex, abd    Jenifer received the following supervised modalities after being cleared for contradictions: IFC Electrical Stimulation:  Jenifer received IFC Electrical Stimulation for pain control applied to the R shoulder. Pt received stimulation at 100 % scan at a frequency of  for 10 minutes. Jenifer tolderated treatment well without any adverse effects.      Jenifer received hot pack for 10 minutes to R shoulder.        Home Exercises Provided and Patient Education Provided     Education provided:   -      Written Home Exercises Provided: Patient instructed to cont prior HEP.  Exercises were reviewed and Jenifer was able to demonstrate them prior to the end of the session.  Jenifer demonstrated good  understanding of the education provided.     See EMR under Patient Instructions for exercises provided prior visit.    Assessment     Progressed strengthening with light resisted IR, ER, added seated rows and wall slides. Pt demonstrating much improved overhead AROM and assisted ROM. She noted mod soreness with resisted ER, but otherwise not much c/o discomfort.    Jenifer is progressing well towards her goals.   Pt prognosis is Good.     Pt will continue to benefit from skilled outpatient physical therapy to address the deficits listed in the problem list box on initial evaluation, provide pt/family education and to maximize pt's level of independence in the home and community environment.     Pt's spiritual, cultural and educational needs considered and pt agreeable to plan of care and goals.     Anticipated barriers to physical therapy: none    Goals:  Short Term Goals: In 3 weeks   1.I with HEP  2.decrease R shoulder pain to 5/10  3. Increase AROM R shoulder ER to 60 deg, flex 110 deg, abd 105 deg  4. Increase PROM R shoulder flex and abd to 115 deg        Long Term Goals: In 6 weeks  1. Decrease R shoulder pain to 3/10  2. Increase AROM R shoulder flex 120 deg, abd 115 deg, ER 75deg.  3. Increase PROM R shoulder flex, abd to 130 deg  4. R shoulder flex, abd MMT 4/5    Plan   Outpatient Physical Therapy 2 times weekly for 6 weeks to include the following interventions: Electrical Stimulation IFC, Manual Therapy, Moist Heat/ Ice, Neuromuscular Re-ed, Patient Education, Therapeutic Exercise and Ultrasound      Ryan Uribe, PT

## 2019-09-04 ENCOUNTER — TELEPHONE (OUTPATIENT)
Dept: OBSTETRICS AND GYNECOLOGY | Facility: CLINIC | Age: 44
End: 2019-09-04

## 2019-09-04 NOTE — TELEPHONE ENCOUNTER
----- Message from José Miguel Colon sent at 9/4/2019  3:55 PM CDT -----  Contact: pt   Pt would like cb in reference to abnormal test result received.         ..[phone]

## 2019-09-10 ENCOUNTER — HOSPITAL ENCOUNTER (OUTPATIENT)
Dept: RADIOLOGY | Facility: HOSPITAL | Age: 44
Discharge: HOME | End: 2019-09-10
Attending: OBSTETRICS & GYNECOLOGY
Payer: MEDICAID

## 2019-09-10 DIAGNOSIS — Z01.419 WELL WOMAN EXAM WITH ROUTINE GYNECOLOGICAL EXAM: ICD-10-CM

## 2019-09-10 PROCEDURE — 77063 BREAST TOMOSYNTHESIS BI: CPT | Mod: 26,,, | Performed by: RADIOLOGY

## 2019-09-10 PROCEDURE — 77067 SCR MAMMO BI INCL CAD: CPT | Mod: 26,,, | Performed by: RADIOLOGY

## 2019-09-10 PROCEDURE — 77067 MAMMO DIGITAL SCREENING BILAT WITH TOMOSYNTHESIS_CAD: ICD-10-PCS | Mod: 26,,, | Performed by: RADIOLOGY

## 2019-09-10 PROCEDURE — 77067 SCR MAMMO BI INCL CAD: CPT | Mod: TC

## 2019-09-10 PROCEDURE — 77063 MAMMO DIGITAL SCREENING BILAT WITH TOMOSYNTHESIS_CAD: ICD-10-PCS | Mod: 26,,, | Performed by: RADIOLOGY

## 2019-09-12 ENCOUNTER — CLINICAL SUPPORT (OUTPATIENT)
Dept: REHABILITATION | Facility: HOSPITAL | Age: 44
End: 2019-09-12
Attending: ORTHOPAEDIC SURGERY
Payer: MEDICAID

## 2019-09-12 DIAGNOSIS — R53.1 WEAKNESS: ICD-10-CM

## 2019-09-12 DIAGNOSIS — M25.611 SHOULDER STIFFNESS, RIGHT: Primary | ICD-10-CM

## 2019-09-12 PROCEDURE — 97110 THERAPEUTIC EXERCISES: CPT

## 2019-09-12 PROCEDURE — 97140 MANUAL THERAPY 1/> REGIONS: CPT

## 2019-09-17 ENCOUNTER — CLINICAL SUPPORT (OUTPATIENT)
Dept: REHABILITATION | Facility: HOSPITAL | Age: 44
End: 2019-09-17
Attending: ORTHOPAEDIC SURGERY
Payer: MEDICAID

## 2019-09-17 DIAGNOSIS — R53.1 WEAKNESS: ICD-10-CM

## 2019-09-17 DIAGNOSIS — M25.611 SHOULDER STIFFNESS, RIGHT: Primary | ICD-10-CM

## 2019-09-17 PROCEDURE — 97014 ELECTRIC STIMULATION THERAPY: CPT

## 2019-09-17 PROCEDURE — 97110 THERAPEUTIC EXERCISES: CPT

## 2019-09-17 PROCEDURE — 97140 MANUAL THERAPY 1/> REGIONS: CPT

## 2019-09-17 NOTE — PROGRESS NOTES
Physical Therapy Daily Treatment Note     Name: Jenifer Westfall  Clinic Number: [id number]    Therapy Diagnosis:   Encounter Diagnoses   Name Primary?    Weakness     Shoulder stiffness, right Yes     Physician: Chon Hammond, *    Visit Date: 9/17/2019    Physician Orders: PT Eval and Treat   Medical Diagnosis from Referral: M25.511,G89.29 (ICD-10-CM) - Chronic right shoulder pain  Evaluation Date: 8/20/2019  Authorization Period Expiration: 9/30/19  Plan of Care Expiration: 9/20/19  Visit # / Visits authorized:6/12  Time In: 2:30 pm  Time Out:  3:35pm  Total Billable Time: 40 minutes      Precautions: Standard    Subjective     Pt reports:  Her R shoulder is feeling a little more stiff today. She will follow up with MD next Fri.  Response to previous treatment: soreness  Functional change: none at this time    Pain: nt  Location: right shoulder      Objective     Jenifer received therapeutic exercises to develop strength, ROM and flexibility for 32 minutes including:  R shoulder IR 3/10 red  ER with yellow tband 2-3/10   Overhead pulley flex, scaption  Isometric R shoulder ER, flex,abd  UBE forw/back 3/3  R shoulder wall slides flexion, scaption 2/10 ea  R seated row-Matrix 3/10/20    Jenifer received the following manual therapy techniques: Joint mobilizations were applied to the: R shoulder for 8 minutes, including:  Grade II post and inf GH joint mobs, PROM IR/ER, flex, abd    Jenifer received the following supervised modalities after being cleared for contradictions: IFC Electrical Stimulation:  Jenifer received IFC Electrical Stimulation for pain control applied to the R shoulder. Pt received stimulation at 100 % scan at a frequency of  for 0 minutes. Jenifer tolderated treatment well without any adverse effects.      Jenifer received hot pack for 10 minutes to R shoulder.        Home Exercises Provided and Patient Education Provided     Education provided:   -     Written Home Exercises Provided: Patient  instructed to cont prior HEP.  Exercises were reviewed and Jenifer was able to demonstrate them prior to the end of the session.  Jenifer demonstrated good  understanding of the education provided.     See EMR under Patient Instructions for exercises provided prior visit.    Assessment     Pt displayed a mild increase in stiffness with R shoulder ER and flexion today. Manual treatment was very effective with quickly resolving this. She noted much improved shoulder mobility with therex after manual treatment.     Jenifer is progressing well towards her goals.   Pt prognosis is Good.     Pt will continue to benefit from skilled outpatient physical therapy to address the deficits listed in the problem list box on initial evaluation, provide pt/family education and to maximize pt's level of independence in the home and community environment.     Pt's spiritual, cultural and educational needs considered and pt agreeable to plan of care and goals.     Anticipated barriers to physical therapy: none    Goals:  Short Term Goals: In 3 weeks   1.I with HEP  2.decrease R shoulder pain to 5/10  3. Increase AROM R shoulder ER to 60 deg, flex 110 deg, abd 105 deg  4. Increase PROM R shoulder flex and abd to 115 deg        Long Term Goals: In 6 weeks  1. Decrease R shoulder pain to 3/10  2. Increase AROM R shoulder flex 120 deg, abd 115 deg, ER 75deg.  3. Increase PROM R shoulder flex, abd to 130 deg  4. R shoulder flex, abd MMT 4/5    Plan   Outpatient Physical Therapy 2 times weekly for 6 weeks to include the following interventions: Electrical Stimulation IFC, Manual Therapy, Moist Heat/ Ice, Neuromuscular Re-ed, Patient Education, Therapeutic Exercise and Ultrasound      Ryan Uribe, PT

## 2019-09-18 ENCOUNTER — TELEPHONE (OUTPATIENT)
Dept: OBSTETRICS AND GYNECOLOGY | Facility: CLINIC | Age: 44
End: 2019-09-18

## 2019-09-18 NOTE — TELEPHONE ENCOUNTER
----- Message from Ke Ba sent at 9/18/2019 10:29 AM CDT -----  Contact: Pt  Pt called in regards to to discussing a test that she had done. Pt can be reached at [phone].

## 2019-09-19 ENCOUNTER — CLINICAL SUPPORT (OUTPATIENT)
Dept: REHABILITATION | Facility: HOSPITAL | Age: 44
End: 2019-09-19
Attending: ORTHOPAEDIC SURGERY
Payer: MEDICAID

## 2019-09-19 DIAGNOSIS — M25.511 CHRONIC RIGHT SHOULDER PAIN: Primary | ICD-10-CM

## 2019-09-19 DIAGNOSIS — G89.29 CHRONIC RIGHT SHOULDER PAIN: Primary | ICD-10-CM

## 2019-09-19 DIAGNOSIS — R53.1 WEAKNESS: ICD-10-CM

## 2019-09-19 DIAGNOSIS — M25.611 SHOULDER STIFFNESS, RIGHT: ICD-10-CM

## 2019-09-19 PROCEDURE — 97140 MANUAL THERAPY 1/> REGIONS: CPT

## 2019-09-19 PROCEDURE — 97110 THERAPEUTIC EXERCISES: CPT

## 2019-09-19 PROCEDURE — 97014 ELECTRIC STIMULATION THERAPY: CPT

## 2019-09-19 NOTE — PROGRESS NOTES
Physical Therapy Daily Treatment Note     Name: Jenifer Westfall  Clinic Number: [id number]    Therapy Diagnosis:   Encounter Diagnoses   Name Primary?    Weakness     Shoulder stiffness, right     Chronic right shoulder pain Yes     Physician: Chon Hammond, *    Visit Date: 9/19/2019    Physician Orders: PT Eval and Treat   Medical Diagnosis from Referral: M25.511,G89.29 (ICD-10-CM) - Chronic right shoulder pain  Evaluation Date: 8/20/2019  Authorization Period Expiration: 9/30/19  Plan of Care Expiration: 9/20/19  Visit # / Visits authorized:7/12  Time In: 2:30 pm  Time Out:  3:35pm  Total Billable Time: 45 minutes      Precautions: Standard    Subjective     Pt reports:  R shoulder feeling stiff.  Response to previous treatment: no c/o from prior treatment  Functional change: none at this time    Pain: nt  Location: right shoulder      Objective     Jenifer received therapeutic exercises to develop strength, ROM and flexibility for 36 minutes including:  R shoulder IR 3/10 red  ER with red tband 3/10   Overhead pulley flex, scaption 2min ea  Isometric R shoulder ER, flex,abd 2/10-15 ea  UBE forw/back 3/3  R shoulder wall slides flexion, scaption 2/10 ea  R seated row-Matrix 3/10/20  R lat pulldown 3/10/20    Jenifer received the following manual therapy techniques: Joint mobilizations were applied to the: R shoulder for 8 minutes, including:  Grade II post and inf GH joint mobs, PROM IR/ER, flex, abd    Jenifer received the following supervised modalities after being cleared for contradictions: IFC Electrical Stimulation:  Jenifer received IFC Electrical Stimulation for pain control applied to the R shoulder. Pt received stimulation at 100 % scan at a frequency of  for 0 minutes. Jenifer tolderated treatment well without any adverse effects.      Jenifer received hot pack for 10 minutes to R shoulder.        Home Exercises Provided and Patient Education Provided     Education provided:   -     Written Home Exercises  Provided: Patient instructed to cont prior HEP. Added shoulder IR, ER with red tband to HEP.  Exercises were reviewed and Jenifer was able to demonstrate them prior to the end of the session.  Jenifer demonstrated good  understanding of the education provided.     See EMR under Patient Instructions for exercises provided prior visit.    Assessment     Pt noted manual treatment and exercise help R shoulder to loosen up quickly. Added lat pulldown and increased ER resistance today without complaint.     Jenifer is progressing well towards her goals.   Pt prognosis is Good.     Pt will continue to benefit from skilled outpatient physical therapy to address the deficits listed in the problem list box on initial evaluation, provide pt/family education and to maximize pt's level of independence in the home and community environment.     Pt's spiritual, cultural and educational needs considered and pt agreeable to plan of care and goals.     Anticipated barriers to physical therapy: none    Goals:  Short Term Goals: In 3 weeks   1.I with HEP  2.decrease R shoulder pain to 5/10  3. Increase AROM R shoulder ER to 60 deg, flex 110 deg, abd 105 deg  4. Increase PROM R shoulder flex and abd to 115 deg        Long Term Goals: In 6 weeks  1. Decrease R shoulder pain to 3/10  2. Increase AROM R shoulder flex 120 deg, abd 115 deg, ER 75deg.  3. Increase PROM R shoulder flex, abd to 130 deg  4. R shoulder flex, abd MMT 4/5    Plan   Outpatient Physical Therapy 2 times weekly for 6 weeks to include the following interventions: Electrical Stimulation IFC, Manual Therapy, Moist Heat/ Ice, Neuromuscular Re-ed, Patient Education, Therapeutic Exercise and Ultrasound      Ryan Uribe, PT

## 2019-09-20 ENCOUNTER — TELEPHONE (OUTPATIENT)
Dept: OBSTETRICS AND GYNECOLOGY | Facility: CLINIC | Age: 44
End: 2019-09-20

## 2019-09-20 NOTE — TELEPHONE ENCOUNTER
Patient informed of abnormal pap smear results LGSIL with HPV positive.  Informed that it is recommended for colposcopy and procedure explained.  Also answered all questions of pap smear.  She voiced understanding and scheduled for 10/10 at 11am [place] location.

## 2019-09-20 NOTE — TELEPHONE ENCOUNTER
----- Message from Jade Tay sent at 9/20/2019  9:40 AM CDT -----  Contact: pt  .Type:  Patient Returning Call    Who Called: Pt   Who Left Message for Patient: Nurse   Does the patient know what this is regarding?: return call   Would the patient rather a call back or a response via [facility]? Call back   Best Call Back Number: [phone]  Additional Information:

## 2019-09-23 ENCOUNTER — PROCEDURE VISIT (OUTPATIENT)
Dept: NEUROLOGY | Facility: CLINIC | Age: 44
End: 2019-09-23
Payer: MEDICAID

## 2019-09-23 DIAGNOSIS — R53.1 WEAKNESS: Primary | ICD-10-CM

## 2019-09-23 DIAGNOSIS — I10 ESSENTIAL HYPERTENSION: ICD-10-CM

## 2019-09-23 DIAGNOSIS — I77.1 ILIAC ARTERY STENOSIS, LEFT: ICD-10-CM

## 2019-09-23 DIAGNOSIS — K58.0 IRRITABLE BOWEL SYNDROME WITH DIARRHEA: ICD-10-CM

## 2019-09-23 DIAGNOSIS — Z01.810 PREOP CARDIOVASCULAR EXAM: ICD-10-CM

## 2019-09-23 DIAGNOSIS — E78.2 MIXED HYPERLIPIDEMIA: ICD-10-CM

## 2019-09-23 DIAGNOSIS — G89.29 CHRONIC RIGHT SHOULDER PAIN: ICD-10-CM

## 2019-09-23 DIAGNOSIS — M54.32 SCIATICA OF LEFT SIDE: ICD-10-CM

## 2019-09-23 DIAGNOSIS — M75.81 ROTATOR CUFF TENDONITIS, RIGHT: ICD-10-CM

## 2019-09-23 DIAGNOSIS — E11.40 TYPE 2 DIABETES MELLITUS WITH DIABETIC NEUROPATHY, WITH LONG-TERM CURRENT USE OF INSULIN: ICD-10-CM

## 2019-09-23 DIAGNOSIS — M25.511 CHRONIC RIGHT SHOULDER PAIN: ICD-10-CM

## 2019-09-23 DIAGNOSIS — M75.41 ROTATOR CUFF IMPINGEMENT SYNDROME OF RIGHT SHOULDER: ICD-10-CM

## 2019-09-23 DIAGNOSIS — R94.2 ABNORMAL LUNG FUNCTION TEST: ICD-10-CM

## 2019-09-23 DIAGNOSIS — I73.9 PAD (PERIPHERAL ARTERY DISEASE): ICD-10-CM

## 2019-09-23 DIAGNOSIS — G62.9 NEUROPATHY: ICD-10-CM

## 2019-09-23 DIAGNOSIS — M25.472 LEFT ANKLE SWELLING: ICD-10-CM

## 2019-09-23 DIAGNOSIS — M50.30 DDD (DEGENERATIVE DISC DISEASE), CERVICAL: Chronic | ICD-10-CM

## 2019-09-23 DIAGNOSIS — M25.611 SHOULDER STIFFNESS, RIGHT: ICD-10-CM

## 2019-09-23 DIAGNOSIS — I34.1 MVP (MITRAL VALVE PROLAPSE): ICD-10-CM

## 2019-09-23 DIAGNOSIS — K21.9 GASTROESOPHAGEAL REFLUX DISEASE, ESOPHAGITIS PRESENCE NOT SPECIFIED: ICD-10-CM

## 2019-09-23 DIAGNOSIS — I77.1 INNOMINATE ARTERY STENOSIS: ICD-10-CM

## 2019-09-23 DIAGNOSIS — N94.6 DYSMENORRHEA: ICD-10-CM

## 2019-09-23 DIAGNOSIS — Z79.4 TYPE 2 DIABETES MELLITUS WITH DIABETIC NEUROPATHY, WITH LONG-TERM CURRENT USE OF INSULIN: ICD-10-CM

## 2019-09-23 DIAGNOSIS — I70.8 OCCLUSION OF RIGHT SUBCLAVIAN ARTERY: ICD-10-CM

## 2019-09-23 DIAGNOSIS — M75.121 COMPLETE TEAR OF RIGHT ROTATOR CUFF: ICD-10-CM

## 2019-09-23 DIAGNOSIS — E55.9 VITAMIN D DEFICIENCY: ICD-10-CM

## 2019-09-23 DIAGNOSIS — M17.0 PRIMARY OSTEOARTHRITIS OF BOTH KNEES: Chronic | ICD-10-CM

## 2019-09-23 DIAGNOSIS — F32.A DEPRESSION, UNSPECIFIED DEPRESSION TYPE: ICD-10-CM

## 2019-09-23 DIAGNOSIS — E11.42 DIABETIC PERIPHERAL NEUROPATHY ASSOCIATED WITH TYPE 2 DIABETES MELLITUS: ICD-10-CM

## 2019-09-23 DIAGNOSIS — G89.29 OTHER CHRONIC PAIN: ICD-10-CM

## 2019-09-23 DIAGNOSIS — M70.62 TROCHANTERIC BURSITIS OF LEFT HIP: ICD-10-CM

## 2019-09-23 DIAGNOSIS — Q87.40 MARFAN'S SYNDROME: ICD-10-CM

## 2019-09-23 DIAGNOSIS — M25.561 ACUTE PAIN OF RIGHT KNEE: ICD-10-CM

## 2019-09-23 DIAGNOSIS — I73.9 CLAUDICATION IN PERIPHERAL VASCULAR DISEASE: ICD-10-CM

## 2019-09-23 DIAGNOSIS — K74.00 LIVER FIBROSIS: ICD-10-CM

## 2019-09-23 DIAGNOSIS — M75.51 CHRONIC BURSITIS OF RIGHT SHOULDER: Chronic | ICD-10-CM

## 2019-09-23 PROCEDURE — 95910 PR NERVE CONDUCTION STUDY; 7-8 STUDIES: ICD-10-PCS | Mod: 26,S$PBB,, | Performed by: PSYCHIATRY & NEUROLOGY

## 2019-09-23 PROCEDURE — 95886 PR EMG COMPLETE, W/ NERVE CONDUCTION STUDIES, 5+ MUSCLES: ICD-10-PCS | Mod: 26,S$PBB,, | Performed by: PSYCHIATRY & NEUROLOGY

## 2019-09-23 PROCEDURE — 95910 NRV CNDJ TEST 7-8 STUDIES: CPT | Mod: PBBFAC | Performed by: PSYCHIATRY & NEUROLOGY

## 2019-09-23 PROCEDURE — 95886 MUSC TEST DONE W/N TEST COMP: CPT | Mod: 26,S$PBB,, | Performed by: PSYCHIATRY & NEUROLOGY

## 2019-09-23 PROCEDURE — 95910 NRV CNDJ TEST 7-8 STUDIES: CPT | Mod: 26,S$PBB,, | Performed by: PSYCHIATRY & NEUROLOGY

## 2019-09-23 PROCEDURE — 95886 MUSC TEST DONE W/N TEST COMP: CPT | Mod: PBBFAC | Performed by: PSYCHIATRY & NEUROLOGY

## 2019-09-23 NOTE — PROCEDURES
[facility]   [facility]incen[facility] Arthur  Department of Neurology  [address] [place] [address] [place]  [address]  Phone [phone]     Fax  [phone]        Patient: Khoi Burgess YOB: 1975  Patient ID: [id number] Age: 44 Years 2 Months  Sex: Female Ref. Provider: Chon Hammond MD  Notes: RUE/Chronic right shoulder pain, atrophy of supraspinatus muscle, right rotator cuff tear      SUMMARY       Nerve conduction studies were performed in the right upper extremity. The right median motor study recording the abductor pollicis brevis showed borderline normal amplitude, normal distal latency and normal conduction velocity. The right ulnar motor study recording the abductor digiti minimi showed a normal amplitude, prolonged distal latency and normal conduction velocity. No conduction block or focal slowing was present across the elbow.     The right median sensory response recording digit two showed reduced amplitude, prolonged latency and normal conduction velocity. The right ulnar sensory response recording digit five showed a reduced amplitude, prolonged latency and normal conduction velocity. The right radial sensory response recording over the extensor snuff box showed normal amplitude, prolonged latency and normal conduction velocity.    In the right upper extremity, no active denervation was present. Motor unit potentials were borderline in amplitude, duration, polyphasia and recruitment in the distal muscles.  Activation was decreased in the supraspinatus and infraspinatus muscles.                                           IMPRESSION      There is electrophysiologic evidence consistent with a generalized, chronic, axonal, motor and sensory peripheral polyneuropathy.    The decreased activation pattern in the supraspinatus and infraspinatus muscles is pain-related.         --------------------------------------             Pop Acuna M.D., F.A.A.N.      Diplomate, American Board of  Psychiatry and Neurology  Diplomate, American Board of Clinical Neurophysiology   Fellow, American Academy of Neurology             Sensory NCS      Nerve / Sites Rec. Site Onset Peak NP Amp PP Amp Dist Surjit d Lat.2     ms ms µV µV cm m/s ms   R MEDIAN - Dig II      Wrist II 2.92 3.65 17.8 11.6 13 44.6 3.65      Ref.   3.40  20.0  48.0    L MEDIAN - Dig II      Wrist II 2.76 3.49 15.0 6.7 13 47.1 3.49      Ref.   3.40  20.0  48.0    R ULNAR - Dig V      Wrist Dig V 3.02 3.70 5.4 5.5 11 36.4 3.70      Ref.   3.10  12.0  48.0    R RADIAL - Snuff box      Forearm Snuff box 2.14 2.92 26.1 13.5 10 46.8 2.92      Ref.   2.70 18.0           Motor NCS      Nerve / Sites Rec. Site Lat Amp Dist Surjit     ms mV cm m/s   R MEDIAN - APB      Wrist APB 4.58 8.7 8       Ref.  3.90 6.0        Elbow APB 9.48 5.9 23 47.0      Ref.     49.0   L MEDIAN - APB      Wrist APB 3.96 13.9 8       Ref.  3.90 6.0        Elbow APB 8.85 7.3 23 47.0      Ref.     49.0   R ULNAR - ADM      Wrist ADM 3.44 7.2 8       Ref.  3.10 7.0        B.Elbow ADM 8.70 6.8 17 32.3      Ref.     50.0      A.Elbow ADM 11.09 6.8 10 41.7                                   EMG Summary Table     Spontaneous MUAP Recruitment    IA Fib PSW Fasc Other Amp Dur. PPP Pattern   R. 1ST PAZ INT C8, T1 N None None None - 1+ N N N   R. BICEPS C5-6 N None None None - N N N N   R. BRACHIORADIALIS C5-6 N None None None - N N N N   R. DELTOID, C5,6 N None None None - N N N N   R. FLEX CARPI RAD C6-7-8 N None None None - N N N N   R. PRONATOR TERES C6-7 N None None None - N N N N   R. TRICEPS C6-7-8 N None None None - N N N N   R. SUPRASPINATUS N None None None -       R. INFRASPINATUS N None None None -       R. CERV PSP (U) N None None None -       R. CERV PSP (M) N None None None -       R. CERV PSP (L) N None None None -

## 2019-09-24 ENCOUNTER — CLINICAL SUPPORT (OUTPATIENT)
Dept: REHABILITATION | Facility: HOSPITAL | Age: 44
End: 2019-09-24
Attending: ORTHOPAEDIC SURGERY
Payer: MEDICAID

## 2019-09-24 ENCOUNTER — TELEPHONE (OUTPATIENT)
Dept: ORTHOPEDICS | Facility: CLINIC | Age: 44
End: 2019-09-24

## 2019-09-24 DIAGNOSIS — G89.29 CHRONIC RIGHT SHOULDER PAIN: ICD-10-CM

## 2019-09-24 DIAGNOSIS — M25.611 SHOULDER STIFFNESS, RIGHT: ICD-10-CM

## 2019-09-24 DIAGNOSIS — M25.511 CHRONIC RIGHT SHOULDER PAIN: ICD-10-CM

## 2019-09-24 DIAGNOSIS — E11.40 TYPE 2 DIABETES MELLITUS WITH DIABETIC NEUROPATHY, WITH LONG-TERM CURRENT USE OF INSULIN: Primary | ICD-10-CM

## 2019-09-24 DIAGNOSIS — Z79.4 TYPE 2 DIABETES MELLITUS WITH DIABETIC NEUROPATHY, WITH LONG-TERM CURRENT USE OF INSULIN: Primary | ICD-10-CM

## 2019-09-24 DIAGNOSIS — R53.1 WEAKNESS: Primary | ICD-10-CM

## 2019-09-24 PROCEDURE — 97110 THERAPEUTIC EXERCISES: CPT

## 2019-09-24 PROCEDURE — 97140 MANUAL THERAPY 1/> REGIONS: CPT

## 2019-09-24 NOTE — PROGRESS NOTES
Physical Therapy Assistant Daily Treatment Note     Name: Jenifer Westfall  Clinic Number: [id number]    Therapy Diagnosis:   Encounter Diagnoses   Name Primary?    Weakness Yes    Shoulder stiffness, right     Chronic right shoulder pain      Physician: Chon Hammond, *    Visit Date: 9/24/2019    Physician Orders: PT Eval and Treat   Medical Diagnosis from Referral: M25.511,G89.29 (ICD-10-CM) - Chronic right shoulder pain  Evaluation Date: 8/20/2019  Authorization Period Expiration: 9/30/19  Plan of Care Expiration: 9/20/19  Visit # / Visits authorized:7/12  Time In: 2:45 pm  Time Out:  4::00  pm  Total Billable Time: 55 minutes      Precautions: Standard    Subjective     Pt reports:  R shoulder feeling ok today. Had a nerve conduction study yesterday.  Response to previous treatment: no c/o from prior treatment  Functional change: none at this time    Pain: 0/10  Location: right shoulder       Objective     Jenifer received therapeutic exercises to develop strength, ROM and flexibility for 45 minutes including:  R shoulder IR 3/10 red  ER with red tband 3/10   Overhead pulley flex, scaption 2min ea  UBE forw/back 3/3 for ROM and strength  R shoulder wall slides flexion, scaption 2/10 ea  R seated row-Matrix 3/10/20  R lat pulldown 3/10/20  Scap stabilization w/ball on wall 4 way  PROM    Jenifer received the following manual therapy techniques: Joint mobilizations were applied to the: R shoulder for 10 minutes, including:  GH joint mobs  Cervical distraction  UT and Levator stretches    Jenifer received the following supervised modalities after being cleared for contradictions: IFC Electrical Stimulation:  Jenifer received IFC Electrical Stimulation for pain control applied to the R shoulder. Pt received stimulation at 100 % scan at a frequency of  for 0 minutes. Jenifer tolderated treatment well without any adverse effects.      Jenifer received hot pack for 10 minutes to R shoulder.        Home Exercises Provided  and Patient Education Provided     Education provided:   -     Written Home Exercises Provided: Patient instructed to cont prior HEP. Added shoulder IR, ER with red tband to HEP.  Exercises were reviewed and Jenifer was able to demonstrate them prior to the end of the session.  Jenifer demonstrated good  understanding of the education provided.     See EMR under Patient Instructions for exercises provided prior visit.    Assessment     Pt noted manual treatment allows her to perform her exercises with less discomfort and greater flexibility. Patient is challenged with scap stabiization/GH rhythm without compensatory shoulder elevation and requires verbal and tactile cueing for proper techniques and alignment. Jenifer remains motivated to progress with her therapy.     Jenifer is progressing well towards her goals.   Pt prognosis is Good.     Pt will continue to benefit from skilled outpatient physical therapy to address the deficits listed in the problem list box on initial evaluation, provide pt/family education and to maximize pt's level of independence in the home and community environment.     Pt's spiritual, cultural and educational needs considered and pt agreeable to plan of care and goals.     Anticipated barriers to physical therapy: none    Goals:  Short Term Goals: In 3 weeks   1.I with HEP  2.decrease R shoulder pain to 5/10  3. Increase AROM R shoulder ER to 60 deg, flex 110 deg, abd 105 deg  4. Increase PROM R shoulder flex and abd to 115 deg        Long Term Goals: In 6 weeks  1. Decrease R shoulder pain to 3/10  2. Increase AROM R shoulder flex 120 deg, abd 115 deg, ER 75deg.  3. Increase PROM R shoulder flex, abd to 130 deg  4. R shoulder flex, abd MMT 4/5    Plan   Outpatient Physical Therapy 2 times weekly for 6 weeks to include the following interventions: Electrical Stimulation IFC, Manual Therapy, Moist Heat/ Ice, Neuromuscular Re-ed, Patient Education, Therapeutic Exercise and Ultrasound      Alicia  Raymon, PTA

## 2019-09-25 ENCOUNTER — TELEPHONE (OUTPATIENT)
Dept: OBSTETRICS AND GYNECOLOGY | Facility: CLINIC | Age: 44
End: 2019-09-25

## 2019-09-25 ENCOUNTER — DOCUMENTATION ONLY (OUTPATIENT)
Dept: REHABILITATION | Facility: HOSPITAL | Age: 44
End: 2019-09-25

## 2019-09-25 DIAGNOSIS — B37.31 YEAST VAGINITIS: Primary | ICD-10-CM

## 2019-09-25 RX ORDER — FLUCONAZOLE 150 MG/1
150 TABLET ORAL ONCE
Qty: 1 TABLET | Refills: 0 | Status: SHIPPED | OUTPATIENT
Start: 2019-09-25 | End: 2019-09-25

## 2019-09-25 NOTE — TELEPHONE ENCOUNTER
Spoke with patient, complaining of vaginal itching, irritation.  Has used OTC monistat with no relief.  Patient advised message will be sent to Dr. Peralta and call her back.

## 2019-09-25 NOTE — TELEPHONE ENCOUNTER
Spoke to patient and let her know that her diflucan has been sent to her pharmacy. Verified pharmacy. Patient verbalized understanding.

## 2019-09-25 NOTE — TELEPHONE ENCOUNTER
----- Message from Jeanie Ewing sent at 9/25/2019 10:53 AM CDT -----  Contact: Patient  Type: Needs Medical Advice    Who Called:  Patient  Symptoms (please be specific):  Yeast infection   How long has patient had these symptoms:  3-4 days  Pharmacy name and phone #:    [facility] in [place], [place] - [address], [facility] [address]  [address], [address]  [facility] [address]  Phone: [phone] Fax: [phone]     [facility] Call Back Number: [phone]    Additional Information: requesting to have something called in to her pharmacy. Please call to advise, thank you!

## 2019-09-25 NOTE — PROGRESS NOTES
PT/PTA met face to face to discuss pt's treatment plan and progress towards established goals. Pt will be seen by a physical therapist minimally every 6th visit or every 30 days.      Alicia Ennis PTA

## 2019-09-27 ENCOUNTER — OFFICE VISIT (OUTPATIENT)
Dept: ORTHOPEDICS | Facility: CLINIC | Age: 44
End: 2019-09-27
Payer: MEDICAID

## 2019-09-27 VITALS
SYSTOLIC BLOOD PRESSURE: 115 MMHG | HEART RATE: 103 BPM | BODY MASS INDEX: 29.49 KG/M2 | WEIGHT: 206 LBS | DIASTOLIC BLOOD PRESSURE: 82 MMHG | HEIGHT: 70 IN | RESPIRATION RATE: 18 BRPM

## 2019-09-27 DIAGNOSIS — M75.121 COMPLETE TEAR OF RIGHT ROTATOR CUFF: ICD-10-CM

## 2019-09-27 DIAGNOSIS — Z79.4 TYPE 2 DIABETES MELLITUS WITH DIABETIC NEUROPATHY, WITH LONG-TERM CURRENT USE OF INSULIN: Primary | ICD-10-CM

## 2019-09-27 DIAGNOSIS — M79.18 MYOFASCIAL PAIN: ICD-10-CM

## 2019-09-27 DIAGNOSIS — E11.40 TYPE 2 DIABETES MELLITUS WITH DIABETIC NEUROPATHY, WITH LONG-TERM CURRENT USE OF INSULIN: Primary | ICD-10-CM

## 2019-09-27 PROCEDURE — 99999 PR PBB SHADOW E&M-EST. PATIENT-LVL IV: CPT | Mod: PBBFAC,,, | Performed by: ORTHOPAEDIC SURGERY

## 2019-09-27 PROCEDURE — 99213 OFFICE O/P EST LOW 20 MIN: CPT | Mod: S$PBB,,, | Performed by: ORTHOPAEDIC SURGERY

## 2019-09-27 PROCEDURE — 99999 PR PBB SHADOW E&M-EST. PATIENT-LVL IV: ICD-10-PCS | Mod: PBBFAC,,, | Performed by: ORTHOPAEDIC SURGERY

## 2019-09-27 PROCEDURE — 99214 OFFICE O/P EST MOD 30 MIN: CPT | Mod: PBBFAC | Performed by: ORTHOPAEDIC SURGERY

## 2019-09-27 PROCEDURE — 99213 PR OFFICE/OUTPT VISIT, EST, LEVL III, 20-29 MIN: ICD-10-PCS | Mod: S$PBB,,, | Performed by: ORTHOPAEDIC SURGERY

## 2019-09-27 RX ORDER — INSULIN GLARGINE 100 [IU]/ML
INJECTION, SOLUTION SUBCUTANEOUS
COMMUNITY
Start: 2019-09-21 | End: 2021-11-20

## 2019-09-27 RX ORDER — FLUCONAZOLE 150 MG/1
TABLET ORAL
COMMUNITY
Start: 2019-09-25 | End: 2020-06-18 | Stop reason: ALTCHOICE

## 2019-09-27 RX ORDER — FAMOTIDINE 20 MG/1
TABLET, FILM COATED ORAL
COMMUNITY
Start: 2019-09-25 | End: 2019-10-04

## 2019-09-27 NOTE — PROGRESS NOTES
Subjective:     Patient ID: Jenifer Westfall is a 44 y.o. female.    Chief Complaint: Pain of the Right Shoulder    Follow-up for right rotator cuff tear.  Had an EMG with Neurology that still showed some muscle response.  Had recent A1c, elevated from prior years.  Has been doing therapy, notices great improvement in her pain and motion        Shoulder Pain    The pain is present in the right shoulder. This is a recurrent problem. The injury was the result of a lifting action The problem occurs constantly. The problem has been gradually worsening. The quality of the pain is described as aching. The pain is at a severity of 2/10. Associated symptoms include an inability to bear weight and a limited range of motion. Pertinent negatives include no fever or numbness. She has tried nothing for the symptoms. Physical therapy was not tried.      Past Medical History:   Diagnosis Date    Arthritis     DM (diabetes mellitus)      2017 Insulin x 3 years     Hepatitis C antibody positive in blood     Hyperlipidemia     Hypertension     IBS (irritable bowel syndrome)     Kidney stone     Marfan syndrome     Mitral valve prolapse      Past Surgical History:   Procedure Laterality Date    ANGIOGRAPHY OF LOWER EXTREMITY N/A 2018    Procedure: ANGIOGRAM, LOWER EXTREMITY- LEFT LEG;  Surgeon: Roscoe Landeros MD;  Location: [facility] CATH LAB;  Service: Peripheral Vascular;  Laterality: N/A;    APPENDECTOMY      BUNIONECTOMY      both feet    cataract surgery  2019     SECTION      x 2    CHOLECYSTECTOMY      ESOPHAGOGASTRODUODENOSCOPY N/A 2019    Procedure: ESOPHAGOGASTRODUODENOSCOPY (EGD);  Surgeon: Jaron Sanders III, MD;  Location: [facility] ENDO;  Service: Endoscopy;  Laterality: N/A;    TUBAL LIGATION       Family History   Problem Relation Age of Onset    Heart disease Mother     Thrombophilia Father         DVT    Hypertension Father     Stroke Maternal Aunt     Heart  "disease Maternal Aunt     Stroke Maternal Uncle     Heart disease Maternal Uncle     Breast cancer Neg Hx     Colon cancer Neg Hx     Ovarian cancer Neg Hx      Social History     Socioeconomic History    Marital status: Single     Spouse name: Not on file    Number of children: Not on file    Years of education: Not on file    Highest education level: Not on file   Occupational History    Not on file   Social Needs    Financial resource strain: Not on file    Food insecurity:     Worry: Not on file     Inability: Not on file    Transportation needs:     Medical: Not on file     Non-medical: Not on file   Tobacco Use    Smoking status: Former Smoker     Packs/day: 0.20     Years: 20.00     Pack years: 4.00     Types: Cigarettes     Last attempt to quit: 2015     Years since quittin.4    Smokeless tobacco: Never Used    Tobacco comment: "once in a blue moon"   Substance and Sexual Activity    Alcohol use: No     Alcohol/week: 0.0 standard drinks    Drug use: No    Sexual activity: Yes     Partners: Male     Birth control/protection: Injection   Lifestyle    Physical activity:     Days per week: Not on file     Minutes per session: Not on file    Stress: Not on file   Relationships    Social connections:     Talks on phone: Not on file     Gets together: Not on file     Attends Cheondoism service: Not on file     Active member of club or organization: Not on file     Attends meetings of clubs or organizations: Not on file     Relationship status: Not on file   Other Topics Concern    Not on file   Social History Narrative    Not on file     Medication List with Changes/Refills   Current Medications    ADMELOG SOLOSTAR U-100 INSULIN 100 UNIT/ML PEN        ASCORBIC ACID (VITAMIN C) 100 MG TABLET    Take 100 mg by mouth once daily.    ASPIRIN (ECOTRIN) 81 MG EC TABLET    Take 81 mg by mouth once daily.    ATENOLOL (TENORMIN) 50 MG TABLET    TAKE 1 TABLET BY MOUTH ONCE DAILY    ATORVASTATIN " "(LIPITOR) 80 MG TABLET    TAKE 1 TABLET BY MOUTH ONCE DAILY.    BASAGLAR KWIKPEN U-100 INSULIN GLARGINE 100 UNITS/ML (3ML) SUBQ PEN        CETIRIZINE (ZYRTEC) 10 MG TABLET    Take 10 mg by mouth.    CLOPIDOGREL (PLAVIX) 75 MG TABLET    Take 1 tablet (75 mg total) by mouth once daily.    COLESTIPOL (COLESTID) 1 GRAM TAB    TAKE 2 TABLETS BY MOUTH THREE TIMES DAILY. DO NOT TAKE OTHER MEDICATION WITHIN 1 HOUR BEFORE OR 4 HOURS AFTER TAKING COLESTIPOL.    DICLOFENAC SODIUM (VOLTAREN) 1 % GEL    Apply 2 grams topically 4 (four) times daily. for 10 days    DICYCLOMINE (BENTYL) 20 MG TABLET    TAKE 1 TABLET (20 MG TOTAL) BY MOUTH 4 TIMES DAILY.    ERGOCALCIFEROL, VITAMIN D2, (VITAMIN D ORAL)    Take 1 tablet by mouth once daily.     EZETIMIBE (ZETIA) 10 MG TABLET    Take 1 tablet (10 mg total) by mouth once daily.    FAMOTIDINE (PEPCID) 20 MG TABLET        FLUCONAZOLE (DIFLUCAN) 150 MG TAB        FLUOXETINE (PROZAC) 40 MG CAPSULE    TAKE 1 CAPSULE BY MOUTH ONCE DAILY    GABAPENTIN (NEURONTIN) 400 MG CAPSULE    TAKE 1 CAPSULE BY MOUTH 3 TIMES DAILY    GLIPIZIDE (GLUCOTROL) 10 MG TABLET    Take 1 tablet (10 mg total) by mouth 2 (two) times daily.    INSULIN GLARGINE,HUM.REC.ANLOG (BASAGLAR KWIKPEN U-100 INSULIN SUBQ)    Inject 40 Units into the skin 2 (two) times daily.    KETOROLAC 0.5% (ACULAR) 0.5 % DROP        LIRAGLUTIDE 0.6 MG/0.1 ML, 18 MG/3 ML, SUBQ PNIJ (VICTOZA 2-DIAN) 0.6 MG/0.1 ML (18 MG/3 ML) PNIJ    Inject 1.2 Units into the skin.    LISINOPRIL 10 MG TABLET    TAKE 1 TABLET (10 MG TOTAL) BY MOUTH ONCE DAILY.    ONDANSETRON (ZOFRAN) 4 MG TABLET    Take 1 tablet (4 mg total) by mouth 2 (two) times daily.    PANTOPRAZOLE (PROTONIX) 40 MG TABLET    Take 1 tablet (40 mg total) by mouth once daily.    PEN NEEDLE 31 GAUGE X 5/16" NDLE    USE 5 TIMES DAILY WITH LANTUS AND HUMALOG    PREDNISOLONE ACETATE (PRED FORTE) 1 % DRPS        TIZANIDINE (ZANAFLEX) 4 MG TABLET    TAKE 1 TABLET BY MOUTH EVERY EVENING    TOBRAMYCIN " SULFATE 0.3% (TOBREX) 0.3 % OPHTHALMIC SOLUTION        TRAMADOL (ULTRAM) 50 MG TABLET    TAKE 1 TABLET BY MOUTH EVERY 12 HOURS    TRUE METRIX GLUCOSE TEST STRIP STRP         Review of patient's allergies indicates:   Allergen Reactions    Compazine [prochlorperazine edisylate] Rash    Contrast media Anaphylaxis    Dye Anaphylaxis    Levaquin [levofloxacin] Hives and Itching    Metformin Other (See Comments)     Upset stomach    Ibuprofen Other (See Comments)     Stomach pain    Morphine Other (See Comments)     Irritable     Review of Systems   Constitution: Negative for chills and fever.   HENT: Negative for ear discharge and hearing loss.    Eyes: Negative for blurred vision and visual disturbance.   Cardiovascular: Negative for chest pain and leg swelling.   Respiratory: Negative for cough and shortness of breath.    Endocrine: Negative for polyuria.   Hematologic/Lymphatic: Negative for bleeding problem.   Skin: Negative for rash.   Musculoskeletal: Positive for joint pain. Negative for back pain, joint swelling, muscle cramps and muscle weakness.   Gastrointestinal: Negative for nausea and vomiting.   Genitourinary: Negative for hematuria.   Neurological: Negative for loss of balance, numbness and paresthesias.   Psychiatric/Behavioral: Negative for altered mental status.       Objective:   Body mass index is 29.56 kg/m².  Vitals:    09/27/19 1009   BP: 115/82   Pulse: 103   Resp: 18                General    Vitals reviewed.  Constitutional: She is oriented to person, place, and time. She appears well-developed and well-nourished. No distress.   HENT:   Head: Atraumatic.   Nose: Nose normal.   Eyes: Pupils are equal, round, and reactive to light. Right eye exhibits no discharge. Left eye exhibits no discharge.   Neck: Normal range of motion.   Cardiovascular: Normal rate and intact distal pulses.    Pulmonary/Chest: Effort normal. No respiratory distress.   Neurological: She is alert and oriented to  person, place, and time. She has normal reflexes. She displays normal reflexes. No cranial nerve deficit. Coordination normal.   Psychiatric: She has a normal mood and affect. Her behavior is normal. Judgment and thought content normal.         Right Shoulder Exam     Inspection/Observation   Swelling: absent  Bruising: absent  Scars: absent  Deformity: absent  Scapular Winging: absent  Scapular Dyskinesia: negative  Atrophy: absent    Tenderness   The patient is tender to palpation of the biceps tendon and acromioclavicular joint.    Range of Motion   Active abduction: 90   Passive abduction: 100   Extension: 0   Forward Flexion: 160   Forward Elevation: 160Adduction: 40   External Rotation 0 degrees: 50   Internal rotation 0 degrees: T12     Tests & Signs   Drop arm: negative  Gordon test: positive  Impingement: positive  Lift Off Sign: negative  Active Compression Test ([place]'s Sign): positive  Speed's Test: positive    Other   Sensation: normal    Left Shoulder Exam     Inspection/Observation   Swelling: absent  Bruising: absent  Scars: absent  Deformity: absent  Scapular Winging: absent  Scapular Dyskinesia: negative  Atrophy: absent    Tenderness   The patient is experiencing no tenderness.     Range of Motion   Active abduction: 90   Passive abduction: 100   Extension: 0   Forward Flexion: 160   Forward Elevation: 160Adduction: 40   External Rotation 0 degrees: 50   Internal rotation 0 degrees: T10     Tests & Signs   Drop arm: negative  Gordon test: negative  Impingement: negative  Lift Off Sign: negative  Active Compression test ([place]'s Sign): negative  Speed's Test: negative  Bear Hug: negative    Other   Sensation: normal       Muscle Strength   Right Upper Extremity   Shoulder Abduction: 5/5   Shoulder Internal Rotation: 5/5   Shoulder External Rotation: 4/5   Supraspinatus: 4/5/5   Subscapularis: 5/5/5   Biceps: 5/5/5   Left Upper Extremity  Shoulder Abduction: 5/5   Shoulder Internal Rotation:  5/5   Shoulder External Rotation: 5/5   Supraspinatus: 5/5/5   Subscapularis: 5/5/5   Biceps: 5/5/5     Reflexes     Left Side  Biceps:  2+  Triceps:  2+    Right Side   Biceps:  2+  Triceps:  2+    Vascular Exam     Right Pulses      Radial:                    2+      Left Pulses      Radial:                    2+      Capillary Refill  Right Hand: normal capillary refill  Left Hand: normal capillary refill      Relevant imaging results reviewed and interpreted by me, discussed with the patient and / or family today   Zachariah Teixeira III, MD 8/2/2019 Routine      Narrative     EXAMINATION:  XR SHOULDER COMPLETE 2 OR MORE VIEWS RIGHT    CLINICAL HISTORY:  Pain in right shoulder    TECHNIQUE:  Two or three views of the right shoulder were performed.    COMPARISON:  November 19, 2018 and June 23rd 2017    FINDINGS:  Degenerative change again noted at the AC and glenohumeral joints with minimal inferior spurring.  Calcifications identified adjacent to the greater tuberosity as well as the area of the coracoclavicular ligaments.  No acute fracture or dislocation.  Remaining osseous structures intact.  Visualized right upper lung field is clear.      Impression       Stable exam without acute fracture or dislocation.  See above.      Electronically signed by: Zachariah Teixeira MD  Date: 08/02/2019  Time: 08:35     Reading Physician Reading Date Result Priority   SHAWN Mayberry Sr., MD 8/9/2017       Narrative     MRI of the right shoulder without IV contrast    Clinical History: M25.9 Joint disorder, unspecified; M25.511 Pain in right shoulder; M75.31 Calcific tendinitis of right shoulder; M75.41 Impingement syndrome of right shoulder; M75.51 Bursitis of right shoulder    Technique: Standard shoulder MRI protocol without IV contrast was performed.       Finding: There is a focal full-thickness tear involving the anterior half of the distal portion of the supraspinatus tendon. The rest of the rotator cuff appears to be  intact. There is a small joint effusion in the right glenohumeral joint. This communicates with the subacromial/subdeltoid bursa. The glenoid labrum is normal in appearance. The intra-articular portion of the long head of the biceps tendon is normal in appearance. There are mild osteoarthritic changes in the right acromioclavicular joint with a mild amount of downward proliferation. There is a type II acromion process with a mild amount of lateral downsloping.      Impression       1. There is a focal full-thickness tear involving the anterior half of the distal portion of the supraspinatus tendon. There is a small joint effusion in the right glenohumeral joint. This communicates with the subacromial/subdeltoid bursa.   2. There are mild osteoarthritic changes in the right acromioclavicular joint with a mild amount of downward proliferation.  3. There is a type II acromion process with a mild amount of lateral downsloping.          Electronically signed by: SHAWN GEORGE MD  Date: 08/09/17  Time: 14:00          Assessment:     Encounter Diagnoses   Name Primary?    Type 2 diabetes mellitus with diabetic neuropathy, with long-term current use of insulin Yes    Myofascial pain     Complete tear of right rotator cuff         Plan:     We reviewed with Jenifer today, the pathology and natural history of her diagnosis. We had an extensive discussion as to the conservative treatment and management of their condition. We also discussed the variety of treatment options to include medication, physical therapy, diagnostic testing as well as other treatments.The decision was made to go forward with:    -diabetes clinic referral, has much worse A1c. Discussed increased risk of infection and complication with worsening glucose control.  She is going to work on this prior to surgical fixation  -continue PT/OT/home exercise program  -physiatry for myofascial pain, possible trigger injections  -follow-up 8 weeks                 Disclaimer: This note was prepared using a voice recognition system and is likely to have sound alike errors within the text.

## 2019-10-04 RX ORDER — PANTOPRAZOLE SODIUM 40 MG/1
TABLET, DELAYED RELEASE ORAL
Qty: 30 TABLET | Refills: 1 | Status: SHIPPED | OUTPATIENT
Start: 2019-10-04 | End: 2019-12-04 | Stop reason: SDUPTHER

## 2019-10-10 ENCOUNTER — PROCEDURE VISIT (OUTPATIENT)
Dept: OBSTETRICS AND GYNECOLOGY | Facility: CLINIC | Age: 44
End: 2019-10-10
Payer: MEDICAID

## 2019-10-10 VITALS
DIASTOLIC BLOOD PRESSURE: 72 MMHG | HEIGHT: 70 IN | SYSTOLIC BLOOD PRESSURE: 108 MMHG | WEIGHT: 213.88 LBS | BODY MASS INDEX: 30.62 KG/M2

## 2019-10-10 DIAGNOSIS — R87.612 LGSIL ON PAP SMEAR OF CERVIX: Primary | ICD-10-CM

## 2019-10-10 PROBLEM — M25.511 CHRONIC RIGHT SHOULDER PAIN: Status: RESOLVED | Noted: 2018-11-19 | Resolved: 2019-10-10

## 2019-10-10 PROBLEM — G89.29 CHRONIC RIGHT SHOULDER PAIN: Status: RESOLVED | Noted: 2018-11-19 | Resolved: 2019-10-10

## 2019-10-10 PROCEDURE — 88342 TISSUE SPECIMEN TO PATHOLOGY, OBSTETRICS/GYNECOLOGY: ICD-10-PCS | Mod: 26,,, | Performed by: PATHOLOGY

## 2019-10-10 PROCEDURE — 88305 TISSUE EXAM BY PATHOLOGIST: CPT | Mod: 26,,, | Performed by: PATHOLOGY

## 2019-10-10 PROCEDURE — 81025 URINE PREGNANCY TEST: CPT | Mod: PBBFAC | Performed by: OBSTETRICS & GYNECOLOGY

## 2019-10-10 PROCEDURE — 88342 IMHCHEM/IMCYTCHM 1ST ANTB: CPT | Mod: 26,,, | Performed by: PATHOLOGY

## 2019-10-10 PROCEDURE — 57456 COLPOSCOPY-TODAY: ICD-10-PCS | Mod: S$PBB,,, | Performed by: OBSTETRICS & GYNECOLOGY

## 2019-10-10 PROCEDURE — 88305 TISSUE SPECIMEN TO PATHOLOGY, OBSTETRICS/GYNECOLOGY: ICD-10-PCS | Mod: 26,,, | Performed by: PATHOLOGY

## 2019-10-10 PROCEDURE — 88305 TISSUE EXAM BY PATHOLOGIST: CPT | Performed by: PATHOLOGY

## 2019-10-10 PROCEDURE — 57456 ENDOCERV CURETTAGE W/SCOPE: CPT | Mod: PBBFAC | Performed by: OBSTETRICS & GYNECOLOGY

## 2019-10-10 PROCEDURE — 88342 IMHCHEM/IMCYTCHM 1ST ANTB: CPT | Performed by: PATHOLOGY

## 2019-10-10 RX ORDER — METOCLOPRAMIDE 10 MG/1
TABLET ORAL
COMMUNITY
Start: 2019-10-02 | End: 2019-10-11 | Stop reason: SDUPTHER

## 2019-10-10 NOTE — PROCEDURES
Colposcopy-Today  Date/Time: 10/10/2019 11:00 AM  Performed by: Ky Peralta MD  Authorized by: Ky Peralta MD     Consent Done?:  Yes (Written)  Assistants?: No      Colposcopy Site:  Cervix  Position:  Supine  Acrowhite Lesion: No    Atypical Vessels: No    Transformation Zone Adequate?: Yes    Biopsy?: No    ECC Performed?: Yes    LEEP Performed?: No    Estimated blood loss (cc):  1   Patient tolerated the procedure well with no immediate complications.   Post-operative instructions were provided for the patient.   Patient was discharged and will follow up if any complications occur     COLPOSCOPY:    Jenifer Westfall is a 44 y.o. female   presents for colposcopy.  No LMP recorded. Patient has had an injection..  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      The abnormal test findings were discussed, as well as HPV infection, need for colposcopy and possible biopsies to determine the plan of care, treatments available, the minimal risk of bleeding and infection with colposcopy, and alternatives to colposcopy.  Pt voiced understanding and desires to proceed.      UPT is negative    COLPOSCOPY EXAM:   TIME OUT PERFORMED.     No gross vulvovaginal or cervix lesions noted.  On colpo: no visible lesions, no mosaicism, no punctation and no abnormal vasculature    No biopsies.  ECC was performed.  SCJ was entirely visualized.    Hemostasis was adequate with application of Monsel's solution.  The speculum was removed.  The patient did tolerate the procedure well.    All collected specimens sent to pathology for histologic analysis.    Post-colposcopy counseling:  The patient was instructed to manage post-colposcopy cramping with NSAIDs or Tylenol, or with a prescription per the medication card.  Avoid intercourse, douching, or tampons in the vagina for at least 2-3 days.  Expect a clumpy blackish discharge due to Monsel's  solution application for several days.  Report heavy bleeding, worsening pain or pain that does not respond to above medications, or foul-smelling vaginal discharge. HPV vaccine recommended according to FDA age guidelines.  Importance of follow-up stressed.      Follow up based on colposcopy results.  Impression:  Negative colpo.  If confirmed by pathology results, recommend follow-up pap in 12 months.

## 2019-10-11 ENCOUNTER — TELEPHONE (OUTPATIENT)
Dept: GASTROENTEROLOGY | Facility: CLINIC | Age: 44
End: 2019-10-11

## 2019-10-11 ENCOUNTER — OFFICE VISIT (OUTPATIENT)
Dept: GASTROENTEROLOGY | Facility: CLINIC | Age: 44
End: 2019-10-11
Payer: MEDICAID

## 2019-10-11 VITALS
WEIGHT: 213.63 LBS | BODY MASS INDEX: 30.58 KG/M2 | HEART RATE: 74 BPM | HEIGHT: 70 IN | DIASTOLIC BLOOD PRESSURE: 70 MMHG | SYSTOLIC BLOOD PRESSURE: 110 MMHG

## 2019-10-11 DIAGNOSIS — K21.9 GASTROESOPHAGEAL REFLUX DISEASE, ESOPHAGITIS PRESENCE NOT SPECIFIED: ICD-10-CM

## 2019-10-11 DIAGNOSIS — R10.13 EPIGASTRIC PAIN: Primary | ICD-10-CM

## 2019-10-11 DIAGNOSIS — Z79.4 TYPE 2 DIABETES MELLITUS WITHOUT COMPLICATION, WITH LONG-TERM CURRENT USE OF INSULIN: ICD-10-CM

## 2019-10-11 DIAGNOSIS — R11.2 NON-INTRACTABLE VOMITING WITH NAUSEA, UNSPECIFIED VOMITING TYPE: ICD-10-CM

## 2019-10-11 DIAGNOSIS — E11.9 TYPE 2 DIABETES MELLITUS WITHOUT COMPLICATION, WITH LONG-TERM CURRENT USE OF INSULIN: ICD-10-CM

## 2019-10-11 DIAGNOSIS — K58.0 IRRITABLE BOWEL SYNDROME WITH DIARRHEA: ICD-10-CM

## 2019-10-11 PROCEDURE — 99215 OFFICE O/P EST HI 40 MIN: CPT | Mod: PBBFAC | Performed by: PHYSICIAN ASSISTANT

## 2019-10-11 PROCEDURE — 99999 PR PBB SHADOW E&M-EST. PATIENT-LVL V: ICD-10-PCS | Mod: PBBFAC,,, | Performed by: PHYSICIAN ASSISTANT

## 2019-10-11 PROCEDURE — 99213 OFFICE O/P EST LOW 20 MIN: CPT | Mod: S$PBB,,, | Performed by: PHYSICIAN ASSISTANT

## 2019-10-11 PROCEDURE — 99999 PR PBB SHADOW E&M-EST. PATIENT-LVL V: CPT | Mod: PBBFAC,,, | Performed by: PHYSICIAN ASSISTANT

## 2019-10-11 PROCEDURE — 99213 PR OFFICE/OUTPT VISIT, EST, LEVL III, 20-29 MIN: ICD-10-PCS | Mod: S$PBB,,, | Performed by: PHYSICIAN ASSISTANT

## 2019-10-11 RX ORDER — METOCLOPRAMIDE 10 MG/1
10 TABLET ORAL 2 TIMES DAILY
Qty: 60 TABLET | Refills: 1 | Status: SHIPPED | OUTPATIENT
Start: 2019-10-11 | End: 2019-12-20 | Stop reason: SDUPTHER

## 2019-10-11 NOTE — PROGRESS NOTES
Subjective:      Patient ID: Jenifer Westfall is a 44 y.o. female.    Chief Complaint: Follow-up and Hospital Follow Up (nausea/abdomen pain)    HPI  The patient has a history of IBS with diarrhea. She was last seen in April. At that time, her primary complaint was reflux and epigastric pain despite taking Pepcid bid. She had reported improvement in bowel habits with Colestipol and decided not to do a colonoscopy. Recommendations last visit included GES which was normal; therefore, an EGD was recommended. She was noted to have chronic gastritis and was put on Protonix daily for 8 weeks. Biopsies were normal. She is taking Protonix and feels it works better than Pepcid. However, she continues to have abdominal pain and bloating. Sometimes she can only tolerate liquids. She went to the ER at [facility] a little over a week ago. CT scan showed food in the stomach with an air-fluid level in the stomach and also in the hepatic flexure of colon. Diverticula are present along the descending colon and sigmoid colon, with no diverticulitis. She was discharged with Reglan but she was scared it would make her lethargic. Therefore, she only took it at bedtime. Her A1C has gone up since last visit. It is now 11.4. She said Glipizide was increased. She is still on insulin. She is taking Bentyl about once a day and Colestipol three times a day. She has been having diarrhea lately.     Review of Systems  As per HPI.     Objective:     Physical Exam   Constitutional: She is oriented to person, place, and time. She appears well-developed and well-nourished. No distress.   HENT:   Head: Normocephalic and atraumatic.   Eyes: EOM are normal.   Cardiovascular: Normal rate and regular rhythm.   Pulmonary/Chest: Effort normal and breath sounds normal. No respiratory distress. She has no wheezes.   Abdominal: Soft. Bowel sounds are normal. She exhibits no distension. There is no tenderness.   Neurological: She is alert and oriented to person,  place, and time. No cranial nerve deficit.   Skin: She is not diaphoretic.   Psychiatric: Her behavior is normal.       Assessment:     1. Epigastric pain    2. Non-intractable vomiting with nausea, unspecified vomiting type    3. Irritable bowel syndrome with diarrhea    4. Gastroesophageal reflux disease, esophagitis presence not specified    5. Type 2 diabetes mellitus without complication, with long-term current use of insulin        Plan:     GES was negative but CT showed gastric distention with food. Considering her symptoms and A1C, gastroparesis is a likely etiology. I will have her take Reglan 10 mg bid. Potential for neurologic side effects discussed with the patient. She will continue Protonix, PRN bentyl and colestipol for now. Dietary considerations discussed and included in AVS. RTC in two weeks to reassess.       Follow up in about 2 weeks (around 10/25/2019).    Thank you for the opportunity to participate in the care of this patient.   Lizandro Larson PA-C.

## 2019-10-11 NOTE — TELEPHONE ENCOUNTER
----- Message from Meera Pozo sent at 10/11/2019  9:03 AM CDT -----  Contact: pt  Pt on the way, within 15min out, any questions call pt @ [phone].

## 2019-10-11 NOTE — PATIENT INSTRUCTIONS
Gastroparesis     Gastroparesis means that food and fluids move too slowly out of the stomach into the duodenum.   Gastroparesis (also called delayed gastric emptying) happens when the stomach takes longer than normal to empty of food. This is due to a problem with motility (the movement of the muscles in the digestive tract). For many people, gastroparesis is a lifelong condition. But treatment can help relieve symptoms and prevent complications. Read on to learn more about gastroparesis and how it can be managed.  How gastroparesis develops  With normal motility, signals from nerves tell the stomach muscles when to contract. These muscles move food from the stomach into the duodenum (the first part of the small bowel). With gastroparesis, the nerves or muscles are damaged. This causes motility to slow down or stop completely. As a result, food cannot move from the stomach properly. This delayed emptying can cause nausea, vomiting, and other symptoms. Malnutrition can result. Bezoars (hardened lumps of food) can form in the stomach and cause other complications as well.  Causes of gastroparesis  Gastroparesis can be caused by any of the following:  · Diabetes  · Surgery involving any of the digestive organs, such as the stomach and bowels  · Certain medicines, such as strong pain medicines (narcotics)  · Certain conditions, such as systemic scleroderma, Parkinson disease, and thyroid disease  · After a viral illness  In many cases, the cause of gastroparesis cannot be found.  Signs and symptoms of gastroparesis  These can include:  · Nausea and vomiting  · Feeling full quickly when eating  · Belly pain  · Heartburn  · Belly bloating  · Weight loss  · Loss of appetite  · High and low blood sugar levels (in people with diabetes)  Diagnosing gastroparesis  Your healthcare provider will ask about your symptoms and health history. Youll also be examined. In addition, blood tests and X-rays are often done to check  your health and rule out other problems. To confirm the problem, you may need other tests as well. These can include:  · Upper endoscopy. This is done to see inside the stomach and duodenum. For the test, an endoscope is used. This is a thin, flexible tube with a tiny camera on the end. Its inserted through the mouth and down into the stomach and duodenum.  · Upper gastrointestinal (GI) series. This is done to take X-rays of the upper GI tract from the mouth to the small bowel. For the test, a substance called barium is used. The barium coats the upper GI tract so that it will show up clearly on X-rays.  · Gastric emptying scan. This is done to measure how quickly food leaves the stomach. For the test, a meal containing a harmless radioactive substance (tracer) is eaten. Then scans of the stomach are done. The tracer shows up clearly on the scans and shows the movement of the food through the stomach.  · Antroduodenal manometry. This test gives pressure measurements of the stomach and small intestine to check how the contractions are working.  · Newer tests. These are being created and include breath tests and wireless capsule studies   Treating gastroparesis  The goal of treatment is to help you manage your condition. Treatment may include one or more of the following:  · Dietary changes. You may need to make changes to your eating habits and daily diet. For instance, your healthcare provider may instruct you to eat small meals throughout the day. Doing this can keep you from feeling full too quickly. You may be placed on a liquid or soft diet. This means youll eat liquid foods or foods that are mashed or put through a . In addition, you may need to avoid foods high in fats and fiber. These can slow digestion. For more help with your diet, your healthcare provider can refer you to a dietitian. In severe cases, you may need a feeding tube. This sends liquid food or medicine directly to your small bowel,  bypassing the stomach.  · Treating diabetes. If you are diabetic, it is important to control your blood sugar. High sugar levels worsen gastroparesis.   · Medicines. These can help manage symptoms, such as nausea and vomiting. They can also improve motility. Each medicine has specific risks and side effects. Your doctor can tell you more about any medicine that is prescribed for you.  · Surgery. You may need to have a tube surgically inserted into the stomach. The tube removes excess air and fluid. This can relieve severe symptoms of nausea and vomiting. In rare cases, other surgery may be needed on the stomach or small bowel. This is to create a new passageway for food to be emptied from the stomach.  · Gastric electrical stimulation. This treatment is done less often and may not be available. Your healthcare provider can tell you more about this treatment if it is a choice for you.  Diabetes and gastroparesis  If you have diabetes, gastroparesis can make it harder to manage your blood sugar level. Youll need to take extra steps in your treatment to prevent complications. Work with your healthcare provider to learn what you can do to protect your health. For more information, contact the American Diabetes Association, www.diabetes.org.   Long-term concerns   With treatment, most people can manage their symptoms and maintain their usual routines. If your symptoms are moderate to severe, you may need to see your healthcare provider more often for checkups. Also, other treatments will likely be needed.  Date Last Reviewed: 7/14/2016  © 2878-7654 [employer]. [address] [place] [address], [place] [address]. All rights reserved. This information is not intended as a substitute for professional medical care. Always follow your healthcare professional's instructions.      The Basics  Volume  The larger the meal, the slower the stomach will empty. It is important to decrease the amount of food eaten at a meal, but  in order to meet nutrient needs, patients will have to eat more often. Smaller, more frequent meals (6-8 or more if necessary) may allow patients to meet their needs.  Liquids Versus Solids  If decreasing the meal size and increasing the number of meals does not work, the next step is to switch over to more liquid-type calories. Liquids are better tolerated than solids. Liquids empty the stomach more easily than solids do. Pureed foods may be better tolerated also.  Fiber  Fiber (found in many fruits, vegetables and grains) may act to slow stomach  emptying and fill the stomach up quickly, hence nutrient needs may not be met.  For patients who have had a bezoar (an indigestible, concretion of foods and/or medications) in the past, a fiber restriction (including avoidance of over-the-counter fiber/bulking medicines) is worthwhile.  Fat  Although fat may slow stomach emptying in some patients, many can consume fat especially in the form of liquids. Although many clinicians restrict fat, my experience is that fat in the liquid form (as part of beverages such as whole milk, milkshakes, nutritional supplements, etc.) can be well tolerated by many. To take fat out of the diet of a patient diet that is seriously malnourished is to remove a valuable source of calories. Unless a fat-containing food or fluid causes problems, fat should not be limited. It is often well tolerated, pleasurable, and it provides a great source of calories small amounts.  Medications  There are quite a few medications that can delay stomach emptying -ask your doctor if any of the medications you are on could be slowing down your stomach emptying.  Getting Started  ·  Eat at least six small meals per day; avoid large meals.  ·  Avoid solid foods high in fat or adding too much fat (see list below) to foods, however, liquid beverages containing fat are often tolerated just fine.  ·  Eat nutritious foods first before filling up on empty calories  (i.e., candy, cakes, pastries, etc.)  ·  Chew foods well; especially meats (meats may be more tolerated if ground or puréed.)  ·  Avoid high fiber foods because they may be more difficult for your stomach to  empty or may cause bezoar formation. A bezoar is a mixture of food fibers that may get stuck in the stomach causing it to not empty even more poorly.  ·  Sit up while eating and for at least 1 hour after finishing; consider taking a quiet walk after meals.  ·  If you have diabetes, keep your blood sugar under control. Let your doctor  know if your blood sugar runs >200 on a regular basis.  Try Blenderized Food  Any food can be blenderized, but solid foods will need to be thinned with some type of liquid.  ·  Meats, fish, poultry and ham: Blend with broths, water, milk, vegetable or  V-8® juice, tomato sauce, gravies.  ·  Vegetables: Blend with water, tomato juice, broths, strained baby  vegetables.  ·  Starches: potatoes, pasta: Blend with soups, broth, milk, water, gravies;  add strained baby meats, etc to add protein if needed. Consider using hot  cereals such as cream of wheat or rice, grits, etc as your starch at lunch  and dinner.  ·  Fruits: Blend with their own juices, other fruit juices, water, strained baby  fruits.  ·  Cereals: Make with caloric beverage such as whole milk, soy or rice milk,  juice, Ensure® or equivalent, etc., instead of water. Add sugars, honey,  molasses, syrups, or other flavorings, butter or margarine for extra calories.  ·  Mixed dishes: Lasagna, macaroni and cheese, spaghetti, chili, stews, hearty  soups, chop suey - add adequate liquid of your choice, blend well and strain.  Always clean the  well. Any food left in the  for > 1-2 hours  could cause food poisoning. If you do not have a , strained baby foods will work and can be thinned down if needed with milk, soy or rice milk, water, broth, etc.    Getting your Calories  When getting enough calories is  a daily struggle  ·  High calorie drinks are better than water (provides calories AND fluid); use  peach, pear or papaya nectar, cranberry juice, orange juice, Hawaiian Punch®, Hi C®, lemonade, Cb-Aid®, etc.  ·  Fortify milk by adding dry milk powder - 1-cup powder to 1-quart milk.  ·  Use whole milk or evaporated milk (if tolerated) instead of skim or 2% for  drinking and preparing cream type soups, custards, puddings, and milkshakes.  ·  Add instant breakfast, protein powder, dry milk powder, or other flavored  powders or syrups to whole milk or juices.  ·  Make custards and puddings with eggs or egg substitutes (such as  Eggbeaters®).  ·  Try adding ice cream, sherbets, or sorbets to ready-made liquid nutritional  supplements such as Nutra-shakes®, Ensure® or Boost® or others.

## 2019-11-05 RX ORDER — DICYCLOMINE HYDROCHLORIDE 20 MG/1
TABLET ORAL
Qty: 120 TABLET | Refills: 3 | Status: SHIPPED | OUTPATIENT
Start: 2019-11-05 | End: 2020-03-30

## 2019-11-14 ENCOUNTER — DOCUMENTATION ONLY (OUTPATIENT)
Dept: REHABILITATION | Facility: HOSPITAL | Age: 44
End: 2019-11-14

## 2019-11-14 NOTE — PROGRESS NOTES
Outpatient Therapy Discharge Summary     Name: Jenifer Westfall  Clinic Number: [id number]    Physician: Chon Hammond, *     Visit Date: 9/24/2019     Physician Orders: PT Eval and Treat   Medical Diagnosis from Referral: M25.511,G89.29 (ICD-10-CM) - Chronic right shoulder pain  Evaluation Date: 8/20/2019  Authorization Period Expiration: 9/30/19  Plan of Care Expiration: 9/20/19  Visit # / Visits authorized:7/12    Date of Last visit: 9/24/19  Total Visits Received: 8    Discharge reason: Patient has not attended therapy since 9/24/19    Plan   This patient is discharged from Physical Therapy

## 2019-12-05 RX ORDER — PANTOPRAZOLE SODIUM 40 MG/1
TABLET, DELAYED RELEASE ORAL
Qty: 30 TABLET | Refills: 3 | Status: SHIPPED | OUTPATIENT
Start: 2019-12-05 | End: 2020-04-20

## 2019-12-05 RX ORDER — ATORVASTATIN CALCIUM 80 MG/1
TABLET, FILM COATED ORAL
Qty: 30 TABLET | Refills: 5 | Status: SHIPPED | OUTPATIENT
Start: 2019-12-05 | End: 2020-06-09

## 2019-12-11 ENCOUNTER — OFFICE VISIT (OUTPATIENT)
Dept: SPORTS MEDICINE | Facility: CLINIC | Age: 44
End: 2019-12-11
Payer: MEDICAID

## 2019-12-11 ENCOUNTER — LAB VISIT (OUTPATIENT)
Dept: LAB | Facility: HOSPITAL | Age: 44
End: 2019-12-11
Attending: ORTHOPAEDIC SURGERY
Payer: MEDICAID

## 2019-12-11 VITALS
SYSTOLIC BLOOD PRESSURE: 130 MMHG | DIASTOLIC BLOOD PRESSURE: 81 MMHG | BODY MASS INDEX: 30.49 KG/M2 | HEIGHT: 70 IN | HEART RATE: 95 BPM | WEIGHT: 213 LBS

## 2019-12-11 DIAGNOSIS — G89.29 OTHER CHRONIC PAIN: Primary | ICD-10-CM

## 2019-12-11 DIAGNOSIS — E11.40 TYPE 2 DIABETES MELLITUS WITH DIABETIC NEUROPATHY, WITH LONG-TERM CURRENT USE OF INSULIN: ICD-10-CM

## 2019-12-11 DIAGNOSIS — Z79.4 TYPE 2 DIABETES MELLITUS WITH DIABETIC NEUROPATHY, WITH LONG-TERM CURRENT USE OF INSULIN: ICD-10-CM

## 2019-12-11 DIAGNOSIS — M75.121 COMPLETE TEAR OF RIGHT ROTATOR CUFF, UNSPECIFIED WHETHER TRAUMATIC: ICD-10-CM

## 2019-12-11 DIAGNOSIS — G89.29 OTHER CHRONIC PAIN: ICD-10-CM

## 2019-12-11 DIAGNOSIS — G89.29 CHRONIC RIGHT SHOULDER PAIN: Primary | ICD-10-CM

## 2019-12-11 DIAGNOSIS — M25.511 CHRONIC RIGHT SHOULDER PAIN: ICD-10-CM

## 2019-12-11 DIAGNOSIS — Z79.4 TYPE 2 DIABETES MELLITUS WITH DIABETIC NEUROPATHY, WITH LONG-TERM CURRENT USE OF INSULIN: Primary | ICD-10-CM

## 2019-12-11 DIAGNOSIS — E11.40 TYPE 2 DIABETES MELLITUS WITH DIABETIC NEUROPATHY, WITH LONG-TERM CURRENT USE OF INSULIN: Primary | ICD-10-CM

## 2019-12-11 DIAGNOSIS — M50.30 DDD (DEGENERATIVE DISC DISEASE), CERVICAL: Chronic | ICD-10-CM

## 2019-12-11 DIAGNOSIS — M25.511 CHRONIC RIGHT SHOULDER PAIN: Primary | ICD-10-CM

## 2019-12-11 DIAGNOSIS — M50.30 DDD (DEGENERATIVE DISC DISEASE), CERVICAL: Primary | Chronic | ICD-10-CM

## 2019-12-11 DIAGNOSIS — G89.29 CHRONIC RIGHT SHOULDER PAIN: ICD-10-CM

## 2019-12-11 PROCEDURE — 36415 COLL VENOUS BLD VENIPUNCTURE: CPT

## 2019-12-11 PROCEDURE — 83036 HEMOGLOBIN GLYCOSYLATED A1C: CPT

## 2019-12-11 PROCEDURE — 99214 PR OFFICE/OUTPT VISIT, EST, LEVL IV, 30-39 MIN: ICD-10-PCS | Mod: S$PBB,,, | Performed by: ORTHOPAEDIC SURGERY

## 2019-12-11 PROCEDURE — 99999 PR PBB SHADOW E&M-EST. PATIENT-LVL III: CPT | Mod: PBBFAC,,, | Performed by: ORTHOPAEDIC SURGERY

## 2019-12-11 PROCEDURE — 99999 PR PBB SHADOW E&M-EST. PATIENT-LVL III: ICD-10-PCS | Mod: PBBFAC,,, | Performed by: ORTHOPAEDIC SURGERY

## 2019-12-11 PROCEDURE — 99214 OFFICE O/P EST MOD 30 MIN: CPT | Mod: S$PBB,,, | Performed by: ORTHOPAEDIC SURGERY

## 2019-12-11 PROCEDURE — 99213 OFFICE O/P EST LOW 20 MIN: CPT | Mod: PBBFAC | Performed by: ORTHOPAEDIC SURGERY

## 2019-12-11 RX ORDER — FAMOTIDINE 20 MG/1
TABLET, FILM COATED ORAL
COMMUNITY
Start: 2019-12-04 | End: 2020-01-10

## 2019-12-11 NOTE — PROGRESS NOTES
Subjective:     Patient ID: Jenifer Westfall is a 44 y.o. female.    Chief Complaint: Pain of the Right Shoulder    2019-R shoulder f/up. Didn't see physiatry. Hasnt had a1c f/up.    19 Follow-up for right rotator cuff tear.  Had an EMG with Neurology that still showed some muscle response.  Had recent A1c, elevated from prior years.  Has been doing therapy, notices great improvement in her pain and motion        Shoulder Pain    The pain is present in the right shoulder. This is a recurrent problem. The injury was the result of a lifting action The problem occurs constantly. The problem has been gradually worsening. The quality of the pain is described as aching, sharp, shooting, squeezing and tightness. The pain is at a severity of 5/10. Associated symptoms include an inability to bear weight and a limited range of motion. Pertinent negatives include no fever or numbness. The symptoms are aggravated by activity, lifting, rotation, cold and exercise. Physical therapy was not tried and effective.      Past Medical History:   Diagnosis Date    Arthritis     DM (diabetes mellitus)      2017 Insulin x 3 years     Hepatitis C antibody positive in blood     Hyperlipidemia     Hypertension     IBS (irritable bowel syndrome)     Kidney stone     Marfan syndrome     Mitral valve prolapse      Past Surgical History:   Procedure Laterality Date    ANGIOGRAPHY OF LOWER EXTREMITY N/A 2018    Procedure: ANGIOGRAM, LOWER EXTREMITY- LEFT LEG;  Surgeon: Roscoe Landeros MD;  Location: [facility] CATH LAB;  Service: Peripheral Vascular;  Laterality: N/A;    APPENDECTOMY      BUNIONECTOMY      both feet    cataract surgery  2019     SECTION      x 2    CHOLECYSTECTOMY      ESOPHAGOGASTRODUODENOSCOPY N/A 2019    Procedure: ESOPHAGOGASTRODUODENOSCOPY (EGD);  Surgeon: Jaron Sanders III, MD;  Location: [facility] ENDO;  Service: Endoscopy;  Laterality: N/A;    TUBAL LIGATION    "    Family History   Problem Relation Age of Onset    Heart disease Mother     Thrombophilia Father         DVT    Hypertension Father     Stroke Maternal Aunt     Heart disease Maternal Aunt     Stroke Maternal Uncle     Heart disease Maternal Uncle     Breast cancer Neg Hx     Colon cancer Neg Hx     Ovarian cancer Neg Hx      Social History     Socioeconomic History    Marital status: Single     Spouse name: Not on file    Number of children: Not on file    Years of education: Not on file    Highest education level: Not on file   Occupational History    Not on file   Social Needs    Financial resource strain: Not on file    Food insecurity:     Worry: Not on file     Inability: Not on file    Transportation needs:     Medical: Not on file     Non-medical: Not on file   Tobacco Use    Smoking status: Former Smoker     Packs/day: 0.20     Years: 20.00     Pack years: 4.00     Types: Cigarettes     Last attempt to quit: 2015     Years since quittin.6    Smokeless tobacco: Never Used    Tobacco comment: "once in a blue moon"   Substance and Sexual Activity    Alcohol use: No     Alcohol/week: 0.0 standard drinks    Drug use: No    Sexual activity: Yes     Partners: Male     Birth control/protection: Injection   Lifestyle    Physical activity:     Days per week: Not on file     Minutes per session: Not on file    Stress: Not on file   Relationships    Social connections:     Talks on phone: Not on file     Gets together: Not on file     Attends [employer] service: Not on file     Active member of club or organization: Not on file     Attends meetings of clubs or organizations: Not on file     Relationship status: Not on file   Other Topics Concern    Not on file   Social History Narrative    Not on file     Medication List with Changes/Refills   Current Medications    ADMELOG SOLOSTAR U-100 INSULIN 100 UNIT/ML PEN        ASCORBIC ACID (VITAMIN C) 100 MG TABLET    Take 100 mg by " mouth once daily.    ASPIRIN (ECOTRIN) 81 MG EC TABLET    Take 81 mg by mouth once daily.    ATENOLOL (TENORMIN) 50 MG TABLET    TAKE 1 TABLET BY MOUTH ONCE DAILY    ATORVASTATIN (LIPITOR) 80 MG TABLET    TAKE 1 TABLET BY MOUTH ONCE DAILY.    BASAGLAR KWIKPEN U-100 INSULIN GLARGINE 100 UNITS/ML (3ML) SUBQ PEN        CETIRIZINE (ZYRTEC) 10 MG TABLET    Take 10 mg by mouth.    CLOPIDOGREL (PLAVIX) 75 MG TABLET    Take 1 tablet (75 mg total) by mouth once daily.    COLESTIPOL (COLESTID) 1 GRAM TAB    TAKE 2 TABLETS BY MOUTH THREE TIMES DAILY. DO NOT TAKE OTHER MEDICATION WITHIN 1 HOUR BEFORE OR 4 HOURS AFTER TAKING COLESTIPOL.    DICLOFENAC SODIUM (VOLTAREN) 1 % GEL    Apply 2 grams topically 4 (four) times daily. for 10 days    DICYCLOMINE (BENTYL) 20 MG TABLET    TAKE 1 TABLET BY MOUTH 4 TIMES DAILY    ERGOCALCIFEROL, VITAMIN D2, (VITAMIN D ORAL)    Take 1 tablet by mouth once daily.     EZETIMIBE (ZETIA) 10 MG TABLET    Take 1 tablet (10 mg total) by mouth once daily.    FAMOTIDINE (PEPCID) 20 MG TABLET        FLUCONAZOLE (DIFLUCAN) 150 MG TAB        FLUOXETINE (PROZAC) 40 MG CAPSULE    TAKE 1 CAPSULE BY MOUTH ONCE DAILY    GABAPENTIN (NEURONTIN) 400 MG CAPSULE    TAKE 1 CAPSULE BY MOUTH 3 TIMES DAILY    GLIPIZIDE (GLUCOTROL) 10 MG TABLET    Take 1 tablet (10 mg total) by mouth 2 (two) times daily.    INSULIN GLARGINE,HUM.REC.ANLOG (BASAGLAR KWIKPEN U-100 INSULIN SUBQ)    Inject 40 Units into the skin 2 (two) times daily.    KETOROLAC 0.5% (ACULAR) 0.5 % DROP        LIRAGLUTIDE 0.6 MG/0.1 ML, 18 MG/3 ML, SUBQ PNIJ (VICTOZA 2-DIAN) 0.6 MG/0.1 ML (18 MG/3 ML) PNIJ    Inject 1.2 Units into the skin.    LISINOPRIL 10 MG TABLET    TAKE 1 TABLET (10 MG TOTAL) BY MOUTH ONCE DAILY.    METOCLOPRAMIDE HCL (REGLAN) 10 MG TABLET    Take 1 tablet (10 mg total) by mouth 2 (two) times daily.    ONDANSETRON (ZOFRAN) 4 MG TABLET    Take 1 tablet (4 mg total) by mouth 2 (two) times daily.    PANTOPRAZOLE (PROTONIX) 40 MG TABLET     "TAKE 1 TABLET BY MOUTH ONCE DAILY    PEN NEEDLE 31 GAUGE X 5/16" NDLE    USE 5 TIMES DAILY WITH LANTUS AND HUMALOG    PREDNISOLONE ACETATE (PRED FORTE) 1 % DRPS        TIZANIDINE (ZANAFLEX) 4 MG TABLET    TAKE 1 TABLET BY MOUTH EVERY EVENING    TOBRAMYCIN SULFATE 0.3% (TOBREX) 0.3 % OPHTHALMIC SOLUTION        TRAMADOL (ULTRAM) 50 MG TABLET    TAKE 1 TABLET BY MOUTH EVERY 12 HOURS    TRUE METRIX GLUCOSE TEST STRIP STRP         Review of patient's allergies indicates:   Allergen Reactions    Compazine [prochlorperazine edisylate] Rash    Contrast media Anaphylaxis    Dye Anaphylaxis    Levaquin [levofloxacin] Hives and Itching    Metformin Other (See Comments)     Upset stomach    Ibuprofen Other (See Comments)     Stomach pain    Morphine Other (See Comments)     Irritable     Review of Systems   Constitution: Negative for chills and fever.   HENT: Negative for ear discharge and hearing loss.    Eyes: Negative for blurred vision and visual disturbance.   Cardiovascular: Negative for chest pain and leg swelling.   Respiratory: Negative for cough and shortness of breath.    Endocrine: Negative for polyuria.   Hematologic/Lymphatic: Negative for bleeding problem.   Skin: Negative for rash.   Musculoskeletal: Positive for joint pain. Negative for back pain, joint swelling, muscle cramps and muscle weakness.   Gastrointestinal: Negative for nausea and vomiting.   Genitourinary: Negative for hematuria.   Neurological: Negative for loss of balance, numbness and paresthesias.   Psychiatric/Behavioral: Negative for altered mental status.       Objective:   Body mass index is 30.56 kg/m².  Vitals:    12/11/19 1049   BP: 130/81   Pulse: 95                General    Vitals reviewed.  Constitutional: She is oriented to person, place, and time. She appears well-developed and well-nourished. No distress.   HENT:   Head: Atraumatic.   Nose: Nose normal.   Eyes: Pupils are equal, round, and reactive to light. Right eye " exhibits no discharge. Left eye exhibits no discharge.   Neck: Normal range of motion.   Cardiovascular: Normal rate and intact distal pulses.    Pulmonary/Chest: Effort normal. No respiratory distress.   Neurological: She is alert and oriented to person, place, and time. She has normal reflexes. She displays normal reflexes. No cranial nerve deficit. Coordination normal.   Psychiatric: She has a normal mood and affect. Her behavior is normal. Judgment and thought content normal.         Right Shoulder Exam     Inspection/Observation   Swelling: absent  Bruising: absent  Scars: absent  Deformity: absent  Scapular Winging: absent  Scapular Dyskinesia: negative  Atrophy: absent    Tenderness   The patient is tender to palpation of the biceps tendon and acromioclavicular joint.    Range of Motion   Active abduction: 90   Passive abduction: 100   Extension: 0   Forward Flexion: 160   Forward Elevation: 160Adduction: 40   External Rotation 0 degrees: 50   Internal rotation 0 degrees: T12     Tests & Signs   Drop arm: negative  Gordon test: positive  Impingement: positive  Lift Off Sign: negative  Active Compression Test ([place]'s Sign): positive  Speed's Test: positive    Other   Sensation: normal    Left Shoulder Exam     Inspection/Observation   Swelling: absent  Bruising: absent  Scars: absent  Deformity: absent  Scapular Winging: absent  Scapular Dyskinesia: negative  Atrophy: absent    Tenderness   The patient is experiencing no tenderness.     Range of Motion   Active abduction: 90   Passive abduction: 100   Extension: 0   Forward Flexion: 160   Forward Elevation: 160Adduction: 40   External Rotation 0 degrees: 50   Internal rotation 0 degrees: T10     Tests & Signs   Drop arm: negative  Gordon test: negative  Impingement: negative  Lift Off Sign: negative  Active Compression test ([place]'s Sign): negative  Speed's Test: negative  Bear Hug: negative    Other   Sensation: normal       Muscle Strength   Right  Upper Extremity   Shoulder Abduction: 5/5   Shoulder Internal Rotation: 5/5   Shoulder External Rotation: 4/5   Supraspinatus: 4/5/5   Subscapularis: 5/5/5   Biceps: 5/5/5   Left Upper Extremity  Shoulder Abduction: 5/5   Shoulder Internal Rotation: 5/5   Shoulder External Rotation: 5/5   Supraspinatus: 5/5/5   Subscapularis: 5/5/5   Biceps: 5/5/5     Reflexes     Left Side  Biceps:  2+  Triceps:  2+    Right Side   Biceps:  2+  Triceps:  2+    Vascular Exam     Right Pulses      Radial:                    2+      Left Pulses      Radial:                    2+      Capillary Refill  Right Hand: normal capillary refill  Left Hand: normal capillary refill      Relevant imaging results reviewed and interpreted by me, discussed with the patient and / or family today   Zachariah Teixeira III, MD 8/2/2019 Routine      Narrative     EXAMINATION:  XR SHOULDER COMPLETE 2 OR MORE VIEWS RIGHT    CLINICAL HISTORY:  Pain in right shoulder    TECHNIQUE:  Two or three views of the right shoulder were performed.    COMPARISON:  November 19, 2018 and June 23rd 2017    FINDINGS:  Degenerative change again noted at the AC and glenohumeral joints with minimal inferior spurring.  Calcifications identified adjacent to the greater tuberosity as well as the area of the coracoclavicular ligaments.  No acute fracture or dislocation.  Remaining osseous structures intact.  Visualized right upper lung field is clear.      Impression       Stable exam without acute fracture or dislocation.  See above.      Electronically signed by: Zachariah Teixeira MD  Date: 08/02/2019  Time: 08:35     Reading Physician Reading Date Result Priority   SHAWN Mayberry Sr., MD 8/9/2017       Narrative     MRI of the right shoulder without IV contrast    Clinical History: M25.9 Joint disorder, unspecified; M25.511 Pain in right shoulder; M75.31 Calcific tendinitis of right shoulder; M75.41 Impingement syndrome of right shoulder; M75.51 Bursitis of right  shoulder    Technique: Standard shoulder MRI protocol without IV contrast was performed.       Finding: There is a focal full-thickness tear involving the anterior half of the distal portion of the supraspinatus tendon. The rest of the rotator cuff appears to be intact. There is a small joint effusion in the right glenohumeral joint. This communicates with the subacromial/subdeltoid bursa. The glenoid labrum is normal in appearance. The intra-articular portion of the long head of the biceps tendon is normal in appearance. There are mild osteoarthritic changes in the right acromioclavicular joint with a mild amount of downward proliferation. There is a type II acromion process with a mild amount of lateral downsloping.      Impression       1. There is a focal full-thickness tear involving the anterior half of the distal portion of the supraspinatus tendon. There is a small joint effusion in the right glenohumeral joint. This communicates with the subacromial/subdeltoid bursa.   2. There are mild osteoarthritic changes in the right acromioclavicular joint with a mild amount of downward proliferation.  3. There is a type II acromion process with a mild amount of lateral downsloping.          Electronically signed by: SHAWN GEORGE MD  Date: 08/09/17  Time: 14:00          Assessment:     Encounter Diagnoses   Name Primary?    Other chronic pain Yes    Complete tear of right rotator cuff, unspecified whether traumatic         Plan:     We reviewed with Jenifer today, the pathology and natural history of her diagnosis. We had an extensive discussion as to the conservative treatment and management of their condition. We also discussed the variety of treatment options to include medication, physical therapy, diagnostic testing as well as other treatments.The decision was made to go forward with:    -diabetes clinic referral, has much worse A1c. Discussed increased risk of infection and complication with worsening glucose  control.  She is going to work on this prior to surgical fixation. Will repeat A1c  -home exercise program  -physiatry for myofascial pain, possible trigger injections  -follow-up 8 weeks     -needs pain management doctor preop        Disclaimer: This note was prepared using a voice recognition system and is likely to have sound alike errors within the text.

## 2019-12-12 ENCOUNTER — TELEPHONE (OUTPATIENT)
Dept: ORTHOPEDICS | Facility: CLINIC | Age: 44
End: 2019-12-12

## 2019-12-12 LAB
ESTIMATED AVG GLUCOSE: 197 MG/DL (ref 68–131)
HBA1C MFR BLD HPLC: 8.5 % (ref 4–5.6)

## 2019-12-12 NOTE — TELEPHONE ENCOUNTER
----- Message from Chon Hammond MD sent at 12/12/2019  1:53 PM CST -----  Let her know her A1c is much better. We can see her to schedule surgery after she gets established with a pain doc for medication management and sees Dr. Yeager for trigger points. Ask if she has made appointments for those or have her reach out once she does

## 2019-12-12 NOTE — TELEPHONE ENCOUNTER
Spoke c pt.Informed her of Dr. Hammond's message below. She has appt c Dr. Yeager on 12/18/19 & is working on getting established c external pain management. She has our list of preferred providers & is going to call today to set up an appt. She will reach out to our office for f/u appt to discuss surgical options once she has established care c pain management. Pt expressed understanding & was thankful.

## 2019-12-13 ENCOUNTER — TELEPHONE (OUTPATIENT)
Dept: ORTHOPEDICS | Facility: CLINIC | Age: 44
End: 2019-12-13

## 2019-12-13 NOTE — TELEPHONE ENCOUNTER
Spoke w/ patient in regards to her referral for pain management. Patient states that she called the list of external pain management doctors and they stated that they do not accept medicaid. Informed her that I will f/u with Dr. Hammond as he did want her to est care with pain man and Dr. Yeager first before we discuss sx options, patient verbalized understanding and was grateful for the call-DD

## 2019-12-18 ENCOUNTER — OFFICE VISIT (OUTPATIENT)
Dept: PHYSICAL MEDICINE AND REHAB | Facility: CLINIC | Age: 44
End: 2019-12-18
Payer: MEDICAID

## 2019-12-18 VITALS
BODY MASS INDEX: 30.49 KG/M2 | WEIGHT: 213 LBS | SYSTOLIC BLOOD PRESSURE: 146 MMHG | HEIGHT: 70 IN | DIASTOLIC BLOOD PRESSURE: 78 MMHG | HEART RATE: 97 BPM | RESPIRATION RATE: 14 BRPM

## 2019-12-18 DIAGNOSIS — M53.82 MUSCULOSKELETAL DISORDER OF THE SUBOCCIPITAL: ICD-10-CM

## 2019-12-18 DIAGNOSIS — M79.18 DIFFUSE MYOFASCIAL PAIN SYNDROME: Primary | ICD-10-CM

## 2019-12-18 PROBLEM — E66.9 OBESITY (BMI 30-39.9): Status: ACTIVE | Noted: 2017-10-09

## 2019-12-18 PROBLEM — K52.9 COLITIS: Status: ACTIVE | Noted: 2017-10-08

## 2019-12-18 PROCEDURE — 99204 OFFICE O/P NEW MOD 45 MIN: CPT | Mod: 25,S$PBB,, | Performed by: PHYSICAL MEDICINE & REHABILITATION

## 2019-12-18 PROCEDURE — 20552 PR INJECT TRIGGER POINT, 1 OR 2: ICD-10-PCS | Mod: S$PBB,,, | Performed by: PHYSICAL MEDICINE & REHABILITATION

## 2019-12-18 PROCEDURE — 99204 PR OFFICE/OUTPT VISIT, NEW, LEVL IV, 45-59 MIN: ICD-10-PCS | Mod: 25,S$PBB,, | Performed by: PHYSICAL MEDICINE & REHABILITATION

## 2019-12-18 PROCEDURE — 20552 NJX 1/MLT TRIGGER POINT 1/2: CPT | Mod: S$PBB,,, | Performed by: PHYSICAL MEDICINE & REHABILITATION

## 2019-12-18 PROCEDURE — 99213 OFFICE O/P EST LOW 20 MIN: CPT | Mod: PBBFAC,25 | Performed by: PHYSICAL MEDICINE & REHABILITATION

## 2019-12-18 PROCEDURE — 20552 NJX 1/MLT TRIGGER POINT 1/2: CPT | Mod: PBBFAC | Performed by: PHYSICAL MEDICINE & REHABILITATION

## 2019-12-18 PROCEDURE — 99999 PR PBB SHADOW E&M-EST. PATIENT-LVL III: ICD-10-PCS | Mod: PBBFAC,,, | Performed by: PHYSICAL MEDICINE & REHABILITATION

## 2019-12-18 PROCEDURE — 99999 PR PBB SHADOW E&M-EST. PATIENT-LVL III: CPT | Mod: PBBFAC,,, | Performed by: PHYSICAL MEDICINE & REHABILITATION

## 2019-12-18 RX ORDER — DOXYCYCLINE 100 MG/1
100 CAPSULE ORAL
COMMUNITY
Start: 2019-12-12 | End: 2019-12-22

## 2019-12-18 RX ORDER — METHYLPREDNISOLONE ACETATE 40 MG/ML
40 INJECTION, SUSPENSION INTRA-ARTICULAR; INTRALESIONAL; INTRAMUSCULAR; SOFT TISSUE
Status: COMPLETED | OUTPATIENT
Start: 2019-12-18 | End: 2019-12-18

## 2019-12-18 RX ADMIN — METHYLPREDNISOLONE ACETATE 40 MG: 40 INJECTION, SUSPENSION INTRA-ARTICULAR; INTRALESIONAL; INTRAMUSCULAR; SOFT TISSUE at 12:12

## 2019-12-18 NOTE — LETTER
December 18, 2019      Chon Hammond MD  [address] [place] [address]           [facility]  [address] [facility] [facility] [place] [address]  Phone: [phone]  Fax: [phone]          Patient: Jenifer Westfall   MR Number: [id number]   YOB: 1975   Date of Visit: 12/18/2019       Dear Dr. Chon Hammond:    Thank you for referring Jenifer Westfall to me for evaluation. Attached you will find relevant portions of my assessment and plan of care.    If you have questions, please do not hesitate to call me. I look forward to following Jenifer Westfall along with you.    Sincerely,    Zandra Yeager MD    Enclosure  CC:  No Recipients    If you would like to receive this communication electronically, please contact [email or web address] or [phone] to request more information on [employer] access.    For providers and/or their staff who would like to refer a patient to [facility], please contact us through our one-stop-shop provider referral line, [facility] at [phone].    If you feel you have received this communication in error or would no longer like to receive these types of communications, please e-mail [email or web address]

## 2019-12-18 NOTE — PROGRESS NOTES
PM&R NEW PATIENT HISTORY & PHYSICAL :    Referring Physician: Stephany      Chief Complaint   Patient presents with    Muscle Pain    Shoulder Pain       HPI: This is a 44 y.o.  female being seen in clinic today for evaluation of chronic right shoulder achy pain and weakness. She has a history of rotator cuff tear and tried conservative PT. She has improved shoulder ROM, but is still having significant tightness and spasms in her shoulder radiating to the neck. She denies numbness/tingling into her arms    History obtained from patient    Functional History:  Walking: Not limited  Transfers: Independent  Assistive devices: No  Power mobility: No  Falls: None       Needs help with:  Nothing - all ADLS normal    Cooking   Cleaning  Bathing   Dressing   Toileting     Past family, medical, social, and surgical history reviewed in chart    Review of Systems:     General- denies lethargy, weight change, fever, chills  Head/neck- denies swallowing difficulties  ENT- denies hearing changes  Cardiovascular-denies chest pain  Pulmonary- denies shortness of breath  GI- denies constipation or bowel incontinence  - denies bladder incontinence  Skin- denies wounds or rashes  Musculoskeletal- denies weakness, +pain  Neurologic- denies numbness and tingling  Psychiatric- denies depressive or psychotic features, denies anxiety  Lymphatic-denies swelling  Endocrine- denies hypoglycemic symptoms/DM history  All other pertinent systems negative     Physical Examination:  General: Well developed, well nourished female, NAD  HEENT:NCAT EOMI bilaterally   Pulmonary:Normal respirations    Spinal Examination: CERVICAL  Active ROM is within normal limits.  Inspection: No deformity of spinal alignment.  Palpation: No vertebral tenderness to percussion.  VERY tight and tender at right trapezius   Spurling test: neg    Spinal Examination: LUMBAR or THORACIC  Active ROM is within normal limits.  Inspection: No deformity of spinal alignment.   No palpable olisthesis.  Palpation: No vertebral tenderness to percussion.    CARLOS: negative  SLR Test (seated and supine):negative to greater than 70 degrees bilaterally  Able to stand on heels and toes  Trendelenburg test: negative    Musculoskeletal Tests:    Elbow compression (ulnar): neg  Tinels at wrist: neg  Phalen: neg    Bilateral Upper and Lower Extremities:  Pulses are 2+ at radial, bilaterally.  Shoulder/Elbow/Wrist/Hand ROM wnl except limited full rom at right shoulder abduction  Hip/Knee/Ankle ROM   Bilateral Extremities show normal capillary refill.  No signs of cyanosis, rubor, edema, skin changes, or dysvascular changes of appendages.  Nails appear intact.    Neurological Exam:  Cranial Nerves:  II-XII grossly intact    Manual Muscle Testing: (Motor 5=normal)    RIGHT Upper extremity: Shoulder abduction 5/5, Biceps 5/5, Triceps 5/5, Wrist extension 5/5, Abductor pollicis brevis 5/5, Ulnar hand intrinsics 5/5,  LEFT Upper extremity: Shoulder abduction 5/5, Biceps 5/5, Triceps 5/5, Wrist extension 5/5, Abductor pollicis brevis 5/5, Ulnar hand intrinsics 5/5,  No focal atrophy is noted of either upper extremity.    Bilateral Reflexes:1+bic tric br  Siegel's response is absent bilaterally.    Sensation: tested to light touch  - intact in arms  Gait: Narrow base and good arm swing.      IMPRESSION/PLAN: This is a 44 y.o.  female with myofascial pain, rotator cuff tendonopathy, weakness     1. Cont HEP, added exercises for shoulder ROM, stretch, strength  2. Trigger pt injection today  3. Ice instead of heat, try topical agents   4. Stephen Yeager M.D.  Physical Medicine and Rehab      PROCEDURE NOTE    Diagnosis: Myofascial pain  Procedure: trigger point injections to right trapezius    Risks and benefits of procedure explained to patient including risks of infection, bleeding, pain, or damage to surrounding tissues. All questions answered. Informed consent obtained prior to proceeding. Areas  marked and prepped in sterile fashion. Using a 27g 1.25inch needle, a 3 cc mixture of depo medrol 1cc (40mg) and 1% lidocaine was injected evenly into the above mentioned muscles. None to minimal bleeding noted. ER and post injection instructions given.    Zandra Yeager M.D.

## 2019-12-18 NOTE — PATIENT INSTRUCTIONS
Myofascial Pain Syndrome: Fibrositis  Your pain is caused by a state of chronic muscle tension. This condition is called by various names: myofascial pain, fibrositis and trigger point pain. This can also be due to mechanical stress (such as working at a computer terminal for long periods; or work that requires repetitive motions of the arms or hands) or emotional stress (such as problems on the job or in your personal life). Sometimes there is no obvious cause. The pain can occur in the area of the muscle spasm or at a site distant to it. For example, spasm of a neck muscle can cause headache. Spasm of the muscle near the shoulder blade can cause pain shooting down the arm.  Home Care:  · Try to identify the factors that may be causing your problem and change them:  ¨ If you feel that emotional stress is a cause of your pain, learn methods to deal more effectively with the stress in your life. These may include regular exercise, muscle relaxation techniques, meditation or simply taking time out for yourself. Consult your doctor or go to a local bookstore and review the many books and tapes available on the subject of stress reduction.  ¨ If you feel that physical stress is a cause for your pain, try to modify any poor work habits.  · You may use acetaminophen (Tylenol) or ibuprofen (Motrin, Advil) to control pain, unless another medicine was prescribed. [NOTE: If you have chronic liver or kidney disease or ever had a stomach ulcer or GI bleeding, talk with your doctor before using these medicines.]  · The use of heat to the muscle (hot compress or heating pad) will be helpful to reduce muscle spasm. Some persons get relief with ice packs. Apply an ice pack (crushed or cubed ice in a plastic bag, wrapped in a towel) for 20 minutes at a time as needed. Use the method that feels best to you.  · Massaging the trigger point and stretching out the muscle are an important parts of prevention and treatment. Trigger point  massage can be done by first applying heat to the area to warm and prepare the muscle. Have someone apply steady thumb pressure directly on the knot in the muscle (the most tender point) for 30 seconds. Release the pressure, then massage the surrounding muscle. Repeat the process, applying more pressure to the trigger point each time. Do this up to the limit of pain. With each treatment, the trigger point should become less tender and the pain should decrease. You can apply local pressure to trigger points in the back by lying on the floor with a tennis ball under the trigger point.  Follow Up  with your doctor as advised or if not improving within the next week. It may be necessary for you to receive physical therapy if you do not respond to home treatment alone.  Get Prompt Medical Attention  if any of the following occur:  · If your trigger point is in the chest muscles, observe for pain that becomes more severe, lasts longer, or spreads into your shoulder/arm, neck or back; you develop trouble breathing, sweating, nausea or vomiting in association with chest pain  · If you develop weakness or numbness in an extremity  · If your pain worsens, regardless of its location  © 8492-8905 [employer]. [address] [place] [address]. All rights reserved. This information is not intended as a substitute for professional medical care. Always follow your healthcare professional's instructions.          Trigger Point Injection  The cause of your muscle pain or spasms may be one or more trigger points. Your health care provider may decide to inject the painful spots to relax the muscle. This can help relieve your pain. Relaxing the muscle can also make movement easier. You may then be able to exercise to strengthen the muscle and help it heal.    What is a trigger point?  A trigger point is a tight, painful knot of muscle fiber. It can form where a muscle is strained or injured. The knot can  sometimes be felt under the skin. A trigger point is very tender to the touch. Pain may also spread to other parts of the affected muscle. Muscles around a knee, shoulder blade, or other bones are prone to trigger points. This is because these muscles are more likely to be injured.    About the injections  Any muscle in the body can have one or more trigger points. Several injections may be needed in each trigger point to best relieve pain. These injections may be given in sessions about 2 weeks apart, depending on the preference of your health care provider. In some cases, you may not feel much change in your symptoms until after the third injection.     © 7309-5880 [employer]. [address] [place] [address]. All rights reserved. This information is not intended as a substitute for professional medical care. Always follow your healthcare professional's instructions.            Your Neck Muscles  The muscles in the neck and shoulders need to be strong to hold the neck and head in place. These muscles also help move the neck and shoulders. Your health care provider can recommend exercises to help stretch and strengthen your neck muscles.    © 3137-6802 [employer]. [address] [place] [address]. All rights reserved. This information is not intended as a substitute for professional medical care. Always follow your healthcare professional's instructions.          Neck Problems: Relieving Your Symptoms  The first goal of treatment is to relieve your symptoms. Your health care provider may recommend self-care treatments. These include resting, applying ice and heat, taking medication, and doing exercises. Your health care provider may also recommend that you see a physical therapist, who can teach you ways to care for and strengthen your neck.    Self-Care Treatments  Pain can end quickly or last awhile. Either way, youll want relief as soon as possible. Your health  care provider can tell you which treatments to do at home to help relieve your pain.  · Lying down for a short time takes pressure from the head off the neck.  · Ice and heat can help reduce pain. To bring down swelling, rest an ice pack wrapped in a thin towel on your neck for 15 minutes. To relax sore muscles, apply a warm, wet towel to the area. Or take a warm bath or shower.  · Over-the-counter medications, such as ibuprofen, naproxen, and aspirin, can help reduce pain and swelling. Acetaminophen can help relieve pain. Use these only as directed.  · Exercises can relax muscles and prevent stiffness. To prepare, drape a warm, wet towel around your neck and shoulders for 5 minutes. Remove the towel. Then do any exercises recommended to you by your health care provider.  Physical Therapy  If self-care treatments arent helping relieve neck pain, your health care provider may suggest one or more sessions of physical therapy. Physical therapy is performed by a specialist trained to treat injuries. Your physical therapist (PT) will teach you how to strengthen muscles, improve the spines alignment, and help you move properly. Treatment methods used in physical therapy may include:  · Heat. A special heating pad called a neck pack may be applied to your neck.  · Exercises. Your PT will teach you exercises to help strengthen your neck and improve its range of motion.  · Joint mobilization. The PT gently moves your vertebrae to help restore motion in your neck joints and reduce neck pain.  · Soft tissue mobilization. The PT massages and stretches the muscles in your neck and shoulders.  · Electrical stimulation. Electrical impulses are sent into your neck. This helps reduce soreness and inflammation.  · Education in body mechanics. The PT shows you ways to position and move your body that protect the neck.  Other Treatments  If physical therapy doesnt relieve your neck pain, your health care provider may suggest other  treatments. For example, medications or injections can help relieve pain and swelling. In some cases, surgery may be needed to treat neck problems.  © 3650-7289 [employer]. [address] [place] [address], [place] [address]. All rights reserved. This information is not intended as a substitute for professional medical care. Always follow your healthcare professional's instructions.          Understanding Neck Problems       If you suffer from neck pain, youre not alone. Many people have neck pain at some point in their lives. Problems such as poor posture, injury, and wear and tear can lead to neck pain. Your health care provider will work with you to find the treatment thats best for your neck.  Types of Neck Problems  The following problems can cause pain or injury in your neck:  · Strains and sprains: Strains (stretched or torn muscles) and sprains (stretched or torn ligaments) can cause neck pain. Strains and sprains can occur during an accident, or when you overuse your neck through repetitive motion. They can also cause your muscles and ligaments to become inflamed (swollen and painful).  · Whiplash and other injuries: Whiplash can result when an impact throws your head, forcing your neck too far forward (hyperflexion), then too far backward (hyperextension). When combined, the two motions can cause a painful injury to different parts of your neck, such as muscles, ligaments, or joints. The most common cause of whiplash is a car accident. But it can also happen during a fall or sports injury.  · Weakened disks: A simple action, such as a sneeze or a cough, can cause one of your disks to bulge (herniate). A herniated disk can put pressure on your nerve and cause pain. Over time, disks can also thin out (degenerate). Flattened disks dont cushion vertebrae well and can cause vertebrae to rub together. Rubbing vertebrae can pinch nerves and cause pain.  · Weakened joints: Aging and injury can cause joints  to slowly degenerate. Thinned joints can also cause vertebrae to rub together. This can cause abnormal growths of bone (bone spurs) to form on vertebrae. Bone spurs put pressure on nerves, causing pain.  Common Symptoms  If you have a neck problem, you may have one or more of the following symptoms:  · Muscle tension and spasm: You may not be able to move your neck, arms, or shoulders comfortably if you have muscle tension or stiffness in your neck. If your symptoms arent relieved, you may experience muscle spasms, or knots of contracted tissue (trigger points) in areas of your neck and shoulders.  · Aches and pains: Dull aches in your head or neck, sharp pains, and swelling of the soft tissue of your neck and shoulders are common symptoms. If theres pressure on the nerves in your neck, you may feel pain in your arms or hands (referred pain).  · Numbness or weakness: If you injure the nerves in your neck, you may experience numbness, tingling, or weakness in your shoulders, arms, or hands. These symptoms arise when disks or bone spurs press on the nerves in your neck.  © 4523-3049 [employer]. [address] [place] [address]. All rights reserved. This information is not intended as a substitute for professional medical care. Always follow your healthcare professional's instructions.          Neck Spasm [No Trauma]    Spasm of the neck muscles can occur after a sudden awkward neck movement. Sleeping with your neck in a crooked position can also cause spasm. Some persons respond to emotional stress by tensing the muscles of their neck, shoulders and upper back. If neck spasm lasts long enough, it can cause headache.  The treatment described below will usually help the pain to go away in 5-7 days. Pain that continues may require further evaluation or other types of treatment such as physical therapy.  Home Care:  1. Rest and relax the muscles. Use a comfortable pillow that supports the head  and keeps the spine in a neutral position. The position of the head should not be tilted forward or backward. A rolled up towel may help for a custom fit.  2. Some persons find relief with heat (hot shower, hot bath or heating pad) and massage, while others prefer cold packs (crushed or cubed ice in a plastic bag, wrapped in a towel). Try both and use the method that feels best for 20 minutes several times a day.  3. You may use acetaminophen (Tylenol) or ibuprofen (Motrin, Advil) to control pain, unless another medicine was prescribed. [NOTE: If you have chronic liver or kidney disease or ever had a stomach ulcer or GI bleeding, talk with your doctor before using these medicines.]  Follow Up  with your doctor or this facility if your symptoms do not show signs of improvement after one week. Physical therapy or further evaluation may be needed.  [NOTE: If x-rays were taken, they will be reviewed by a radiologist. You will be notified of any new findings that may affect your care.]  Return Promptly  or contact your doctor if any of the following occurs:  · Pain becomes worse or spreads into one or both arms  · Weakness or numbness in one or both arms  · Increasing headache with nausea or vomiting  · Fever over 100.4ºF (38.0ºC)  © 4107-9974 [employer]. [address] [place] [address], [place] [address]. All rights reserved. This information is not intended as a substitute for professional medical care. Always follow your healthcare professional's instructions.          Know Your Neck: The Cervical Spine  By learning about the parts of the neck, you can better understand your neck problem. The bones of the neck are called cervical vertebrae, commonly identified as C1 through C7. Together, they form a bony column called the spine. Vertebrae also protect the spinal cord, a pathway for messages to reach the brain. Surrounding the spine are soft tissues such as muscles, tendons, and nerves.        Flexibility Is  Key  For the neck to function normally, it has to be flexible enough to move without discomfort. A healthy neck can move easily in six different directions.    © 0332-6305 [employer]. [address] [place] [address], [place] [address]. All rights reserved. This information is not intended as a substitute for professional medical care. Always follow your healthcare professional's instructions.          Protecting Your Neck: Posture and Body Mechanics  Protecting your neck from injuries and pain involves practicing good posture and body mechanics. This may mean correcting bad habits you have related to the way you hold and move your body. The tips below can help you improve your posture and body mechanics.    What Is Posture and Why Does It Matter?  Posture is the way you hold your body. For many of us, this means hunching over, thrusting the chin forward, and slouching the shoulders. But this kind of poor posture keeps muscles from properly supporting the neck and puts stress on muscles, disks, ligaments, and joints in your neck. As a result, injury and pain can occur.  How Is Your Posture?  Use a full-length mirror to check your posture. To begin, stand normally. Then slowly back up against a wall. Is there space between your head and the wall? Do you slouch your shoulders? Is your chin pointing up or down? All these can cause neck pain and injury.  Improving Your Posture  Follow these steps to improve your posture:  · Pull your shoulders back.  · Think of the ears, shoulders, and hips as a series of dots. Now, adjust your body to connect the dots in a straight line.  · Keep your chin level.  What Are Body Mechanics and Why Do They Matter?  The way you move and position your body during daily activities is called body mechanics. Good body mechanics help protect the neck. This means learning the right ways to stand, sit, and even sleep. So do whats best for your neck and practice good body  mechanics.  Standing   To protect your neck while standing:  · Carry objects close to your body.  · Keep your ears and shoulders in a line while standing or walking.  · To lower yourself, bend at the knees with a straight back. Do this instead of looking down and reaching for objects.  · Work at eye level. Dont reach above your head or tilt your head back.  Sitting   To protect your neck while sitting:  · Set up your workstation so your monitor is at eye level. Also, use a document pond when viewing papers or books.  · Keep your knees at or slightly below the level of your hips.  · Sit up straight, with feet flat on the floor. If your feet dont touch the floor, use a footrest.  · Avoid sitting or driving for long periods. Take frequent breaks.  Sleeping   To protect your neck while sleeping:  · Sleep on your back with a pillow under your knees, or on your side with a pillow between bent knees. This helps align the spine.  · Avoid using pillows that are too high or too low. Instead, use a neck roll or pillow under your neck while you sleep to keep the neck straight.  · Sleep on a mattress that supports you, with a pillow under your neck.  © 3599-4235 [employer]. [address] [place] [address], [place] [address]. All rights reserved. This information is not intended as a substitute for professional medical care. Always follow your healthcare professional's instructions.          Exercises at Your Workstation: Eyes, Neck, and Head     Tired eyes? Stiff neck? A few easy moves can help prevent these kinds of problems. Take a few minutes during your day to do these exercises--right at your desk. They'll loosen up your muscles, keep you more alert, and make a big difference in how you work and feel.    For your eyes  Eye cup  · Lean forward with your elbows on your desk.  · Cup your hands and place them lightly over your closed eyes. Hold for a minute, while breathing deeply in and out.  · Slowly uncover and  open your eyes. Repeat 2 times.  Eye roll  · Close your eyes. Slowly roll your eyeballs clockwise all the way around. Repeat 3 times.  · Now slowly roll them all the way around counterclockwise. Repeat 3 times.  Eye rest  · Every 20 minutes, look away from the computer screen. Focus on an object at least 20 feet away. Stay focused on this object for a full 20 seconds.    For your neck and head  Warm-up  · Drop your head gently to your chest. While breathing in, slowly roll your head up to your left shoulder. While breathing out, slowly roll your head back to center. Repeat to the right.  · Repeat 3 times on each side.  Head tilt  · Sit up straight. Tuck in your chin.  · Slowly tip your head to the left. Return to the center. Then, tip your head to the right.  · Repeat 3 times on each side.    Head turn  · Sit up straight.  · Slowly turn your head and look over your left shoulder. Hold for a few seconds. Go back to the center, then repeat to your right.  · Repeat 3 times on each side.  © 3720-5778 [employer], [employer]. [address] [place], [place] [id number]. All rights reserved. This information is not intended as a substitute for professional medical care. Always follow your healthcare professional's instructions.          Reach and Hold Exercise    Do this exercise on your hands and knees. Keep your knees under your hips and your hands under your shoulders. Keep your spine in a neutral position (not arched or sagging). Keep your ears in line with your shoulders. Hold for a few seconds before starting the exercise:  4. Tighten your abdominal muscles and raise one arm straight in front of you, palm down. Hold for 5 seconds, then lower. Repeat 5 times.  5. Do the exercise again, this time lifting your arm to the side. Repeat 5 times.  6. Do the exercise again, this time lifting your arm backward, palm up. Repeat 5 times.  Switch sides and do each exercise with the other arm.  © 7986-0772 [employer],  [employer]. [address] [place] [address]. All rights reserved. This information is not intended as a substitute for professional medical care. Always follow your healthcare professional's instructions.        Shoulder and Upper Back Stretch  To start, stand tall with your ears, shoulders, and hips in line. Your feet should be slightly apart, positioned just under your hips. Focus your eyes directly in front of you.  this position for a few seconds before starting your exercise. This helps increase your awareness of proper posture.  Reach overhead and slightly back with both arms. Keep your shoulders and neck aligned and your elbows behind your shoulders:  · With your palms facing the ceiling, turn your fingers inward.  · Take a deep breath. Breathe out, and lower your elbows toward your buttocks. Hold for 5 seconds, then return to starting position.  · Repeat 3 times.    © 0393-2709 [employer]. [address] [place] [address]. All rights reserved. This information is not intended as a substitute for professional medical care. Always follow your healthcare professional's instructions.          Shoulder Clock Exercise  To start, stand tall with your ears, shoulders, and hips in line. Your feet should be slightly apart, positioned just under your hips. Focus your eyes directly in front of you.  this position for a few seconds before starting your exercise. This helps increase your awareness of proper posture.  · Imagine that your right shoulder is the center of a clock. With the outer point of your shoulder, roll it around to slowly trace the outer edge of the clock.  · Move clockwise first, then counterclockwise.  · Repeat 3 times. Switch shoulders.   © 6555-0796 [employer]. [address] [place] [address]. All rights reserved. This information is not intended as a substitute for professional medical care. Always follow your healthcare  professional's instructions.          Shoulder Girdle Stretch     To start, sit in a chair with your feet flat on the floor. Your weight should be slightly forward so that youre balanced evenly on your buttocks. Relax your shoulders and keep your head level. Using a chair with arms may help you keep your balance:  · Place 1 hand on the outside elbow of the other arm.  · Pull the arm across your body. Hold for 30 to 60 seconds. Repeat once.  · Switch sides.    © 3719-4423 [employer]. [address] [place] [address]. All rights reserved. This information is not intended as a substitute for professional medical care. Always follow your healthcare professional's instructions.          Shoulder Exercises      To start, sit in a chair with your feet flat on the floor. Your weight should be slightly forward so that youre balanced evenly on your buttocks. Relax your shoulders and keep your head level. Avoid arching your back or rounding your shoulders. Using a chair with arms may help you keep your balance.  · Raise your arms, elbows bent, to shoulder height.  · Slowly move your forearms together. Hold for 5 seconds.  · Return to starting position. Repeat 5 times.  © 5143-2096 [employer]. [address] [place] [address]. All rights reserved. This information is not intended as a substitute for professional medical care. Always follow your healthcare professional's instructions.        Shoulder Shrug Exercise  To start, sit in a chair with your feet flat on the floor. Shift your weight slightly forward to avoid rounding your back. Relax. Keep your ears, shoulders, and hips aligned:  · Raise both of your shoulders as high as you can, as if you were trying to touch them to your ears. Keep your head and neck still and relaxed.  · Hold for a count of 10. Release.  · Repeat 5 times.    © 2339-8864 [employer]. [address] [place] [address]. All rights  reserved. This information is not intended as a substitute for professional medical care. Always follow your healthcare professional's instructions.          Shoulder Squeeze Exercise     To start, sit in a chair with your feet flat on the floor. Shift your weight slightly forward to avoid rounding your back. Relax. Keep your ears, shoulders, and hips aligned:  · Raise your arms to shoulder height, elbows bent and palms forward.  · Move your arms back, squeezing your shoulder blades together.  · Hold for 10 seconds. Return to starting position.   · Repeat 5 times.     © 7122-3341 [employer]. [address] [place] [address]. All rights reserved. This information is not intended as a substitute for professional medical care. Always follow your healthcare professional's instructions.          Shoulder Exercises: Biceps Curl    This exercise stretches and strengthens your shoulders and arms. Before starting, read through all the instructions. During the exercise, breathe normally and use smooth movements. Stop if you feel any pain. If pain persists, call your healthcare provider.  · Hold a ____ pound weight in each hand, with your palms facing your body. Tuck your arms close to your sides.  · Bend your left elbow and raise the weight to your left shoulder. As you lower that weight, bend your right elbow and raise the weight to your right shoulder. Continue to alternate arms.  · Repeat ____ times. Do ____ sets ____ times a day.     CAUTION: Keep your arms close to your body throughout the exercise. Keep your wrists straight.   Date Last Reviewed: 8/16/2015  © 6642-0239 [employer]. [address] [place] [address]. All rights reserved. This information is not intended as a substitute for professional medical care. Always follow your healthcare professional's instructions.        Shoulder Exercises: External Rotation    Strengthening exercises help make your injured shoulder  more stable. To warm up, do flexibility (stretching) exercises first. Your healthcare provider will tell you what size hand weights to use for the strengthening exercise below. If you dont have hand weights, try using cans of soup instead:  · Lie on your uninjured side with your head supported by a pillow or your arm. Place a small rolled-up towel under your top elbow.  · Grasp a hand weight with your top hand and bend that arm to a right angle, resting your forearm against your stomach.  · Keeping your elbow against the towel, slowly lift the weight until your forearm is slightly higher than your elbow. Return to the starting position. Repeat.  · Work up to 5 to 15 lifts.  Date Last Reviewed: 8/16/2015  © 1331-3746 [employer]. [address] [place] [address]. All rights reserved. This information is not intended as a substitute for professional medical care. Always follow your healthcare professional's instructions.        Shoulder Exercises: Internal Rotation    Strengthening exercises help make your injured shoulder more stable. To warm up, do flexibility (stretching) exercises first. Your healthcare provider will tell you what size hand weights to use for the strengthening exercise below. If you dont have hand weights, try using cans of soup instead:  · With knees bent, lie on a firm surface. Using the hand on the same side as your injured shoulder, grasp a weight. Bend that arm to a right angle (90 degrees).  · Rest your elbow on the floor.  · Keeping your elbow next to your side, lower your forearm toward the floor, away from your body. Do not lower your hand all the way to the floor.  · Slowly return your forearm to your side. Repeat.  · Work up to 5 to 15 lifts.     Note: Support your head and neck with a pillow.   Date Last Reviewed: 9/30/2015  © 3515-7066 [employer]. [address] [place] [address]. All rights reserved. This information is not intended as  a substitute for professional medical care. Always follow your healthcare professional's instructions.        Shoulder Exercises: Shoulder Press    This exercise stretches and strengthens your shoulders. Before starting, read through all the instructions. During the exercise, breathe normally and use smooth movements. Stop if you feel any pain. If pain persists, call your healthcare provider.  · Hold a ____ pound weight in each hand, elbows at shoulder level, palms facing forward. Arms to the side and slightly forward.   · Raise one arm up until its almost straight. Hold for a second. Lower the weight, extending the other arm up.  · Repeat ____ times with each arm. Do ____ sets ____ times a day.  CAUTION: If you have shoulder problems, consult your health care provider before doing this exercise. Keep your head and body still during the exercise. Only your arms should move.   Date Last Reviewed: 8/16/2015  © 9098-6066 [employer]. [address] [place] [address]. All rights reserved. This information is not intended as a substitute for professional medical care. Always follow your healthcare professional's instructions.        Shoulder Exercises: Side Raise    This exercise stretches and strengthens your shoulders. Before starting, read through all the instructions. During the exercise, breathe normally and use smooth movements. Stop if you feel any pain. If pain persists, call your healthcare provider.  · Stand straight, holding a ____ pound weight in each hand, arms at sides, feet shoulder-width apart.  · Slowly extend your arms up and out until weights are at shoulder level. Slowly return to starting position.  · Repeat ____ times. Do ____ sets ____ times a day.     CAUTION: Dont swing the weights or raise weights above shoulder level.   Date Last Reviewed: 8/16/2015  © 9479-6532 [employer]. [address] [place] [address]. All rights reserved. This information  is not intended as a substitute for professional medical care. Always follow your healthcare professional's instructions.        Shoulder Exercises: Triceps Press    This exercise stretches and strengthens your shoulders. Before starting, read through all the instructions. During the exercise, breathe normally and use smooth movements. Stop if you feel any pain. If pain persists, call your healthcare provider.  · Grasp a ___ pound  weight in each hand. Raise one arm overhead. Hold that arm close to your ear. Bend your elbow and lower the weight behind your head, as far as you can, being careful not to hit your head.   · Slowly straighten your elbow, extending your arm upward. Return to starting position.  · Repeat ____ times with each arm. Do ____ sets ____ times a day.  CAUTION: Keep your head still and neck straight. Keep your arm close to your ear. Dont arch your back.   Date Last Reviewed: 8/16/2015  © 8905-3587 [employer]. [address] [place] [address]. All rights reserved. This information is not intended as a substitute for professional medical care. Always follow your healthcare professional's instructions.        Shoulder Exercises: Wall Pushup    Strengthening exercises help make your injured shoulder more stable by making the muscles that support your shoulder stronger. To warm up, do flexibility (stretching) exercises first.  · With feet and hands shoulder-width apart, place your palms on the wall, standing about an arms length away.  · Keeping your knees straight and heels on the floor, bend your elbows and lean forward as far as you comfortably can. Your elbows should be pointing downward. Then push away from the wall to the starting position. Repeat.  · Work up to 15 wall pushups.     Note: Wear shoes that keep you from slipping.   Date Last Reviewed: 9/3/2015  © 5783-8471 [employer]. [address] [place] [address]. All rights reserved. This  information is not intended as a substitute for professional medical care. Always follow your healthcare professional's instructions.

## 2019-12-20 ENCOUNTER — OFFICE VISIT (OUTPATIENT)
Dept: GASTROENTEROLOGY | Facility: CLINIC | Age: 44
End: 2019-12-20
Payer: MEDICAID

## 2019-12-20 VITALS
HEART RATE: 104 BPM | OXYGEN SATURATION: 99 % | HEIGHT: 70 IN | DIASTOLIC BLOOD PRESSURE: 66 MMHG | SYSTOLIC BLOOD PRESSURE: 94 MMHG | WEIGHT: 211.88 LBS | BODY MASS INDEX: 30.33 KG/M2

## 2019-12-20 DIAGNOSIS — R19.7 DIARRHEA, UNSPECIFIED TYPE: ICD-10-CM

## 2019-12-20 DIAGNOSIS — R10.13 EPIGASTRIC PAIN: ICD-10-CM

## 2019-12-20 DIAGNOSIS — K31.84 DIABETIC GASTROPARESIS: Primary | ICD-10-CM

## 2019-12-20 DIAGNOSIS — K58.0 IRRITABLE BOWEL SYNDROME WITH DIARRHEA: ICD-10-CM

## 2019-12-20 DIAGNOSIS — E11.43 DIABETIC GASTROPARESIS: Primary | ICD-10-CM

## 2019-12-20 PROCEDURE — 99213 PR OFFICE/OUTPT VISIT, EST, LEVL III, 20-29 MIN: ICD-10-PCS | Mod: S$PBB,,, | Performed by: PHYSICIAN ASSISTANT

## 2019-12-20 PROCEDURE — 99999 PR PBB SHADOW E&M-EST. PATIENT-LVL V: CPT | Mod: PBBFAC,,, | Performed by: PHYSICIAN ASSISTANT

## 2019-12-20 PROCEDURE — 99215 OFFICE O/P EST HI 40 MIN: CPT | Mod: PBBFAC | Performed by: PHYSICIAN ASSISTANT

## 2019-12-20 PROCEDURE — 99999 PR PBB SHADOW E&M-EST. PATIENT-LVL V: ICD-10-PCS | Mod: PBBFAC,,, | Performed by: PHYSICIAN ASSISTANT

## 2019-12-20 PROCEDURE — 99213 OFFICE O/P EST LOW 20 MIN: CPT | Mod: S$PBB,,, | Performed by: PHYSICIAN ASSISTANT

## 2019-12-20 RX ORDER — METOCLOPRAMIDE 10 MG/1
10 TABLET ORAL
Qty: 60 TABLET | Refills: 2 | Status: SHIPPED | OUTPATIENT
Start: 2019-12-20 | End: 2020-01-24 | Stop reason: SDUPTHER

## 2019-12-20 NOTE — PROGRESS NOTES
"Subjective:      Patient ID: Jenifer Westfall is a 44 y.o. female.    Chief Complaint: Nausea (no appetite) and Diarrhea (with abdomen pain)    HPI  The patient has a history of epigastric pain, vomiting, nausea, IBS with diarrhea, GERD and diabetes. Last visit, she was prescribed a trial of Reglan for the working diagnosis of diabetic gastroparesis. She said it helped but symptoms returned. However, she never followed back up. Today she complains of sour stomach which has been getting worse lately. She gets full with three crackers and mostly tolerating liquids. She has lost ten pounds since last visit. She denies vomiting. A1C has decreased significantly and currently 8.5. She is taking Bentyl as needed for cramping with some relief. She is still having issues with diarrhea. She said it was "severe" yesterday with a stool count of 3. She is still taking Colestipol, but is also taking Pepto and Imodium PRN. She denies sick contacts. She was on antibiotics in the last week or two. The diarrhea doesn't affect her sleep.     Review of Systems  As per HPI.     Objective:     Physical Exam   Constitutional: She is oriented to person, place, and time. She appears well-developed and well-nourished. No distress.   HENT:   Head: Normocephalic and atraumatic.   Eyes: EOM are normal.   Cardiovascular: Normal rate and regular rhythm.   Pulmonary/Chest: Effort normal and breath sounds normal. No respiratory distress. She has no wheezes.   Abdominal: Soft. Bowel sounds are normal. She exhibits no distension. There is tenderness in the epigastric area.   Neurological: She is alert and oriented to person, place, and time. No cranial nerve deficit.   Skin: She is not diaphoretic.   Psychiatric: Her behavior is normal.       Assessment:     1. Diabetic gastroparesis    2. Irritable bowel syndrome with diarrhea    3. Diarrhea, unspecified type    4. Epigastric pain        Plan:     Restart Reglan since this seemed to help. Discussed " dosing and possible side effects.   Continue other medications.     Orders Placed This Encounter   Procedures    Clostridium difficile EIA     Medications Ordered This Encounter   Medications    metoclopramide HCl (REGLAN) 10 MG tablet     Sig: Take 1 tablet (10 mg total) by mouth 2 (two) times daily before meals.     Dispense:  60 tablet     Refill:  2         Follow up in about 4 weeks (around 1/17/2020).    Thank you for the opportunity to participate in the care of this patient.   Lizandro Larson PA-C.

## 2019-12-24 ENCOUNTER — TELEPHONE (OUTPATIENT)
Dept: OBSTETRICS AND GYNECOLOGY | Facility: CLINIC | Age: 44
End: 2019-12-24

## 2019-12-24 NOTE — TELEPHONE ENCOUNTER
----- Message from Caitlin Delgado sent at 12/24/2019  9:50 AM CST -----  Contact: [phone]  Would like to consult with the nurse, Patient would like to schedule an Appt to get her Depo Shot, Patient would like to speak with the nurse as soon as possible , Please call back at  [phone], Thanks sj

## 2019-12-24 NOTE — TELEPHONE ENCOUNTER
Patient stated that she needed to schedule her next injection, she states that the last one was given on 10/10/2019 when she was here for her colposcopy.  There is no documentation on this injection, I informed the patient that I would talk to Dr Peralta to see what he would recommend.  I talked with Dr Peralta, he stated that patient can have her next injection when next one is due.  I scheduled patient for 01/07/2019 at 1:30pm.  She voiced understanding of the time, date and location.

## 2020-01-02 ENCOUNTER — LAB VISIT (OUTPATIENT)
Dept: LAB | Facility: HOSPITAL | Age: 45
End: 2020-01-02
Attending: PHYSICIAN ASSISTANT
Payer: MEDICAID

## 2020-01-02 DIAGNOSIS — R19.7 DIARRHEA, UNSPECIFIED TYPE: ICD-10-CM

## 2020-01-02 PROCEDURE — 87324 CLOSTRIDIUM AG IA: CPT

## 2020-01-02 PROCEDURE — 87449 NOS EACH ORGANISM AG IA: CPT

## 2020-01-03 LAB
C DIFF GDH STL QL: NEGATIVE
C DIFF TOX A+B STL QL IA: NEGATIVE

## 2020-01-07 ENCOUNTER — CLINICAL SUPPORT (OUTPATIENT)
Dept: OBSTETRICS AND GYNECOLOGY | Facility: CLINIC | Age: 45
End: 2020-01-07
Payer: MEDICAID

## 2020-01-07 PROCEDURE — 99999 PR PBB SHADOW E&M-EST. PATIENT-LVL I: CPT | Mod: PBBFAC,,,

## 2020-01-07 PROCEDURE — 99999 PR PBB SHADOW E&M-EST. PATIENT-LVL I: ICD-10-PCS | Mod: PBBFAC,,,

## 2020-01-07 PROCEDURE — 99211 OFF/OP EST MAY X REQ PHY/QHP: CPT | Mod: PBBFAC

## 2020-01-10 RX ORDER — FAMOTIDINE 20 MG/1
TABLET, FILM COATED ORAL
Qty: 60 TABLET | Refills: 11 | Status: SHIPPED | OUTPATIENT
Start: 2020-01-10 | End: 2021-03-02

## 2020-01-14 DIAGNOSIS — K52.9 CHRONIC DIARRHEA: ICD-10-CM

## 2020-01-15 RX ORDER — MONTELUKAST SODIUM 4 MG/1
TABLET, CHEWABLE ORAL
Qty: 180 TABLET | Refills: 5 | Status: SHIPPED | OUTPATIENT
Start: 2020-01-15 | End: 2021-05-03

## 2020-01-24 ENCOUNTER — OFFICE VISIT (OUTPATIENT)
Dept: GASTROENTEROLOGY | Facility: CLINIC | Age: 45
End: 2020-01-24
Payer: MEDICAID

## 2020-01-24 VITALS
BODY MASS INDEX: 29.63 KG/M2 | HEART RATE: 92 BPM | HEIGHT: 71 IN | SYSTOLIC BLOOD PRESSURE: 130 MMHG | WEIGHT: 211.63 LBS | DIASTOLIC BLOOD PRESSURE: 80 MMHG

## 2020-01-24 DIAGNOSIS — E11.43 DIABETIC GASTROPARESIS: ICD-10-CM

## 2020-01-24 DIAGNOSIS — R19.7 DIARRHEA, UNSPECIFIED TYPE: Primary | ICD-10-CM

## 2020-01-24 DIAGNOSIS — K31.84 DIABETIC GASTROPARESIS: ICD-10-CM

## 2020-01-24 PROCEDURE — 99213 PR OFFICE/OUTPT VISIT, EST, LEVL III, 20-29 MIN: ICD-10-PCS | Mod: S$PBB,,, | Performed by: PHYSICIAN ASSISTANT

## 2020-01-24 PROCEDURE — 99213 OFFICE O/P EST LOW 20 MIN: CPT | Mod: S$PBB,,, | Performed by: PHYSICIAN ASSISTANT

## 2020-01-24 PROCEDURE — 99999 PR PBB SHADOW E&M-EST. PATIENT-LVL V: ICD-10-PCS | Mod: PBBFAC,,, | Performed by: PHYSICIAN ASSISTANT

## 2020-01-24 PROCEDURE — 99999 PR PBB SHADOW E&M-EST. PATIENT-LVL V: CPT | Mod: PBBFAC,,, | Performed by: PHYSICIAN ASSISTANT

## 2020-01-24 PROCEDURE — 99215 OFFICE O/P EST HI 40 MIN: CPT | Mod: PBBFAC | Performed by: PHYSICIAN ASSISTANT

## 2020-01-24 RX ORDER — POLYETHYLENE GLYCOL 3350 17 G/17G
POWDER, FOR SOLUTION ORAL
Qty: 255 G | Refills: 0 | Status: SHIPPED | OUTPATIENT
Start: 2020-01-24 | End: 2020-06-18 | Stop reason: ALTCHOICE

## 2020-01-24 RX ORDER — METOCLOPRAMIDE 10 MG/1
10 TABLET ORAL
Qty: 60 TABLET | Refills: 2 | Status: SHIPPED | OUTPATIENT
Start: 2020-01-24 | End: 2020-01-24

## 2020-01-24 RX ORDER — METOCLOPRAMIDE 10 MG/1
10 TABLET ORAL
Qty: 60 TABLET | Refills: 2 | Status: SHIPPED | OUTPATIENT
Start: 2020-01-24 | End: 2020-07-29

## 2020-01-24 RX ORDER — SODIUM, POTASSIUM,MAG SULFATES 17.5-3.13G
SOLUTION, RECONSTITUTED, ORAL ORAL
Qty: 354 ML | Refills: 0 | Status: SHIPPED | OUTPATIENT
Start: 2020-01-24 | End: 2020-01-24

## 2020-01-24 NOTE — PROGRESS NOTES
Subjective:      Patient ID: Jenifer Westfall is a 44 y.o. female.    Chief Complaint: Follow-up    HPI  The patient has a history of epigastric pain, vomiting, nausea, IBS with diarrhea, GERD and diabetes. She was put on a trial of Reglan last visit and is here today for a follow up. She reports taking Reglan bid. She is able to tolerate more solid foods. Her A!C has improved. However, she continues to have issues with sour stomach and diarrhea. She is still taking Colestipol, but is also taking Pepto and Imodium PRN. Bentyl helps with stomach cramps but sometimes makes her sleepy. She takes it at least once a day. A few weeks ago, she had a renal protocol CT scan at [facility]. There were no GI findings to account for her symptoms.   She has been having right should pain     Review of Systems  As per HPI.     Objective:     Physical Exam   Constitutional: She is oriented to person, place, and time. She appears well-developed and well-nourished. No distress.   HENT:   Head: Normocephalic and atraumatic.   Eyes: EOM are normal.   Pulmonary/Chest: Effort normal. No respiratory distress.   Neurological: She is alert and oriented to person, place, and time. No cranial nerve deficit.   Psychiatric: Her behavior is normal.       Assessment:     1. Diarrhea, unspecified type    2. Diabetic gastroparesis        Plan:     Her gastroparesis seems better controlled, but she is still bothered by persistent diarrhea.   I will recommend Colonoscopy with biopsies.   Continue Reglan bid.   Miralax bowel prep.     Medications Ordered This Encounter   Medications    metoclopramide HCl (REGLAN) 10 MG tablet     Sig: Take 1 tablet (10 mg total) by mouth 2 (two) times daily before meals.     Dispense:  60 tablet     Refill:  2    polyethylene glycol (GLYCOLAX) 17 gram/dose powder     Sig: Use as directed.     Dispense:  255 g     Refill:  0       Follow up in about 6 weeks (around 3/6/2020).    Thank you for the opportunity to participate in  the care of this patient.   Lizandro Larson PA-C.

## 2020-01-28 RX ORDER — CLOPIDOGREL BISULFATE 75 MG/1
75 TABLET ORAL DAILY
Qty: 90 TABLET | Refills: 2 | Status: SHIPPED | OUTPATIENT
Start: 2020-01-28 | End: 2021-02-02

## 2020-02-13 ENCOUNTER — TELEPHONE (OUTPATIENT)
Dept: GASTROENTEROLOGY | Facility: CLINIC | Age: 45
End: 2020-02-13

## 2020-02-13 ENCOUNTER — DOCUMENTATION ONLY (OUTPATIENT)
Dept: GASTROENTEROLOGY | Facility: CLINIC | Age: 45
End: 2020-02-13

## 2020-02-13 NOTE — TELEPHONE ENCOUNTER
----- Message from Stefanie Sandoval sent at 2/13/2020  1:35 PM CST -----  Contact: patient  Calling concerning needing a prior authorization to get Pro-tunic for patient. States they faxed over the request and no respond. Please call patient @ [phone] when sent to pharmacy.      [facility] [address]  [address]  [facility] [address]  Phone: [phone] Fax: [phone]

## 2020-02-13 NOTE — PROGRESS NOTES
Prior auth done for pantoprazole 40 mg, one capsule daily, approved for 02/13/20 through 02/12/2021.

## 2020-02-13 NOTE — TELEPHONE ENCOUNTER
Spoke to [facility], Tita. Prior auth done for the pantoprazole 40 mg, approved. Tita states she will re-run it and see if it will go through.

## 2020-03-04 ENCOUNTER — TELEPHONE (OUTPATIENT)
Dept: GASTROENTEROLOGY | Facility: CLINIC | Age: 45
End: 2020-03-04

## 2020-03-04 NOTE — TELEPHONE ENCOUNTER
Pt stated she is having severe watery diarrhea more than 5 times per day x 3 days. She is able to eat and drink and is taking Pepto Bismol and Imodium but neither is working. Please advise if there is another medication she can take.

## 2020-03-05 ENCOUNTER — TELEPHONE (OUTPATIENT)
Dept: GASTROENTEROLOGY | Facility: CLINIC | Age: 45
End: 2020-03-05

## 2020-03-05 NOTE — TELEPHONE ENCOUNTER
----- Message from Luci Triana sent at 3/5/2020  2:33 PM CST -----  Contact: Patient   Jenifer would like a call back at [phone], Regards to she is still waiting on a call back at about what to do for her diarrhea.    Thanks  Td

## 2020-03-05 NOTE — TELEPHONE ENCOUNTER
Note      There isn't anything else to call in. Has anyone around her had diarrhea? Any new medications? Any recent antibiotic use? If continued symptoms, she needs to see us or she an go to [facility].   Lizandro

## 2020-03-05 NOTE — TELEPHONE ENCOUNTER
There isn't anything else to call in. Has anyone around her had diarrhea? Any new medications? Any recent antibiotic use? If continued symptoms, she needs to see us or she an go to [facility].   Lizandro

## 2020-03-05 NOTE — TELEPHONE ENCOUNTER
Notified pt of recommendations. Pt verbalized understanding. She states she has a colonoscopy scheduled for 03/24/20. offered to get her in sooner for colonoscopy but pt is on blood thinners and needs appt to be a few weeks out in order to hold her meds.   No other appts available, kept appt for 03/24/20.  Pt verbalized understanding.

## 2020-03-09 NOTE — TELEPHONE ENCOUNTER
Surely we can given her an earlier colonoscopy appt. ??  Also, make sure she is taking colestipol as prescribed.   Lizandro

## 2020-03-09 NOTE — TELEPHONE ENCOUNTER
Spoke to pt and offered other appts but none would work for the pt. Will keep her appt that is already scheduled.

## 2020-03-30 RX ORDER — DICYCLOMINE HYDROCHLORIDE 20 MG/1
TABLET ORAL
Qty: 120 TABLET | Refills: 2 | Status: SHIPPED | OUTPATIENT
Start: 2020-03-30 | End: 2020-08-04

## 2020-03-31 ENCOUNTER — TELEPHONE (OUTPATIENT)
Dept: RHEUMATOLOGY | Facility: CLINIC | Age: 45
End: 2020-03-31

## 2020-03-31 DIAGNOSIS — G89.4 CHRONIC PAIN DISORDER: ICD-10-CM

## 2020-03-31 DIAGNOSIS — M54.32 SCIATICA OF LEFT SIDE: ICD-10-CM

## 2020-03-31 NOTE — TELEPHONE ENCOUNTER
Returned call to pt, states [facility]. transferred her back to main line.

## 2020-03-31 NOTE — TELEPHONE ENCOUNTER
----- Message from Tish Boss sent at 3/31/2020 11:34 AM CDT -----  Contact: Pt   Pt called and stated she was returning a call to the nurse. She can be reached at [phone] (home)     Thanks,  TF

## 2020-04-01 RX ORDER — TIZANIDINE 4 MG/1
TABLET ORAL
Qty: 90 TABLET | Refills: 1 | Status: SHIPPED | OUTPATIENT
Start: 2020-04-01 | End: 2020-06-01 | Stop reason: SDUPTHER

## 2020-04-03 ENCOUNTER — TELEPHONE (OUTPATIENT)
Dept: OBSTETRICS AND GYNECOLOGY | Facility: CLINIC | Age: 45
End: 2020-04-03

## 2020-04-03 NOTE — TELEPHONE ENCOUNTER
Patient stated that she has a stomach virus and can not make her appointment this morning.  I rescheduled patient for 04/08 at 10:30am.  She voiced understanding of the time date and location.

## 2020-04-03 NOTE — TELEPHONE ENCOUNTER
----- Message from Leela Gomez sent at 4/3/2020  9:40 AM CDT -----  Contact: self  Pt is requesting a call back to reschedule appt due to having a stomach bug. Please call pt back at [phone]

## 2020-04-08 ENCOUNTER — CLINICAL SUPPORT (OUTPATIENT)
Dept: OBSTETRICS AND GYNECOLOGY | Facility: CLINIC | Age: 45
End: 2020-04-08
Payer: MEDICAID

## 2020-04-08 DIAGNOSIS — Z30.42 ENCOUNTER FOR SURVEILLANCE OF INJECTABLE CONTRACEPTIVE: Primary | ICD-10-CM

## 2020-04-08 PROCEDURE — 99213 OFFICE O/P EST LOW 20 MIN: CPT | Mod: PBBFAC,25

## 2020-04-08 PROCEDURE — 99999 PR PBB SHADOW E&M-EST. PATIENT-LVL III: ICD-10-PCS | Mod: PBBFAC,,,

## 2020-04-08 PROCEDURE — 99999 PR PBB SHADOW E&M-EST. PATIENT-LVL III: CPT | Mod: PBBFAC,,,

## 2020-04-08 PROCEDURE — 96372 THER/PROPH/DIAG INJ SC/IM: CPT | Mod: PBBFAC

## 2020-04-08 RX ADMIN — MEDROXYPROGESTERONE ACETATE 150 MG: 150 INJECTION, SUSPENSION, EXTENDED RELEASE INTRAMUSCULAR at 10:04

## 2020-04-08 NOTE — PROGRESS NOTES
Depo provera 150mg given IM to left ventrogluteal.  Pt tolerated well.  Pt advised to wait 10-15 minutes for s/s of medication reaction.  Due to COVID19 precautions, pt to wait in her car in the parking lot.  Appt made for next injection on 6/24/2020 at 11am at [facility], per pt request.  Pt voiced understanding.  MARY CASTILLO

## 2020-04-18 DIAGNOSIS — M54.32 SCIATICA OF LEFT SIDE: ICD-10-CM

## 2020-04-20 RX ORDER — PANTOPRAZOLE SODIUM 40 MG/1
TABLET, DELAYED RELEASE ORAL
Qty: 30 TABLET | Refills: 11 | Status: SHIPPED | OUTPATIENT
Start: 2020-04-20 | End: 2021-04-28

## 2020-04-22 RX ORDER — GABAPENTIN 400 MG/1
CAPSULE ORAL
Qty: 270 CAPSULE | Refills: 3 | Status: SHIPPED | OUTPATIENT
Start: 2020-04-22 | End: 2021-02-24

## 2020-05-11 ENCOUNTER — TELEPHONE (OUTPATIENT)
Dept: ENDOSCOPY | Facility: HOSPITAL | Age: 45
End: 2020-05-11

## 2020-05-11 NOTE — TELEPHONE ENCOUNTER
Attempted to reschedule procedure cancelled d/t covid-19 with patient, no answer.  Left message to call office, number provided.

## 2020-05-20 RX ORDER — ONDANSETRON 4 MG/1
4 TABLET, FILM COATED ORAL 2 TIMES DAILY
Qty: 20 TABLET | Refills: 0 | Status: SHIPPED | OUTPATIENT
Start: 2020-05-20 | End: 2021-12-08

## 2020-05-21 ENCOUNTER — TELEPHONE (OUTPATIENT)
Dept: PHYSICAL MEDICINE AND REHAB | Facility: CLINIC | Age: 45
End: 2020-05-21

## 2020-05-21 ENCOUNTER — TELEPHONE (OUTPATIENT)
Dept: ORTHOPEDICS | Facility: CLINIC | Age: 45
End: 2020-05-21

## 2020-05-22 ENCOUNTER — TELEPHONE (OUTPATIENT)
Dept: ENDOSCOPY | Facility: HOSPITAL | Age: 45
End: 2020-05-22

## 2020-05-28 ENCOUNTER — OFFICE VISIT (OUTPATIENT)
Dept: PHYSICAL MEDICINE AND REHAB | Facility: CLINIC | Age: 45
End: 2020-05-28
Payer: MEDICAID

## 2020-05-28 VITALS — HEART RATE: 99 BPM | SYSTOLIC BLOOD PRESSURE: 125 MMHG | DIASTOLIC BLOOD PRESSURE: 68 MMHG

## 2020-05-28 DIAGNOSIS — M79.18 DIFFUSE MYOFASCIAL PAIN SYNDROME: Primary | ICD-10-CM

## 2020-05-28 DIAGNOSIS — M53.82 MUSCULOSKELETAL DISORDER OF THE SUBOCCIPITAL: ICD-10-CM

## 2020-05-28 PROCEDURE — 99214 PR OFFICE/OUTPT VISIT, EST, LEVL IV, 30-39 MIN: ICD-10-PCS | Mod: 25,S$PBB,, | Performed by: PHYSICAL MEDICINE & REHABILITATION

## 2020-05-28 PROCEDURE — 20552 NJX 1/MLT TRIGGER POINT 1/2: CPT | Mod: S$PBB,,, | Performed by: PHYSICAL MEDICINE & REHABILITATION

## 2020-05-28 PROCEDURE — 99213 OFFICE O/P EST LOW 20 MIN: CPT | Mod: PBBFAC,25 | Performed by: PHYSICAL MEDICINE & REHABILITATION

## 2020-05-28 PROCEDURE — 99999 PR PBB SHADOW E&M-EST. PATIENT-LVL III: CPT | Mod: PBBFAC,,, | Performed by: PHYSICAL MEDICINE & REHABILITATION

## 2020-05-28 PROCEDURE — 99999 PR PBB SHADOW E&M-EST. PATIENT-LVL III: ICD-10-PCS | Mod: PBBFAC,,, | Performed by: PHYSICAL MEDICINE & REHABILITATION

## 2020-05-28 PROCEDURE — 99214 OFFICE O/P EST MOD 30 MIN: CPT | Mod: 25,S$PBB,, | Performed by: PHYSICAL MEDICINE & REHABILITATION

## 2020-05-28 PROCEDURE — 20552 PR INJECT TRIGGER POINT, 1 OR 2: ICD-10-PCS | Mod: S$PBB,,, | Performed by: PHYSICAL MEDICINE & REHABILITATION

## 2020-05-28 PROCEDURE — 20552 NJX 1/MLT TRIGGER POINT 1/2: CPT | Mod: PBBFAC | Performed by: PHYSICAL MEDICINE & REHABILITATION

## 2020-05-28 RX ORDER — METHYLPREDNISOLONE ACETATE 40 MG/ML
40 INJECTION, SUSPENSION INTRA-ARTICULAR; INTRALESIONAL; INTRAMUSCULAR; SOFT TISSUE
Status: COMPLETED | OUTPATIENT
Start: 2020-05-28 | End: 2020-05-28

## 2020-05-28 RX ADMIN — METHYLPREDNISOLONE ACETATE 40 MG: 40 INJECTION, SUSPENSION INTRA-ARTICULAR; INTRALESIONAL; INTRAMUSCULAR; SOFT TISSUE at 01:05

## 2020-05-28 NOTE — PATIENT INSTRUCTIONS
Miguel Barrera (Posture and Strength)    1. Sit in a chair with your feet flat on the floor, or stand up. Relax your shoulders.  2. Look straight ahead. Gently glide your chin straight back. Its a small movement. Dont tilt your head up or down, or bend your neck forward.  3. Hold for 5 seconds. Then relax.  4. Repeat 5 times, or as instructed.     Tip: Dont arch your back or hunch your shoulders.   Date Last Reviewed: 5/1/2016 © 2000-2017 [employer]. [address] [place] [address]. All rights reserved. This information is not intended as a substitute for professional medical care. Always follow your healthcare professional's instructions.        Myofascial Pain Syndrome: Fibrositis  Your pain is caused by a state of chronic muscle tension. This condition is called by various names: myofascial pain, fibrositis and trigger point pain. This can also be due to mechanical stress (such as working at a computer terminal for long periods; or work that requires repetitive motions of the arms or hands) or emotional stress (such as problems on the job or in your personal life). Sometimes there is no obvious cause. The pain can occur in the area of the muscle spasm or at a site distant to it. For example, spasm of a neck muscle can cause headache. Spasm of the muscle near the shoulder blade can cause pain shooting down the arm.  Home Care:  · Try to identify the factors that may be causing your problem and change them:  ¨ If you feel that emotional stress is a cause of your pain, learn methods to deal more effectively with the stress in your life. These may include regular exercise, muscle relaxation techniques, meditation or simply taking time out for yourself. Consult your doctor or go to a local bookstore and review the many books and tapes available on the subject of stress reduction.  ¨ If you feel that physical stress is a cause for your pain, try to modify any poor work habits.  · You may use  acetaminophen (Tylenol) or ibuprofen (Motrin, Advil) to control pain, unless another medicine was prescribed. [NOTE: If you have chronic liver or kidney disease or ever had a stomach ulcer or GI bleeding, talk with your doctor before using these medicines.]  · The use of heat to the muscle (hot compress or heating pad) will be helpful to reduce muscle spasm. Some persons get relief with ice packs. Apply an ice pack (crushed or cubed ice in a plastic bag, wrapped in a towel) for 20 minutes at a time as needed. Use the method that feels best to you.  · Massaging the trigger point and stretching out the muscle are an important parts of prevention and treatment. Trigger point massage can be done by first applying heat to the area to warm and prepare the muscle. Have someone apply steady thumb pressure directly on the knot in the muscle (the most tender point) for 30 seconds. Release the pressure, then massage the surrounding muscle. Repeat the process, applying more pressure to the trigger point each time. Do this up to the limit of pain. With each treatment, the trigger point should become less tender and the pain should decrease. You can apply local pressure to trigger points in the back by lying on the floor with a tennis ball under the trigger point.  Follow Up  with your doctor as advised or if not improving within the next week. It may be necessary for you to receive physical therapy if you do not respond to home treatment alone.  Get Prompt Medical Attention  if any of the following occur:  · If your trigger point is in the chest muscles, observe for pain that becomes more severe, lasts longer, or spreads into your shoulder/arm, neck or back; you develop trouble breathing, sweating, nausea or vomiting in association with chest pain  · If you develop weakness or numbness in an extremity  · If your pain worsens, regardless of its location  © 4769-0099 [employer]. [address] [place] [address], [place]  [address]. All rights reserved. This information is not intended as a substitute for professional medical care. Always follow your healthcare professional's instructions.          Trigger Point Injection  The cause of your muscle pain or spasms may be one or more trigger points. Your health care provider may decide to inject the painful spots to relax the muscle. This can help relieve your pain. Relaxing the muscle can also make movement easier. You may then be able to exercise to strengthen the muscle and help it heal.    What is a trigger point?  A trigger point is a tight, painful knot of muscle fiber. It can form where a muscle is strained or injured. The knot can sometimes be felt under the skin. A trigger point is very tender to the touch. Pain may also spread to other parts of the affected muscle. Muscles around a knee, shoulder blade, or other bones are prone to trigger points. This is because these muscles are more likely to be injured.    About the injections  Any muscle in the body can have one or more trigger points. Several injections may be needed in each trigger point to best relieve pain. These injections may be given in sessions about 2 weeks apart, depending on the preference of your health care provider. In some cases, you may not feel much change in your symptoms until after the third injection.     © 7096-1527 [employer]. [address] [place] [address]. All rights reserved. This information is not intended as a substitute for professional medical care. Always follow your healthcare professional's instructions.            Your Neck Muscles  The muscles in the neck and shoulders need to be strong to hold the neck and head in place. These muscles also help move the neck and shoulders. Your health care provider can recommend exercises to help stretch and strengthen your neck muscles.    © 8878-9675 [employer]. [address] [place] [address]. All  rights reserved. This information is not intended as a substitute for professional medical care. Always follow your healthcare professional's instructions.          Neck Problems: Relieving Your Symptoms  The first goal of treatment is to relieve your symptoms. Your health care provider may recommend self-care treatments. These include resting, applying ice and heat, taking medication, and doing exercises. Your health care provider may also recommend that you see a physical therapist, who can teach you ways to care for and strengthen your neck.    Self-Care Treatments  Pain can end quickly or last awhile. Either way, youll want relief as soon as possible. Your health care provider can tell you which treatments to do at home to help relieve your pain.  · Lying down for a short time takes pressure from the head off the neck.  · Ice and heat can help reduce pain. To bring down swelling, rest an ice pack wrapped in a thin towel on your neck for 15 minutes. To relax sore muscles, apply a warm, wet towel to the area. Or take a warm bath or shower.  · Over-the-counter medications, such as ibuprofen, naproxen, and aspirin, can help reduce pain and swelling. Acetaminophen can help relieve pain. Use these only as directed.  · Exercises can relax muscles and prevent stiffness. To prepare, drape a warm, wet towel around your neck and shoulders for 5 minutes. Remove the towel. Then do any exercises recommended to you by your health care provider.  Physical Therapy  If self-care treatments arent helping relieve neck pain, your health care provider may suggest one or more sessions of physical therapy. Physical therapy is performed by a specialist trained to treat injuries. Your physical therapist (PT) will teach you how to strengthen muscles, improve the spines alignment, and help you move properly. Treatment methods used in physical therapy may include:  · Heat. A special heating pad called a neck pack may be applied to your  neck.  · Exercises. Your PT will teach you exercises to help strengthen your neck and improve its range of motion.  · Joint mobilization. The PT gently moves your vertebrae to help restore motion in your neck joints and reduce neck pain.  · Soft tissue mobilization. The PT massages and stretches the muscles in your neck and shoulders.  · Electrical stimulation. Electrical impulses are sent into your neck. This helps reduce soreness and inflammation.  · Education in body mechanics. The PT shows you ways to position and move your body that protect the neck.  Other Treatments  If physical therapy doesnt relieve your neck pain, your health care provider may suggest other treatments. For example, medications or injections can help relieve pain and swelling. In some cases, surgery may be needed to treat neck problems.  © 2826-4219 [employer]. [address] [place] [address], [place] [address]. All rights reserved. This information is not intended as a substitute for professional medical care. Always follow your healthcare professional's instructions.          Understanding Neck Problems       If you suffer from neck pain, youre not alone. Many people have neck pain at some point in their lives. Problems such as poor posture, injury, and wear and tear can lead to neck pain. Your health care provider will work with you to find the treatment thats best for your neck.  Types of Neck Problems  The following problems can cause pain or injury in your neck:  · Strains and sprains: Strains (stretched or torn muscles) and sprains (stretched or torn ligaments) can cause neck pain. Strains and sprains can occur during an accident, or when you overuse your neck through repetitive motion. They can also cause your muscles and ligaments to become inflamed (swollen and painful).  · Whiplash and other injuries: Whiplash can result when an impact throws your head, forcing your neck too far forward (hyperflexion), then too far  backward (hyperextension). When combined, the two motions can cause a painful injury to different parts of your neck, such as muscles, ligaments, or joints. The most common cause of whiplash is a car accident. But it can also happen during a fall or sports injury.  · Weakened disks: A simple action, such as a sneeze or a cough, can cause one of your disks to bulge (herniate). A herniated disk can put pressure on your nerve and cause pain. Over time, disks can also thin out (degenerate). Flattened disks dont cushion vertebrae well and can cause vertebrae to rub together. Rubbing vertebrae can pinch nerves and cause pain.  · Weakened joints: Aging and injury can cause joints to slowly degenerate. Thinned joints can also cause vertebrae to rub together. This can cause abnormal growths of bone (bone spurs) to form on vertebrae. Bone spurs put pressure on nerves, causing pain.  Common Symptoms  If you have a neck problem, you may have one or more of the following symptoms:  · Muscle tension and spasm: You may not be able to move your neck, arms, or shoulders comfortably if you have muscle tension or stiffness in your neck. If your symptoms arent relieved, you may experience muscle spasms, or knots of contracted tissue (trigger points) in areas of your neck and shoulders.  · Aches and pains: Dull aches in your head or neck, sharp pains, and swelling of the soft tissue of your neck and shoulders are common symptoms. If theres pressure on the nerves in your neck, you may feel pain in your arms or hands (referred pain).  · Numbness or weakness: If you injure the nerves in your neck, you may experience numbness, tingling, or weakness in your shoulders, arms, or hands. These symptoms arise when disks or bone spurs press on the nerves in your neck.  © 1211-8170 [employer]. [address] [place] [address], [place] [address]. All rights reserved. This information is not intended as a substitute for professional medical  care. Always follow your healthcare professional's instructions.          Neck Spasm [No Trauma]    Spasm of the neck muscles can occur after a sudden awkward neck movement. Sleeping with your neck in a crooked position can also cause spasm. Some persons respond to emotional stress by tensing the muscles of their neck, shoulders and upper back. If neck spasm lasts long enough, it can cause headache.  The treatment described below will usually help the pain to go away in 5-7 days. Pain that continues may require further evaluation or other types of treatment such as physical therapy.  Home Care:  5. Rest and relax the muscles. Use a comfortable pillow that supports the head and keeps the spine in a neutral position. The position of the head should not be tilted forward or backward. A rolled up towel may help for a custom fit.  6. Some persons find relief with heat (hot shower, hot bath or heating pad) and massage, while others prefer cold packs (crushed or cubed ice in a plastic bag, wrapped in a towel). Try both and use the method that feels best for 20 minutes several times a day.  7. You may use acetaminophen (Tylenol) or ibuprofen (Motrin, Advil) to control pain, unless another medicine was prescribed. [NOTE: If you have chronic liver or kidney disease or ever had a stomach ulcer or GI bleeding, talk with your doctor before using these medicines.]  Follow Up  with your doctor or this facility if your symptoms do not show signs of improvement after one week. Physical therapy or further evaluation may be needed.  [NOTE: If x-rays were taken, they will be reviewed by a radiologist. You will be notified of any new findings that may affect your care.]  Return Promptly  or contact your doctor if any of the following occurs:  · Pain becomes worse or spreads into one or both arms  · Weakness or numbness in one or both arms  · Increasing headache with nausea or vomiting  · Fever over 100.4ºF (38.0ºC)  © 4361-9126 [employer]. [address] [place] [address]. All rights reserved. This information is not intended as a substitute for professional medical care. Always follow your healthcare professional's instructions.          Know Your Neck: The Cervical Spine  By learning about the parts of the neck, you can better understand your neck problem. The bones of the neck are called cervical vertebrae, commonly identified as C1 through C7. Together, they form a bony column called the spine. Vertebrae also protect the spinal cord, a pathway for messages to reach the brain. Surrounding the spine are soft tissues such as muscles, tendons, and nerves.        Flexibility Is Key  For the neck to function normally, it has to be flexible enough to move without discomfort. A healthy neck can move easily in six different directions.    © 9328-5943 [employer]. [address] [place] [address]. All rights reserved. This information is not intended as a substitute for professional medical care. Always follow your healthcare professional's instructions.          Protecting Your Neck: Posture and Body Mechanics  Protecting your neck from injuries and pain involves practicing good posture and body mechanics. This may mean correcting bad habits you have related to the way you hold and move your body. The tips below can help you improve your posture and body mechanics.    What Is Posture and Why Does It Matter?  Posture is the way you hold your body. For many of us, this means hunching over, thrusting the chin forward, and slouching the shoulders. But this kind of poor posture keeps muscles from properly supporting the neck and puts stress on muscles, disks, ligaments, and joints in your neck. As a result, injury and pain can occur.  How Is Your Posture?  Use a full-length mirror to check your posture. To begin, stand normally. Then slowly back up against a wall. Is there space between your head and the  wall? Do you slouch your shoulders? Is your chin pointing up or down? All these can cause neck pain and injury.  Improving Your Posture  Follow these steps to improve your posture:  · Pull your shoulders back.  · Think of the ears, shoulders, and hips as a series of dots. Now, adjust your body to connect the dots in a straight line.  · Keep your chin level.  What Are Body Mechanics and Why Do They Matter?  The way you move and position your body during daily activities is called body mechanics. Good body mechanics help protect the neck. This means learning the right ways to stand, sit, and even sleep. So do whats best for your neck and practice good body mechanics.  Standing   To protect your neck while standing:  · Carry objects close to your body.  · Keep your ears and shoulders in a line while standing or walking.  · To lower yourself, bend at the knees with a straight back. Do this instead of looking down and reaching for objects.  · Work at eye level. Dont reach above your head or tilt your head back.  Sitting   To protect your neck while sitting:  · Set up your workstation so your monitor is at eye level. Also, use a document pond when viewing papers or books.  · Keep your knees at or slightly below the level of your hips.  · Sit up straight, with feet flat on the floor. If your feet dont touch the floor, use a footrest.  · Avoid sitting or driving for long periods. Take frequent breaks.  Sleeping   To protect your neck while sleeping:  · Sleep on your back with a pillow under your knees, or on your side with a pillow between bent knees. This helps align the spine.  · Avoid using pillows that are too high or too low. Instead, use a neck roll or pillow under your neck while you sleep to keep the neck straight.  · Sleep on a mattress that supports you, with a pillow under your neck.  © 4570-0976 [employer]. [address] [place] [address], [place] [address]. All rights reserved. This information is  not intended as a substitute for professional medical care. Always follow your healthcare professional's instructions.          Exercises at Your Workstation: Eyes, Neck, and Head     Tired eyes? Stiff neck? A few easy moves can help prevent these kinds of problems. Take a few minutes during your day to do these exercises--right at your desk. They'll loosen up your muscles, keep you more alert, and make a big difference in how you work and feel.    For your eyes  Eye cup  · Lean forward with your elbows on your desk.  · Cup your hands and place them lightly over your closed eyes. Hold for a minute, while breathing deeply in and out.  · Slowly uncover and open your eyes. Repeat 2 times.  Eye roll  · Close your eyes. Slowly roll your eyeballs clockwise all the way around. Repeat 3 times.  · Now slowly roll them all the way around counterclockwise. Repeat 3 times.  Eye rest  · Every 20 minutes, look away from the computer screen. Focus on an object at least 20 feet away. Stay focused on this object for a full 20 seconds.    For your neck and head  Warm-up  · Drop your head gently to your chest. While breathing in, slowly roll your head up to your left shoulder. While breathing out, slowly roll your head back to center. Repeat to the right.  · Repeat 3 times on each side.  Head tilt  · Sit up straight. Tuck in your chin.  · Slowly tip your head to the left. Return to the center. Then, tip your head to the right.  · Repeat 3 times on each side.    Head turn  · Sit up straight.  · Slowly turn your head and look over your left shoulder. Hold for a few seconds. Go back to the center, then repeat to your right.  · Repeat 3 times on each side.  © 3133-4293 [employer]. [address] [place] [address], [place] [address]. All rights reserved. This information is not intended as a substitute for professional medical care. Always follow your healthcare professional's instructions.          Reach and Hold Exercise    Do this  exercise on your hands and knees. Keep your knees under your hips and your hands under your shoulders. Keep your spine in a neutral position (not arched or sagging). Keep your ears in line with your shoulders. Hold for a few seconds before starting the exercise:  8. Tighten your abdominal muscles and raise one arm straight in front of you, palm down. Hold for 5 seconds, then lower. Repeat 5 times.  9. Do the exercise again, this time lifting your arm to the side. Repeat 5 times.  10. Do the exercise again, this time lifting your arm backward, palm up. Repeat 5 times.  Switch sides and do each exercise with the other arm.  © 1490-4864 [employer]. [address] [place] [address]. All rights reserved. This information is not intended as a substitute for professional medical care. Always follow your healthcare professional's instructions.        Shoulder and Upper Back Stretch  To start, stand tall with your ears, shoulders, and hips in line. Your feet should be slightly apart, positioned just under your hips. Focus your eyes directly in front of you.  this position for a few seconds before starting your exercise. This helps increase your awareness of proper posture.  Reach overhead and slightly back with both arms. Keep your shoulders and neck aligned and your elbows behind your shoulders:  · With your palms facing the ceiling, turn your fingers inward.  · Take a deep breath. Breathe out, and lower your elbows toward your buttocks. Hold for 5 seconds, then return to starting position.  · Repeat 3 times.    © 7324-6758 [employer]. [address] [place] [address]. All rights reserved. This information is not intended as a substitute for professional medical care. Always follow your healthcare professional's instructions.          Shoulder Clock Exercise  To start, stand tall with your ears, shoulders, and hips in line. Your feet should be slightly apart,  positioned just under your hips. Focus your eyes directly in front of you.  this position for a few seconds before starting your exercise. This helps increase your awareness of proper posture.  · Imagine that your right shoulder is the center of a clock. With the outer point of your shoulder, roll it around to slowly trace the outer edge of the clock.  · Move clockwise first, then counterclockwise.  · Repeat 3 times. Switch shoulders.   © 0245-2296 [employer]. [address] [place] [address]. All rights reserved. This information is not intended as a substitute for professional medical care. Always follow your healthcare professional's instructions.          Shoulder Girdle Stretch     To start, sit in a chair with your feet flat on the floor. Your weight should be slightly forward so that youre balanced evenly on your buttocks. Relax your shoulders and keep your head level. Using a chair with arms may help you keep your balance:  · Place 1 hand on the outside elbow of the other arm.  · Pull the arm across your body. Hold for 30 to 60 seconds. Repeat once.  · Switch sides.    © 9807-6560 [employer]. [address] [place] [address]. All rights reserved. This information is not intended as a substitute for professional medical care. Always follow your healthcare professional's instructions.          Shoulder Exercises      To start, sit in a chair with your feet flat on the floor. Your weight should be slightly forward so that youre balanced evenly on your buttocks. Relax your shoulders and keep your head level. Avoid arching your back or rounding your shoulders. Using a chair with arms may help you keep your balance.  · Raise your arms, elbows bent, to shoulder height.  · Slowly move your forearms together. Hold for 5 seconds.  · Return to starting position. Repeat 5 times.  © 9477-6142 [employer]. [address] [place] [address].  All rights reserved. This information is not intended as a substitute for professional medical care. Always follow your healthcare professional's instructions.        Shoulder Shrug Exercise  To start, sit in a chair with your feet flat on the floor. Shift your weight slightly forward to avoid rounding your back. Relax. Keep your ears, shoulders, and hips aligned:  · Raise both of your shoulders as high as you can, as if you were trying to touch them to your ears. Keep your head and neck still and relaxed.  · Hold for a count of 10. Release.  · Repeat 5 times.    © 6403-3854 [employer]. [address] [place] [address]. All rights reserved. This information is not intended as a substitute for professional medical care. Always follow your healthcare professional's instructions.          Shoulder Squeeze Exercise     To start, sit in a chair with your feet flat on the floor. Shift your weight slightly forward to avoid rounding your back. Relax. Keep your ears, shoulders, and hips aligned:  · Raise your arms to shoulder height, elbows bent and palms forward.  · Move your arms back, squeezing your shoulder blades together.  · Hold for 10 seconds. Return to starting position.   · Repeat 5 times.     © 9056-9126 [employer]. [address] [place] [address]. All rights reserved. This information is not intended as a substitute for professional medical care. Always follow your healthcare professional's instructions.

## 2020-05-28 NOTE — PROGRESS NOTES
PM&R CLINIC NOTE    Chief Complaint   Patient presents with    Neck Pain    Shoulder Pain     right       HPI: This is a 44 y.o.  female being seen in clinic today for re-evaluation of chronic right shoulder achy pain and weakness. She was doing fairly well after her last visit with trigger pt injections.  She reports a gradual return of tightness and now has tight, sharp pain in her right trapezius muscle.  When cooking or using her arms, her symptoms worsen.  Brent has a history of rotator cuff tear and tried conservative PT. She has improved shoulder ROM, but is still having significant tightness and spasms in her shoulder radiating to the neck. She denies numbness/tingling into her arms    History obtained from patient    Functional History:  Walking: Not limited  Transfers: Independent  Assistive devices: No  Power mobility: No  Falls: None       Needs help with:  Nothing - all ADLS normal    Cooking   Cleaning  Bathing   Dressing   Toileting     Past family, medical, social, and surgical history reviewed in chart    Review of Systems:     General- denies lethargy, weight change, fever, chills  Head/neck- denies swallowing difficulties  ENT- denies hearing changes  Cardiovascular-denies chest pain  Pulmonary- denies shortness of breath  GI- denies constipation or bowel incontinence  - denies bladder incontinence  Skin- denies wounds or rashes  Musculoskeletal- denies weakness, +pain  Neurologic- denies numbness and tingling  Psychiatric- denies depressive or psychotic features, denies anxiety  Lymphatic-denies swelling  Endocrine- denies hypoglycemic symptoms/DM history  All other pertinent systems negative     Physical Examination:  General: Well developed, well nourished female, NAD  HEENT:NCAT EOMI bilaterally   Pulmonary:Normal respirations    Spinal Examination: CERVICAL  Active ROM is within normal limits.  Inspection: No deformity of spinal alignment.  Palpation: No vertebral tenderness to  percussion.  VERY tight and tender at right trapezius and levator scapula  Spurling test: neg    Spinal Examination: LUMBAR or THORACIC  Active ROM is within normal limits.  Inspection: No deformity of spinal alignment.      Musculoskeletal Tests:    Elbow compression (ulnar): neg  Tinels at wrist: neg  Phalen: neg    Bilateral Upper and Lower Extremities:  Pulses are 2+ at radial, bilaterally.  Shoulder/Elbow/Wrist/Hand ROM wnl except limited full strength of cuff musculature   Hip/Knee/Ankle ROM   Bilateral Extremities show normal capillary refill.  No signs of cyanosis, rubor, edema, skin changes, or dysvascular changes of appendages.  Nails appear intact.    Neurological Exam:  Cranial Nerves:  II-XII grossly intact    Manual Muscle Testing: (Motor 5=normal)    RIGHT Upper extremity: Shoulder abduction 5/5, Biceps 5/5, Triceps 5/5, Wrist extension 5/5, Abductor pollicis brevis 5/5, Ulnar hand intrinsics 5/5,  LEFT Upper extremity: Shoulder abduction 5/5, Biceps 5/5, Triceps 5/5, Wrist extension 5/5, Abductor pollicis brevis 5/5, Ulnar hand intrinsics 5/5,  No focal atrophy is noted of either upper extremity.    Bilateral Reflexes:1+bic tric br  Siegel's response is absent bilaterally.    Sensation: tested to light touch  - intact in arms  Gait: Narrow base and good arm swing.      IMPRESSION/PLAN: This is a 44 y.o.  female with myofascial pain, h/o rotator cuff tendonopathy, cuff weakness     1. Cont HEP, added exercises for shoulder ROM, stretch, strength  2. Trigger pt injection today  3. Ice instead of heat, try topical agents   4. Cont zanaflex 4mg QHS    Zandra Yeager M.D.  Physical Medicine and Rehab      PROCEDURE NOTE    Diagnosis: Myofascial pain  Procedure: trigger point injections to right trapezius, levator scapula     Risks and benefits of procedure explained to patient including risks of infection, bleeding, pain, or damage to surrounding tissues. All questions answered. Informed consent obtained  prior to proceeding. Areas marked and prepped in sterile fashion. Using a 27g 1.25inch needle, a 5 cc mixture of depo medrol 1cc (40mg) and 1% lidocaine was injected evenly into the above mentioned muscles. None to minimal bleeding noted. ER and post injection instructions given.    Zandra Yeager M.D.

## 2020-06-01 ENCOUNTER — TELEPHONE (OUTPATIENT)
Dept: ENDOSCOPY | Facility: HOSPITAL | Age: 45
End: 2020-06-01

## 2020-06-01 DIAGNOSIS — G89.4 CHRONIC PAIN DISORDER: ICD-10-CM

## 2020-06-01 DIAGNOSIS — M54.32 SCIATICA OF LEFT SIDE: ICD-10-CM

## 2020-06-01 DIAGNOSIS — K59.1 FUNCTIONAL DIARRHEA: Primary | ICD-10-CM

## 2020-06-01 DIAGNOSIS — I73.9 PAD (PERIPHERAL ARTERY DISEASE): Primary | ICD-10-CM

## 2020-06-01 DIAGNOSIS — K21.9 GASTROESOPHAGEAL REFLUX DISEASE, ESOPHAGITIS PRESENCE NOT SPECIFIED: ICD-10-CM

## 2020-06-01 RX ORDER — TIZANIDINE 4 MG/1
4-6 TABLET ORAL NIGHTLY PRN
Qty: 45 TABLET | Refills: 1 | Status: SHIPPED | OUTPATIENT
Start: 2020-06-01 | End: 2020-07-30

## 2020-06-01 NOTE — TELEPHONE ENCOUNTER
Left message for pt to call back.      [facility] ENCOUNTER:  Orthopedic Clinic - Follow Up Note    SUBJECTIVE:  Sonny Bonilla is  a 44 year old male who presented requesting follow up evaluation for bilateral shoulder pain, patient states that the left is by far the worst. Patient states that prior left shoulder injections only helped for about a week.     Chief Complaint   Patient presents with   • Shoulder Pain     Bilateral shoulder pain, L > R. Right and Left gleno inj given 1/29/20, right worked well but not the left. Had Right labral debridement/acromioplasty/biceps tenodesis/removal clavicle hardware on 6/27/19. Left shoulder, previously discussed possible SLAP tear, wants to discuss treatment beyond injection.   • Other     RTW not needed.         OBJECTIVE:  Past Histories:  I have reviewed the patient's medications and allergies, past medical, surgical, social and family history, updating these as appropriate.  See histories section of the electronic medical record for a display of this information.      Problem List:  Patient Active Problem List   Diagnosis   • Major depressive disorder, recurrent episode, moderate (CMS/HCC)   • Migraine headache   • PTSD (post-traumatic stress disorder)   • Anxiety, generalized   • Vitamin D insufficiency   • GENARO (obstructive sleep apnea)   • Sciatica of right side   • Sacroiliitis, not elsewhere classified (CMS/HCC)   • Chronic low back pain   • Right lumbar radiculopathy Ll3-4, 4-5 )   • Obesity (BMI 30-39.9)   • Right hip pain   • Left hip impingement syndrome   • Femoroacetabular impingement of right hip   • Localized superficial swelling, mass, or lump (lumbar region)   • History of gastric surgery       Allergies:   ALLERGIES:   Allergen Reactions   • Gabapentin Other (See Comments)     Severe cognitive and dizziness   .    Medications:   Outpatient Encounter Medications as of 6/3/2020   Medication Sig Dispense Refill   • meloxicam (MOBIC) 15 MG tablet Take 1 tablet by mouth daily. 90  tablet 1   • Cyanocobalamin (B-12) 2500 MCG Tab      • loratadine-pseudoephedrine (CLARITIN-D 24 HOUR)  MG per 24 hr tablet Take 1 tablet by mouth daily as needed for Allergies. 30 tablet 11   • venlafaxine XR (EFFEXOR XR) 150 MG 24 hr capsule Take 1 capsule by mouth daily. 30 capsule 11   • topiramate (TOPAMAX) 100 MG tablet Take 1 tablet by mouth daily. 30 tablet 11   • Multiple Vitamins-Minerals (MULTIVITAMIN ADULT PO) Take 2 tablets by mouth daily.     • Cholecalciferol (VITAMIN D3) 2000 units Tab Take 2 tablets by mouth daily.      • topiramate (TOPAMAX) 50 MG tablet Take 1-2 tablets by mouth nightly. 60 tablet 4   • topiramate (TOPAMAX) 25 MG tablet 1 at  Bedtime for 1 wk then 2 at bedtime 53 tablet 0     Facility-Administered Encounter Medications as of 6/3/2020   Medication Dose Route Frequency Provider Last Rate Last Dose   • cyanocobalamin injection 1,000 mcg  1,000 mcg Intramuscular Q30 Days Rosalia Hong NP   1,000 mcg at 07/31/18 1334       Review of Systems:   As documented above in the history of present illness.  The history is otherwise non-pertinent or negative in all 13 systems reviewed.    Physical Examination:  Vital Signs:    Vitals:    06/03/20 1021   Temp: 98.4 °F (36.9 °C)   TempSrc: Oral   Weight: 111.1 kg   Height: 6' 2\" (1.88 m)     Constitutional:  Patient is pleasant, awake, alert, and oriented x 3 and in no acute distress.  Integument:  Warm. Dry. No erythema. No rash.    HENT:  Normocephalic. Atraumatic. Bilateral external ears normal.  Nose normal.   Neck: Normal range of motion. No tenderness. Supple. No stridor.    Eyes:  Eyes exhibit normal tracking.  Cardiovascular:  Capillary refill less than 2 seconds, normal pulses.  Respiratory:  Respirations are within normal limits.    Neurologic:  Alert and oriented x 3. Normal motor function. Normal sensory function. No focal deficits noted.    Musculoskeletal:  Left SHOULDER:     Tender to palpation: over long head of  biceps    Passive range of motion: full      Acromioclavicular joint is painful to palpation.  Cross body adduction causes pain    Neer's impingement mildly positive    Speeds test is positive  Apprehension test is negative  Relocation test negative    Biceps peel back test positive  Load shift is negative  Hunter's test is negative    Rotator cuff:   Supraspinatus 4/5   Infraspinatus 5/5   Subscapularis 5/5    All findings are compared with contralateral side which is considered normal unless otherwise noted.      Diagnostic Imaging:  See dictated detailed report    ASSESSMENT:  1. Left shoulder SLAP tear  2. Left shoulder biceps tendonitis    PLAN:  Please consult Rosalia Hong NP for preoperative clearance for left shoulder arthroscopy, acromioplasty, distal clavicle excision and open subpectoralis biceps tenodesis.       Operative techniques of hardware fixation and dissolvable anchor with non-dissolvable suture along with alternative treatments were discussed.    The alternatives, potentials and complications were discussed in detail.  Potential complications including but not limited to: Anesthetic risk, interscalene nerve block complications, controlling postoperative pain, death, infection, nerve, artery, muscle, and tendon damage, increased risk of stiffness and/or adhesive capsulitis, high likelihood of re-tear, continued weakness, persistent pain, skin healing problems, blood clots, blood loss and transfusion, hardware irritation and need for further surgery were discussed.    We discussed recovery course of immobilization for 6 weeks and the need for physical therapy at that time.  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  Full healing is not until 6 months and risk of re-tear decreases.     Anesthesia was discussed and included interscalene block.    All the patient's questions were answered and the patient wishes to proceed  with the surgery.  No guarantee of success was given for the above surgical intervention.    Patient will be fitted with and issued a specialized shoulder sling today.    Patient was educated on my post-operative pain management protocol, including a 5 day post-operative course of Gabapentin.     Patient was informed that he will need to have a Covid-19 test. This needs to be done 48-72 hours prior to surgery. Order was placed today.    Additional instructions provided as documented in the after visit summary. Any radiographic studies and reports were personally reviewed by me and the findings were discussed with the patient. All questions were answered and the patient was instructed to contact the office with any questions, concerns or change in status. Patient verbalized understanding and is in agreement with plan.    A follow up appointment at the clinic was recommended in follow up for re-evaluation.     Orders Placed This Encounter   • SERVICE TO OCCUPATIONAL THERAPY   • fluticasone (FLONASE) 50 MCG/ACT nasal spray     Return for Left Shoulder Scope DOS 6/22/20.     On 6/3/2020, ISanjana scribed the services personally performed by Laurel Greer DO.      The documentation recorded by the scribe accurately and completely reflects the service(s) I personally performed and the decisions made by me.

## 2020-06-02 ENCOUNTER — LAB VISIT (OUTPATIENT)
Dept: OTOLARYNGOLOGY | Facility: CLINIC | Age: 45
End: 2020-06-02
Payer: MEDICAID

## 2020-06-02 DIAGNOSIS — K59.1 FUNCTIONAL DIARRHEA: ICD-10-CM

## 2020-06-02 PROCEDURE — U0003 INFECTIOUS AGENT DETECTION BY NUCLEIC ACID (DNA OR RNA); SEVERE ACUTE RESPIRATORY SYNDROME CORONAVIRUS 2 (SARS-COV-2) (CORONAVIRUS DISEASE [COVID-19]), AMPLIFIED PROBE TECHNIQUE, MAKING USE OF HIGH THROUGHPUT TECHNOLOGIES AS DESCRIBED BY CMS-2020-01-R: HCPCS

## 2020-06-03 LAB — SARS-COV-2 RNA RESP QL NAA+PROBE: NOT DETECTED

## 2020-06-04 ENCOUNTER — ANESTHESIA (OUTPATIENT)
Dept: ENDOSCOPY | Facility: HOSPITAL | Age: 45
End: 2020-06-04
Payer: MEDICAID

## 2020-06-04 ENCOUNTER — ANESTHESIA EVENT (OUTPATIENT)
Dept: ENDOSCOPY | Facility: HOSPITAL | Age: 45
End: 2020-06-04
Payer: MEDICAID

## 2020-06-04 ENCOUNTER — HOSPITAL ENCOUNTER (OUTPATIENT)
Facility: HOSPITAL | Age: 45
Discharge: HOME | End: 2020-06-04
Attending: FAMILY MEDICINE | Admitting: FAMILY MEDICINE
Payer: MEDICAID

## 2020-06-04 VITALS
HEART RATE: 80 BPM | TEMPERATURE: 98 F | SYSTOLIC BLOOD PRESSURE: 128 MMHG | WEIGHT: 209.44 LBS | RESPIRATION RATE: 16 BRPM | DIASTOLIC BLOOD PRESSURE: 75 MMHG | BODY MASS INDEX: 29.32 KG/M2 | OXYGEN SATURATION: 97 % | HEIGHT: 71 IN

## 2020-06-04 DIAGNOSIS — K63.5 POLYP OF COLON, UNSPECIFIED PART OF COLON, UNSPECIFIED TYPE: ICD-10-CM

## 2020-06-04 DIAGNOSIS — R19.7 DIARRHEA: ICD-10-CM

## 2020-06-04 DIAGNOSIS — K52.9 COLITIS: Primary | ICD-10-CM

## 2020-06-04 LAB
B-HCG UR QL: NEGATIVE
CTP QC/QA: YES
POCT GLUCOSE: 113 MG/DL (ref 70–110)

## 2020-06-04 PROCEDURE — 45380 COLONOSCOPY AND BIOPSY: CPT | Mod: 59,,, | Performed by: FAMILY MEDICINE

## 2020-06-04 PROCEDURE — 88305 TISSUE EXAM BY PATHOLOGIST: CPT | Mod: 26,,, | Performed by: PATHOLOGY

## 2020-06-04 PROCEDURE — 27201012 HC FORCEPS, HOT/COLD, DISP: Performed by: FAMILY MEDICINE

## 2020-06-04 PROCEDURE — 00813 ANES UPR LWR GI NDSC PX: CPT | Performed by: FAMILY MEDICINE

## 2020-06-04 PROCEDURE — 45380 PR COLONOSCOPY,BIOPSY: ICD-10-PCS | Mod: 59,,, | Performed by: FAMILY MEDICINE

## 2020-06-04 PROCEDURE — 45385 COLONOSCOPY W/LESION REMOVAL: CPT | Performed by: FAMILY MEDICINE

## 2020-06-04 PROCEDURE — 45385 PR COLONOSCOPY,REMV LESN,SNARE: ICD-10-PCS | Mod: ,,, | Performed by: FAMILY MEDICINE

## 2020-06-04 PROCEDURE — 27201089 HC SNARE, DISP (ANY): Performed by: FAMILY MEDICINE

## 2020-06-04 PROCEDURE — 88305 TISSUE EXAM BY PATHOLOGIST: CPT | Mod: 59 | Performed by: PATHOLOGY

## 2020-06-04 PROCEDURE — 88305 TISSUE EXAM BY PATHOLOGIST: ICD-10-PCS | Mod: 26,,, | Performed by: PATHOLOGY

## 2020-06-04 PROCEDURE — 81025 URINE PREGNANCY TEST: CPT | Performed by: FAMILY MEDICINE

## 2020-06-04 PROCEDURE — 63600175 PHARM REV CODE 636 W HCPCS: Performed by: NURSE ANESTHETIST, CERTIFIED REGISTERED

## 2020-06-04 PROCEDURE — 45385 COLONOSCOPY W/LESION REMOVAL: CPT | Mod: ,,, | Performed by: FAMILY MEDICINE

## 2020-06-04 PROCEDURE — 37000008 HC ANESTHESIA 1ST 15 MINUTES: Performed by: FAMILY MEDICINE

## 2020-06-04 PROCEDURE — 45380 COLONOSCOPY AND BIOPSY: CPT | Performed by: FAMILY MEDICINE

## 2020-06-04 PROCEDURE — 25000003 PHARM REV CODE 250: Performed by: NURSE ANESTHETIST, CERTIFIED REGISTERED

## 2020-06-04 PROCEDURE — 37000009 HC ANESTHESIA EA ADD 15 MINS: Performed by: FAMILY MEDICINE

## 2020-06-04 RX ORDER — LIDOCAINE HCL/PF 100 MG/5ML
SYRINGE (ML) INTRAVENOUS
Status: DISCONTINUED | OUTPATIENT
Start: 2020-06-04 | End: 2020-06-04

## 2020-06-04 RX ORDER — SODIUM CHLORIDE, SODIUM LACTATE, POTASSIUM CHLORIDE, CALCIUM CHLORIDE 600; 310; 30; 20 MG/100ML; MG/100ML; MG/100ML; MG/100ML
INJECTION, SOLUTION INTRAVENOUS CONTINUOUS PRN
Status: DISCONTINUED | OUTPATIENT
Start: 2020-06-04 | End: 2020-06-04

## 2020-06-04 RX ORDER — SODIUM CHLORIDE 0.9 % (FLUSH) 0.9 %
10 SYRINGE (ML) INJECTION
Status: DISCONTINUED | OUTPATIENT
Start: 2020-06-04 | End: 2020-06-04 | Stop reason: HOSPADM

## 2020-06-04 RX ORDER — SODIUM CHLORIDE, SODIUM LACTATE, POTASSIUM CHLORIDE, CALCIUM CHLORIDE 600; 310; 30; 20 MG/100ML; MG/100ML; MG/100ML; MG/100ML
INJECTION, SOLUTION INTRAVENOUS CONTINUOUS
Status: DISCONTINUED | OUTPATIENT
Start: 2020-06-04 | End: 2020-06-04 | Stop reason: HOSPADM

## 2020-06-04 RX ORDER — PROPOFOL 10 MG/ML
INJECTION, EMULSION INTRAVENOUS
Status: DISCONTINUED | OUTPATIENT
Start: 2020-06-04 | End: 2020-06-04

## 2020-06-04 RX ADMIN — PROPOFOL 30 MG: 10 INJECTION, EMULSION INTRAVENOUS at 09:06

## 2020-06-04 RX ADMIN — PROPOFOL 80 MG: 10 INJECTION, EMULSION INTRAVENOUS at 09:06

## 2020-06-04 RX ADMIN — SODIUM CHLORIDE, SODIUM LACTATE, POTASSIUM CHLORIDE, AND CALCIUM CHLORIDE: 600; 310; 30; 20 INJECTION, SOLUTION INTRAVENOUS at 09:06

## 2020-06-04 RX ADMIN — LIDOCAINE HYDROCHLORIDE 50 MG: 20 INJECTION, SOLUTION INTRAVENOUS at 09:06

## 2020-06-04 NOTE — ANESTHESIA RELEASE NOTE
"Anesthesia Release from PACU Note    Patient: Jenifer Westfall    Procedure(s) Performed: Procedure(s) (LRB):  COLONOSCOPY w/ biopsies (N/A)    Anesthesia type: MAC    Post pain: Adequate analgesia    Post assessment: no apparent anesthetic complications, tolerated procedure well and no evidence of recall    Last Vitals:   Visit Vitals  /70 (BP Location: Left arm, Patient Position: Lying)   Pulse 92   Temp 36.8 °C (98.2 °F) (Temporal)   Resp 18   Ht 5' 11" (1.803 m)   Wt 95 kg (209 lb 7 oz)   SpO2 98%   Breastfeeding? No   BMI 29.21 kg/m²       Post vital signs: stable    Level of consciousness: responds to stimulation    Nausea/Vomiting: no nausea/no vomiting    Complications: none    Airway Patency: patent    Respiratory: unassisted    Cardiovascular: stable and blood pressure at baseline    Hydration: euvolemic       "

## 2020-06-04 NOTE — DISCHARGE INSTRUCTIONS

## 2020-06-04 NOTE — ANESTHESIA PREPROCEDURE EVALUATION
06/04/2020  Jenifer Westfall is a 44 y.o., female.    Anesthesia Evaluation    I have reviewed the Patient Summary Reports.    I have reviewed the Nursing Notes.    I have reviewed the Medications.     Review of Systems  Anesthesia Hx:  Denies Family Hx of Anesthesia complications.   Denies Personal Hx of Anesthesia complications.   Social:  Former Smoker    Cardiovascular:   Exercise tolerance: good Hypertension, well controlled Valvular problems/Murmurs Denies MI.  Denies CAD.    PVD ECG has been reviewed. CONCLUSIONS     1 - No wall motion abnormalities.     2 - Normal left ventricular systolic function (EF 55-60%).     3 - Normal left ventricular diastolic function.     4 - Normal right ventricular systolic function .     5 - Pulmonary hypertension. The estimated PA systolic pressure is 43 mmHg.     6 - Mild to moderate tricuspid reg   Pulmonary:  Pulmonary Normal    Renal/:   renal calculi    Hepatic/GI:   Liver Disease, Hepatitis, C    Neurological:   Neuromuscular Disease, Marfan syndrome,eye problem   Endocrine:   Diabetes, well controlled, type 2    Psych:   Psychiatric History          Physical Exam  General:  Well nourished    Airway/Jaw/Neck:  Airway Findings: Mouth Opening: Small, but > 3cm Tongue: Normal  Mallampati: II      Dental:  Dental Findings: Periodontal disease, Severe   Chest/Lungs:  Chest/Lungs Findings: Clear to auscultation     Heart/Vascular:  Heart Findings: Rhythm: Regular Rhythm             Anesthesia Plan  Type of Anesthesia, risks & benefits discussed:  Anesthesia Type:  MAC  Patient's Preference:   Intra-op Monitoring Plan:   Intra-op Monitoring Plan Comments:   Post Op Pain Control Plan:   Post Op Pain Control Plan Comments:   Induction:   IV  Beta Blocker:         Informed Consent: Patient understands risks and agrees with Anesthesia plan.  Questions answered.   ASA Score:  3     Day of Surgery Review of History & Physical: I have interviewed and examined the patient. I have reviewed the patient's H&P dated:  There are no significant changes.      Anesthesia Plan Notes: Previously intubated- Grade 4 view with Mac 3 previously. 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        Ready For Surgery From Anesthesia Perspective.

## 2020-06-04 NOTE — ANESTHESIA POSTPROCEDURE EVALUATION
Anesthesia Post Evaluation    Patient: Jenifer Westfall    Procedure(s) Performed: Procedure(s) (LRB):  COLONOSCOPY w/ biopsies (N/A)    Final Anesthesia Type: MAC    Patient location during evaluation: PACU  Patient participation: Yes- Able to Participate  Level of consciousness: awake and alert and awake  Post-procedure vital signs: reviewed and stable  Pain management: adequate  Airway patency: patent    PONV status at discharge: No PONV  Anesthetic complications: no      Cardiovascular status: blood pressure returned to baseline  Respiratory status: unassisted, spontaneous ventilation and room air  Hydration status: euvolemic  Follow-up not needed.          Vitals Value Taken Time   /70 6/4/2020  8:21 AM   Temp 36.8 °C (98.2 °F) 6/4/2020  8:21 AM   Pulse 92 6/4/2020  8:21 AM   Resp 18 6/4/2020  8:21 AM   SpO2 98 % 6/4/2020  8:21 AM         No case tracking events are documented in the log.      Pain/Abdullahi Score: No data recorded

## 2020-06-04 NOTE — [employer] PATIENT INSTRUCTIONS
Discharge Summary/Instructions after an Endoscopic Procedure  Patient Name: Jenifer Westfall  Patient MRN: [id number]  Patient YOB: 1975 Thursday, June 4, 2020 Gio Georges MD  RESTRICTIONS:  During your procedure today, you received medications for sedation.  These   medications may affect your judgment, balance and coordination.  Therefore,   for 24 hours, you have the following restrictions:   - DO NOT drive a car, operate machinery, make legal/financial decisions,   sign important papers or drink alcohol.    ACTIVITY:  Today: no heavy lifting, straining or running due to procedural   sedation/anesthesia.  The following day: return to full activity including work.  DIET:  Eat and drink normally unless instructed otherwise.     TREATMENT FOR COMMON SIDE EFFECTS:  - Mild abdominal pain, nausea, belching, bloating or excessive gas:  rest,   eat lightly and use a heating pad.  - Sore Throat: treat with throat lozenges and/or gargle with warm salt   water.  - Because air was used during the procedure, expelling large amounts of air   from your rectum or belching is normal.  - If a bowel prep was taken, you may not have a bowel movement for 1-3 days.    This is normal.  SYMPTOMS TO WATCH FOR AND REPORT TO YOUR PHYSICIAN:  1. Abdominal pain or bloating, other than gas cramps.  2. Chest pain.  3. Back pain.  4. Signs of infection such as: chills or fever occurring within 24 hours   after the procedure.  5. Rectal bleeding, which would show as bright red, maroon, or black stools.   (A tablespoon of blood from the rectum is not serious, especially if   hemorrhoids are present.)  6. Vomiting.  7. Weakness or dizziness.  GO DIRECTLY TO THE NEAREST EMERGENCY ROOM IF YOU HAVE ANY OF THE FOLLOWING:      Difficulty breathing              Chills and/or fever over 101 F   Persistent vomiting and/or vomiting blood   Severe abdominal pain   Severe chest pain   Black, tarry stools   Bleeding- more than one tablespoon   Any  other symptom or condition that you feel may need urgent attention  Your doctor recommends these additional instructions:  If any biopsies were taken, your doctors clinic will contact you in 1 to 2   weeks with any results.  - Patient has a contact number available for emergencies.  The signs and   symptoms of potential delayed complications were discussed with the   patient.  Return to normal activities tomorrow.  Written discharge   instructions were provided to the patient.   - Resume previous diet.   - Continue present medications.   - Await pathology results.   - Repeat colonoscopy in 5 years for surveillance.   - Telephone my office for pathology results in 2 weeks.   - Return to GI clinic in 2 weeks.   - Discharge patient to home (via wheelchair).  For questions, problems or results please call your physician Gio Georges MD at Work:  [phone]  If you have any questions about the above instructions, call the GI   department at [phone] or call the endoscopy unit at [phone]   from 7am until 3 pm.  [facility] [place], EMERGENCY ROOM PHONE NUMBER:   [phone]  IF A COMPLICATION OR EMERGENCY SITUATION ARISES AND YOU ARE UNABLE TO REACH   YOUR PHYSICIAN - GO DIRECTLY TO THE EMERGENCY ROOM.  I have read or have had read to me these discharge instructions for my   procedure and have received a written copy.  I understand these   instructions and will follow-up with my physician if I have any questions.     __________________________________       _____________________________________  Nurse Signature                                          Patient/Designated   Responsible Party Signature  Gio Georges MD  6/4/2020 9:42:09 AM  This report has been verified and signed electronically.  [employer]

## 2020-06-04 NOTE — H&P
Short Stay Endoscopy History and Physical    PCP - Provider Notinsystem    Procedure - Colonoscopy  ASA - 2  Mallampati - per anesthesia  History of Anesthesia problems - no  Family history Anesthesia problems -  no     HPI:  This is a 44 y.o. female here for evaluation of :   Active Hospital Problems    Diagnosis  POA    *Diarrhea [R19.7]  Yes    Colitis [K52.9]  Yes     Last Assessment & Plan:   The patient was admitted for colitis seen on CT scan.  GI was consulted and did not recommend any inpatient endoscopy.  C. difficile was ruled out with the stool toxin assay was negative.  She was continued on Zosyn and stool culture thus far has not revealed any pathogen.  Clinically, she is responded to treatment and her diet has been advanced and tolerated.  Overall, she will be discontinued on about a 7 day course of antibiotics to complete in the outpatient realm and she will follow-up within the [facility] clinic.        Resolved Hospital Problems   No resolved problems to display.         Health Maintenance       Date Due Completion Date    HIV Screening 07/[birth year]1989 ---    Urine Microalbumin 02/03/2018 2/3/2017    Foot Exam 04/18/2019 4/18/2018    Override on 2/22/2017: Done    Override on 7/24/2015: Done    Override on 7/6/2015: Done    Eye Exam 06/04/2019 6/4/2018    Override on 6/4/2018: Done    Override on 3/2/2017: Done    Override on 11/10/2015: Done    Override on 1/5/2015: Done    Pap Smear 01/24/2020 7/24/2019    Hemoglobin A1c 03/11/2020 12/11/2019    Override on 10/9/2013: Done    Lipid Panel 05/29/2020 5/29/2019    Influenza Vaccine (Season Ended) 09/01/2020 12/3/2015    Mammogram 09/10/2020 9/10/2019    Aspirin/Antiplatelet Therapy 06/04/2021 6/4/2020    TETANUS VACCINE 04/02/2025 4/2/2015          Screening - no  History of polyps - no  Diarrhea - yes  Anemia - no  Blood in stools - no  Abdominal pain - no  Other - no    ROS:  CONSTITUTIONAL: Denies weight change,  fatigue, fevers, chills, night  "sweats.  CARDIOVASCULAR: Denies chest pain, shortness of breath, orthopnea and edema.  RESPIRATORY: Denies cough, hemoptysis, dyspnea, and wheezing.  GI: See HPI.    Medical History:   Past Medical History:   Diagnosis Date    Arthritis     DM (diabetes mellitus)      2017 Insulin x 3 years     Hepatitis C antibody positive in blood     Hyperlipidemia     Hypertension     IBS (irritable bowel syndrome)     Kidney stone     Marfan syndrome     Mitral valve prolapse        Surgical History:   Past Surgical History:   Procedure Laterality Date    ANGIOGRAPHY OF LOWER EXTREMITY N/A 2018    Procedure: ANGIOGRAM, LOWER EXTREMITY- LEFT LEG;  Surgeon: Roscoe Landeros MD;  Location: [facility] CATH LAB;  Service: Peripheral Vascular;  Laterality: N/A;    APPENDECTOMY      BUNIONECTOMY      both feet    cataract surgery  2019     SECTION      x 2    CHOLECYSTECTOMY      ESOPHAGOGASTRODUODENOSCOPY N/A 2019    Procedure: ESOPHAGOGASTRODUODENOSCOPY (EGD);  Surgeon: Jaron Sanders III, MD;  Location: [facility] ENDO;  Service: Endoscopy;  Laterality: N/A;    TUBAL LIGATION         Family History:   Family History   Problem Relation Age of Onset    Heart disease Mother     Thrombophilia Father         DVT    Hypertension Father     Stroke Maternal Aunt     Heart disease Maternal Aunt     Stroke Maternal Uncle     Heart disease Maternal Uncle     Breast cancer Neg Hx     Colon cancer Neg Hx     Ovarian cancer Neg Hx        Social History:   Social History     Tobacco Use    Smoking status: Former Smoker     Packs/day: 0.20     Years: 20.00     Pack years: 4.00     Types: Cigarettes     Last attempt to quit: 2015     Years since quittin.1    Smokeless tobacco: Never Used    Tobacco comment: "once in a blue moon"   Substance Use Topics    Alcohol use: No     Alcohol/week: 0.0 standard drinks    Drug use: No       Allergies:   Review of patient's allergies " indicates:   Allergen Reactions    Compazine [prochlorperazine edisylate] Rash    Contrast media Anaphylaxis    Dye Anaphylaxis    Levaquin [levofloxacin] Hives and Itching    Metformin Other (See Comments)     Upset stomach    Ibuprofen Other (See Comments)     Stomach pain    Morphine Other (See Comments)     Irritable       Medications:   No current facility-administered medications on file prior to encounter.      Current Outpatient Medications on File Prior to Encounter   Medication Sig Dispense Refill    ascorbic acid (VITAMIN C) 100 MG tablet Take 100 mg by mouth once daily.      aspirin (ECOTRIN) 81 MG EC tablet Take 81 mg by mouth once daily.      atenolol (TENORMIN) 50 MG tablet TAKE 1 TABLET BY MOUTH ONCE DAILY 30 tablet 11    atorvastatin (LIPITOR) 80 MG tablet TAKE 1 TABLET BY MOUTH ONCE DAILY. 30 tablet 5    BASAGLAR KWIKPEN U-100 INSULIN glargine 100 units/mL (3mL) SubQ pen       cetirizine (ZYRTEC) 10 MG tablet Take 10 mg by mouth.      colestipol (COLESTID) 1 gram Tab TAKE 2 TABLETS BY MOUTH THREE TIMES DAILY. DO NOT TAKE OTHER MEDICATION WITHIN 1 HOUR BEFORE OR 4 HOURS AFTER TAKING COLESTIPOL. 180 tablet 5    ERGOCALCIFEROL, VITAMIN D2, (VITAMIN D ORAL) Take 1 tablet by mouth once daily.       famotidine (PEPCID) 20 MG tablet TAKE 1 TABLET BY MOUTH 2 (TWO) TIMES DAILY. 60 tablet 11    FLUoxetine (PROZAC) 40 MG capsule TAKE 1 CAPSULE BY MOUTH ONCE DAILY 90 capsule 0    glipiZIDE (GLUCOTROL) 10 MG tablet Take 1 tablet (10 mg total) by mouth 2 (two) times daily. 60 tablet 0    liraglutide 0.6 mg/0.1 mL, 18 mg/3 mL, subq PNIJ (VICTOZA 2-DIAN) 0.6 mg/0.1 mL (18 mg/3 mL) PnIj Inject 1.2 Units into the skin.      lisinopril 10 MG tablet TAKE 1 TABLET (10 MG TOTAL) BY MOUTH ONCE DAILY. 30 tablet 3    metoclopramide HCl (REGLAN) 10 MG tablet Take 1 tablet (10 mg total) by mouth 2 (two) times daily before meals. 60 tablet 2    ADMELOG SOLOSTAR U-100 INSULIN 100 unit/mL pen        "diclofenac sodium (VOLTAREN) 1 % Gel Apply 2 grams topically 4 (four) times daily. for 10 days 1 Tube 3    ezetimibe (ZETIA) 10 mg tablet Take 1 tablet (10 mg total) by mouth once daily. 90 tablet 3    fluconazole (DIFLUCAN) 150 MG Tab       insulin glargine,hum.rec.anlog (BASAGLAR KWIKPEN U-100 INSULIN SUBQ) Inject 40 Units into the skin 2 (two) times daily.      ketorolac 0.5% (ACULAR) 0.5 % Drop       PEN NEEDLE 31 gauge x 5/16" Ndle USE 5 TIMES DAILY WITH LANTUS AND HUMALOG 100 each 3    polyethylene glycol (GLYCOLAX) 17 gram/dose powder Use as directed. 255 g 0    prednisoLONE acetate (PRED FORTE) 1 % DrpS       tobramycin sulfate 0.3% (TOBREX) 0.3 % ophthalmic solution       traMADol (ULTRAM) 50 mg tablet TAKE 1 TABLET BY MOUTH EVERY 12 HOURS 60 tablet 0    TRUE METRIX GLUCOSE TEST STRIP Strp          Physical Exam:  Vital Signs:   Vitals:    06/04/20 0821   BP: 127/70   Pulse: 92   Resp: 18   Temp: 98.2 °F (36.8 °C)     General Appearance: Well appearing in no acute distress  ENT: OP clear  Chest: CTA B  CV: RRR, no m/r/g  Abd: s/nt/nd/nabs  Ext: no edema    Labs:Reviewed    IMP:  Active Hospital Problems    Diagnosis  POA    *Diarrhea [R19.7]  Yes    Colitis [K52.9]  Yes     Last Assessment & Plan:   The patient was admitted for colitis seen on CT scan.  GI was consulted and did not recommend any inpatient endoscopy.  C. difficile was ruled out with the stool toxin assay was negative.  She was continued on Zosyn and stool culture thus far has not revealed any pathogen.  Clinically, she is responded to treatment and her diet has been advanced and tolerated.  Overall, she will be discontinued on about a 7 day course of antibiotics to complete in the outpatient realm and she will follow-up within the [facility] clinic.        Resolved Hospital Problems   No resolved problems to display.         Plan:   I have explained the risks and benefits of colonoscopy to the patient including but not limited to " bleeding, perforation, infection, and death. The patient wishes to proceed.

## 2020-06-04 NOTE — TRANSFER OF CARE
"Anesthesia Transfer of Care Note    Patient: Jenifer Westfall    Procedure(s) Performed: Procedure(s) (LRB):  COLONOSCOPY w/ biopsies (N/A)    Patient location: PACU    Anesthesia Type: MAC    Transport from OR: Transported from OR on room air with adequate spontaneous ventilation    Post pain: adequate analgesia    Post assessment: no apparent anesthetic complications    Post vital signs: stable    Level of consciousness: responds to stimulation    Nausea/Vomiting: no nausea/vomiting    Complications: none    Transfer of care protocol was followed      Last vitals:   Visit Vitals  /70 (BP Location: Left arm, Patient Position: Lying)   Pulse 92   Temp 36.8 °C (98.2 °F) (Temporal)   Resp 18   Ht 5' 11" (1.803 m)   Wt 95 kg (209 lb 7 oz)   SpO2 98%   Breastfeeding? No   BMI 29.21 kg/m²     "

## 2020-06-04 NOTE — DISCHARGE SUMMARY
Endoscopy Discharge Summary      Admit Date: 6/4/2020    Discharge Date and Time:  6/4/2020 9:38 AM    Attending Physician: Gio Georges MD     Discharge Physician: Gio Georges MD     Principal Admitting Diagnoses: Diarrhea         Discharge Diagnosis: The primary encounter diagnosis was Colitis. Diagnoses of Diarrhea and Polyp of colon, unspecified part of colon, unspecified type were also pertinent to this visit.     Discharged Condition: Good    Indication for Admission: Diarrhea     Hospital Course: Patient was admitted for an inpatient procedure and tolerated the procedure well with no complications.    Significant Diagnostic Studies: Colonoscopy with cold biopsy and Colonoscopy with cold snare polypectomy    Pathology (if any):  Specimen (12h ago, onward)    None          Estimated Blood Loss: 1 ml.    Discussed with: patient.    Disposition: Home.    Follow Up/Patient Instructions:   Current Discharge Medication List      CONTINUE these medications which have NOT CHANGED    Details   ascorbic acid (VITAMIN C) 100 MG tablet Take 100 mg by mouth once daily.      aspirin (ECOTRIN) 81 MG EC tablet Take 81 mg by mouth once daily.      atenolol (TENORMIN) 50 MG tablet TAKE 1 TABLET BY MOUTH ONCE DAILY  Qty: 30 tablet, Refills: 11    Comments: Generic For:TENORMIN 50 MG TABLET  Associated Diagnoses: Essential hypertension; Marfan's syndrome      atorvastatin (LIPITOR) 80 MG tablet TAKE 1 TABLET BY MOUTH ONCE DAILY.  Qty: 30 tablet, Refills: 5    Comments: Generic For:LIPITOR 80 MG TABLET  12/04/2019 4:08:26 PM      !! BASAGLAR KWIKPEN U-100 INSULIN glargine 100 units/mL (3mL) SubQ pen       cetirizine (ZYRTEC) 10 MG tablet Take 10 mg by mouth.      colestipol (COLESTID) 1 gram Tab TAKE 2 TABLETS BY MOUTH THREE TIMES DAILY. DO NOT TAKE OTHER MEDICATION WITHIN 1 HOUR BEFORE OR 4 HOURS AFTER TAKING COLESTIPOL.  Qty: 180 tablet, Refills: 5    Comments: Generic For:COLESTID 1 GM TABLET patient needs refills   01/14/2020 4:09:00 PM  Associated Diagnoses: Chronic diarrhea      dicyclomine (BENTYL) 20 mg tablet T1T PO QID  Qty: 120 tablet, Refills: 2      ERGOCALCIFEROL, VITAMIN D2, (VITAMIN D ORAL) Take 1 tablet by mouth once daily.       famotidine (PEPCID) 20 MG tablet TAKE 1 TABLET BY MOUTH 2 (TWO) TIMES DAILY.  Qty: 60 tablet, Refills: 11    Comments: Generic For:PEPCID 20 MG TABLET  12/31/2019 11:51:54 AM      FLUoxetine (PROZAC) 40 MG capsule TAKE 1 CAPSULE BY MOUTH ONCE DAILY  Qty: 90 capsule, Refills: 0    Comments: Generic For:PROZAC 40 MG PULVULE  11/12/2018 10:08:44 AM  N O T I C E    Last quantity doesn't match original quantity      gabapentin (NEURONTIN) 400 MG capsule  TID  Qty: 270 capsule, Refills: 3    Associated Diagnoses: Sciatica of left side      glipiZIDE (GLUCOTROL) 10 MG tablet Take 1 tablet (10 mg total) by mouth 2 (two) times daily.  Qty: 60 tablet, Refills: 0    Associated Diagnoses: Type 2 diabetes mellitus without complication, with long-term current use of insulin      liraglutide 0.6 mg/0.1 mL, 18 mg/3 mL, subq PNIJ (VICTOZA 2-DIAN) 0.6 mg/0.1 mL (18 mg/3 mL) PnIj Inject 1.2 Units into the skin.      lisinopril 10 MG tablet TAKE 1 TABLET (10 MG TOTAL) BY MOUTH ONCE DAILY.  Qty: 30 tablet, Refills: 3    Comments: Generic For:ZESTRIL 10 MG TABLET NEED REFILLS AUTHORIZED!!  07/27/2017 2:44:51 PM  Associated Diagnoses: Essential hypertension      metoclopramide HCl (REGLAN) 10 MG tablet Take 1 tablet (10 mg total) by mouth 2 (two) times daily before meals.  Qty: 60 tablet, Refills: 2      pantoprazole (PROTONIX) 40 MG tablet T1T PO ONCE DAILY  Qty: 30 tablet, Refills: 11      tiZANidine (ZANAFLEX) 4 MG tablet Take 1-1.5 tablets (4-6 mg total) by mouth nightly as needed.  Qty: 45 tablet, Refills: 1    Associated Diagnoses: Chronic pain disorder; Sciatica of left side      ADMELOG SOLOSTAR U-100 INSULIN 100 unit/mL pen       clopidogrel (PLAVIX) 75 mg tablet TAKE 1 TABLET (75 MG TOTAL) BY MOUTH  "ONCE DAILY.  Qty: 90 tablet, Refills: 2      diclofenac sodium (VOLTAREN) 1 % Gel Apply 2 grams topically 4 (four) times daily. for 10 days  Qty: 1 Tube, Refills: 3    Associated Diagnoses: Chronic right shoulder pain      ezetimibe (ZETIA) 10 mg tablet Take 1 tablet (10 mg total) by mouth once daily.  Qty: 90 tablet, Refills: 3      fluconazole (DIFLUCAN) 150 MG Tab       !! insulin glargine,hum.rec.anlog (BASAGLAR KWIKPEN U-100 INSULIN SUBQ) Inject 40 Units into the skin 2 (two) times daily.      ketorolac 0.5% (ACULAR) 0.5 % Drop       ondansetron (ZOFRAN) 4 MG tablet TAKE 1 TABLET (4 MG TOTAL) BY MOUTH 2 (TWO) TIMES DAILY.  Qty: 20 tablet, Refills: 0      PEN NEEDLE 31 gauge x 5/16" Ndle USE 5 TIMES DAILY WITH LANTUS AND HUMALOG  Qty: 100 each, Refills: 3    Comments: Generic For:BD SHORT PEN NEEDLES      polyethylene glycol (GLYCOLAX) 17 gram/dose powder Use as directed.  Qty: 255 g, Refills: 0      prednisoLONE acetate (PRED FORTE) 1 % DrpS       tobramycin sulfate 0.3% (TOBREX) 0.3 % ophthalmic solution       traMADol (ULTRAM) 50 mg tablet TAKE 1 TABLET BY MOUTH EVERY 12 HOURS  Qty: 60 tablet, Refills: 0    Comments: Generic For:*ULTRAM 50 MG TABLET Ptn needs refills..thanks!  07/01/2019 12:00:37 PM  Associated Diagnoses: Trochanteric bursitis of left hip; Chronic bursitis of right shoulder      TRUE METRIX GLUCOSE TEST STRIP Strp        !! - Potential duplicate medications found. Please discuss with provider.          Discharge Procedure Orders   Diet general     Call MD for:  temperature >100.4     Call MD for:  persistent nausea and vomiting     Call MD for:  severe uncontrolled pain     Call MD for:  difficulty breathing, headache or visual disturbances     Call MD for:  redness, tenderness, or signs of infection (pain, swelling, redness, odor or green/yellow discharge around incision site)     Call MD for:  hives     Call MD for:  persistent dizziness or light-headedness     No dressing needed "       Follow-up Information     Gio Georges MD. Call in 2 weeks.    Specialty:  Family Medicine  Why:  To receive pathology results.  Contact information:  [address] [address] [phone]

## 2020-06-08 ENCOUNTER — TELEPHONE (OUTPATIENT)
Dept: GASTROENTEROLOGY | Facility: CLINIC | Age: 45
End: 2020-06-08

## 2020-06-08 ENCOUNTER — TELEPHONE (OUTPATIENT)
Dept: FAMILY MEDICINE | Facility: CLINIC | Age: 45
End: 2020-06-08

## 2020-06-08 NOTE — TELEPHONE ENCOUNTER
----- Message from Adrianne Hardy sent at 6/8/2020  1:17 PM CDT -----  Contact: self  Pt requesting a call back to have a 2 week follow up for her colonoscopy. I am unable to view any appts with provider. Keshav call pt back at [phone]

## 2020-06-08 NOTE — TELEPHONE ENCOUNTER
Patient informed we are not accepting new Medicaid patients in clinic.  She just wanted to get her endoscopy results, please advise.

## 2020-06-08 NOTE — TELEPHONE ENCOUNTER
Spoke to pt and scheduled an appt for 2 wk follow up after colonoscopy.  appt booked on 06/18/20 with Ms baker. Pt agreed to plan.

## 2020-06-08 NOTE — TELEPHONE ENCOUNTER
----- Message from Yumiko Reveles sent at 6/8/2020  1:10 PM CDT -----  Contact: self  Type:  Sooner Apoointment Request    Caller is requesting a sooner appointment.  Caller declined first available appointment listed below.  Caller will not accept being placed on the waitlist and is requesting a message be sent to doctor.  Name of Caller:pt  When is the first available appointment?n/a  Symptoms:f/u  Would the patient rather a call back or a response via [facility]? Call back  Best Call Back Number:[phone]  Additional Information: Please call back

## 2020-06-09 LAB
FINAL PATHOLOGIC DIAGNOSIS: NORMAL
GROSS: NORMAL

## 2020-06-11 NOTE — PROGRESS NOTES
Dear Reinaldo Cortez,    I recently cared for Jenifer Westfall and performed an endoscopy.  Tissue was sent for pathology evaluation and I will have a letter written to ask the patient to repeat the colonoscopy in 5 years.  The pathology showed that there was adenomatous tissue present.  Thank you for allowing me to participate in the care of your patient.  Please call me for any questions that you might have.      Dr. Gio Georges  [phone] cell  [phone] office      NURSING STAFF:Please  inform the patient that I reviewed the recent pathology obtained at the time of colonoscopy.    The results showed that there was adenomatous tissue present which is benign and based on that, I recommend that the patient have a repeat colonoscopy performed in 5 years.   Also, there was no sign of microscopic colitis with the random biopsies that were done.      If the patient has MyChart, this message has been sent to them.  Confirm that they read the note.  If not, copy the information and print a letter to send to the patient at this time.  Confirm that a notation to the PCP was done.      Dear Jenifer Westfall,    This is to inform you that I have reviewed your recent colonoscopy pathology.  The results showed that you had adenomatous tissue present which is benign and based on that, I recommend that you have a repeat colonoscopy performed in 5 years.    Also, there was no sign of microscopic colitis with the random biopsies that were done.    Dr. Gio Georges  [phone]

## 2020-06-11 NOTE — TELEPHONE ENCOUNTER
Dear Jenifer Westfall,    This is to inform you that I have reviewed your recent colonoscopy pathology.  The results showed that you had adenomatous tissue present which is benign and based on that, I recommend that you have a repeat colonoscopy performed in 5 years.    Also, there was no sign of microscopic colitis with the random biopsies that were done.      Dr. Gio Georges  [phone]    She should follow up with Lizandro Larson NP.  She saw her in the GI clinic in [place] and is who referred her for the colonoscopy.  Please call her and let her know.

## 2020-06-12 ENCOUNTER — TELEPHONE (OUTPATIENT)
Dept: RHEUMATOLOGY | Facility: CLINIC | Age: 45
End: 2020-06-12

## 2020-06-15 ENCOUNTER — OFFICE VISIT (OUTPATIENT)
Dept: RHEUMATOLOGY | Facility: CLINIC | Age: 45
End: 2020-06-15
Payer: MEDICAID

## 2020-06-15 VITALS
HEIGHT: 71 IN | DIASTOLIC BLOOD PRESSURE: 65 MMHG | SYSTOLIC BLOOD PRESSURE: 127 MMHG | HEART RATE: 85 BPM | WEIGHT: 216.5 LBS | BODY MASS INDEX: 30.31 KG/M2

## 2020-06-15 DIAGNOSIS — M54.32 SCIATICA OF LEFT SIDE: ICD-10-CM

## 2020-06-15 DIAGNOSIS — G89.29 CHRONIC RIGHT SHOULDER PAIN: ICD-10-CM

## 2020-06-15 DIAGNOSIS — M51.36 LUMBAR DEGENERATIVE DISC DISEASE: ICD-10-CM

## 2020-06-15 DIAGNOSIS — M25.511 CHRONIC RIGHT SHOULDER PAIN: ICD-10-CM

## 2020-06-15 DIAGNOSIS — M70.62 TROCHANTERIC BURSITIS OF LEFT HIP: ICD-10-CM

## 2020-06-15 DIAGNOSIS — G89.4 CHRONIC PAIN DISORDER: Primary | ICD-10-CM

## 2020-06-15 DIAGNOSIS — Q87.40 MARFAN'S SYNDROME: ICD-10-CM

## 2020-06-15 DIAGNOSIS — M17.0 PRIMARY OSTEOARTHRITIS OF BOTH KNEES: ICD-10-CM

## 2020-06-15 PROCEDURE — 99214 PR OFFICE/OUTPT VISIT, EST, LEVL IV, 30-39 MIN: ICD-10-PCS | Mod: 25,S$PBB,, | Performed by: PHYSICIAN ASSISTANT

## 2020-06-15 PROCEDURE — 99999 PR PBB SHADOW E&M-EST. PATIENT-LVL V: CPT | Mod: PBBFAC,,, | Performed by: PHYSICIAN ASSISTANT

## 2020-06-15 PROCEDURE — 20610 PR DRAIN/INJECT LARGE JOINT/BURSA: ICD-10-PCS | Mod: S$PBB,LT,, | Performed by: PHYSICIAN ASSISTANT

## 2020-06-15 PROCEDURE — 20610 DRAIN/INJ JOINT/BURSA W/O US: CPT | Mod: PBBFAC,LT | Performed by: PHYSICIAN ASSISTANT

## 2020-06-15 PROCEDURE — 99214 OFFICE O/P EST MOD 30 MIN: CPT | Mod: 25,S$PBB,, | Performed by: PHYSICIAN ASSISTANT

## 2020-06-15 PROCEDURE — 20610 DRAIN/INJ JOINT/BURSA W/O US: CPT | Mod: S$PBB,LT,, | Performed by: PHYSICIAN ASSISTANT

## 2020-06-15 PROCEDURE — 99999 PR PBB SHADOW E&M-EST. PATIENT-LVL V: ICD-10-PCS | Mod: PBBFAC,,, | Performed by: PHYSICIAN ASSISTANT

## 2020-06-15 PROCEDURE — 99215 OFFICE O/P EST HI 40 MIN: CPT | Mod: PBBFAC,25 | Performed by: PHYSICIAN ASSISTANT

## 2020-06-15 RX ORDER — TRIAMCINOLONE ACETONIDE 40 MG/ML
40 INJECTION, SUSPENSION INTRA-ARTICULAR; INTRAMUSCULAR
Status: COMPLETED | OUTPATIENT
Start: 2020-06-15 | End: 2020-06-15

## 2020-06-15 RX ORDER — METHYLPREDNISOLONE 4 MG/1
TABLET ORAL
Qty: 1 PACKAGE | Refills: 0 | Status: SHIPPED | OUTPATIENT
Start: 2020-06-15 | End: 2020-07-06

## 2020-06-15 RX ADMIN — TRIAMCINOLONE ACETONIDE 40 MG: 40 INJECTION, SUSPENSION INTRA-ARTICULAR; INTRAMUSCULAR at 09:06

## 2020-06-15 NOTE — PATIENT INSTRUCTIONS
Talk to ortho, is surgery an option, bursectomy for the hip?     Shot today     Steroid pack today     Get back into PT for the hip

## 2020-06-15 NOTE — PROGRESS NOTES
Subjective:       Patient ID: Jenifer Westfall is a 44 y.o. female.    Chief Complaint: shoulder pain and hip pain    Jenifer is here for f/u.  She has chronic pain with chronic sciatica on the left side. MRI l spine done several years ago shows disc disease and nerve impingement.  She also has had issues with chronic left hip bursitis with injections in the past.  She also has Marfans. She also has issues with neck pain and has DDD on Cspine xray.  She has had prednisone tapers in the past which help her sciatic pain but it returns when she is off the steroids.  She takes gabapentin 400mg TID and tramadol prn for her pain. She was seen by pain mgt in past for possible DENY for her spine issues but insurance would not cover these.     She has a right shoulder rotator cuff tear, causes pain, has seen ortho and has done PT in the past.      She can't take nsaids due to GI upset.  Prev issues with djd in her catherine knees with injections.  Not an issue today.  Main c/o is her left hip, pain when walking, can't walk distances due to her pain, very tender to touch.  Pain mostly in the lateral hip, mild pain in the the leg as well.  Her Right shoulder continues to be an issue and she is seeing PT in a week.  Hip pain today is 5/10     Review of Systems   Constitutional: Negative for activity change, fatigue, fever and unexpected weight change.   HENT: Negative for mouth sores, tinnitus and trouble swallowing.    Eyes: Negative for itching and visual disturbance.   Respiratory: Negative for cough and shortness of breath.    Cardiovascular: Negative for chest pain, palpitations and leg swelling.   Gastrointestinal: Negative for blood in stool, diarrhea, nausea and vomiting.   Genitourinary: Negative for dysuria, flank pain, frequency and hematuria.   Musculoskeletal: Positive for arthralgias, back pain, gait problem, joint swelling and neck pain. Negative for myalgias.   Skin: Negative for rash and wound.   Neurological: Positive  "for numbness. Negative for dizziness, weakness, light-headedness and headaches.   Hematological: Negative for adenopathy. Does not bruise/bleed easily.   Psychiatric/Behavioral: Negative for confusion and sleep disturbance.         Objective:   /65   Pulse 85   Ht 5' 11" (1.803 m)   Wt 98.2 kg (216 lb 7.9 oz)   BMI 30.19 kg/m²      Physical Exam   Constitutional: She is oriented to person, place, and time and well-developed, well-nourished, and in no distress. No distress.   HENT:   Head: Normocephalic.   Eyes: EOM are normal. Pupils are equal, round, and reactive to light.   Neck: Normal range of motion. Neck supple. No thyromegaly present.   Cardiovascular: Normal rate, regular rhythm and normal heart sounds.  Exam reveals no gallop and no friction rub.    No murmur heard.  Pulmonary/Chest: Breath sounds normal. She has no wheezes. She has no rales. She exhibits no tenderness.   Abdominal: Soft. There is no abdominal tenderness. There is no rebound.   Lymphadenopathy:     She has no cervical adenopathy.   Neurological: She is alert and oriented to person, place, and time. She displays normal reflexes. She exhibits normal muscle tone. Gait normal.   Skin: Skin is warm and dry. No rash noted. No erythema.     Psychiatric: Mood, memory and affect normal.   Musculoskeletal: Normal range of motion. Tenderness present. No edema.      Comments: Left low  to bilateral paraspinal muscles    Left hip significant tenderness to palpation over the greater trochanter, rom flex/ext of the hip ellicits pain laterally with radiation into the leg, +straight leg raise      right hip normal            MRI lumbar spine 5/2019:   Similar appearance of the spine with mild degenerative changes.  No high-grade spinal canal stenosis or neural foraminal narrowing.  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 Chronic pain disorder    2. Lumbar degenerative disc disease    3. Primary osteoarthritis of both " knees    4. Marfan's syndrome    5. Trochanteric bursitis of left hip        Impression:  Left side sciatica: stable mri in past showing nerve impingement on L5-S1, done PT, medicaid denies ariella's, steroid packs help briefly; no nsaids due to gi upset; gabapentin 400mg in the am and 400-800mg q hs    Left hip bursitis: cortisone injections in the past help,     catherine knee djd: prev injections worked in the past, not an issue today     Chronic pain disorder: due to all of the above    Right shoulder pain: due to RTC tear on MRI,     Lumbar deg disease: with sig radiation and neuropathy into the left leg, taking gabapentin 400mg tid      Plan:       Inject left hip as below   Get back into PT   Medrol dose pack if needed   D/w ortho any surgical options for her chronic hip bursitis     Cont gabapentin tid   Can inject knees anytime  rec vit B6 for her neuropathy     F/u with us prn or in a year     Procedure note: After verbal consent was obtained.  The left greater trochanteric bursa area was prepared with sterile prep.  The skin was anesthetized with 1% ethyl chloride.  The greater trochanteric bursa injected with 2cc of 1% lidocaine and 40mg kenalog. Hemostasis was obtained.  The patient tolerated procedure well with no complications.  discharge and icing instructions given to patient

## 2020-06-17 ENCOUNTER — CLINICAL SUPPORT (OUTPATIENT)
Dept: REHABILITATION | Facility: HOSPITAL | Age: 45
End: 2020-06-17
Payer: MEDICAID

## 2020-06-17 DIAGNOSIS — M70.62 TROCHANTERIC BURSITIS OF LEFT HIP: ICD-10-CM

## 2020-06-17 DIAGNOSIS — M25.552 CHRONIC HIP PAIN, LEFT: ICD-10-CM

## 2020-06-17 DIAGNOSIS — G89.29 CHRONIC HIP PAIN, LEFT: ICD-10-CM

## 2020-06-17 PROBLEM — R53.1 WEAKNESS: Status: RESOLVED | Noted: 2019-08-20 | Resolved: 2020-06-17

## 2020-06-17 PROBLEM — M25.611 SHOULDER STIFFNESS, RIGHT: Status: RESOLVED | Noted: 2019-08-20 | Resolved: 2020-06-17

## 2020-06-17 PROCEDURE — 97161 PT EVAL LOW COMPLEX 20 MIN: CPT

## 2020-06-17 NOTE — PLAN OF CARE
[facility]  Physical Therapy Initial Evaluation    Name: Jenifer Westfall  Clinic Number: [id number]    Therapy Diagnosis:   Encounter Diagnosis   Name Primary?    Trochanteric bursitis of left hip      Physician: Adriana Mcneal PA-C    Physician Orders: PT Eval and Treat   Medical Diagnosis from Referral: Trochanteric bursitis of left hip  Evaluation Date: 6/17/2020  Authorization Period Expiration: 6/30/20  Plan of Care Expiration: 7/17/20  Visit # / Visits authorized: 1/ 1    Time In: 4:10  Time Out: 4:45  Total Billable Time: 10 minutes    Precautions: Standard    Subjective   Date of onset: years, but recent exacerbation in the last couple of months  History of current condition - Jenifer reports: that she has been having hip pain on and off for the past couple of years and she has therapy in the past that she said was helpful. She is mainly having pain on the outside of her L hip that increasing with walking, especially if she walks longer distances. Relieves with sitting and laying on her R side. Does have back pain as well and reports when the pain is bad it will go all the way down her leg leg and to her foot.     Pain:  Current 4/10, worst 9/10, best 3/10   Location: left hip/back   Description: Burning, Grabbing and Sharp  Aggravating Factors: Standing, Walking and Lifting  Easing Factors: rest    Prior Therapy: yes for her L hip in the last 2 years  Social History:  lives with their spouse  Occupation: none  Prior Level of Function: IND  Current Level of Function: MI with increase in pain    Imaging: none    Medical History:   Past Medical History:   Diagnosis Date    Arthritis     DM (diabetes mellitus) 2009     03/01/2017 Insulin x 3 years 2013    Hepatitis C antibody positive in blood     Hyperlipidemia     Hypertension     IBS (irritable bowel syndrome)     Kidney stone     Marfan syndrome     Mitral valve prolapse        Surgical History:   Jenifer Westfall  has  a past surgical history that includes Cholecystectomy; Bunionectomy; Tubal ligation;  section; Appendectomy; Angiography of lower extremity (N/A, 2018); cataract surgery (2019); Esophagogastroduodenoscopy (N/A, 2019); and Colonoscopy (N/A, 2020).    Medications:   Jenifer has a current medication list which includes the following prescription(s): admelog solostar u-100 insulin, ascorbic acid (vitamin c), aspirin, atenolol, atorvastatin, basaglar kwikpen u-100 insulin, cetirizine, clopidogrel, colestipol, diclofenac sodium, dicyclomine, ergocalciferol (vitamin d2), ezetimibe, famotidine, fluconazole, fluoxetine, gabapentin, glipizide, insulin glargine,hum.rec.anlog, ketorolac 0.5%, liraglutide 0.6 mg/0.1 ml (18 mg/3 ml) subq pnij, lisinopril, methylprednisolone, metoclopramide hcl, ondansetron, pantoprazole, pen needle, polyethylene glycol, prednisolone acetate, tizanidine, tobramycin sulfate 0.3%, tramadol, and true metrix glucose test strip, and the following Facility-Administered Medications: medroxyprogesterone.    Allergies:   Review of patient's allergies indicates:   Allergen Reactions    Compazine [prochlorperazine edisylate] Rash    Contrast media Anaphylaxis    Dye Anaphylaxis    Levaquin [levofloxacin] Hives and Itching    Metformin Other (See Comments)     Upset stomach    Ibuprofen Other (See Comments)     Stomach pain    Morphine Other (See Comments)     Irritable        Pts goals: be able to walk better, decrease pain    Objective       CMS Impairment/Limitation/Restriction for FOTO Hip Survey    Therapist reviewed FOTO scores for Jenifer Westfall on 2020.   FOTO documents entered into EPIC - see Media section.    Limitation Score: 73%  Category: Body Position    Current : CL = least 60% but < 80% impaired, limited or restricted  Goal: CK = at least 40% but < 60% impaired, limited or restricted  Discharge: NA         Gait: antalgic, decreased weight shift to the L  side    Squat: Double leg: decreased weight shift   Single leg: DNT    Balance: fair + due to decrease in weight shift to the L side    Reflex/Sensation: increased sensitivity along L sciatic nerve as well as the lateral hip    Hip PROM %:     (L) (R)     Flexion   75% p! 100%     Extension  50% 75%     Abduction  50% p! 100%     Adduction  50% p! 100%     External rotation 75%  100%     Internal rotation 75% p! 100%    Lumbar FL: 75%  with increase in pain to posterior hip  Lumbar EX: 25% increase in pain and pain down leg     Strength:  Hip flexors  3+/5 4/5  Quadriceps  4/5 4/5      Hamstrings  4/5 4/5     Anterior Tibialis 4/5 4/5     Peroneals  4/5 4/5     Gastrocnemius 4/5 4/5     Adductors  3+/5 3+/5     External rotators 3+/5 3+/5     Internal rotators 3+/5 3+/5     Gluteus Medius 3+/5 3+/5     Gluteus Jamarcus 3+/5 3+/5    Joint Mobility: hypomobile to L hip     PIVM Lumbar: tender and more hypomobile on the L side    Special Test:  CARLOS  p! FADIR   p!     Scouring  neg SLR L: positive         Tenderness to palpation: Tender to palpate along L lateral hip and along L lumbar paraspinals and along L TFL    Lower Limb Tension Test:  Pos sciatic nerve on the L side     TREATMENT   Treatment Time In: 4:35  Treatment Time Out: 4:45  Total Treatment time separate from Evaluation: 10 minutes    Jenifer received therapeutic exercises to develop strength and flexibility for 5 minutes including:  Sidelying clam  Seated hip add against ball  Standing lumbar FL    Jenifer received the following manual therapy techniques: Joint mobilizations and Soft tissue Mobilization were applied to the: L hip/lower back for 5 minutes, including:  STM to L piriformis/glut med    Home Exercises and Patient Education Provided    Education provided:   - Progression of rehab    Written Home Exercises Provided: yes.  Exercises were reviewed and Jenifer was able to demonstrate them prior to the end of the session.  Jenifer demonstrated good   understanding of the education provided.     See EMR under Patient Instructions for exercises provided 6/17/2020.    Assessment   Jenifer is a 44 y.o. female referred to outpatient Physical Therapy with a medical diagnosis of trochanteric bursitis of left hip. Pt presents with decreased L hip and lumbar ROM, increased pain/adverse symptoms, increased neural tension, decreased tolerance to activity, decreased strength, and impairment in gait. Patient's symptoms seem to be most consistent with lumbar radiculopathy that increases with more extension based movements as well as possible L glut med tendinopathy on the L side as well.    Pt prognosis is Good.   Pt will benefit from skilled outpatient Physical Therapy to address the deficits stated above and in the chart below, provide pt/family education, and to maximize pt's level of independence.     Plan of care discussed with patient: Yes  Pt's spiritual, cultural and educational needs considered and patient is agreeable to the plan of care and goals as stated below:     Anticipated Barriers for therapy: chronicity of symptoms    Medical Necessity is demonstrated by the following  History  Co-morbidities and personal factors that may impact the plan of care Co-morbidities:   diabetes and HTN    Personal Factors:   no deficits     low   Examination  Body Structures and Functions, activity limitations and participation restrictions that may impact the plan of care Body Regions:   back  lower extremities    Body Systems:    ROM  strength  balance  gait  transfers  transitions  motor control  motor learning    Participation Restrictions:   ADLs, wanted activities    Activity limitations:   Learning and applying knowledge  no deficits    General Tasks and Commands  no deficits    Communication  no deficits    Mobility  lifting and carrying objects  walking    Self care  no deficits    Domestic Life  shopping  cooking  doing house work (cleaning house, washing dishes,  laundry)  assisting others    Interactions/Relationships  no deficits    Life Areas  no deficits    Community and Social Life  community life  recreation and leisure         low   Clinical Presentation stable and uncomplicated low   Decision Making/ Complexity Score: low     Short Term Goals: In 3 weeks:  1.I with HEP  2.Patient to demo increased AROM /PROM to 75% L hip pain free  3.Patient to demo increase LE strength by 1/2 grade   4.Patient to have decreased pain to 3/10 with walking.    Long Term Goals: In 6 weeks  1. Patient to perform daily activities including walking without limitation.  2. Patient to demonstrate increased L hip ROM 90% pain free  3. Patient to demonstrate increased LE strength by 1 grade.  4. Patient to have decreased pain to zero with standing.    Plan   Plan of care Certification: 6/17/2020 to 7/17/20.    Outpatient Physical Therapy 2 times weekly for 6 weeks to include the following interventions: Electrical Stimulation TENS, Manual Therapy, Moist Heat/ Ice, Neuromuscular Re-ed, Patient Education, Therapeutic Activites and Therapeutic Exercise.     Megan Rojo, PT

## 2020-06-18 ENCOUNTER — OFFICE VISIT (OUTPATIENT)
Dept: GASTROENTEROLOGY | Facility: CLINIC | Age: 45
End: 2020-06-18
Payer: MEDICAID

## 2020-06-18 VITALS
BODY MASS INDEX: 29.66 KG/M2 | SYSTOLIC BLOOD PRESSURE: 118 MMHG | HEIGHT: 71 IN | WEIGHT: 211.88 LBS | OXYGEN SATURATION: 98 % | HEART RATE: 92 BPM | DIASTOLIC BLOOD PRESSURE: 62 MMHG

## 2020-06-18 DIAGNOSIS — K31.84 DIABETIC GASTROPARESIS: ICD-10-CM

## 2020-06-18 DIAGNOSIS — E11.43 DIABETIC GASTROPARESIS: ICD-10-CM

## 2020-06-18 DIAGNOSIS — K21.9 GASTROESOPHAGEAL REFLUX DISEASE, ESOPHAGITIS PRESENCE NOT SPECIFIED: ICD-10-CM

## 2020-06-18 DIAGNOSIS — K58.0 IRRITABLE BOWEL SYNDROME WITH DIARRHEA: Primary | ICD-10-CM

## 2020-06-18 PROBLEM — M25.552 CHRONIC HIP PAIN, LEFT: Status: ACTIVE | Noted: 2020-06-18

## 2020-06-18 PROBLEM — G89.29 CHRONIC HIP PAIN, LEFT: Status: ACTIVE | Noted: 2020-06-18

## 2020-06-18 PROCEDURE — 99999 PR PBB SHADOW E&M-EST. PATIENT-LVL III: CPT | Mod: PBBFAC,,, | Performed by: PHYSICIAN ASSISTANT

## 2020-06-18 PROCEDURE — 99213 OFFICE O/P EST LOW 20 MIN: CPT | Mod: S$PBB,,, | Performed by: PHYSICIAN ASSISTANT

## 2020-06-18 PROCEDURE — 99213 PR OFFICE/OUTPT VISIT, EST, LEVL III, 20-29 MIN: ICD-10-PCS | Mod: S$PBB,,, | Performed by: PHYSICIAN ASSISTANT

## 2020-06-18 PROCEDURE — 99213 OFFICE O/P EST LOW 20 MIN: CPT | Mod: PBBFAC | Performed by: PHYSICIAN ASSISTANT

## 2020-06-18 PROCEDURE — 99999 PR PBB SHADOW E&M-EST. PATIENT-LVL III: ICD-10-PCS | Mod: PBBFAC,,, | Performed by: PHYSICIAN ASSISTANT

## 2020-06-18 NOTE — PROGRESS NOTES
Subjective:      Patient ID: Jenifer Westfall is a 44 y.o. female.    Chief Complaint: Follow-up (rev colonoscopy) and Diarrhea    HPI  The patient has a history of epigastric pain, vomiting, nausea, IBS with diarrhea, GERD and diabetes. A GES last year was normal but I put her on a trial of Reglan earlier this year which helped her nausea, vomiting and appetite. However, she continued to have abdominal cramping and diarrhea. Bentyl helps the cramps but makes her sleepy. She treats the diarrhea with Colestipol and intermittent Pepto or Imodium. Prior noncontrast CT scan was unremarkable. Recommendations last visit included a Colonoscopy and continuation of current medications. Her colonoscopy showed benign colon polyps, but random biopsies were normal.   Today she reports better control over n/v and Protonix controls heartburn. Her primary issue is her bowel habits. She has a BM three times a day, usually after eating. It is sometimes urgent with incontinence. She is taking Cholestipol but couldn't tolerate cholestyramine packs. She drinks soda daily. She eats some sugar free foods, but not a lot. She is on several medications which can cause diarrhea. She reports compliance with her GI medications.     Review of Systems  As per HPI.     Objective:     Physical Exam  Constitutional:       Appearance: Normal appearance.   HENT:      Head: Normocephalic and atraumatic.   Eyes:      Extraocular Movements: Extraocular movements intact.   Pulmonary:      Effort: Pulmonary effort is normal. No respiratory distress.   Neurological:      General: No focal deficit present.      Mental Status: She is alert.   Psychiatric:         Behavior: Behavior normal.         Assessment:     1. Irritable bowel syndrome with diarrhea    2. Diabetic gastroparesis    3. Gastroesophageal reflux disease, esophagitis presence not specified        Plan:     Continue current medications.   Add bulking fiber and avoid sugar free foods. We talked  about dietary changes that may help manage diarrhea. I also recommend she talk to her PCP about alternative medications where appropriate to try and minimize diarrhea.     Follow up if symptoms worsen or fail to improve.    Thank you for the opportunity to participate in the care of this patient.   Lizandro Larson PA-C.

## 2020-06-23 ENCOUNTER — TELEPHONE (OUTPATIENT)
Dept: ORTHOPEDICS | Facility: CLINIC | Age: 45
End: 2020-06-23

## 2020-06-24 ENCOUNTER — CLINICAL SUPPORT (OUTPATIENT)
Dept: OBSTETRICS AND GYNECOLOGY | Facility: CLINIC | Age: 45
End: 2020-06-24
Payer: MEDICAID

## 2020-06-24 PROCEDURE — 96372 THER/PROPH/DIAG INJ SC/IM: CPT | Mod: PBBFAC

## 2020-06-24 RX ADMIN — MEDROXYPROGESTERONE ACETATE 150 MG: 150 INJECTION, SUSPENSION, EXTENDED RELEASE INTRAMUSCULAR at 11:06

## 2020-06-24 NOTE — PROGRESS NOTES
After using two patient identifiers and reviewing allergies and medications. Pt. Received depo provera injection. Pt. Instructed to wait 15 minutes and next appt. Scheduled.

## 2020-06-30 NOTE — PROGRESS NOTES
Physical Therapy Treatment Note     Name: Jenifer Westfall  Clinic Number: [id number]    Therapy Diagnosis:   Encounter Diagnosis   Name Primary?    Chronic hip pain, left      Physician: Adriana Mcneal PA-C    Visit Date: 7/1/2020    Physician Orders: PT Eval and Treat  Medical Diagnosis: Trochanteric bursitis of left hip  Evaluation Date: 6/17/2020  Authorization Period Expiration: 6/30/20  Plan of Care Certification Period: 7/17/20  Visit #/Visits authorized: 1/ 12     Time In: 12:15  Time Out: 1:10  Total Billable Time: 45 minutes    Precautions: Standard    Subjective     Pt reports: that she has been feeling a little better. Exercises have been going well.  She was compliant with home exercise program.  Response to previous treatment: good  Functional change: improvement in symptoms    Pain: 5/10  Location: left hip    Objective     Jenifer received therapeutic exercises to develop strength, ROM and flexibility for 30 minutes including:  Nustep x 6 min for joint nutrition  Total gym 5 bands 3 min  DKTC with ball 2 min  LTR 2 min  Sidelying clam 3 second holds  Seated trunk FL with ball  Prone glut lift    Jenifer received the following manual therapy techniques: Joint mobilizations and Soft tissue Mobilization were applied to the: L hip/lower back for 15 minutes, including:  L hip distraction  STM to L glut med  L hip PROM    Jenifer received hot pack for 10 minutes to L hip.    Home Exercises Provided and Patient Education Provided     Education provided:   - Progression of rehab    Written Home Exercises Provided: Patient instructed to cont prior HEP.  Exercises were reviewed and Jenifer was able to demonstrate them prior to the end of the session.  Jenifer demonstrated good  understanding of the education provided.     See EMR under Patient Instructions for exercises provided prior visit.    Assessment     Patient demonstrated good tolerance to strengthening and more hip mobility training with minimal increase in  adverse symptoms.   Jenifer is progressing well towards her goals.   Pt prognosis is Good.     Pt will continue to benefit from skilled outpatient physical therapy to address the deficits listed in the problem list box on initial evaluation, provide pt/family education and to maximize pt's level of independence in the home and community environment.     Pt's spiritual, cultural and educational needs considered and pt agreeable to plan of care and goals.     Anticipated barriers to physical therapy: chronicity of symptoms    Patient Specific Goals: be able to walk better, decrease pain    Short Term Goals: In 3 weeks:  1.I with HEP  2.Patient to demo increased AROM /PROM to 75% L hip pain free  3.Patient to demo increase LE strength by 1/2 grade   4.Patient to have decreased pain to 3/10 with walking.     Long Term Goals: In 6 weeks  1. Patient to perform daily activities including walking without limitation.  2. Patient to demonstrate increased L hip ROM 90% pain free  3. Patient to demonstrate increased LE strength by 1 grade.  4. Patient to have decreased pain to zero with standing.    Plan     Gentle hip and lumbar mobility, more FL based, core/hip strengthening    Lloyd-Antionette Rojo, PT

## 2020-07-01 ENCOUNTER — CLINICAL SUPPORT (OUTPATIENT)
Dept: REHABILITATION | Facility: HOSPITAL | Age: 45
End: 2020-07-01
Payer: MEDICAID

## 2020-07-01 DIAGNOSIS — G89.29 CHRONIC HIP PAIN, LEFT: ICD-10-CM

## 2020-07-01 DIAGNOSIS — M25.552 CHRONIC HIP PAIN, LEFT: ICD-10-CM

## 2020-07-01 PROCEDURE — 97140 MANUAL THERAPY 1/> REGIONS: CPT

## 2020-07-01 PROCEDURE — 97110 THERAPEUTIC EXERCISES: CPT

## 2020-07-06 ENCOUNTER — CLINICAL SUPPORT (OUTPATIENT)
Dept: REHABILITATION | Facility: HOSPITAL | Age: 45
End: 2020-07-06
Payer: MEDICAID

## 2020-07-06 DIAGNOSIS — M25.552 CHRONIC HIP PAIN, LEFT: ICD-10-CM

## 2020-07-06 DIAGNOSIS — G89.29 CHRONIC HIP PAIN, LEFT: ICD-10-CM

## 2020-07-06 PROCEDURE — 97140 MANUAL THERAPY 1/> REGIONS: CPT

## 2020-07-06 PROCEDURE — 97110 THERAPEUTIC EXERCISES: CPT

## 2020-07-06 NOTE — PROGRESS NOTES
Physical Therapy Treatment Note     Name: Jenifer Westfall  Clinic Number: [id number]    Therapy Diagnosis:   Encounter Diagnosis   Name Primary?    Chronic hip pain, left      Physician: Adriana Mcneal PA-C    Visit Date: 7/6/2020    Physician Orders: PT Eval and Treat  Medical Diagnosis: Trochanteric bursitis of left hip  Evaluation Date: 6/17/2020  Authorization Period Expiration: 6/30/20  Plan of Care Certification Period: 7/17/20  Visit #/Visits authorized: 2/ 12     Time In: 11:45  Time Out: 12:45  Total Billable Time: 45 minutes    Precautions: Standard    Subjective     Pt reports: that she is feeling about the same as last time.  She was compliant with home exercise program.  Response to previous treatment: good  Functional change: none at this time    Pain: 5/10  Location: left hip    Objective     Jenifer received therapeutic exercises to develop strength, ROM and flexibility for 30 minutes including:  Nustep x 6 min for joint nutrition  Total gym 5 bands 3 min  DKTC with ball 2 min  LTR 2 min  Sidelying clam 3 second holds  Seated trunk FL with ball 3 ways  Prone glut lift    Jenifer received the following manual therapy techniques: Joint mobilizations and Soft tissue Mobilization were applied to the: L hip/lower back for 15 minutes, including:  L hip distraction  STM to L glut med  L hip PROM    Jenifer received hot pack for 10 minutes to L hip.    Home Exercises Provided and Patient Education Provided     Education provided:   - Progression of rehab    Written Home Exercises Provided: Patient instructed to cont prior HEP.  Exercises were reviewed and Jenifer was able to demonstrate them prior to the end of the session.  Jenifer demonstrated good  understanding of the education provided.     See EMR under Patient Instructions for exercises provided prior visit.    Assessment   Patient is still having a lot of tenderness to L hip, but was able to tolerate more pressure with manual therapy as well as noted  improvement in motion.    Jenifer is progressing well towards her goals.   Pt prognosis is Good.     Pt will continue to benefit from skilled outpatient physical therapy to address the deficits listed in the problem list box on initial evaluation, provide pt/family education and to maximize pt's level of independence in the home and community environment.     Pt's spiritual, cultural and educational needs considered and pt agreeable to plan of care and goals.     Anticipated barriers to physical therapy: chronicity of symptoms    Patient Specific Goals: be able to walk better, decrease pain    Short Term Goals: In 3 weeks:  1.I with HEP  2.Patient to demo increased AROM /PROM to 75% L hip pain free  3.Patient to demo increase LE strength by 1/2 grade   4.Patient to have decreased pain to 3/10 with walking.     Long Term Goals: In 6 weeks  1. Patient to perform daily activities including walking without limitation.  2. Patient to demonstrate increased L hip ROM 90% pain free  3. Patient to demonstrate increased LE strength by 1 grade.  4. Patient to have decreased pain to zero with standing.    Plan     Gentle hip and lumbar mobility, more FL based, core/hip strengthening    Lloyd-Antionette Rooj, PT

## 2020-07-07 NOTE — PROGRESS NOTES
Physical Therapy Treatment Note     Name: Jenifer Westfall  Clinic Number: [id number]    Therapy Diagnosis:   No diagnosis found.  Physician: Adriana Mcneal PA-C    Visit Date: 7/8/2020    Physician Orders: PT Eval and Treat  Medical Diagnosis: Trochanteric bursitis of left hip  Evaluation Date: 6/17/2020  Authorization Period Expiration: 6/30/20  Plan of Care Certification Period: 7/17/20  Visit #/Visits authorized: 3/ 12     Time In: 11:30  Time Out: 12:25  Total Billable Time: 45 minutes    Precautions: Standard    Subjective     Pt reports: that her hips is feeling a little better since last time. The pain seems less intense.  She was compliant with home exercise program.  Response to previous treatment: good  Functional change: improvement in symptoms    Pain: 4/10  Location: left hip    Objective     Jenifer received therapeutic exercises to develop strength, ROM and flexibility for 30 minutes including:  Nustep x 6 min for joint nutrition  Total gym 5 bands 3 min  Total gym 4 bands x 15  DKTC with ball 2 min  LTR 2 min  Sidelying clam 3 second holds  Prone glut lift    Jenifer received the following manual therapy techniques: Joint mobilizations and Soft tissue Mobilization were applied to the: L hip/lower back for 15 minutes, including:  L hip distraction  STM to L glut med  L hip PROM    Jenifer received hot pack for 10 minutes to L hip.    Home Exercises Provided and Patient Education Provided     Education provided:   - Progression of rehab    Written Home Exercises Provided: Patient instructed to cont prior HEP.  Exercises were reviewed and Jenifer was able to demonstrate them prior to the end of the session.  Jenifer demonstrated good  understanding of the education provided.     See EMR under Patient Instructions for exercises provided prior visit.    Assessment   Patient demonstrated better ability to lift legs while prone. However, did have increase in symptoms when performing SL total gym- improved by the end  of the session.    Jenifer is progressing well towards her goals.   Pt prognosis is Good.     Pt will continue to benefit from skilled outpatient physical therapy to address the deficits listed in the problem list box on initial evaluation, provide pt/family education and to maximize pt's level of independence in the home and community environment.     Pt's spiritual, cultural and educational needs considered and pt agreeable to plan of care and goals.     Anticipated barriers to physical therapy: chronicity of symptoms    Patient Specific Goals: be able to walk better, decrease pain    Short Term Goals: In 3 weeks:  1.I with HEP  2.Patient to demo increased AROM /PROM to 75% L hip pain free  3.Patient to demo increase LE strength by 1/2 grade   4.Patient to have decreased pain to 3/10 with walking.     Long Term Goals: In 6 weeks  1. Patient to perform daily activities including walking without limitation.  2. Patient to demonstrate increased L hip ROM 90% pain free  3. Patient to demonstrate increased LE strength by 1 grade.  4. Patient to have decreased pain to zero with standing.    Plan     Gentle hip and lumbar mobility, more FL based, core/hip strengthening    Lloyd-Antionette Rojo, PT

## 2020-07-08 ENCOUNTER — CLINICAL SUPPORT (OUTPATIENT)
Dept: REHABILITATION | Facility: HOSPITAL | Age: 45
End: 2020-07-08
Payer: MEDICAID

## 2020-07-08 DIAGNOSIS — M25.552 CHRONIC HIP PAIN, LEFT: ICD-10-CM

## 2020-07-08 DIAGNOSIS — G89.29 CHRONIC HIP PAIN, LEFT: ICD-10-CM

## 2020-07-08 PROCEDURE — 97140 MANUAL THERAPY 1/> REGIONS: CPT

## 2020-07-08 PROCEDURE — 97110 THERAPEUTIC EXERCISES: CPT

## 2020-07-17 ENCOUNTER — CLINICAL SUPPORT (OUTPATIENT)
Dept: REHABILITATION | Facility: HOSPITAL | Age: 45
End: 2020-07-17
Payer: MEDICAID

## 2020-07-17 DIAGNOSIS — R53.1 WEAKNESS: Primary | ICD-10-CM

## 2020-07-17 DIAGNOSIS — G89.29 CHRONIC HIP PAIN, LEFT: ICD-10-CM

## 2020-07-17 DIAGNOSIS — M25.552 CHRONIC HIP PAIN, LEFT: ICD-10-CM

## 2020-07-17 PROCEDURE — 97140 MANUAL THERAPY 1/> REGIONS: CPT | Mod: CQ

## 2020-07-17 PROCEDURE — 97110 THERAPEUTIC EXERCISES: CPT | Mod: CQ

## 2020-07-17 NOTE — PROGRESS NOTES
Physical Therapist Assistant Treatment Note     Name: Jenifer Westfall  Clinic Number: [id number]    Therapy Diagnosis:   No diagnosis found.  Physician: Adriana Mcneal PA-C    Visit Date: 7/17/2020    Physician Orders: PT Eval and Treat  Medical Diagnosis: Trochanteric bursitis of left hip  Evaluation Date: 6/17/2020  Authorization Period Expiration: 6/30/20  Plan of Care Certification Period: 7/17/20  Visit #/Visits authorized: 4/ 12     Time In: 11:40  Time Out: 12:55  Total Billable Time: 53 minutes    Precautions: Standard    Subjective     Pt reports:    She was compliant with home exercise program.  Response to previous treatment: good  Functional change: sometimes able to walk further    Pain: 2/10  Location: left hip    Objective     Jenifer received therapeutic exercises to develop strength, ROM and flexibility for 38 minutes including:  Nustep x 6 min for joint nutrition  Total gym 5 bands 3 min  Total gym LLE 3 bands x 15  DKTC with ball 2 min-not today  LTR with ball 3 min  PPT- very challenged  Bridge  Sidelying clam 3 second holds 2 min  Prone glut lift  Seated ball roll out spinal stretches x10  Seated figure four hip stretch- very limited x3    Jenifer received the following manual therapy techniques: Joint mobilizations and Soft tissue Mobilization were applied to the: L hip/lower back for 15 minutes, including:  L hip distraction  STM to L glut / low back  Hamstring stretches  Sciatic nerve glides supine and sitting      Jenifer received hot pack for 10 minutes to L hip and low back    Home Exercises Provided and Patient Education Provided     Education provided:   - Progression of rehab  - HEP review    Written Home Exercises Provided: Patient instructed to cont prior HEP.  Exercises were reviewed and Jenifer was able to demonstrate them prior to the end of the session.  Jenifer demonstrated good  understanding of the education provided.     See EMR under Patient Instructions for exercises provided prior  "visit.    Assessment   Jenifer presents today with minimal left hip pain stating she "did not do too much today and feels better." She had moderate tenderness to minimal depth palpation in her left low back/SI and glute areas. Today, she again had symptoms increase with single limb shuttle squats, however with reduced resistance, her symptoms were alleviated. Jenifer was very challenged with performing pelvic tilts and demonstrated poor mobility. She required verbal and tactile cues with her exercises for proper form and alignment and does better with flexion based activities.  Jenifer reported pain relief end of session.   Jenifer is progressing well towards her goals.   Pt prognosis is Good.     Pt will continue to benefit from skilled outpatient physical therapy to address the deficits listed in the problem list box on initial evaluation, provide pt/family education and to maximize pt's level of independence in the home and community environment.     Pt's spiritual, cultural and educational needs considered and pt agreeable to plan of care and goals.     Anticipated barriers to physical therapy: chronicity of symptoms    Patient Specific Goals: be able to walk better, decrease pain    Short Term Goals: In 3 weeks:  1.I with HEP  2.Patient to demo increased AROM /PROM to 75% L hip pain free  3.Patient to demo increase LE strength by 1/2 grade   4.Patient to have decreased pain to 3/10 with walking.     Long Term Goals: In 6 weeks  1. Patient to perform daily activities including walking without limitation.  2. Patient to demonstrate increased L hip ROM 90% pain free  3. Patient to demonstrate increased LE strength by 1 grade.  4. Patient to have decreased pain to zero with standing.    Plan     Gentle hip and lumbar mobility, more FL based, core/hip strengthening    Alicia Ennis PTA     "

## 2020-07-20 ENCOUNTER — CLINICAL SUPPORT (OUTPATIENT)
Dept: REHABILITATION | Facility: HOSPITAL | Age: 45
End: 2020-07-20
Payer: MEDICAID

## 2020-07-20 DIAGNOSIS — G89.29 CHRONIC HIP PAIN, LEFT: Primary | ICD-10-CM

## 2020-07-20 DIAGNOSIS — R53.1 WEAKNESS: ICD-10-CM

## 2020-07-20 DIAGNOSIS — M25.552 CHRONIC HIP PAIN, LEFT: Primary | ICD-10-CM

## 2020-07-20 PROCEDURE — 97110 THERAPEUTIC EXERCISES: CPT | Mod: CQ

## 2020-07-20 PROCEDURE — 97140 MANUAL THERAPY 1/> REGIONS: CPT | Mod: CQ

## 2020-07-20 NOTE — PROGRESS NOTES
Physical Therapist Assistant Treatment Note     Name: Jenifer Westfall  Clinic Number: [id number]    Therapy Diagnosis:   Encounter Diagnosis   Name Primary?    Chronic hip pain, left Yes     Physician: Adriana Mcneal PA-C    Visit Date: 7/20/2020    Physician Orders: PT Eval and Treat  Medical Diagnosis: Trochanteric bursitis of left hip  Evaluation Date: 6/17/2020  Authorization Period Expiration: 6/30/20  Plan of Care Certification Period: 7/17/20  Visit #/Visits authorized: 5/ 12     Time In: 2:20  Time Out: 3:25  Total Billable Time: 55 minutes    Precautions: Standard    Subjective     Pt reports: she is feeling better and not having any pain at the moment.    She was compliant with home exercise program.  Response to previous treatment: good  Functional change: sometimes able to walk further    Pain: 0/10  Location: NA     Objective     Jenifer received therapeutic exercises to develop strength, ROM and flexibility for 40 minutes including:  Nustep x 6 min for joint nutrition  DKTC with ball  LTR with ball   Bridge   PPT  Sidelying clam 3 second holds w/TB   Prone glut lift  Seated ball roll out spinal stretches  Seated figure four hip stretch- very limited     Not today   Total gym 5 bands 3 min  Total gym LLE 3 bands x 15    Jenifer received the following manual therapy techniques: Joint mobilizations and Soft tissue Mobilization were applied to the: L hip for 15 minutes, including:  L hip distraction  STM to L glut   Hamstring stretches  Sciatic nerve glides supine and sitting      Jenifer received hot pack for 10 minutes to L hip and low back    Home Exercises Provided and Patient Education Provided     Education provided:   - Progression of rehab  - HEP review    Written Home Exercises Provided: Patient instructed to cont prior HEP.  Exercises were reviewed and Jenifer was able to demonstrate them prior to the end of the session.  Jenifer demonstrated good  understanding of the education provided.     See EMR under  Patient Instructions for exercises provided prior visit.    Assessment   Jenifer presents today with having little to no pain. She stated she had relief for a couple of hours after last therapy session. Patient had moderate tenderness to minimal depth palpation in her left glute area. She was able to tolerate prone exercises better today and was able to lift her leg off the mat without increase in pain. She continues to require verbal and tactile cues with her exercises for proper form and alignment. She reported pain relief at the end of therapy session.     Jenifer is progressing well towards her goals.   Pt prognosis is Good.     Pt will continue to benefit from skilled outpatient physical therapy to address the deficits listed in the problem list box on initial evaluation, provide pt/family education and to maximize pt's level of independence in the home and community environment.     Pt's spiritual, cultural and educational needs considered and pt agreeable to plan of care and goals.     Anticipated barriers to physical therapy: chronicity of symptoms    Patient Specific Goals: be able to walk better, decrease pain    Short Term Goals: In 3 weeks:  1.I with HEP  2.Patient to demo increased AROM /PROM to 75% L hip pain free  3.Patient to demo increase LE strength by 1/2 grade   4.Patient to have decreased pain to 3/10 with walking.     Long Term Goals: In 6 weeks  1. Patient to perform daily activities including walking without limitation.  2. Patient to demonstrate increased L hip ROM 90% pain free  3. Patient to demonstrate increased LE strength by 1 grade.  4. Patient to have decreased pain to zero with standing.    Plan     Gentle hip and lumbar mobility, more FL based, core/hip strengthening    Carla Benson, BRENNON

## 2020-07-23 ENCOUNTER — CLINICAL SUPPORT (OUTPATIENT)
Dept: REHABILITATION | Facility: HOSPITAL | Age: 45
End: 2020-07-23
Payer: MEDICAID

## 2020-07-23 DIAGNOSIS — R53.1 WEAKNESS: ICD-10-CM

## 2020-07-23 DIAGNOSIS — G89.29 CHRONIC HIP PAIN, LEFT: Primary | ICD-10-CM

## 2020-07-23 DIAGNOSIS — M25.552 CHRONIC HIP PAIN, LEFT: Primary | ICD-10-CM

## 2020-07-23 DIAGNOSIS — M70.62 TROCHANTERIC BURSITIS OF LEFT HIP: ICD-10-CM

## 2020-07-23 PROCEDURE — 97140 MANUAL THERAPY 1/> REGIONS: CPT | Mod: CQ

## 2020-07-23 PROCEDURE — 97110 THERAPEUTIC EXERCISES: CPT | Mod: CQ

## 2020-07-23 NOTE — PROGRESS NOTES
Physical Therapist Assistant Treatment Note     Name: Jenifer Westfall  Clinic Number: [id number]    Therapy Diagnosis:   Encounter Diagnoses   Name Primary?    Chronic hip pain, left Yes    Weakness     Trochanteric bursitis of left hip      Physician: Adriana Mcneal PA-C    Visit Date: 7/23/2020    Physician Orders: PT Eval and Treat  Medical Diagnosis: Trochanteric bursitis of left hip  Evaluation Date: 6/17/2020  Authorization Period Expiration: 6/30/20  Plan of Care Certification Period: 7/17/20  Visit #/Visits authorized: 6/ 12     Time In: 3:50  Time Out: 4:53  Total Billable Time: 53 minutes    Precautions: Standard    Subjective     Pt reports: not having any pain at the moment.    She was compliant with home exercise program.  Response to previous treatment: good  Functional change: sometimes able to walk further    Pain: 0/10  Location: NA     Objective     Jenifer received therapeutic exercises to develop strength, ROM and flexibility for 43 minutes including:  Bike x 3 min P!  LTR with ball   SL Bridges   Matrix Hip ABD   Prone glute iso w/ heel press   Piriformis stretch   Prone glut lift    Not today   Total gym 5 bands 3 min  Total gym LLE 3 bands x 15  Seated ball roll out spinal stretches  Seated figure four hip stretch- very limited     Jenifer received the following manual therapy techniques: Joint mobilizations and Soft tissue Mobilization were applied to the: L hip for 10 minutes, including:  L hip distraction  STM to L glut   Hamstring stretches    Jenifer received hot pack for 10 minutes to L hip and low back    Home Exercises Provided and Patient Education Provided     Education provided:   - Progression of rehab  - HEP review    Written Home Exercises Provided: Patient instructed to cont prior HEP.  Exercises were reviewed and Jenifer was able to demonstrate them prior to the end of the session.  Jenifer demonstrated good  understanding of the education provided.     See EMR under Patient  Instructions for exercises provided prior visit.    Assessment   Jenifer presents today with having little to no pain and moderate antalgic gait pattern. She stated she begins to have pain whenever she begins her exercises. The pain is the worst whenever she walks for long periods of time. Patient had moderate tenderness to minimal depth palpation in her left glute area. Additional exercises were added to focus on increased glute activity. Severe left glute weakness noted by compensating the right side during therex. She continues to require verbal and tactile cues with her exercises for proper form and alignment. She did not have any pain and patient was more symmetrical in her gait pattern leaving therapy session.     Jenifer is progressing well towards her goals.   Pt prognosis is Good.     Pt will continue to benefit from skilled outpatient physical therapy to address the deficits listed in the problem list box on initial evaluation, provide pt/family education and to maximize pt's level of independence in the home and community environment.     Pt's spiritual, cultural and educational needs considered and pt agreeable to plan of care and goals.     Anticipated barriers to physical therapy: chronicity of symptoms    Patient Specific Goals: be able to walk better, decrease pain    Short Term Goals: In 3 weeks:  1.I with HEP  2.Patient to demo increased AROM /PROM to 75% L hip pain free  3.Patient to demo increase LE strength by 1/2 grade   4.Patient to have decreased pain to 3/10 with walking.     Long Term Goals: In 6 weeks  1. Patient to perform daily activities including walking without limitation.  2. Patient to demonstrate increased L hip ROM 90% pain free  3. Patient to demonstrate increased LE strength by 1 grade.  4. Patient to have decreased pain to zero with standing.    Plan     Gentle hip and lumbar mobility, core/hip strengthening    BRENNON Dietrich

## 2020-07-27 ENCOUNTER — CLINICAL SUPPORT (OUTPATIENT)
Dept: REHABILITATION | Facility: HOSPITAL | Age: 45
End: 2020-07-27
Payer: MEDICAID

## 2020-07-27 DIAGNOSIS — M25.552 CHRONIC HIP PAIN, LEFT: ICD-10-CM

## 2020-07-27 DIAGNOSIS — G89.29 CHRONIC HIP PAIN, LEFT: ICD-10-CM

## 2020-07-27 PROCEDURE — 97110 THERAPEUTIC EXERCISES: CPT

## 2020-07-27 PROCEDURE — 97140 MANUAL THERAPY 1/> REGIONS: CPT

## 2020-07-27 NOTE — PROGRESS NOTES
PHYSICAL THERAPY TREATMENT NOTE    Name: Jenifer Westfall  Clinic Number: [id number]    Therapy Diagnosis:   Encounter Diagnosis   Name Primary?    Chronic hip pain, left      Physician: Adriana Mcneal PA-C    Visit Date: 7/27/2020    Physician Orders: PT Eval and Treat  Medical Diagnosis: Trochanteric bursitis of left hip  Evaluation Date: 6/17/2020  Authorization Period Expiration: 6/30/20  Plan of Care Certification Period: 8/27/20  Visit #/Visits authorized: 7/ 12     Time In: 11:45  Time Out: 12:40  Total Billable Time: 45 minutes    Precautions: Standard    Subjective     Pt reports: see in [facility]   She was compliant with home exercise program.  Response to previous treatment: good  Functional change: sometimes able to walk further    Pain: 3/10  Location: L hip    Objective     Jenifer received therapeutic exercises to develop strength, ROM and flexibility for 35 minutes including:  Nustep x 5 min for joint nutrition  Standing hip hike on L side  Standing heel raises 3 second holds  Half sit<>stands  Prone hip IR/ER  Prone glut lift  Bridge with hip AB against TB  Sidelying clam 5 second holds against TB  Total gym 5 bands 3 min    Jenifer received the following manual therapy techniques: Joint mobilizations and Soft tissue Mobilization were applied to the: L hip for 10 minutes, including:  L hip distraction  STM to L glut   PROM to L hip    Jenifer received hot pack for 10 minutes to L hip and low back    Home Exercises Provided and Patient Education Provided     Education provided:   - Progression of rehab  - HEP review    Written Home Exercises Provided: Patient instructed to cont prior HEP.  Exercises were reviewed and Jenifer was able to demonstrate them prior to the end of the session.  Jenifer demonstrated good  understanding of the education provided.     See EMR under Patient Instructions for exercises provided prior visit.    Assessment   See in [facility]    Jenifer is progressing well towards her goals.   Pt prognosis  is Good.     Pt will continue to benefit from skilled outpatient physical therapy to address the deficits listed in the problem list box on initial evaluation, provide pt/family education and to maximize pt's level of independence in the home and community environment.     Pt's spiritual, cultural and educational needs considered and pt agreeable to plan of care and goals.     Anticipated barriers to physical therapy: chronicity of symptoms    Patient Specific Goals: be able to walk better, decrease pain    Short Term Goals: In 3 weeks:  1.I with HEP MET  2.Patient to demo increased AROM /PROM to 75% L hip pain free ALMOST MET  3.Patient to demo increase LE strength by 1/2 grade  NOT MET  4.Patient to have decreased pain to 3/10 with walking. ALMOST MET     Long Term Goals: In 6 weeks  1. Patient to perform daily activities including walking without limitation. NOT MET  2. Patient to demonstrate increased L hip ROM 90% pain free NOT MET  3. Patient to demonstrate increased LE strength by 1 grade. NOT MET  4. Patient to have decreased pain to zero with standing. NOT MET    Plan     Gentle hip and lumbar mobility, core/hip strengthening    Megan Rojo, PT

## 2020-07-27 NOTE — PLAN OF CARE
Outpatient Therapy Updated Plan of Care     Visit Date: 7/27/2020  Name: Jenifer Westfall  Clinic Number: [id number]    Therapy Diagnosis:   Encounter Diagnosis   Name Primary?    Chronic hip pain, left      Physician: Adriana Mcneal PA-C    Physician Orders: PT Eval and Treat  Medical Diagnosis: Trochanteric bursitis of left hip  Evaluation Date: 6/17/20    Total Visits Received: 7  Cancelled Visits: 2  No Show Visits: 0    Current Certification Period:  7/27/20 to 8/27/20  Precautions:  Standard  Visits from Evaluation Date:  7  Functional Level Prior to Evaluation:  IND    Subjective     Update: Patient reports that she has been feeling more irritation in her L hip area, than in the lower left side of her back.    Objective     Update:     CMS Impairment/Limitation/Restriction for FOTO Hip Survey    Therapist reviewed FOTO scores for Jenifer Westfall on 7/27/2020.   FOTO documents entered into [employer] - see Media section.    Limitation Score: 48%       Gait: antalgic, decreased weight shift to the L side     Squat: Double leg: decreased weight shift              Single leg: DNT     Balance: fair + due to decrease in weight shift to the L side     Reflex/Sensation: increased sensitivity along L sciatic nerve as well as the lateral hip     Hip PROM %:                                                 (L)        (R)                                      Flexion                         90% p! 100%                                      Extension                    50%     75%                                      Abduction                    75%  100%                                      Adduction                    75% p! 100%                                      External rotation          90%     100%                                      Internal rotation           90% p! 100%     Lumbar FL: 100%  with increase in pain to posterior hip  Lumbar EX: 50% increase in pain and pain down leg                Strength:                     Hip flexors                   4/5     4/5  Quadriceps                  4/5       4/5                                             Hamstrings                  4/5       4/5                                      Anterior Tibialis           4/5       4/5                                      Peroneals                    4/5       4/5                                      Gastrocnemius            4/5       4/5                                      Adductors                    3+/5     3+/5                                      External rotators         3+/5     3+/5                                      Internal rotators           3+/5     3+/5                                      Gluteus Medius           3+/5     3+/5                                      Gluteus Jamarcus        3+/5     3+/5     Joint Mobility: hypomobile to L hip        PIVM Lumbar: tender and more hypomobile on the L side     Special Test:              CARLOS                        p!         FADIR                         p!                                      Scouring                      neg      SLR L: positive                                           Tenderness to palpation: Tender to palpate along L lateral hip and along L lumbar paraspinals and along L TFL     Lower Limb Tension Test:  Pos sciatic nerve on the L side    Assessment     Update: Patient has demonstrated improvement in ROM, improvement in pain/adverse symptoms, improvement in strength, and improvement in tolerance to activity since starting skilled services.    Short Term Goals: In 3 weeks:  1.I with HEP MET  2.Patient to demo increased AROM /PROM to 75% L hip pain free ALMOST MET  3.Patient to demo increase LE strength by 1/2 grade  NOT MET  4.Patient to have decreased pain to 3/10 with walking. ALMOST MET     Long Term Goals: In 6 weeks  1. Patient to perform daily activities including walking without limitation. NOT MET  2. Patient to demonstrate increased L hip ROM 90% pain free NOT  MET  3. Patient to demonstrate increased LE strength by 1 grade. NOT MET  4. Patient to have decreased pain to zero with standing. NOT MET    Reasons for Recertification of Therapy:   Patient is still having a lot of difficulty with walking, increased pain with standing, and decreased tolerance to activities. Patient needs continued skilled therapy in order to improve ROM, improve pain/adverse symptoms, improve strength, and tolerance to activity in order to improve quality of life and return to PLOF.    Plan     Updated Certification Period: 7/27/2020 to 8/27/20  Recommended Treatment Plan: 2 times per week for 4 weeks: Electrical Stimulation tens, Manual Therapy, Moist Heat/ Ice, Neuromuscular Re-ed, Patient Education, Therapeutic Activites and Therapeutic Exercise  Other Recommendations: luis armando Rojo, PT  7/27/2020      I CERTIFY THE NEED FOR THESE SERVICES FURNISHED UNDER THIS PLAN OF TREATMENT AND WHILE UNDER MY CARE    Physician's comments:        Physician's Signature: ___________________________________________________

## 2020-07-29 ENCOUNTER — CLINICAL SUPPORT (OUTPATIENT)
Dept: REHABILITATION | Facility: HOSPITAL | Age: 45
End: 2020-07-29
Payer: MEDICAID

## 2020-07-29 DIAGNOSIS — M25.552 CHRONIC HIP PAIN, LEFT: ICD-10-CM

## 2020-07-29 DIAGNOSIS — G89.29 CHRONIC HIP PAIN, LEFT: ICD-10-CM

## 2020-07-29 DIAGNOSIS — M25.511 RIGHT SHOULDER PAIN, UNSPECIFIED CHRONICITY: Primary | ICD-10-CM

## 2020-07-29 PROCEDURE — 97140 MANUAL THERAPY 1/> REGIONS: CPT

## 2020-07-29 PROCEDURE — 97110 THERAPEUTIC EXERCISES: CPT

## 2020-07-29 NOTE — PROGRESS NOTES
PHYSICAL THERAPY TREATMENT NOTE    Name: Jenifer Westfall  Clinic Number: [id number]    Therapy Diagnosis:   Encounter Diagnosis   Name Primary?    Chronic hip pain, left      Physician: Adriana Mcneal PA-C    Visit Date: 7/29/2020    Physician Orders: PT Eval and Treat  Medical Diagnosis: Trochanteric bursitis of left hip  Evaluation Date: 6/17/2020  Authorization Period Expiration: 6/30/20  Plan of Care Certification Period: 8/27/20  Visit #/Visits authorized: 8/ 12     Time In: 11:30  Time Out: 12:15  Total Billable Time: 40 minutes    Precautions: Standard    Subjective     Pt reports: that she feels about the same   She was compliant with home exercise program.  Response to previous treatment: good  Functional change: sometimes able to walk further    Pain: 3/10  Location: L hip    Objective     Jenifer received therapeutic exercises to develop strength, ROM and flexibility for 35 minutes including:  Nustep x 6 min for joint nutrition  Standing glut med against TB  Standing heel raises 3 second holds  Prone hip IR/ER  Prone glut lift  Low Bridge 5 second holds  Sidelying hip AB  Total gym 5 bands 3 min    Jenifer received the following manual therapy techniques: Joint mobilizations and Soft tissue Mobilization were applied to the: L hip for 10 minutes, including:  L hip distraction  STM to L glut   PROM to L hip    Jenifer received hot pack for 10 minutes to L hip and low back    Home Exercises Provided and Patient Education Provided     Education provided:   - Progression of rehab  - HEP review    Written Home Exercises Provided: Patient instructed to cont prior HEP.  Exercises were reviewed and Jenifer was able to demonstrate them prior to the end of the session.  Jenifer demonstrated good  understanding of the education provided.     See EMR under Patient Instructions for exercises provided prior visit.    Assessment   Patient demonstrated good tolerance to more standing exercises.     Jenifer is progressing well towards  her goals.   Pt prognosis is Good.     Pt will continue to benefit from skilled outpatient physical therapy to address the deficits listed in the problem list box on initial evaluation, provide pt/family education and to maximize pt's level of independence in the home and community environment.     Pt's spiritual, cultural and educational needs considered and pt agreeable to plan of care and goals.     Anticipated barriers to physical therapy: chronicity of symptoms    Patient Specific Goals: be able to walk better, decrease pain    Short Term Goals: In 3 weeks:  1.I with HEP MET  2.Patient to demo increased AROM /PROM to 75% L hip pain free ALMOST MET  3.Patient to demo increase LE strength by 1/2 grade  NOT MET  4.Patient to have decreased pain to 3/10 with walking. ALMOST MET     Long Term Goals: In 6 weeks  1. Patient to perform daily activities including walking without limitation. NOT MET  2. Patient to demonstrate increased L hip ROM 90% pain free NOT MET  3. Patient to demonstrate increased LE strength by 1 grade. NOT MET  4. Patient to have decreased pain to zero with standing. NOT MET    Plan     Gentle hip and lumbar mobility, core/hip strengthening    Lloyd-Antionette Rojo, PT

## 2020-07-30 ENCOUNTER — HOSPITAL ENCOUNTER (OUTPATIENT)
Dept: RADIOLOGY | Facility: HOSPITAL | Age: 45
Discharge: HOME | End: 2020-07-30
Attending: ORTHOPAEDIC SURGERY
Payer: MEDICAID

## 2020-07-30 ENCOUNTER — OFFICE VISIT (OUTPATIENT)
Dept: ORTHOPEDICS | Facility: CLINIC | Age: 45
End: 2020-07-30
Payer: MEDICAID

## 2020-07-30 VITALS — WEIGHT: 211 LBS | BODY MASS INDEX: 29.54 KG/M2 | HEIGHT: 71 IN

## 2020-07-30 DIAGNOSIS — M25.552 LEFT HIP PAIN: Primary | ICD-10-CM

## 2020-07-30 DIAGNOSIS — M25.511 RIGHT SHOULDER PAIN, UNSPECIFIED CHRONICITY: ICD-10-CM

## 2020-07-30 DIAGNOSIS — M70.62 TROCHANTERIC BURSITIS OF LEFT HIP: Primary | ICD-10-CM

## 2020-07-30 PROCEDURE — 73030 X-RAY EXAM OF SHOULDER: CPT | Mod: 26,RT,, | Performed by: RADIOLOGY

## 2020-07-30 PROCEDURE — 99999 PR PBB SHADOW E&M-EST. PATIENT-LVL V: CPT | Mod: PBBFAC,,, | Performed by: ORTHOPAEDIC SURGERY

## 2020-07-30 PROCEDURE — 99999 PR PBB SHADOW E&M-EST. PATIENT-LVL V: ICD-10-PCS | Mod: PBBFAC,,, | Performed by: ORTHOPAEDIC SURGERY

## 2020-07-30 PROCEDURE — 73030 X-RAY EXAM OF SHOULDER: CPT | Mod: TC,RT

## 2020-07-30 PROCEDURE — 73030 XR SHOULDER COMPLETE 2 OR MORE VIEWS RIGHT: ICD-10-PCS | Mod: 26,RT,, | Performed by: RADIOLOGY

## 2020-07-30 PROCEDURE — 99215 OFFICE O/P EST HI 40 MIN: CPT | Mod: PBBFAC,25 | Performed by: ORTHOPAEDIC SURGERY

## 2020-07-30 PROCEDURE — 99214 OFFICE O/P EST MOD 30 MIN: CPT | Mod: S$PBB,,, | Performed by: ORTHOPAEDIC SURGERY

## 2020-07-30 PROCEDURE — 99214 PR OFFICE/OUTPT VISIT, EST, LEVL IV, 30-39 MIN: ICD-10-PCS | Mod: S$PBB,,, | Performed by: ORTHOPAEDIC SURGERY

## 2020-07-30 RX ORDER — PENICILLIN V POTASSIUM 500 MG/1
500 TABLET, FILM COATED ORAL
COMMUNITY
Start: 2020-07-28 | End: 2020-08-04

## 2020-07-30 RX ORDER — INSULIN ASPART 100 [IU]/ML
INJECTION, SOLUTION INTRAVENOUS; SUBCUTANEOUS
COMMUNITY
Start: 2020-07-15

## 2020-07-30 NOTE — PATIENT INSTRUCTIONS
Improved right RTC tearing after injections and PT    Continue therapy for Left hip    Troch bursitis left hip    Re-eval after 6 more weeks of PT, get left hip x-rays at the time

## 2020-07-30 NOTE — PROGRESS NOTES
Patient ID: Jenifer Westfall  YOB: 1975  MRN: [id number]    Chief Complaint: Pain of the Right Shoulder    Referred By: Previous Dr. Hammond patient    History of Present Illness: Jenifer Westfall is a right-hand dominant 45 y.o. female, previous Dr. Hammond patient, here for a re-eval of her right shoulder.     Shoulder Pain   The pain is present in the right shoulder. The pain radiates to the right neck. The current episode started more than 1 year ago. The problem occurs intermittently. The problem has been unchanged. The quality of the pain is described as burning and throbbing. The pain is at a severity of 4/10. Associated symptoms include a limited range of motion. Pertinent negatives include no fever or itching. The symptoms are aggravated by lifting, activity and exercise. She has tried OTC pain meds for the symptoms. The treatment provided mild relief. Physical therapy was effective (Been out of PT since January).      Past Medical History:   Past Medical History:   Diagnosis Date    Arthritis     DM (diabetes mellitus)      2017 Insulin x 3 years     Hepatitis C antibody positive in blood     Hyperlipidemia     Hypertension     IBS (irritable bowel syndrome)     Kidney stone     Marfan syndrome     Mitral valve prolapse      Past Surgical History:   Procedure Laterality Date    ANGIOGRAPHY OF LOWER EXTREMITY N/A 2018    Procedure: ANGIOGRAM, LOWER EXTREMITY- LEFT LEG;  Surgeon: Roscoe Landeros MD;  Location: [facility] CATH LAB;  Service: Peripheral Vascular;  Laterality: N/A;    APPENDECTOMY      BUNIONECTOMY      both feet    cataract surgery  2019     SECTION      x 2    CHOLECYSTECTOMY      COLONOSCOPY N/A 2020    Procedure: COLONOSCOPY w/ biopsies;  Surgeon: Gio Georges MD;  Location: [facility] ENDO;  Service: Endoscopy;  Laterality: N/A;    ESOPHAGOGASTRODUODENOSCOPY N/A 2019    Procedure: ESOPHAGOGASTRODUODENOSCOPY (EGD);   "Surgeon: Jaron Sanders III, MD;  Location[facility];  Service: Endoscopy;  Laterality: N/A;    TUBAL LIGATION       Family History   Problem Relation Age of Onset    Heart disease Mother     Thrombophilia Father         DVT    Hypertension Father     Stroke Maternal Aunt     Heart disease Maternal Aunt     Stroke Maternal Uncle     Heart disease Maternal Uncle     Breast cancer Neg Hx     Colon cancer Neg Hx     Ovarian cancer Neg Hx      Social History     Socioeconomic History    Marital status: Single     Spouse name: Not on file    Number of children: Not on file    Years of education: Not on file    Highest education level: Not on file   Occupational History    Not on file   Social Needs    Financial resource strain: Not on file    Food insecurity     Worry: Not on file     Inability: Not on file    Transportation needs     Medical: Not on file     Non-medical: Not on file   Tobacco Use    Smoking status: Former Smoker     Packs/day: 0.20     Years: 20.00     Pack years: 4.00     Types: Cigarettes     Quit date: 2015     Years since quittin.3    Smokeless tobacco: Never Used    Tobacco comment: "once in a blue moon"   Substance and Sexual Activity    Alcohol use: No     Alcohol/week: 0.0 standard drinks    Drug use: No    Sexual activity: Not on file   Lifestyle    Physical activity     Days per week: Not on file     Minutes per session: Not on file    Stress: Not on file   Relationships    Social connections     Talks on phone: Not on file     Gets together: Not on file     Attends Spiritism service: Not on file     Active member of club or organization: Not on file     Attends meetings of clubs or organizations: Not on file     Relationship status: Not on file   Other Topics Concern    Not on file   Social History Narrative    Not on file     Medication List with Changes/Refills   Current Medications    ADMELOG SOLOSTAR U-100 INSULIN 100 UNIT/ML PEN        ASCORBIC " "ACID (VITAMIN C) 100 MG TABLET    Take 100 mg by mouth once daily.    ASPIRIN (ECOTRIN) 81 MG EC TABLET    Take 81 mg by mouth once daily.    ATENOLOL (TENORMIN) 50 MG TABLET    TAKE 1 TABLET BY MOUTH ONCE DAILY    ATORVASTATIN (LIPITOR) 80 MG TABLET    TAKE 1 TABLET BY MOUTH ONCE DAILY.    BASAGLAR KWIKPEN U-100 INSULIN GLARGINE 100 UNITS/ML (3ML) SUBQ PEN        CETIRIZINE (ZYRTEC) 10 MG TABLET    Take 10 mg by mouth.    CLOPIDOGREL (PLAVIX) 75 MG TABLET    TAKE 1 TABLET (75 MG TOTAL) BY MOUTH ONCE DAILY.    COLESTIPOL (COLESTID) 1 GRAM TAB    TAKE 2 TABLETS BY MOUTH THREE TIMES DAILY. DO NOT TAKE OTHER MEDICATION WITHIN 1 HOUR BEFORE OR 4 HOURS AFTER TAKING COLESTIPOL.    DICLOFENAC SODIUM (VOLTAREN) 1 % GEL    Apply 2 grams topically 4 (four) times daily. for 10 days    DICYCLOMINE (BENTYL) 20 MG TABLET    T1T PO QID    FAMOTIDINE (PEPCID) 20 MG TABLET    TAKE 1 TABLET BY MOUTH 2 (TWO) TIMES DAILY.    FLUOXETINE (PROZAC) 40 MG CAPSULE    TAKE 1 CAPSULE BY MOUTH ONCE DAILY    GABAPENTIN (NEURONTIN) 400 MG CAPSULE     TID    GLIPIZIDE (GLUCOTROL) 10 MG TABLET    Take 1 tablet (10 mg total) by mouth 2 (two) times daily.    INSULIN GLARGINE,HUM.REC.ANLOG (BASAGLAR KWIKPEN U-100 INSULIN SUBQ)    Inject 40 Units into the skin 2 (two) times daily.    KETOROLAC 0.5% (ACULAR) 0.5 % DROP        LIRAGLUTIDE 0.6 MG/0.1 ML, 18 MG/3 ML, SUBQ PNIJ (VICTOZA 2-DIAN) 0.6 MG/0.1 ML (18 MG/3 ML) PNIJ    Inject 1.2 Units into the skin.    LISINOPRIL 10 MG TABLET    TAKE 1 TABLET (10 MG TOTAL) BY MOUTH ONCE DAILY.    METOCLOPRAMIDE HCL (REGLAN) 10 MG TABLET    TAKE 1 TABLET BY MOUTH 2 (TWO) TIMES DAILY BEFORE MEALS.    NOVOLOG FLEXPEN U-100 INSULIN 100 UNIT/ML (3 ML) INPN PEN        ONDANSETRON (ZOFRAN) 4 MG TABLET    TAKE 1 TABLET (4 MG TOTAL) BY MOUTH 2 (TWO) TIMES DAILY.    PANTOPRAZOLE (PROTONIX) 40 MG TABLET    T1T PO ONCE DAILY    PEN NEEDLE 31 GAUGE X 5/16" NDLE    USE 5 TIMES DAILY WITH LANTUS AND HUMALOG    PENICILLIN V " POTASSIUM (VEETID) 500 MG TABLET    Take 500 mg by mouth.    TIZANIDINE (ZANAFLEX) 4 MG TABLET    TAKE 1 TO 1 & 1/2 TABLET (4-6 MG TOTAL) BY MOUTH NIGHTLY AS NEEDED.    TRAMADOL (ULTRAM) 50 MG TABLET    TAKE 1 TABLET BY MOUTH EVERY 12 HOURS    TRUE METRIX GLUCOSE TEST STRIP STRP         Review of patient's allergies indicates:   Allergen Reactions    Compazine [prochlorperazine edisylate] Rash    Contrast media Anaphylaxis    Dye Anaphylaxis    Levaquin [levofloxacin] Hives and Itching    Metformin Other (See Comments)     Upset stomach    Ibuprofen Other (See Comments)     Stomach pain    Morphine Other (See Comments)     Irritable     Review of Systems   Constitution: Negative for fever.   HENT: Negative for sore throat.    Eyes: Negative for blurred vision.   Cardiovascular: Negative for dyspnea on exertion.   Respiratory: Negative for shortness of breath.    Hematologic/Lymphatic: Does not bruise/bleed easily.   Skin: Negative for itching.   Musculoskeletal: Positive for joint pain.   Gastrointestinal: Negative for vomiting.   Genitourinary: Negative for dysuria.   Neurological: Negative for dizziness.   Psychiatric/Behavioral: The patient does not have insomnia.        Physical Exam:   Body mass index is 29.43 kg/m².  There were no vitals filed for this visit.   GENERAL: Well appearing, appropriate for stated age, no acute distress.  CARDIOVASCULAR: Pulses regular by peripheral palpation.  PULMONARY: Respirations are even and non-labored.  NEURO: Awake, alert, and oriented x 3.  PSYCH: Mood & affect are appropriate.  HEENT: Head is normocephalic and atraumatic.  Ortho/SPM Exam  Right shoulder:  Pain is significantly improved.  Has full active motion including elevation internal and external rotation and has 5/5 supraspinatus infraspinatus and subscap strength.  She is neurovascular intact distally.  She had mildly positive impingement sign.  No visible deformity and no point tenderness.  Left hip:  She  localizes most of her pain to the lateral side of the hip and has some tenderness over her troch bursa.  She is grossly neurovascular intact and has good motor strength but some weakness to abduction type maneuvers.    Imaging:    X-ray Shoulder 2 or More Views Right  Narrative: EXAMINATION:  XR SHOULDER COMPLETE 2 OR MORE VIEWS RIGHT    CLINICAL HISTORY:  Pain in right shoulder    TECHNIQUE:  Two or three views of the right shoulder were performed.    COMPARISON:  08/02/2019    FINDINGS:  No acute osseous abnormality.  Similar glenohumeral and AC joint degenerative findings.  Calcifications are similar adjacent to the humeral head.  Lung parenchyma clear.  Impression: Similar appearance of the shoulder    Electronically signed by: Guevara Tinajero MD  Date:    07/30/2020  Time:    15:39    Relevant imaging results reviewed and interpreted by me, discussed with the patient and / or family today.     Other Tests:     No other tests performed today.    Diagnosis: Improved right RTC tearing after injections and PT, Troch bursitis left hip    Continue therapy for Left hip    Re-eval after 6 more weeks of PT, get left hip x-rays at the time    Disclaimer: This note was prepared using a voice recognition system and is likely to have sound alike errors within the text.

## 2020-08-14 ENCOUNTER — CLINICAL SUPPORT (OUTPATIENT)
Dept: REHABILITATION | Facility: HOSPITAL | Age: 45
End: 2020-08-14
Payer: MEDICAID

## 2020-08-14 DIAGNOSIS — R53.1 WEAKNESS: Primary | ICD-10-CM

## 2020-08-14 DIAGNOSIS — M25.552 CHRONIC HIP PAIN, LEFT: ICD-10-CM

## 2020-08-14 DIAGNOSIS — G89.29 CHRONIC HIP PAIN, LEFT: ICD-10-CM

## 2020-08-14 PROCEDURE — 97110 THERAPEUTIC EXERCISES: CPT | Mod: CQ

## 2020-08-14 NOTE — PROGRESS NOTES
PHYSICAL THERAPIST ASSISTANT TREATMENT NOTE    Name: Jenifer Westfall  Clinic Number: [id number]    Therapy Diagnosis:   Encounter Diagnoses   Name Primary?    Chronic hip pain, left     Weakness Yes     Physician: Barry Seals MD    Visit Date: 8/14/2020    Physician Orders: PT Eval and Treat  Medical Diagnosis: Trochanteric bursitis of left hip  Evaluation Date: 6/17/2020  Authorization Period Expiration: 6/30/20  Plan of Care Certification Period: 8/27/20  Visit #/Visits authorized: PENDING    Time In: 3:45  Time Out: 4:45  Total Billable Time: 45 minutes    Precautions: Standard    Subjective     Pt reports: no significant changes; saw her MD who wants her to continue with therapy.   She was compliant with home exercise program.  Response to previous treatment: good  Functional change: sometimes able to walk further    Pain: 5/10  Location: Left lateral thigh    Objective     Jenifer received therapeutic exercises to develop strength, ROM and flexibility for 40 minutes including:  Nustep x 6 min for joint nutrition  Standing glut med against TB  Standing heel raises 3 second holds  Prone hip IR/ER-not today  Prone glut lift Bilat  Side lying clams w/RTB 3 second holds  Low Bridge 5 second holds  Prone hip ABD w/YTB  Total gym 5 bands 3 min-not today  Matrix hip abd and add 20#    Jenifer received the following manual therapy techniques: Joint mobilizations and Soft tissue Mobilization were applied to the: L hip for 5 minutes, including:  STM proximal lateral left leg    Jenifer received hot pack for 10 minutes to L hip and low back    Home Exercises Provided and Patient Education Provided     Education provided:   - Progression of rehab  - HEP review    Written Home Exercises Provided: Patient instructed to cont prior HEP.  Exercises were reviewed and Jenifer was able to demonstrate them prior to the end of the session.  Jenifer demonstrated good  understanding of the education provided.     See EMR under Patient  Instructions for exercises provided prior visit.    Assessment   Jenifer returns to therapy after missing a few weeks due to transportation issues. She recently saw her physician who wants her to continue with therapy. X rays of her left hip are to be taken at next visit w/physician. Today emphasis was focused on gluteal strength. She was very tender to moderate depth palpation along the ITB. We will proceed forward with strengthening and stability activities. No complaints of increased pain with activities today; Jenifer reported some pain relief end of session.     Jenifer is progressing well towards her goals.   Pt prognosis is Good.     Pt will continue to benefit from skilled outpatient physical therapy to address the deficits listed in the problem list box on initial evaluation, provide pt/family education and to maximize pt's level of independence in the home and community environment.     Pt's spiritual, cultural and educational needs considered and pt agreeable to plan of care and goals.     Anticipated barriers to physical therapy: chronicity of symptoms    Patient Specific Goals: be able to walk better, decrease pain    Short Term Goals: In 3 weeks:  1.I with HEP MET  2.Patient to demo increased AROM /PROM to 75% L hip pain free ALMOST MET  3.Patient to demo increase LE strength by 1/2 grade  NOT MET  4.Patient to have decreased pain to 3/10 with walking. ALMOST MET     Long Term Goals: In 6 weeks  1. Patient to perform daily activities including walking without limitation. NOT MET  2. Patient to demonstrate increased L hip ROM 90% pain free NOT MET  3. Patient to demonstrate increased LE strength by 1 grade. NOT MET  4. Patient to have decreased pain to zero with standing. NOT MET    Plan     Gentle hip and lumbar mobility, core/hip strengthening    Alicia Ennis, [facility]

## 2020-08-24 ENCOUNTER — CLINICAL SUPPORT (OUTPATIENT)
Dept: REHABILITATION | Facility: HOSPITAL | Age: 45
End: 2020-08-24
Payer: MEDICAID

## 2020-08-24 DIAGNOSIS — M25.552 CHRONIC HIP PAIN, LEFT: ICD-10-CM

## 2020-08-24 DIAGNOSIS — G89.29 CHRONIC HIP PAIN, LEFT: ICD-10-CM

## 2020-08-24 PROCEDURE — 97110 THERAPEUTIC EXERCISES: CPT

## 2020-08-24 NOTE — PLAN OF CARE
Outpatient Therapy Updated Plan of Care     Visit Date: 8/24/2020  Name: Jenifer Westfall  Clinic Number: [id number]    Therapy Diagnosis:   Encounter Diagnosis   Name Primary?    Chronic hip pain, left      Physician: Adriana Mcneal PA-C    Physician Orders: PT Eval and Treat  Medical Diagnosis: Trochanteric bursitis of left hip  Evaluation Date: 6/17/20    Total Visits Received:11  Cancelled Visits: 3  No Show Visits: 0    Current Certification Period:  8/24/20 to 9/24/20  Precautions:  Standard  Visits from Evaluation Date:  11  Functional Level Prior to Evaluation:  IND    Subjective     Update: Patient reports that the back part of her hip has been feeling better. However, still having a lot of pain with walking. Feeling it more now in the lateral part of her L hip.    Objective     Update:     CMS Impairment/Limitation/Restriction for FOTO Hip Survey    Therapist reviewed FOTO scores for Jenifer Westfall on 8/24/2020.   FOTO documents entered into [employer] - see Media section.    Limitation Score: 57%       Gait: antalgic, decreased weight shift to the L side     Squat: Double leg: decreased weight shift to L side, but better with less ROM              Single leg: DNT     Balance: fair + due to decrease in weight shift to the L side     Reflex/Sensation: increased sensitivity along L sciatic nerve as well as the lateral hip     Hip PROM %:                                                 (L)        (R)                                      Flexion                         90% p! 100%                                      Extension                    50%     75%                                      Abduction                    75%  100%                                      Adduction                    75% p! 100%                                      External rotation          90%     100%                                      Internal rotation           90% p! 100%     Lumbar FL: 100%  with increase in pain to posterior  hip  Lumbar EX: 50% increase in pain and pain down leg                Strength:                    Hip flexors                   4/5     4/5  Quadriceps                  4/5       4/5                                             Hamstrings                  4/5       4/5                                      Anterior Tibialis           4/5       4/5                                      Peroneals                    4/5       4/5                                      Gastrocnemius            4/5       4/5                                      Adductors                    3+/5     3+/5                                      External rotators         3+/5     3+/5                                      Internal rotators           3+/5     3+/5                                      Gluteus Medius           3+/5     3+/5                                      Gluteus Jamarcus        3+/5     3+/5     Joint Mobility: hypomobile to L hip        PIVM Lumbar: tender and more hypomobile on the L side     Special Test:              CARLOS                        p!         FADIR                         p!                                      Scouring                      neg      SLR L: positive                                           Tenderness to palpation: Tender to palpate along L lateral hip and along L lumbar paraspinals and along L TFL     Lower Limb Tension Test:  Pos sciatic nerve on the L side, but ROM has improved.     Assessment     Update: Patient has demonstrated some improvement in ROM, improvement in pain/adverse symptoms, improvement in strength, and improvement in tolerance to activity since starting skilled services. Patient has not been very consistent coming to therapy, which has been limiting her progress.     Short Term Goals: In 2 weeks:  1.I with HEP MET  2.Patient to demo increased AROM /PROM to 75% L hip pain free ALMOST MET  3.Patient to demo increase LE strength by 1/2 grade  NOT MET  4.Patient to have decreased  pain to 3/10 with walking. ALMOST MET     Long Term Goals: In 4 weeks  1. Patient to perform daily activities including walking without limitation. NOT MET  2. Patient to demonstrate increased L hip ROM 90% pain free NOT MET  3. Patient to demonstrate increased LE strength by 1 grade. NOT MET  4. Patient to have decreased pain to zero with standing. NOT MET    Reasons for Recertification of Therapy:   Patient is still having a lot of difficulty with walking, pain with certain hip movements, and decreased tolerance to activities. Patient needs continued skilled therapy in order to improve ROM, improve pain/adverse symptoms, improve strength, and tolerance to activity in order to improve quality of life and return to PLOF.    Plan     Updated Certification Period: 8/24/2020 to 9/24/20  Recommended Treatment Plan: 2 times per week for 4 weeks: Electrical Stimulation tens, Manual Therapy, Moist Heat/ Ice, Neuromuscular Re-ed, Patient Education, Therapeutic Activites and Therapeutic Exercise  Other Recommendations: luis armando Rojo, PT  8/24/2020      I CERTIFY THE NEED FOR THESE SERVICES FURNISHED UNDER THIS PLAN OF TREATMENT AND WHILE UNDER MY CARE    Physician's comments:        Physician's Signature: ___________________________________________________

## 2020-08-28 ENCOUNTER — CLINICAL SUPPORT (OUTPATIENT)
Dept: REHABILITATION | Facility: HOSPITAL | Age: 45
End: 2020-08-28
Payer: MEDICAID

## 2020-08-28 DIAGNOSIS — M25.552 CHRONIC HIP PAIN, LEFT: ICD-10-CM

## 2020-08-28 DIAGNOSIS — G89.29 CHRONIC HIP PAIN, LEFT: ICD-10-CM

## 2020-08-28 PROCEDURE — 97110 THERAPEUTIC EXERCISES: CPT | Mod: CQ

## 2020-08-28 NOTE — PROGRESS NOTES
PHYSICAL THERAPIST ASSISTANT TREATMENT NOTE    Name: Jenifer Westfall  Clinic Number: [id number]    Therapy Diagnosis:   Encounter Diagnosis   Name Primary?    Chronic hip pain, left      Physician: Adriana Mcneal PA-C    Visit Date: 8/28/2020    Physician Orders: PT Eval and Treat  Medical Diagnosis: Trochanteric bursitis of left hip  Evaluation Date: 6/17/2020  Authorization Period Expiration: 6/30/20  Plan of Care Certification Period: 8/27/20  Visit #/Visits authorized: 4/12    Time In: 1:15p  Time Out: 2:10  Total Billable Time: 45 minutes    Precautions: Standard    Subjective     Pt reports: that her hip hurts with increased walking but has no current pain. She is still unable to walk 25 feet without hip pain.   She was compliant with home exercise program.  Response to previous treatment: good  Functional change: nothing significant    Pain: 0/10  Location: Left lateral thigh    Objective     Jenifer received therapeutic exercises to develop strength, ROM and flexibility for 40 minutes including:  Nustep x 6 min for joint nutrition  Sit<>stands with focus on full hip extension at the top  Hip abduction 30# machine  Matrix hip adduction 25#  Prone hip IR/ER-not today  Standing glut med/ max 2x10  Heel raises 3x10  Prone hip extension  Side lying clams and reverse clams w/RTB 3 second holds  Bridge 5 second holds against TB    Jenifer received the following manual therapy techniques: Joint mobilizations and Soft tissue Mobilization were applied to the: L hip for 5 minutes, including:  STM proximal lateral left leg  Long axis distraction  Passive stretching to piriformis    Jenifer received hot pack for 10 minutes to L hip and    Home Exercises Provided and Patient Education Provided     Education provided:   - Progression of rehab/ expected soreness    Written Home Exercises Provided: Patient instructed to cont prior HEP.  Exercises were reviewed and Jenifer was able to demonstrate them prior to the end of the session.   Jenifer demonstrated good  understanding of the education provided.     See EMR under Patient Instructions for exercises provided prior visit.    Assessment   Pt. Presents with no current increase in symptoms although reports of increase with walking short distances. Pt. Performed exercises for strengthening of hip adductors, glute med, glute max. Fatigues at end of session and complains of pain in lateral L hip. Decrease in symptoms noted after applying MHP.     Jenifer is progressing well towards her goals.   Pt prognosis is Good.     Pt will continue to benefit from skilled outpatient physical therapy to address the deficits listed in the problem list box on initial evaluation, provide pt/family education and to maximize pt's level of independence in the home and community environment.     Pt's spiritual, cultural and educational needs considered and pt agreeable to plan of care and goals.     Anticipated barriers to physical therapy: chronicity of symptoms    Patient Specific Goals: be able to walk better, decrease pain    Short Term Goals: In 3 weeks:  1.I with HEP MET  2.Patient to demo increased AROM /PROM to 75% L hip pain free ALMOST MET  3.Patient to demo increase LE strength by 1/2 grade  NOT MET  4.Patient to have decreased pain to 3/10 with walking. ALMOST MET     Long Term Goals: In 6 weeks  1. Patient to perform daily activities including walking without limitation. NOT MET  2. Patient to demonstrate increased L hip ROM 90% pain free NOT MET  3. Patient to demonstrate increased LE strength by 1 grade. NOT MET  4. Patient to have decreased pain to zero with standing. NOT MET    Plan     Gentle hip and lumbar mobility, core/hip strengthening    Kelsea Darden, PTA

## 2020-08-31 ENCOUNTER — CLINICAL SUPPORT (OUTPATIENT)
Dept: REHABILITATION | Facility: HOSPITAL | Age: 45
End: 2020-08-31
Payer: MEDICAID

## 2020-08-31 DIAGNOSIS — R53.1 WEAKNESS: Primary | ICD-10-CM

## 2020-08-31 DIAGNOSIS — M25.552 CHRONIC HIP PAIN, LEFT: ICD-10-CM

## 2020-08-31 DIAGNOSIS — G89.29 CHRONIC HIP PAIN, LEFT: ICD-10-CM

## 2020-08-31 PROCEDURE — 97110 THERAPEUTIC EXERCISES: CPT | Mod: CQ

## 2020-08-31 PROCEDURE — 97140 MANUAL THERAPY 1/> REGIONS: CPT | Mod: CQ

## 2020-09-01 ENCOUNTER — DOCUMENTATION ONLY (OUTPATIENT)
Dept: REHABILITATION | Facility: HOSPITAL | Age: 45
End: 2020-09-01

## 2020-09-01 NOTE — PROGRESS NOTES
PT/PTA met face to face to discuss pt's treatment plan and progress towards established goals. Pt will be seen by a physical therapist minimally every 6th visit or every 30 days.    .    Alicia Ennis PTA

## 2020-09-01 NOTE — PROGRESS NOTES
"PHYSICAL THERAPIST ASSISTANT TREATMENT NOTE    Name: Jenifer Westfall  Clinic Number: [id number]    Therapy Diagnosis:   Encounter Diagnoses   Name Primary?    Chronic hip pain, left     Weakness Yes     Physician: Adriana Mcneal PA-C    Visit Date: 8/31/2020    Physician Orders: PT Eval and Treat  Medical Diagnosis: Trochanteric bursitis of left hip  Evaluation Date: 6/17/2020  Authorization Period Expiration: 10/14/20  Plan of Care Certification Period: 9/24/20   Visit #/Visits authorized: 4/12    Time In:11:45  Time Out: 12:50  Total Billable Time: 55 minutes    Precautions: Standard    Subjective     Pt reports: "now my right hip is starting to hurt; my left hip is a little better". She is still unable to walk 25 feet without hip pain.   She was compliant with home exercise program.  Response to previous treatment: no issues  Functional change: nothing significant at this time    Pain: 3/10  Location: both hips    Objective     Jenifer received therapeutic exercises to develop strength, ROM, posture, and flexibility for 40 minutes including:  Nustep  for joint nutrition and mobility  Right side glides against wall w/assist  Postural alignment against wall  Supine shoulder horizontal abd  Supine shoulder alt arms  Supine pect butterfly stretch  PPT w/ low march  Bridges  Standing glute medius  Prone hip extension    Not performed today:  Sit<>stands with focus on full hip extension at the top  Matrix hip abduction 30#  Matrix hip adduction 25#  Side lying clams and reverse clams w/RTB 3 second holds      Jenifer received the following manual therapy techniques x 15 minutes, including:  STM to bilat glutes/proximal ITB/TFL  Piriformis releases  Piriformis, glute min, hamstring stretches    Jenifer received hot pack for 10 minutes to bilat glutes/hips in supine    Home Exercises Provided and Patient Education Provided     Education provided:   - Progression of rehab/ expected soreness    Written Home Exercises Provided: " Patient instructed to cont prior HEP.  Exercises were reviewed and Jenifer was able to demonstrate them prior to the end of the session.  Jenifer demonstrated good  understanding of the education provided.     See EMR under Patient Instructions for exercises provided prior visit.    Assessment   Jenifer presents today complaints of bilat hip pain however the left side is a bit better overall. She continues to be limited with her activities due to her pain.  She has postural and muscle imbalances and the following was noted today:  Left quad atrophy  Elevated left iliac crest  Elevated left shoulder  Decreased shoulder range of motion in flexion and external rotation  Increased right hip external rotation  Increased left lateral shift in lumbar spine  Decreased muscle tone BLE/glutes  Focus today was on postural awareness/alignment/correction as well as range of motion and strengthening. She was unable to work on progressive core activities due to weakness. Jenifer fatigued end of session.  Jenifer is progressing well towards her goals.   Pt prognosis is Good.     Pt will continue to benefit from skilled outpatient physical therapy to address the deficits listed in the problem list box on initial evaluation, provide pt/family education and to maximize pt's level of independence in the home and community environment.     Pt's spiritual, cultural and educational needs considered and pt agreeable to plan of care and goals.     Anticipated barriers to physical therapy: chronicity of symptoms    Patient Specific Goals: be able to walk better, decrease pain    Short Term Goals: In 3 weeks:  1.I with HEP MET  2.Patient to demo increased AROM /PROM to 75% L hip pain free ALMOST MET  3.Patient to demo increase LE strength by 1/2 grade  NOT MET  4.Patient to have decreased pain to 3/10 with walking. ALMOST MET     Long Term Goals: In 6 weeks  1. Patient to perform daily activities including walking without limitation. NOT MET  2.  Patient to demonstrate increased L hip ROM 90% pain free NOT MET  3. Patient to demonstrate increased LE strength by 1 grade. NOT MET  4. Patient to have decreased pain to zero with standing. NOT MET    Plan     Gentle hip and lumbar mobility, alignment, core/hip strengthening    Certification Period: 8/24/2020 to 9/24/20  Recommended Treatment Plan: 2 times per week for 4 weeks: Electrical Stimulation tens, Manual Therapy, Moist Heat/ Ice, Neuromuscular Re-ed, Patient Education, Therapeutic Activites and Therapeutic Exercise    Alicia Ennis, PTA

## 2020-09-02 ENCOUNTER — TELEPHONE (OUTPATIENT)
Dept: OBSTETRICS AND GYNECOLOGY | Facility: CLINIC | Age: 45
End: 2020-09-02

## 2020-09-02 DIAGNOSIS — Z12.31 BREAST CANCER SCREENING BY MAMMOGRAM: Primary | ICD-10-CM

## 2020-09-02 NOTE — TELEPHONE ENCOUNTER
Attempted to contact patient, no answer. Left patient voice mail to return call to clinic.    Mammo order is in. Pt also needs annual

## 2020-09-02 NOTE — TELEPHONE ENCOUNTER
----- Message from Mychal Perez sent at 9/2/2020 10:58 AM CDT -----  Contact: self  Requesting call back regarding getting an order to have mammogram done. Please call back at [phone].

## 2020-09-04 ENCOUNTER — CLINICAL SUPPORT (OUTPATIENT)
Dept: REHABILITATION | Facility: HOSPITAL | Age: 45
End: 2020-09-04
Payer: MEDICAID

## 2020-09-04 DIAGNOSIS — G89.29 CHRONIC HIP PAIN, LEFT: ICD-10-CM

## 2020-09-04 DIAGNOSIS — M25.552 CHRONIC HIP PAIN, LEFT: ICD-10-CM

## 2020-09-04 PROCEDURE — 97110 THERAPEUTIC EXERCISES: CPT

## 2020-09-04 NOTE — PROGRESS NOTES
PHYSICAL THERAPIST TREATMENT NOTE    Name: Jenifer Westfall  Clinic Number: [id number]    Therapy Diagnosis:   Encounter Diagnosis   Name Primary?    Chronic hip pain, left      Physician: Adriana Mcneal PA-C    Visit Date: 9/4/2020    Physician Orders: PT Eval and Treat  Medical Diagnosis: Trochanteric bursitis of left hip  Evaluation Date: 6/17/2020  Authorization Period Expiration: 10/14/20  Plan of Care Certification Period: 9/24/20   Visit #/Visits authorized: 5/12    Time In: 11:15  Time Out: 12:00  Total Billable Time: 55 minutes    Precautions: Standard    Subjective     Pt reports: that last session she felt okay, but was just sore afterwards.    She was compliant with home exercise program.  Response to previous treatment: no issues  Functional change: nothing significant at this time    Pain: 3/10  Location: both hips    Objective     Jenifer received therapeutic exercises to develop strength, ROM, posture, and flexibility for 12 minutes including:  Nustep x 6 min for joint nutrition and mobility  Total gym 6 bands x 3 min  Sidelying clams against TB    Not performed today:  Sit<>stands with focus on full hip extension at the top  Matrix hip abduction 30#  Matrix hip adduction 25#  Side lying clams and reverse clams w/RTB 3 second holds  Right side glides against wall w/assist  Postural alignment against wall  Supine shoulder horizontal abd  Supine shoulder alt arms  Supine pect butterfly stretch  PPT w/ low march  Bridges  Standing glut medius  Prone hip extension    Jenifer received the following manual therapy techniques x 5 minutes, including:  STM to bilat glutes/proximal ITB/TFL  Piriformis releases  Piriformis, glute min, hamstring stretches    Jenifer received hot pack for 0 minutes to bilat glutes/hips in supine    Home Exercises Provided and Patient Education Provided     Education provided:   - Progression of rehab/ expected soreness    Written Home Exercises Provided: Patient instructed to cont prior  HEP.  Exercises were reviewed and Jenifer was able to demonstrate them prior to the end of the session.  Jenifer demonstrated good  understanding of the education provided.     See EMR under Patient Instructions for exercises provided prior visit.    Assessment   Patient had upset in GI symptoms after only a couple of exercises, so needed to stop the rest of her visit and will be back next week.    Jenifer is progressing well towards her goals.   Pt prognosis is Good.     Pt will continue to benefit from skilled outpatient physical therapy to address the deficits listed in the problem list box on initial evaluation, provide pt/family education and to maximize pt's level of independence in the home and community environment.     Pt's spiritual, cultural and educational needs considered and pt agreeable to plan of care and goals.     Anticipated barriers to physical therapy: chronicity of symptoms    Patient Specific Goals: be able to walk better, decrease pain    Short Term Goals: In 3 weeks:  1.I with HEP MET  2.Patient to demo increased AROM /PROM to 75% L hip pain free ALMOST MET  3.Patient to demo increase LE strength by 1/2 grade  NOT MET  4.Patient to have decreased pain to 3/10 with walking. ALMOST MET     Long Term Goals: In 6 weeks  1. Patient to perform daily activities including walking without limitation. NOT MET  2. Patient to demonstrate increased L hip ROM 90% pain free NOT MET  3. Patient to demonstrate increased LE strength by 1 grade. NOT MET  4. Patient to have decreased pain to zero with standing. NOT MET    Plan     Gentle hip and lumbar mobility, alignment, core/hip strengthening    Certification Period: 8/24/2020 to 9/24/20  Recommended Treatment Plan: 2 times per week for 4 weeks: Electrical Stimulation tens, Manual Therapy, Moist Heat/ Ice, Neuromuscular Re-ed, Patient Education, Therapeutic Activites and Therapeutic Exercise    Lloyd-Antionette Rojo, PT

## 2020-09-16 ENCOUNTER — CLINICAL SUPPORT (OUTPATIENT)
Dept: OBSTETRICS AND GYNECOLOGY | Facility: CLINIC | Age: 45
End: 2020-09-16
Payer: MEDICAID

## 2020-09-16 PROCEDURE — 96372 THER/PROPH/DIAG INJ SC/IM: CPT | Mod: PBBFAC

## 2020-09-16 RX ADMIN — MEDROXYPROGESTERONE ACETATE 150 MG: 150 INJECTION, SUSPENSION, EXTENDED RELEASE INTRAMUSCULAR at 11:09

## 2020-09-16 NOTE — PROGRESS NOTES
Per depo protocol , gave pt depo injection to left ventrogluteal area . Pt advised to remain in waiting area for 15 min , to ensure no adverse reactions . Pt given future depo dates. Nettie HERNANDEZ

## 2020-09-18 ENCOUNTER — CLINICAL SUPPORT (OUTPATIENT)
Dept: REHABILITATION | Facility: HOSPITAL | Age: 45
End: 2020-09-18
Payer: MEDICAID

## 2020-09-18 DIAGNOSIS — G89.29 CHRONIC HIP PAIN, LEFT: ICD-10-CM

## 2020-09-18 DIAGNOSIS — M25.552 CHRONIC HIP PAIN, LEFT: ICD-10-CM

## 2020-09-18 PROCEDURE — 97140 MANUAL THERAPY 1/> REGIONS: CPT

## 2020-09-18 PROCEDURE — 97110 THERAPEUTIC EXERCISES: CPT

## 2020-09-18 NOTE — PROGRESS NOTES
PHYSICAL THERAPIST TREATMENT NOTE    Name: Jenifer Westfall  Clinic Number: [id number]    Therapy Diagnosis:   Encounter Diagnosis   Name Primary?    Chronic hip pain, left      Physician: Barry Seals MD    Visit Date: 9/18/2020    Physician Orders: PT Eval and Treat  Medical Diagnosis: Trochanteric bursitis of left hip  Evaluation Date: 6/17/2020  Authorization Period Expiration: 10/14/20  Plan of Care Certification Period: 9/24/20   Visit #/Visits authorized: 6/12    Time In: 11:20  Time Out: 12:10  Total Billable Time: 40 minutes    Precautions: Standard    Subjective     Pt reports: that last session she felt okay today but her hip hip pain increased throughout the session.    She was compliant with home exercise program.  Response to previous treatment: no issues  Functional change: nothing significant at this time    Pain: 4/10  Location: both hips    Objective     Jenifer received therapeutic exercises to develop strength, ROM, posture, and flexibility for 30 minutes including:  Nustep x 6 min for joint nutrition and mobility  Total gym 6 bands x 3 min  Sit to stands from 24 inch box focusing on hip extension at the top  Sit to stands from 24 inc box w/ resisted UE extension green TB  Quadruped fire hydrants   Bridges   Stair training  Step ups     Not performed today:  Sit<>stands with focus on full hip extension at the top  Matrix hip abduction 30#  Matrix hip adduction 25#  Side lying clams and reverse clams w/RTB 3 second holds  Right side glides against wall w/assist  Postural alignment against wall  Supine shoulder horizontal abd  Supine shoulder alt arms  Supine pect butterfly stretch  PPT w/ low march  Standing glut medius  Prone hip extension    Jenifer received the following manual therapy techniques x 10 minutes, including:  PROM hip flexion/extension, er/ir  Hip long axis distractions grade 3  Supine posterior-lateral hip mobilizations grades 2-4  STM- greater trochanter rocking.      Jenifer  received hot pack for 10 minutes to L glutes/hips seated    Home Exercises Provided and Patient Education Provided     Education provided:   - Progression of rehab/ expected soreness    Written Home Exercises Provided: Patient instructed to cont prior HEP.  Exercises were reviewed and Jenifer was able to demonstrate them prior to the end of the session.  Jenifer demonstrated good  understanding of the education provided.     See EMR under Patient Instructions for exercises provided 09/18/20.    Assessment     Pt returned to therapy today with moderate complaints of pain. Hip mobility and pain levels improved post manual therapy techniques. She needed mod verbal/tactile cues for full extension with her sit to stands. Overall she showed good tolerance to load with therapy today. She continues to display compensated trendelenburg gait on her L side, signifying persistent glute weakness.    Jenifer is progressing well towards her goals.   Pt prognosis is Good.     Pt will continue to benefit from skilled outpatient physical therapy to address the deficits listed in the problem list box on initial evaluation, provide pt/family education and to maximize pt's level of independence in the home and community environment.     Pt's spiritual, cultural and educational needs considered and pt agreeable to plan of care and goals.     Anticipated barriers to physical therapy: chronicity of symptoms    Patient Specific Goals: be able to walk better, decrease pain    Short Term Goals: In 3 weeks:  1.I with HEP MET  2.Patient to demo increased AROM /PROM to 75% L hip pain free ALMOST MET  3.Patient to demo increase LE strength by 1/2 grade  NOT MET  4.Patient to have decreased pain to 3/10 with walking. ALMOST MET     Long Term Goals: In 6 weeks  1. Patient to perform daily activities including walking without limitation. NOT MET  2. Patient to demonstrate increased L hip ROM 90% pain free NOT MET  3. Patient to demonstrate increased LE  strength by 1 grade. NOT MET  4. Patient to have decreased pain to zero with standing. NOT MET    Plan     Gentle hip and lumbar mobility, alignment, core/hip strengthening    Certification Period: 8/24/2020 to 9/24/20    Recommended Treatment Plan: 2 times per week for 4 weeks: Electrical Stimulation tens, Manual Therapy, Moist Heat/ Ice, Neuromuscular Re-ed, Patient Education, Therapeutic Activites and Therapeutic Exercise    Lloyd-Antionette Rojo, PT

## 2020-09-24 ENCOUNTER — HOSPITAL ENCOUNTER (OUTPATIENT)
Dept: RADIOLOGY | Facility: HOSPITAL | Age: 45
Discharge: HOME | End: 2020-09-24
Attending: PHYSICIAN ASSISTANT
Payer: MEDICAID

## 2020-09-24 ENCOUNTER — OFFICE VISIT (OUTPATIENT)
Dept: ORTHOPEDICS | Facility: CLINIC | Age: 45
End: 2020-09-24
Payer: MEDICAID

## 2020-09-24 ENCOUNTER — HOSPITAL ENCOUNTER (OUTPATIENT)
Dept: RADIOLOGY | Facility: HOSPITAL | Age: 45
Discharge: HOME | End: 2020-09-24
Attending: ORTHOPAEDIC SURGERY
Payer: MEDICAID

## 2020-09-24 VITALS
BODY MASS INDEX: 29.54 KG/M2 | HEIGHT: 71 IN | SYSTOLIC BLOOD PRESSURE: 123 MMHG | DIASTOLIC BLOOD PRESSURE: 85 MMHG | HEART RATE: 97 BPM | WEIGHT: 211 LBS

## 2020-09-24 DIAGNOSIS — E11.40 TYPE 2 DIABETES MELLITUS WITH DIABETIC NEUROPATHY, WITH LONG-TERM CURRENT USE OF INSULIN: ICD-10-CM

## 2020-09-24 DIAGNOSIS — M54.50 LUMBAR PAIN WITH RADIATION DOWN LEG: ICD-10-CM

## 2020-09-24 DIAGNOSIS — G89.29 OTHER CHRONIC PAIN: ICD-10-CM

## 2020-09-24 DIAGNOSIS — Z79.4 TYPE 2 DIABETES MELLITUS WITH DIABETIC NEUROPATHY, WITH LONG-TERM CURRENT USE OF INSULIN: ICD-10-CM

## 2020-09-24 DIAGNOSIS — M70.62 TROCHANTERIC BURSITIS OF LEFT HIP: Primary | ICD-10-CM

## 2020-09-24 DIAGNOSIS — M75.81 ROTATOR CUFF TENDONITIS, RIGHT: ICD-10-CM

## 2020-09-24 DIAGNOSIS — G89.29 CHRONIC RIGHT SHOULDER PAIN: ICD-10-CM

## 2020-09-24 DIAGNOSIS — M25.552 LEFT HIP PAIN: ICD-10-CM

## 2020-09-24 DIAGNOSIS — M75.41 ROTATOR CUFF IMPINGEMENT SYNDROME OF RIGHT SHOULDER: ICD-10-CM

## 2020-09-24 DIAGNOSIS — M25.511 CHRONIC RIGHT SHOULDER PAIN: ICD-10-CM

## 2020-09-24 DIAGNOSIS — M70.62 TROCHANTERIC BURSITIS OF LEFT HIP: ICD-10-CM

## 2020-09-24 DIAGNOSIS — M50.30 DDD (DEGENERATIVE DISC DISEASE), CERVICAL: ICD-10-CM

## 2020-09-24 DIAGNOSIS — M79.606 LUMBAR PAIN WITH RADIATION DOWN LEG: ICD-10-CM

## 2020-09-24 DIAGNOSIS — M16.0 PRIMARY OSTEOARTHRITIS OF BOTH HIPS: ICD-10-CM

## 2020-09-24 PROCEDURE — 99213 OFFICE O/P EST LOW 20 MIN: CPT | Mod: S$PBB,,, | Performed by: PHYSICIAN ASSISTANT

## 2020-09-24 PROCEDURE — 99999 PR PBB SHADOW E&M-EST. PATIENT-LVL III: CPT | Mod: PBBFAC,,, | Performed by: PHYSICIAN ASSISTANT

## 2020-09-24 PROCEDURE — 73502 X-RAY EXAM HIP UNI 2-3 VIEWS: CPT | Mod: TC,LT

## 2020-09-24 PROCEDURE — 99213 OFFICE O/P EST LOW 20 MIN: CPT | Mod: PBBFAC,25 | Performed by: PHYSICIAN ASSISTANT

## 2020-09-24 PROCEDURE — 73502 X-RAY EXAM HIP UNI 2-3 VIEWS: CPT | Mod: 26,LT,, | Performed by: RADIOLOGY

## 2020-09-24 PROCEDURE — 72110 X-RAY EXAM L-2 SPINE 4/>VWS: CPT | Mod: TC

## 2020-09-24 PROCEDURE — 99213 PR OFFICE/OUTPT VISIT, EST, LEVL III, 20-29 MIN: ICD-10-PCS | Mod: S$PBB,,, | Performed by: PHYSICIAN ASSISTANT

## 2020-09-24 PROCEDURE — 99999 PR PBB SHADOW E&M-EST. PATIENT-LVL III: ICD-10-PCS | Mod: PBBFAC,,, | Performed by: PHYSICIAN ASSISTANT

## 2020-09-24 PROCEDURE — 72110 X-RAY EXAM L-2 SPINE 4/>VWS: CPT | Mod: 26,,, | Performed by: RADIOLOGY

## 2020-09-24 PROCEDURE — 73502 XR HIP 2 VIEW LEFT: ICD-10-PCS | Mod: 26,LT,, | Performed by: RADIOLOGY

## 2020-09-24 PROCEDURE — 72110 XR LUMBAR SPINE 5 VIEW WITH FLEX AND EXT: ICD-10-PCS | Mod: 26,,, | Performed by: RADIOLOGY

## 2020-09-24 RX ORDER — DICLOFENAC SODIUM 10 MG/G
2 GEL TOPICAL 3 TIMES DAILY
Qty: 1 TUBE | Refills: 2 | Status: ON HOLD | OUTPATIENT
Start: 2020-09-24 | End: 2024-01-22 | Stop reason: HOSPADM

## 2020-09-24 NOTE — PATIENT INSTRUCTIONS
Assessment:  Jenifer Westfall is a 45 y.o. female previous patient of Dr. Hammond presents today for a recheck on right shoulder RTC and left hip pain   Right shoulder RTC tear   Left hip troch bursitis   Lumbar pain with radiculopathy   DM with neuropathy    Cervical pain with radiculopathy   Bilateral hip OA      Encounter Diagnoses   Name Primary?    Trochanteric bursitis of left hip Yes    DDD (degenerative disc disease), cervical     Type 2 diabetes mellitus with diabetic neuropathy, with long-term current use of insulin     Rotator cuff impingement syndrome of right shoulder     Rotator cuff tendonitis, right     Chronic right shoulder pain     Other chronic pain     Lumbar pain with radiation down leg     Primary osteoarthritis of both hips         Plan:   Referral to pain management   Referral to neurosurgery for back pain   Voltaren gel    Continue physical therapy for lumbar back pain and left hip pain.   Deferring on CSI due to DM    Follow-up: Recheck 6 weeks  or sooner if there are any problems between now and then.    Thank you for choosing [facility] and Dr. Barry Seals for your orthopedic & sports medicine care. It is our goal to provide you with exceptional care that will help keep you healthy, active, and get you back in the game.    If you felt that you received exemplary care today, please consider leaving us feedback on [employer] at [email or web address]     Please do not hesitate to reach out to us via email, phone, or MyChart with any questions, concerns, or feedback.    If you are experiencing pain/discomfort ,or have questions after 5pm and would like to be connected to the [facility] on-call team, please call this number and specify which Sports Medicine provider is treating you: [phone]

## 2020-09-24 NOTE — PROGRESS NOTES
Patient ID: Jenifer Westfall  YOB: 1975  MRN: [id number]    Chief Complaint: Pain of the Right Shoulder and Pain of the Left Hip    Referred By: former pt of Dr. Hammond    History of Present Illness: Jenifer Westfall is a right-hand dominant 45 y.o. female who is here today for a follow up for her right shoulder and left hip. She states that her hip pain is 8/10. She is still doing physical therapy for her hip at [facility] . She has no complaints with therapy besides she is really sore and in pain afterwards. Pain in the right shoulder has overall gotten better. States that she does have a hx of neck pain in the past which she has had injections. Most of her pain is located in the left hip, most of the pain is chronic and constant. She also has lumbar pain. Has seen pain management in the past- but was dismissed in the past due to receiving a prescription in the past. States that most of her pain is worse with walking and worse when laying on the left side of hip. Denies any fevers, chills, night sweats, numbness and tingling.     Previous 07/22/2020 History of Present Illness: Jenifer Westfall is a right-hand dominant 45 y.o. female, previous Dr. Hammond patient, here for a re-eval of her right shoulder.     Hip Pain   The pain is present in the left hip. This is a chronic problem. The problem occurs constantly. The problem has been unchanged. The quality of the pain is described as aching, sharp, shooting, throbbing and burning. The pain is at a severity of 8/10. Associated symptoms include an inability to bear weight, a limited range of motion and stiffness. Pertinent negatives include no fever or itching. The symptoms are aggravated by activity, bearing weight, walking, twisting, standing, sitting and lying down. She has tried cold, OTC ointments and rest for the symptoms. The treatment provided no relief. Physical therapy was effective.      Past Medical History:   Past Medical History:    Diagnosis Date    Arthritis     DM (diabetes mellitus)      2017 Insulin x 3 years     Hepatitis C antibody positive in blood     Hyperlipidemia     Hypertension     IBS (irritable bowel syndrome)     Kidney stone     Marfan syndrome     Mitral valve prolapse      Past Surgical History:   Procedure Laterality Date    ANGIOGRAPHY OF LOWER EXTREMITY N/A 2018    Procedure: ANGIOGRAM, LOWER EXTREMITY- LEFT LEG;  Surgeon: Roscoe Landeros MD;  Location: [facility] CATH LAB;  Service: Peripheral Vascular;  Laterality: N/A;    APPENDECTOMY      BUNIONECTOMY      both feet    cataract surgery  2019     SECTION      x 2    CHOLECYSTECTOMY      COLONOSCOPY N/A 2020    Procedure: COLONOSCOPY w/ biopsies;  Surgeon: Gio Georges MD;  Location: [facility] ENDO;  Service: Endoscopy;  Laterality: N/A;    ESOPHAGOGASTRODUODENOSCOPY N/A 2019    Procedure: ESOPHAGOGASTRODUODENOSCOPY (EGD);  Surgeon: Jaron Sanders III, MD;  Location: [facility] ENDO;  Service: Endoscopy;  Laterality: N/A;    TUBAL LIGATION       Family History   Problem Relation Age of Onset    Heart disease Mother     Thrombophilia Father         DVT    Hypertension Father     Stroke Maternal Aunt     Heart disease Maternal Aunt     Stroke Maternal Uncle     Heart disease Maternal Uncle     Breast cancer Neg Hx     Colon cancer Neg Hx     Ovarian cancer Neg Hx      Social History     Socioeconomic History    Marital status: Single     Spouse name: Not on file    Number of children: Not on file    Years of education: Not on file    Highest education level: Not on file   Occupational History    Not on file   Social Needs    Financial resource strain: Not on file    Food insecurity     Worry: Not on file     Inability: Not on file    Transportation needs     Medical: Not on file     Non-medical: Not on file   Tobacco Use    Smoking status: Former Smoker     Packs/day: 0.20     Years: 20.00     Pack  "years: 4.00     Types: Cigarettes     Quit date: 2015     Years since quittin.4    Smokeless tobacco: Never Used    Tobacco comment: "once in a blue moon"   Substance and Sexual Activity    Alcohol use: No     Alcohol/week: 0.0 standard drinks    Drug use: No    Sexual activity: Not on file   Lifestyle    Physical activity     Days per week: Not on file     Minutes per session: Not on file    Stress: Not on file   Relationships    Social connections     Talks on phone: Not on file     Gets together: Not on file     Attends Jainism service: Not on file     Active member of club or organization: Not on file     Attends meetings of clubs or organizations: Not on file     Relationship status: Not on file   Other Topics Concern    Not on file   Social History Narrative    Not on file     Medication List with Changes/Refills   New Medications    DICLOFENAC SODIUM (VOLTAREN) 1 % GEL    Apply 2 g topically 3 (three) times daily.   Current Medications    ADMELOG SOLOSTAR U-100 INSULIN 100 UNIT/ML PEN        ASCORBIC ACID (VITAMIN C) 100 MG TABLET    Take 100 mg by mouth once daily.    ASPIRIN (ECOTRIN) 81 MG EC TABLET    Take 81 mg by mouth once daily.    ATENOLOL (TENORMIN) 50 MG TABLET    TAKE 1 TABLET BY MOUTH ONCE DAILY    ATORVASTATIN (LIPITOR) 80 MG TABLET    TAKE 1 TABLET BY MOUTH ONCE DAILY.    BASAGLAR KWIKPEN U-100 INSULIN GLARGINE 100 UNITS/ML (3ML) SUBQ PEN        CETIRIZINE (ZYRTEC) 10 MG TABLET    Take 10 mg by mouth.    CLOPIDOGREL (PLAVIX) 75 MG TABLET    TAKE 1 TABLET (75 MG TOTAL) BY MOUTH ONCE DAILY.    COLESTIPOL (COLESTID) 1 GRAM TAB    TAKE 2 TABLETS BY MOUTH THREE TIMES DAILY. DO NOT TAKE OTHER MEDICATION WITHIN 1 HOUR BEFORE OR 4 HOURS AFTER TAKING COLESTIPOL.    DICLOFENAC SODIUM (VOLTAREN) 1 % GEL    Apply 2 grams topically 4 (four) times daily. for 10 days    DICYCLOMINE (BENTYL) 20 MG TABLET    TAKE 1 TABLET BY MOUTH 4 TIMES DAILY    FAMOTIDINE (PEPCID) 20 MG TABLET    TAKE 1 " "TABLET BY MOUTH 2 (TWO) TIMES DAILY.    FLUOXETINE (PROZAC) 40 MG CAPSULE    TAKE 1 CAPSULE BY MOUTH ONCE DAILY    GABAPENTIN (NEURONTIN) 400 MG CAPSULE     TID    GLIPIZIDE (GLUCOTROL) 10 MG TABLET    Take 1 tablet (10 mg total) by mouth 2 (two) times daily.    INSULIN GLARGINE,HUM.REC.ANLOG (BASAGLAR KWIKPEN U-100 INSULIN SUBQ)    Inject 40 Units into the skin 2 (two) times daily.    KETOROLAC 0.5% (ACULAR) 0.5 % DROP        LIRAGLUTIDE 0.6 MG/0.1 ML, 18 MG/3 ML, SUBQ PNIJ (VICTOZA 2-DIAN) 0.6 MG/0.1 ML (18 MG/3 ML) PNIJ    Inject 1.2 Units into the skin.    LISINOPRIL 10 MG TABLET    TAKE 1 TABLET (10 MG TOTAL) BY MOUTH ONCE DAILY.    METOCLOPRAMIDE HCL (REGLAN) 10 MG TABLET    TAKE 1 TABLET BY MOUTH 2 (TWO) TIMES DAILY BEFORE MEALS.    NOVOLOG FLEXPEN U-100 INSULIN 100 UNIT/ML (3 ML) INPN PEN        ONDANSETRON (ZOFRAN) 4 MG TABLET    TAKE 1 TABLET (4 MG TOTAL) BY MOUTH 2 (TWO) TIMES DAILY.    PANTOPRAZOLE (PROTONIX) 40 MG TABLET    T1T PO ONCE DAILY    PEN NEEDLE 31 GAUGE X 5/16" NDLE    USE 5 TIMES DAILY WITH LANTUS AND HUMALOG    TIZANIDINE (ZANAFLEX) 4 MG TABLET    TAKE 1 TO 1 & 1/2 TABLET (4-6 MG TOTAL) BY MOUTH NIGHTLY AS NEEDED.    TRAMADOL (ULTRAM) 50 MG TABLET    TAKE 1 TABLET BY MOUTH EVERY 12 HOURS    TRUE METRIX GLUCOSE TEST STRIP STRP         Review of patient's allergies indicates:   Allergen Reactions    Compazine [prochlorperazine edisylate] Rash    Contrast media Anaphylaxis    Dye Anaphylaxis    Levaquin [levofloxacin] Hives and Itching    Metformin Other (See Comments)     Upset stomach    Ibuprofen Other (See Comments)     Stomach pain    Morphine Other (See Comments)     Irritable     Review of Systems   Constitution: Negative for fever.   HENT: Negative for sore throat.    Eyes: Negative for blurred vision.   Cardiovascular: Negative for dyspnea on exertion.   Respiratory: Negative for shortness of breath.    Hematologic/Lymphatic: Does not bruise/bleed easily.   Skin: Negative for " itching.   Musculoskeletal: Positive for joint pain, joint swelling, muscle cramps, muscle weakness and stiffness.   Gastrointestinal: Negative for vomiting.   Genitourinary: Negative for dysuria.   Neurological: Negative for dizziness and loss of balance.   Psychiatric/Behavioral: The patient does not have insomnia.        Physical Exam:   Body mass index is 29.43 kg/m².  Vitals:    09/24/20 1525   BP: 123/85   Pulse: 97      GENERAL: Well appearing, appropriate for stated age, no acute distress.  CARDIOVASCULAR: Pulses regular by peripheral palpation.  PULMONARY: Respirations are even and non-labored.  NEURO: Awake, alert, and oriented x 3.  PSYCH: Mood & affect are appropriate.  HEENT: Head is normocephalic and atraumatic.  General    Nursing note and vitals reviewed.        Right Ankle/Foot Exam     Tests   Heel Walk: able to perform  Single Heel Rise: able to perform  Double Heel Rise: unable to perform double heel rise    Left Ankle/Foot Exam     Tests   Heel Walk: able to perform  Single Heel Rise: able to perform  Double Heel Rise: able to perform      Right Hip Exam     Tenderness   The patient tender to palpation of the trochanteric bursa.    Range of Motion   Extension: 0   Flexion: 100   External rotation: 30   Internal rotation: 10     Tests   Pain w/ forced internal rotation (CARLOS): absent  Pain w/ forced external rotation (FADIR): absent  Jarad: negative  Log Roll: negative    Other   Sensation: normal  Left Hip Exam     Range of Motion   Extension: 0   Flexion: 100   External rotation: 30   Internal rotation: 10     Tests   Pain w/ forced internal rotation (CARLOS): absent  Pain w/ forced external rotation (FADIR): absent  Jarad: positive  Log Roll: positive    Other   Sensation: normal    Comments:  + straight leg raise        Back (L-Spine & T-Spine) / Neck (C-Spine) Exam     Tenderness Posterior midline palpation reveals tenderness of the Upper L-Spine and Lower L-Spine. Right paramedian tenderness  of the Lower L-Spine and Upper L-Spine. Left paramedian tenderness of the Lower L-Spine.     Back (L-Spine & T-Spine) Tests   Right Side Tests  Squat Test: able to perform  Left Side Tests  Squat: able to perform    Other     Suggestions for Possible Nonorganic Illness  Exaggerated pain response  Lower extremity ratcheting weakness                  Comments:  Ttp lumbar spine and paraspinal muscles.       Muscle Strength   Right Lower Extremity   Hip Abduction: 5/5   Hip Adduction: 5/5   Hip Flexion: 5/5   Quadriceps:  5/5   Hamstrin/5   Ankle Dorsiflexion:  5/5   Anterior tibial:  5/5   Gastrocsoleus:  5/5   EHL:  5/5  Left Lower Extremity   Hip Abduction: 5/5   Hip Adduction: 5/5   Hip Flexion: 5/5   Quadriceps:  5/5   Hamstrin/5   Ankle Dorsiflexion:  5/5   Anterior tibial:  5/5   Gastrocsoleus:  5/5   EHL:  5/5    Vascular Exam     Right Pulses  Dorsalis Pedis:      2+  Posterior Tibial:      2+        Left Pulses  Dorsalis Pedis:      2+  Posterior Tibial:      2+        Capillary Refill  Right Hand: normal capillary refill  Left Hand: normal capillary refill    Intact EHL, FHL, gastrocsoleus, and tibialis anterior.   Sensation intact to light touch in superficial peroneal, deep peroneal, tibial, sural, and saphenous nerve distributions.   Foot warm and well perfused with capillary refill of less than 2 seconds and palpable pedal pulses.      Imaging:    X-Ray Hip 2 or 3 views Left  Narrative: EXAMINATION:  XR HIP 2 VIEW LEFT    CLINICAL HISTORY:  Pain in left hip    TECHNIQUE:  AP view of the pelvis and frog leg lateral view of the left hip were performed.    COMPARISON:  2015.    FINDINGS:  Degenerative changes of the hips noted.  No evidence of AVN.  No acute fracture or dislocation.  Calcification at the right ischial tuberosity and bilateral greater trochanters which can be seen with tendinopathy.  Degenerative changes of the lumbosacral spine and sacroiliac joints and pubic symphysis noted  as well.  Iliac stents visualized.  Impression: As above    Electronically signed by: Ronaldo Sawyer MD  Date:    09/24/2020  Time:    15:28    Degenerative  Relevant imaging results reviewed and interpreted by me, discussed with the patient and / or family today.     Other Tests:     No other tests performed today.  Discussed with patient continuing physical therapy and possibly doing injections in the future if no improvement. I discussed with patient possible referral to pain management but she has had issues in the past with pain- was discharged from our at [facility], would need to seek outside pain management referral. She feels most of her pain is located in her back, will refer to neuro-surgery.    Assessment:  Jenifer Westfall is a 45 y.o. female previous patient of Dr. Hammond presents today for a recheck on right shoulder RTC and left hip pain   Right shoulder RTC tear   Left hip troch bursitis   Lumbar pain with radiculopathy   DM with neuropathy    Cervical pain with radiculopathy   Bilateral hip OA    Encounter Diagnoses   Name Primary?    Trochanteric bursitis of left hip Yes    DDD (degenerative disc disease), cervical     Type 2 diabetes mellitus with diabetic neuropathy, with long-term current use of insulin     Rotator cuff impingement syndrome of right shoulder     Rotator cuff tendonitis, right     Chronic right shoulder pain     Other chronic pain     Lumbar pain with radiation down leg     Primary osteoarthritis of both hips       Plan:   Referral to pain management   Referral to neurosurgery for back pain   Voltaren gel    Continue physical therapy for lumbar back pain and left hip pain.   Deferring on CSI due to DM   I discussed worrisome and red flag signs and symptoms with the patient. The patient expressed understanding and agreed to alert me immediately or to go to the emergency room if they experience any of these.    Treatment plan was developed with input from the  patient/family, and they expressed understanding and agreement with the plan. All questions were answered today.    Follow-up: 6 weeks or sooner if there are any problems between now and then.    Disclaimer: This note was prepared using a voice recognition system and is likely to have sound alike errors within the text.

## 2020-09-25 ENCOUNTER — TELEPHONE (OUTPATIENT)
Dept: ORTHOPEDICS | Facility: CLINIC | Age: 45
End: 2020-09-25

## 2020-09-29 ENCOUNTER — CLINICAL SUPPORT (OUTPATIENT)
Dept: REHABILITATION | Facility: HOSPITAL | Age: 45
End: 2020-09-29
Payer: MEDICAID

## 2020-09-29 DIAGNOSIS — M25.552 CHRONIC HIP PAIN, LEFT: ICD-10-CM

## 2020-09-29 DIAGNOSIS — R53.1 WEAKNESS: Primary | ICD-10-CM

## 2020-09-29 DIAGNOSIS — G89.29 CHRONIC HIP PAIN, LEFT: ICD-10-CM

## 2020-09-29 PROCEDURE — 97110 THERAPEUTIC EXERCISES: CPT | Mod: CQ

## 2020-09-29 NOTE — PLAN OF CARE
Outpatient Therapy Updated Plan of Care     Visit Date: 9/18/2020  Name: Jenifer Westfall  Clinic Number: [id number]    Therapy Diagnosis:   Encounter Diagnosis   Name Primary?    Chronic hip pain, left      Physician: Barry Seals MD    Physician Orders: PT Eval and Treat  Medical Diagnosis: Trochanteric bursitis of left hip  Evaluation Date: 6/17/20    Total Visits Received:16  Cancelled Visits: 4  No Show Visits: 0    Current Certification Period:  9/18/20 to 10/18/20  Precautions:  Marfan's syndrome  Visits from Evaluation Date:  16  Functional Level Prior to Evaluation:  IND    Subjective     Update: Patient reports that she has been feeling about the same. Still is having a lot of difficulty with walking and performing stairs.     Objective     Update:     CMS Impairment/Limitation/Restriction for FOTO Hip Survey    Therapist reviewed FOTO scores for Jenifer Westfall on 9/18/2020.   FOTO documents entered into [employer] - see Media section.    Limitation Score: 62%       Gait: antalgic, decreased weight shift to the L side     Squat: Double leg: decreased weight shift to L side, but better with less ROM              Single leg: DNT     Balance: fair + due to decrease in weight shift to the L side     Reflex/Sensation: increased sensitivity along L sciatic nerve as well as the lateral hip     Hip PROM %:                                                 (L)        (R)                                      Flexion                         90% p! 100%                                      Extension                    50%     50%                                      Abduction                    75%  100%                                      Adduction                    75% p! 100%                                      External rotation          90%     100%                                      Internal rotation           90% p! 100%     Lumbar FL: 100%  with increase in pain to posterior hip  Lumbar EX: 50% increase in  pain and pain down leg                Strength:                    Hip flexors                   4/5     4/5  Quadriceps                  4/5       4/5                                             Hamstrings                  4/5       4/5                                      Anterior Tibialis           4/5       4/5                                      Peroneals                    4/5       4/5                                      Gastrocnemius            4/5       4/5                                      Adductors                    3+/5     3+/5                                      External rotators         3+/5     3+/5                                      Internal rotators           3+/5     3+/5                                      Gluteus Medius           3+/5     3+/5                                      Gluteus Jamarcus        3+/5     3+/5     Joint Mobility: hypomobile to L hip        PIVM Lumbar: tender and more hypomobile on the L side     Special Test:              CARLOS                        p!         FADIR                         p!                                      Scouring                      neg      SLR L: positive                                           Tenderness to palpation: Tender to palpate along L lateral hip and along L lumbar paraspinals and along L TFL     Lower Limb Tension Test:  Pos sciatic nerve on the L side, but ROM has improved.     Assessment     Update: Patient has demonstrated some improvement in ROM, improvement in pain/adverse symptoms, improvement in strength, and improvement in tolerance to activity since starting skilled services.     Short Term Goals: In 2 weeks:  1.I with HEP MET  2.Patient to demo increased AROM /PROM to 75% L hip pain free ALMOST MET  3.Patient to demo increase LE strength by 1/2 grade  NOT MET  4.Patient to have decreased pain to 3/10 with walking. ALMOST MET     Long Term Goals: In 4 weeks  1. Patient to perform daily activities including  walking without limitation. NOT MET  2. Patient to demonstrate increased L hip ROM 90% pain free NOT MET  3. Patient to demonstrate increased LE strength by 1 grade. NOT MET  4. Patient to have decreased pain to zero with standing. NOT MET    Reasons for Recertification of Therapy:   Patient is still having a lot of difficulty with walking, pain with certain hip movements, and decreased tolerance to activities. Patient had some personal issues with her son as well as some helath issues not allowing her to come consistently in the last month. Patient needs continued skilled therapy in order to improve ROM, improve pain/adverse symptoms, improve strength, and tolerance to activity in order to improve quality of life and return to OF.    Plan     Updated Certification Period: 9/18/2020 to 10/18/20  Recommended Treatment Plan: 2 times per week for 4 weeks: Electrical Stimulation tens, Manual Therapy, Moist Heat/ Ice, Neuromuscular Re-ed, Patient Education, Therapeutic Activites and Therapeutic Exercise  Other Recommendations: luis armando Rojo, PT  9/18/2020      I CERTIFY THE NEED FOR THESE SERVICES FURNISHED UNDER THIS PLAN OF TREATMENT AND WHILE UNDER MY CARE    Physician's comments:        Physician's Signature: ___________________________________________________

## 2020-09-29 NOTE — PROGRESS NOTES
PHYSICAL THERAPIST ASSISTANT TREATMENT NOTE    Name: Jenifer Westfall  Clinic Number: [id number]    Therapy Diagnosis:   No diagnosis found.  Physician: Barry Seals MD    Visit Date: 9/29/2020    Physician Orders: PT Eval and Treat  Medical Diagnosis: Trochanteric bursitis of left hip  Evaluation Date: 6/17/2020  Authorization Period Expiration: 10/14/20  Plan of Care Certification Period: 10/18/20   Visit #/Visits authorized: 6/12    Time In: 2:15  Time Out: 3:30  Total Billable Time: 53 minutes    Precautions: Standard    Subjective     Pt reports: had difficulty recuperating from kneeling last session    She was not compliant with home exercise program.  Response to previous treatment: knee pain  Functional change: nothing significant at this time    Pain: 7/10  Location: left hip    Objective     Jenifer received therapeutic exercises to develop strength, ROM, posture, and flexibility for 53 minutes including:  Nustep x 6 min for joint nutrition and mobility  Total gym 6 bands x 3 min-not today  PROM both hips  Side lying hip abd w/8 sec hold Bilat  DL Bridges w/hips in ER  SL Bridges Bilat  Standing glute med  Matrix hip add 25#  Matrix hip abd 35#  DB Step ups - not today  Waiters walk w/6# DB on left      Jenifer received the following manual therapy techniques x 0 minutes, including:  Na today    Jenifer received hot pack for 15 minutes to L hip/back in supine.    Home Exercises Provided and Patient Education Provided     Education provided:   - Progression of rehab/ expected soreness    Written Home Exercises Provided: Patient instructed to cont prior HEP.  Exercises were reviewed and Jenifer was able to demonstrate them prior to the end of the session.  Jenifer demonstrated good  understanding of the education provided.     See EMR under Patient Instructions for exercises provided 09/18/20.    Assessment   Jenifer presents today with no significant changes in her pain. She had imaging done and states she feels a  bit defeated by the results she discussed with her doctor. Continue strengthening today as tolerated to focus on her core/glue strengthening. She did not complain of pain during her activities and required verbal and tactile cues for techniques and alignment.    FOTO Reflects an 11% improvement since eval  CMS Impairment/Limitation/Restriction for FOTO Hip Survey   Therapist reviewed FOTO scores for Jenifer Westfall on 9/18/2020.   FOTO documents entered into [employer] - see Media section.   Limitation Score: 62%    Jenifer is progressing well towards her goals.   Pt prognosis is Good.     Pt will continue to benefit from skilled outpatient physical therapy to address the deficits listed in the problem list box on initial evaluation, provide pt/family education and to maximize pt's level of independence in the home and community environment.     Pt's spiritual, cultural and educational needs considered and pt agreeable to plan of care and goals.     Anticipated barriers to physical therapy: chronicity of symptoms    Patient Specific Goals: be able to walk better, decrease pain    Short Term Goals: In 2 weeks:  1.I with HEP MET  2.Patient to demo increased AROM /PROM to 75% L hip pain free ALMOST MET  3.Patient to demo increase LE strength by 1/2 grade  NOT MET  4.Patient to have decreased pain to 3/10 with walking. ALMOST MET     Long Term Goals: In 4 weeks  1. Patient to perform daily activities including walking without limitation. NOT MET  2. Patient to demonstrate increased L hip ROM 90% pain free NOT MET  3. Patient to demonstrate increased LE strength by 1 grade. NOT MET  4. Patient to have decreased pain to zero with standing. NOT MET       Plan     Gentle hip and lumbar mobility, alignment, core/hip strengthening    Certification Period: 9/24/2020-10/18/2020    Recommended Treatment Plan: 2 times per week for 4 weeks: Electrical Stimulation tens, Manual Therapy, Moist Heat/ Ice, Neuromuscular Re-ed, Patient  Education, Therapeutic Activites and Therapeutic Exercise    Alicia Ennis, PTA

## 2020-10-01 ENCOUNTER — CLINICAL SUPPORT (OUTPATIENT)
Dept: REHABILITATION | Facility: HOSPITAL | Age: 45
End: 2020-10-01
Payer: MEDICAID

## 2020-10-01 DIAGNOSIS — M25.552 CHRONIC HIP PAIN, LEFT: ICD-10-CM

## 2020-10-01 DIAGNOSIS — R53.1 WEAKNESS: Primary | ICD-10-CM

## 2020-10-01 DIAGNOSIS — G89.29 CHRONIC HIP PAIN, LEFT: ICD-10-CM

## 2020-10-01 PROCEDURE — 97110 THERAPEUTIC EXERCISES: CPT | Mod: CQ

## 2020-10-01 NOTE — PROGRESS NOTES
PHYSICAL THERAPIST ASSISTANT TREATMENT NOTE    Name: Jenifer Westfall  Clinic Number: [id number]    Therapy Diagnosis:   Encounter Diagnoses   Name Primary?    Chronic hip pain, left     Weakness Yes     Physician: Barry Seals MD    Visit Date: 10/1/2020    Physician Orders: PT Eval and Treat  Medical Diagnosis: Trochanteric bursitis of left hip  Evaluation Date: 6/17/2020  Authorization Period Expiration: 10/14/20  Plan of Care Certification Period: 10/18/20   Visit #/Visits authorized: 7/12    Time In: 2:35  Time Out: 3:30  Total Billable Time: 55 minutes    Precautions: Standard    Subjective     Pt reports: not feeling too bad today   She was not compliant with home exercise program.  Response to previous treatment: sore  Functional change: nothing significant at this time    Pain: 3/10  Location: left hip    Objective     Jenifer received therapeutic exercises to develop strength, ROM, posture, and flexibility for 55 minutes including:  Nustep x 5 min for joint nutrition and mobility  Hamstring/piriformis stretching Bilat--passive  Side lying hip abd w/8 sec hold Bilat  DL Bridges w/hips in ER  SL Bridges Bilat  Standing glute med- upright and bent over high mat  Matrix hip add 25#  Matrix hip abd 35#  LAQ 3#  3 sec hold Bilat  Waiters walk w/6# DB on left--not today  Dynamic hamstring stretches      Jenifer received the following manual therapy techniques x 0 minutes, including:  Na today    Jenifer received hot pack for 0 minutes . Na today    Home Exercises Provided and Patient Education Provided     Education provided:   - Progression of rehab/ expected soreness  - Updated HEP    Written Home Exercises Provided: initiate new exercises provided via written handout.  Exercises were reviewed and Jenifer was able to demonstrate them prior to the end of the session.  Jenifer demonstrated good  understanding of the education provided.     See EMR under Patient Instructions for exercises provided  09/18/20.    Assessment   Jenifer presents today with low level of pain and reported only sore from last session. Continue strengthening today as tolerated to focus on her core/glue strengthening. She is very challenged with good form and alignment with her standing activities to work on her glute strengthening. All her activities require verbal and tactile cues due to her impaired form/weakness and compensatory alignment. She complained of some anterior foot pain with unilat bridging on the left. Jenifer was issued upgraded exercises today for her HEP. Jenifer was fatigued end of session.   Jenifer is progressing well towards her goals.   Pt prognosis is Good.     Pt will continue to benefit from skilled outpatient physical therapy to address the deficits listed in the problem list box on initial evaluation, provide pt/family education and to maximize pt's level of independence in the home and community environment.     Pt's spiritual, cultural and educational needs considered and pt agreeable to plan of care and goals.     Anticipated barriers to physical therapy: chronicity of symptoms    Patient Specific Goals: be able to walk better, decrease pain    Short Term Goals: In 2 weeks:  1.I with HEP MET  2.Patient to demo increased AROM /PROM to 75% L hip pain free ALMOST MET  3.Patient to demo increase LE strength by 1/2 grade  NOT MET  4.Patient to have decreased pain to 3/10 with walking. ALMOST MET     Long Term Goals: In 4 weeks  1. Patient to perform daily activities including walking without limitation. NOT MET  2. Patient to demonstrate increased L hip ROM 90% pain free NOT MET  3. Patient to demonstrate increased LE strength by 1 grade. NOT MET  4. Patient to have decreased pain to zero with standing. NOT MET       Plan     Gentle hip and lumbar mobility, alignment, core/hip strengthening    Certification Period: 9/24/2020-10/18/2020    Recommended Treatment Plan: 2 times per week for 4 weeks: Electrical Stimulation  tens, Manual Therapy, Moist Heat/ Ice, Neuromuscular Re-ed, Patient Education, Therapeutic Activites and Therapeutic Exercise    Alicia Ennis, PTA

## 2020-10-05 DIAGNOSIS — M70.62 TROCHANTERIC BURSITIS OF LEFT HIP: Primary | ICD-10-CM

## 2020-10-06 ENCOUNTER — OFFICE VISIT (OUTPATIENT)
Dept: NEUROSURGERY | Facility: CLINIC | Age: 45
End: 2020-10-06
Payer: MEDICAID

## 2020-10-06 ENCOUNTER — CLINICAL SUPPORT (OUTPATIENT)
Dept: REHABILITATION | Facility: HOSPITAL | Age: 45
End: 2020-10-06
Payer: MEDICAID

## 2020-10-06 VITALS
HEIGHT: 71 IN | BODY MASS INDEX: 29.14 KG/M2 | HEART RATE: 82 BPM | DIASTOLIC BLOOD PRESSURE: 82 MMHG | SYSTOLIC BLOOD PRESSURE: 131 MMHG | WEIGHT: 208.13 LBS

## 2020-10-06 DIAGNOSIS — M54.50 LUMBAR PAIN WITH RADIATION DOWN LEG: ICD-10-CM

## 2020-10-06 DIAGNOSIS — M25.552 CHRONIC HIP PAIN, LEFT: ICD-10-CM

## 2020-10-06 DIAGNOSIS — G89.29 CHRONIC HIP PAIN, LEFT: ICD-10-CM

## 2020-10-06 DIAGNOSIS — M54.9 DORSALGIA, UNSPECIFIED: ICD-10-CM

## 2020-10-06 DIAGNOSIS — M79.606 LUMBAR PAIN WITH RADIATION DOWN LEG: ICD-10-CM

## 2020-10-06 DIAGNOSIS — M51.36 DDD (DEGENERATIVE DISC DISEASE), LUMBAR: ICD-10-CM

## 2020-10-06 DIAGNOSIS — R53.1 WEAKNESS: Primary | ICD-10-CM

## 2020-10-06 DIAGNOSIS — G89.29 CHRONIC LEFT-SIDED LOW BACK PAIN WITH LEFT-SIDED SCIATICA: Primary | ICD-10-CM

## 2020-10-06 DIAGNOSIS — M54.42 CHRONIC LEFT-SIDED LOW BACK PAIN WITH LEFT-SIDED SCIATICA: Primary | ICD-10-CM

## 2020-10-06 PROCEDURE — 99999 PR PBB SHADOW E&M-EST. PATIENT-LVL III: ICD-10-PCS | Mod: PBBFAC,,, | Performed by: PHYSICIAN ASSISTANT

## 2020-10-06 PROCEDURE — 99999 PR PBB SHADOW E&M-EST. PATIENT-LVL III: CPT | Mod: PBBFAC,,, | Performed by: PHYSICIAN ASSISTANT

## 2020-10-06 PROCEDURE — 99203 OFFICE O/P NEW LOW 30 MIN: CPT | Mod: S$PBB,,, | Performed by: PHYSICIAN ASSISTANT

## 2020-10-06 PROCEDURE — 99203 PR OFFICE/OUTPT VISIT, NEW, LEVL III, 30-44 MIN: ICD-10-PCS | Mod: S$PBB,,, | Performed by: PHYSICIAN ASSISTANT

## 2020-10-06 PROCEDURE — 99213 OFFICE O/P EST LOW 20 MIN: CPT | Mod: PBBFAC | Performed by: PHYSICIAN ASSISTANT

## 2020-10-06 PROCEDURE — 97110 THERAPEUTIC EXERCISES: CPT | Mod: CQ

## 2020-10-06 RX ORDER — PROMETHAZINE HYDROCHLORIDE 25 MG/1
TABLET ORAL
COMMUNITY
Start: 2020-08-28
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
## 2020-10-06 NOTE — LETTER
October 6, 2020      Barry Seals MD  [address]  [place] [address]           [facility]  [address]  [place] [address]  Phone: [phone]  Fax: [phone]          Patient: Jenifer Westfall   MR Number: [id number]   YOB: 1975   Date of Visit: 10/6/2020       Dear Dr. Barry Seals:    Thank you for referring Jenifer Westfall to me for evaluation. Attached you will find relevant portions of my assessment and plan of care.    If you have questions, please do not hesitate to call me. I look forward to following Jenifer Westfall along with you.    Sincerely,    Lucero Landeros PA-C    Enclosure  CC:  No Recipients    If you would like to receive this communication electronically, please contact [email or web address] or [phone] to request more information on [employer] Link access.    For providers and/or their staff who would like to refer a patient to [facility], please contact us through our one-stop-shop provider referral line, [facility], at [phone].    If you feel you have received this communication in error or would no longer like to receive these types of communications, please e-mail [email or web address]

## 2020-10-06 NOTE — PROGRESS NOTES
PHYSICAL THERAPIST ASSISTANT TREATMENT NOTE    Name: Jenifer Westfall  Clinic Number: [id number]    Therapy Diagnosis:   Encounter Diagnoses   Name Primary?    Chronic hip pain, left     Weakness Yes     Physician: Barry Seals MD    Visit Date: 10/6/2020    Physician Orders: PT Eval and Treat  Medical Diagnosis: Trochanteric bursitis of left hip  Evaluation Date: 6/17/2020  Authorization Period Expiration: 10/14/20  Plan of Care Certification Period: 10/18/20   Visit #/Visits authorized: 8/12    Time In:12:45  Time Out: 1:30  Total Billable Time: 40  minutes    Precautions: Standard    Subjective     Pt reports: little pain today   She was compliant with home exercise program.  Response to previous treatment: sore  Functional change: nothing significant at this time    Pain: 3/10  Location: left hip    Objective     Jenifer received therapeutic exercises to develop strength, ROM, posture, and flexibility for 40 minutes including:  Nustep x 5 min for joint nutrition and mobility  Shuttle heel raises 4 bands  Side lying hip abd w/8 sec hold  DL Bridges w/hips in ER  SL Bridges Bilat  Standing glute med- on forearms over high mat  Dynamic hamstring stretches- assisted  Figure 4 piriformis stretches    Not performed today:  Matrix hip add 25#  Matrix hip abd 35#  LAQ 3#  3 sec hold Bilat  Waiters walk w/6# DB on left--not today        Jenifer received the following manual therapy techniques x 0 minutes, including:  Na today    Jenifer received hot pack for 0 minutes . Na today    Home Exercises Provided and Patient Education Provided     Education provided:   - Progression of rehab/ expected soreness  - HEP review     Written Home Exercises Provided: initiate new exercises provided via written handout.  Exercises were reviewed and Jenifer was able to demonstrate them prior to the end of the session.  Jenifer demonstrated good  understanding of the education provided.     See EMR under Patient Instructions for exercises  provided 09/18/20.    Assessment   Jenifer presents today with low level of pain and reported only sore from previous session. Continue strengthening today as tolerated to focus on her core/glue strengthening. She is very challenged with good form and alignment with her standing activities to work on her glute strengthening therefore the position was modified to facilitate improved form and alignment.  All her activities require verbal and tactile cues due to her impaired form/weakness and compensatory alignment. Today, session was modified as Jenifer had another appointment.   Jenifer is progressing well towards her goals.   Pt prognosis is Good.     Pt will continue to benefit from skilled outpatient physical therapy to address the deficits listed in the problem list box on initial evaluation, provide pt/family education and to maximize pt's level of independence in the home and community environment.     Pt's spiritual, cultural and educational needs considered and pt agreeable to plan of care and goals.     Anticipated barriers to physical therapy: chronicity of symptoms    Patient Specific Goals: be able to walk better, decrease pain    Short Term Goals: In 2 weeks:  1.I with HEP MET  2.Patient to demo increased AROM /PROM to 75% L hip pain free ALMOST MET  3.Patient to demo increase LE strength by 1/2 grade  NOT MET  4.Patient to have decreased pain to 3/10 with walking. ALMOST MET     Long Term Goals: In 4 weeks  1. Patient to perform daily activities including walking without limitation. NOT MET  2. Patient to demonstrate increased L hip ROM 90% pain free NOT MET  3. Patient to demonstrate increased LE strength by 1 grade. NOT MET  4. Patient to have decreased pain to zero with standing. NOT MET       Plan     Gentle hip and lumbar mobility, alignment, core/hip strengthening    Certification Period: 9/24/2020-10/18/2020    Recommended Treatment Plan: 2 times per week for 4 weeks: Electrical Stimulation tens,  Manual Therapy, Moist Heat/ Ice, Neuromuscular Re-ed, Patient Education, Therapeutic Activites and Therapeutic Exercise    Alicia Ennis, PTA

## 2020-10-07 ENCOUNTER — HOSPITAL ENCOUNTER (OUTPATIENT)
Dept: RADIOLOGY | Facility: HOSPITAL | Age: 45
Discharge: HOME | End: 2020-10-07
Attending: OBSTETRICS & GYNECOLOGY
Payer: MEDICAID

## 2020-10-07 DIAGNOSIS — Z12.31 BREAST CANCER SCREENING BY MAMMOGRAM: ICD-10-CM

## 2020-10-07 PROCEDURE — 77067 SCR MAMMO BI INCL CAD: CPT | Mod: TC

## 2020-10-07 PROCEDURE — 77063 MAMMO DIGITAL SCREENING BILAT WITH TOMOSYNTHESIS_CAD: ICD-10-PCS | Mod: 26,,, | Performed by: RADIOLOGY

## 2020-10-07 PROCEDURE — 77063 BREAST TOMOSYNTHESIS BI: CPT | Mod: 26,,, | Performed by: RADIOLOGY

## 2020-10-07 PROCEDURE — 77067 SCR MAMMO BI INCL CAD: CPT | Mod: 26,,, | Performed by: RADIOLOGY

## 2020-10-07 PROCEDURE — 77067 MAMMO DIGITAL SCREENING BILAT WITH TOMOSYNTHESIS_CAD: ICD-10-PCS | Mod: 26,,, | Performed by: RADIOLOGY

## 2020-10-08 ENCOUNTER — CLINICAL SUPPORT (OUTPATIENT)
Dept: REHABILITATION | Facility: HOSPITAL | Age: 45
End: 2020-10-08
Payer: MEDICAID

## 2020-10-08 DIAGNOSIS — M25.552 CHRONIC HIP PAIN, LEFT: Primary | ICD-10-CM

## 2020-10-08 DIAGNOSIS — I73.9 PAD (PERIPHERAL ARTERY DISEASE): Primary | ICD-10-CM

## 2020-10-08 DIAGNOSIS — R53.1 WEAKNESS: ICD-10-CM

## 2020-10-08 DIAGNOSIS — G89.29 CHRONIC HIP PAIN, LEFT: Primary | ICD-10-CM

## 2020-10-08 PROCEDURE — 97110 THERAPEUTIC EXERCISES: CPT | Mod: CQ

## 2020-10-08 NOTE — PROGRESS NOTES
PHYSICAL THERAPIST ASSISTANT TREATMENT NOTE    Name: Jenifer Westfall  Clinic Number: [id number]    Therapy Diagnosis:   Encounter Diagnoses   Name Primary?    Chronic hip pain, left Yes    Weakness      Physician: Barry Seals MD    Visit Date: 10/8/2020    Physician Orders: PT Eval and Treat  Medical Diagnosis: Trochanteric bursitis of left hip  Evaluation Date: 6/17/2020  Authorization Period Expiration: 10/14/20  Plan of Care Certification Period: 10/18/20   Visit #/Visits authorized: 9/12    Time In:2:30  Time Out: 3:35  Total Billable Time: 55  minutes    Precautions: Standard    Subjective     Pt reports: little pain today   She was compliant with home exercise program.  Response to previous treatment: sore in the calves  Functional change: nothing significant at this time    Pain: 3/10  Location: left hip    Objective     Jenifer received therapeutic exercises to develop strength, ROM, posture, and flexibility for 55 minutes including:  Nustep x 5 min for joint nutrition and mobility  Matrix hip add 25#  Matrix hip abd 35#  Shuttle heel raises 4 bands  Side lying hip abd w/8 sec hold  DL Bridges w/hips in ER-not today  SL Bridges Bilat-not today  Standing glute med- on forearms over high mat (for postural alignment)  Standing hip IR><ER  Dynamic hamstring stretches  Figure 4 piriformis stretches  Seated pelvic tilts  LTR  DKTC w/towel  Lumber flex walk        Jenifer received the following manual therapy techniques x 0 minutes, including:  Na today    Jenifer received hot pack for 10 minutes to low back and left hip    Home Exercises Provided and Patient Education Provided     Education provided:   - Progression of rehab/ expected soreness  - HEP review     Written Home Exercises Provided: initiate new exercises provided via written handout.  Exercises were reviewed and Jenifer was able to demonstrate them prior to the end of the session.  Jenifer demonstrated good  understanding of the education provided.     See  EMR under Patient Instructions for exercises provided 09/18/20.    Assessment   Jenifer presents today with low level of pain and reported only sore from previous session in her calves. Continue strengthening today as tolerated to focus on her core/glutes. She is very challenged with good form and alignment with her standing activities to work on her glute strengthening and requires modification.  All her activities require verbal and tactile cues due to her impaired form/weakness and compensatory alignment. Her pain appears to be slowly decreasing overall however her subjective comments/complaints are somewhat inconsistent at times.   Jenifer is progressing well towards her goals.   Pt prognosis is Good.     Pt will continue to benefit from skilled outpatient physical therapy to address the deficits listed in the problem list box on initial evaluation, provide pt/family education and to maximize pt's level of independence in the home and community environment.     Pt's spiritual, cultural and educational needs considered and pt agreeable to plan of care and goals.     Anticipated barriers to physical therapy: chronicity of symptoms    Patient Specific Goals: be able to walk better, decrease pain    Short Term Goals: In 2 weeks:  1.I with HEP MET  2.Patient to demo increased AROM /PROM to 75% L hip pain free ALMOST MET  3.Patient to demo increase LE strength by 1/2 grade  NOT MET  4.Patient to have decreased pain to 3/10 with walking. ALMOST MET     Long Term Goals: In 4 weeks  1. Patient to perform daily activities including walking without limitation. NOT MET  2. Patient to demonstrate increased L hip ROM 90% pain free NOT MET  3. Patient to demonstrate increased LE strength by 1 grade. NOT MET  4. Patient to have decreased pain to zero with standing. NOT MET       Plan     Gentle hip and lumbar mobility, alignment, core/hip strengthening    Certification Period: 9/24/2020-10/18/2020    Recommended Treatment Plan: 2  times per week for 4 weeks: Electrical Stimulation tens, Manual Therapy, Moist Heat/ Ice, Neuromuscular Re-ed, Patient Education, Therapeutic Activites and Therapeutic Exercise    Alicia Ennis, PTA

## 2020-10-09 ENCOUNTER — HOSPITAL ENCOUNTER (OUTPATIENT)
Dept: CARDIOLOGY | Facility: HOSPITAL | Age: 45
Discharge: HOME | End: 2020-10-09
Attending: INTERNAL MEDICINE
Payer: MEDICAID

## 2020-10-09 ENCOUNTER — OFFICE VISIT (OUTPATIENT)
Dept: CARDIOLOGY | Facility: CLINIC | Age: 45
End: 2020-10-09
Payer: MEDICAID

## 2020-10-09 ENCOUNTER — TELEPHONE (OUTPATIENT)
Dept: CARDIOLOGY | Facility: CLINIC | Age: 45
End: 2020-10-09

## 2020-10-09 VITALS
SYSTOLIC BLOOD PRESSURE: 112 MMHG | WEIGHT: 207.25 LBS | OXYGEN SATURATION: 98 % | HEART RATE: 84 BPM | BODY MASS INDEX: 28.9 KG/M2 | DIASTOLIC BLOOD PRESSURE: 68 MMHG

## 2020-10-09 DIAGNOSIS — Z79.4 TYPE 2 DIABETES MELLITUS WITH DIABETIC NEUROPATHY, WITH LONG-TERM CURRENT USE OF INSULIN: ICD-10-CM

## 2020-10-09 DIAGNOSIS — E78.2 MIXED HYPERLIPIDEMIA: ICD-10-CM

## 2020-10-09 DIAGNOSIS — Z79.4 TYPE 2 DIABETES MELLITUS WITH DIABETIC PERIPHERAL ANGIOPATHY WITHOUT GANGRENE, WITH LONG-TERM CURRENT USE OF INSULIN: ICD-10-CM

## 2020-10-09 DIAGNOSIS — I70.8 OCCLUSION OF RIGHT SUBCLAVIAN ARTERY: ICD-10-CM

## 2020-10-09 DIAGNOSIS — I77.1 ILIAC ARTERY STENOSIS, LEFT: ICD-10-CM

## 2020-10-09 DIAGNOSIS — E66.9 OBESITY (BMI 30-39.9): ICD-10-CM

## 2020-10-09 DIAGNOSIS — E11.51 TYPE 2 DIABETES MELLITUS WITH DIABETIC PERIPHERAL ANGIOPATHY WITHOUT GANGRENE, WITH LONG-TERM CURRENT USE OF INSULIN: ICD-10-CM

## 2020-10-09 DIAGNOSIS — I73.9 CLAUDICATION IN PERIPHERAL VASCULAR DISEASE: ICD-10-CM

## 2020-10-09 DIAGNOSIS — E11.40 TYPE 2 DIABETES MELLITUS WITH DIABETIC NEUROPATHY, WITH LONG-TERM CURRENT USE OF INSULIN: ICD-10-CM

## 2020-10-09 DIAGNOSIS — Q87.40 MARFAN'S SYNDROME: Primary | ICD-10-CM

## 2020-10-09 DIAGNOSIS — I34.1 MVP (MITRAL VALVE PROLAPSE): ICD-10-CM

## 2020-10-09 DIAGNOSIS — I73.9 PAD (PERIPHERAL ARTERY DISEASE): Primary | ICD-10-CM

## 2020-10-09 DIAGNOSIS — I77.1 INNOMINATE ARTERY STENOSIS: ICD-10-CM

## 2020-10-09 DIAGNOSIS — I73.9 PAD (PERIPHERAL ARTERY DISEASE): ICD-10-CM

## 2020-10-09 DIAGNOSIS — I10 ESSENTIAL HYPERTENSION: ICD-10-CM

## 2020-10-09 DIAGNOSIS — I27.20 PULMONARY HYPERTENSION: ICD-10-CM

## 2020-10-09 PROCEDURE — 99215 PR OFFICE/OUTPT VISIT, EST, LEVL V, 40-54 MIN: ICD-10-PCS | Mod: S$PBB,,, | Performed by: INTERNAL MEDICINE

## 2020-10-09 PROCEDURE — 99215 OFFICE O/P EST HI 40 MIN: CPT | Mod: S$PBB,,, | Performed by: INTERNAL MEDICINE

## 2020-10-09 PROCEDURE — 99212 OFFICE O/P EST SF 10 MIN: CPT | Mod: PBBFAC,25 | Performed by: INTERNAL MEDICINE

## 2020-10-09 PROCEDURE — 93005 ELECTROCARDIOGRAM TRACING: CPT

## 2020-10-09 PROCEDURE — 99999 PR PBB SHADOW E&M-EST. PATIENT-LVL II: ICD-10-PCS | Mod: PBBFAC,,, | Performed by: INTERNAL MEDICINE

## 2020-10-09 PROCEDURE — 99999 PR PBB SHADOW E&M-EST. PATIENT-LVL II: CPT | Mod: PBBFAC,,, | Performed by: INTERNAL MEDICINE

## 2020-10-09 PROCEDURE — 93010 EKG 12-LEAD: ICD-10-PCS | Mod: ,,, | Performed by: INTERNAL MEDICINE

## 2020-10-09 PROCEDURE — 93010 ELECTROCARDIOGRAM REPORT: CPT | Mod: ,,, | Performed by: INTERNAL MEDICINE

## 2020-10-09 RX ORDER — ATENOLOL 25 MG/1
25 TABLET ORAL DAILY
Qty: 30 TABLET | Refills: 11 | Status: SHIPPED | OUTPATIENT
Start: 2020-10-09 | End: 2021-10-19 | Stop reason: SDUPTHER

## 2020-10-09 NOTE — TELEPHONE ENCOUNTER
Please call pt.  Lipids remain too high.  Start Repatha and will help get lipids to goal.  Needs to be about 50 points better.  Will submit to [facility] pharmacy.    DM HGAIC above goal, 9.6%  Needs to be < 7.0%.  F/u with treating physician for DM.    Dr Delgado

## 2020-10-09 NOTE — PROGRESS NOTES
Subjective:    Patient ID:  Jenifer Westfall is a 45 y.o. female who presents for evaluation of Marfain's Peripheral Arterial Disease, Hyperlipidemia, Hypertension, Claudication, and Risk Factor Management For Atherosclerosis        HPI pt presents for f/u.  She sees Dr. Hsu and wants to transfer care.  Chart reviewed in detail.   Her current med conditions include Marfans, PAD, HTN, DM, MVP, PHTN, hyperlipidemia. Nonsmoker.  She has Marfan's Dz dx'ed at the age of 9, with ectopia lentis. She is the first one in the family having Marfan's and her younger son has Dx.   S/p aortic arch angio 2015 w Dr. Yao and  R subclavian artery.  S/p B STEPHEN stenting w Dr. Landeros, [facility] surgery, June 2018 (R STEPHEN 9 X 39 mm iCast Atrium stent, L STEPHEN:  9 x 59 mm Icast Atrium and 8 x 18 balloon exp stent).  Now sees Dr. Tian, [facility] surgery, and had PTA of her left iliac stent May 2019.  Echo 12/18 normal LVEF, mild PHTN.  ecg today is normal.   No chest pain sxs.  No CHF sxs.  Left hip pain walking for some time.   HTN controlled.  No longer on Atenolol?  Lipids above goal.  DM above goal.     Past Medical History:   Diagnosis Date    Arthritis     DM (diabetes mellitus) 2009     03/01/2017 Insulin x 3 years 2013    Hepatitis C antibody positive in blood     Hyperlipidemia     Hypertension     IBS (irritable bowel syndrome)     Kidney stone     Marfan syndrome     Mitral valve prolapse      Current Outpatient Medications   Medication Instructions    ADMELOG SOLOSTAR U-100 INSULIN 100 unit/mL pen No dose, route, or frequency recorded.    ascorbic acid (vitamin C) (VITAMIN C) 100 mg, Oral, Daily    aspirin (ECOTRIN) 81 mg, Oral, Daily    atenoloL (TENORMIN) 25 mg, Oral, Daily    atorvastatin (LIPITOR) 80 MG tablet TAKE 1 TABLET BY MOUTH ONCE DAILY.    BASAGLAR KWIKPEN U-100 INSULIN glargine 100 units/mL (3mL) SubQ pen No dose, route, or frequency recorded.    cetirizine (ZYRTEC) 10 mg, Oral    clopidogreL  "(PLAVIX) 75 mg, Oral, Daily    colestipol (COLESTID) 1 gram Tab TAKE 2 TABLETS BY MOUTH THREE TIMES DAILY. DO NOT TAKE OTHER MEDICATION WITHIN 1 HOUR BEFORE OR 4 HOURS AFTER TAKING COLESTIPOL.    diclofenac sodium (VOLTAREN) 2 g, Topical (Top), 3 times daily    dicyclomine (BENTYL) 20 mg tablet TAKE 1 TABLET BY MOUTH 4 TIMES DAILY    famotidine (PEPCID) 20 MG tablet TAKE 1 TABLET BY MOUTH 2 (TWO) TIMES DAILY.    FLUoxetine (PROZAC) 40 MG capsule TAKE 1 CAPSULE BY MOUTH ONCE DAILY    gabapentin (NEURONTIN) 400 MG capsule  TID    glipiZIDE (GLUCOTROL) 10 mg, Oral, 2 times daily    insulin glargine,hum.rec.anlog (BASAGLAR KWIKPEN U-100 INSULIN SUBQ) 40 Units, Subcutaneous, 2 times daily    ketorolac 0.5% (ACULAR) 0.5 % Drop No dose, route, or frequency recorded.    liraglutide 0.6 mg/0.1 mL (18 mg/3 mL) subq PNIJ (VICTOZA 2-DIAN) 1.2 Units, Subcutaneous    lisinopril 10 MG tablet TAKE 1 TABLET (10 MG TOTAL) BY MOUTH ONCE DAILY.    metoclopramide HCl (REGLAN) 10 MG tablet TAKE 1 TABLET BY MOUTH 2 (TWO) TIMES DAILY BEFORE MEALS.    NOVOLOG FLEXPEN U-100 INSULIN 100 unit/mL (3 mL) InPn pen No dose, route, or frequency recorded.    ondansetron (ZOFRAN) 4 mg, Oral, 2 times daily    pantoprazole (PROTONIX) 40 MG tablet T1T PO ONCE DAILY    PEN NEEDLE 31 gauge x 5/16" Ndle USE 5 TIMES DAILY WITH LANTUS AND HUMALOG    promethazine (PHENERGAN) 25 MG tablet No dose, route, or frequency recorded.    tiZANidine (ZANAFLEX) 4 MG tablet TAKE 1 TO 1 & 1/2 TABLET (4-6 MG TOTAL) BY MOUTH NIGHTLY AS NEEDED.    traMADol (ULTRAM) 50 mg tablet TAKE 1 TABLET BY MOUTH EVERY 12 HOURS    TRUE METRIX GLUCOSE TEST STRIP Strp No dose, route, or frequency recorded.       Review of Systems   Constitution: Negative.   HENT: Negative.    Eyes: Negative.    Cardiovascular: Positive for claudication.   Respiratory: Negative.    Endocrine: Negative.    Hematologic/Lymphatic: Negative.    Skin: Negative.    Musculoskeletal: Positive for " arthritis, back pain and joint pain.   Gastrointestinal: Negative.    Genitourinary: Negative.    Neurological: Negative.    Psychiatric/Behavioral: Negative.    Allergic/Immunologic: Negative.        /68 (BP Location: Left arm, Patient Position: Sitting)   Pulse 84   Wt 94 kg (207 lb 3.7 oz)   SpO2 98%   BMI 28.90 kg/m²     Wt Readings from Last 3 Encounters:   10/09/20 94 kg (207 lb 3.7 oz)   10/06/20 94.4 kg (208 lb 1.8 oz)   09/24/20 95.7 kg (211 lb)     Temp Readings from Last 3 Encounters:   06/04/20 98.2 °F (36.8 °C) (Temporal)   07/31/19 98 °F (36.7 °C) (Temporal)   01/23/19 98.3 °F (36.8 °C) (Oral)     BP Readings from Last 3 Encounters:   10/09/20 112/68   10/06/20 131/82   09/24/20 123/85     Pulse Readings from Last 3 Encounters:   10/09/20 84   10/06/20 82   09/24/20 97          Objective:    Physical Exam   Constitutional: She is oriented to person, place, and time. Vital signs are normal. She appears well-developed and well-nourished. She is active and cooperative. She does not have a sickly appearance. She does not appear ill. No distress.   HENT:   Head: Normocephalic.   Neck: Neck supple. Normal carotid pulses, no hepatojugular reflux and no JVD present. Carotid bruit is not present. No thyromegaly present.   Cardiovascular: Normal rate, regular rhythm, S1 normal, S2 normal and normal heart sounds. PMI is not displaced. Exam reveals no gallop and no friction rub.   No murmur heard.  Pulses:       Radial pulses are 2+ on the right side and 2+ on the left side.        Femoral pulses are 1+ on the right side and 0 on the left side.       Dorsalis pedis pulses are 2+ on the right side and 0 on the left side.        Posterior tibial pulses are 1+ on the right side and 0 on the left side.   Pulmonary/Chest: Effort normal and breath sounds normal. She has no wheezes. She has no rales.   Abdominal: Soft. Normal appearance, normal aorta and bowel sounds are normal. She exhibits no pulsatile liver,  no abdominal bruit, no ascites and no mass. There is no splenomegaly or hepatomegaly. There is no abdominal tenderness.   Musculoskeletal:         General: No edema.   Lymphadenopathy:     She has no cervical adenopathy.   Neurological: She is alert and oriented to person, place, and time.   Skin: Skin is warm. She is not diaphoretic.   Psychiatric: She has a normal mood and affect. Her behavior is normal.   Nursing note and vitals reviewed.      I have reviewed all pertinent labs and cardiac studies.      Chemistry        Component Value Date/Time     (L) 10/14/2018 1820    K 3.7 10/14/2018 1820     10/14/2018 1820    CO2 18 (L) 10/14/2018 1820    BUN 12 10/14/2018 1820    CREATININE 0.8 10/14/2018 1820     (H) 10/14/2018 1820        Component Value Date/Time    CALCIUM 9.6 10/14/2018 1820    ALKPHOS 112 10/14/2018 1820    AST 33 10/14/2018 1820    ALT 38 10/14/2018 1820    BILITOT 0.4 10/14/2018 1820    ESTGFRAFRICA >60 10/14/2018 1820    EGFRNONAA >60 10/14/2018 1820        Lab Results   Component Value Date    WBC 11.59 10/14/2018    HGB 16.1 (H) 10/14/2018    HCT 44.9 10/14/2018    MCV 90 10/14/2018     10/14/2018       Lab Results   Component Value Date    HGBA1C 8.5 (H) 12/11/2019     Lab Results   Component Value Date    CHOL 199 05/29/2019    CHOL 164 02/03/2017    CHOL 175 10/07/2015     Lab Results   Component Value Date    HDL 32 (L) 05/29/2019    HDL 30 (L) 02/03/2017    HDL 33 (L) 10/07/2015     Lab Results   Component Value Date    LDLCALC 108.0 05/29/2019    LDLCALC 103.8 02/03/2017    LDLCALC 115.0 10/07/2015     Lab Results   Component Value Date    TRIG 295 (H) 05/29/2019    TRIG 151 (H) 02/03/2017    TRIG 135 10/07/2015     Lab Results   Component Value Date    CHOLHDL 16.1 (L) 05/29/2019    CHOLHDL 18.3 (L) 02/03/2017    CHOLHDL 18.9 (L) 10/07/2015           Assessment:       1. Marfan's syndrome    2. Essential hypertension    3. Mixed hyperlipidemia    4. MVP (mitral  valve prolapse)    5. Innominate artery stenosis    6. PAD (peripheral artery disease)    7. Occlusion of right subclavian artery    8. Claudication in peripheral vascular disease, left leg    9. Iliac artery stenosis, left    10. Obesity (BMI 30-39.9)    11. Pulmonary hypertension    12. Type 2 diabetes mellitus with diabetic peripheral angiopathy without gangrene, with long-term current use of insulin         Plan:             F/u with Dr. Tian, [facility] surgery, later this month.  She has vascular studies pending and then appt with him.  While this is my first time seeing pt, it seems she has probably blocked up her left iliac stent again as she has left hip claudication and significant decrease in pulses throughout left leg vs right leg.  I advised pt to discuss with Dr. Tian to perform another angiogram with intervention if warranted.  Discussed Marfan's with pt.  Needs to get her lipids to goal and her DM too.  Recheck lipids.  Continue high intensity statin.  Repatha probably needed.  Patient advised of indications for above medications, risks/benefits and side effect profile.  Stress MPI  Echocardiogram.  Discussed possible LHC should stress test and/or echo reveal evidence of ischemic heart disease.  Discussed possible left heart catheterization with possible PCI if warranted.  Pt advised of all risks and benefits of procedure.  Pt advised of alternative approaches and treatment strategies to include medical therapy, PCI as well as surgical revascularization with possible CABG.  All questions answered in clinic.  Continue current meds except add back Atenolol 25 mg qd for CV protection with Marfan's.  Cardiac diet  Daily exercise  Weigh loss  F/u after tests.

## 2020-10-09 NOTE — TELEPHONE ENCOUNTER
The patient has been notified of this information and all questions answered.  Lipids remain too high.  Start Repatha and will help get lipids to goal.  Needs to be about 50 points better.  Will submit to [facility] pharmacy.     DM HGAIC above goal, 9.6%  Needs to be < 7.0%.  F/u with treating physician for DM. Pt verbalizes understanding.

## 2020-10-16 ENCOUNTER — OFFICE VISIT (OUTPATIENT)
Dept: GASTROENTEROLOGY | Facility: CLINIC | Age: 45
End: 2020-10-16
Payer: MEDICAID

## 2020-10-16 VITALS
HEIGHT: 71 IN | BODY MASS INDEX: 29.6 KG/M2 | SYSTOLIC BLOOD PRESSURE: 138 MMHG | DIASTOLIC BLOOD PRESSURE: 76 MMHG | WEIGHT: 211.44 LBS

## 2020-10-16 DIAGNOSIS — K58.0 IRRITABLE BOWEL SYNDROME WITH DIARRHEA: Primary | ICD-10-CM

## 2020-10-16 PROCEDURE — 99213 PR OFFICE/OUTPT VISIT, EST, LEVL III, 20-29 MIN: ICD-10-PCS | Mod: S$PBB,,, | Performed by: INTERNAL MEDICINE

## 2020-10-16 PROCEDURE — 99215 OFFICE O/P EST HI 40 MIN: CPT | Mod: PBBFAC | Performed by: INTERNAL MEDICINE

## 2020-10-16 PROCEDURE — 99999 PR PBB SHADOW E&M-EST. PATIENT-LVL V: ICD-10-PCS | Mod: PBBFAC,,, | Performed by: INTERNAL MEDICINE

## 2020-10-16 PROCEDURE — 99999 PR PBB SHADOW E&M-EST. PATIENT-LVL V: CPT | Mod: PBBFAC,,, | Performed by: INTERNAL MEDICINE

## 2020-10-16 PROCEDURE — 99213 OFFICE O/P EST LOW 20 MIN: CPT | Mod: S$PBB,,, | Performed by: INTERNAL MEDICINE

## 2020-10-16 RX ORDER — DIPHENOXYLATE HYDROCHLORIDE AND ATROPINE SULFATE 2.5; .025 MG/1; MG/1
1 TABLET ORAL 4 TIMES DAILY PRN
Qty: 120 TABLET | Refills: 0 | Status: SHIPPED | OUTPATIENT
Start: 2020-10-16 | End: 2020-11-15

## 2020-10-16 NOTE — PATIENT INSTRUCTIONS
Metamucil once daily   Continue bentyl with each meal and at night   Start lomotil  Continue cholestyramine

## 2020-10-20 ENCOUNTER — TELEPHONE (OUTPATIENT)
Dept: RADIOLOGY | Facility: HOSPITAL | Age: 45
End: 2020-10-20

## 2020-10-21 ENCOUNTER — HOSPITAL ENCOUNTER (OUTPATIENT)
Dept: RADIOLOGY | Facility: HOSPITAL | Age: 45
Discharge: HOME | End: 2020-10-21
Attending: PHYSICIAN ASSISTANT
Payer: MEDICAID

## 2020-10-21 DIAGNOSIS — M54.9 DORSALGIA, UNSPECIFIED: ICD-10-CM

## 2020-10-21 PROCEDURE — 72148 MRI LUMBAR SPINE WITHOUT CONTRAST: ICD-10-PCS | Mod: 26,,, | Performed by: RADIOLOGY

## 2020-10-21 PROCEDURE — 72148 MRI LUMBAR SPINE W/O DYE: CPT | Mod: 26,,, | Performed by: RADIOLOGY

## 2020-10-21 PROCEDURE — 72148 MRI LUMBAR SPINE W/O DYE: CPT | Mod: TC

## 2020-10-22 ENCOUNTER — SPECIALTY PHARMACY (OUTPATIENT)
Dept: PHARMACY | Facility: CLINIC | Age: 45
End: 2020-10-22

## 2020-10-23 ENCOUNTER — CLINICAL SUPPORT (OUTPATIENT)
Dept: REHABILITATION | Facility: HOSPITAL | Age: 45
End: 2020-10-23
Payer: MEDICAID

## 2020-10-23 DIAGNOSIS — G89.29 CHRONIC HIP PAIN, LEFT: ICD-10-CM

## 2020-10-23 DIAGNOSIS — R53.1 WEAKNESS: Primary | ICD-10-CM

## 2020-10-23 DIAGNOSIS — M25.552 CHRONIC HIP PAIN, LEFT: ICD-10-CM

## 2020-10-23 PROCEDURE — 97110 THERAPEUTIC EXERCISES: CPT | Mod: CQ

## 2020-10-23 NOTE — PLAN OF CARE
Outpatient Therapy Updated Plan of Care     Visit Date: 10/23/2020  Name: Jenifer Westfall  Clinic Number: [id number]    Therapy Diagnosis:   Encounter Diagnoses   Name Primary?    Chronic hip pain, left     Weakness Yes     Physician: Barry Seals MD    Physician Orders: PT Eval and Treat  Medical Diagnosis: Trochanteric bursitis of left hip  Evaluation Date: 6/17/20    Total Visits Received:20  Cancelled Visits: 5  No Show Visits: 0    Current Certification Period:  10/23/20 to 11/23/20  Precautions:  Marfan's syndrome  Visits from Evaluation Date:  20  Functional Level Prior to Evaluation:  IND    Subjective     Update: Patient reports that she has been feeling about the same. However she had a recent MD appointment and it looks like her pain might be more vascular after her consult. She is looking at getting an angiogram in the near future and is excited that they are figuring out more of what her symptoms are.    Objective     Update:     CMS Impairment/Limitation/Restriction for FOTO Hip Survey    Therapist reviewed FOTO scores for Jenifer Westfall on 10/23/2020.   FOTO documents entered into [email or web address] - see Media section.    Limitation Score: 45%       Gait: antalgic, decreased weight shift to the L side     Squat: Double leg: decreased weight shift to L side, but better with less ROM              Single leg: DNT     Balance: fair + due to decrease in weight shift to the L side     Reflex/Sensation: increased sensitivity along L sciatic nerve as well as the lateral hip     Hip PROM %:                                                 (L)        (R)                                      Flexion                         90% p! 100%                                      Extension                    50%     50%                                      Abduction                    75%  100%                                      Adduction                    75% p! 100%                                      External rotation           90%     100%                                      Internal rotation           90% p! 100%     Lumbar FL: 100%  with increase in pain to posterior hip  Lumbar EX: 50% increase in pain and pain down leg                Strength:                    Hip flexors                   4/5     4/5  Quadriceps                  4/5       4/5                                             Hamstrings                  4/5       4/5                                      Anterior Tibialis           4/5       4/5                                      Peroneals                    4/5       4/5                                      Gastrocnemius            4/5       4/5                                      Adductors                    3+/5     3+/5                                      External rotators         3+/5     3+/5                                      Internal rotators           3+/5     3+/5                                      Gluteus Medius           3+/5     3+/5                                      Gluteus Jamarcus        3+/5     3+/5     Joint Mobility: hypomobile to L hip        PIVM Lumbar: tender and more hypomobile on the L side     Special Test:              CARLOS                        p!         FADIR                         p!                                      Scouring                      neg      SLR L: positive                                           Tenderness to palpation: Tender to palpate along L lateral hip and along L lumbar paraspinals and along L TFL     Lower Limb Tension Test:  Pos sciatic nerve on the L side, but ROM has improved.     Assessment     Update: Patient has demonstrated some improvement in ROM, improvement in pain/adverse symptoms, improvement in strength, and improvement in tolerance to activity since starting skilled services.      Short Term Goals: In 2 weeks:  1.I with HEP MET  2.Patient to demo increased AROM /PROM to 75% L hip pain free ALMOST MET  3.Patient to demo increase LE  strength by 1/2 grade  NOT MET  4.Patient to have decreased pain to 3/10 with walking. ALMOST MET     Long Term Goals: In 4 weeks  1. Patient to perform daily activities including walking without limitation. NOT MET  2. Patient to demonstrate increased L hip ROM 90% pain free NOT MET  3. Patient to demonstrate increased LE strength by 1 grade. NOT MET  4. Patient to have decreased pain to zero with standing. NOT MET    Reasons for Recertification of Therapy:   Patient is still having a lot of difficulty with walking, pain with certain hip movements, and decreased tolerance to activities. Patient had some personal issues with her son as well as some helath issues not allowing her to come consistently in the last month. However, patient's recent MD visit concluded that it seems to be more vascular in nature. Will hold off on therapy at this time due to improvement in symptoms most likely will be due to vascular surgery. Educated patient on improvement in overall health and activity and given updated HEP to continue to improve strength and hip mobility. Also, informed patient to contact therapy to make appts after her surgery in order to work on general strengthening and improving overall health.    Plan     Updated Certification Period: 10/23/2020 to 11/23/20  Recommended Treatment Plan: 2 times per week for 4 weeks: Manual Therapy, Moist Heat/ Ice, Neuromuscular Re-ed, Patient Education, Therapeutic Activites and Therapeutic Exercise  Other Recommendations: Postpone therapy at this time until possible angiogram    Megan Rojo, PT  10/23/2020      I CERTIFY THE NEED FOR THESE SERVICES FURNISHED UNDER THIS PLAN OF TREATMENT AND WHILE UNDER MY CARE    Physician's comments:        Physician's Signature: ___________________________________________________

## 2020-10-23 NOTE — PROGRESS NOTES
"PHYSICAL THERAPIST ASSISTANT TREATMENT NOTE    Name: Jenifer Westfall  Clinic Number: [id number]    Therapy Diagnosis:   Encounter Diagnoses   Name Primary?    Chronic hip pain, left     Weakness Yes     Physician: Barry Seals MD    Visit Date: 10/23/2020    Physician Orders: PT Eval and Treat  Medical Diagnosis: Trochanteric bursitis of left hip  Evaluation Date: 6/17/2020  Authorization Period Expiration: 11/30/2020  Plan of Care Certification Period: 11/23/2020   Visit #/Visits authorized: 1/12 new auth    Time In:12:47  Time Out: 1:45  Total Billable Time: 55 minutes    Precautions: Standard    Subjective     Pt reports: has had multiple MD visits and "things are starting to finally get taken care of". She will be having an angioplasty of her recurrent in-stent stenosis.   She was partially compliant with home exercise program.  Response to previous treatment: no issues  Functional change: nothing significant at this time    Pain: 0/10  Location: na today    Objective     Jenifer received therapeutic exercises to develop strength, ROM, posture, and flexibility for 55 minutes including:  Nustep x 5 min for joint nutrition and mobility  Matrix hip add 25#  Matrix hip abd 35#  Shuttle heel raises 3 bands  Right side lying for left hip abd w/8 sec hold (patient unable to lie on left side)  Prone alternating hip extension  DL Bridges w/hips in ER  SL Bridges Bilat  Standing glute med- on forearms over stool/pillow on high mat (for postural alignment)  Seated left LAQ 3# w/8 sec hold  Dynamic hamstring stretches      Jenifer received the following manual therapy techniques x 0 minutes, including:  Na today    Jenifer received hot pack for 10 minutes to low back and left hip    Home Exercises Provided and Patient Education Provided     Education provided:   - Progression of rehab/ expected soreness  - HEP review     Written Home Exercises Provided: initiate new exercises provided via written handout.  Exercises were " reviewed and Jenifer was able to demonstrate them prior to the end of the session.  Jenifer demonstrated good  understanding of the education provided.     See EMR under Patient Instructions for exercises provided 09/18/20.    Assessment   Jenifer presents today without pain and is in very good spirits as she feels she is going forward with her care in taking care of her pain. Continued strengthening today as tolerated with focus on her core/glutes. She is challenged with good form and alignment with her standing activities to work on her glute strengthening and requires modification.  Some of her activities require verbal and tactile cues due to her impaired form/weakness and compensatory alignment however she has improved overall. Jenifer was given an updated HEP with encouragement for compliance. Jenifer verbalized understanding.   Jenifer is progressing well towards her goals.   Pt prognosis is Good.     Pt will continue to benefit from skilled outpatient physical therapy to address the deficits listed in the problem list box on initial evaluation, provide pt/family education and to maximize pt's level of independence in the home and community environment.     Pt's spiritual, cultural and educational needs considered and pt agreeable to plan of care and goals.     Anticipated barriers to physical therapy: chronicity of symptoms    Patient Specific Goals: be able to walk better, decrease pain    Short Term Goals: In 2 weeks:  1.I with HEP MET  2.Patient to demo increased AROM /PROM to 75% L hip pain free ALMOST MET  3.Patient to demo increase LE strength by 1/2 grade  NOT MET  4.Patient to have decreased pain to 3/10 with walking. ALMOST MET     Long Term Goals: In 4 weeks  1. Patient to perform daily activities including walking without limitation. NOT MET  2. Patient to demonstrate increased L hip ROM 90% pain free NOT MET  3. Patient to demonstrate increased LE strength by 1 grade. NOT MET  4. Patient to have decreased  pain to zero with standing. NOT MET       Plan     Gentle hip and lumbar mobility, alignment, core/hip strengthening    Certification Period: 10/23/2020-11/23/2020    Recommended Treatment Plan: 2 times per week for 4 weeks: Electrical Stimulation tens, Manual Therapy, Moist Heat/ Ice, Neuromuscular Re-ed, Patient Education, Therapeutic Activites and Therapeutic Exercise    Alicia Ennis, PTA

## 2020-10-27 ENCOUNTER — OFFICE VISIT (OUTPATIENT)
Dept: NEUROSURGERY | Facility: CLINIC | Age: 45
End: 2020-10-27
Payer: MEDICAID

## 2020-10-27 VITALS
DIASTOLIC BLOOD PRESSURE: 62 MMHG | SYSTOLIC BLOOD PRESSURE: 120 MMHG | RESPIRATION RATE: 16 BRPM | BODY MASS INDEX: 29.44 KG/M2 | HEIGHT: 71 IN | WEIGHT: 210.31 LBS | HEART RATE: 86 BPM

## 2020-10-27 DIAGNOSIS — M79.606 LUMBAR PAIN WITH RADIATION DOWN LEG: Primary | ICD-10-CM

## 2020-10-27 DIAGNOSIS — M51.36 DDD (DEGENERATIVE DISC DISEASE), LUMBAR: ICD-10-CM

## 2020-10-27 DIAGNOSIS — M54.50 LUMBAR PAIN WITH RADIATION DOWN LEG: Primary | ICD-10-CM

## 2020-10-27 PROCEDURE — 99214 PR OFFICE/OUTPT VISIT, EST, LEVL IV, 30-39 MIN: ICD-10-PCS | Mod: S$PBB,,, | Performed by: PHYSICIAN ASSISTANT

## 2020-10-27 PROCEDURE — 99999 PR PBB SHADOW E&M-EST. PATIENT-LVL IV: ICD-10-PCS | Mod: PBBFAC,,, | Performed by: PHYSICIAN ASSISTANT

## 2020-10-27 PROCEDURE — 99214 OFFICE O/P EST MOD 30 MIN: CPT | Mod: PBBFAC | Performed by: PHYSICIAN ASSISTANT

## 2020-10-27 PROCEDURE — 99214 OFFICE O/P EST MOD 30 MIN: CPT | Mod: S$PBB,,, | Performed by: PHYSICIAN ASSISTANT

## 2020-10-27 PROCEDURE — 99999 PR PBB SHADOW E&M-EST. PATIENT-LVL IV: CPT | Mod: PBBFAC,,, | Performed by: PHYSICIAN ASSISTANT

## 2020-10-27 RX ORDER — AMOXICILLIN 500 MG/1
CAPSULE ORAL
Status: ON HOLD | COMMUNITY
Start: 2020-08-11 | End: 2020-11-23

## 2020-10-27 RX ORDER — BUTALBITAL, ACETAMINOPHEN AND CAFFEINE 50; 325; 40 MG/1; MG/1; MG/1
1 TABLET ORAL EVERY 6 HOURS PRN
COMMUNITY
Start: 2020-10-22 | End: 2021-02-24

## 2020-10-27 RX ORDER — VARENICLINE TARTRATE 1 MG/1
1 TABLET, FILM COATED ORAL
COMMUNITY
End: 2021-04-15

## 2020-10-27 RX ORDER — HYDROCODONE BITARTRATE AND ACETAMINOPHEN 5; 325 MG/1; MG/1
TABLET ORAL
COMMUNITY
Start: 2020-08-10 | End: 2020-12-10
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
## 2020-10-30 ENCOUNTER — TELEPHONE (OUTPATIENT)
Dept: PAIN MEDICINE | Facility: CLINIC | Age: 45
End: 2020-10-30

## 2020-10-30 DIAGNOSIS — M54.16 LUMBAR RADICULOPATHY: Primary | ICD-10-CM

## 2020-10-30 NOTE — TELEPHONE ENCOUNTER
----- Message from Jewel Walters MD sent at 10/30/2020  8:59 AM CDT -----  Regarding: FW: Injection +/- aspiration  Neurosurgery requesting bilateral L5-S1 TFESI, orders placed. Patient needs ASA and Plavix clearance, sees Dr. Delgado here at [facility]. Please assist with clearance and scheduling procedure. Patient to follow up with Neurosurgery.     Thanks,    Jewel Walters MD  Interventional Pain Medicine  [facility] [place]    ----- Message -----  From: Lucero Landeros PA-C  Sent: 10/29/2020   2:00 PM CDT  To: Jewel Walters MD  Subject: Injection +/- aspiration                         Hi Dr. Walters,    I would like this patient to have a TF DENY at Lumbar 5/S1 (bilateral).   She has several Tarlov cysts-- not sure if this is also contributing to her pain. Do you aspirate these?      Lucero

## 2020-10-30 NOTE — TELEPHONE ENCOUNTER
Good afternoon, Dr. Delgado,    Dr. Walters would like to perform a Lumbar TF DENY. Patient takes Aspirin 81 mg and Plavix, and we need her to hold it 7 days prior to procedure.   Please advise.    Sincerely,  Krupa

## 2020-11-02 ENCOUNTER — HOSPITAL ENCOUNTER (OUTPATIENT)
Dept: RADIOLOGY | Facility: HOSPITAL | Age: 45
Discharge: HOME | End: 2020-11-02
Attending: INTERNAL MEDICINE
Payer: MEDICAID

## 2020-11-02 ENCOUNTER — HOSPITAL ENCOUNTER (OUTPATIENT)
Dept: CARDIOLOGY | Facility: HOSPITAL | Age: 45
Discharge: HOME | End: 2020-11-02
Attending: INTERNAL MEDICINE
Payer: MEDICAID

## 2020-11-02 ENCOUNTER — TELEPHONE (OUTPATIENT)
Dept: PAIN MEDICINE | Facility: CLINIC | Age: 45
End: 2020-11-02

## 2020-11-02 ENCOUNTER — HOSPITAL ENCOUNTER (OUTPATIENT)
Dept: PULMONOLOGY | Facility: HOSPITAL | Age: 45
Discharge: HOME | End: 2020-11-02
Attending: INTERNAL MEDICINE
Payer: MEDICAID

## 2020-11-02 VITALS
SYSTOLIC BLOOD PRESSURE: 120 MMHG | DIASTOLIC BLOOD PRESSURE: 62 MMHG | HEART RATE: 85 BPM | SYSTOLIC BLOOD PRESSURE: 184 MMHG | HEART RATE: 86 BPM | WEIGHT: 210 LBS | HEIGHT: 71 IN | DIASTOLIC BLOOD PRESSURE: 84 MMHG | WEIGHT: 210 LBS | BODY MASS INDEX: 29.4 KG/M2 | BODY MASS INDEX: 29.4 KG/M2 | HEIGHT: 71 IN

## 2020-11-02 DIAGNOSIS — I27.20 PULMONARY HYPERTENSION: ICD-10-CM

## 2020-11-02 DIAGNOSIS — Q87.40 MARFAN'S SYNDROME: ICD-10-CM

## 2020-11-02 DIAGNOSIS — I10 ESSENTIAL HYPERTENSION: ICD-10-CM

## 2020-11-02 DIAGNOSIS — I34.1 MVP (MITRAL VALVE PROLAPSE): ICD-10-CM

## 2020-11-02 DIAGNOSIS — I77.1 ILIAC ARTERY STENOSIS, LEFT: ICD-10-CM

## 2020-11-02 DIAGNOSIS — E66.9 OBESITY (BMI 30-39.9): ICD-10-CM

## 2020-11-02 DIAGNOSIS — E11.51 TYPE 2 DIABETES MELLITUS WITH DIABETIC PERIPHERAL ANGIOPATHY WITHOUT GANGRENE, WITH LONG-TERM CURRENT USE OF INSULIN: ICD-10-CM

## 2020-11-02 DIAGNOSIS — E78.2 MIXED HYPERLIPIDEMIA: ICD-10-CM

## 2020-11-02 DIAGNOSIS — I73.9 CLAUDICATION IN PERIPHERAL VASCULAR DISEASE: ICD-10-CM

## 2020-11-02 DIAGNOSIS — I73.9 PAD (PERIPHERAL ARTERY DISEASE): ICD-10-CM

## 2020-11-02 DIAGNOSIS — I77.1 INNOMINATE ARTERY STENOSIS: ICD-10-CM

## 2020-11-02 DIAGNOSIS — Z79.4 TYPE 2 DIABETES MELLITUS WITH DIABETIC PERIPHERAL ANGIOPATHY WITHOUT GANGRENE, WITH LONG-TERM CURRENT USE OF INSULIN: ICD-10-CM

## 2020-11-02 DIAGNOSIS — I70.8 OCCLUSION OF RIGHT SUBCLAVIAN ARTERY: ICD-10-CM

## 2020-11-02 LAB
AORTIC ROOT ANNULUS: 3.36 CM
AV INDEX (PROSTH): 0.87
AV MEAN GRADIENT: 3 MMHG
AV PEAK GRADIENT: 5 MMHG
AV VALVE AREA: 2.71 CM2
AV VELOCITY RATIO: 0.76
BSA FOR ECHO PROCEDURE: 2.18 M2
CV ECHO LV RWT: 0.76 CM
CV STRESS BASE HR: 86 BPM
DIASTOLIC BLOOD PRESSURE: 84 MMHG
DOP CALC AO PEAK VEL: 1.12 M/S
DOP CALC AO VTI: 21.54 CM
DOP CALC LVOT AREA: 3.1 CM2
DOP CALC LVOT DIAMETER: 1.99 CM
DOP CALC LVOT PEAK VEL: 0.85 M/S
DOP CALC LVOT STROKE VOLUME: 58.38 CM3
DOP CALC RVOT PEAK VEL: 0.75 M/S
DOP CALC RVOT VTI: 15.03 CM
DOP CALCLVOT PEAK VEL VTI: 18.78 CM
E WAVE DECELERATION TIME: 287.51 MSEC
E/A RATIO: 0.89
ECHO LV POSTERIOR WALL: 1.31 CM (ref 0.6–1.1)
EJECTION FRACTION- HIGH: 65 %
END DIASTOLIC INDEX-HIGH: 158 ML/M2
END DIASTOLIC INDEX-LOW: 94 ML/M2
END SYSTOLIC INDEX-HIGH: 71 ML/M2
END SYSTOLIC INDEX-LOW: 33 ML/M2
FRACTIONAL SHORTENING: 27 % (ref 28–44)
INTERVENTRICULAR SEPTUM: 1.32 CM (ref 0.6–1.1)
IVRT: 73.82 MSEC
LA MAJOR: 4.18 CM
LA MINOR: 4.34 CM
LA WIDTH: 3.2 CM
LEFT ATRIUM SIZE: 2.99 CM
LEFT ATRIUM VOLUME INDEX: 16.1 ML/M2
LEFT ATRIUM VOLUME: 34.63 CM3
LEFT INTERNAL DIMENSION IN SYSTOLE: 2.51 CM (ref 2.1–4)
LEFT VENTRICLE DIASTOLIC VOLUME INDEX: 22.87 ML/M2
LEFT VENTRICLE DIASTOLIC VOLUME: 49.24 ML
LEFT VENTRICLE MASS INDEX: 71 G/M2
LEFT VENTRICLE SYSTOLIC VOLUME INDEX: 10.5 ML/M2
LEFT VENTRICLE SYSTOLIC VOLUME: 22.56 ML
LEFT VENTRICULAR INTERNAL DIMENSION IN DIASTOLE: 3.45 CM (ref 3.5–6)
LEFT VENTRICULAR MASS: 153.43 G
MV PEAK A VEL: 0.71 M/S
MV PEAK E VEL: 0.63 M/S
MV STENOSIS PRESSURE HALF TIME: 83.38 MS
MV VALVE AREA P 1/2 METHOD: 2.64 CM2
NUC REST DIASTOLIC VOLUME INDEX: 82
NUC REST EJECTION FRACTION: 62
NUC REST SYSTOLIC VOLUME INDEX: 31
NUC STRESS DIASTOLIC VOLUME INDEX: 100
NUC STRESS EJECTION FRACTION: 71 %
NUC STRESS SYSTOLIC VOLUME INDEX: 29
OHS CV CPX 85 PERCENT MAX PREDICTED HEART RATE MALE: 141
OHS CV CPX MAX PREDICTED HEART RATE: 166
OHS CV CPX PATIENT IS FEMALE: 1
OHS CV CPX PATIENT IS MALE: 0
OHS CV CPX PEAK DIASTOLIC BLOOD PRESSURE: 84 MMHG
OHS CV CPX PEAK HEAR RATE: 106 BPM
OHS CV CPX PEAK RATE PRESSURE PRODUCT: NORMAL
OHS CV CPX PEAK SYSTOLIC BLOOD PRESSURE: 184 MMHG
OHS CV CPX PERCENT MAX PREDICTED HEART RATE ACHIEVED: 64
OHS CV CPX RATE PRESSURE PRODUCT PRESENTING: NORMAL
PV MEAN GRADIENT: 1.28 MMHG
PV PEAK GRADIENT: 2.25 MMHG
RA MAJOR: 4.07 CM
RA PRESSURE: 3 MMHG
RA WIDTH: 2.57 CM
RETIRED EF AND QEF - SEE NOTES: 53 %
SINUS: 3.16 CM
STJ: 3.14 CM
SYSTOLIC BLOOD PRESSURE: 184 MMHG
TRICUSPID ANNULAR PLANE SYSTOLIC EXCURSION: 1.88 CM

## 2020-11-02 PROCEDURE — 93306 ECHO (CUPID ONLY): ICD-10-PCS | Mod: 26,,, | Performed by: INTERNAL MEDICINE

## 2020-11-02 PROCEDURE — A9502 TC99M TETROFOSMIN: HCPCS

## 2020-11-02 PROCEDURE — 93306 TTE W/DOPPLER COMPLETE: CPT

## 2020-11-02 PROCEDURE — 63600175 PHARM REV CODE 636 W HCPCS: Performed by: INTERNAL MEDICINE

## 2020-11-02 PROCEDURE — 93306 TTE W/DOPPLER COMPLETE: CPT | Mod: 26,,, | Performed by: INTERNAL MEDICINE

## 2020-11-02 RX ORDER — REGADENOSON 0.08 MG/ML
0.4 INJECTION, SOLUTION INTRAVENOUS ONCE
Status: COMPLETED | OUTPATIENT
Start: 2020-11-02 | End: 2020-11-02

## 2020-11-02 RX ADMIN — REGADENOSON 0.4 MG: 0.08 INJECTION, SOLUTION INTRAVENOUS at 11:11

## 2020-11-02 NOTE — TELEPHONE ENCOUNTER
LVM regarding getting ok from Dr. Delgado on Aspirin and Plavix, asked her to CB to schedule a procedure.

## 2020-11-02 NOTE — TELEPHONE ENCOUNTER
----- Message from Jewel Walters MD sent at 11/2/2020 10:05 AM CST -----  Regarding: FW: Injection +/- aspiration  Please schedule for DENY. Bilateral L5 TFESI, on ASA and Plavix - need clearance (Cardiologist is Dr. Delgado with [facility])    Thank you,    Jewel Walters MD  Interventional Pain Medicine  [facility] [place]    ----- Message -----  From: Lucero Landeros PA-C  Sent: 11/2/2020   5:28 AM CST  To: Jewel Walters MD  Subject: RE: Injection +/- aspiration                     Thanks!  ----- Message -----  From: Jewel Walters MD  Sent: 10/30/2020   8:57 AM CST  To: Lucero Landeros PA-C  Subject: RE: Injection +/- aspiration                     Tang Barker,    I do not aspirate Tarlov cysts, but will be happy to get her scheduled for the ESIs.  Placing orders now.     Thanks,    Jewel Walters MD  Interventional Pain Medicine  [facility] [place]    ----- Message -----  From: Lucero Landeros PA-C  Sent: 10/29/2020   2:00 PM CDT  To: Jewel Walters MD  Subject: Injection +/- aspiration                         Abelardo Walters,    I would like this patient to have a TF DENY at Lumbar 5/S1 (bilateral).   She has several Tarlov cysts-- not sure if this is also contributing to her pain. Do you aspirate these?      Lucero

## 2020-11-02 NOTE — TELEPHONE ENCOUNTER
Good morning,  Dr. Walters would like to perform a Lumbar TF DENY , but patient is on aspirin and plavix and  would need to hold all asa products and plavix  7 days prior, along with any other blood thinners. Please advise.

## 2020-11-03 ENCOUNTER — TELEPHONE (OUTPATIENT)
Dept: PAIN MEDICINE | Facility: CLINIC | Age: 45
End: 2020-11-03

## 2020-11-03 NOTE — TELEPHONE ENCOUNTER
Call patient to schedule the procedure with Dr. Walters. All instructions are given, all question are answered. Written instructions are maild. Patient understood.

## 2020-11-09 ENCOUNTER — TELEPHONE (OUTPATIENT)
Dept: NEUROSURGERY | Facility: CLINIC | Age: 45
End: 2020-11-09

## 2020-11-09 NOTE — TELEPHONE ENCOUNTER
Spoke with pt in regards to portal message pt states that she wasn't able to schedule her 2 week f/u appointment after her injections, informed pt that I was able to schedule her a f/u appointment pt is aware of appointment day, time and location pt AR.

## 2020-11-17 NOTE — PRE-PROCEDURE INSTRUCTIONS
Attempted to PAT patient for procedure on 11.23 with Dr. garces . No answer. M with return phone number. No return call at this time.

## 2020-11-17 NOTE — PRE-PROCEDURE INSTRUCTIONS
Spoke with patient regarding procedure scheduled on 11/23    Arrival time 0750    Has patient been sick with fever or on antibiotics within the last 7 days? No    Has patient received a vaccination within the last 7 days? no    Has the patient stopped all medications as directed? ASA, plavix and vitamins on 11.16. Hold vitamins, supplements and other NSAIDS. Hold insulin and glipizide AM of procedure. Cardiac clearance obtained from Dr. Delgado on 10.30.    Does patient have a pacemaker and or defibrillator? no    Does the patient have a ride to and from procedure and someone reliable to remain with patient? Mother Debora    Is the patient diabetic? yes    Does the patient have sleep apnea? Or use O2 at home? No and no     Is the patient receiving sedation? yes    Is the patient instructed to remain NPO beginning at midnight the night before their procedure? yes    Procedure location confirmed with patient? Yes    Covid- Denies signs/symptoms. Instructed to notify PAT/MD if any changes.

## 2020-11-18 DIAGNOSIS — Q87.40 MARFAN'S SYNDROME: ICD-10-CM

## 2020-11-18 DIAGNOSIS — E11.51 TYPE 2 DIABETES MELLITUS WITH DIABETIC PERIPHERAL ANGIOPATHY WITHOUT GANGRENE, WITH LONG-TERM CURRENT USE OF INSULIN: ICD-10-CM

## 2020-11-18 DIAGNOSIS — Z79.4 TYPE 2 DIABETES MELLITUS WITH DIABETIC PERIPHERAL ANGIOPATHY WITHOUT GANGRENE, WITH LONG-TERM CURRENT USE OF INSULIN: ICD-10-CM

## 2020-11-18 DIAGNOSIS — E78.2 MIXED HYPERLIPIDEMIA: Primary | ICD-10-CM

## 2020-11-23 ENCOUNTER — HOSPITAL ENCOUNTER (OUTPATIENT)
Facility: HOSPITAL | Age: 45
Discharge: HOME | End: 2020-11-23
Attending: PHYSICAL MEDICINE & REHABILITATION | Admitting: PHYSICAL MEDICINE & REHABILITATION
Payer: MEDICAID

## 2020-11-23 VITALS
WEIGHT: 206.56 LBS | BODY MASS INDEX: 29.57 KG/M2 | HEART RATE: 79 BPM | TEMPERATURE: 98 F | OXYGEN SATURATION: 97 % | RESPIRATION RATE: 18 BRPM | DIASTOLIC BLOOD PRESSURE: 65 MMHG | SYSTOLIC BLOOD PRESSURE: 143 MMHG | HEIGHT: 70 IN

## 2020-11-23 DIAGNOSIS — M54.16 LUMBAR RADICULOPATHY: ICD-10-CM

## 2020-11-23 LAB
B-HCG UR QL: NEGATIVE
CTP QC/QA: YES
POCT GLUCOSE: 166 MG/DL (ref 70–110)

## 2020-11-23 PROCEDURE — 82962 GLUCOSE BLOOD TEST: CPT | Performed by: PHYSICAL MEDICINE & REHABILITATION

## 2020-11-23 PROCEDURE — A9585 GADOBUTROL INJECTION: HCPCS | Performed by: PHYSICAL MEDICINE & REHABILITATION

## 2020-11-23 PROCEDURE — 64483 NJX AA&/STRD TFRM EPI L/S 1: CPT | Mod: 50 | Performed by: PHYSICAL MEDICINE & REHABILITATION

## 2020-11-23 PROCEDURE — 99152 MOD SED SAME PHYS/QHP 5/>YRS: CPT | Performed by: PHYSICAL MEDICINE & REHABILITATION

## 2020-11-23 PROCEDURE — 25500020 PHARM REV CODE 255: Performed by: PHYSICAL MEDICINE & REHABILITATION

## 2020-11-23 PROCEDURE — 25000003 PHARM REV CODE 250: Performed by: PHYSICAL MEDICINE & REHABILITATION

## 2020-11-23 PROCEDURE — 63600175 PHARM REV CODE 636 W HCPCS: Performed by: PHYSICAL MEDICINE & REHABILITATION

## 2020-11-23 PROCEDURE — 64483 PR EPIDURAL INJ, ANES/STEROID, TRANSFORAMINAL, LUMB/SACR, SNGL LEVL: ICD-10-PCS | Mod: 50,,, | Performed by: PHYSICAL MEDICINE & REHABILITATION

## 2020-11-23 PROCEDURE — 64483 NJX AA&/STRD TFRM EPI L/S 1: CPT | Mod: 50,,, | Performed by: PHYSICAL MEDICINE & REHABILITATION

## 2020-11-23 PROCEDURE — 81025 URINE PREGNANCY TEST: CPT | Performed by: PHYSICAL MEDICINE & REHABILITATION

## 2020-11-23 RX ORDER — ONDANSETRON 2 MG/ML
4 INJECTION INTRAMUSCULAR; INTRAVENOUS ONCE AS NEEDED
Status: DISCONTINUED | OUTPATIENT
Start: 2020-11-23 | End: 2020-11-23 | Stop reason: HOSPADM

## 2020-11-23 RX ORDER — BUPIVACAINE HYDROCHLORIDE 2.5 MG/ML
INJECTION, SOLUTION EPIDURAL; INFILTRATION; INTRACAUDAL
Status: DISCONTINUED | OUTPATIENT
Start: 2020-11-23 | End: 2020-11-23 | Stop reason: HOSPADM

## 2020-11-23 RX ORDER — MIDAZOLAM HYDROCHLORIDE 1 MG/ML
INJECTION, SOLUTION INTRAMUSCULAR; INTRAVENOUS
Status: DISCONTINUED | OUTPATIENT
Start: 2020-11-23 | End: 2020-11-23 | Stop reason: HOSPADM

## 2020-11-23 RX ORDER — FENTANYL CITRATE 50 UG/ML
INJECTION, SOLUTION INTRAMUSCULAR; INTRAVENOUS
Status: DISCONTINUED | OUTPATIENT
Start: 2020-11-23 | End: 2020-11-23 | Stop reason: HOSPADM

## 2020-11-23 RX ORDER — METHYLPREDNISOLONE ACETATE 40 MG/ML
INJECTION, SUSPENSION INTRA-ARTICULAR; INTRALESIONAL; INTRAMUSCULAR; SOFT TISSUE
Status: DISCONTINUED | OUTPATIENT
Start: 2020-11-23 | End: 2020-11-23 | Stop reason: HOSPADM

## 2020-11-23 RX ORDER — GADOBUTROL 604.72 MG/ML
INJECTION INTRAVENOUS
Status: DISCONTINUED | OUTPATIENT
Start: 2020-11-23 | End: 2020-11-23 | Stop reason: HOSPADM

## 2020-11-23 NOTE — DISCHARGE INSTRUCTIONS

## 2020-11-23 NOTE — DISCHARGE SUMMARY
Discharge Note  Short Stay      SUMMARY     Admit Date: 11/23/2020    Attending Physician: Jewel Walters MD        Discharge Physician: Jewel Walters MD        Discharge Date: 11/23/2020 9:30 AM    Procedure(s) (LRB):  Bilateral L5/S1 TF DENY (Bilateral)    Final Diagnosis: Lumbar radiculopathy [M54.16]    Disposition: Home or self care    Patient Instructions:   Current Discharge Medication List      CONTINUE these medications which have NOT CHANGED    Details   ADMELOG SOLOSTAR U-100 INSULIN 100 unit/mL pen       ascorbic acid (VITAMIN C) 100 MG tablet Take 100 mg by mouth once daily.      atenoloL (TENORMIN) 25 MG tablet Take 1 tablet (25 mg total) by mouth once daily.  Qty: 30 tablet, Refills: 11    Comments: .  Associated Diagnoses: Essential hypertension      atorvastatin (LIPITOR) 80 MG tablet TAKE 1 TABLET BY MOUTH ONCE DAILY.  Qty: 30 tablet, Refills: 11    Comments: patient needs refills - 06/09/2020 9:43:35 AM      !! BASAGLAR KWIKPEN U-100 INSULIN glargine 100 units/mL (3mL) SubQ pen       cetirizine (ZYRTEC) 10 MG tablet Take 10 mg by mouth.      colestipol (COLESTID) 1 gram Tab TAKE 2 TABLETS BY MOUTH THREE TIMES DAILY. DO NOT TAKE OTHER MEDICATION WITHIN 1 HOUR BEFORE OR 4 HOURS AFTER TAKING COLESTIPOL.  Qty: 180 tablet, Refills: 5    Comments: Generic For:COLESTID 1 GM TABLET patient needs refills  01/14/2020 4:09:00 PM  Associated Diagnoses: Chronic diarrhea      dicyclomine (BENTYL) 20 mg tablet TAKE 1 TABLET BY MOUTH 4 TIMES DAILY  Qty: 120 tablet, Refills: 2    Comments: 07/29/2020 2:38:14 PM      famotidine (PEPCID) 20 MG tablet TAKE 1 TABLET BY MOUTH 2 (TWO) TIMES DAILY.  Qty: 60 tablet, Refills: 11    Comments: Generic For:PEPCID 20 MG TABLET  12/31/2019 11:51:54 AM      FLUoxetine (PROZAC) 40 MG capsule TAKE 1 CAPSULE BY MOUTH ONCE DAILY  Qty: 90 capsule, Refills: 0    Comments: Generic For:PROZAC 40 MG PULVULE  11/12/2018 10:08:44 AM  N O T I C E    Last quantity doesn't match  original quantity      gabapentin (NEURONTIN) 400 MG capsule  TID  Qty: 270 capsule, Refills: 3    Associated Diagnoses: Sciatica of left side      glipiZIDE (GLUCOTROL) 10 MG tablet Take 1 tablet (10 mg total) by mouth 2 (two) times daily.  Qty: 60 tablet, Refills: 0    Associated Diagnoses: Type 2 diabetes mellitus without complication, with long-term current use of insulin      !! insulin glargine,hum.rec.anlog (BASAGLAR KWIKPEN U-100 INSULIN SUBQ) Inject 40 Units into the skin 2 (two) times daily.      liraglutide 0.6 mg/0.1 mL, 18 mg/3 mL, subq PNIJ (VICTOZA 2-DIAN) 0.6 mg/0.1 mL (18 mg/3 mL) PnIj Inject 1.2 Units into the skin.      lisinopril 10 MG tablet TAKE 1 TABLET (10 MG TOTAL) BY MOUTH ONCE DAILY.  Qty: 30 tablet, Refills: 3    Comments: Generic For:ZESTRIL 10 MG TABLET NEED REFILLS AUTHORIZED!!  07/27/2017 2:44:51 PM  Associated Diagnoses: Essential hypertension      metoclopramide HCl (REGLAN) 10 MG tablet TAKE 1 TABLET BY MOUTH 2 (TWO) TIMES DAILY BEFORE MEALS.  Qty: 60 tablet, Refills: 5    Comments: 07/29/2020 2:38:22 PM      NOVOLOG FLEXPEN U-100 INSULIN 100 unit/mL (3 mL) InPn pen       pantoprazole (PROTONIX) 40 MG tablet T1T PO ONCE DAILY  Qty: 30 tablet, Refills: 11      tiZANidine (ZANAFLEX) 4 MG tablet TAKE 1 TO 1 & 1/2 TABLET (4-6 MG TOTAL) BY MOUTH NIGHTLY AS NEEDED.  Qty: 45 tablet, Refills: 5    Comments: 10/23/2020 10:57:16 AM  Associated Diagnoses: Chronic pain disorder; Sciatica of left side      varenicline (CHANTIX) 1 mg Tab Take 1 mg by mouth.      aspirin (ECOTRIN) 81 MG EC tablet Take 81 mg by mouth once daily.      butalbital-acetaminophen-caffeine -40 mg (FIORICET, ESGIC) -40 mg per tablet Take 1 tablet by mouth every 6 (six) hours as needed.      clopidogrel (PLAVIX) 75 mg tablet TAKE 1 TABLET (75 MG TOTAL) BY MOUTH ONCE DAILY.  Qty: 90 tablet, Refills: 2      diclofenac sodium (VOLTAREN) 1 % Gel Apply 2 g topically 3 (three) times daily.  Qty: 1 Tube, Refills: 2     "  evolocumab (REPATHA SURECLICK) 140 mg/mL PnIj Inject 1 mL (140 mg total) into the skin every 14 (fourteen) days.  Qty: 2 mL, Refills: 12    Associated Diagnoses: Mixed hyperlipidemia; Marfan's syndrome; Type 2 diabetes mellitus with diabetic peripheral angiopathy without gangrene, with long-term current use of insulin      HYDROcodone-acetaminophen (NORCO) 5-325 mg per tablet     Comments: Quantity prescribed more than 7 day supply? Press F2 and select one:[id number]        ketorolac 0.5% (ACULAR) 0.5 % Drop       ondansetron (ZOFRAN) 4 MG tablet TAKE 1 TABLET (4 MG TOTAL) BY MOUTH 2 (TWO) TIMES DAILY.  Qty: 20 tablet, Refills: 0      PEN NEEDLE 31 gauge x 5/16" Ndle USE 5 TIMES DAILY WITH LANTUS AND HUMALOG  Qty: 100 each, Refills: 3    Comments: Generic For:BD SHORT PEN NEEDLES      promethazine (PHENERGAN) 25 MG tablet       traMADol (ULTRAM) 50 mg tablet TAKE 1 TABLET BY MOUTH EVERY 12 HOURS  Qty: 60 tablet, Refills: 0    Comments: Generic For:*ULTRAM 50 MG TABLET Ptn needs refills..thanks!  07/01/2019 12:00:37 PM  Associated Diagnoses: Trochanteric bursitis of left hip; Chronic bursitis of right shoulder      TRUE METRIX GLUCOSE TEST STRIP Strp        !! - Potential duplicate medications found. Please discuss with provider.      STOP taking these medications       amoxicillin (AMOXIL) 500 MG capsule Comments:   Reason for Stopping:                   Discharge Diagnosis: Lumbar radiculopathy [M54.16]  Condition on Discharge: Stable with no complications to procedure   Diet on Discharge: Same as before.  Activity: as per instruction sheet.  Discharge to: Home with a responsible adult.  Follow up: 2-4 weeks       Please call the office if you experience any weakness or loss of sensation, fever > 101.5, pain uncontrolled with oral medications, persistent nausea/vomiting/or diarrhea, redness or drainage from the incisions, or any other worrisome concerns. If physician on call was not reached or could not communicate " with our office for any reason please go to the nearest emergency department

## 2020-11-23 NOTE — PLAN OF CARE
Patient in recovery doing well able to tolerate drinking coke, bandaids x2 to lower back clean dry intact. No complaints of pain at this time, went over all discharge instructions with patient verbalized understanding.

## 2020-11-23 NOTE — OP NOTE
INFORMED CONSENT: The procedure, risks, benefits and options were discussed with patient. There are no contraindications to the procedure. The patient expressed understanding and agreed to proceed. The personnel performing the procedure was discussed.    11/23/2020    Surgeon: Jewel Walters MD    Assistants: None    PROCEDURE:  Bilateral  L5/S1  1) Left  L5/S1 TRANSFORAMINAL EPIDURAL STEROID INJECTION  2) Right  L5/S1 TRANSFORAMINAL EPIDURAL STEROID INJECTION      Pre Procedure diagnosis:  Bilateral L5/S1 Lumbar radiculopathy [M54.16]    Post-Procedure diagnosis:   same    Complications: None    Specimens: None      DESCRIPTION OF PROCEDURE: The patient was brought to the procedure room. IV access was obtained prior to the procedure. The patient was positioned prone on the fluoroscopy table. Continuous hemodynamic monitoring was initiated including blood pressure, EKG, and pulse oximetry. . The skin was prepped with chlorhexidine and draped in a sterile fashion. Skin anesthesia was achieved using a total of 10mL of lidocaine, 5mL over each respective injection site.     The  L5/S1 transforaminal spaces were identified with fluoroscopy in the  AP, oblique, and lateral views.  A 22 gauge spinal quinke needle was then advanced into the area of the trans foraminal spaces bilaterally with confirmation of proper needle position using AP, oblique, and lateral fluoroscopic views. Once the needle tip was in the area of the transforaminal space, and there was no blood, CSF or paraesthesias,  1.5 mL of Gadavist was injected on each side for a total of 3mL.  Fluoroscopic imaging in the AP and lateral views revealed a clear outline of the spinal nerve with proximal spread of agent through the neural foramen into the epidural space. A total combination of 1 mL of Bupivicaine 0.25% and 40 mg depo medrol was injected on each side for a total of 4mL of injected medications with displacement of the contrast dye confirming that  the medication went into the area of the transforaminal spaces bilaterally. A sterile dressing was applied.   Patient tolerated the procedure well.    Patient was taken back to the recovery room for further observation.     The patient was discharged to home in stable condition

## 2020-12-02 ENCOUNTER — CLINICAL SUPPORT (OUTPATIENT)
Dept: OBSTETRICS AND GYNECOLOGY | Facility: CLINIC | Age: 45
End: 2020-12-02
Payer: MEDICAID

## 2020-12-02 DIAGNOSIS — Z30.8 ENCOUNTER FOR OTHER CONTRACEPTIVE MANAGEMENT: Primary | ICD-10-CM

## 2020-12-02 NOTE — PROGRESS NOTES
"Pt here for depo provera injection without current order.  Pt is past-due for annual exam, had also been advised to come in for repeat pap 6mths after colpo (done 10/10/2019).  Per Dr. Peralta, pt was scheduled for annual exam on 12/10/2020 at 10:15am at the [facility] location.  Pt voiced understanding, stated she "didn't realize it had been over a year since my last visit."  Importance of follow-up of abnormal pap smears was stressed to pt.  Pt voiced understanding.  JONATHAN, LPN    "

## 2020-12-10 ENCOUNTER — OFFICE VISIT (OUTPATIENT)
Dept: OBSTETRICS AND GYNECOLOGY | Facility: CLINIC | Age: 45
End: 2020-12-10
Payer: MEDICAID

## 2020-12-10 VITALS
WEIGHT: 211.44 LBS | SYSTOLIC BLOOD PRESSURE: 124 MMHG | HEIGHT: 70 IN | BODY MASS INDEX: 30.27 KG/M2 | DIASTOLIC BLOOD PRESSURE: 76 MMHG

## 2020-12-10 DIAGNOSIS — Z12.4 PAP SMEAR FOR CERVICAL CANCER SCREENING: ICD-10-CM

## 2020-12-10 DIAGNOSIS — Z30.42 ENCOUNTER FOR SURVEILLANCE OF INJECTABLE CONTRACEPTIVE: Primary | ICD-10-CM

## 2020-12-10 PROCEDURE — 99999 PR PBB SHADOW E&M-EST. PATIENT-LVL II: CPT | Mod: PBBFAC,,, | Performed by: OBSTETRICS & GYNECOLOGY

## 2020-12-10 PROCEDURE — 99396 PREV VISIT EST AGE 40-64: CPT | Mod: S$PBB,,, | Performed by: OBSTETRICS & GYNECOLOGY

## 2020-12-10 PROCEDURE — 99999 PR PBB SHADOW E&M-EST. PATIENT-LVL II: ICD-10-PCS | Mod: PBBFAC,,, | Performed by: OBSTETRICS & GYNECOLOGY

## 2020-12-10 PROCEDURE — 99212 OFFICE O/P EST SF 10 MIN: CPT | Mod: PBBFAC | Performed by: OBSTETRICS & GYNECOLOGY

## 2020-12-10 PROCEDURE — 88175 CYTOPATH C/V AUTO FLUID REDO: CPT | Performed by: PATHOLOGY

## 2020-12-10 PROCEDURE — 99396 PR PREVENTIVE VISIT,EST,40-64: ICD-10-PCS | Mod: S$PBB,,, | Performed by: OBSTETRICS & GYNECOLOGY

## 2020-12-10 RX ORDER — DIPHENOXYLATE HYDROCHLORIDE AND ATROPINE SULFATE 2.5; .025 MG/1; MG/1
1 TABLET ORAL
COMMUNITY
Start: 2020-10-19 | End: 2021-05-03

## 2020-12-10 RX ORDER — MEDROXYPROGESTERONE ACETATE 150 MG/ML
150 INJECTION, SUSPENSION INTRAMUSCULAR
Status: COMPLETED | OUTPATIENT
Start: 2020-12-10 | End: 2021-11-11

## 2020-12-10 RX ADMIN — MEDROXYPROGESTERONE ACETATE 150 MG: 150 INJECTION, SUSPENSION, EXTENDED RELEASE INTRAMUSCULAR at 12:12

## 2020-12-10 NOTE — PROGRESS NOTES
Subjective:       Patient ID: Jenifer Westfall is a 45 y.o. female.    Chief Complaint:  Annual Exam      History of Present Illness  HPI  Annual Exam-Premenopausal  Patient presents for annual exam. The patient has no complaints today. The patient is not sexually active. GYN screening history: last pap: approximate date  and was abnormal: LSIL with AI 2 on colpo and last mammogram: approximate date  and was normal. The patient wears seatbelts: yes. The patient participates in regular exercise: no. Has the patient ever been transfused or tattooed?: no. The patient reports that there is not domestic violence in her life.  Is doing well on Depo Provera and would like to continue use.        GYN & OB History  No LMP recorded. Patient has had an injection.   Date of Last Pap: No result found    OB History    Para Term  AB Living   2 2 2         SAB TAB Ectopic Multiple Live Births                  # Outcome Date GA Lbr Renato/2nd Weight Sex Delivery Anes PTL Lv   2 Term      CS-Unspec      1 Term      CS-Unspec          Review of Systems  Review of Systems   Constitutional: Negative for activity change, appetite change, chills, fatigue, fever and unexpected weight change.   Respiratory: Negative for shortness of breath.    Cardiovascular: Negative for chest pain, palpitations and leg swelling.   Gastrointestinal: Negative for abdominal pain, bloating, blood in stool, constipation, diarrhea, nausea and vomiting.   Genitourinary: Negative for dysmenorrhea, dysuria, flank pain, frequency, genital sores, hematuria, menorrhagia, menstrual problem, pelvic pain, urgency, vaginal bleeding, vaginal discharge, vaginal pain, urinary incontinence, vaginal dryness and vaginal odor.   Musculoskeletal: Negative for back pain.   Integumentary:  Negative for breast mass, nipple discharge, breast skin changes and breast tenderness.   Neurological: Negative for syncope and headaches.   Breast: Negative for asymmetry,  lump, mass, mastodynia, nipple discharge, skin changes and tenderness          Objective:    Physical Exam:   Constitutional: She is oriented to person, place, and time. She appears well-developed and well-nourished. No distress.    HENT:   Head: Normocephalic and atraumatic.    Eyes: Pupils are equal, round, and reactive to light. EOM are normal.    Neck: Normal range of motion.    Cardiovascular: Normal rate, regular rhythm and normal heart sounds.     Pulmonary/Chest: Effort normal and breath sounds normal.        Abdominal: Soft. Bowel sounds are normal. She exhibits no distension. There is no abdominal tenderness.     Genitourinary:    Vagina and uterus normal.      Pelvic exam was performed with patient supine.   There is no rash, tenderness, lesion or injury on the right labia. There is no rash, tenderness, lesion or injury on the left labia. Uterus is not deviated, not enlarged and not tender. Cervix is normal. Right adnexum displays no mass, no tenderness and no fullness. Left adnexum displays no mass, no tenderness and no fullness. No erythema, tenderness or bleeding in the vagina.    No foreign body in the vagina.      No signs of injury in the vagina.   Cervix exhibits no motion tenderness, no discharge and no friability. negative for vaginal discharge          Musculoskeletal: Normal range of motion and moves all extremeties. No tenderness or edema.       Neurological: She is alert and oriented to person, place, and time.    Skin: Skin is warm and dry.    Psychiatric: She has a normal mood and affect. Her behavior is normal. Thought content normal.          Assessment:        1. Encounter for surveillance of injectable contraceptive    2. Pap smear for cervical cancer screening             Plan:      Encounter for surveillance of injectable contraceptive  -     medroxyPROGESTERone (DEPO-PROVERA) syringe 150 mg  -     Pt was counseled on contraception options, including associated risks and benefits of  each.  Pt voiced understanding and desires to continue with Depo Provera.  Medication dosing, side-effects, risks, benefits, and alternatives were discussed.  Risks associated with long term Depo Provera use, including bone density loss, delayed fertility, and delayed return to regular menses were discussed.  Recommend daily Calcium/Vitamin D supplementation and regular exercise.  Medical history was reviewed and pt remains a candidate for Depo Provera use.    Pap smear for cervical cancer screening  -     Liquid-Based Pap Smear, Screening  -     Pt was counseled on cervical/vaginal screening guidelines and recommendations.  Last pap LSIL with AI 2 on colpo in 2019 (pt lost to follow up due to COVID).  If today's pap smear result is negative, next pap smear will be due in 6 months.  -     Pt was advised on current breast cancer screening recommendations.  Pt desires to proceed with breast exam today and screening MMG.  -     Follow up with PCP for routine health maintenance needs.      Follow up in about 6 months (around 6/10/2021) for Pap.

## 2020-12-10 NOTE — PROGRESS NOTES
Two pt identifiers verified  pt notified to wait in clinic for 15 minutes after receiving injection, pt verbalized understanding  150mg/mL of Depo Provera administered IM to pt's left ventrogluteal.   Pt tolerated well  Next injection scheduled

## 2020-12-23 LAB
FINAL PATHOLOGIC DIAGNOSIS: ABNORMAL
Lab: ABNORMAL

## 2021-01-25 NOTE — TELEPHONE ENCOUNTER
----- Message from Liz Couch sent at 2/23/2017 10:16 AM CST -----  Patient wants to know if it is time for her depo shot.  Also, she states that she need to have her mammogram scheduled.    Call her at [phone].                                                    barrera                          Additional Notes: AK treatment site clear Render Risk Assessment In Note?: no Detail Level: Simple

## 2021-02-11 ENCOUNTER — DOCUMENTATION ONLY (OUTPATIENT)
Dept: REHABILITATION | Facility: HOSPITAL | Age: 46
End: 2021-02-11

## 2021-02-23 ENCOUNTER — TELEPHONE (OUTPATIENT)
Dept: CARDIOLOGY | Facility: CLINIC | Age: 46
End: 2021-02-23

## 2021-02-24 ENCOUNTER — OFFICE VISIT (OUTPATIENT)
Dept: RHEUMATOLOGY | Facility: CLINIC | Age: 46
End: 2021-02-24
Payer: MEDICAID

## 2021-02-24 VITALS — BODY MASS INDEX: 31.31 KG/M2 | WEIGHT: 218.69 LBS | HEIGHT: 70 IN

## 2021-02-24 DIAGNOSIS — G89.4 CHRONIC PAIN DISORDER: ICD-10-CM

## 2021-02-24 DIAGNOSIS — M17.0 PRIMARY OSTEOARTHRITIS OF BOTH KNEES: ICD-10-CM

## 2021-02-24 DIAGNOSIS — M54.32 SCIATICA OF LEFT SIDE: ICD-10-CM

## 2021-02-24 DIAGNOSIS — G62.9 NEUROPATHY: Primary | ICD-10-CM

## 2021-02-24 DIAGNOSIS — M51.36 LUMBAR DEGENERATIVE DISC DISEASE: ICD-10-CM

## 2021-02-24 DIAGNOSIS — Q87.40 MARFAN'S SYNDROME: ICD-10-CM

## 2021-02-24 PROCEDURE — 99213 OFFICE O/P EST LOW 20 MIN: CPT | Mod: PBBFAC | Performed by: PHYSICIAN ASSISTANT

## 2021-02-24 PROCEDURE — 99999 PR PBB SHADOW E&M-EST. PATIENT-LVL III: ICD-10-PCS | Mod: PBBFAC,,, | Performed by: PHYSICIAN ASSISTANT

## 2021-02-24 PROCEDURE — 99214 OFFICE O/P EST MOD 30 MIN: CPT | Mod: S$PBB,,, | Performed by: PHYSICIAN ASSISTANT

## 2021-02-24 PROCEDURE — 99999 PR PBB SHADOW E&M-EST. PATIENT-LVL III: CPT | Mod: PBBFAC,,, | Performed by: PHYSICIAN ASSISTANT

## 2021-02-24 PROCEDURE — 99214 PR OFFICE/OUTPT VISIT, EST, LEVL IV, 30-39 MIN: ICD-10-PCS | Mod: S$PBB,,, | Performed by: PHYSICIAN ASSISTANT

## 2021-02-24 RX ORDER — GABAPENTIN 300 MG/1
600 CAPSULE ORAL 3 TIMES DAILY
Qty: 180 CAPSULE | Refills: 11 | Status: SHIPPED | OUTPATIENT
Start: 2021-02-24 | End: 2021-08-27

## 2021-02-25 ENCOUNTER — TELEPHONE (OUTPATIENT)
Dept: NEUROSURGERY | Facility: CLINIC | Age: 46
End: 2021-02-25

## 2021-02-26 ENCOUNTER — TELEPHONE (OUTPATIENT)
Dept: NEUROSURGERY | Facility: CLINIC | Age: 46
End: 2021-02-26

## 2021-03-01 ENCOUNTER — TELEPHONE (OUTPATIENT)
Dept: CARDIOLOGY | Facility: CLINIC | Age: 46
End: 2021-03-01

## 2021-03-03 ENCOUNTER — TELEPHONE (OUTPATIENT)
Dept: CARDIOLOGY | Facility: CLINIC | Age: 46
End: 2021-03-03

## 2021-03-04 ENCOUNTER — HOSPITAL ENCOUNTER (EMERGENCY)
Facility: HOSPITAL | Age: 46
Discharge: HOME | End: 2021-03-05
Attending: STUDENT IN AN ORGANIZED HEALTH CARE EDUCATION/TRAINING PROGRAM
Payer: MEDICAID

## 2021-03-04 ENCOUNTER — CLINICAL SUPPORT (OUTPATIENT)
Dept: OBSTETRICS AND GYNECOLOGY | Facility: CLINIC | Age: 46
End: 2021-03-04
Payer: MEDICAID

## 2021-03-04 ENCOUNTER — TELEPHONE (OUTPATIENT)
Dept: NEUROSURGERY | Facility: CLINIC | Age: 46
End: 2021-03-04

## 2021-03-04 DIAGNOSIS — R11.0 NAUSEA: ICD-10-CM

## 2021-03-04 DIAGNOSIS — R10.30 LOWER ABDOMINAL PAIN: Primary | ICD-10-CM

## 2021-03-04 DIAGNOSIS — R91.1 PULMONARY NODULE: ICD-10-CM

## 2021-03-04 DIAGNOSIS — R10.13 EPIGASTRIC ABDOMINAL PAIN: ICD-10-CM

## 2021-03-04 LAB
ALBUMIN SERPL BCP-MCNC: 3.7 G/DL (ref 3.5–5.2)
ALP SERPL-CCNC: 131 U/L (ref 55–135)
ALT SERPL W/O P-5'-P-CCNC: 17 U/L (ref 10–44)
ANION GAP SERPL CALC-SCNC: 15 MMOL/L (ref 8–16)
AST SERPL-CCNC: 11 U/L (ref 10–40)
BASOPHILS # BLD AUTO: 0.04 K/UL (ref 0–0.2)
BASOPHILS NFR BLD: 0.4 % (ref 0–1.9)
BILIRUB SERPL-MCNC: 0.3 MG/DL (ref 0.1–1)
BUN SERPL-MCNC: 4 MG/DL (ref 6–20)
CALCIUM SERPL-MCNC: 9 MG/DL (ref 8.7–10.5)
CHLORIDE SERPL-SCNC: 106 MMOL/L (ref 95–110)
CO2 SERPL-SCNC: 20 MMOL/L (ref 23–29)
CREAT SERPL-MCNC: 0.8 MG/DL (ref 0.5–1.4)
DIFFERENTIAL METHOD: NORMAL
EOSINOPHIL # BLD AUTO: 0.1 K/UL (ref 0–0.5)
EOSINOPHIL NFR BLD: 0.8 % (ref 0–8)
ERYTHROCYTE [DISTWIDTH] IN BLOOD BY AUTOMATED COUNT: 12.8 % (ref 11.5–14.5)
EST. GFR  (AFRICAN AMERICAN): >60 ML/MIN/1.73 M^2
EST. GFR  (NON AFRICAN AMERICAN): >60 ML/MIN/1.73 M^2
GLUCOSE SERPL-MCNC: 298 MG/DL (ref 70–110)
HCT VFR BLD AUTO: 42.2 % (ref 37–48.5)
HGB BLD-MCNC: 13.8 G/DL (ref 12–16)
IMM GRANULOCYTES # BLD AUTO: 0.04 K/UL (ref 0–0.04)
IMM GRANULOCYTES NFR BLD AUTO: 0.4 % (ref 0–0.5)
LIPASE SERPL-CCNC: 13 U/L (ref 4–60)
LYMPHOCYTES # BLD AUTO: 3.3 K/UL (ref 1–4.8)
LYMPHOCYTES NFR BLD: 32.6 % (ref 18–48)
MCH RBC QN AUTO: 29.9 PG (ref 27–31)
MCHC RBC AUTO-ENTMCNC: 32.7 G/DL (ref 32–36)
MCV RBC AUTO: 92 FL (ref 82–98)
MONOCYTES # BLD AUTO: 0.7 K/UL (ref 0.3–1)
MONOCYTES NFR BLD: 7.1 % (ref 4–15)
NEUTROPHILS # BLD AUTO: 6 K/UL (ref 1.8–7.7)
NEUTROPHILS NFR BLD: 58.7 % (ref 38–73)
NRBC BLD-RTO: 0 /100 WBC
PLATELET # BLD AUTO: 291 K/UL (ref 150–350)
PMV BLD AUTO: 9.5 FL (ref 9.2–12.9)
POTASSIUM SERPL-SCNC: 3.2 MMOL/L (ref 3.5–5.1)
PROT SERPL-MCNC: 7.1 G/DL (ref 6–8.4)
RBC # BLD AUTO: 4.61 M/UL (ref 4–5.4)
SODIUM SERPL-SCNC: 141 MMOL/L (ref 136–145)
WBC # BLD AUTO: 10.23 K/UL (ref 3.9–12.7)

## 2021-03-04 PROCEDURE — 80053 COMPREHEN METABOLIC PANEL: CPT | Performed by: STUDENT IN AN ORGANIZED HEALTH CARE EDUCATION/TRAINING PROGRAM

## 2021-03-04 PROCEDURE — 85025 COMPLETE CBC W/AUTO DIFF WBC: CPT | Performed by: STUDENT IN AN ORGANIZED HEALTH CARE EDUCATION/TRAINING PROGRAM

## 2021-03-04 PROCEDURE — 96372 THER/PROPH/DIAG INJ SC/IM: CPT | Mod: PBBFAC,59

## 2021-03-04 PROCEDURE — 63600175 PHARM REV CODE 636 W HCPCS: Performed by: STUDENT IN AN ORGANIZED HEALTH CARE EDUCATION/TRAINING PROGRAM

## 2021-03-04 PROCEDURE — 81025 URINE PREGNANCY TEST: CPT | Performed by: STUDENT IN AN ORGANIZED HEALTH CARE EDUCATION/TRAINING PROGRAM

## 2021-03-04 PROCEDURE — 25000003 PHARM REV CODE 250: Performed by: STUDENT IN AN ORGANIZED HEALTH CARE EDUCATION/TRAINING PROGRAM

## 2021-03-04 PROCEDURE — 36415 COLL VENOUS BLD VENIPUNCTURE: CPT | Performed by: STUDENT IN AN ORGANIZED HEALTH CARE EDUCATION/TRAINING PROGRAM

## 2021-03-04 PROCEDURE — 81000 URINALYSIS NONAUTO W/SCOPE: CPT | Performed by: STUDENT IN AN ORGANIZED HEALTH CARE EDUCATION/TRAINING PROGRAM

## 2021-03-04 PROCEDURE — 96375 TX/PRO/DX INJ NEW DRUG ADDON: CPT

## 2021-03-04 PROCEDURE — 99284 EMERGENCY DEPT VISIT MOD MDM: CPT | Mod: 25

## 2021-03-04 PROCEDURE — 96374 THER/PROPH/DIAG INJ IV PUSH: CPT

## 2021-03-04 PROCEDURE — 86803 HEPATITIS C AB TEST: CPT | Performed by: EMERGENCY MEDICINE

## 2021-03-04 PROCEDURE — 96361 HYDRATE IV INFUSION ADD-ON: CPT

## 2021-03-04 PROCEDURE — 86703 HIV-1/HIV-2 1 RESULT ANTBDY: CPT | Performed by: EMERGENCY MEDICINE

## 2021-03-04 PROCEDURE — 83690 ASSAY OF LIPASE: CPT | Performed by: STUDENT IN AN ORGANIZED HEALTH CARE EDUCATION/TRAINING PROGRAM

## 2021-03-04 RX ORDER — DIPHENHYDRAMINE HYDROCHLORIDE 50 MG/ML
25 INJECTION INTRAMUSCULAR; INTRAVENOUS
Status: COMPLETED | OUTPATIENT
Start: 2021-03-04 | End: 2021-03-04

## 2021-03-04 RX ORDER — FLUTICASONE PROPIONATE 50 MCG
2 SPRAY, SUSPENSION (ML) NASAL DAILY PRN
COMMUNITY
Start: 2021-02-23

## 2021-03-04 RX ORDER — HALOPERIDOL 5 MG/ML
5 INJECTION INTRAMUSCULAR
Status: COMPLETED | OUTPATIENT
Start: 2021-03-04 | End: 2021-03-04

## 2021-03-04 RX ORDER — FENTANYL CITRATE 50 UG/ML
50 INJECTION, SOLUTION INTRAMUSCULAR; INTRAVENOUS
Status: COMPLETED | OUTPATIENT
Start: 2021-03-04 | End: 2021-03-04

## 2021-03-04 RX ORDER — AZELASTINE 1 MG/ML
2 SPRAY, METERED NASAL DAILY PRN
COMMUNITY
Start: 2021-02-23

## 2021-03-04 RX ADMIN — HALOPERIDOL LACTATE 5 MG: 5 INJECTION, SOLUTION INTRAMUSCULAR at 11:03

## 2021-03-04 RX ADMIN — DIPHENHYDRAMINE HYDROCHLORIDE 25 MG: 50 INJECTION, SOLUTION INTRAMUSCULAR; INTRAVENOUS at 11:03

## 2021-03-04 RX ADMIN — SODIUM CHLORIDE 1000 ML: 0.9 INJECTION, SOLUTION INTRAVENOUS at 11:03

## 2021-03-04 RX ADMIN — FENTANYL CITRATE 50 MCG: 50 INJECTION, SOLUTION INTRAMUSCULAR; INTRAVENOUS at 11:03

## 2021-03-04 RX ADMIN — MEDROXYPROGESTERONE ACETATE 150 MG: 150 INJECTION, SUSPENSION, EXTENDED RELEASE INTRAMUSCULAR at 01:03

## 2021-03-05 ENCOUNTER — OFFICE VISIT (OUTPATIENT)
Dept: NEUROSURGERY | Facility: CLINIC | Age: 46
End: 2021-03-05
Payer: MEDICAID

## 2021-03-05 VITALS
RESPIRATION RATE: 18 BRPM | SYSTOLIC BLOOD PRESSURE: 158 MMHG | HEIGHT: 70 IN | HEART RATE: 95 BPM | WEIGHT: 216.81 LBS | DIASTOLIC BLOOD PRESSURE: 71 MMHG | BODY MASS INDEX: 31.04 KG/M2

## 2021-03-05 VITALS
SYSTOLIC BLOOD PRESSURE: 151 MMHG | DIASTOLIC BLOOD PRESSURE: 82 MMHG | HEART RATE: 87 BPM | WEIGHT: 227.75 LBS | HEIGHT: 70 IN | TEMPERATURE: 98 F | OXYGEN SATURATION: 97 % | BODY MASS INDEX: 32.61 KG/M2 | RESPIRATION RATE: 16 BRPM

## 2021-03-05 DIAGNOSIS — M54.9 DORSALGIA, UNSPECIFIED: ICD-10-CM

## 2021-03-05 DIAGNOSIS — M54.42 CHRONIC LEFT-SIDED LOW BACK PAIN WITH LEFT-SIDED SCIATICA: Primary | ICD-10-CM

## 2021-03-05 DIAGNOSIS — G89.29 CHRONIC LEFT-SIDED LOW BACK PAIN WITH LEFT-SIDED SCIATICA: Primary | ICD-10-CM

## 2021-03-05 LAB
B-HCG UR QL: NEGATIVE
BACTERIA #/AREA URNS HPF: ABNORMAL /HPF
BILIRUB UR QL STRIP: NEGATIVE
CLARITY UR: CLEAR
COLOR UR: YELLOW
GLUCOSE UR QL STRIP: ABNORMAL
HCV AB SERPL QL IA: POSITIVE
HGB UR QL STRIP: NEGATIVE
HIV 1+2 AB+HIV1 P24 AG SERPL QL IA: NEGATIVE
KETONES UR QL STRIP: NEGATIVE
LEUKOCYTE ESTERASE UR QL STRIP: NEGATIVE
MICROSCOPIC COMMENT: ABNORMAL
NITRITE UR QL STRIP: NEGATIVE
PH UR STRIP: 6 [PH] (ref 5–8)
PROT UR QL STRIP: NEGATIVE
SP GR UR STRIP: >=1.03 (ref 1–1.03)
SQUAMOUS #/AREA URNS HPF: 4 /HPF
URN SPEC COLLECT METH UR: ABNORMAL
UROBILINOGEN UR STRIP-ACNC: NEGATIVE EU/DL
WBC #/AREA URNS HPF: 5 /HPF (ref 0–5)
YEAST URNS QL MICRO: ABNORMAL

## 2021-03-05 PROCEDURE — 99214 PR OFFICE/OUTPT VISIT, EST, LEVL IV, 30-39 MIN: ICD-10-PCS | Mod: S$PBB,,, | Performed by: PHYSICIAN ASSISTANT

## 2021-03-05 PROCEDURE — 99214 OFFICE O/P EST MOD 30 MIN: CPT | Mod: PBBFAC,25,PO | Performed by: PHYSICIAN ASSISTANT

## 2021-03-05 PROCEDURE — 99999 PR PBB SHADOW E&M-EST. PATIENT-LVL IV: CPT | Mod: PBBFAC,,, | Performed by: PHYSICIAN ASSISTANT

## 2021-03-05 PROCEDURE — 99214 OFFICE O/P EST MOD 30 MIN: CPT | Mod: S$PBB,,, | Performed by: PHYSICIAN ASSISTANT

## 2021-03-05 PROCEDURE — 99999 PR PBB SHADOW E&M-EST. PATIENT-LVL IV: ICD-10-PCS | Mod: PBBFAC,,, | Performed by: PHYSICIAN ASSISTANT

## 2021-03-05 RX ORDER — ONDANSETRON 8 MG/1
8 TABLET, ORALLY DISINTEGRATING ORAL EVERY 6 HOURS PRN
Qty: 20 TABLET | Refills: 0 | Status: SHIPPED | OUTPATIENT
Start: 2021-03-05 | End: 2021-12-08

## 2021-03-05 RX ORDER — ACETAMINOPHEN AND CODEINE PHOSPHATE 300; 30 MG/1; MG/1
1 TABLET ORAL EVERY 6 HOURS PRN
Qty: 10 TABLET | Refills: 0 | Status: SHIPPED | OUTPATIENT
Start: 2021-03-05 | End: 2021-03-15

## 2021-03-10 DIAGNOSIS — M47.816 LUMBAR SPONDYLOSIS: Primary | ICD-10-CM

## 2021-03-11 RX ORDER — DICYCLOMINE HYDROCHLORIDE 20 MG/1
TABLET ORAL
Qty: 120 TABLET | Refills: 0 | Status: SHIPPED | OUTPATIENT
Start: 2021-03-11 | End: 2021-03-18 | Stop reason: SDUPTHER

## 2021-03-18 ENCOUNTER — TELEPHONE (OUTPATIENT)
Dept: GASTROENTEROLOGY | Facility: CLINIC | Age: 46
End: 2021-03-18

## 2021-03-19 RX ORDER — DICYCLOMINE HYDROCHLORIDE 20 MG/1
20 TABLET ORAL
Qty: 120 TABLET | Refills: 2 | Status: SHIPPED | OUTPATIENT
Start: 2021-03-19 | End: 2021-04-18

## 2021-03-26 ENCOUNTER — TELEPHONE (OUTPATIENT)
Dept: PAIN MEDICINE | Facility: CLINIC | Age: 46
End: 2021-03-26

## 2021-04-05 ENCOUNTER — OFFICE VISIT (OUTPATIENT)
Dept: GASTROENTEROLOGY | Facility: CLINIC | Age: 46
End: 2021-04-05
Payer: MEDICAID

## 2021-04-05 VITALS
OXYGEN SATURATION: 99 % | HEIGHT: 70 IN | BODY MASS INDEX: 30.99 KG/M2 | DIASTOLIC BLOOD PRESSURE: 78 MMHG | WEIGHT: 216.5 LBS | HEART RATE: 88 BPM | SYSTOLIC BLOOD PRESSURE: 122 MMHG

## 2021-04-05 DIAGNOSIS — R10.9 ABDOMINAL PAIN, UNSPECIFIED ABDOMINAL LOCATION: ICD-10-CM

## 2021-04-05 DIAGNOSIS — K59.00 CONSTIPATION, UNSPECIFIED CONSTIPATION TYPE: ICD-10-CM

## 2021-04-05 DIAGNOSIS — R19.7 DIARRHEA, UNSPECIFIED TYPE: Primary | ICD-10-CM

## 2021-04-05 DIAGNOSIS — K58.0 IRRITABLE BOWEL SYNDROME WITH DIARRHEA: ICD-10-CM

## 2021-04-05 PROCEDURE — 99999 PR PBB SHADOW E&M-EST. PATIENT-LVL V: CPT | Mod: PBBFAC,,, | Performed by: INTERNAL MEDICINE

## 2021-04-05 PROCEDURE — 99999 PR PBB SHADOW E&M-EST. PATIENT-LVL V: ICD-10-PCS | Mod: PBBFAC,,, | Performed by: INTERNAL MEDICINE

## 2021-04-05 PROCEDURE — 99215 OFFICE O/P EST HI 40 MIN: CPT | Mod: PBBFAC | Performed by: INTERNAL MEDICINE

## 2021-04-05 PROCEDURE — 99213 PR OFFICE/OUTPT VISIT, EST, LEVL III, 20-29 MIN: ICD-10-PCS | Mod: S$PBB,,, | Performed by: INTERNAL MEDICINE

## 2021-04-05 PROCEDURE — 99213 OFFICE O/P EST LOW 20 MIN: CPT | Mod: S$PBB,,, | Performed by: INTERNAL MEDICINE

## 2021-04-05 RX ORDER — SODIUM, POTASSIUM,MAG SULFATES 17.5-3.13G
1 SOLUTION, RECONSTITUTED, ORAL ORAL DAILY
Qty: 1 KIT | Refills: 0 | Status: SHIPPED | OUTPATIENT
Start: 2021-04-05 | End: 2021-04-07

## 2021-04-15 ENCOUNTER — OFFICE VISIT (OUTPATIENT)
Dept: OBSTETRICS AND GYNECOLOGY | Facility: CLINIC | Age: 46
End: 2021-04-15
Payer: MEDICAID

## 2021-04-15 ENCOUNTER — TELEPHONE (OUTPATIENT)
Dept: OBSTETRICS AND GYNECOLOGY | Facility: CLINIC | Age: 46
End: 2021-04-15

## 2021-04-15 VITALS
DIASTOLIC BLOOD PRESSURE: 80 MMHG | WEIGHT: 229.06 LBS | HEIGHT: 70 IN | BODY MASS INDEX: 32.79 KG/M2 | SYSTOLIC BLOOD PRESSURE: 130 MMHG

## 2021-04-15 DIAGNOSIS — B35.6 TINEA CRURIS: Primary | ICD-10-CM

## 2021-04-15 PROCEDURE — 99999 PR PBB SHADOW E&M-EST. PATIENT-LVL V: CPT | Mod: PBBFAC,,, | Performed by: OBSTETRICS & GYNECOLOGY

## 2021-04-15 PROCEDURE — 99215 OFFICE O/P EST HI 40 MIN: CPT | Mod: PBBFAC | Performed by: OBSTETRICS & GYNECOLOGY

## 2021-04-15 PROCEDURE — 87210 SMEAR WET MOUNT SALINE/INK: CPT | Mod: PBBFAC | Performed by: OBSTETRICS & GYNECOLOGY

## 2021-04-15 PROCEDURE — 99213 OFFICE O/P EST LOW 20 MIN: CPT | Mod: S$PBB,,, | Performed by: OBSTETRICS & GYNECOLOGY

## 2021-04-15 PROCEDURE — 99999 PR PBB SHADOW E&M-EST. PATIENT-LVL V: ICD-10-PCS | Mod: PBBFAC,,, | Performed by: OBSTETRICS & GYNECOLOGY

## 2021-04-15 PROCEDURE — 99213 PR OFFICE/OUTPT VISIT, EST, LEVL III, 20-29 MIN: ICD-10-PCS | Mod: S$PBB,,, | Performed by: OBSTETRICS & GYNECOLOGY

## 2021-04-15 RX ORDER — NYSTATIN AND TRIAMCINOLONE ACETONIDE 100000; 1 [USP'U]/G; MG/G
CREAM TOPICAL
Qty: 30 G | Refills: 1 | Status: SHIPPED | OUTPATIENT
Start: 2021-04-15 | End: 2021-06-01 | Stop reason: SDUPTHER

## 2021-04-15 RX ORDER — DOXYLAMINE SUCCINATE 25 MG
TABLET ORAL
COMMUNITY
Start: 2021-04-14 | End: 2021-12-08

## 2021-04-15 RX ORDER — FLUCONAZOLE 150 MG/1
150 TABLET ORAL ONCE
COMMUNITY
Start: 2021-04-14 | End: 2021-06-01 | Stop reason: SDUPTHER

## 2021-05-03 ENCOUNTER — OFFICE VISIT (OUTPATIENT)
Dept: GASTROENTEROLOGY | Facility: CLINIC | Age: 46
End: 2021-05-03
Payer: MEDICAID

## 2021-05-03 VITALS
BODY MASS INDEX: 32.73 KG/M2 | WEIGHT: 228.63 LBS | HEIGHT: 70 IN | SYSTOLIC BLOOD PRESSURE: 122 MMHG | HEART RATE: 96 BPM | OXYGEN SATURATION: 95 % | DIASTOLIC BLOOD PRESSURE: 78 MMHG

## 2021-05-03 DIAGNOSIS — R19.7 OVERFLOW DIARRHEA: Primary | ICD-10-CM

## 2021-05-03 DIAGNOSIS — K59.00 CONSTIPATION, UNSPECIFIED CONSTIPATION TYPE: ICD-10-CM

## 2021-05-03 PROCEDURE — 99999 PR PBB SHADOW E&M-EST. PATIENT-LVL V: CPT | Mod: PBBFAC,,, | Performed by: INTERNAL MEDICINE

## 2021-05-03 PROCEDURE — 99215 OFFICE O/P EST HI 40 MIN: CPT | Mod: PBBFAC | Performed by: INTERNAL MEDICINE

## 2021-05-03 PROCEDURE — 99213 PR OFFICE/OUTPT VISIT, EST, LEVL III, 20-29 MIN: ICD-10-PCS | Mod: S$PBB,,, | Performed by: INTERNAL MEDICINE

## 2021-05-03 PROCEDURE — 99999 PR PBB SHADOW E&M-EST. PATIENT-LVL V: ICD-10-PCS | Mod: PBBFAC,,, | Performed by: INTERNAL MEDICINE

## 2021-05-03 PROCEDURE — 99213 OFFICE O/P EST LOW 20 MIN: CPT | Mod: S$PBB,,, | Performed by: INTERNAL MEDICINE

## 2021-05-13 ENCOUNTER — TELEPHONE (OUTPATIENT)
Dept: OBSTETRICS AND GYNECOLOGY | Facility: CLINIC | Age: 46
End: 2021-05-13

## 2021-05-13 ENCOUNTER — HOSPITAL ENCOUNTER (EMERGENCY)
Facility: HOSPITAL | Age: 46
Discharge: HOME | End: 2021-05-13
Attending: EMERGENCY MEDICINE
Payer: MEDICAID

## 2021-05-13 VITALS
BODY MASS INDEX: 32.08 KG/M2 | OXYGEN SATURATION: 98 % | WEIGHT: 223.56 LBS | TEMPERATURE: 99 F | HEART RATE: 89 BPM | RESPIRATION RATE: 18 BRPM | SYSTOLIC BLOOD PRESSURE: 113 MMHG | DIASTOLIC BLOOD PRESSURE: 53 MMHG

## 2021-05-13 DIAGNOSIS — N76.4 LEFT GENITAL LABIAL ABSCESS: Primary | ICD-10-CM

## 2021-05-13 PROCEDURE — 25000003 PHARM REV CODE 250: Performed by: REGISTERED NURSE

## 2021-05-13 PROCEDURE — 99283 EMERGENCY DEPT VISIT LOW MDM: CPT | Mod: 25

## 2021-05-13 PROCEDURE — 56405 I&D VULVA/PERINEAL ABSCESS: CPT

## 2021-05-13 RX ORDER — HYDROCODONE BITARTRATE AND ACETAMINOPHEN 10; 325 MG/1; MG/1
1 TABLET ORAL
Status: COMPLETED | OUTPATIENT
Start: 2021-05-13 | End: 2021-05-13

## 2021-05-13 RX ORDER — LIDOCAINE HYDROCHLORIDE 10 MG/ML
10 INJECTION, SOLUTION EPIDURAL; INFILTRATION; INTRACAUDAL; PERINEURAL
Status: COMPLETED | OUTPATIENT
Start: 2021-05-13 | End: 2021-05-13

## 2021-05-13 RX ORDER — HYDROCODONE BITARTRATE AND ACETAMINOPHEN 7.5; 325 MG/1; MG/1
1 TABLET ORAL EVERY 6 HOURS PRN
Qty: 12 TABLET | Refills: 0 | Status: SHIPPED | OUTPATIENT
Start: 2021-05-13 | End: 2021-06-01

## 2021-05-13 RX ADMIN — LIDOCAINE HYDROCHLORIDE 50 MG: 10 INJECTION, SOLUTION EPIDURAL; INFILTRATION; INTRACAUDAL at 12:05

## 2021-05-13 RX ADMIN — HYDROCODONE BITARTRATE AND ACETAMINOPHEN 1 TABLET: 10; 325 TABLET ORAL at 11:05

## 2021-06-01 ENCOUNTER — OFFICE VISIT (OUTPATIENT)
Dept: OBSTETRICS AND GYNECOLOGY | Facility: CLINIC | Age: 46
End: 2021-06-01
Payer: MEDICAID

## 2021-06-01 VITALS
HEIGHT: 70 IN | WEIGHT: 229.06 LBS | BODY MASS INDEX: 32.79 KG/M2 | DIASTOLIC BLOOD PRESSURE: 70 MMHG | SYSTOLIC BLOOD PRESSURE: 130 MMHG

## 2021-06-01 DIAGNOSIS — B35.6 TINEA CRURIS: Primary | ICD-10-CM

## 2021-06-01 PROCEDURE — 99213 PR OFFICE/OUTPT VISIT, EST, LEVL III, 20-29 MIN: ICD-10-PCS | Mod: S$PBB,,, | Performed by: OBSTETRICS & GYNECOLOGY

## 2021-06-01 PROCEDURE — 99999 PR PBB SHADOW E&M-EST. PATIENT-LVL II: ICD-10-PCS | Mod: PBBFAC,,, | Performed by: OBSTETRICS & GYNECOLOGY

## 2021-06-01 PROCEDURE — 99213 OFFICE O/P EST LOW 20 MIN: CPT | Mod: S$PBB,,, | Performed by: OBSTETRICS & GYNECOLOGY

## 2021-06-01 PROCEDURE — 99999 PR PBB SHADOW E&M-EST. PATIENT-LVL II: CPT | Mod: PBBFAC,,, | Performed by: OBSTETRICS & GYNECOLOGY

## 2021-06-01 PROCEDURE — 99212 OFFICE O/P EST SF 10 MIN: CPT | Mod: PBBFAC | Performed by: OBSTETRICS & GYNECOLOGY

## 2021-06-01 PROCEDURE — 96372 THER/PROPH/DIAG INJ SC/IM: CPT | Mod: PBBFAC

## 2021-06-01 RX ORDER — FLUCONAZOLE 150 MG/1
150 TABLET ORAL
Qty: 3 TABLET | Refills: 0 | Status: SHIPPED | OUTPATIENT
Start: 2021-06-01 | End: 2021-06-08

## 2021-06-01 RX ORDER — NYSTATIN AND TRIAMCINOLONE ACETONIDE 100000; 1 [USP'U]/G; MG/G
CREAM TOPICAL
Qty: 30 G | Refills: 1 | Status: SHIPPED | OUTPATIENT
Start: 2021-06-01 | End: 2021-12-08

## 2021-06-01 RX ADMIN — MEDROXYPROGESTERONE ACETATE 150 MG: 150 INJECTION, SUSPENSION, EXTENDED RELEASE INTRAMUSCULAR at 11:06

## 2021-07-15 ENCOUNTER — TELEPHONE (OUTPATIENT)
Dept: GASTROENTEROLOGY | Facility: CLINIC | Age: 46
End: 2021-07-15

## 2021-07-16 ENCOUNTER — OFFICE VISIT (OUTPATIENT)
Dept: GASTROENTEROLOGY | Facility: CLINIC | Age: 46
End: 2021-07-16
Payer: MEDICAID

## 2021-07-16 ENCOUNTER — TELEPHONE (OUTPATIENT)
Dept: GASTROENTEROLOGY | Facility: CLINIC | Age: 46
End: 2021-07-16

## 2021-07-16 VITALS
SYSTOLIC BLOOD PRESSURE: 144 MMHG | WEIGHT: 231.56 LBS | HEIGHT: 70 IN | HEART RATE: 96 BPM | BODY MASS INDEX: 33.15 KG/M2 | DIASTOLIC BLOOD PRESSURE: 66 MMHG | OXYGEN SATURATION: 98 %

## 2021-07-16 DIAGNOSIS — R13.10 DYSPHAGIA, UNSPECIFIED TYPE: Primary | ICD-10-CM

## 2021-07-16 DIAGNOSIS — R11.2 NAUSEA AND VOMITING, INTRACTABILITY OF VOMITING NOT SPECIFIED, UNSPECIFIED VOMITING TYPE: ICD-10-CM

## 2021-07-16 DIAGNOSIS — Z01.818 PRE-OP TESTING: ICD-10-CM

## 2021-07-16 DIAGNOSIS — R13.19 OTHER DYSPHAGIA: ICD-10-CM

## 2021-07-16 PROCEDURE — 99999 PR PBB SHADOW E&M-EST. PATIENT-LVL V: CPT | Mod: PBBFAC,,, | Performed by: INTERNAL MEDICINE

## 2021-07-16 PROCEDURE — 99214 PR OFFICE/OUTPT VISIT, EST, LEVL IV, 30-39 MIN: ICD-10-PCS | Mod: S$PBB,,, | Performed by: INTERNAL MEDICINE

## 2021-07-16 PROCEDURE — 99999 PR PBB SHADOW E&M-EST. PATIENT-LVL V: ICD-10-PCS | Mod: PBBFAC,,, | Performed by: INTERNAL MEDICINE

## 2021-07-16 PROCEDURE — 99215 OFFICE O/P EST HI 40 MIN: CPT | Mod: PBBFAC | Performed by: INTERNAL MEDICINE

## 2021-07-16 PROCEDURE — 99214 OFFICE O/P EST MOD 30 MIN: CPT | Mod: S$PBB,,, | Performed by: INTERNAL MEDICINE

## 2021-07-16 RX ORDER — ALPRAZOLAM 0.25 MG/1
0.25 TABLET ORAL DAILY PRN
COMMUNITY
Start: 2021-06-12 | End: 2023-09-05

## 2021-07-16 RX ORDER — DICYCLOMINE HYDROCHLORIDE 20 MG/1
20 TABLET ORAL 4 TIMES DAILY
COMMUNITY
Start: 2021-06-30 | End: 2021-07-28

## 2021-07-19 ENCOUNTER — TELEPHONE (OUTPATIENT)
Dept: GASTROENTEROLOGY | Facility: CLINIC | Age: 46
End: 2021-07-19

## 2021-07-30 ENCOUNTER — OFFICE VISIT (OUTPATIENT)
Dept: CARDIOLOGY | Facility: CLINIC | Age: 46
End: 2021-07-30
Payer: MEDICAID

## 2021-07-30 ENCOUNTER — HOSPITAL ENCOUNTER (OUTPATIENT)
Dept: CARDIOLOGY | Facility: HOSPITAL | Age: 46
Discharge: HOME | End: 2021-07-30
Payer: MEDICAID

## 2021-07-30 VITALS
DIASTOLIC BLOOD PRESSURE: 74 MMHG | HEART RATE: 95 BPM | WEIGHT: 231.94 LBS | BODY MASS INDEX: 33.28 KG/M2 | OXYGEN SATURATION: 97 % | SYSTOLIC BLOOD PRESSURE: 116 MMHG

## 2021-07-30 DIAGNOSIS — Q87.40 MARFAN'S SYNDROME: ICD-10-CM

## 2021-07-30 DIAGNOSIS — E78.2 MIXED HYPERLIPIDEMIA: ICD-10-CM

## 2021-07-30 DIAGNOSIS — R20.0 LEFT ARM NUMBNESS: ICD-10-CM

## 2021-07-30 DIAGNOSIS — I73.9 PAD (PERIPHERAL ARTERY DISEASE): ICD-10-CM

## 2021-07-30 DIAGNOSIS — I10 ESSENTIAL HYPERTENSION: ICD-10-CM

## 2021-07-30 DIAGNOSIS — I77.1 ILIAC ARTERY STENOSIS, LEFT: ICD-10-CM

## 2021-07-30 DIAGNOSIS — E11.42 DIABETIC PERIPHERAL NEUROPATHY ASSOCIATED WITH TYPE 2 DIABETES MELLITUS: ICD-10-CM

## 2021-07-30 DIAGNOSIS — I27.20 PULMONARY HYPERTENSION: ICD-10-CM

## 2021-07-30 DIAGNOSIS — Z01.818 PRE-OP EVALUATION: Primary | ICD-10-CM

## 2021-07-30 DIAGNOSIS — Z01.818 PRE-OP EVALUATION: ICD-10-CM

## 2021-07-30 DIAGNOSIS — I73.9 CLAUDICATION IN PERIPHERAL VASCULAR DISEASE: ICD-10-CM

## 2021-07-30 DIAGNOSIS — Z01.810 PREOP CARDIOVASCULAR EXAM: Primary | ICD-10-CM

## 2021-07-30 PROCEDURE — 93005 ELECTROCARDIOGRAM TRACING: CPT

## 2021-07-30 PROCEDURE — 99214 OFFICE O/P EST MOD 30 MIN: CPT | Mod: S$PBB,,, | Performed by: PHYSICIAN ASSISTANT

## 2021-07-30 PROCEDURE — 93010 ELECTROCARDIOGRAM REPORT: CPT | Mod: ,,, | Performed by: INTERNAL MEDICINE

## 2021-07-30 PROCEDURE — 99999 PR PBB SHADOW E&M-EST. PATIENT-LVL IV: ICD-10-PCS | Mod: PBBFAC,,, | Performed by: PHYSICIAN ASSISTANT

## 2021-07-30 PROCEDURE — 99214 PR OFFICE/OUTPT VISIT, EST, LEVL IV, 30-39 MIN: ICD-10-PCS | Mod: S$PBB,,, | Performed by: PHYSICIAN ASSISTANT

## 2021-07-30 PROCEDURE — 99999 PR PBB SHADOW E&M-EST. PATIENT-LVL IV: CPT | Mod: PBBFAC,,, | Performed by: PHYSICIAN ASSISTANT

## 2021-07-30 PROCEDURE — 99214 OFFICE O/P EST MOD 30 MIN: CPT | Mod: PBBFAC | Performed by: PHYSICIAN ASSISTANT

## 2021-07-30 PROCEDURE — 93010 EKG 12-LEAD: ICD-10-PCS | Mod: ,,, | Performed by: INTERNAL MEDICINE

## 2021-07-31 PROBLEM — R11.2 NAUSEA AND VOMITING: Status: ACTIVE | Noted: 2021-07-31

## 2021-07-31 PROBLEM — R13.19 OTHER DYSPHAGIA: Status: ACTIVE | Noted: 2021-07-31

## 2021-08-10 ENCOUNTER — HOSPITAL ENCOUNTER (OUTPATIENT)
Dept: CARDIOLOGY | Facility: HOSPITAL | Age: 46
Discharge: HOME | End: 2021-08-10
Attending: PHYSICIAN ASSISTANT
Payer: MEDICAID

## 2021-08-10 DIAGNOSIS — E78.2 MIXED HYPERLIPIDEMIA: ICD-10-CM

## 2021-08-10 DIAGNOSIS — I73.9 CLAUDICATION IN PERIPHERAL VASCULAR DISEASE: ICD-10-CM

## 2021-08-10 DIAGNOSIS — I77.1 ILIAC ARTERY STENOSIS, LEFT: ICD-10-CM

## 2021-08-10 DIAGNOSIS — R20.0 LEFT ARM NUMBNESS: ICD-10-CM

## 2021-08-10 DIAGNOSIS — I73.9 PAD (PERIPHERAL ARTERY DISEASE): ICD-10-CM

## 2021-08-10 PROCEDURE — 93225 XTRNL ECG REC<48 HRS REC: CPT

## 2021-08-10 PROCEDURE — 93227 HOLTER MONITOR - 48 HOUR (CUPID ONLY): ICD-10-PCS | Mod: ,,, | Performed by: INTERNAL MEDICINE

## 2021-08-10 PROCEDURE — 93227 XTRNL ECG REC<48 HR R&I: CPT | Mod: ,,, | Performed by: INTERNAL MEDICINE

## 2021-08-12 LAB
OHS CV EVENT MONITOR DAY: 2
OHS CV HOLTER LENGTH DECIMAL HOURS: 96
OHS CV HOLTER LENGTH HOURS: 48
OHS CV HOLTER LENGTH MINUTES: 0
OHS CV HOLTER SINUS AVERAGE HR: 95
OHS CV HOLTER SINUS MAX HR: 158
OHS CV HOLTER SINUS MIN HR: 71

## 2021-08-13 ENCOUNTER — TELEPHONE (OUTPATIENT)
Dept: CARDIOLOGY | Facility: CLINIC | Age: 46
End: 2021-08-13

## 2021-08-18 ENCOUNTER — TELEPHONE (OUTPATIENT)
Dept: OBSTETRICS AND GYNECOLOGY | Facility: CLINIC | Age: 46
End: 2021-08-18

## 2021-08-18 ENCOUNTER — CLINICAL SUPPORT (OUTPATIENT)
Dept: OBSTETRICS AND GYNECOLOGY | Facility: CLINIC | Age: 46
End: 2021-08-18
Payer: MEDICAID

## 2021-08-18 PROCEDURE — 99999 PR PBB SHADOW E&M-EST. PATIENT-LVL III: ICD-10-PCS | Mod: PBBFAC,,,

## 2021-08-18 PROCEDURE — 96372 THER/PROPH/DIAG INJ SC/IM: CPT | Mod: PBBFAC

## 2021-08-18 PROCEDURE — 99999 PR PBB SHADOW E&M-EST. PATIENT-LVL III: CPT | Mod: PBBFAC,,,

## 2021-08-18 PROCEDURE — 99213 OFFICE O/P EST LOW 20 MIN: CPT | Mod: PBBFAC

## 2021-08-18 RX ADMIN — MEDROXYPROGESTERONE ACETATE 150 MG: 150 INJECTION, SUSPENSION, EXTENDED RELEASE INTRAMUSCULAR at 04:08

## 2021-08-23 ENCOUNTER — TELEPHONE (OUTPATIENT)
Dept: RHEUMATOLOGY | Facility: CLINIC | Age: 46
End: 2021-08-23

## 2021-08-25 ENCOUNTER — TELEPHONE (OUTPATIENT)
Dept: RHEUMATOLOGY | Facility: CLINIC | Age: 46
End: 2021-08-25

## 2021-09-13 ENCOUNTER — TELEPHONE (OUTPATIENT)
Dept: OBSTETRICS AND GYNECOLOGY | Facility: CLINIC | Age: 46
End: 2021-09-13

## 2021-09-17 ENCOUNTER — HOSPITAL ENCOUNTER (EMERGENCY)
Facility: HOSPITAL | Age: 46
Discharge: HOME | End: 2021-09-17
Attending: EMERGENCY MEDICINE
Payer: MEDICAID

## 2021-09-17 VITALS
WEIGHT: 223.44 LBS | RESPIRATION RATE: 18 BRPM | SYSTOLIC BLOOD PRESSURE: 119 MMHG | HEART RATE: 93 BPM | DIASTOLIC BLOOD PRESSURE: 73 MMHG | TEMPERATURE: 99 F | OXYGEN SATURATION: 97 % | BODY MASS INDEX: 32.06 KG/M2

## 2021-09-17 DIAGNOSIS — M79.662 PAIN AND SWELLING OF LEFT LOWER LEG: ICD-10-CM

## 2021-09-17 DIAGNOSIS — I80.02 THROMBOPHLEBITIS OF SUPERFICIAL VEINS OF LEFT LOWER EXTREMITY: Primary | ICD-10-CM

## 2021-09-17 DIAGNOSIS — M79.89 PAIN AND SWELLING OF LEFT LOWER LEG: ICD-10-CM

## 2021-09-17 PROCEDURE — 99284 EMERGENCY DEPT VISIT MOD MDM: CPT | Mod: 25

## 2021-09-17 RX ORDER — HYDROCODONE BITARTRATE AND ACETAMINOPHEN 5; 325 MG/1; MG/1
1 TABLET ORAL EVERY 6 HOURS PRN
Qty: 12 TABLET | Refills: 0 | OUTPATIENT
Start: 2021-09-17 | End: 2021-11-20

## 2021-09-28 ENCOUNTER — TELEPHONE (OUTPATIENT)
Dept: ENDOSCOPY | Facility: HOSPITAL | Age: 46
End: 2021-09-28

## 2021-09-28 ENCOUNTER — TELEPHONE (OUTPATIENT)
Dept: ORTHOPEDICS | Facility: CLINIC | Age: 46
End: 2021-09-28

## 2021-09-28 DIAGNOSIS — Z01.818 PRE-OP TESTING: Primary | ICD-10-CM

## 2021-10-01 ENCOUNTER — HOSPITAL ENCOUNTER (EMERGENCY)
Facility: HOSPITAL | Age: 46
Discharge: HOME | End: 2021-10-01
Attending: EMERGENCY MEDICINE
Payer: MEDICAID

## 2021-10-01 VITALS
DIASTOLIC BLOOD PRESSURE: 59 MMHG | TEMPERATURE: 100 F | BODY MASS INDEX: 32.32 KG/M2 | WEIGHT: 225.75 LBS | RESPIRATION RATE: 20 BRPM | OXYGEN SATURATION: 98 % | SYSTOLIC BLOOD PRESSURE: 115 MMHG | HEART RATE: 94 BPM | HEIGHT: 70 IN

## 2021-10-01 DIAGNOSIS — L03.116 CELLULITIS OF LEFT LEG: Primary | ICD-10-CM

## 2021-10-01 PROCEDURE — 99284 EMERGENCY DEPT VISIT MOD MDM: CPT

## 2021-10-01 RX ORDER — TRAMADOL HYDROCHLORIDE 50 MG/1
50 TABLET ORAL EVERY 6 HOURS PRN
Qty: 12 TABLET | Refills: 0 | OUTPATIENT
Start: 2021-10-01 | End: 2021-10-10

## 2021-10-01 RX ORDER — CLINDAMYCIN HYDROCHLORIDE 300 MG/1
300 CAPSULE ORAL EVERY 6 HOURS
Qty: 28 CAPSULE | Refills: 0 | Status: SHIPPED | OUTPATIENT
Start: 2021-10-01 | End: 2021-10-08

## 2021-10-08 ENCOUNTER — TELEPHONE (OUTPATIENT)
Dept: GASTROENTEROLOGY | Facility: CLINIC | Age: 46
End: 2021-10-08

## 2021-10-08 ENCOUNTER — TELEPHONE (OUTPATIENT)
Dept: HEPATOLOGY | Facility: CLINIC | Age: 46
End: 2021-10-08

## 2021-10-10 ENCOUNTER — HOSPITAL ENCOUNTER (EMERGENCY)
Facility: HOSPITAL | Age: 46
Discharge: HOME | End: 2021-10-10
Attending: EMERGENCY MEDICINE
Payer: MEDICAID

## 2021-10-10 VITALS
HEIGHT: 70 IN | SYSTOLIC BLOOD PRESSURE: 140 MMHG | OXYGEN SATURATION: 100 % | HEART RATE: 95 BPM | WEIGHT: 222.13 LBS | TEMPERATURE: 98 F | DIASTOLIC BLOOD PRESSURE: 70 MMHG | RESPIRATION RATE: 17 BRPM | BODY MASS INDEX: 31.8 KG/M2

## 2021-10-10 DIAGNOSIS — B37.31 VULVOVAGINAL CANDIDIASIS: ICD-10-CM

## 2021-10-10 DIAGNOSIS — M79.89 LEFT LEG SWELLING: ICD-10-CM

## 2021-10-10 DIAGNOSIS — M79.605 LEFT LEG PAIN: Primary | ICD-10-CM

## 2021-10-10 PROCEDURE — 99284 EMERGENCY DEPT VISIT MOD MDM: CPT | Mod: 25

## 2021-10-10 RX ORDER — TRAMADOL HYDROCHLORIDE 50 MG/1
50 TABLET ORAL EVERY 6 HOURS PRN
Qty: 12 TABLET | Refills: 0 | OUTPATIENT
Start: 2021-10-10 | End: 2021-11-20

## 2021-10-10 RX ORDER — FLUCONAZOLE 150 MG/1
150 TABLET ORAL DAILY
Qty: 2 TABLET | Refills: 0 | Status: SHIPPED | OUTPATIENT
Start: 2021-10-10 | End: 2021-10-12

## 2021-10-19 ENCOUNTER — TELEPHONE (OUTPATIENT)
Dept: CARDIOLOGY | Facility: CLINIC | Age: 46
End: 2021-10-19

## 2021-10-19 ENCOUNTER — LAB VISIT (OUTPATIENT)
Dept: LAB | Facility: HOSPITAL | Age: 46
End: 2021-10-19
Attending: PHYSICIAN ASSISTANT
Payer: MEDICAID

## 2021-10-19 ENCOUNTER — OFFICE VISIT (OUTPATIENT)
Dept: CARDIOLOGY | Facility: CLINIC | Age: 46
End: 2021-10-19
Payer: MEDICAID

## 2021-10-19 VITALS
DIASTOLIC BLOOD PRESSURE: 74 MMHG | OXYGEN SATURATION: 98 % | SYSTOLIC BLOOD PRESSURE: 128 MMHG | BODY MASS INDEX: 32.65 KG/M2 | HEART RATE: 87 BPM | WEIGHT: 227.5 LBS

## 2021-10-19 DIAGNOSIS — E87.6 HYPOKALEMIA: Primary | ICD-10-CM

## 2021-10-19 DIAGNOSIS — G89.29 OTHER CHRONIC PAIN: ICD-10-CM

## 2021-10-19 DIAGNOSIS — I10 ESSENTIAL HYPERTENSION: ICD-10-CM

## 2021-10-19 DIAGNOSIS — E66.9 OBESITY (BMI 30-39.9): ICD-10-CM

## 2021-10-19 DIAGNOSIS — Z79.4 TYPE 2 DIABETES MELLITUS WITH DIABETIC PERIPHERAL ANGIOPATHY WITHOUT GANGRENE, WITH LONG-TERM CURRENT USE OF INSULIN: ICD-10-CM

## 2021-10-19 DIAGNOSIS — E11.51 TYPE 2 DIABETES MELLITUS WITH DIABETIC PERIPHERAL ANGIOPATHY WITHOUT GANGRENE, WITH LONG-TERM CURRENT USE OF INSULIN: ICD-10-CM

## 2021-10-19 DIAGNOSIS — I27.20 PULMONARY HYPERTENSION: ICD-10-CM

## 2021-10-19 DIAGNOSIS — I73.9 CLAUDICATION IN PERIPHERAL VASCULAR DISEASE: ICD-10-CM

## 2021-10-19 DIAGNOSIS — E87.6 HYPOKALEMIA: ICD-10-CM

## 2021-10-19 DIAGNOSIS — K74.00 LIVER FIBROSIS: ICD-10-CM

## 2021-10-19 DIAGNOSIS — E78.2 MIXED HYPERLIPIDEMIA: ICD-10-CM

## 2021-10-19 DIAGNOSIS — I73.9 PAD (PERIPHERAL ARTERY DISEASE): ICD-10-CM

## 2021-10-19 DIAGNOSIS — I10 PRIMARY HYPERTENSION: ICD-10-CM

## 2021-10-19 DIAGNOSIS — I34.1 MVP (MITRAL VALVE PROLAPSE): ICD-10-CM

## 2021-10-19 DIAGNOSIS — Q87.40 MARFAN'S SYNDROME: ICD-10-CM

## 2021-10-19 LAB
ANION GAP SERPL CALC-SCNC: 10 MMOL/L (ref 8–16)
BUN SERPL-MCNC: 8 MG/DL (ref 6–20)
CALCIUM SERPL-MCNC: 9.5 MG/DL (ref 8.7–10.5)
CHLORIDE SERPL-SCNC: 106 MMOL/L (ref 95–110)
CO2 SERPL-SCNC: 19 MMOL/L (ref 23–29)
CREAT SERPL-MCNC: 0.9 MG/DL (ref 0.5–1.4)
EST. GFR  (AFRICAN AMERICAN): >60 ML/MIN/1.73 M^2
EST. GFR  (NON AFRICAN AMERICAN): >60 ML/MIN/1.73 M^2
GLUCOSE SERPL-MCNC: 458 MG/DL (ref 70–110)
POTASSIUM SERPL-SCNC: 4.1 MMOL/L (ref 3.5–5.1)
SODIUM SERPL-SCNC: 135 MMOL/L (ref 136–145)

## 2021-10-19 PROCEDURE — 99999 PR PBB SHADOW E&M-EST. PATIENT-LVL V: ICD-10-PCS | Mod: PBBFAC,,, | Performed by: PHYSICIAN ASSISTANT

## 2021-10-19 PROCEDURE — 99999 PR PBB SHADOW E&M-EST. PATIENT-LVL V: CPT | Mod: PBBFAC,,, | Performed by: PHYSICIAN ASSISTANT

## 2021-10-19 PROCEDURE — 99215 OFFICE O/P EST HI 40 MIN: CPT | Mod: PBBFAC | Performed by: PHYSICIAN ASSISTANT

## 2021-10-19 PROCEDURE — 99214 OFFICE O/P EST MOD 30 MIN: CPT | Mod: S$PBB,,, | Performed by: PHYSICIAN ASSISTANT

## 2021-10-19 PROCEDURE — 80048 BASIC METABOLIC PNL TOTAL CA: CPT | Performed by: PHYSICIAN ASSISTANT

## 2021-10-19 PROCEDURE — 99214 PR OFFICE/OUTPT VISIT, EST, LEVL IV, 30-39 MIN: ICD-10-PCS | Mod: S$PBB,,, | Performed by: PHYSICIAN ASSISTANT

## 2021-10-19 PROCEDURE — 36415 COLL VENOUS BLD VENIPUNCTURE: CPT | Performed by: PHYSICIAN ASSISTANT

## 2021-10-19 RX ORDER — ATENOLOL 25 MG/1
25 TABLET ORAL DAILY
Qty: 30 TABLET | Refills: 11 | Status: SHIPPED | OUTPATIENT
Start: 2021-10-19 | End: 2022-10-12

## 2021-10-20 ENCOUNTER — TELEPHONE (OUTPATIENT)
Dept: CARDIOLOGY | Facility: CLINIC | Age: 46
End: 2021-10-20

## 2021-10-20 ENCOUNTER — TELEPHONE (OUTPATIENT)
Dept: ENDOSCOPY | Facility: HOSPITAL | Age: 46
End: 2021-10-20

## 2021-10-22 ENCOUNTER — TELEPHONE (OUTPATIENT)
Dept: ENDOSCOPY | Facility: HOSPITAL | Age: 46
End: 2021-10-22

## 2021-10-24 ENCOUNTER — LAB VISIT (OUTPATIENT)
Dept: URGENT CARE | Facility: CLINIC | Age: 46
End: 2021-10-24
Payer: MEDICAID

## 2021-10-24 DIAGNOSIS — Z01.818 PRE-OP TESTING: ICD-10-CM

## 2021-10-24 PROCEDURE — U0005 INFEC AGEN DETEC AMPLI PROBE: HCPCS | Performed by: INTERNAL MEDICINE

## 2021-10-24 PROCEDURE — U0003 INFECTIOUS AGENT DETECTION BY NUCLEIC ACID (DNA OR RNA); SEVERE ACUTE RESPIRATORY SYNDROME CORONAVIRUS 2 (SARS-COV-2) (CORONAVIRUS DISEASE [COVID-19]), AMPLIFIED PROBE TECHNIQUE, MAKING USE OF HIGH THROUGHPUT TECHNOLOGIES AS DESCRIBED BY CMS-2020-01-R: HCPCS | Performed by: INTERNAL MEDICINE

## 2021-10-25 LAB
SARS-COV-2 RNA RESP QL NAA+PROBE: NOT DETECTED
SARS-COV-2- CYCLE NUMBER: NORMAL

## 2021-11-05 ENCOUNTER — TELEPHONE (OUTPATIENT)
Dept: OBSTETRICS AND GYNECOLOGY | Facility: CLINIC | Age: 46
End: 2021-11-05
Payer: MEDICAID

## 2021-11-05 DIAGNOSIS — Z12.39 ENCOUNTER FOR SCREENING FOR MALIGNANT NEOPLASM OF BREAST, UNSPECIFIED SCREENING MODALITY: Primary | ICD-10-CM

## 2021-11-08 ENCOUNTER — TELEPHONE (OUTPATIENT)
Dept: GASTROENTEROLOGY | Facility: CLINIC | Age: 46
End: 2021-11-08
Payer: MEDICAID

## 2021-11-08 ENCOUNTER — HOSPITAL ENCOUNTER (EMERGENCY)
Facility: HOSPITAL | Age: 46
Discharge: HOME | End: 2021-11-08
Attending: EMERGENCY MEDICINE
Payer: MEDICAID

## 2021-11-08 VITALS
SYSTOLIC BLOOD PRESSURE: 141 MMHG | RESPIRATION RATE: 20 BRPM | OXYGEN SATURATION: 96 % | WEIGHT: 228.19 LBS | HEIGHT: 70 IN | BODY MASS INDEX: 32.67 KG/M2 | DIASTOLIC BLOOD PRESSURE: 45 MMHG | TEMPERATURE: 98 F | HEART RATE: 85 BPM

## 2021-11-08 DIAGNOSIS — R21 RASH: Primary | ICD-10-CM

## 2021-11-08 PROCEDURE — 99284 EMERGENCY DEPT VISIT MOD MDM: CPT

## 2021-11-08 RX ORDER — PANTOPRAZOLE SODIUM 40 MG/1
TABLET, DELAYED RELEASE ORAL
Qty: 30 TABLET | Refills: 3 | Status: SHIPPED | OUTPATIENT
Start: 2021-11-08 | End: 2022-03-03

## 2021-11-08 RX ORDER — VALACYCLOVIR HYDROCHLORIDE 1 G/1
1000 TABLET, FILM COATED ORAL 3 TIMES DAILY
Qty: 21 TABLET | Refills: 0 | Status: SHIPPED | OUTPATIENT
Start: 2021-11-08 | End: 2021-12-08

## 2021-11-08 RX ORDER — TRAMADOL HYDROCHLORIDE 50 MG/1
50 TABLET ORAL EVERY 6 HOURS PRN
Qty: 12 TABLET | Refills: 0 | OUTPATIENT
Start: 2021-11-08 | End: 2021-11-20

## 2021-11-11 ENCOUNTER — CLINICAL SUPPORT (OUTPATIENT)
Dept: OBSTETRICS AND GYNECOLOGY | Facility: CLINIC | Age: 46
End: 2021-11-11
Payer: MEDICAID

## 2021-11-11 PROCEDURE — 99999 PR PBB SHADOW E&M-EST. PATIENT-LVL III: CPT | Mod: PBBFAC,,,

## 2021-11-11 PROCEDURE — 96372 THER/PROPH/DIAG INJ SC/IM: CPT | Mod: PBBFAC

## 2021-11-11 PROCEDURE — 99999 PR PBB SHADOW E&M-EST. PATIENT-LVL III: ICD-10-PCS | Mod: PBBFAC,,,

## 2021-11-11 PROCEDURE — 99213 OFFICE O/P EST LOW 20 MIN: CPT | Mod: PBBFAC,25

## 2021-11-11 RX ADMIN — MEDROXYPROGESTERONE ACETATE 150 MG: 150 INJECTION, SUSPENSION, EXTENDED RELEASE INTRAMUSCULAR at 03:11

## 2021-11-17 ENCOUNTER — TELEPHONE (OUTPATIENT)
Dept: PHYSICAL MEDICINE AND REHAB | Facility: CLINIC | Age: 46
End: 2021-11-17
Payer: MEDICAID

## 2021-11-20 ENCOUNTER — HOSPITAL ENCOUNTER (EMERGENCY)
Facility: HOSPITAL | Age: 46
Discharge: HOME | End: 2021-11-20
Attending: EMERGENCY MEDICINE
Payer: MEDICAID

## 2021-11-20 VITALS
RESPIRATION RATE: 18 BRPM | HEART RATE: 90 BPM | WEIGHT: 229.25 LBS | DIASTOLIC BLOOD PRESSURE: 66 MMHG | SYSTOLIC BLOOD PRESSURE: 146 MMHG | OXYGEN SATURATION: 98 % | TEMPERATURE: 98 F | BODY MASS INDEX: 32.9 KG/M2

## 2021-11-20 DIAGNOSIS — M25.579 ANKLE PAIN: ICD-10-CM

## 2021-11-20 DIAGNOSIS — L03.116 CELLULITIS OF LEFT LOWER EXTREMITY: Primary | ICD-10-CM

## 2021-11-20 DIAGNOSIS — M79.606 LEG PAIN: ICD-10-CM

## 2021-11-20 LAB
ALBUMIN SERPL BCP-MCNC: 3.3 G/DL (ref 3.5–5.2)
ALP SERPL-CCNC: 124 U/L (ref 55–135)
ALT SERPL W/O P-5'-P-CCNC: 32 U/L (ref 10–44)
ANION GAP SERPL CALC-SCNC: 10 MMOL/L (ref 8–16)
AST SERPL-CCNC: 20 U/L (ref 10–40)
BASOPHILS # BLD AUTO: 0.03 K/UL (ref 0–0.2)
BASOPHILS NFR BLD: 0.2 % (ref 0–1.9)
BILIRUB SERPL-MCNC: 0.3 MG/DL (ref 0.1–1)
BUN SERPL-MCNC: 9 MG/DL (ref 6–20)
CALCIUM SERPL-MCNC: 9.2 MG/DL (ref 8.7–10.5)
CHLORIDE SERPL-SCNC: 108 MMOL/L (ref 95–110)
CO2 SERPL-SCNC: 21 MMOL/L (ref 23–29)
CREAT SERPL-MCNC: 0.7 MG/DL (ref 0.5–1.4)
DIFFERENTIAL METHOD: NORMAL
EOSINOPHIL # BLD AUTO: 0.3 K/UL (ref 0–0.5)
EOSINOPHIL NFR BLD: 2.1 % (ref 0–8)
ERYTHROCYTE [DISTWIDTH] IN BLOOD BY AUTOMATED COUNT: 12.9 % (ref 11.5–14.5)
ERYTHROCYTE [SEDIMENTATION RATE] IN BLOOD BY WESTERGREN METHOD: 70 MM/HR (ref 0–20)
EST. GFR  (AFRICAN AMERICAN): >60 ML/MIN/1.73 M^2
EST. GFR  (NON AFRICAN AMERICAN): >60 ML/MIN/1.73 M^2
GLUCOSE SERPL-MCNC: 157 MG/DL (ref 70–110)
HCT VFR BLD AUTO: 39.9 % (ref 37–48.5)
HGB BLD-MCNC: 13.3 G/DL (ref 12–16)
IMM GRANULOCYTES # BLD AUTO: 0.04 K/UL (ref 0–0.04)
IMM GRANULOCYTES NFR BLD AUTO: 0.3 % (ref 0–0.5)
LYMPHOCYTES # BLD AUTO: 4.1 K/UL (ref 1–4.8)
LYMPHOCYTES NFR BLD: 33.5 % (ref 18–48)
MCH RBC QN AUTO: 30.8 PG (ref 27–31)
MCHC RBC AUTO-ENTMCNC: 33.3 G/DL (ref 32–36)
MCV RBC AUTO: 92 FL (ref 82–98)
MONOCYTES # BLD AUTO: 0.9 K/UL (ref 0.3–1)
MONOCYTES NFR BLD: 7.5 % (ref 4–15)
NEUTROPHILS # BLD AUTO: 6.8 K/UL (ref 1.8–7.7)
NEUTROPHILS NFR BLD: 56.4 % (ref 38–73)
NRBC BLD-RTO: 0 /100 WBC
PLATELET # BLD AUTO: 298 K/UL (ref 150–450)
PMV BLD AUTO: 9.4 FL (ref 9.2–12.9)
POTASSIUM SERPL-SCNC: 3.7 MMOL/L (ref 3.5–5.1)
PROCALCITONIN SERPL IA-MCNC: 0.06 NG/ML
PROT SERPL-MCNC: 6.5 G/DL (ref 6–8.4)
RBC # BLD AUTO: 4.32 M/UL (ref 4–5.4)
SODIUM SERPL-SCNC: 139 MMOL/L (ref 136–145)
WBC # BLD AUTO: 12.08 K/UL (ref 3.9–12.7)

## 2021-11-20 PROCEDURE — 84145 PROCALCITONIN (PCT): CPT | Performed by: NURSE PRACTITIONER

## 2021-11-20 PROCEDURE — 80053 COMPREHEN METABOLIC PANEL: CPT | Performed by: NURSE PRACTITIONER

## 2021-11-20 PROCEDURE — 96365 THER/PROPH/DIAG IV INF INIT: CPT

## 2021-11-20 PROCEDURE — 99285 EMERGENCY DEPT VISIT HI MDM: CPT | Mod: 25

## 2021-11-20 PROCEDURE — 25000003 PHARM REV CODE 250: Performed by: EMERGENCY MEDICINE

## 2021-11-20 PROCEDURE — 85025 COMPLETE CBC W/AUTO DIFF WBC: CPT | Performed by: NURSE PRACTITIONER

## 2021-11-20 PROCEDURE — 25000003 PHARM REV CODE 250: Performed by: NURSE PRACTITIONER

## 2021-11-20 PROCEDURE — 85651 RBC SED RATE NONAUTOMATED: CPT | Performed by: NURSE PRACTITIONER

## 2021-11-20 RX ORDER — OXYCODONE AND ACETAMINOPHEN 5; 325 MG/1; MG/1
1 TABLET ORAL ONCE
Status: COMPLETED | OUTPATIENT
Start: 2021-11-20 | End: 2021-11-20

## 2021-11-20 RX ORDER — CLINDAMYCIN HYDROCHLORIDE 300 MG/1
300 CAPSULE ORAL EVERY 8 HOURS
Qty: 30 CAPSULE | Refills: 0 | Status: SHIPPED | OUTPATIENT
Start: 2021-11-20 | End: 2021-11-30

## 2021-11-20 RX ORDER — DOXYCYCLINE 100 MG/1
100 CAPSULE ORAL
COMMUNITY
Start: 2021-11-17 | End: 2021-11-27

## 2021-11-20 RX ORDER — CLINDAMYCIN PHOSPHATE 600 MG/50ML
600 INJECTION, SOLUTION INTRAVENOUS ONCE
Status: COMPLETED | OUTPATIENT
Start: 2021-11-20 | End: 2021-11-20

## 2021-11-20 RX ORDER — OXYCODONE AND ACETAMINOPHEN 10; 325 MG/1; MG/1
1 TABLET ORAL EVERY 8 HOURS PRN
Qty: 12 TABLET | Refills: 0 | Status: SHIPPED | OUTPATIENT
Start: 2021-11-20 | End: 2021-12-08

## 2021-11-20 RX ADMIN — OXYCODONE HYDROCHLORIDE AND ACETAMINOPHEN 1 TABLET: 5; 325 TABLET ORAL at 05:11

## 2021-11-20 RX ADMIN — CLINDAMYCIN PHOSPHATE 600 MG: 600 INJECTION, SOLUTION INTRAVENOUS at 05:11

## 2021-11-20 RX ADMIN — OXYCODONE HYDROCHLORIDE AND ACETAMINOPHEN 1 TABLET: 5; 325 TABLET ORAL at 08:11

## 2021-11-22 ENCOUNTER — PATIENT OUTREACH (OUTPATIENT)
Dept: EMERGENCY MEDICINE | Facility: HOSPITAL | Age: 46
End: 2021-11-22
Payer: MEDICAID

## 2021-11-22 ENCOUNTER — HOSPITAL ENCOUNTER (EMERGENCY)
Facility: HOSPITAL | Age: 46
Discharge: HOME | End: 2021-11-23
Attending: EMERGENCY MEDICINE
Payer: MEDICAID

## 2021-11-22 DIAGNOSIS — E86.0 DEHYDRATION: ICD-10-CM

## 2021-11-22 DIAGNOSIS — L03.116 LEFT LEG CELLULITIS: ICD-10-CM

## 2021-11-22 DIAGNOSIS — L03.116 CELLULITIS OF LEFT ANKLE: Primary | ICD-10-CM

## 2021-11-22 LAB
ALBUMIN SERPL BCP-MCNC: 3.5 G/DL (ref 3.5–5.2)
ALLENS TEST: ABNORMAL
ALP SERPL-CCNC: 133 U/L (ref 55–135)
ALT SERPL W/O P-5'-P-CCNC: 38 U/L (ref 10–44)
ANION GAP SERPL CALC-SCNC: 11 MMOL/L (ref 8–16)
ANION GAP SERPL CALC-SCNC: 14 MMOL/L (ref 8–16)
ANION GAP SERPL CALC-SCNC: 8 MMOL/L (ref 8–16)
AST SERPL-CCNC: 61 U/L (ref 10–40)
B-OH-BUTYR BLD STRIP-SCNC: 0.1 MMOL/L (ref 0–0.5)
BASOPHILS # BLD AUTO: 0.03 K/UL (ref 0–0.2)
BASOPHILS NFR BLD: 0.3 % (ref 0–1.9)
BILIRUB SERPL-MCNC: 0.3 MG/DL (ref 0.1–1)
BUN SERPL-MCNC: 10 MG/DL (ref 6–20)
BUN SERPL-MCNC: 11 MG/DL (ref 6–20)
BUN SERPL-MCNC: 11 MG/DL (ref 6–20)
CALCIUM SERPL-MCNC: 8.1 MG/DL (ref 8.7–10.5)
CALCIUM SERPL-MCNC: 8.9 MG/DL (ref 8.7–10.5)
CALCIUM SERPL-MCNC: 9.4 MG/DL (ref 8.7–10.5)
CHLORIDE SERPL-SCNC: 109 MMOL/L (ref 95–110)
CHLORIDE SERPL-SCNC: 110 MMOL/L (ref 95–110)
CHLORIDE SERPL-SCNC: 111 MMOL/L (ref 95–110)
CO2 SERPL-SCNC: 14 MMOL/L (ref 23–29)
CO2 SERPL-SCNC: 18 MMOL/L (ref 23–29)
CO2 SERPL-SCNC: 20 MMOL/L (ref 23–29)
CREAT SERPL-MCNC: 0.7 MG/DL (ref 0.5–1.4)
CREAT SERPL-MCNC: 0.7 MG/DL (ref 0.5–1.4)
CREAT SERPL-MCNC: 0.8 MG/DL (ref 0.5–1.4)
CRP SERPL-MCNC: 7 MG/L (ref 0–8.2)
DELSYS: ABNORMAL
DIFFERENTIAL METHOD: NORMAL
EOSINOPHIL # BLD AUTO: 0.2 K/UL (ref 0–0.5)
EOSINOPHIL NFR BLD: 2.1 % (ref 0–8)
ERYTHROCYTE [DISTWIDTH] IN BLOOD BY AUTOMATED COUNT: 13.1 % (ref 11.5–14.5)
ERYTHROCYTE [SEDIMENTATION RATE] IN BLOOD BY WESTERGREN METHOD: 54 MM/HR (ref 0–20)
EST. GFR  (AFRICAN AMERICAN): >60 ML/MIN/1.73 M^2
EST. GFR  (NON AFRICAN AMERICAN): >60 ML/MIN/1.73 M^2
GLUCOSE SERPL-MCNC: 188 MG/DL (ref 70–110)
GLUCOSE SERPL-MCNC: 203 MG/DL (ref 70–110)
GLUCOSE SERPL-MCNC: 213 MG/DL (ref 70–110)
HCO3 UR-SCNC: 21.2 MMOL/L (ref 24–28)
HCT VFR BLD AUTO: 40.4 % (ref 37–48.5)
HGB BLD-MCNC: 13.5 G/DL (ref 12–16)
IMM GRANULOCYTES # BLD AUTO: 0.03 K/UL (ref 0–0.04)
IMM GRANULOCYTES NFR BLD AUTO: 0.3 % (ref 0–0.5)
LACTATE SERPL-SCNC: 1.1 MMOL/L (ref 0.5–2.2)
LACTATE SERPL-SCNC: 2.3 MMOL/L (ref 0.5–2.2)
LYMPHOCYTES # BLD AUTO: 4 K/UL (ref 1–4.8)
LYMPHOCYTES NFR BLD: 34.7 % (ref 18–48)
MCH RBC QN AUTO: 30.8 PG (ref 27–31)
MCHC RBC AUTO-ENTMCNC: 33.4 G/DL (ref 32–36)
MCV RBC AUTO: 92 FL (ref 82–98)
MONOCYTES # BLD AUTO: 0.8 K/UL (ref 0.3–1)
MONOCYTES NFR BLD: 6.9 % (ref 4–15)
NEUTROPHILS # BLD AUTO: 6.4 K/UL (ref 1.8–7.7)
NEUTROPHILS NFR BLD: 55.7 % (ref 38–73)
NRBC BLD-RTO: 0 /100 WBC
PCO2 BLDA: 37.1 MMHG (ref 35–45)
PH SMN: 7.37 [PH] (ref 7.35–7.45)
PLATELET # BLD AUTO: 364 K/UL (ref 150–450)
PMV BLD AUTO: 9.2 FL (ref 9.2–12.9)
PO2 BLDA: 29 MMHG (ref 40–60)
POC BE: -4 MMOL/L
POC SATURATED O2: 53 % (ref 95–100)
POTASSIUM SERPL-SCNC: 3.5 MMOL/L (ref 3.5–5.1)
POTASSIUM SERPL-SCNC: 3.7 MMOL/L (ref 3.5–5.1)
POTASSIUM SERPL-SCNC: 4.8 MMOL/L (ref 3.5–5.1)
PROCALCITONIN SERPL IA-MCNC: 0.05 NG/ML
PROT SERPL-MCNC: 7.5 G/DL (ref 6–8.4)
RBC # BLD AUTO: 4.38 M/UL (ref 4–5.4)
SAMPLE: ABNORMAL
SODIUM SERPL-SCNC: 138 MMOL/L (ref 136–145)
SODIUM SERPL-SCNC: 138 MMOL/L (ref 136–145)
SODIUM SERPL-SCNC: 139 MMOL/L (ref 136–145)
WBC # BLD AUTO: 11.52 K/UL (ref 3.9–12.7)

## 2021-11-22 PROCEDURE — 82010 KETONE BODYS QUAN: CPT | Performed by: EMERGENCY MEDICINE

## 2021-11-22 PROCEDURE — 96360 HYDRATION IV INFUSION INIT: CPT | Mod: 59

## 2021-11-22 PROCEDURE — 80048 BASIC METABOLIC PNL TOTAL CA: CPT | Performed by: EMERGENCY MEDICINE

## 2021-11-22 PROCEDURE — 80053 COMPREHEN METABOLIC PANEL: CPT | Performed by: PHYSICIAN ASSISTANT

## 2021-11-22 PROCEDURE — 86140 C-REACTIVE PROTEIN: CPT | Performed by: PHYSICIAN ASSISTANT

## 2021-11-22 PROCEDURE — 83605 ASSAY OF LACTIC ACID: CPT | Mod: 91 | Performed by: PHYSICIAN ASSISTANT

## 2021-11-22 PROCEDURE — 80048 BASIC METABOLIC PNL TOTAL CA: CPT | Mod: 91 | Performed by: EMERGENCY MEDICINE

## 2021-11-22 PROCEDURE — 99900035 HC TECH TIME PER 15 MIN (STAT)

## 2021-11-22 PROCEDURE — 84145 PROCALCITONIN (PCT): CPT | Performed by: EMERGENCY MEDICINE

## 2021-11-22 PROCEDURE — 63600175 PHARM REV CODE 636 W HCPCS: Performed by: EMERGENCY MEDICINE

## 2021-11-22 PROCEDURE — 96365 THER/PROPH/DIAG IV INF INIT: CPT

## 2021-11-22 PROCEDURE — 25000003 PHARM REV CODE 250: Performed by: EMERGENCY MEDICINE

## 2021-11-22 PROCEDURE — 85651 RBC SED RATE NONAUTOMATED: CPT | Performed by: PHYSICIAN ASSISTANT

## 2021-11-22 PROCEDURE — 82803 BLOOD GASES ANY COMBINATION: CPT

## 2021-11-22 PROCEDURE — 99291 CRITICAL CARE FIRST HOUR: CPT | Mod: 25

## 2021-11-22 PROCEDURE — 87040 BLOOD CULTURE FOR BACTERIA: CPT | Mod: 59 | Performed by: EMERGENCY MEDICINE

## 2021-11-22 PROCEDURE — 96361 HYDRATE IV INFUSION ADD-ON: CPT

## 2021-11-22 PROCEDURE — 83605 ASSAY OF LACTIC ACID: CPT | Performed by: EMERGENCY MEDICINE

## 2021-11-22 PROCEDURE — 96366 THER/PROPH/DIAG IV INF ADDON: CPT

## 2021-11-22 PROCEDURE — 85025 COMPLETE CBC W/AUTO DIFF WBC: CPT | Performed by: PHYSICIAN ASSISTANT

## 2021-11-22 RX ORDER — HYDROCODONE BITARTRATE AND ACETAMINOPHEN 10; 325 MG/1; MG/1
1 TABLET ORAL
Status: COMPLETED | OUTPATIENT
Start: 2021-11-22 | End: 2021-11-22

## 2021-11-22 RX ORDER — OXYCODONE AND ACETAMINOPHEN 10; 325 MG/1; MG/1
1 TABLET ORAL
Status: COMPLETED | OUTPATIENT
Start: 2021-11-22 | End: 2021-11-22

## 2021-11-22 RX ADMIN — HYDROCODONE BITARTRATE AND ACETAMINOPHEN 1 TABLET: 10; 325 TABLET ORAL at 11:11

## 2021-11-22 RX ADMIN — SODIUM CHLORIDE 1000 ML: 0.9 INJECTION, SOLUTION INTRAVENOUS at 10:11

## 2021-11-22 RX ADMIN — OXYCODONE AND ACETAMINOPHEN 1 TABLET: 10; 325 TABLET ORAL at 06:11

## 2021-11-22 RX ADMIN — SODIUM CHLORIDE 1000 ML: 0.9 INJECTION, SOLUTION INTRAVENOUS at 07:11

## 2021-11-22 RX ADMIN — VANCOMYCIN HYDROCHLORIDE 2250 MG: 10 INJECTION, POWDER, LYOPHILIZED, FOR SOLUTION INTRAVENOUS at 06:11

## 2021-11-23 VITALS
HEART RATE: 77 BPM | SYSTOLIC BLOOD PRESSURE: 135 MMHG | WEIGHT: 229 LBS | BODY MASS INDEX: 32.86 KG/M2 | RESPIRATION RATE: 17 BRPM | TEMPERATURE: 98 F | OXYGEN SATURATION: 99 % | DIASTOLIC BLOOD PRESSURE: 82 MMHG

## 2021-11-28 LAB
BACTERIA BLD CULT: NORMAL
BACTERIA BLD CULT: NORMAL

## 2021-12-08 ENCOUNTER — TELEPHONE (OUTPATIENT)
Dept: PAIN MEDICINE | Facility: CLINIC | Age: 46
End: 2021-12-08
Payer: MEDICAID

## 2021-12-08 ENCOUNTER — TELEPHONE (OUTPATIENT)
Dept: NEUROLOGY | Facility: CLINIC | Age: 46
End: 2021-12-08
Payer: MEDICAID

## 2021-12-08 ENCOUNTER — OFFICE VISIT (OUTPATIENT)
Dept: NEUROLOGY | Facility: CLINIC | Age: 46
End: 2021-12-08
Payer: MEDICAID

## 2021-12-08 ENCOUNTER — LAB VISIT (OUTPATIENT)
Dept: LAB | Facility: HOSPITAL | Age: 46
End: 2021-12-08
Attending: NURSE PRACTITIONER
Payer: MEDICAID

## 2021-12-08 VITALS
SYSTOLIC BLOOD PRESSURE: 118 MMHG | BODY MASS INDEX: 32.07 KG/M2 | WEIGHT: 224 LBS | HEIGHT: 70 IN | RESPIRATION RATE: 16 BRPM | DIASTOLIC BLOOD PRESSURE: 60 MMHG

## 2021-12-08 DIAGNOSIS — R29.898 WEAKNESS OF LEFT FOOT: ICD-10-CM

## 2021-12-08 DIAGNOSIS — M54.50 CHRONIC MIDLINE LOW BACK PAIN WITHOUT SCIATICA: ICD-10-CM

## 2021-12-08 DIAGNOSIS — E11.42 DIABETIC PERIPHERAL NEUROPATHY ASSOCIATED WITH TYPE 2 DIABETES MELLITUS: ICD-10-CM

## 2021-12-08 DIAGNOSIS — G62.9 NEUROPATHY: ICD-10-CM

## 2021-12-08 DIAGNOSIS — E11.42 DIABETIC PERIPHERAL NEUROPATHY ASSOCIATED WITH TYPE 2 DIABETES MELLITUS: Primary | ICD-10-CM

## 2021-12-08 DIAGNOSIS — G89.29 CHRONIC MIDLINE LOW BACK PAIN WITHOUT SCIATICA: ICD-10-CM

## 2021-12-08 LAB
ESTIMATED AVG GLUCOSE: 315 MG/DL (ref 68–131)
FOLATE SERPL-MCNC: 5.1 NG/ML (ref 4–24)
HBA1C MFR BLD: 12.6 % (ref 4–5.6)
HCYS SERPL-SCNC: 13.2 UMOL/L (ref 4–15.5)
VIT B12 SERPL-MCNC: 294 PG/ML (ref 210–950)

## 2021-12-08 PROCEDURE — 84207 ASSAY OF VITAMIN B-6: CPT | Performed by: NURSE PRACTITIONER

## 2021-12-08 PROCEDURE — 83921 ORGANIC ACID SINGLE QUANT: CPT | Performed by: NURSE PRACTITIONER

## 2021-12-08 PROCEDURE — 4010F PR ACE/ARB THEARPY RXD/TAKEN: ICD-10-PCS | Mod: CPTII,,, | Performed by: NURSE PRACTITIONER

## 2021-12-08 PROCEDURE — 83036 HEMOGLOBIN GLYCOSYLATED A1C: CPT | Performed by: NURSE PRACTITIONER

## 2021-12-08 PROCEDURE — 99999 PR PBB SHADOW E&M-EST. PATIENT-LVL V: ICD-10-PCS | Mod: PBBFAC,,, | Performed by: NURSE PRACTITIONER

## 2021-12-08 PROCEDURE — 84425 ASSAY OF VITAMIN B-1: CPT | Performed by: NURSE PRACTITIONER

## 2021-12-08 PROCEDURE — 4010F ACE/ARB THERAPY RXD/TAKEN: CPT | Mod: CPTII,,, | Performed by: NURSE PRACTITIONER

## 2021-12-08 PROCEDURE — 99999 PR PBB SHADOW E&M-EST. PATIENT-LVL V: CPT | Mod: PBBFAC,,, | Performed by: NURSE PRACTITIONER

## 2021-12-08 PROCEDURE — 82607 VITAMIN B-12: CPT | Performed by: NURSE PRACTITIONER

## 2021-12-08 PROCEDURE — 83090 ASSAY OF HOMOCYSTEINE: CPT | Performed by: NURSE PRACTITIONER

## 2021-12-08 PROCEDURE — 99215 PR OFFICE/OUTPT VISIT, EST, LEVL V, 40-54 MIN: ICD-10-PCS | Mod: S$PBB,,, | Performed by: NURSE PRACTITIONER

## 2021-12-08 PROCEDURE — 82746 ASSAY OF FOLIC ACID SERUM: CPT | Performed by: NURSE PRACTITIONER

## 2021-12-08 PROCEDURE — 99215 OFFICE O/P EST HI 40 MIN: CPT | Mod: S$PBB,,, | Performed by: NURSE PRACTITIONER

## 2021-12-08 PROCEDURE — 84252 ASSAY OF VITAMIN B-2: CPT | Performed by: NURSE PRACTITIONER

## 2021-12-08 PROCEDURE — 99215 OFFICE O/P EST HI 40 MIN: CPT | Mod: PBBFAC | Performed by: NURSE PRACTITIONER

## 2021-12-09 ENCOUNTER — TELEPHONE (OUTPATIENT)
Dept: PAIN MEDICINE | Facility: CLINIC | Age: 46
End: 2021-12-09
Payer: MEDICAID

## 2021-12-09 ENCOUNTER — TELEPHONE (OUTPATIENT)
Dept: NEUROLOGY | Facility: CLINIC | Age: 46
End: 2021-12-09
Payer: MEDICAID

## 2021-12-10 ENCOUNTER — TELEPHONE (OUTPATIENT)
Dept: PAIN MEDICINE | Facility: CLINIC | Age: 46
End: 2021-12-10
Payer: MEDICAID

## 2021-12-14 ENCOUNTER — PROCEDURE VISIT (OUTPATIENT)
Dept: NEUROLOGY | Facility: CLINIC | Age: 46
End: 2021-12-14
Payer: MEDICAID

## 2021-12-14 ENCOUNTER — TELEPHONE (OUTPATIENT)
Dept: NEUROLOGY | Facility: CLINIC | Age: 46
End: 2021-12-14
Payer: MEDICAID

## 2021-12-14 DIAGNOSIS — G62.9 NEUROPATHY: ICD-10-CM

## 2021-12-14 DIAGNOSIS — E11.42 DIABETIC PERIPHERAL NEUROPATHY ASSOCIATED WITH TYPE 2 DIABETES MELLITUS: ICD-10-CM

## 2021-12-14 LAB
METHYLMALONATE SERPL-SCNC: 0.28 UMOL/L
PYRIDOXAL SERPL-MCNC: 5 UG/L (ref 5–50)
VIT B1 BLD-MCNC: 83 UG/L (ref 38–122)
VIT B2 SERPL-MCNC: 2 MCG/L (ref 1–19)

## 2022-01-03 DIAGNOSIS — E11.40 DIABETIC NEUROPATHY: Primary | ICD-10-CM

## 2022-01-12 ENCOUNTER — TELEPHONE (OUTPATIENT)
Dept: NEUROLOGY | Facility: CLINIC | Age: 47
End: 2022-01-12
Payer: MEDICAID

## 2022-01-12 NOTE — TELEPHONE ENCOUNTER
Left voicemail returning patient's call.  Stated NCS is scheduled for Wed 1/19/22 @ 2:00, but if she still has swelling associated with clot, we may need to reschedule.  Informed her I will be out tomorrow and Friday, the office is closed on Monday, and I will return on Tuesday, 1/18. Advised her to leave me a voice mail if she thinks clot hasn't resolved so I can call her back to discuss rescheduling appointment until swelling subsides.    ----- Message from Chely Sadler MA sent at 1/12/2022  4:10 PM CST -----  Contact: NAV KINNEY [[id number]]    ----- Message -----  From: Carla Cortez  Sent: 1/12/2022   4:07 PM CST  To: Sigrid LEYVA Staff    .Type:  Test Results    Who Called: NAV KINNEY [[id number]]  Name of Test (Lab/Mammo/Etc): Nerve test   Date of Test:   Ordering Provider: Sigrid   Where the test was performed:   Would the patient rather a call back or a response via [facility]? call  Best Call Back Number: .[phone] (home)    Additional Information:  Pt is req a callback in regards to her results of her nerve test. She states that her foot has been very swollen and numb in her left foot.

## 2022-01-13 ENCOUNTER — TELEPHONE (OUTPATIENT)
Dept: ORTHOPEDICS | Facility: CLINIC | Age: 47
End: 2022-01-13
Payer: MEDICAID

## 2022-01-19 ENCOUNTER — PROCEDURE VISIT (OUTPATIENT)
Dept: NEUROLOGY | Facility: CLINIC | Age: 47
End: 2022-01-19
Payer: MEDICAID

## 2022-01-19 DIAGNOSIS — R20.2 NUMBNESS AND TINGLING: Primary | ICD-10-CM

## 2022-01-19 DIAGNOSIS — R20.0 NUMBNESS AND TINGLING: Primary | ICD-10-CM

## 2022-01-19 DIAGNOSIS — E11.40 DIABETIC NEUROPATHY: ICD-10-CM

## 2022-01-19 PROCEDURE — 95911 NRV CNDJ TEST 9-10 STUDIES: CPT | Mod: 26,S$PBB,, | Performed by: PSYCHIATRY & NEUROLOGY

## 2022-01-19 PROCEDURE — 95911 NRV CNDJ TEST 9-10 STUDIES: CPT | Mod: PBBFAC | Performed by: PSYCHIATRY & NEUROLOGY

## 2022-01-19 PROCEDURE — 95911 PR NERVE CONDUCTION STUDY; 9-10 STUDIES: ICD-10-PCS | Mod: 26,S$PBB,, | Performed by: PSYCHIATRY & NEUROLOGY

## 2022-01-19 NOTE — PROCEDURES
[facility]   Vincenzo Arthur  Department of Neurology  [address] [place] [address] [place]  [address]  Phone [phone]     Fax  [phone]        Patient: Khoi Burgess YOB: 1975  Patient ID: [id number] Age: 46 Years 5 Months  Sex: Female Ref. Provider: Chen Matta NP  Notes: NCS/LLE/LUE/DPN workup. C/O: Numbness, tingling, and burning in BLE, L>R. PMHX: Marfan syndrome, PVD, DM2, bilateral iliac stenting, HTN, HLD, DDD, and lumbar radiculopathy. EMG/NCS 2019: Generalized, chronic, axonal, motor and sensory peripheral polyneuropathy.      SUMMARY         Nerve conduction studies were performed in the left upper and lower extremity. The left median motor study recording the abductor pollicis brevis showed normal amplitude, normal distal latency and normal conduction velocity. The left ulnar motor study recording the abductor digiti minimi showed reduced amplitude, prolonged distal latency and slow conduction velocity. No conduction block or focal slowing was present across the elbow.     The left median sensory response recording digit two showed reduced amplitude, prolonged latency and slow conduction velocity. The left ulnar sensory response recording digit five showed reduced amplitude, prolonged latency and slow conduction velocity. The left radial sensory response recording over the extensor snuff box showed normal amplitude, prolonged latency and normal conduction velocity.     The left peroneal motor study recording the extensor digitorum brevis was absent and to the tibialis anterior showed reduced amplitude, prolonged distal latency and slowed conduction velocity. No conduction block or focal slowing was present across the fibular neck. The left tibial motor study recording the abductor hallucis brevis showed an absent response.     Left sural sensory response showed an absent response. Left superficial peroneal sensory response showed an absent response.           IMPRESSION    There is electrophysiologic evidence consistent with severe, length-dependent, axonal, motor and sensory peripheral polyneuropathy.      ---------------------------------               Pop Acuna M.D., F.A.A.N.      Diplomate, American Board of Psychiatry and Neurology  Diplomate, American Board of Clinical Neurophysiology   Fellow, American Academy of Neurology            Sensory NCS      Nerve / Sites Rec. Site Peak NP Amp PP Amp Dist Surjit d Lat.2     ms µV µV cm m/s ms   L MEDIAN - Dig II      Wrist II 3.59 13.6 13.2 13 47.1 3.59      Ref.  3.40  20.0  48.0    L ULNAR - Dig V      Wrist Dig V 3.80 14.0 10.5 11 32.0 3.80      Ref.  3.10  12.0  48.0    L RADIAL - Snuff box      Forearm Snuff box 2.81 22.0 25.1 10 46.8 2.81      Ref.  2.70 18.0       L SURAL - Lat Mall      Calf Lat Mall NR NR NR 14 NR NR      Ref.  4.50  5.0  40.0    L SUP PERONEAL      Lat Leg Ankle NR NR NR 10 NR NR      Ref.  4.50  5.0  40.0        Motor NCS      Nerve / Sites Rec. Site Lat Amp Dist Surjit     ms mV cm m/s   L MEDIAN - APB      Wrist APB 3.85 11.7 8       Ref.  3.90 6.0        Elbow APB 8.54 7.6 24.5 52.3      Ref.     49.0   L ULNAR - ADM      Wrist ADM 3.59 5.5 8       Ref.  3.10 7.0        B.Elbow ADM 8.54 5.3 21 42.4      Ref.     50.0      A.Elbow ADM 10.73 4.8 10 45.7   L COMM PERONEAL - EDB      Ankle EDB NR NR 8       Ref.  5.50 3.0        FibHead EDB NR NR        Ref.     40.0      Knee EDB NR NR     L TIBIAL (KNEE) - AH      Ankle AH NR NR 8       Ref.  6.00 8.0        Knee AH NR NR        Ref.     40.0   L COMM PERONEAL - Tib Ant      Fib Head Tib Ant 5.31 1.5 11       Ref.  4.00 4.0        Knee Tib Ant 6.72 1.1 10 71.1

## 2022-01-21 ENCOUNTER — TELEPHONE (OUTPATIENT)
Dept: NEUROLOGY | Facility: CLINIC | Age: 47
End: 2022-01-21
Payer: MEDICAID

## 2022-01-24 ENCOUNTER — TELEPHONE (OUTPATIENT)
Dept: NEUROLOGY | Facility: CLINIC | Age: 47
End: 2022-01-24
Payer: MEDICAID

## 2022-01-24 NOTE — TELEPHONE ENCOUNTER
----- Message from Sena Sanders sent at 1/24/2022  2:22 PM CST -----  Contact: self [phone]  Would like to consult with nurse regarding results, please call her at [phone]. Thanks ah

## 2022-01-26 ENCOUNTER — TELEPHONE (OUTPATIENT)
Dept: ORTHOPEDICS | Facility: CLINIC | Age: 47
End: 2022-01-26
Payer: MEDICAID

## 2022-01-26 DIAGNOSIS — M25.512 BILATERAL SHOULDER PAIN, UNSPECIFIED CHRONICITY: Primary | ICD-10-CM

## 2022-01-26 DIAGNOSIS — M25.511 BILATERAL SHOULDER PAIN, UNSPECIFIED CHRONICITY: Primary | ICD-10-CM

## 2022-01-26 NOTE — TELEPHONE ENCOUNTER
Called to tell pt to arrive 30 min early for xrays. Pt was under the impression that xrays were during her appt, and was unsure if she could arrive early. She stated that she could arrive 15 min early, which I told her was fine. Understanding verbalized and all pt questions were answered.

## 2022-01-27 ENCOUNTER — OFFICE VISIT (OUTPATIENT)
Dept: ORTHOPEDICS | Facility: CLINIC | Age: 47
End: 2022-01-27
Payer: MEDICAID

## 2022-01-27 ENCOUNTER — OFFICE VISIT (OUTPATIENT)
Dept: RHEUMATOLOGY | Facility: CLINIC | Age: 47
End: 2022-01-27
Payer: MEDICAID

## 2022-01-27 ENCOUNTER — HOSPITAL ENCOUNTER (OUTPATIENT)
Dept: RADIOLOGY | Facility: HOSPITAL | Age: 47
Discharge: HOME | End: 2022-01-27
Attending: PHYSICIAN ASSISTANT
Payer: MEDICAID

## 2022-01-27 ENCOUNTER — HOSPITAL ENCOUNTER (OUTPATIENT)
Dept: RADIOLOGY | Facility: HOSPITAL | Age: 47
Discharge: HOME | End: 2022-01-27
Attending: OBSTETRICS & GYNECOLOGY
Payer: MEDICAID

## 2022-01-27 VITALS
BODY MASS INDEX: 33.23 KG/M2 | HEIGHT: 70 IN | HEART RATE: 92 BPM | WEIGHT: 232.13 LBS | DIASTOLIC BLOOD PRESSURE: 77 MMHG | SYSTOLIC BLOOD PRESSURE: 138 MMHG

## 2022-01-27 VITALS — WEIGHT: 223 LBS | BODY MASS INDEX: 31.92 KG/M2 | HEIGHT: 70 IN

## 2022-01-27 DIAGNOSIS — Z12.39 ENCOUNTER FOR SCREENING FOR MALIGNANT NEOPLASM OF BREAST, UNSPECIFIED SCREENING MODALITY: ICD-10-CM

## 2022-01-27 DIAGNOSIS — M79.606 LUMBAR PAIN WITH RADIATION DOWN LEG: ICD-10-CM

## 2022-01-27 DIAGNOSIS — M25.512 BILATERAL SHOULDER PAIN, UNSPECIFIED CHRONICITY: Primary | ICD-10-CM

## 2022-01-27 DIAGNOSIS — G62.9 NEUROPATHY: ICD-10-CM

## 2022-01-27 DIAGNOSIS — M25.511 BILATERAL SHOULDER PAIN, UNSPECIFIED CHRONICITY: ICD-10-CM

## 2022-01-27 DIAGNOSIS — M75.101 TEAR OF RIGHT SUPRASPINATUS TENDON: ICD-10-CM

## 2022-01-27 DIAGNOSIS — M54.50 LUMBAR PAIN WITH RADIATION DOWN LEG: ICD-10-CM

## 2022-01-27 DIAGNOSIS — M75.41 ROTATOR CUFF IMPINGEMENT SYNDROME OF RIGHT SHOULDER: ICD-10-CM

## 2022-01-27 DIAGNOSIS — E66.9 OBESITY (BMI 30-39.9): ICD-10-CM

## 2022-01-27 DIAGNOSIS — M25.512 BILATERAL SHOULDER PAIN, UNSPECIFIED CHRONICITY: ICD-10-CM

## 2022-01-27 DIAGNOSIS — G89.4 CHRONIC PAIN DISORDER: Primary | ICD-10-CM

## 2022-01-27 DIAGNOSIS — M51.36 LUMBAR DEGENERATIVE DISC DISEASE: ICD-10-CM

## 2022-01-27 DIAGNOSIS — Z86.79 HX OF PRIMARY HYPERTENSION: ICD-10-CM

## 2022-01-27 DIAGNOSIS — M25.511 BILATERAL SHOULDER PAIN, UNSPECIFIED CHRONICITY: Primary | ICD-10-CM

## 2022-01-27 PROCEDURE — 3075F PR MOST RECENT SYSTOLIC BLOOD PRESS GE 130-139MM HG: ICD-10-PCS | Mod: CPTII,,, | Performed by: INTERNAL MEDICINE

## 2022-01-27 PROCEDURE — 1159F PR MEDICATION LIST DOCUMENTED IN MEDICAL RECORD: ICD-10-PCS | Mod: CPTII,,, | Performed by: PHYSICIAN ASSISTANT

## 2022-01-27 PROCEDURE — 99999 PR PBB SHADOW E&M-EST. PATIENT-LVL V: ICD-10-PCS | Mod: PBBFAC,,, | Performed by: PHYSICIAN ASSISTANT

## 2022-01-27 PROCEDURE — 77063 MAMMO DIGITAL SCREENING BILAT WITH TOMO: ICD-10-PCS | Mod: 26,,, | Performed by: RADIOLOGY

## 2022-01-27 PROCEDURE — 99999 PR PBB SHADOW E&M-EST. PATIENT-LVL V: ICD-10-PCS | Mod: PBBFAC,,, | Performed by: INTERNAL MEDICINE

## 2022-01-27 PROCEDURE — 99214 PR OFFICE/OUTPT VISIT, EST, LEVL IV, 30-39 MIN: ICD-10-PCS | Mod: S$PBB,,, | Performed by: PHYSICIAN ASSISTANT

## 2022-01-27 PROCEDURE — 77067 MAMMO DIGITAL SCREENING BILAT WITH TOMO: ICD-10-PCS | Mod: 26,,, | Performed by: RADIOLOGY

## 2022-01-27 PROCEDURE — 3008F PR BODY MASS INDEX (BMI) DOCUMENTED: ICD-10-PCS | Mod: CPTII,,, | Performed by: PHYSICIAN ASSISTANT

## 2022-01-27 PROCEDURE — 99214 OFFICE O/P EST MOD 30 MIN: CPT | Mod: S$PBB,,, | Performed by: PHYSICIAN ASSISTANT

## 2022-01-27 PROCEDURE — 1160F RVW MEDS BY RX/DR IN RCRD: CPT | Mod: CPTII,,, | Performed by: PHYSICIAN ASSISTANT

## 2022-01-27 PROCEDURE — 77067 SCR MAMMO BI INCL CAD: CPT | Mod: 26,,, | Performed by: RADIOLOGY

## 2022-01-27 PROCEDURE — 4010F ACE/ARB THERAPY RXD/TAKEN: CPT | Mod: CPTII,,, | Performed by: PHYSICIAN ASSISTANT

## 2022-01-27 PROCEDURE — 77067 SCR MAMMO BI INCL CAD: CPT | Mod: TC

## 2022-01-27 PROCEDURE — 3078F DIAST BP <80 MM HG: CPT | Mod: CPTII,,, | Performed by: INTERNAL MEDICINE

## 2022-01-27 PROCEDURE — 73030 X-RAY EXAM OF SHOULDER: CPT | Mod: 26,50,, | Performed by: RADIOLOGY

## 2022-01-27 PROCEDURE — 99214 OFFICE O/P EST MOD 30 MIN: CPT | Mod: S$PBB,,, | Performed by: INTERNAL MEDICINE

## 2022-01-27 PROCEDURE — 1159F PR MEDICATION LIST DOCUMENTED IN MEDICAL RECORD: ICD-10-PCS | Mod: CPTII,,, | Performed by: INTERNAL MEDICINE

## 2022-01-27 PROCEDURE — 99999 PR PBB SHADOW E&M-EST. PATIENT-LVL V: CPT | Mod: PBBFAC,,, | Performed by: INTERNAL MEDICINE

## 2022-01-27 PROCEDURE — 1159F MED LIST DOCD IN RCRD: CPT | Mod: CPTII,,, | Performed by: INTERNAL MEDICINE

## 2022-01-27 PROCEDURE — 4010F PR ACE/ARB THEARPY RXD/TAKEN: ICD-10-PCS | Mod: CPTII,,, | Performed by: PHYSICIAN ASSISTANT

## 2022-01-27 PROCEDURE — 99215 OFFICE O/P EST HI 40 MIN: CPT | Mod: PBBFAC,27 | Performed by: PHYSICIAN ASSISTANT

## 2022-01-27 PROCEDURE — 3075F SYST BP GE 130 - 139MM HG: CPT | Mod: CPTII,,, | Performed by: INTERNAL MEDICINE

## 2022-01-27 PROCEDURE — 1159F MED LIST DOCD IN RCRD: CPT | Mod: CPTII,,, | Performed by: PHYSICIAN ASSISTANT

## 2022-01-27 PROCEDURE — 73030 XR SHOULDER COMPLETE 2 OR MORE VIEWS BILATERAL: ICD-10-PCS | Mod: 26,50,, | Performed by: RADIOLOGY

## 2022-01-27 PROCEDURE — 3008F PR BODY MASS INDEX (BMI) DOCUMENTED: ICD-10-PCS | Mod: CPTII,,, | Performed by: INTERNAL MEDICINE

## 2022-01-27 PROCEDURE — 77063 BREAST TOMOSYNTHESIS BI: CPT | Mod: TC

## 2022-01-27 PROCEDURE — 77063 BREAST TOMOSYNTHESIS BI: CPT | Mod: 26,,, | Performed by: RADIOLOGY

## 2022-01-27 PROCEDURE — 73030 X-RAY EXAM OF SHOULDER: CPT | Mod: TC,50

## 2022-01-27 PROCEDURE — 1160F PR REVIEW ALL MEDS BY PRESCRIBER/CLIN PHARMACIST DOCUMENTED: ICD-10-PCS | Mod: CPTII,,, | Performed by: PHYSICIAN ASSISTANT

## 2022-01-27 PROCEDURE — 99999 PR PBB SHADOW E&M-EST. PATIENT-LVL V: CPT | Mod: PBBFAC,,, | Performed by: PHYSICIAN ASSISTANT

## 2022-01-27 PROCEDURE — 3008F BODY MASS INDEX DOCD: CPT | Mod: CPTII,,, | Performed by: INTERNAL MEDICINE

## 2022-01-27 PROCEDURE — 3008F BODY MASS INDEX DOCD: CPT | Mod: CPTII,,, | Performed by: PHYSICIAN ASSISTANT

## 2022-01-27 PROCEDURE — 99215 OFFICE O/P EST HI 40 MIN: CPT | Mod: PBBFAC | Performed by: INTERNAL MEDICINE

## 2022-01-27 PROCEDURE — 4010F ACE/ARB THERAPY RXD/TAKEN: CPT | Mod: CPTII,,, | Performed by: INTERNAL MEDICINE

## 2022-01-27 PROCEDURE — 4010F PR ACE/ARB THEARPY RXD/TAKEN: ICD-10-PCS | Mod: CPTII,,, | Performed by: INTERNAL MEDICINE

## 2022-01-27 PROCEDURE — 3078F PR MOST RECENT DIASTOLIC BLOOD PRESSURE < 80 MM HG: ICD-10-PCS | Mod: CPTII,,, | Performed by: INTERNAL MEDICINE

## 2022-01-27 PROCEDURE — 99214 PR OFFICE/OUTPT VISIT, EST, LEVL IV, 30-39 MIN: ICD-10-PCS | Mod: S$PBB,,, | Performed by: INTERNAL MEDICINE

## 2022-01-27 RX ORDER — PREGABALIN 50 MG/1
CAPSULE ORAL
Qty: 120 CAPSULE | Refills: 3 | Status: SHIPPED | OUTPATIENT
Start: 2022-01-27 | End: 2022-06-17

## 2022-01-27 NOTE — PATIENT INSTRUCTIONS
Assessment:  Jenifer Westfall is a  46 y.o. female   Data Unavailable with a chief complaint of Pain of the Right Shoulder and Pain of the Left Shoulder  Previous patient of Dr. Hammond with a hx of right shoulder RTC tear with increase pain, has been unable to follow up with pain management and has not tried any physical therapy.    Right shoulder pain   Right shoulder RTC   Lumbar pain with radiculopathy   Cervical pain with radiculopathy    Encounter Diagnoses   Name Primary?    Bilateral shoulder pain, unspecified chronicity Yes    Rotator cuff impingement syndrome of right shoulder     Lumbar pain with radiation down leg     Tear of right supraspinatus tendon       Plan:   Referral for back and spine   Physical therapy at [facility] for shoulder pain   Continue to use Voltaren gel    Recheck 8 weeks   Discussed patient trying some formal of conservative treatment, may need to repeat MRI if no relief has been a long time since previous MRI.    Follow-up: 8 weeks or sooner if there are any problems between now and then.    Thank you for choosing [facility] and Dr. Barry Seals for your orthopedic & sports medicine care. It is our goal to provide you with exceptional care that will help keep you healthy, active, and get you back in the game.    If you felt that you received exemplary care today, please consider leaving us feedback on Healthgrades at [email or web address].     Please do not hesitate to reach out to us via email, phone, or MyChart with any questions, concerns, or feedback.    If you are experiencing pain/discomfort ,or have questions after 5pm and would like to be connected to the [facility] on-call team, please call this number and specify which Sports Medicine provider is treating you: [phone]

## 2022-01-27 NOTE — PATIENT INSTRUCTIONS
Patient Education       Duloxetine (doo LOX e teen)   Brand Names: US Cymbalta; Coltonma Sprinkle   Brand Names: Bart AG-Duloxetine; APO-Duloxetine; Auro-Duloxetine; Cymbalta; JAMP-Duloxetine; M-Duloxetine; Mar-Duloxetine; MINT-Duloxetine; MYLAN-Duloxetine [DSC]; NRA-Duloxetine; PMS-Duloxetine; PRIVA-Duloxetine; RAN-Duloxetine; PHILLIP-Duloxetine; SANDOZ Duloxetine; TEVA-Duloxetine   Warning   · Drugs like this one have raised the chance of suicidal thoughts or actions in children and young adults. The risk may be greater in people who have had these thoughts or actions in the past. All people who take this drug need to be watched closely. Call the doctor right away if signs like low mood (depression), nervousness, restlessness, grouchiness, panic attacks, or changes in mood or actions are new or worse. Call the doctor right away if any thoughts or actions of suicide occur.    What is this drug used for?   · It is used to treat low mood (depression).  · It is used to treat anxiety.  · It is used to help painful nerve diseases and diabetic nerve problems.  · It is used to ease long-term pain problems.  · It is used to treat fibromyalgia.  · It may be given to you for other reasons. Talk with the doctor.    What do I need to tell my doctor BEFORE I take this drug?   · If you are allergic to this drug; any part of this drug; or any other drugs, foods, or substances. Tell your doctor about the allergy and what signs you had.  · If you have any of these health problems: Kidney disease or liver disease.  · If you are taking thioridazine.  · If you are taking any of these drugs: Ciprofloxacin or fluvoxamine.  · If you are taking any of these drugs: Linezolid or methylene blue.  · If you have taken certain drugs for depression or Parkinson's disease in the last 14 days. This includes isocarboxazid, phenelzine, tranylcypromine, selegiline, or rasagiline. Very high blood pressure may happen.  This is not a list of all drugs or  health problems that interact with this drug.  Tell your doctor and pharmacist about all of your drugs (prescription or OTC, natural products, vitamins) and health problems. You must check to make sure that it is safe for you to take this drug with all of your drugs and health problems. Do not start, stop, or change the dose of any drug without checking with your doctor.  What are some things I need to know or do while I take this drug?   · Tell all of your health care providers that you take this drug. This includes your doctors, nurses, pharmacists, and dentists.  · Avoid driving and doing other tasks or actions that call for you to be alert until you see how this drug affects you.  · To lower the chance of feeling dizzy or passing out, rise slowly if you have been sitting or lying down. Be careful going up and down stairs.  · Low blood pressure, falls, and passing out have happened with this drug. Falls may lead to problems like broken bones and the need to go to the hospital. The chance of falling is raised with older people. Talk with the doctor.  · If you have high blood sugar (diabetes), you will need to watch your blood sugar closely.  · High blood pressure has happened with this drug. Have your blood pressure checked as you have been told by your doctor.  · Talk with your doctor before you use alcohol, marijuana or other forms of cannabis, or prescription or OTC drugs that may slow your actions.  · This drug may raise the chance of bleeding. Sometimes, bleeding can be life-threatening. Talk with the doctor.  · A severe and sometimes deadly problem called serotonin syndrome may happen. The risk may be greater if you also take certain other drugs. Call your doctor right away if you have agitation; change in balance; confusion; hallucinations; fever; fast or abnormal heartbeat; flushing; muscle twitching or stiffness; seizures; shivering or shaking; sweating a lot; severe diarrhea, upset stomach, or throwing  up; or very bad headache.  · Some people may have a higher chance of eye problems with this drug. Your doctor may want you to have an eye exam to see if you have a higher chance of these eye problems. Call your doctor right away if you have eye pain, change in eyesight, or swelling or redness in or around the eye.  · This drug can cause low sodium levels. Very low sodium levels can be life-threatening, leading to seizures, passing out, trouble breathing, or death.  · This drug may affect certain lab tests. Tell all of your health care providers and lab workers that you take this drug.  · If you are 65 or older, use this drug with care. You could have more side effects.  · This drug may affect growth in children and teens in some cases. They may need regular growth checks. Talk with the doctor.  · This drug may cause harm to the unborn baby if you take it while you are pregnant. If you are pregnant or you get pregnant while taking this drug, call your doctor right away.  · Taking this drug in the third trimester of pregnancy may lead to some health problems in the . Talk with the doctor.  · Tell your doctor if you are breast-feeding or plan to breast-feed. This drug passes into breast milk and may harm your baby.    What are some side effects that I need to call my doctor about right away?   WARNING/CAUTION: Even though it may be rare, some people may have very bad and sometimes deadly side effects when taking a drug. Tell your doctor or get medical help right away if you have any of the following signs or symptoms that may be related to a very bad side effect:  · Signs of an allergic reaction, like rash; hives; itching; red, swollen, blistered, or peeling skin with or without fever; wheezing; tightness in the chest or throat; trouble breathing, swallowing, or talking; unusual hoarseness; or swelling of the mouth, face, lips, tongue, or throat.  · Signs of low sodium levels like headache, trouble focusing,  memory problems, feeling confused, weakness, seizures, or change in balance.  · Signs of bleeding like throwing up or coughing up blood; vomit that looks like coffee grounds; blood in the urine; black, red, or tarry stools; bleeding from the gums; abnormal vaginal bleeding; bruises without a cause or that get bigger; or bleeding you cannot stop.  · Signs of high or low blood pressure like very bad headache or dizziness, passing out, or change in eyesight.  · Seizures.  · Trouble passing urine.  · Sex problems have happened with drugs like this one. This includes lowered interest in sex, trouble having an orgasm, ejaculation problems, or trouble getting or keeping an erection. If you have any sex problems or if you have any questions, talk with your doctor.  · Liver problems have happened with this drug. Rarely, this has been deadly. Call your doctor right away if you have signs of liver problems like dark urine, feeling tired, not hungry, upset stomach or stomach pain, light-colored stools, throwing up, or yellow skin or eyes.  · A severe skin reaction (Eid-Renard syndrome/toxic epidermal necrolysis) may happen. It can cause severe health problems that may not go away, and sometimes death. Get medical help right away if you have signs like red, swollen, blistered, or peeling skin (with or without fever); red or irritated eyes; or sores in your mouth, throat, nose, or eyes.  What are some other side effects of this drug?   All drugs may cause side effects. However, many people have no side effects or only have minor side effects. Call your doctor or get medical help if any of these side effects or any other side effects bother you or do not go away:  · Constipation, diarrhea, stomach pain, upset stomach, throwing up, or feeling less hungry.  · Headache.  · Dry mouth.  · Trouble sleeping.  · Feeling dizzy, sleepy, tired, or weak.  · Sweating a lot.  · Weight loss.  · Nose or throat irritation.  These are not all  of the side effects that may occur. If you have questions about side effects, call your doctor. Call your doctor for medical advice about side effects.  You may report side effects to your national health agency.  You may report side effects to the FDA at 1-710.788.4523. You may also report side effects at https://www.fda.gov/medwatch.  How is this drug best taken?   Use this drug as ordered by your doctor. Read all information given to you. Follow all instructions closely.  All products:   · Keep taking this drug as you have been told by your doctor or other health care provider, even if you feel well.  · Take with or without food.  · Do not stop taking this drug all of a sudden without calling your doctor. You may have a greater risk of side effects. If you need to stop this drug, you will want to slowly stop it as ordered by your doctor.  Capsules:   · Swallow whole. Do not chew, open, or crush.  Sprinkle capsule:   · Swallow whole. Do not chew or crush.  · If you cannot swallow this drug whole, you may sprinkle the contents on applesauce. If you do this, swallow the mixture right away without chewing.  · Those who have feeding tubes may use this drug. Use as you have been told. Flush the feeding tube after this drug is given.  What do I do if I miss a dose?   · Take a missed dose as soon as you think about it.  · If it is close to the time for your next dose, skip the missed dose and go back to your normal time.  · Do not take 2 doses at the same time or extra doses.    How do I store and/or throw out this drug?   · Store at room temperature in a dry place. Do not store in a bathroom.  · Keep all drugs in a safe place. Keep all drugs out of the reach of children and pets.  · Throw away unused or  drugs. Do not flush down a toilet or pour down a drain unless you are told to do so. Check with your pharmacist if you have questions about the best way to throw out drugs. There may be drug take-back programs in  your area.    General drug facts   · If your symptoms or health problems do not get better or if they become worse, call your doctor.  · Do not share your drugs with others and do not take anyone else's drugs.  · Some drugs may have another patient information leaflet. If you have any questions about this drug, please talk with your doctor, nurse, pharmacist, or other health care provider.  · This drug comes with an extra patient fact sheet called a Medication Guide. Read it with care. Read it again each time this drug is refilled. If you have any questions about this drug, please talk with the doctor, pharmacist, or other health care provider.  · If you think there has been an overdose, call your poison control center or get medical care right away. Be ready to tell or show what was taken, how much, and when it happened.    Consumer Information Use and Disclaimer   This generalized information is a limited summary of diagnosis, treatment, and/or medication information. It is not meant to be comprehensive and should be used as a tool to help the user understand and/or assess potential diagnostic and treatment options. It does NOT include all information about conditions, treatments, medications, side effects, or risks that may apply to a specific patient. It is not intended to be medical advice or a substitute for the medical advice, diagnosis, or treatment of a health care provider based on the health care provider's examination and assessment of a patient's specific and unique circumstances. Patients must speak with a health care provider for complete information about their health, medical questions, and treatment options, including any risks or benefits regarding use of medications. This information does not endorse any treatments or medications as safe, effective, or approved for treating a specific patient. [employer] and its affiliates disclaim any warranty or liability relating to this information or the  use thereof. The use of this information is governed by the Terms of Use, available at https://www.[employer].com/en/solutions/lexicomp/about/leandra.  Last Reviewed Date   2021-08-10  Copyright   © 2021 [employer] and its affiliates and/or licensors. All rights reserved.  Patient Education       Pregabalin (pre ALEX a malathi)   Brand Names: US Lyrica; Lyrica CR   Brand Names: Bart ACH-Pregabalin; ACT Pregabalin [DSC]; AG-Pregabalin; APO-Pregabalin; Auro-Pregabalin; DOM-Pregabalin; JAMP-Pregabalin; Lyrica; M-Pregabalin; Mar-Pregabalin; MINT-Pregabalin; MYLAN-Pregabalin [DSC]; TY-Pregabalin; NRA-Pregabalin; PMS-Pregabalin; PHILLIP-Pregabalin; SANDOZ Pregabalin; TARO-Pregabalin; TEVA-Pregabalin   What is this drug used for?   · It is used to help control certain kinds of seizures.  · It is used to treat painful nerve diseases.  · It is used to treat fibromyalgia.  · It may be given to you for other reasons. Talk with the doctor.    What do I need to tell my doctor BEFORE I take this drug?   · If you are allergic to this drug; any part of this drug; or any other drugs, foods, or substances. Tell your doctor about the allergy and what signs you had.  · If you have kidney disease.  · If you are breast-feeding. Do not breast-feed while you take this drug.  This is not a list of all drugs or health problems that interact with this drug.  Tell your doctor and pharmacist about all of your drugs (prescription or OTC, natural products, vitamins) and health problems. You must check to make sure that it is safe for you to take this drug with all of your drugs and health problems. Do not start, stop, or change the dose of any drug without checking with your doctor.  What are some things I need to know or do while I take this drug?   · Tell all of your health care providers that you take this drug. This includes your doctors, nurses, pharmacists, and dentists.  · Avoid driving and doing other tasks or actions that call for you to  be alert or have clear eyesight until you see how this drug affects you.  · Do not stop taking this drug all of a sudden without calling your doctor. You may have a greater risk of side effects. If you need to stop this drug, you will want to slowly stop it as ordered by your doctor.  · Avoid drinking alcohol while taking this drug.  · Talk with your doctor before you use marijuana, other forms of cannabis, or prescription or OTC drugs that may slow your actions.  · A very bad reaction called angioedema has happened with this drug. Sometimes, this may be life-threatening. Signs may include swelling of the hands, face, lips, eyes, tongue, or throat; trouble breathing; trouble swallowing; or unusual hoarseness. Get medical help right away if you have any of these signs.  · Severe breathing problems have happened with this drug in people taking certain other drugs (like opioid pain drugs). This has also happened in people who already have lung or breathing problems. The risk may also be greater in people who are older than 65. Sometimes, breathing problems have been deadly. If you have questions, talk with the doctor.  · If you are 65 or older, use this drug with care. You could have more side effects.  · Talk with your doctor if you plan to father a child. This drug made male animals less fertile and caused sperm changes. Birth defects also happened in the young of male animals treated with this drug. It is not known if these problems happen in humans.  · Tell your doctor if you are pregnant or plan on getting pregnant. You will need to talk about the benefits and risks of using this drug while you are pregnant.    What are some side effects that I need to call my doctor about right away?   WARNING/CAUTION: Even though it may be rare, some people may have very bad and sometimes deadly side effects when taking a drug. Tell your doctor or get medical help right away if you have any of the following signs or symptoms  that may be related to a very bad side effect:  · Signs of an allergic reaction, like rash; hives; itching; red, swollen, blistered, or peeling skin with or without fever; wheezing; tightness in the chest or throat; trouble breathing, swallowing, or talking; unusual hoarseness; or swelling of the mouth, face, lips, tongue, or throat.  · Change in eyesight.  · Muscle pain or weakness.  · If seizures are new or worse after starting this drug.  · Change in balance.  · Feeling confused.  · Shakiness.  · Trouble breathing, slow breathing, or shallow breathing.  · Change in color of skin to a bluish color like on the lips, nail beds, fingers, or toes.  · Memory problems or loss.  · Shortness of breath, a big weight gain, or swelling in the arms or legs.  · Fast or abnormal heartbeat.  · Fever, chills, or sore throat.  · Skin sores.  · Trouble speaking.  · Trouble sleeping.  · Trouble walking.  · Feeling high (easy laughing and feeling good).  · Twitching.  · Get medical help right away if you feel very sleepy, very dizzy, or if you pass out. Caregivers or others need to get medical help right away if the patient does not respond, does not answer or react like normal, or will not wake up.  · Patients who take this drug may be at a greater risk of having thoughts or actions of suicide. The risk may be greater in people who have had these thoughts or actions in the past. Call the doctor right away if signs like low mood (depression), nervousness, restlessness, grouchiness, panic attacks, or changes in mood or actions are new or worse. Call the doctor right away if any thoughts or actions of suicide occur.  · Low platelet counts have rarely happened with this drug. This may lead to a higher chance of bleeding. Call your doctor right away if you have any unexplained bruising or bleeding.  What are some other side effects of this drug?   All drugs may cause side effects. However, many people have no side effects or only have  minor side effects. Call your doctor or get medical help if any of these side effects or any other side effects bother you or do not go away:  · Feeling dizzy, sleepy, tired, or weak.  · Weight gain.  · Not able to focus.  · Headache.  · Dry mouth.  · Constipation.  · More hungry.  · Upset stomach.  · Joint pain.  · Nose or throat irritation.  These are not all of the side effects that may occur. If you have questions about side effects, call your doctor. Call your doctor for medical advice about side effects.  You may report side effects to your national health agency.  You may report side effects to the FDA at 1-374.681.2073. You may also report side effects at https://www.fda.gov/medwatch.  How is this drug best taken?   Use this drug as ordered by your doctor. Read all information given to you. Follow all instructions closely.  Extended-release tablets:   · Take after the evening meal if taking once daily.  · Swallow whole. Do not chew, break, or crush.  · Keep taking this drug as you have been told by your doctor or other health care provider, even if you feel well.  Capsules and oral solution:   · Take with or without food.  · Keep taking this drug as you have been told by your doctor or other health care provider, even if you feel well.  Liquid (solution):   · Measure liquid doses carefully. Use the measuring device that comes with this drug. If there is none, ask the pharmacist for a device to measure this drug.  What do I do if I miss a dose?   Extended-release tablets:   · Take a missed dose just before bedtime after eating a snack or after the next day's morning meal.  · If you miss taking the missed dose after the next day's morning meal, skip the missed dose and go back to your normal time.  · Do not take 2 doses at the same time or extra doses.  Capsules and oral solution:   · Take a missed dose as soon as you think about it.  · If it is close to the time for your next dose, skip the missed dose and go  back to your normal time.  · Do not take 2 doses at the same time or extra doses.  How do I store and/or throw out this drug?   · Store in the original container at room temperature.  · Store in a dry place. Do not store in a bathroom.  · Store this drug in a safe place where children cannot see or reach it, and where other people cannot get to it. A locked box or area may help keep this drug safe. Keep all drugs away from pets.  · Throw away unused or  drugs. Do not flush down a toilet or pour down a drain unless you are told to do so. Check with your pharmacist if you have questions about the best way to throw out drugs. There may be drug take-back programs in your area.    General drug facts   · If your symptoms or health problems do not get better or if they become worse, call your doctor.  · Do not share your drugs with others and do not take anyone else's drugs.  · Some drugs may have another patient information leaflet. If you have any questions about this drug, please talk with your doctor, nurse, pharmacist, or other health care provider.  · This drug comes with an extra patient fact sheet called a Medication Guide. Read it with care. Read it again each time this drug is refilled. If you have any questions about this drug, please talk with the doctor, pharmacist, or other health care provider.  · If you think there has been an overdose, call your poison control center or get medical care right away. Be ready to tell or show what was taken, how much, and when it happened.    Consumer Information Use and Disclaimer   This generalized information is a limited summary of diagnosis, treatment, and/or medication information. It is not meant to be comprehensive and should be used as a tool to help the user understand and/or assess potential diagnostic and treatment options. It does NOT include all information about conditions, treatments, medications, side effects, or risks that may apply to a specific  patient. It is not intended to be medical advice or a substitute for the medical advice, diagnosis, or treatment of a health care provider based on the health care provider's examination and assessment of a patient's specific and unique circumstances. Patients must speak with a health care provider for complete information about their health, medical questions, and treatment options, including any risks or benefits regarding use of medications. This information does not endorse any treatments or medications as safe, effective, or approved for treating a specific patient. [employer], [employer] and its affiliates disclaim any warranty or liability relating to this information or the use thereof. The use of this information is governed by the Terms of Use, available at https://www.[employer].com/en/solutions/lexicomp/about/leandra.  Last Reviewed Date   2020-05-29  Copyright   © 2021 [employer], [employer]. and its affiliates and/or licensors. All rights reserved.

## 2022-01-27 NOTE — PROGRESS NOTES
Patient ID: Jenifer Westfall  YOB: 1975  MRN: [id number]    Chief Complaint: Pain of the Right Shoulder and Pain of the Left Shoulder    Referred By: former pt of Dr. Hammond    History of Present Illness: Jenifer Westfall is a 46 y.o. female   Data Unavailable with a chief complaint of Pain of the Right Shoulder and Pain of the Left Shoulder  Patient presents to the clinic today for a follow-up on her Bilateral Shoulder Pain. She rates her pain as 8/10. She completed PT at [facility] with minimal improvement in pain and ROM. She takes Tylenol PRN.  She states that using her shoulder >1 minute and activities of daily living cause shooting pain that goes down her arms. She states that her Right Shoulder hurts more than her Left Shoulder. She states that previous CSIs have not given her relief for more than a few days. Presents today for recheck on bilateral shoulder pain. States that she has not seen pain management (has been denied due to previously being dismissed). Has not done any physical therapy since her last visit. Pain worse in the right shoulder. Worse with any house work. Denies any fevers, chills, night sweats, numbness or tingling.      HPI    Previous History of Present Illness (9/24/2020):   Jenifer Westfall is a right-hand dominant 45 y.o. female who is here today for a follow up for her right shoulder and left hip. She states that her hip pain is 8/10. She is still doing physical therapy for her hip at [facility] . She has no complaints with therapy besides she is really sore and in pain afterwards. Pain in the right shoulder has overall gotten better. States that she does have a hx of neck pain in the past which she has had injections. Most of her pain is located in the left hip, most of the pain is chronic and constant. She also has lumbar pain. Has seen pain management in the past- but was dismissed in the past due to receiving a prescription in the past. States that most of  her pain is worse with walking and worse when laying on the left side of hip. Denies any fevers, chills, night sweats, numbness and tingling.     Previous Plan:  · Referral to pain management  · Referral to neurosurgery for back pain  · Voltaren gel   · Continue physical therapy for lumbar back pain and left hip pain.  · Deferring on CSI due to DM  · I discussed worrisome and red flag signs and symptoms with the patient. The patient expressed understanding and agreed to alert me immediately or to go to the emergency room if they experience any of these.   · Treatment plan was developed with input from the patient/family, and they expressed understanding and agreement with the plan. All questions were answered today.      Past Medical History:   Past Medical History:   Diagnosis Date    Arthritis     DM (diabetes mellitus)      2017 Insulin x 3 years     Hepatitis C antibody positive in blood 2021    RNA NEGATIVE 3/6/2017    Hyperlipidemia     Hypertension     IBS (irritable bowel syndrome)     Kidney stone     Marfan syndrome     Mitral valve prolapse      Past Surgical History:   Procedure Laterality Date    ANGIOGRAPHY OF LOWER EXTREMITY N/A 2018    Procedure: ANGIOGRAM, LOWER EXTREMITY- LEFT LEG;  Surgeon: Roscoe Landeros MD;  Location: [facility] CATH LAB;  Service: Peripheral Vascular;  Laterality: N/A;    APPENDECTOMY      BUNIONECTOMY      both feet    cataract surgery  2019     SECTION      x 2    CHOLECYSTECTOMY      COLONOSCOPY N/A 2020    Procedure: COLONOSCOPY w/ biopsies;  Surgeon: Gio Georges MD;  Location[facility];  Service: Endoscopy;  Laterality: N/A;    ESOPHAGOGASTRODUODENOSCOPY N/A 2019    Procedure: ESOPHAGOGASTRODUODENOSCOPY (EGD);  Surgeon: Jaron Sanders III, MD;  Location: [facility];  Service: Endoscopy;  Laterality: N/A;    SELECTIVE INJECTION OF ANESTHETIC AGENT AROUND LUMBAR SPINAL NERVE ROOT BY TRANSFORAMINAL  "APPROACH Bilateral 2020    Procedure: Bilateral L5/S1 TF DENY;  Surgeon: Jewel Walters MD;  Location: [facility] PAIN MGT;  Service: Pain Management;  Laterality: Bilateral;    TUBAL LIGATION       Family History   Problem Relation Age of Onset    Heart disease Mother     Thrombophilia Father         DVT    Hypertension Father     Stroke Maternal Aunt     Heart disease Maternal Aunt     Stroke Maternal Uncle     Heart disease Maternal Uncle     Breast cancer Neg Hx     Colon cancer Neg Hx     Ovarian cancer Neg Hx      Social History     Socioeconomic History    Marital status: Single   Tobacco Use    Smoking status: Former Smoker     Packs/day: 0.20     Years: 20.00     Pack years: 4.00     Types: Cigarettes     Quit date: 2015     Years since quittin.8    Smokeless tobacco: Never Used    Tobacco comment: "once in a blue moon"   Substance and Sexual Activity    Alcohol use: No     Alcohol/week: 0.0 standard drinks    Drug use: No    Sexual activity: Not Currently     Social Determinants of Health     Financial Resource Strain: Low Risk     Difficulty of Paying Living Expenses: Not hard at all   Food Insecurity: No Food Insecurity    Worried About Running Out of Food in the Last Year: Never true    Ran Out of Food in the Last Year: Never true   Transportation Needs: No Transportation Needs    Lack of Transportation (Medical): No    Lack of Transportation (Non-Medical): No   Physical Activity: Inactive    Days of Exercise per Week: 0 days    Minutes of Exercise per Session: 0 min   Stress: No Stress Concern Present    Feeling of Stress : Only a little   Social Connections: Socially Isolated    Frequency of Communication with Friends and Family: Three times a week    Frequency of Social Gatherings with Friends and Family: Once a week    Attends Congregational Services: Never    Active Member of Clubs or Organizations: No    Attends Club or Organization Meetings: Never    Marital " Status: Never    Housing Stability: Unknown    Unable to Pay for Housing in the Last Year: No    Unstable Housing in the Last Year: No     Medication List with Changes/Refills   New Medications    PREGABALIN (LYRICA) 50 MG CAPSULE    Take 1 capsule (50 mg total) by mouth nightly for 10 days, THEN 1 capsule (50 mg total) 2 (two) times daily for 10 days, THEN 1 capsule (50 mg total) 3 (three) times daily. Do not take with gabapentin..   Current Medications    ADMELOG SOLOSTAR U-100 INSULIN 100 UNIT/ML PEN        ALPRAZOLAM (XANAX) 0.25 MG TABLET    Take 0.25 mg by mouth daily as needed.    ASCORBIC ACID (VITAMIN C) 100 MG TABLET    Take 100 mg by mouth once daily.    ASPIRIN (ECOTRIN) 81 MG EC TABLET    Take 81 mg by mouth once daily.    ATENOLOL (TENORMIN) 25 MG TABLET    Take 1 tablet (25 mg total) by mouth once daily.    ATORVASTATIN (LIPITOR) 80 MG TABLET    TAKE 1 TABLET BY MOUTH ONCE DAILY.     AZELASTINE (ASTELIN) 137 MCG (0.1 %) NASAL SPRAY        CLOPIDOGREL (PLAVIX) 75 MG TABLET    TAKE 1 TABLET BY MOUTH ONCE DAILY    DICLOFENAC SODIUM (VOLTAREN) 1 % GEL    Apply 2 g topically 3 (three) times daily.    DICYCLOMINE (BENTYL) 20 MG TABLET    Take 1 tablet (20 mg total) by mouth before meals as needed (abd pain).    FAMOTIDINE (PEPCID) 20 MG TABLET    TAKE 1 TABLET BY MOUTH 2 (TWO) TIMES DAILY.     FLUOXETINE (PROZAC) 40 MG CAPSULE    TAKE 1 CAPSULE BY MOUTH ONCE DAILY    FLUTICASONE PROPIONATE (FLONASE) 50 MCG/ACTUATION NASAL SPRAY        GLIPIZIDE (GLUCOTROL) 10 MG TABLET    Take 1 tablet (10 mg total) by mouth 2 (two) times daily.    INSULIN GLARGINE,HUM.REC.ANLOG (BASAGLAR KWIKPEN U-100 INSULIN SUBQ)    Inject 40 Units into the skin 2 (two) times daily.    LIRAGLUTIDE 0.6 MG/0.1 ML, 18 MG/3 ML, SUBQ PNIJ (VICTOZA 2-DIAN) 0.6 MG/0.1 ML (18 MG/3 ML) PNIJ    Inject 1.2 Units into the skin.    LISINOPRIL 10 MG TABLET    TAKE 1 TABLET (10 MG TOTAL) BY MOUTH ONCE DAILY.    NOVOLOG FLEXPEN U-100 INSULIN 100  "UNIT/ML (3 ML) INPN PEN        PANTOPRAZOLE (PROTONIX) 40 MG TABLET    TAKE 1 TABLET BY MOUTH ONCE DAILY    PEN NEEDLE 31 GAUGE X 5/16" NDLE    USE 5 TIMES DAILY WITH LANTUS AND HUMALOG    PROMETHAZINE (PHENERGAN) 25 MG TABLET        TIZANIDINE (ZANAFLEX) 4 MG TABLET    TAKE 1 TO 1 & 1/2 TABLET (4 TO 6MG TOTAL) BY MOUTH NIGHTLY AS NEEDED.    TRUE METRIX GLUCOSE TEST STRIP STRP         Review of patient's allergies indicates:   Allergen Reactions    Compazine [prochlorperazine edisylate] Rash    Contrast media Anaphylaxis    Dye Anaphylaxis    Levaquin [levofloxacin] Hives and Itching    Metformin Other (See Comments)     Upset stomach    Ibuprofen Other (See Comments)     Stomach pain    Morphine Other (See Comments)     Irritable       Physical Exam:   Body mass index is 32 kg/m².  GENERAL: Well appearing, appropriate for stated age, no acute distress.  CARDIOVASCULAR: Pulses regular by peripheral palpation.  PULMONARY: Respirations are even and non-labored.  NEURO: Awake, alert, and oriented x 3.  PSYCH: Mood & affect are appropriate.  HEENT: Head is normocephalic and atraumatic.    General    Nursing note and vitals reviewed.        Right Shoulder Exam     Tenderness   The patient is tender to palpation of the supraspinatus.    Range of Motion   Active abduction: 110   Forward Elevation: 100  External Rotation 0 degrees: 40   Internal rotation 0 degrees: Lumbar     Tests & Signs   Gordon test: positive  Impingement: positive  Rotator Cuff Painful Arc/Range: moderate    Other   Sensation: normal    Comments:  Limited exam due to patient participation    Left Shoulder Exam     Range of Motion   Active abduction: 120   Forward Elevation: 130  External Rotation 0 degrees: 50   Internal rotation 0 degrees: Lumbar     Other   Sensation: normal       Muscle Strength   Right Upper Extremity   Shoulder Abduction: 4/5   Shoulder Internal Rotation: 4/5   Shoulder External Rotation: 5/5   Supraspinatus: 4/5 "   Subscapularis: 4/5   Left Upper Extremity  Shoulder Abduction: 5/5   Shoulder Internal Rotation: 5/5   Shoulder External Rotation: 4/5   Supraspinatus: 5/5   Subscapularis: 5/5   Biceps: 5/5     Vascular Exam     Right Pulses      Radial:                    2+      Left Pulses      Radial:                    2+      Capillary Refill  Right Hand: normal capillary refill  Left Hand: normal capillary refill      Neurovascular: Intact extensor pollicis longus, flexor pollicis longus, finger flexion, finger extension, finger abduction and adduction. Sensation intact to radial, median, ulnar, and axillary nerve distributions. Hand warm and well perfused with capillary refill of less than 2 seconds, and palpable distal radial pulses.     Imaging:  XR Results:  Results for orders placed during the hospital encounter of 07/30/20    X-ray Shoulder 2 or More Views Right    Narrative  EXAMINATION:  XR SHOULDER COMPLETE 2 OR MORE VIEWS RIGHT    CLINICAL HISTORY:  Pain in right shoulder    TECHNIQUE:  Two or three views of the right shoulder were performed.    COMPARISON:  08/02/2019    FINDINGS:  No acute osseous abnormality.  Similar glenohumeral and AC joint degenerative findings.  Calcifications are similar adjacent to the humeral head.  Lung parenchyma clear.    Impression  Similar appearance of the shoulder      Electronically signed by: Guevara Tinajero MD  Date:    07/30/2020  Time:    15:39    MRI Results:  Results for orders placed during the hospital encounter of 05/21/19    MRI Shoulder Without Contrast Right    Narrative  EXAMINATION:  MRI SHOULDER WITHOUT CONTRAST RIGHT    CLINICAL HISTORY:  Rotator cuff tear;  Pain in right shoulder    TECHNIQUE:  Multisequence, multiplanar MR imaging of the shoulder performed without contrast.    COMPARISON:  MRI shoulder 08/09/2017    FINDINGS:  Rotator cuff:    Supraspinatus: Complete tear present with retraction to the medial humeral head.    Infraspinatus: Intact with  tendinosis    Subscapularis: Intact    Teres minor: Intact    Moderate to significant supraspinatus muscle bulk loss.    Labrum: No discrete tear with overall similar appearance    Biceps: Long head biceps tendon is intact.    Bone/cartilage: No acute fracture or concerning edema signal.  Tiny inferior posterior glenoid subcortical cyst present with minimal overlying cartilage fissuring.  Remaining glenohumeral cartilage appears intact.    Acromioclavicular joint:Degenerative hypertrophy findings with undersurface spurring.    Miscellaneous: No significant joint effusion.  Trace subacromial/subdeltoid bursal fluid present.    IMPRESSION:    Complete tear of the supraspinatus tendon with muscle atrophy.  Infraspinatus tendinosis.    Acromioclavicular joint and degenerative hypertrophy changes with undersurface spurring.  Tiny posterior-inferior glenoid subcortical cyst with minimal overlying cartilage fissuring.    Possible mild subacromial/subdeltoid bursitis.      Electronically signed by: Guevara Tinajero MD  Date:    05/21/2019  Time:    14:55    CT Results:  No results found for this or any previous visit.    Relevant imaging results reviewed and interpreted by me, discussed with the patient and / or family today.    Other Tests:   No other tests performed today.    Patient Instructions     Assessment:  Jenifer Westfall is a  46 y.o. female   Data Unavailable with a chief complaint of Pain of the Right Shoulder and Pain of the Left Shoulder  Previous patient of Dr. Hammond with a hx of right shoulder RTC tear with increase pain, has been unable to follow up with pain management and has not tried any physical therapy.    Right shoulder pain   Right shoulder RTC   Lumbar pain with radiculopathy   Cervical pain with radiculopathy    Encounter Diagnoses   Name Primary?    Bilateral shoulder pain, unspecified chronicity Yes    Rotator cuff impingement syndrome of right shoulder     Lumbar pain with radiation  down leg     Tear of right supraspinatus tendon       Plan:   Referral for back and spine   Physical therapy at [facility] for shoulder pain   Continue to use Voltaren gel    Recheck 8 weeks   Discussed patient trying some formal of conservative treatment, may need to repeat MRI if no relief has been a long time since previous MRI.    Follow-up: 8 weeks or sooner if there are any problems between now and then.    Thank you for choosing [facility] and Dr. Barry Seals for your orthopedic & sports medicine care. It is our goal to provide you with exceptional care that will help keep you healthy, active, and get you back in the game.    If you felt that you received exemplary care today, please consider leaving us feedback on [employer] at [email or web address].     Please do not hesitate to reach out to us via email, phone, or MyChart with any questions, concerns, or feedback.    If you are experiencing pain/discomfort ,or have questions after 5pm and would like to be connected to the [facility] on-call team, please call this number and specify which Sports Medicine provider is treating you: [phone]         Provider Note/Medical Decision Making:   Patient presents today for follow-up on shoulder pain she has not done any conservative treatment recently.  She does have an MRI which shows a complete tear of supraspinatus with muscle aperture of the which was taken in 2019 I would like the patient to start some form of physical therapy and then follow up.  It may be that we need to get a new MRI to see of changes.  Would also like her to be evaluated by Spine or follow-up with pain management.     I discussed worrisome and red flag signs and symptoms with the patient. The patient expressed understanding and agreed to alert me immediately or to go to the emergency room if they experience any of these.     Treatment plan was developed with input from the patient/family, and they expressed understanding and agreement with the plan. All questions were answered today.    Disclaimer: This note was prepared using a voice recognition system and is likely to have sound alike errors within the text.

## 2022-01-27 NOTE — PROGRESS NOTES
RHEUMATOLOGY OUTPATIENT CLINIC NOTE    2022    Attending Rheumatologist: Andrzej Peralta  Primary Care Provider/Physician Requesting Consultation: Primary Doctor No   Chief Complaint/Reason For Consultation:  Osteoarthritis and Degenerative Disc Disease      Subjective:     Jenifer Westfall is a 46 y.o. White female with chronic pain syndrome comes for follow-up visit.    Main concern of chronic lower back pain hip pain with mechanical pattern.  Currently without joint swelling or suspicious rashes.  Suboptimal response to gabapentin reported.  Currently without fever, acute respiratory symptoms, /GI complaints.    Review of Systems   Constitutional: Positive for malaise/fatigue.   Eyes: Negative for photophobia and pain.   Respiratory: Negative for hemoptysis.    Genitourinary: Negative for hematuria.   Musculoskeletal: Positive for back pain and joint pain.   Skin: Negative for rash.   Neurological: Negative for focal weakness.       Chronic comorbid conditions affecting medical decision making today:  Past Medical History:   Diagnosis Date    Arthritis     DM (diabetes mellitus)      2017 Insulin x 3 years     Hepatitis C antibody positive in blood 2021    RNA NEGATIVE 3/6/2017    Hyperlipidemia     Hypertension     IBS (irritable bowel syndrome)     Kidney stone     Marfan syndrome     Mitral valve prolapse      Past Surgical History:   Procedure Laterality Date    ANGIOGRAPHY OF LOWER EXTREMITY N/A 2018    Procedure: ANGIOGRAM, LOWER EXTREMITY- LEFT LEG;  Surgeon: Roscoe Landeros MD;  Location: [facility] CATH LAB;  Service: Peripheral Vascular;  Laterality: N/A;    APPENDECTOMY      BUNIONECTOMY      both feet    cataract surgery  2019     SECTION      x 2    CHOLECYSTECTOMY      COLONOSCOPY N/A 2020    Procedure: COLONOSCOPY w/ biopsies;  Surgeon: Gio Georges MD;  Location: [facility] ENDO;  Service: Endoscopy;  Laterality: N/A;     "ESOPHAGOGASTRODUODENOSCOPY N/A 2019    Procedure: ESOPHAGOGASTRODUODENOSCOPY (EGD);  Surgeon: Jaron Sanders III, MD;  Location: [facility];  Service: Endoscopy;  Laterality: N/A;    SELECTIVE INJECTION OF ANESTHETIC AGENT AROUND LUMBAR SPINAL NERVE ROOT BY TRANSFORAMINAL APPROACH Bilateral 2020    Procedure: Bilateral L5/S1 TF DENY;  Surgeon: Jewel Walters MD;  Location: [facility] PAIN MGT;  Service: Pain Management;  Laterality: Bilateral;    TUBAL LIGATION       Family History   Problem Relation Age of Onset    Heart disease Mother     Thrombophilia Father         DVT    Hypertension Father     Stroke Maternal Aunt     Heart disease Maternal Aunt     Stroke Maternal Uncle     Heart disease Maternal Uncle     Breast cancer Neg Hx     Colon cancer Neg Hx     Ovarian cancer Neg Hx      Social History     Substance and Sexual Activity   Alcohol Use No    Alcohol/week: 0.0 standard drinks     Social History     Tobacco Use   Smoking Status Former Smoker    Packs/day: 0.20    Years: 20.00    Pack years: 4.00    Types: Cigarettes    Quit date: 2015    Years since quittin.8   Smokeless Tobacco Never Used   Tobacco Comment    "once in a blue moon"     Social History     Substance and Sexual Activity   Drug Use No       Current Outpatient Medications:     ADMELOG SOLOSTAR U-100 INSULIN 100 unit/mL pen, , Disp: , Rfl:     ALPRAZolam (XANAX) 0.25 MG tablet, Take 0.25 mg by mouth daily as needed., Disp: , Rfl:     ascorbic acid (VITAMIN C) 100 MG tablet, Take 100 mg by mouth once daily., Disp: , Rfl:     aspirin (ECOTRIN) 81 MG EC tablet, Take 81 mg by mouth once daily., Disp: , Rfl:     atenoloL (TENORMIN) 25 MG tablet, Take 1 tablet (25 mg total) by mouth once daily., Disp: 30 tablet, Rfl: 11    atorvastatin (LIPITOR) 80 MG tablet, TAKE 1 TABLET BY MOUTH ONCE DAILY. , Disp: 30 tablet, Rfl: 11    azelastine (ASTELIN) 137 mcg (0.1 %) nasal spray, , Disp: , Rfl:     clopidogreL " "(PLAVIX) 75 mg tablet, TAKE 1 TABLET BY MOUTH ONCE DAILY, Disp: 90 tablet, Rfl: 2    diclofenac sodium (VOLTAREN) 1 % Gel, Apply 2 g topically 3 (three) times daily., Disp: 1 Tube, Rfl: 2    dicyclomine (BENTYL) 20 mg tablet, Take 1 tablet (20 mg total) by mouth before meals as needed (abd pain)., Disp: 90 tablet, Rfl: 2    famotidine (PEPCID) 20 MG tablet, TAKE 1 TABLET BY MOUTH 2 (TWO) TIMES DAILY. , Disp: 60 tablet, Rfl: 11    FLUoxetine (PROZAC) 40 MG capsule, TAKE 1 CAPSULE BY MOUTH ONCE DAILY, Disp: 90 capsule, Rfl: 0    fluticasone propionate (FLONASE) 50 mcg/actuation nasal spray, , Disp: , Rfl:     glipiZIDE (GLUCOTROL) 10 MG tablet, Take 1 tablet (10 mg total) by mouth 2 (two) times daily., Disp: 60 tablet, Rfl: 0    liraglutide 0.6 mg/0.1 mL, 18 mg/3 mL, subq PNIJ (VICTOZA 2-DIAN) 0.6 mg/0.1 mL (18 mg/3 mL) PnIj, Inject 1.2 Units into the skin., Disp: , Rfl:     lisinopril 10 MG tablet, TAKE 1 TABLET (10 MG TOTAL) BY MOUTH ONCE DAILY., Disp: 30 tablet, Rfl: 3    NOVOLOG FLEXPEN U-100 INSULIN 100 unit/mL (3 mL) InPn pen, , Disp: , Rfl:     pantoprazole (PROTONIX) 40 MG tablet, TAKE 1 TABLET BY MOUTH ONCE DAILY, Disp: 30 tablet, Rfl: 3    PEN NEEDLE 31 gauge x 5/16" Ndle, USE 5 TIMES DAILY WITH LANTUS AND HUMALOG, Disp: 100 each, Rfl: 3    promethazine (PHENERGAN) 25 MG tablet, , Disp: , Rfl:     tiZANidine (ZANAFLEX) 4 MG tablet, TAKE 1 TO 1 & 1/2 TABLET (4 TO 6MG TOTAL) BY MOUTH NIGHTLY AS NEEDED., Disp: 45 tablet, Rfl: 5    TRUE METRIX GLUCOSE TEST STRIP Strp, , Disp: , Rfl:     insulin glargine,hum.rec.anlog (BASAGLAR KWIKPEN U-100 INSULIN SUBQ), Inject 40 Units into the skin 2 (two) times daily., Disp: , Rfl:     pregabalin (LYRICA) 50 MG capsule, Take 1 capsule (50 mg total) by mouth nightly for 10 days, THEN 1 capsule (50 mg total) 2 (two) times daily for 10 days, THEN 1 capsule (50 mg total) 3 (three) times daily. Do not take with gabapentin.., Disp: 120 capsule, Rfl: 3     Objective: "     Vitals:    01/27/22 1531   BP: 138/77   Pulse: 92     Physical Exam   Constitutional: She does not appear ill.   Eyes: Conjunctivae are normal.   Pulmonary/Chest: No respiratory distress.   Musculoskeletal:         General: No swelling or tenderness.       Reviewed available old and all outside pertinent medical records available.    All lab results personally reviewed and interpreted by me.  Lab Results   Component Value Date    WBC 11.52 11/22/2021    HGB 13.5 11/22/2021    HCT 40.4 11/22/2021    MCV 92 11/22/2021    RDW 13.1 11/22/2021     11/22/2021    PLTEST Appears normal 03/22/2015       Lab Results   Component Value Date     11/22/2021    K 3.7 11/22/2021     (H) 11/22/2021    CO2 20 (L) 11/22/2021     (H) 11/22/2021    BUN 11 11/22/2021    CALCIUM 8.1 (L) 11/22/2021    PROT 7.5 11/22/2021    ALBUMIN 3.5 11/22/2021    BILITOT 0.3 11/22/2021    AST 61 (H) 11/22/2021    ALKPHOS 133 11/22/2021    ALT 38 11/22/2021       Lab Results   Component Value Date    COLORU Yellow 03/04/2021    APPEARANCEUA Clear 03/04/2021    SPECGRAV >=1.030 (A) 03/04/2021    PHUR 6.0 03/04/2021    PROTEINUA Negative 03/04/2021    KETONESU Negative 03/04/2021    LEUKOCYTESUR Negative 03/04/2021    NITRITE Negative 03/04/2021    UROBILINOGEN Negative 03/04/2021       No results found for: PTH    Lab Results   Component Value Date    URICACID 3.9 09/14/2017       Lab Results   Component Value Date    CRP 7.0 11/22/2021       No results found for: ANATITER    No components found for: TSPOTTB,  QUANTIFERON     ASSESSMENT:     Chronic pain syndrome with history of primary fibromyalgia with background osteoarthritis.  Rheumatic workup on file unrevealing.  No synovitis on exam.  Etiology of pain multifactorial, also history of diabetic neuropathy.  Trial of replacing gabapentin by Lyrica.  Taper of gabapentin to discontinue over 3 weeks recommended prior starting new anticonvulsant.  Clinical side effects of  therapy discussed in detail.  Consider replacing fluoxetine by Cymbalta if agreed by mental health provider.  Clinical side effects of therapy discussed in detail.    PLAN:     1. Chronic pain disorder    - pregabalin (LYRICA) 50 MG capsule; Take 1 capsule (50 mg total) by mouth nightly for 10 days, THEN 1 capsule (50 mg total) 2 (two) times daily for 10 days, THEN 1 capsule (50 mg total) 3 (three) times daily. Do not take with gabapentin..  Dispense: 120 capsule; Refill: 3  - Ambulatory referral/consult to Pain Clinic; Future    2. Neuropathy    - pregabalin (LYRICA) 50 MG capsule; Take 1 capsule (50 mg total) by mouth nightly for 10 days, THEN 1 capsule (50 mg total) 2 (two) times daily for 10 days, THEN 1 capsule (50 mg total) 3 (three) times daily. Do not take with gabapentin..  Dispense: 120 capsule; Refill: 3  - Ambulatory referral/consult to Pain Clinic; Future    3. Lumbar degenerative disc disease    - Ambulatory referral/consult to Pain Clinic; Future        Follow up in about 6 months (around 7/27/2022).      Disclaimer: This note is prepared using voice recognition software and as such is likely to have errors and has not been proof read. Please contact me for questions.     30 minutes of total time spent on the encounter, which includes face to face time and non-face to face time preparing to see the patient (eg, review of tests), Obtaining and/or reviewing separately obtained history, Documenting clinical information in the electronic or other health record, Independently interpreting results (not separately reported) and communicating results to the patient/family/caregiver, or Care coordination (not separately reported).     Andrzej Peralta M.D.

## 2022-01-28 ENCOUNTER — TELEPHONE (OUTPATIENT)
Dept: PHYSICAL MEDICINE AND REHAB | Facility: CLINIC | Age: 47
End: 2022-01-28
Payer: MEDICAID

## 2022-01-28 NOTE — TELEPHONE ENCOUNTER
Patient in B&S WQ.  Patient has been dismissed from Pain Medicine. Patient is already aware of dismissal and has been given Patient Relations number.    Mili Patrick RN

## 2022-01-31 ENCOUNTER — TELEPHONE (OUTPATIENT)
Dept: OBSTETRICS AND GYNECOLOGY | Facility: CLINIC | Age: 47
End: 2022-01-31
Payer: MEDICAID

## 2022-01-31 ENCOUNTER — TELEPHONE (OUTPATIENT)
Dept: NEUROLOGY | Facility: CLINIC | Age: 47
End: 2022-01-31
Payer: MEDICAID

## 2022-01-31 NOTE — TELEPHONE ENCOUNTER
"Called patient and she stated she has "stomach bug" today and rescheduled appointment for tomorrow.  "

## 2022-01-31 NOTE — TELEPHONE ENCOUNTER
----- Message from Manas Dhillon sent at 1/31/2022  8:35 AM CST -----  Contact: self  Type:  Sooner Apoointment Request    Caller is requesting a sooner appointment.  Caller declined first available appointment listed below.  Caller will not accept being placed on the waitlist and is requesting a message be sent to doctor.  Name of Caller:Jenifer Westfall   When is the first available appointment?2022  Symptoms:test results  Would the patient rather a call back or a response via [facility]? Call back  Best Call Back Number:[phone]  Additional Information:

## 2022-01-31 NOTE — TELEPHONE ENCOUNTER
----- Message from Manas Dhillon sent at 1/31/2022  8:33 AM CST -----  Contact: self  Type:  Sooner Apoointment Request    Caller is requesting a sooner appointment.  Caller declined first available appointment listed below.  Caller will not accept being placed on the waitlist and is requesting a message be sent to doctor.  Name of Caller:Jenifer Westfall   When is the first available appointment?2022  Symptoms:reschedule annual appt  Would the patient rather a call back or a response via [facility]? Call back  Best Call Back Number:[phone]  Additional Information:

## 2022-02-01 ENCOUNTER — CLINICAL SUPPORT (OUTPATIENT)
Dept: REHABILITATION | Facility: HOSPITAL | Age: 47
End: 2022-02-01
Payer: MEDICAID

## 2022-02-01 ENCOUNTER — OFFICE VISIT (OUTPATIENT)
Dept: NEUROLOGY | Facility: CLINIC | Age: 47
End: 2022-02-01
Payer: MEDICAID

## 2022-02-01 ENCOUNTER — OFFICE VISIT (OUTPATIENT)
Dept: OBSTETRICS AND GYNECOLOGY | Facility: CLINIC | Age: 47
End: 2022-02-01
Payer: MEDICAID

## 2022-02-01 ENCOUNTER — TELEPHONE (OUTPATIENT)
Dept: NEUROLOGY | Facility: CLINIC | Age: 47
End: 2022-02-01
Payer: MEDICAID

## 2022-02-01 VITALS — WEIGHT: 230.19 LBS | HEIGHT: 70 IN | BODY MASS INDEX: 32.95 KG/M2

## 2022-02-01 VITALS
SYSTOLIC BLOOD PRESSURE: 138 MMHG | HEIGHT: 70 IN | DIASTOLIC BLOOD PRESSURE: 77 MMHG | WEIGHT: 230.19 LBS | BODY MASS INDEX: 32.95 KG/M2 | RESPIRATION RATE: 16 BRPM

## 2022-02-01 DIAGNOSIS — Z01.419 ROUTINE GYNECOLOGICAL EXAMINATION: Primary | ICD-10-CM

## 2022-02-01 DIAGNOSIS — G89.29 CHRONIC MIDLINE LOW BACK PAIN WITHOUT SCIATICA: ICD-10-CM

## 2022-02-01 DIAGNOSIS — M54.16 LUMBAR RADICULOPATHY: ICD-10-CM

## 2022-02-01 DIAGNOSIS — M54.50 CHRONIC MIDLINE LOW BACK PAIN WITHOUT SCIATICA: ICD-10-CM

## 2022-02-01 DIAGNOSIS — E11.42 DIABETIC PERIPHERAL NEUROPATHY ASSOCIATED WITH TYPE 2 DIABETES MELLITUS: Primary | ICD-10-CM

## 2022-02-01 DIAGNOSIS — E11.40 TYPE 2 DIABETES MELLITUS WITH DIABETIC NEUROPATHY, WITH LONG-TERM CURRENT USE OF INSULIN: ICD-10-CM

## 2022-02-01 DIAGNOSIS — R20.0 NUMBNESS AND TINGLING: ICD-10-CM

## 2022-02-01 DIAGNOSIS — R29.898 WEAKNESS OF LEFT FOOT: ICD-10-CM

## 2022-02-01 DIAGNOSIS — R20.2 NUMBNESS AND TINGLING: ICD-10-CM

## 2022-02-01 DIAGNOSIS — Z79.4 TYPE 2 DIABETES MELLITUS WITH DIABETIC NEUROPATHY, WITH LONG-TERM CURRENT USE OF INSULIN: ICD-10-CM

## 2022-02-01 DIAGNOSIS — M75.101 TEAR OF RIGHT SUPRASPINATUS TENDON: ICD-10-CM

## 2022-02-01 DIAGNOSIS — Z30.013 ENCOUNTER FOR PRESCRIPTION FOR DEPO-PROVERA: ICD-10-CM

## 2022-02-01 PROCEDURE — 99396 PR PREVENTIVE VISIT,EST,40-64: ICD-10-PCS | Mod: S$PBB,,, | Performed by: NURSE PRACTITIONER

## 2022-02-01 PROCEDURE — 1160F RVW MEDS BY RX/DR IN RCRD: CPT | Mod: CPTII,,, | Performed by: NURSE PRACTITIONER

## 2022-02-01 PROCEDURE — 3008F PR BODY MASS INDEX (BMI) DOCUMENTED: ICD-10-PCS | Mod: CPTII,,, | Performed by: NURSE PRACTITIONER

## 2022-02-01 PROCEDURE — 4010F PR ACE/ARB THEARPY RXD/TAKEN: ICD-10-PCS | Mod: CPTII,,, | Performed by: NURSE PRACTITIONER

## 2022-02-01 PROCEDURE — 1160F PR REVIEW ALL MEDS BY PRESCRIBER/CLIN PHARMACIST DOCUMENTED: ICD-10-PCS | Mod: CPTII,,, | Performed by: NURSE PRACTITIONER

## 2022-02-01 PROCEDURE — 97161 PT EVAL LOW COMPLEX 20 MIN: CPT

## 2022-02-01 PROCEDURE — 99215 OFFICE O/P EST HI 40 MIN: CPT | Mod: S$PBB,,, | Performed by: NURSE PRACTITIONER

## 2022-02-01 PROCEDURE — 3008F BODY MASS INDEX DOCD: CPT | Mod: CPTII,,, | Performed by: NURSE PRACTITIONER

## 2022-02-01 PROCEDURE — 99999 PR PBB SHADOW E&M-EST. PATIENT-LVL V: CPT | Mod: PBBFAC,,, | Performed by: NURSE PRACTITIONER

## 2022-02-01 PROCEDURE — 99396 PREV VISIT EST AGE 40-64: CPT | Mod: S$PBB,,, | Performed by: NURSE PRACTITIONER

## 2022-02-01 PROCEDURE — 4010F ACE/ARB THERAPY RXD/TAKEN: CPT | Mod: CPTII,,, | Performed by: NURSE PRACTITIONER

## 2022-02-01 PROCEDURE — 99215 PR OFFICE/OUTPT VISIT, EST, LEVL V, 40-54 MIN: ICD-10-PCS | Mod: S$PBB,,, | Performed by: NURSE PRACTITIONER

## 2022-02-01 PROCEDURE — 3075F SYST BP GE 130 - 139MM HG: CPT | Mod: CPTII,,, | Performed by: NURSE PRACTITIONER

## 2022-02-01 PROCEDURE — 99215 OFFICE O/P EST HI 40 MIN: CPT | Mod: PBBFAC,27 | Performed by: NURSE PRACTITIONER

## 2022-02-01 PROCEDURE — 1159F MED LIST DOCD IN RCRD: CPT | Mod: CPTII,,, | Performed by: NURSE PRACTITIONER

## 2022-02-01 PROCEDURE — 1159F PR MEDICATION LIST DOCUMENTED IN MEDICAL RECORD: ICD-10-PCS | Mod: CPTII,,, | Performed by: NURSE PRACTITIONER

## 2022-02-01 PROCEDURE — 3078F DIAST BP <80 MM HG: CPT | Mod: CPTII,,, | Performed by: NURSE PRACTITIONER

## 2022-02-01 PROCEDURE — 3075F PR MOST RECENT SYSTOLIC BLOOD PRESS GE 130-139MM HG: ICD-10-PCS | Mod: CPTII,,, | Performed by: NURSE PRACTITIONER

## 2022-02-01 PROCEDURE — 3078F PR MOST RECENT DIASTOLIC BLOOD PRESSURE < 80 MM HG: ICD-10-PCS | Mod: CPTII,,, | Performed by: NURSE PRACTITIONER

## 2022-02-01 PROCEDURE — 99999 PR PBB SHADOW E&M-EST. PATIENT-LVL V: ICD-10-PCS | Mod: PBBFAC,,, | Performed by: NURSE PRACTITIONER

## 2022-02-01 RX ORDER — MEDROXYPROGESTERONE ACETATE 150 MG/ML
150 INJECTION, SUSPENSION INTRAMUSCULAR
Status: DISCONTINUED | OUTPATIENT
Start: 2022-02-01 | End: 2022-10-07

## 2022-02-01 RX ADMIN — MEDROXYPROGESTERONE ACETATE 150 MG: 150 INJECTION, SUSPENSION INTRAMUSCULAR at 03:02

## 2022-02-01 NOTE — PLAN OF CARE
[facility]  Physical Therapy Initial Evaluation    Name: Jenifer Westfall  Clinic Number: [id number]    Therapy Diagnosis:   Encounter Diagnosis   Name Primary?    Tear of right supraspinatus tendon      Physician: Herminia Puentes PA-C    Physician Orders: PT Eval and Treat   Medical Diagnosis from Referral: M75.101 (ICD-10-CM) - Tear of right supraspinatus tendon    Evaluation Date: 2/1/2022  Authorization Period Expiration: 1/27/23  Plan of Care Expiration: 5/2/22  Visit # / Visits authorized: 1/ 1    Time In: 2:30  Time Out: 3:15  Total Billable Time: 0 minutes    Precautions: Neuropathy, DMII, Depression, Pulmonary HTN, PAD, Marfan's Syndrome    Subjective   Date of onset: Years  History of current condition - Jenifer reports: having pain in R shoulder and eventually reached point where she couldn't leave her hand.  Pt. Went to doctor and found out she had a tear.  Pt. Reports being a  for years and was a caregiver for years.     Pain:  Current 6/10, worst 10/10, best 0/10   Location: right shoulder   Description: soreness, and painful  Aggravating Factors: lifting R arm, sleeping,   Easing Factors: nothing    Prior Therapy: yes  Social History: has steps to get into home B handrails  Occupation: not working now  Prior Level of Function: antalgic gait secondary hip pain and neuropathy per pt.  Current Level of Function: limited with lifting, reaching, sweeping, mopping    Imaging:  Shoulder X-Ray 1/27/22  FINDINGS:  Right shoulder: Osteopenia.  No acute fracture or dislocation.  Stable AC and glenohumeral joint degenerative changes.  Small calcific densities about the proximal humerus are again noted.  Overall no change from the prior exam.     Left shoulder: Moderate degenerative changes at the AC and glenohumeral joints.  No fracture or dislocation.  The bones are demineralized.     Impression:     As above    Medical History:   Past Medical History:   Diagnosis Date     Arthritis     DM (diabetes mellitus)      2017 Insulin x 3 years     Hepatitis C antibody positive in blood 2021    RNA NEGATIVE 3/6/2017    Hyperlipidemia     Hypertension     IBS (irritable bowel syndrome)     Kidney stone     Marfan syndrome     Mitral valve prolapse        Surgical History:   Jenifer Westfall  has a past surgical history that includes Cholecystectomy; Bunionectomy; Tubal ligation;  section; Appendectomy; Angiography of lower extremity (N/A, 2018); cataract surgery (2019); Esophagogastroduodenoscopy (N/A, 2019); Colonoscopy (N/A, 2020); and Selective injection of anesthetic agent around lumbar spinal nerve root by transforaminal approach (Bilateral, 2020).    Medications:   Jenifer has a current medication list which includes the following prescription(s): admelog solostar u-100 insulin, alprazolam, ascorbic acid (vitamin c), aspirin, atenolol, atorvastatin, azelastine, clopidogrel, diclofenac sodium, dicyclomine, famotidine, fluoxetine, fluticasone propionate, glipizide, insulin glargine,hum.rec.anlog, liraglutide 0.6 mg/0.1 ml (18 mg/3 ml) subq pnij, lisinopril, novolog flexpen u-100 insulin, pantoprazole, pen needle, pregabalin, promethazine, tizanidine, and true metrix glucose test strip, and the following Facility-Administered Medications: medroxyprogesterone.    Allergies:   Review of patient's allergies indicates:   Allergen Reactions    Compazine [prochlorperazine edisylate] Rash    Contrast media Anaphylaxis    Dye Anaphylaxis    Levaquin [levofloxacin] Hives and Itching    Metformin Other (See Comments)     Upset stomach    Ibuprofen Other (See Comments)     Stomach pain    Morphine Other (See Comments)     Irritable        Pts goals: decrease pain in R shoulder and improve mobility R shoulder            Objective       CMS Impairment/Limitation/Restriction for FOTO Shoulder Survey    Therapist reviewed FOTO scores for  Jenifer Westfall on 2/1/2022.   FOTO documents entered into [employer] - see Media section.    Limitation Score: 43%         Posture: Pt noted to present with forward head/rounded shoulder posture.    Scapular AROM standing:     Shoulder ROM:   Active/Passive Joint Range Right Left   Flexion 90 P! 90 P!   ABDuction 90 P! 90 P!   External Rotation- neutral 45 deg. P! 65 deg. P!   Internal Rotation WNL WNL   Extension         Internal Rotation Functional Reach: R:L R glut: L hip  External Rotation Functional Reach: R:L R ear: L post. Head        Strength:  Muscle (Myotome) Right Left   Shoulder Flex 1+/5 1+/5   Shoulder Abduction 1+/5 1+/5   Middle trapezius 1+/5 1+/5   Lower trapezius 1+/5 1+/5   Rhomboids  3/5 3/5         Joint Mobility: hypomobility and discomfort with R GH Joint mob and R AC joint mob        TREATMENT   Treatment Time In: 3:00  Treatment Time Out: 3:15  Total Treatment time separate from Evaluation: 0 minutes    Jenifer received therapeutic exercises to develop  for 6 minutes including: shoulder shrugs x 2 min., scap retraction x 2 min., B shoulder ext. Rot x 2 min      Home Exercises and Patient Education Provided    Education provided:   -Education on condition, HEP, and activity    Written Home Exercises Provided: yes.  Exercises were reviewed and Jenifer was able to demonstrate them prior to the end of the session.  Jenifer demonstrated good  understanding of the education provided.     See EMR under Patient Instructions for exercises provided 2/1/2022.    Assessment   Jenifer is a 46 y.o. female referred to outpatient Physical Therapy with a medical diagnosis of M75.101 (ICD-10-CM) - Tear of right supraspinatus tendon. Pt presents with moderate limitations in R shoulder with significant weakness in R shoulder and scapular muscles.  Pt. Has hypomobility in R GH joint in all planes.  Pt. Educated on importance of strengthening trapezius muscles to improve over head reaching.  Pt. Unable to lie prone to begin  scapular strengthening.  Pt. Educated to begin working on prone lying.  Attempted scapular strengthening in standing but too painful for patient. AAROM with dowel very painful today.  Began gentle scapular and rotator cuff strengthening.    Pt prognosis is Good.   Pt will benefit from skilled outpatient Physical Therapy to address the deficits stated above and in the chart below, provide pt/family education, and to maximize pt's level of independence.     Plan of care discussed with patient: Yes  Pt's spiritual, cultural and educational needs considered and patient is agreeable to the plan of care and goals as stated below:     Anticipated Barriers for therapy: none    Medical Necessity is demonstrated by the following  History  Co-morbidities and personal factors that may impact the plan of care Co-morbidities:    Neuropathy, DMII, Depression, Pulmonary HTN, PAD, Marfan's Syndrome    Personal Factors:   no deficits     moderate   Examination  Body Structures and Functions, activity limitations and participation restrictions that may impact the plan of care Body Regions:   upper extremities    Body Systems:    gross symmetry  ROM  strength  gross coordinated movement  motor control  motor learning    Participation Restrictions:   Limited with reaching and lifting     Activity limitations:   Learning and applying knowledge  no deficits    General Tasks and Commands  no deficits    Communication  no deficits    Mobility  lifting and carrying objects    Self care  no deficits    Domestic Life  shopping  cooking  doing house work (cleaning house, washing dishes, laundry)    Interactions/Relationships  no deficits    Life Areas  no deficits    Community and Social Life  community life  recreation and leisure         moderate   Clinical Presentation stable and uncomplicated low   Decision Making/ Complexity Score: low     Goals:  Short Term Goals: In 6 weeks   1.I with HEP  2.Patient to increase R shoulder ROM to 75% or  greater of normal ROM.     3.Patient to increase R scapular muscles to 3/5 or greater MMT.   4.Patient to have pain less than 4/10 at all times.  5.Patient to score less than 20% impaired on the FOTO.    Long Term Goals: In 12 weeks  1. Patient to score less than 10% impaired on the FOTO.  2. Patient to demo increase in R shoulder/scapular strength to 5/5 gross MMT to tolerate lifting and carrying moderate sized objects.  3. Patient to have decreased pain to less than 2/10 at all times.  4. Patient to perform daily activities including dressing and bathing without limitation.      Plan   Plan of care Certification: 2/1/2022 to 5/2/22.    Outpatient Physical Therapy 2 times weekly for 12 weeks to include the following interventions: Manual Therapy, Moist Heat/ Ice, Neuromuscular Re-ed, Patient Education, Self Care, Therapeutic Activities and Therapeutic Exercise, e-stim, FDN.    Melissa Espinosa, PT    Thank you for this referral.    These services are reasonable and necessary for the conditions set forth above while under my care.

## 2022-02-01 NOTE — PATIENT INSTRUCTIONS
Patient Education       Pregabalin (pre ALEX a malathi)   Brand Names: US Lyrica; Lyrica CR   Brand Names: Bart ACH-Pregabalin; ACT Pregabalin [DSC]; AG-Pregabalin; APO-Pregabalin; Auro-Pregabalin; DOM-Pregabalin; JAMP-Pregabalin; Lyrica; M-Pregabalin; Mar-Pregabalin; MINT-Pregabalin; MYLAN-Pregabalin [DSC]; TY-Pregabalin; NRA-Pregabalin; PMS-Pregabalin; PHILLIP-Pregabalin; SANDOZ Pregabalin; TARO-Pregabalin; TEVA-Pregabalin   What is this drug used for?   · It is used to help control certain kinds of seizures.  · It is used to treat painful nerve diseases.  · It is used to treat fibromyalgia.  · It may be given to you for other reasons. Talk with the doctor.    What do I need to tell my doctor BEFORE I take this drug?   · If you are allergic to this drug; any part of this drug; or any other drugs, foods, or substances. Tell your doctor about the allergy and what signs you had.  · If you have kidney disease.  · If you are breast-feeding. Do not breast-feed while you take this drug.  This is not a list of all drugs or health problems that interact with this drug.  Tell your doctor and pharmacist about all of your drugs (prescription or OTC, natural products, vitamins) and health problems. You must check to make sure that it is safe for you to take this drug with all of your drugs and health problems. Do not start, stop, or change the dose of any drug without checking with your doctor.  What are some things I need to know or do while I take this drug?   · Tell all of your health care providers that you take this drug. This includes your doctors, nurses, pharmacists, and dentists.  · Avoid driving and doing other tasks or actions that call for you to be alert or have clear eyesight until you see how this drug affects you.  · Do not stop taking this drug all of a sudden without calling your doctor. You may have a greater risk of side effects. If you need to stop this drug, you will want to slowly stop it as ordered by your  doctor.  · Avoid drinking alcohol while taking this drug.  · Talk with your doctor before you use marijuana, other forms of cannabis, or prescription or OTC drugs that may slow your actions.  · A very bad reaction called angioedema has happened with this drug. Sometimes, this may be life-threatening. Signs may include swelling of the hands, face, lips, eyes, tongue, or throat; trouble breathing; trouble swallowing; or unusual hoarseness. Get medical help right away if you have any of these signs.  · Severe breathing problems have happened with this drug in people taking certain other drugs (like opioid pain drugs). This has also happened in people who already have lung or breathing problems. The risk may also be greater in people who are older than 65. Sometimes, breathing problems have been deadly. If you have questions, talk with the doctor.  · If you are 65 or older, use this drug with care. You could have more side effects.  · Talk with your doctor if you plan to father a child. This drug made male animals less fertile and caused sperm changes. Birth defects also happened in the young of male animals treated with this drug. It is not known if these problems happen in humans.  · Tell your doctor if you are pregnant or plan on getting pregnant. You will need to talk about the benefits and risks of using this drug while you are pregnant.    What are some side effects that I need to call my doctor about right away?   WARNING/CAUTION: Even though it may be rare, some people may have very bad and sometimes deadly side effects when taking a drug. Tell your doctor or get medical help right away if you have any of the following signs or symptoms that may be related to a very bad side effect:  · Signs of an allergic reaction, like rash; hives; itching; red, swollen, blistered, or peeling skin with or without fever; wheezing; tightness in the chest or throat; trouble breathing, swallowing, or talking; unusual hoarseness;  or swelling of the mouth, face, lips, tongue, or throat.  · Change in eyesight.  · Muscle pain or weakness.  · If seizures are new or worse after starting this drug.  · Change in balance.  · Feeling confused.  · Shakiness.  · Trouble breathing, slow breathing, or shallow breathing.  · Change in color of skin to a bluish color like on the lips, nail beds, fingers, or toes.  · Memory problems or loss.  · Shortness of breath, a big weight gain, or swelling in the arms or legs.  · Fast or abnormal heartbeat.  · Fever, chills, or sore throat.  · Skin sores.  · Trouble speaking.  · Trouble sleeping.  · Trouble walking.  · Feeling high (easy laughing and feeling good).  · Twitching.  · Get medical help right away if you feel very sleepy, very dizzy, or if you pass out. Caregivers or others need to get medical help right away if the patient does not respond, does not answer or react like normal, or will not wake up.  · Patients who take this drug may be at a greater risk of having thoughts or actions of suicide. The risk may be greater in people who have had these thoughts or actions in the past. Call the doctor right away if signs like low mood (depression), nervousness, restlessness, grouchiness, panic attacks, or changes in mood or actions are new or worse. Call the doctor right away if any thoughts or actions of suicide occur.  · Low platelet counts have rarely happened with this drug. This may lead to a higher chance of bleeding. Call your doctor right away if you have any unexplained bruising or bleeding.  What are some other side effects of this drug?   All drugs may cause side effects. However, many people have no side effects or only have minor side effects. Call your doctor or get medical help if any of these side effects or any other side effects bother you or do not go away:  · Feeling dizzy, sleepy, tired, or weak.  · Weight gain.  · Not able to focus.  · Headache.  · Dry mouth.  · Constipation.  · More  hungry.  · Upset stomach.  · Joint pain.  · Nose or throat irritation.  These are not all of the side effects that may occur. If you have questions about side effects, call your doctor. Call your doctor for medical advice about side effects.  You may report side effects to your national health agency.  You may report side effects to the FDA at 1-606.249.3427. You may also report side effects at https://www.fda.gov/medwatch.  How is this drug best taken?   Use this drug as ordered by your doctor. Read all information given to you. Follow all instructions closely.  Extended-release tablets:   · Take after the evening meal if taking once daily.  · Swallow whole. Do not chew, break, or crush.  · Keep taking this drug as you have been told by your doctor or other health care provider, even if you feel well.  Capsules and oral solution:   · Take with or without food.  · Keep taking this drug as you have been told by your doctor or other health care provider, even if you feel well.  Liquid (solution):   · Measure liquid doses carefully. Use the measuring device that comes with this drug. If there is none, ask the pharmacist for a device to measure this drug.  What do I do if I miss a dose?   Extended-release tablets:   · Take a missed dose just before bedtime after eating a snack or after the next day's morning meal.  · If you miss taking the missed dose after the next day's morning meal, skip the missed dose and go back to your normal time.  · Do not take 2 doses at the same time or extra doses.  Capsules and oral solution:   · Take a missed dose as soon as you think about it.  · If it is close to the time for your next dose, skip the missed dose and go back to your normal time.  · Do not take 2 doses at the same time or extra doses.  How do I store and/or throw out this drug?   · Store in the original container at room temperature.  · Store in a dry place. Do not store in a bathroom.  · Store this drug in a safe place  where children cannot see or reach it, and where other people cannot get to it. A locked box or area may help keep this drug safe. Keep all drugs away from pets.  · Throw away unused or  drugs. Do not flush down a toilet or pour down a drain unless you are told to do so. Check with your pharmacist if you have questions about the best way to throw out drugs. There may be drug take-back programs in your area.    General drug facts   · If your symptoms or health problems do not get better or if they become worse, call your doctor.  · Do not share your drugs with others and do not take anyone else's drugs.  · Some drugs may have another patient information leaflet. If you have any questions about this drug, please talk with your doctor, nurse, pharmacist, or other health care provider.  · This drug comes with an extra patient fact sheet called a Medication Guide. Read it with care. Read it again each time this drug is refilled. If you have any questions about this drug, please talk with the doctor, pharmacist, or other health care provider.  · If you think there has been an overdose, call your poison control center or get medical care right away. Be ready to tell or show what was taken, how much, and when it happened.    Consumer Information Use and Disclaimer   This generalized information is a limited summary of diagnosis, treatment, and/or medication information. It is not meant to be comprehensive and should be used as a tool to help the user understand and/or assess potential diagnostic and treatment options. It does NOT include all information about conditions, treatments, medications, side effects, or risks that may apply to a specific patient. It is not intended to be medical advice or a substitute for the medical advice, diagnosis, or treatment of a health care provider based on the health care provider's examination and assessment of a patient's specific and unique circumstances. Patients must speak  with a health care provider for complete information about their health, medical questions, and treatment options, including any risks or benefits regarding use of medications. This information does not endorse any treatments or medications as safe, effective, or approved for treating a specific patient. [employer] and its affiliates disclaim any warranty or liability relating to this information or the use thereof. The use of this information is governed by the Terms of Use, available at https://www.[employer]/en/solutions/lexicomp/about/leandra.  Last Reviewed Date   2020-05-29  Copyright   © 2021 [employer] and its affiliates and/or licensors. All rights reserved.  Patient Education       Peripheral Neuropathy   About this topic   Your nerves carry information to and from the brain. They also carry signals to and from the spinal cord. You have many nerves outside of your spinal cord. They are all a part of your peripheral nervous system. They work with your brain and spinal cord. All of these parts give your body information about senses, moving, and the environment. Damage to any of the nerves outside of your brain or spinal cord is peripheral neuropathy. What you feel and where it is will depend on what nerves are affected.  What are the causes?   Neuropathy can be caused by other health conditions or other medicines. Sometimes, the cause is not known.  What can make this more likely to happen?   · Having a health problem for a long period of time. These illnesses may include:  ? High blood sugar  ? Chronic kidney disease  ? Rheumatoid arthritis or lupus  · Drugs used to treat other diseases  · Toxins like heavy alcohol use or pesticides  · Pressure on a nerve or a broken bone  · Other things like problems with blood flow or swelling  · Low levels of certain vitamins  · Some kinds of infections  · Certain cancers  What are the main signs?   The signs depend on what nerves are damaged. They may  include:  · Pain, numbness, and tingling that often starts in the feet or hands, can be shooting or stabbing  · Not able to feel hot or cold  · More sensitive to touching things  · Poor coordination, muscle weakness, cramping, or twitching  · Bowel and bladder problems like loose or hard stools, leaking urine, not able to pass urine  · Trouble swallowing or breathing  · Dizziness  · Lots of sweating  · Sex problems  How does the doctor diagnose this health problem?   Your doctor will do an exam and ask about your history. Your doctor will feel around the area of the body where you are having problems. Your doctor will check the feeling in your arms and legs. Your reflexes, motion, and strength will also be checked. If your problems are in your legs, your doctor may have you walk and stand on your heels and toes. The doctor may order:  · Lab tests  · Nerve conduction velocity test (NCV) ? to see how fast electrical signals go through nerves  · Electromyelogram (EMG) ? to look at how well the nerves are working in the muscles  · Spinal tap  · CT or MRI scan  · Nerve or skin biopsy to look at nerve tissue  How does the doctor treat this health problem?   Treating your neuropathy means treating the cause. This may include:  · Controlling blood sugar  · Limiting use of beer, wine, and mixed drinks (alcohol)  · Treating a vitamin deficiency  · Quitting smoking  · Brace or splint to keep pressure off the nerves  · Cane, walker, or wheelchair to help you get around safely  · Compression sleeves or stockings  · Exercises for strengthening weak muscles and stretching tight muscles  · Desensitization  · Ventilator if breathing is very poor  · Surgery  Are there other health problems to treat?   If neuropathy is causing trouble with certain things such as balance, digestion, or bladder and bowel function, these things will need to be treated.  What drugs may be needed?   The doctor may order drugs to:  · Control blood  sugar  · Help with pain  · Suppress the immune system  · Help with eating, bathroom, or sex problems  What problems could happen?   · Long-term pain or nerve damage  · Sores on the feet  · Loss of balance, trouble walking, and a higher risk of falling  · Damage to peripheral nerves can affect your blood flow and heartbeat  What can be done to prevent this health problem?   · Control high blood sugar.  · Limit alcohol use.  · If you are a smoker, quit. Smoking lessens the blood supply to peripheral nerves.  · If you have a vitamin deficiency, talk to your doctor to see if you need to add any vitamins to your diet.  · Keep a healthy weight. If you are overweight, lose weight.  · Avoid toxic chemicals, pesticides, and other toxins.  Where can I learn more?   American Cancer Society  http://www.cancer.org/treatment/treatmentsandsideeffects/physicalsideeffects/chemotherapyeffects/peripheralneuropathy/peripheral-neuropathy-caused-by-chemotherapy-pranav   National [place] of Neurological Disorders and Stroke  http://www.ninds.nih.gov/disorders/peripheralneuropathy/detail_peripheralneuropathy.htm   Last Reviewed Date   2020-10-12  Consumer Information Use and Disclaimer   This information is not specific medical advice and does not replace information you receive from your health care provider. This is only a brief summary of general information. It does NOT include all information about conditions, illnesses, injuries, tests, procedures, treatments, therapies, discharge instructions or life-style choices that may apply to you. You must talk with your health care provider for complete information about your health and treatment options. This information should not be used to decide whether or not to accept your health care providers advice, instructions or recommendations. Only your health care provider has the knowledge and training to provide advice that is right for you.  Copyright   Copyright © 2021 [employer] and  its affiliates and/or licensors. All rights reserved.  Patient Education       Diabetic Neuropathy   About this topic   Diabetes is an illness that makes your blood sugar too high. If your blood sugar is not controlled, you may have problems with how well your nerves work. High blood sugars can damage the small blood vessels that carry food and oxygen to these nerves. Nerve damage in patients with diabetes is called diabetic neuropathy.  Your nerves carry information to the brain. This gives your body information about sensation, movement, and your environment. Damage to these nerves can cause problems with your legs, feet, arms, and hands. Your nerves in your hands and feet carry information to the brain about pain and the sense of touch, such as something being too hot or too cold. Specific nerves in the face or eyes may also be affected.  There is no cure for this illness. Diabetic neuropathy can be prevented by managing your blood sugar levels. Doctors treat this illness by managing the signs.  What are the causes?   You are more likely to have this problem if your blood sugar is not well controlled. Your nerves may be harmed if you have high blood sugar levels. Having poor blood flow can also cause problems with your nerves.  What can make this more likely to happen?   You are more likely to have this problem if your diabetes is not controlled.  What are the main signs?   · Damage to the nerves and blood vessels in your arms and legs. This means you may have pain, numbness, or tingling. You may not notice if you step on something sharp or have a small cut on your foot. You may not notice if you touch something too hot or too cold. Your nerves cannot send a signal of pain to your brain. As a result, you cannot protect yourself from injuring your hands or feet. You often do not know you have hurt yourself because you do not feel pain.  · Loss of pain or feeling in your hands, fingers, arms, feet, toes, and  legs.  · Weakness in your arms or legs  How does the doctor diagnose this health problem?   Your doctor will do an exam. The doctor may also check your sense of temperature or vibration on hands and feet. Tests to see how well your nerves are working or to look for other causes of the problem may be ordered.  How does the doctor treat this health problem?   Work with the doctor to get your blood sugar in control. The doctor will treat any pain you are having. Your doctor will teach you ways to protect your feet and hands from injury.  What drugs may be needed?   The doctor may order drugs to:  · Control blood sugar  · Help with pain  · Fight an infection  · Help lower blood pressure and cholesterol levels  Will there be any other care needed?   · Check your feet each day with a mirror to see if there are any sores, blisters, or cuts.  · Wear shoes that fit well and cover all parts of your feet. Always wear soft cotton socks with your shoes.  · If you have sores on your feet, let your doctor know. You may need to see a foot care expert. If you have sores on your arms or legs you may need a wound care expert to help get them healed.  · Work with a dietician to understand a diet that helps control your blood sugar to help prevent further damage.     What can be done to prevent this health problem?   · Keep your blood sugar in good control.  · Keep your blood pressure and cholesterol in normal limits.  · Stop smoking.  Where can I learn more?   American Academy of Family Physicians  https://familydoctor.org/condition/diabetic-neuropathy/   National [place] of Diabetes and Digestive and Kidney Diseases  https://www.niddk.nih.gov/health-information/diabetes/overview/preventing-problems/nerve-damage-diabetic-neuropathies/what-is-diabetic-neuropathy   Last Reviewed Date   2020-10-30  Consumer Information Use and Disclaimer   This information is not specific medical advice and does not replace information you receive from  "your health care provider. This is only a brief summary of general information. It does NOT include all information about conditions, illnesses, injuries, tests, procedures, treatments, therapies, discharge instructions or life-style choices that may apply to you. You must talk with your health care provider for complete information about your health and treatment options. This information should not be used to decide whether or not to accept your health care providers advice, instructions or recommendations. Only your health care provider has the knowledge and training to provide advice that is right for you.  Copyright   Copyright © 2021 [employer], Inc[employer] and its affiliates and/or licensors. All rights reserved.  Patient Education       Radiculopathy   The Basics   Written by the doctors and editors at [employer]   What is radiculopathy? -- "Radiculopathy" is the medical term for the pain, weakness, numbness, or tingling that happens when nerves coming from the spinal cord get pinched or damaged. Radiculopathy can affect different parts of the body, depending on which nerve or group of nerves is affected. People sometimes refer to radiculopathy as having a "pinched nerve."  Here are 2 common examples of radiculopathy:  · Cervical radiculopathy - People with this type of radiculopathy have pain, weakness, numbness, or tingling down one or both arms. The condition happens when one or more of the nerves that go from the spine to the arm get pinched or damaged.  · Lumbosacral radiculopathy - People with this type of radiculopathy have pain, weakness, numbness, or tingling in the buttocks or down the leg. The condition happens when one or more of the nerves that go from the spine to the foot and leg get pinched or damaged. People sometimes refer to the symptoms of this type of radiculopathy as "sciatica."  What causes radiculopathy? -- Radiculopathy is usually caused by a problem with the back. To understand radiculopathy, " "it's helpful to first learn a little about the back and spine.  The back is made up of (figure 1):  · Vertebrae - A stack of bones that sit on top of one another like a stack of coins. Each of these bones has a hole in the center. When stacked, the bones form a hollow tube that protects the spinal cord.  · Spinal cord and nerves - The spinal cord is the highway of nerves that connects the brain to the rest of the body. It runs through the hole in the center of the vertebrae. Nerves branch out from the spinal cord and pass in between the vertebrae. From there they connect to the arms, the legs, and the organs. (This is why problems in the back can cause leg pain or bladder problems.)  · Discs - Rubbery discs sit in between each of the vertebrae to add cushion and allow movement. The discs have a tough outer shell and jelly-like center.  · Muscles, tendons, and ligaments - Together the muscles, tendons, and ligaments are called the "soft tissues" of the back. These soft tissues support the back and help hold it together.  Radiculopathy can happen when changes in the back cause a nerve to get pinched or damaged. This can happen if:  · The vertebrae form bumps called bone spurs, which press on nearby nerves. (People with a condition called "spinal stenosis" often have this problem.)  · The discs between the vertebrae break open and bulge out, causing them to press on or irritate nearby nerves. (A disc that breaks open and bulges is called a "herniated disc.")   · Other medical conditions, such as diabetes, infection, inflammation, or a tumor injure the nerves near the spinal cord.  Should I see a doctor or nurse? -- If you have new pain, weakness, numbness, or tingling that seems to spread out to your arms or legs from your spine, see your doctor or nurse.  Will I need tests? -- Maybe. Doctors can tell a lot about a person's radiculopathy based on which parts of the body are affected and how. Because of that, you might " "not need any tests, especially if you have had symptoms only for a short time. Still, if your doctor is concerned about nerve damage, they might order one or more of these tests:  · Imaging tests - Imaging tests, such as X-rays, MRIs, or CT scans, create pictures of the inside of the body. These imaging tests can show problems with the back like those described above.  · Electromyography (also called "EMG" or nerve conduction study) - During this test, a technician or doctor checks how well electrical pulses travel across nerves to the part of your body that has symptoms. The test helps show whether the nerves controlling that body part are working right.  How is radiculopathy treated? -- Many people with radiculopathy do not need formal treatment. In some cases, the radiculopathy goes away as the back and nerves heal. In other cases, people find ways to cope with their symptoms.  When people do get treatment, the treatments can include:  · Pain medicines that you can get without a prescription (If these do not work, stronger prescription pain medicines are available.)  · Medicines to relax the muscles (called muscle relaxants)  · Avoiding activities that make the pain worse  · Injections of medicines that numb the back or reduce swelling  · Physical therapy to learn special exercises and stretches  · Surgery to repair the problem causing symptoms   All topics are updated as new evidence becomes available and our peer review process is complete.  This topic retrieved from [employer] on: Sep 21, 2021.  Topic 13357 Version 7.0  Release: 29.4.2 - C29.263  © 2021 UpToDate, Inc. and/or its affiliates. All rights reserved.  figure 1: Anatomy of the back     Low back pain can be caused by problems with the muscles, ligaments, discs, bones (vertebrae), or nerves. Often, back pain is caused by strains or sprains involving the muscles or ligaments. These problems cannot always be seen on imaging tests, such as MRI or CT " scans.  Graphic 16954 Version 5.0    Consumer Information Use and Disclaimer   This information is not specific medical advice and does not replace information you receive from your health care provider. This is only a brief summary of general information. It does NOT include all information about conditions, illnesses, injuries, tests, procedures, treatments, therapies, discharge instructions or life-style choices that may apply to you. You must talk with your health care provider for complete information about your health and treatment options. This information should not be used to decide whether or not to accept your health care provider's advice, instructions or recommendations. Only your health care provider has the knowledge and training to provide advice that is right for you. The use of this information is governed by the [employer] End User License Agreement, available at [email or web address].The use of [employer] content is governed by the [employer] Terms of Use. ©2021 UpToDate, Inc. All rights reserved.  Copyright   © 2021 UpToDate, Inc. and/or its affiliates. All rights reserved.

## 2022-02-01 NOTE — PROGRESS NOTES
"CC: Well woman exam    HPI  Jenifer Westfall is a 46 y.o. female  presents for a well woman exam.  LMP: No LMP recorded. Patient has had an injection..  No issues, problems, or complaints.    Past Medical History:   Diagnosis Date    Arthritis     DM (diabetes mellitus)      2017 Insulin x 3 years     Hepatitis C antibody positive in blood 2021    RNA NEGATIVE 3/6/2017    Hyperlipidemia     Hypertension     IBS (irritable bowel syndrome)     Kidney stone     Marfan syndrome     Mitral valve prolapse      Past Surgical History:   Procedure Laterality Date    ANGIOGRAPHY OF LOWER EXTREMITY N/A 2018    Procedure: ANGIOGRAM, LOWER EXTREMITY- LEFT LEG;  Surgeon: Roscoe Landeros MD;  Location: [facility] CATH LAB;  Service: Peripheral Vascular;  Laterality: N/A;    APPENDECTOMY      BUNIONECTOMY      both feet    cataract surgery  2019     SECTION      x 2    CHOLECYSTECTOMY      COLONOSCOPY N/A 2020    Procedure: COLONOSCOPY w/ biopsies;  Surgeon: Gio Georges MD;  Location: [facility] ENDO;  Service: Endoscopy;  Laterality: N/A;    ESOPHAGOGASTRODUODENOSCOPY N/A 2019    Procedure: ESOPHAGOGASTRODUODENOSCOPY (EGD);  Surgeon: Jaron Sanders III, MD;  Location: [facility];  Service: Endoscopy;  Laterality: N/A;    SELECTIVE INJECTION OF ANESTHETIC AGENT AROUND LUMBAR SPINAL NERVE ROOT BY TRANSFORAMINAL APPROACH Bilateral 2020    Procedure: Bilateral L5/S1 TF DENY;  Surgeon: Jewel Walters MD;  Location: [facility];  Service: Pain Management;  Laterality: Bilateral;    TUBAL LIGATION       Social History     Socioeconomic History    Marital status: Single   Tobacco Use    Smoking status: Former Smoker     Packs/day: 0.20     Years: 20.00     Pack years: 4.00     Types: Cigarettes     Quit date: 2015     Years since quittin.8    Smokeless tobacco: Never Used    Tobacco comment: "once in a blue moon"   Substance and Sexual Activity "    Alcohol use: No     Alcohol/week: 0.0 standard drinks    Drug use: No    Sexual activity: Not Currently     Social Determinants of Health     Financial Resource Strain: Low Risk     Difficulty of Paying Living Expenses: Not hard at all   Food Insecurity: No Food Insecurity    Worried About Running Out of Food in the Last Year: Never true    Ran Out of Food in the Last Year: Never true   Transportation Needs: No Transportation Needs    Lack of Transportation (Medical): No    Lack of Transportation (Non-Medical): No   Physical Activity: Inactive    Days of Exercise per Week: 0 days    Minutes of Exercise per Session: 0 min   Stress: No Stress Concern Present    Feeling of Stress : Only a little   Social Connections: Socially Isolated    Frequency of Communication with Friends and Family: Three times a week    Frequency of Social Gatherings with Friends and Family: Once a week    Attends Religion Services: Never    Active Member of Clubs or Organizations: No    Attends Club or Organization Meetings: Never    Marital Status: Never    Housing Stability: Unknown    Unable to Pay for Housing in the Last Year: No    Unstable Housing in the Last Year: No     Family History   Problem Relation Age of Onset    Heart disease Mother     Thrombophilia Father         DVT    Hypertension Father     Stroke Maternal Aunt     Heart disease Maternal Aunt     Stroke Maternal Uncle     Heart disease Maternal Uncle     Breast cancer Neg Hx     Colon cancer Neg Hx     Ovarian cancer Neg Hx      OB History        2    Para   2    Term   2            AB        Living           SAB        IAB        Ectopic        Multiple        Live Births                     Current Outpatient Medications:     ADMELOG SOLOSTAR U-100 INSULIN 100 unit/mL pen, , Disp: , Rfl:     ALPRAZolam (XANAX) 0.25 MG tablet, Take 0.25 mg by mouth daily as needed., Disp: , Rfl:     ascorbic acid (VITAMIN C) 100 MG  "tablet, Take 100 mg by mouth once daily., Disp: , Rfl:     aspirin (ECOTRIN) 81 MG EC tablet, Take 81 mg by mouth once daily., Disp: , Rfl:     atenoloL (TENORMIN) 25 MG tablet, Take 1 tablet (25 mg total) by mouth once daily., Disp: 30 tablet, Rfl: 11    atorvastatin (LIPITOR) 80 MG tablet, TAKE 1 TABLET BY MOUTH ONCE DAILY. , Disp: 30 tablet, Rfl: 11    azelastine (ASTELIN) 137 mcg (0.1 %) nasal spray, , Disp: , Rfl:     clopidogreL (PLAVIX) 75 mg tablet, TAKE 1 TABLET BY MOUTH ONCE DAILY, Disp: 90 tablet, Rfl: 2    diclofenac sodium (VOLTAREN) 1 % Gel, Apply 2 g topically 3 (three) times daily., Disp: 1 Tube, Rfl: 2    dicyclomine (BENTYL) 20 mg tablet, Take 1 tablet (20 mg total) by mouth before meals as needed (abd pain)., Disp: 90 tablet, Rfl: 2    famotidine (PEPCID) 20 MG tablet, TAKE 1 TABLET BY MOUTH 2 (TWO) TIMES DAILY. , Disp: 60 tablet, Rfl: 11    FLUoxetine (PROZAC) 40 MG capsule, TAKE 1 CAPSULE BY MOUTH ONCE DAILY, Disp: 90 capsule, Rfl: 0    fluticasone propionate (FLONASE) 50 mcg/actuation nasal spray, , Disp: , Rfl:     glipiZIDE (GLUCOTROL) 10 MG tablet, Take 1 tablet (10 mg total) by mouth 2 (two) times daily., Disp: 60 tablet, Rfl: 0    insulin glargine,hum.rec.anlog (BASAGLAR KWIKPEN U-100 INSULIN SUBQ), Inject 40 Units into the skin 2 (two) times daily., Disp: , Rfl:     liraglutide 0.6 mg/0.1 mL, 18 mg/3 mL, subq PNIJ (VICTOZA 2-DIAN) 0.6 mg/0.1 mL (18 mg/3 mL) PnIj, Inject 1.2 Units into the skin., Disp: , Rfl:     lisinopril 10 MG tablet, TAKE 1 TABLET (10 MG TOTAL) BY MOUTH ONCE DAILY., Disp: 30 tablet, Rfl: 3    NOVOLOG FLEXPEN U-100 INSULIN 100 unit/mL (3 mL) InPn pen, , Disp: , Rfl:     pantoprazole (PROTONIX) 40 MG tablet, TAKE 1 TABLET BY MOUTH ONCE DAILY, Disp: 30 tablet, Rfl: 3    PEN NEEDLE 31 gauge x 5/16" Ndle, USE 5 TIMES DAILY WITH LANTUS AND HUMALOG, Disp: 100 each, Rfl: 3    pregabalin (LYRICA) 50 MG capsule, Take 1 capsule (50 mg total) by mouth nightly for " "10 days, THEN 1 capsule (50 mg total) 2 (two) times daily for 10 days, THEN 1 capsule (50 mg total) 3 (three) times daily. Do not take with gabapentin.., Disp: 120 capsule, Rfl: 3    promethazine (PHENERGAN) 25 MG tablet, , Disp: , Rfl:     tiZANidine (ZANAFLEX) 4 MG tablet, TAKE 1 TO 1 & 1/2 TABLET (4 TO 6MG TOTAL) BY MOUTH NIGHTLY AS NEEDED., Disp: 45 tablet, Rfl: 5    TRUE METRIX GLUCOSE TEST STRIP Strp, , Disp: , Rfl:     Current Facility-Administered Medications:     medroxyPROGESTERone (DEPO-PROVERA) injection 150 mg, 150 mg, Intramuscular, Q90 Days, Caitlyn Agustin NP    GYNECOLOGY HISTORY:  Bilateral tubal ligation   Desires to continue depo provera as treatment for ovarian cyst   Mammogram was done 1/2022    DATA REVIEWED:  Last pap: 2020 Normal     Ht 5' 10" (1.778 m)   Wt 104.4 kg (230 lb 2.6 oz)   BMI 33.02 kg/m²     Review of Systems  See HPI   Physical Exam  Constitutional:       Appearance: Normal appearance.   Neurological:      Mental Status: She is alert and oriented to person, place, and time.   Skin:     General: Skin is warm and dry.           Routine gynecological examination    Encounter for prescription for depo-Provera  -     medroxyPROGESTERone (DEPO-PROVERA) injection 150 mg    Discussed to importance of having PCP as well as GYN   Discussed the difference between WWE and pap smear   WWE due every year.  Pap smear and Breast exam due every three years    Patient was counseled today on A.C.S. Pap guidelines (Q3) and recommendations for yearly pelvic exams, yearly mammograms starting age 40, and clinical breast exams; to see her PCP for other health maintenance.     "
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
## 2022-02-02 ENCOUNTER — TELEPHONE (OUTPATIENT)
Dept: NEUROLOGY | Facility: CLINIC | Age: 47
End: 2022-02-02
Payer: MEDICAID

## 2022-02-02 NOTE — TELEPHONE ENCOUNTER
----- Message from Valencia Self sent at 2/2/2022  9:25 AM CST -----  Contact: Jenifer Argueta is calling to give fax number and phone number for pain management at [facility] to send referral. Fax number [phone] phone number [phone]. No call back is needed.            Thanks  DD

## 2022-02-03 ENCOUNTER — TELEPHONE (OUTPATIENT)
Dept: NEUROLOGY | Facility: CLINIC | Age: 47
End: 2022-02-03
Payer: MEDICAID

## 2022-02-03 ENCOUNTER — TELEPHONE (OUTPATIENT)
Dept: RHEUMATOLOGY | Facility: CLINIC | Age: 47
End: 2022-02-03
Payer: MEDICAID

## 2022-02-03 NOTE — TELEPHONE ENCOUNTER
----- Message from Lucita Lee sent at 2/3/2022 11:06 AM CST -----  Contact: Jenifer Burgess would like a call back in regards to getting off her medication as Dr. Peralta suggested but she is not sure of how he suggested she does it. Please call her danisha at [phone]

## 2022-02-03 NOTE — TELEPHONE ENCOUNTER
----- Message from Valencia Self sent at 2/3/2022  8:59 AM CST -----  Contact: [facility] jessica Torres is calling to see if pt's MRI and EMG reports can be faxed over to [phone]. call back # [phone]            Thanks  DD

## 2022-02-04 ENCOUNTER — TELEPHONE (OUTPATIENT)
Dept: OBSTETRICS AND GYNECOLOGY | Facility: CLINIC | Age: 47
End: 2022-02-04
Payer: MEDICAID

## 2022-02-04 NOTE — TELEPHONE ENCOUNTER
----- Message from Ky Peralta MD sent at 2/4/2022 12:09 PM CST -----  Please inform patient that her mammogram results are normal.

## 2022-02-07 ENCOUNTER — TELEPHONE (OUTPATIENT)
Dept: REHABILITATION | Facility: HOSPITAL | Age: 47
End: 2022-02-07
Payer: MEDICAID

## 2022-02-07 NOTE — TELEPHONE ENCOUNTER
Called pt. Secondary no show for appointment today.  Pt. Educated of time and date of next appointment and to call clinic if she has any questions or needs to reschedule.

## 2022-02-08 ENCOUNTER — TELEPHONE (OUTPATIENT)
Dept: RHEUMATOLOGY | Facility: CLINIC | Age: 47
End: 2022-02-08
Payer: MEDICAID

## 2022-02-08 NOTE — TELEPHONE ENCOUNTER
Left message for patient to return call to clinic to discuss medication changes.      Per Dr. Peralta:  Recommended taper of gabapentin to discontinue over 2 weeks.  After 1 week off gabapentin may proceed with Lyrica as instructed. Thank you.

## 2022-02-08 NOTE — TELEPHONE ENCOUNTER
Spoke to patient and relayed the following information per Dr. Peralta:    Recommended taper of gabapentin to discontinue over 2 weeks.  After 1 week off gabapentin may proceed with Lyrica as instructed. Thank you.

## 2022-02-14 ENCOUNTER — TELEPHONE (OUTPATIENT)
Dept: NEUROSURGERY | Facility: CLINIC | Age: 47
End: 2022-02-14
Payer: MEDICAID

## 2022-02-14 NOTE — TELEPHONE ENCOUNTER
----- Message from Samara Pete sent at 2/14/2022  2:50 PM CST -----  Contact: Jenifer Burgess is needing her MRI from 10/21/2020 sent to [facility] Management at [facility]. The fax number is [phone]. Please call her with any questions at [phone].

## 2022-02-14 NOTE — TELEPHONE ENCOUNTER
Spoke with pt informed pt that she'll have to contact medical records in regards to obtaining any records, gave pt medical records # pt vu

## 2022-03-14 ENCOUNTER — TELEPHONE (OUTPATIENT)
Dept: NEUROLOGY | Facility: CLINIC | Age: 47
End: 2022-03-14
Payer: MEDICAID

## 2022-03-14 NOTE — TELEPHONE ENCOUNTER
----- Message from Delaney Chong sent at 3/14/2022 10:23 AM CDT -----  Pt would like return call regarding referral for pain management.   Please call back at [phone]. Thx Md

## 2022-03-14 NOTE — TELEPHONE ENCOUNTER
Pt called to have pain management referral sent to [facility]  [phone]  FAX: [phone]

## 2022-03-16 ENCOUNTER — TELEPHONE (OUTPATIENT)
Dept: NEUROLOGY | Facility: CLINIC | Age: 47
End: 2022-03-16
Payer: MEDICAID

## 2022-03-16 NOTE — TELEPHONE ENCOUNTER
Patient would like to speak with someone in office. Please contact her it was sent to the wrong department.

## 2022-03-16 NOTE — TELEPHONE ENCOUNTER
----- Message from Toby Mahoney sent at 3/16/2022  9:52 AM CDT -----  PT is requesting a call back at [phone] for  personal matters.

## 2022-03-20 ENCOUNTER — HOSPITAL ENCOUNTER (EMERGENCY)
Facility: HOSPITAL | Age: 47
Discharge: HOME | End: 2022-03-20
Attending: EMERGENCY MEDICINE
Payer: MEDICAID

## 2022-03-20 VITALS
OXYGEN SATURATION: 98 % | DIASTOLIC BLOOD PRESSURE: 60 MMHG | HEART RATE: 90 BPM | WEIGHT: 220 LBS | SYSTOLIC BLOOD PRESSURE: 122 MMHG | BODY MASS INDEX: 31.5 KG/M2 | RESPIRATION RATE: 18 BRPM | HEIGHT: 70 IN | TEMPERATURE: 99 F

## 2022-03-20 DIAGNOSIS — B37.9 YEAST INFECTION: ICD-10-CM

## 2022-03-20 DIAGNOSIS — B37.2 INTERTRIGINOUS CANDIDIASIS: Primary | ICD-10-CM

## 2022-03-20 DIAGNOSIS — R73.9 HYPERGLYCEMIA: ICD-10-CM

## 2022-03-20 LAB
ALBUMIN SERPL BCP-MCNC: 3.3 G/DL (ref 3.5–5.2)
ALP SERPL-CCNC: 144 U/L (ref 55–135)
ALT SERPL W/O P-5'-P-CCNC: 28 U/L (ref 10–44)
ANION GAP SERPL CALC-SCNC: 13 MMOL/L (ref 8–16)
AST SERPL-CCNC: 25 U/L (ref 10–40)
BASOPHILS # BLD AUTO: 0.04 K/UL (ref 0–0.2)
BASOPHILS NFR BLD: 0.4 % (ref 0–1.9)
BILIRUB SERPL-MCNC: 0.3 MG/DL (ref 0.1–1)
BUN SERPL-MCNC: 14 MG/DL (ref 6–20)
CALCIUM SERPL-MCNC: 8.9 MG/DL (ref 8.7–10.5)
CHLORIDE SERPL-SCNC: 108 MMOL/L (ref 95–110)
CO2 SERPL-SCNC: 17 MMOL/L (ref 23–29)
CREAT SERPL-MCNC: 0.8 MG/DL (ref 0.5–1.4)
DIFFERENTIAL METHOD: NORMAL
EOSINOPHIL # BLD AUTO: 0.2 K/UL (ref 0–0.5)
EOSINOPHIL NFR BLD: 1.7 % (ref 0–8)
ERYTHROCYTE [DISTWIDTH] IN BLOOD BY AUTOMATED COUNT: 12.2 % (ref 11.5–14.5)
EST. GFR  (AFRICAN AMERICAN): >60 ML/MIN/1.73 M^2
EST. GFR  (NON AFRICAN AMERICAN): >60 ML/MIN/1.73 M^2
GLUCOSE SERPL-MCNC: 230 MG/DL (ref 70–110)
HCT VFR BLD AUTO: 42.4 % (ref 37–48.5)
HCV AB SERPL QL IA: POSITIVE
HEP C VIRUS HOLD SPECIMEN: NORMAL
HGB BLD-MCNC: 13.9 G/DL (ref 12–16)
HIV 1+2 AB+HIV1 P24 AG SERPL QL IA: NEGATIVE
IMM GRANULOCYTES # BLD AUTO: 0.03 K/UL (ref 0–0.04)
IMM GRANULOCYTES NFR BLD AUTO: 0.3 % (ref 0–0.5)
LYMPHOCYTES # BLD AUTO: 4.1 K/UL (ref 1–4.8)
LYMPHOCYTES NFR BLD: 41.6 % (ref 18–48)
MCH RBC QN AUTO: 30.1 PG (ref 27–31)
MCHC RBC AUTO-ENTMCNC: 32.8 G/DL (ref 32–36)
MCV RBC AUTO: 92 FL (ref 82–98)
MONOCYTES # BLD AUTO: 0.7 K/UL (ref 0.3–1)
MONOCYTES NFR BLD: 6.9 % (ref 4–15)
NEUTROPHILS # BLD AUTO: 4.8 K/UL (ref 1.8–7.7)
NEUTROPHILS NFR BLD: 49.1 % (ref 38–73)
NRBC BLD-RTO: 0 /100 WBC
PLATELET # BLD AUTO: 262 K/UL (ref 150–450)
PMV BLD AUTO: 10 FL (ref 9.2–12.9)
POCT GLUCOSE: 456 MG/DL (ref 70–110)
POTASSIUM SERPL-SCNC: 4.1 MMOL/L (ref 3.5–5.1)
PROT SERPL-MCNC: 6.4 G/DL (ref 6–8.4)
RBC # BLD AUTO: 4.62 M/UL (ref 4–5.4)
SODIUM SERPL-SCNC: 138 MMOL/L (ref 136–145)
WBC # BLD AUTO: 9.81 K/UL (ref 3.9–12.7)

## 2022-03-20 PROCEDURE — 86803 HEPATITIS C AB TEST: CPT | Performed by: EMERGENCY MEDICINE

## 2022-03-20 PROCEDURE — 25000003 PHARM REV CODE 250: Performed by: NURSE PRACTITIONER

## 2022-03-20 PROCEDURE — 87389 HIV-1 AG W/HIV-1&-2 AB AG IA: CPT | Performed by: EMERGENCY MEDICINE

## 2022-03-20 PROCEDURE — 99284 EMERGENCY DEPT VISIT MOD MDM: CPT | Mod: 25

## 2022-03-20 PROCEDURE — 82962 GLUCOSE BLOOD TEST: CPT

## 2022-03-20 PROCEDURE — 87522 HEPATITIS C REVRS TRNSCRPJ: CPT | Performed by: EMERGENCY MEDICINE

## 2022-03-20 PROCEDURE — 63600175 PHARM REV CODE 636 W HCPCS: Performed by: NURSE PRACTITIONER

## 2022-03-20 PROCEDURE — 36415 COLL VENOUS BLD VENIPUNCTURE: CPT | Performed by: EMERGENCY MEDICINE

## 2022-03-20 PROCEDURE — 96374 THER/PROPH/DIAG INJ IV PUSH: CPT

## 2022-03-20 PROCEDURE — 85025 COMPLETE CBC W/AUTO DIFF WBC: CPT | Performed by: NURSE PRACTITIONER

## 2022-03-20 PROCEDURE — 80053 COMPREHEN METABOLIC PANEL: CPT | Performed by: EMERGENCY MEDICINE

## 2022-03-20 RX ORDER — LIDOCAINE HYDROCHLORIDE 20 MG/ML
SOLUTION OROPHARYNGEAL 4 TIMES DAILY PRN
Qty: 200 ML | Refills: 0 | Status: SHIPPED | OUTPATIENT
Start: 2022-03-20 | End: 2022-06-01

## 2022-03-20 RX ORDER — SODIUM CHLORIDE 9 MG/ML
1000 INJECTION, SOLUTION INTRAVENOUS
Status: COMPLETED | OUTPATIENT
Start: 2022-03-20 | End: 2022-03-20

## 2022-03-20 RX ORDER — LIDOCAINE HYDROCHLORIDE 20 MG/ML
10 SOLUTION OROPHARYNGEAL
Status: DISCONTINUED | OUTPATIENT
Start: 2022-03-20 | End: 2022-03-20

## 2022-03-20 RX ORDER — CLOTRIMAZOLE 1 %
CREAM (GRAM) TOPICAL
Qty: 60 G | Refills: 0 | Status: SHIPPED | OUTPATIENT
Start: 2022-03-20 | End: 2022-06-01

## 2022-03-20 RX ORDER — GABAPENTIN 300 MG/1
600 CAPSULE ORAL 3 TIMES DAILY
COMMUNITY
Start: 2022-03-17 | End: 2022-06-17 | Stop reason: SDUPTHER

## 2022-03-20 RX ORDER — HYDROCODONE BITARTRATE AND ACETAMINOPHEN 7.5; 325 MG/1; MG/1
1 TABLET ORAL ONCE
Status: COMPLETED | OUTPATIENT
Start: 2022-03-20 | End: 2022-03-20

## 2022-03-20 RX ORDER — HYDROCODONE BITARTRATE AND ACETAMINOPHEN 7.5; 325 MG/1; MG/1
1 TABLET ORAL EVERY 6 HOURS PRN
Qty: 12 TABLET | Refills: 0 | Status: SHIPPED | OUTPATIENT
Start: 2022-03-20 | End: 2022-06-01

## 2022-03-20 RX ORDER — FLUCONAZOLE 200 MG/1
200 TABLET ORAL DAILY
Qty: 7 TABLET | Refills: 0 | Status: SHIPPED | OUTPATIENT
Start: 2022-03-20 | End: 2022-03-27

## 2022-03-20 RX ORDER — LIDOCAINE HYDROCHLORIDE 20 MG/ML
10 JELLY TOPICAL
Status: COMPLETED | OUTPATIENT
Start: 2022-03-20 | End: 2022-03-20

## 2022-03-20 RX ADMIN — INSULIN HUMAN 10 UNITS: 100 INJECTION, SOLUTION PARENTERAL at 04:03

## 2022-03-20 RX ADMIN — HYDROCODONE BITARTRATE AND ACETAMINOPHEN 1 TABLET: 7.5; 325 TABLET ORAL at 03:03

## 2022-03-20 RX ADMIN — SODIUM CHLORIDE 1000 ML: 0.9 INJECTION, SOLUTION INTRAVENOUS at 05:03

## 2022-03-20 RX ADMIN — LIDOCAINE HYDROCHLORIDE 10 ML: 20 JELLY TOPICAL at 05:03

## 2022-03-20 RX ADMIN — SODIUM CHLORIDE 1000 ML: 0.9 INJECTION, SOLUTION INTRAVENOUS at 04:03

## 2022-03-20 NOTE — ED PROVIDER NOTES
Encounter Date: 3/20/2022       History     Chief Complaint   Patient presents with    Blister     Blisters on inner thighs x 3-4 days, pt denies using any new products for skincare.     46-year-old female with complaint of pain and burning to the vaginal area for the past couple of days.  Patient denies fever or chills.  Patient reports history of yeast infections.        Review of patient's allergies indicates:   Allergen Reactions    Compazine [prochlorperazine edisylate] Rash    Contrast media Anaphylaxis    Dye Anaphylaxis    Levaquin [levofloxacin] Hives and Itching    Metformin Other (See Comments)     Upset stomach    Ibuprofen Other (See Comments)     Stomach pain    Morphine Other (See Comments)     Irritable     Past Medical History:   Diagnosis Date    Arthritis     DM (diabetes mellitus)      2017 Insulin x 3 years     Hepatitis C antibody positive in blood 2021    RNA NEGATIVE 3/6/2017, 3/22/22    Hyperlipidemia     Hypertension     IBS (irritable bowel syndrome)     Kidney stone     Marfan syndrome     Mitral valve prolapse      Past Surgical History:   Procedure Laterality Date    ANGIOGRAPHY OF LOWER EXTREMITY N/A 2018    Procedure: ANGIOGRAM, LOWER EXTREMITY- LEFT LEG;  Surgeon: Roscoe Landeros MD;  Location: [facility] CATH LAB;  Service: Peripheral Vascular;  Laterality: N/A;    APPENDECTOMY      BUNIONECTOMY      both feet    cataract surgery  2019     SECTION      x 2    CHOLECYSTECTOMY      COLONOSCOPY N/A 2020    Procedure: COLONOSCOPY w/ biopsies;  Surgeon: Gio Georges MD;  Location: [facility] ENDO;  Service: Endoscopy;  Laterality: N/A;    ESOPHAGOGASTRODUODENOSCOPY N/A 2019    Procedure: ESOPHAGOGASTRODUODENOSCOPY (EGD);  Surgeon: Jaron Sanders III, MD;  Location: [facility];  Service: Endoscopy;  Laterality: N/A;    SELECTIVE INJECTION OF ANESTHETIC AGENT AROUND LUMBAR SPINAL NERVE ROOT BY TRANSFORAMINAL APPROACH  "Bilateral 2020    Procedure: Bilateral L5/S1 TF DENY;  Surgeon: Jewel Walters MD;  Location: [facility] PAIN MGT;  Service: Pain Management;  Laterality: Bilateral;    TUBAL LIGATION       Family History   Problem Relation Age of Onset    Heart disease Mother     Thrombophilia Father         DVT    Hypertension Father     Stroke Maternal Aunt     Heart disease Maternal Aunt     Stroke Maternal Uncle     Heart disease Maternal Uncle     Breast cancer Neg Hx     Colon cancer Neg Hx     Ovarian cancer Neg Hx      Social History     Tobacco Use    Smoking status: Former Smoker     Packs/day: 0.20     Years: 20.00     Pack years: 4.00     Types: Cigarettes     Quit date: 2015     Years since quittin.9    Smokeless tobacco: Never Used    Tobacco comment: "once in a blue moon"   Substance Use Topics    Alcohol use: No     Alcohol/week: 0.0 standard drinks    Drug use: No     Review of Systems   Constitutional: Negative for fever.   HENT: Negative for sore throat.    Respiratory: Negative for shortness of breath.    Cardiovascular: Negative for chest pain.   Gastrointestinal: Negative for nausea.   Genitourinary: Positive for vaginal pain. Negative for dysuria.   Musculoskeletal: Negative for back pain.   Skin: Negative for rash.   Neurological: Negative for weakness.   Hematological: Does not bruise/bleed easily.       Physical Exam     Initial Vitals [22 1458]   BP Pulse Resp Temp SpO2   (!) 111/56 97 18 98.6 °F (37 °C) 98 %      MAP       --         Physical Exam    Nursing note and vitals reviewed.  Constitutional: She appears well-developed and well-nourished.   HENT:   Head: Normocephalic and atraumatic.   Eyes: Conjunctivae and EOM are normal. Pupils are equal, round, and reactive to light.   Neck: Neck supple.   Normal range of motion.  Cardiovascular: Normal rate, regular rhythm, normal heart sounds and intact distal pulses.   Pulmonary/Chest: Breath sounds normal.   Abdominal: " Abdomen is soft. There is no abdominal tenderness. There is no rebound and no guarding.   Musculoskeletal:         General: Normal range of motion.      Cervical back: Normal range of motion and neck supple.     Neurological: She is alert and oriented to person, place, and time. She has normal strength and normal reflexes.   Skin: Skin is warm and dry.   Beefy red erythema with areas of excoriation in her bilateral groin region with extension into the vaginal region, with satellite lesions suggestive of Candida   Psychiatric: She has a normal mood and affect. Her behavior is normal. Thought content normal.         ED Course   Procedures  Labs Reviewed   HEPATITIS C ANTIBODY - Abnormal; Notable for the following components:       Result Value    Hepatitis C Ab Positive (*)     All other components within normal limits    Narrative:     Release to patient->Immediate   COMPREHENSIVE METABOLIC PANEL - Abnormal; Notable for the following components:    CO2 17 (*)     Glucose 230 (*)     Albumin 3.3 (*)     Alkaline Phosphatase 144 (*)     All other components within normal limits   POCT GLUCOSE - Abnormal; Notable for the following components:    POCT Glucose 456 (*)     All other components within normal limits   CBC W/ AUTO DIFFERENTIAL   HIV 1 / 2 ANTIBODY    Narrative:     Release to patient->Immediate   HEP C VIRUS HOLD SPECIMEN    Narrative:     Release to patient->Immediate   HEPATITIS C RNA, QUANTITATIVE, PCR    Narrative:     Release to patient->Immediate          Imaging Results    None          Medications   HYDROcodone-acetaminophen 7.5-325 mg per tablet 1 tablet (1 tablet Oral Given 3/20/22 1555)   0.9%  NaCl infusion (1,000 mLs Intravenous New Bag 3/20/22 1703)   0.9%  NaCl infusion (1,000 mLs Intravenous New Bag 3/20/22 1616)   insulin regular injection 10 Units (10 Units Intravenous Given 3/20/22 1614)   LIDOcaine HCL 2% jelly 10 mL (10 mLs Topical (Top) Given 3/20/22 1720)                           Clinical Impression:   Final diagnoses:  [B37.2] Intertriginous candidiasis (Primary)  [R73.9] Hyperglycemia  [B37.9] Yeast infection          ED Disposition Condition    Discharge Stable        ED Prescriptions     Medication Sig Dispense Start Date End Date Auth. Provider    fluconazole (DIFLUCAN) 200 MG Tab Take 1 tablet (200 mg total) by mouth once daily. for 7 days 7 tablet 3/20/2022 3/27/2022 Amadeo Mchugh NP    clotrimazole (LOTRIMIN) 1 % cream Apply to affected area 2 times daily 60 g 3/20/2022  Amadeo Mchugh NP    HYDROcodone-acetaminophen (NORCO) 7.5-325 mg per tablet Take 1 tablet by mouth every 6 (six) hours as needed for Pain. 12 tablet 3/20/2022  Amadeo Mchugh NP    LIDOcaine HCl 2% (LIDOCAINE VISCOUS) 2 % Soln by Mucous Membrane route 4 (four) times daily as needed (pain). 200 mL 3/20/2022  Amadeo Mchugh NP        Follow-up Information     Follow up With Specialties Details Why Contact Info    PCP  Schedule an appointment as soon as possible for a visit in 2 days             Guevara Lance NP  03/24/22 1942

## 2022-03-20 NOTE — FIRST PROVIDER EVALUATION
"Medical screening exam completed.  I have conducted a focused provider triage encounter, findings are as follows:    46-year-old female with complaint of severe vaginal pain and burning for the past 2 days.    PE: no pelvic tenderness    Vitals:    03/20/22 1458   BP: (!) 111/56   BP Location: Left arm   Patient Position: Sitting   Pulse: 97   Resp: 18   Temp: 98.6 °F (37 °C)   TempSrc: Oral   SpO2: 98%   Weight: 99.8 kg (220 lb)   Height: 5' 10" (1.778 m)         "

## 2022-03-23 LAB — HEPATITIS C VIRUS (HCV) RNA DETECTION/QUANTIFICATION RT-PCR: NORMAL IU/ML

## 2022-04-01 ENCOUNTER — TELEPHONE (OUTPATIENT)
Dept: GASTROENTEROLOGY | Facility: CLINIC | Age: 47
End: 2022-04-01
Payer: MEDICAID

## 2022-04-01 DIAGNOSIS — R11.2 NAUSEA AND VOMITING, INTRACTABILITY OF VOMITING NOT SPECIFIED, UNSPECIFIED VOMITING TYPE: Primary | ICD-10-CM

## 2022-04-01 NOTE — TELEPHONE ENCOUNTER
----- Message from Maycol Harley sent at 4/1/2022 11:34 AM CDT -----  Pt states she is needing to speak to the nurse regarding getting an order in for testing. Please call back at .[phone]. Tx JANES

## 2022-04-01 NOTE — TELEPHONE ENCOUNTER
----- Message from Maycol Harley sent at 4/1/2022 11:43 AM CDT -----  Pt states she missed call and asks that someone call her back. .[phone]. Tx JANES

## 2022-04-01 NOTE — TELEPHONE ENCOUNTER
Returned call to pt who stated she was supposed to have an EGD but due to Covid it was cancelled. Order has since . Please advise if pt needs to be seen in clinic before case request can be put in.

## 2022-04-04 RX ORDER — DICYCLOMINE HYDROCHLORIDE 20 MG/1
TABLET ORAL
Qty: 90 TABLET | Refills: 2 | OUTPATIENT
Start: 2022-04-04

## 2022-04-05 ENCOUNTER — TELEPHONE (OUTPATIENT)
Dept: GASTROENTEROLOGY | Facility: CLINIC | Age: 47
End: 2022-04-05
Payer: MEDICAID

## 2022-04-05 NOTE — TELEPHONE ENCOUNTER
Pt needs clearance from her vascular surgeon to hold asa and plavix for endoscopy procedure.      Dr Delgado

## 2022-04-05 NOTE — TELEPHONE ENCOUNTER
----- Message from Karla Valdez sent at 4/5/2022 12:53 PM CDT -----  Regarding: rtn call  Contact: pt  Pt returning call to office. Pt can be reached at [phone]

## 2022-04-11 ENCOUNTER — TELEPHONE (OUTPATIENT)
Dept: GASTROENTEROLOGY | Facility: CLINIC | Age: 47
End: 2022-04-11
Payer: MEDICAID

## 2022-04-11 RX ORDER — DICYCLOMINE HYDROCHLORIDE 20 MG/1
20 TABLET ORAL
Qty: 90 TABLET | Refills: 2 | Status: CANCELLED | OUTPATIENT
Start: 2022-04-11

## 2022-04-11 RX ORDER — DICYCLOMINE HYDROCHLORIDE 20 MG/1
TABLET ORAL
Qty: 90 TABLET | Refills: 2 | OUTPATIENT
Start: 2022-04-11

## 2022-04-14 NOTE — TELEPHONE ENCOUNTER
----- Message from Valencia Self sent at 4/14/2022 10:31 AM CDT -----  Contact: Jenifer Alfaro is requesting a refill on dicyclomine. Please call her back at [phone].          [facility] [place] - [address]  [address]  [place] [address]  Phone: [phone] Fax: [phone]            Thanks  DD

## 2022-04-15 RX ORDER — DICYCLOMINE HYDROCHLORIDE 20 MG/1
20 TABLET ORAL
Qty: 90 TABLET | Refills: 2 | Status: SHIPPED | OUTPATIENT
Start: 2022-04-15 | End: 2022-07-14

## 2022-04-19 ENCOUNTER — LAB VISIT (OUTPATIENT)
Dept: LAB | Facility: HOSPITAL | Age: 47
End: 2022-04-19
Attending: INTERNAL MEDICINE
Payer: MEDICAID

## 2022-04-19 ENCOUNTER — OFFICE VISIT (OUTPATIENT)
Dept: CARDIOLOGY | Facility: CLINIC | Age: 47
End: 2022-04-19
Payer: MEDICAID

## 2022-04-19 VITALS
WEIGHT: 228.19 LBS | RESPIRATION RATE: 16 BRPM | HEIGHT: 70 IN | HEART RATE: 94 BPM | OXYGEN SATURATION: 98 % | SYSTOLIC BLOOD PRESSURE: 120 MMHG | BODY MASS INDEX: 32.67 KG/M2 | DIASTOLIC BLOOD PRESSURE: 72 MMHG

## 2022-04-19 DIAGNOSIS — Z79.4 TYPE 2 DIABETES MELLITUS WITH DIABETIC PERIPHERAL ANGIOPATHY WITHOUT GANGRENE, WITH LONG-TERM CURRENT USE OF INSULIN: ICD-10-CM

## 2022-04-19 DIAGNOSIS — Z87.891 FORMER SMOKER: ICD-10-CM

## 2022-04-19 DIAGNOSIS — E66.9 OBESITY (BMI 30-39.9): ICD-10-CM

## 2022-04-19 DIAGNOSIS — R06.09 DOE (DYSPNEA ON EXERTION): ICD-10-CM

## 2022-04-19 DIAGNOSIS — Q87.40 MARFAN'S SYNDROME: ICD-10-CM

## 2022-04-19 DIAGNOSIS — E11.51 TYPE 2 DIABETES MELLITUS WITH DIABETIC PERIPHERAL ANGIOPATHY WITHOUT GANGRENE, WITH LONG-TERM CURRENT USE OF INSULIN: ICD-10-CM

## 2022-04-19 DIAGNOSIS — E78.2 MIXED HYPERLIPIDEMIA: ICD-10-CM

## 2022-04-19 DIAGNOSIS — M19.012 PRIMARY OSTEOARTHRITIS OF LEFT SHOULDER: Primary | ICD-10-CM

## 2022-04-19 DIAGNOSIS — R07.89 ATYPICAL CHEST PAIN: Primary | ICD-10-CM

## 2022-04-19 DIAGNOSIS — I73.9 PAD (PERIPHERAL ARTERY DISEASE): ICD-10-CM

## 2022-04-19 DIAGNOSIS — Z01.810 PREOP CARDIOVASCULAR EXAM: ICD-10-CM

## 2022-04-19 DIAGNOSIS — I10 PRIMARY HYPERTENSION: ICD-10-CM

## 2022-04-19 DIAGNOSIS — M75.42 IMPINGEMENT SYNDROME OF LEFT SHOULDER: ICD-10-CM

## 2022-04-19 DIAGNOSIS — I34.1 MVP (MITRAL VALVE PROLAPSE): ICD-10-CM

## 2022-04-19 LAB
ALBUMIN SERPL BCP-MCNC: 3.4 G/DL (ref 3.5–5.2)
ALP SERPL-CCNC: 128 U/L (ref 55–135)
ALT SERPL W/O P-5'-P-CCNC: 20 U/L (ref 10–44)
ANION GAP SERPL CALC-SCNC: 16 MMOL/L (ref 8–16)
AST SERPL-CCNC: 21 U/L (ref 10–40)
BILIRUB SERPL-MCNC: 0.5 MG/DL (ref 0.1–1)
BNP SERPL-MCNC: <10 PG/ML (ref 0–99)
BUN SERPL-MCNC: 11 MG/DL (ref 6–20)
CALCIUM SERPL-MCNC: 9 MG/DL (ref 8.7–10.5)
CHLORIDE SERPL-SCNC: 106 MMOL/L (ref 95–110)
CO2 SERPL-SCNC: 18 MMOL/L (ref 23–29)
CREAT SERPL-MCNC: 0.9 MG/DL (ref 0.5–1.4)
EST. GFR  (AFRICAN AMERICAN): >60 ML/MIN/1.73 M^2
EST. GFR  (NON AFRICAN AMERICAN): >60 ML/MIN/1.73 M^2
GLUCOSE SERPL-MCNC: 367 MG/DL (ref 70–110)
POTASSIUM SERPL-SCNC: 4.6 MMOL/L (ref 3.5–5.1)
PROT SERPL-MCNC: 6.6 G/DL (ref 6–8.4)
SODIUM SERPL-SCNC: 140 MMOL/L (ref 136–145)

## 2022-04-19 PROCEDURE — 3074F SYST BP LT 130 MM HG: CPT | Mod: CPTII,,, | Performed by: INTERNAL MEDICINE

## 2022-04-19 PROCEDURE — 80053 COMPREHEN METABOLIC PANEL: CPT | Performed by: INTERNAL MEDICINE

## 2022-04-19 PROCEDURE — 99999 PR PBB SHADOW E&M-EST. PATIENT-LVL IV: CPT | Mod: PBBFAC,,, | Performed by: INTERNAL MEDICINE

## 2022-04-19 PROCEDURE — 1160F PR REVIEW ALL MEDS BY PRESCRIBER/CLIN PHARMACIST DOCUMENTED: ICD-10-PCS | Mod: CPTII,,, | Performed by: INTERNAL MEDICINE

## 2022-04-19 PROCEDURE — 1159F MED LIST DOCD IN RCRD: CPT | Mod: CPTII,,, | Performed by: INTERNAL MEDICINE

## 2022-04-19 PROCEDURE — 3074F PR MOST RECENT SYSTOLIC BLOOD PRESSURE < 130 MM HG: ICD-10-PCS | Mod: CPTII,,, | Performed by: INTERNAL MEDICINE

## 2022-04-19 PROCEDURE — 3008F PR BODY MASS INDEX (BMI) DOCUMENTED: ICD-10-PCS | Mod: CPTII,,, | Performed by: INTERNAL MEDICINE

## 2022-04-19 PROCEDURE — 99214 OFFICE O/P EST MOD 30 MIN: CPT | Mod: PBBFAC | Performed by: INTERNAL MEDICINE

## 2022-04-19 PROCEDURE — 3008F BODY MASS INDEX DOCD: CPT | Mod: CPTII,,, | Performed by: INTERNAL MEDICINE

## 2022-04-19 PROCEDURE — 1160F RVW MEDS BY RX/DR IN RCRD: CPT | Mod: CPTII,,, | Performed by: INTERNAL MEDICINE

## 2022-04-19 PROCEDURE — 3078F PR MOST RECENT DIASTOLIC BLOOD PRESSURE < 80 MM HG: ICD-10-PCS | Mod: CPTII,,, | Performed by: INTERNAL MEDICINE

## 2022-04-19 PROCEDURE — 99999 PR PBB SHADOW E&M-EST. PATIENT-LVL IV: ICD-10-PCS | Mod: PBBFAC,,, | Performed by: INTERNAL MEDICINE

## 2022-04-19 PROCEDURE — 4010F PR ACE/ARB THEARPY RXD/TAKEN: ICD-10-PCS | Mod: CPTII,,, | Performed by: INTERNAL MEDICINE

## 2022-04-19 PROCEDURE — 4010F ACE/ARB THERAPY RXD/TAKEN: CPT | Mod: CPTII,,, | Performed by: INTERNAL MEDICINE

## 2022-04-19 PROCEDURE — 99215 OFFICE O/P EST HI 40 MIN: CPT | Mod: S$PBB,,, | Performed by: INTERNAL MEDICINE

## 2022-04-19 PROCEDURE — 83880 ASSAY OF NATRIURETIC PEPTIDE: CPT | Performed by: INTERNAL MEDICINE

## 2022-04-19 PROCEDURE — 3078F DIAST BP <80 MM HG: CPT | Mod: CPTII,,, | Performed by: INTERNAL MEDICINE

## 2022-04-19 PROCEDURE — 99215 PR OFFICE/OUTPT VISIT, EST, LEVL V, 40-54 MIN: ICD-10-PCS | Mod: S$PBB,,, | Performed by: INTERNAL MEDICINE

## 2022-04-19 PROCEDURE — 36415 COLL VENOUS BLD VENIPUNCTURE: CPT | Performed by: INTERNAL MEDICINE

## 2022-04-19 PROCEDURE — 1159F PR MEDICATION LIST DOCUMENTED IN MEDICAL RECORD: ICD-10-PCS | Mod: CPTII,,, | Performed by: INTERNAL MEDICINE

## 2022-04-19 RX ORDER — ISOSORBIDE MONONITRATE 30 MG/1
30 TABLET, EXTENDED RELEASE ORAL DAILY
Qty: 30 TABLET | Refills: 11 | Status: SHIPPED | OUTPATIENT
Start: 2022-04-19 | End: 2023-05-05

## 2022-04-19 NOTE — PROGRESS NOTES
Subjective:    Patient ID:  Jenifer Westfall is a 46 y.o. female who presents for evaluation of Risk Factor Management For Atherosclerosis, Hypertension, Peripheral Arterial Disease, Hyperlipidemia, Chest Pain, and Shortness of Breath      HPI pt presents for eval.  Her current med conditions include Marfans, PAD, HTN, DM, MVP, PHTN, hyperlipidemia.   Former smoker.  Past hx pertinent for following:  She has Marfan's Dz dx'ed at the age of 9, with ectopia lentis.   She is the first one in the family having Marfan's and her younger son has Dx.   S/p aortic arch angio 2015 w Dr. Yao and  R subclavian artery.  S/p B STEPHEN stenting w Dr. Landeros, [facility] surgery, June 2018 (R STEPHEN 9 X 39 mm iCast Atrium stent, L STEPHEN:  9 x 59 mm Icast Atrium and 8 x 18 balloon exp stent).  F/u by Dr. Tian, [facility] surgery, and has had iliac interventions.  Now here.  I saw pt once before 10/20.  Sees Dr. Hsu in past.  Holter 8/21 normal findings.  Stress MPI 10/20 no ischemia.  Echo 10/21 normal LV function, normal MV.  She was seen [facility] ER for CP 3/22 and tx to [facility].  V/Q scan -.  No ischemic changes on ecgs.  Stress MPI 3/30/22 no ischemia, normal EF.  CP was heaviness but was continuous for few days.  No CP w exertion.  Not really related to po intake.  ntg did not help per pt.  Negative troponin levels.  Possible GI origin.  Has EGD this week.  HGAIC poorly controlled.  TG has improved last labs.  Weight stable.  She does have KAT, not acute.  No pnd/orthopnea.  Has chronic leg pains, claudication, neuropathy sxs.    Past Medical History:   Diagnosis Date    Arthritis     DM (diabetes mellitus) 2009     03/01/2017 Insulin x 3 years 2013    Hepatitis C antibody positive in blood 03/04/2021    RNA NEGATIVE 3/6/2017, 3/22/22    Hyperlipidemia     Hypertension     IBS (irritable bowel syndrome)     Kidney stone     Marfan syndrome     Mitral valve prolapse      Current Outpatient Medications   Medication  "Instructions    ADMELOG SOLOSTAR U-100 INSULIN 100 unit/mL pen No dose, route, or frequency recorded.    ALPRAZolam (XANAX) 0.25 mg, Oral, Daily PRN    ascorbic acid (vitamin C) (VITAMIN C) 100 mg, Oral, Daily    aspirin (ECOTRIN) 81 mg, Oral, Daily    atenoloL (TENORMIN) 25 mg, Oral, Daily    atorvastatin (LIPITOR) 80 MG tablet TAKE 1 TABLET BY MOUTH ONCE DAILY.     azelastine (ASTELIN) 137 mcg (0.1 %) nasal spray No dose, route, or frequency recorded.    clopidogreL (PLAVIX) 75 mg tablet TAKE 1 TABLET BY MOUTH ONCE DAILY    clotrimazole (LOTRIMIN) 1 % cream Apply to affected area 2 times daily    diclofenac sodium (VOLTAREN) 2 g, Topical (Top), 3 times daily    dicyclomine (BENTYL) 20 mg, Oral, 3 times daily before meals PRN    famotidine (PEPCID) 20 MG tablet TAKE 1 TABLET BY MOUTH TWICE DAILY    FLUoxetine (PROZAC) 40 MG capsule TAKE 1 CAPSULE BY MOUTH ONCE DAILY    fluticasone propionate (FLONASE) 50 mcg/actuation nasal spray No dose, route, or frequency recorded.    gabapentin (NEURONTIN) 300 MG capsule Oral    glipiZIDE (GLUCOTROL) 10 mg, Oral, 2 times daily    HYDROcodone-acetaminophen (NORCO) 7.5-325 mg per tablet 1 tablet, Oral, Every 6 hours PRN    insulin glargine,hum.rec.anlog (BASAGLAR KWIKPEN U-100 INSULIN SUBQ) 40 Units, Subcutaneous, 2 times daily    isosorbide mononitrate (IMDUR) 30 mg, Oral, Daily    LIDOcaine HCl 2% (LIDOCAINE VISCOUS) 2 % Soln Mucous Membrane, 4 times daily PRN    liraglutide 0.6 mg/0.1 mL (18 mg/3 mL) subq PNIJ (VICTOZA 2-DIAN) 1.2 Units, Subcutaneous    lisinopril 10 MG tablet TAKE 1 TABLET (10 MG TOTAL) BY MOUTH ONCE DAILY.    NOVOLOG FLEXPEN U-100 INSULIN 100 unit/mL (3 mL) InPn pen No dose, route, or frequency recorded.    pantoprazole (PROTONIX) 40 MG tablet TAKE 1 TABLET BY MOUTH ONCE DAILY    PEN NEEDLE 31 gauge x 5/16" Ndle USE 5 TIMES DAILY WITH LANTUS AND HUMALOG    pregabalin (LYRICA) 50 MG capsule Take 1 capsule (50 mg total) by mouth " "nightly for 10 days, THEN 1 capsule (50 mg total) 2 (two) times daily for 10 days, THEN 1 capsule (50 mg total) 3 (three) times daily. Do not take with gabapentin..    promethazine (PHENERGAN) 25 MG tablet No dose, route, or frequency recorded.    tiZANidine (ZANAFLEX) 4 MG tablet TAKE 1 TO 1 AND 1/2 TABLETS (4 TO 6MG TOTAL) BY MOUTH NIGHTLY AS NEEDED.    TRUE METRIX GLUCOSE TEST STRIP Strp No dose, route, or frequency recorded.         Review of Systems   Constitutional: Positive for malaise/fatigue.   HENT: Negative.    Eyes: Negative.    Cardiovascular: Positive for chest pain, claudication and dyspnea on exertion.   Respiratory: Positive for shortness of breath.    Endocrine: Negative.    Hematologic/Lymphatic: Negative.    Skin: Negative.    Musculoskeletal: Positive for arthritis and joint pain.   Gastrointestinal: Negative.    Genitourinary: Negative.    Neurological: Positive for numbness and weakness.   Psychiatric/Behavioral: Negative.    Allergic/Immunologic: Negative.        /72 (BP Location: Left arm, Patient Position: Sitting, BP Method: Medium (Manual))   Pulse 94   Resp 16   Ht 5' 10" (1.778 m)   Wt 103.5 kg (228 lb 2.8 oz)   SpO2 98%   BMI 32.74 kg/m²     Wt Readings from Last 3 Encounters:   04/19/22 103.5 kg (228 lb 2.8 oz)   03/20/22 99.8 kg (220 lb)   02/01/22 104.4 kg (230 lb 2.6 oz)     Temp Readings from Last 3 Encounters:   03/20/22 98.6 °F (37 °C) (Oral)   11/23/21 98.1 °F (36.7 °C) (Oral)   11/20/21 98.2 °F (36.8 °C) (Oral)     BP Readings from Last 3 Encounters:   04/19/22 120/72   03/20/22 122/60   02/01/22 138/77     Pulse Readings from Last 3 Encounters:   04/19/22 94   03/20/22 90   01/27/22 92          Objective:    Physical Exam  Vitals and nursing note reviewed.   Constitutional:       General: She is not in acute distress.     Appearance: Normal appearance. She is well-developed and overweight. She is not ill-appearing or diaphoretic.   HENT:      Head: " Normocephalic.   Neck:      Thyroid: No thyromegaly.      Vascular: Normal carotid pulses. No carotid bruit, hepatojugular reflux or JVD.   Cardiovascular:      Rate and Rhythm: Normal rate and regular rhythm.      Chest Wall: PMI is not displaced.      Pulses: Normal pulses.      Heart sounds: Normal heart sounds, S1 normal and S2 normal. No murmur heard.    No friction rub. No gallop.   Pulmonary:      Effort: Pulmonary effort is normal.      Breath sounds: Normal breath sounds. No wheezing or rales.   Abdominal:      General: Bowel sounds are normal. There is no abdominal bruit.      Palpations: Abdomen is soft. There is no hepatomegaly, splenomegaly or mass.      Tenderness: There is no abdominal tenderness.   Musculoskeletal:      Cervical back: Neck supple.   Lymphadenopathy:      Cervical: No cervical adenopathy.   Skin:     General: Skin is warm.   Neurological:      Mental Status: She is alert and oriented to person, place, and time.   Psychiatric:         Behavior: Behavior normal. Behavior is cooperative.       I have reviewed all pertinent labs and cardiac studies.      Chemistry        Component Value Date/Time     03/20/2022 1659    K 4.1 03/20/2022 1659     03/20/2022 1659    CO2 17 (L) 03/20/2022 1659    BUN 14 03/20/2022 1659    CREATININE 0.8 03/20/2022 1659     (H) 03/20/2022 1659        Component Value Date/Time    CALCIUM 8.9 03/20/2022 1659    ALKPHOS 144 (H) 03/20/2022 1659    AST 25 03/20/2022 1659    ALT 28 03/20/2022 1659    BILITOT 0.3 03/20/2022 1659    ESTGFRAFRICA >60 03/20/2022 1659    EGFRNONAA >60 03/20/2022 1659        Lab Results   Component Value Date    WBC 9.81 03/20/2022    HGB 13.9 03/20/2022    HCT 42.4 03/20/2022    MCV 92 03/20/2022     03/20/2022       Lab Results   Component Value Date    HGBA1C 12.0 (H) 03/28/2022     Lab Results   Component Value Date    CHOL 183 10/09/2020    CHOL 199 05/29/2019    CHOL 164 02/03/2017     Lab Results    Component Value Date    HDL 27 (L) 10/09/2020    HDL 32 (L) 05/29/2019    HDL 30 (L) 02/03/2017     Lab Results   Component Value Date    LDLCALC 112.6 10/09/2020    LDLCALC 108.0 05/29/2019    LDLCALC 103.8 02/03/2017     Lab Results   Component Value Date    TRIG 217 (H) 10/09/2020    TRIG 295 (H) 05/29/2019    TRIG 151 (H) 02/03/2017     Lab Results   Component Value Date    CHOLHDL 14.8 (L) 10/09/2020    CHOLHDL 16.1 (L) 05/29/2019    CHOLHDL 18.3 (L) 02/03/2017         Results for orders placed during the hospital encounter of 11/02/20    Echo Color Flow Doppler? Yes    Interpretation Summary  · There is left ventricular concentric remodeling.  · The left ventricle is small with normal systolic function. The estimated ejection fraction is 60%.  · Normal left ventricular diastolic function.  · Normal right ventricular systolic function.  · Normal central venous pressure (3 mmHg).        Assessment:       1. Atypical chest pain    2. Preop cardiovascular exam    3. Primary hypertension    4. Mixed hyperlipidemia    5. PAD (peripheral artery disease)    6. Type 2 diabetes mellitus with diabetic peripheral angiopathy without gangrene, with long-term current use of insulin    7. Marfan's syndrome    8. MVP (mitral valve prolapse)    9. Obesity (BMI 30-39.9)    10. KAT (dyspnea on exertion)    11. Former smoker         Plan:             Atypical cp: unclear etiology.  No ischemia on stress MPI 3/30/22.  At risk for CAD.  Add Imdur 30 mg qd.  Patient advised of indications for above medications, risks/benefits and side effect profile.  Proceed with GI endoscopy procedures to rule out GI etiologies.  May hold asa and Plavix x 5 days for procedure and proceed at low CV risk.  Recommend CT chest (no contrast) for further evaluation of aorta in light of Marfan's disease.  Echocardiogram to assess LV function.  CMP + BNP to assess for possible CHF.  Pulmonary consultation for possible COPD, h/o smoking.  Continue current  meds.  Reviewed all tests and above medical conditions with patient in detail and formulated treatment plan.  Continue optimal medical treatment for cardiovascular conditions.  Cardiac low salt diet advised.  Daily exercise encouraged, as tolerated.  Maintaining healthy weight and weight loss goals (if needed) were discussed in clinic.  Need for BP control and HTN goals (if needed) were discussed and tx plan formulated.  Importance of optimal lipid control were discussed in detail as well as possible pharmacologic and lifestyle changes that may be needed.  Continue statin tx.  Needs to get her DM HGAIC to goal.  F/u w Dr. Tian, Vasc Surgery, for PAD/vasc disease.      PHONE REVIEW.        I have reviewed all pertinent labs and cardiac studies independently. Plans and recommendations have been formulated under my direct supervision. All questions answered and patient voiced understanding.

## 2022-04-20 ENCOUNTER — TELEPHONE (OUTPATIENT)
Dept: CARDIOLOGY | Facility: HOSPITAL | Age: 47
End: 2022-04-20
Payer: MEDICAID

## 2022-04-21 NOTE — TELEPHONE ENCOUNTER
Called patient to advise per Dr. Delgado     Please call pt.  Heart failure blood test is normal.     Dr Delgado    No answer unable to leave vm called all numbers

## 2022-04-22 ENCOUNTER — ANESTHESIA (OUTPATIENT)
Dept: ENDOSCOPY | Facility: HOSPITAL | Age: 47
End: 2022-04-22
Payer: MEDICAID

## 2022-04-22 ENCOUNTER — HOSPITAL ENCOUNTER (OUTPATIENT)
Facility: HOSPITAL | Age: 47
Discharge: HOME | End: 2022-04-22
Attending: INTERNAL MEDICINE | Admitting: INTERNAL MEDICINE
Payer: MEDICAID

## 2022-04-22 ENCOUNTER — ANESTHESIA EVENT (OUTPATIENT)
Dept: ENDOSCOPY | Facility: HOSPITAL | Age: 47
End: 2022-04-22
Payer: MEDICAID

## 2022-04-22 LAB
B-HCG UR QL: NEGATIVE
CTP QC/QA: YES
CTP QC/QA: YES
GLUCOSE SERPL-MCNC: 232 MG/DL (ref 70–110)
POCT GLUCOSE: 232 MG/DL (ref 70–110)
SARS-COV-2 RDRP RESP QL NAA+PROBE: NEGATIVE

## 2022-04-22 PROCEDURE — 88305 TISSUE EXAM BY PATHOLOGIST: CPT | Performed by: PATHOLOGY

## 2022-04-22 PROCEDURE — 88342 CHG IMMUNOCYTOCHEMISTRY: ICD-10-PCS | Mod: 26,,, | Performed by: PATHOLOGY

## 2022-04-22 PROCEDURE — 88342 IMHCHEM/IMCYTCHM 1ST ANTB: CPT | Mod: 26,,, | Performed by: PATHOLOGY

## 2022-04-22 PROCEDURE — 88305 TISSUE EXAM BY PATHOLOGIST: ICD-10-PCS | Mod: 26,,, | Performed by: PATHOLOGY

## 2022-04-22 PROCEDURE — 37000008 HC ANESTHESIA 1ST 15 MINUTES: Performed by: INTERNAL MEDICINE

## 2022-04-22 PROCEDURE — 43239 EGD BIOPSY SINGLE/MULTIPLE: CPT | Performed by: INTERNAL MEDICINE

## 2022-04-22 PROCEDURE — 43239 PR EGD, FLEX, W/BIOPSY, SGL/MULTI: ICD-10-PCS | Mod: ,,, | Performed by: INTERNAL MEDICINE

## 2022-04-22 PROCEDURE — 88305 TISSUE EXAM BY PATHOLOGIST: CPT | Mod: 26,,, | Performed by: PATHOLOGY

## 2022-04-22 PROCEDURE — 25000003 PHARM REV CODE 250: Performed by: NURSE ANESTHETIST, CERTIFIED REGISTERED

## 2022-04-22 PROCEDURE — 00731 ANES UPR GI NDSC PX NOS: CPT | Performed by: INTERNAL MEDICINE

## 2022-04-22 PROCEDURE — 43239 EGD BIOPSY SINGLE/MULTIPLE: CPT | Mod: ,,, | Performed by: INTERNAL MEDICINE

## 2022-04-22 PROCEDURE — 81025 URINE PREGNANCY TEST: CPT | Performed by: INTERNAL MEDICINE

## 2022-04-22 PROCEDURE — 27201012 HC FORCEPS, HOT/COLD, DISP: Performed by: INTERNAL MEDICINE

## 2022-04-22 PROCEDURE — U0002 COVID-19 LAB TEST NON-CDC: HCPCS | Performed by: INTERNAL MEDICINE

## 2022-04-22 PROCEDURE — 88342 IMHCHEM/IMCYTCHM 1ST ANTB: CPT | Performed by: PATHOLOGY

## 2022-04-22 PROCEDURE — 63600175 PHARM REV CODE 636 W HCPCS: Performed by: NURSE ANESTHETIST, CERTIFIED REGISTERED

## 2022-04-22 RX ORDER — PROPOFOL 10 MG/ML
VIAL (ML) INTRAVENOUS
Status: DISCONTINUED | OUTPATIENT
Start: 2022-04-22 | End: 2022-04-22

## 2022-04-22 RX ORDER — SODIUM CHLORIDE, SODIUM LACTATE, POTASSIUM CHLORIDE, CALCIUM CHLORIDE 600; 310; 30; 20 MG/100ML; MG/100ML; MG/100ML; MG/100ML
INJECTION, SOLUTION INTRAVENOUS CONTINUOUS
Status: DISCONTINUED | OUTPATIENT
Start: 2022-04-22 | End: 2022-04-22 | Stop reason: HOSPADM

## 2022-04-22 RX ORDER — SODIUM CHLORIDE 9 MG/ML
INJECTION, SOLUTION INTRAVENOUS CONTINUOUS
Status: DISCONTINUED | OUTPATIENT
Start: 2022-04-22 | End: 2022-04-22 | Stop reason: HOSPADM

## 2022-04-22 RX ORDER — LIDOCAINE HYDROCHLORIDE 10 MG/ML
INJECTION, SOLUTION EPIDURAL; INFILTRATION; INTRACAUDAL; PERINEURAL
Status: DISCONTINUED | OUTPATIENT
Start: 2022-04-22 | End: 2022-04-22

## 2022-04-22 RX ADMIN — SODIUM CHLORIDE, SODIUM LACTATE, POTASSIUM CHLORIDE, AND CALCIUM CHLORIDE: .6; .31; .03; .02 INJECTION, SOLUTION INTRAVENOUS at 11:04

## 2022-04-22 RX ADMIN — PROPOFOL 100 MG: 10 INJECTION, EMULSION INTRAVENOUS at 11:04

## 2022-04-22 RX ADMIN — LIDOCAINE HYDROCHLORIDE 50 MG: 10 INJECTION, SOLUTION EPIDURAL; INFILTRATION; INTRACAUDAL; PERINEURAL at 11:04

## 2022-04-22 NOTE — ANESTHESIA POSTPROCEDURE EVALUATION
Anesthesia Post Evaluation    Patient: Jenifer Westfall    Procedure(s) Performed: Procedure(s) (LRB):  EGD (ESOPHAGOGASTRODUODENOSCOPY) (N/A)    Final Anesthesia Type: MAC      Patient location during evaluation: PACU  Patient participation: Yes- Able to Participate  Level of consciousness: awake and alert  Post-procedure vital signs: reviewed and stable  Pain management: adequate  Airway patency: patent    PONV status at discharge: No PONV  Anesthetic complications: no      Cardiovascular status: blood pressure returned to baseline  Respiratory status: unassisted  Hydration status: euvolemic  Follow-up not needed.          Vitals Value Taken Time   /68 04/22/22 1213   Temp 36.4 °C (97.6 °F) 04/22/22 1213   Pulse 78 04/22/22 1213   Resp 16 04/22/22 1213   SpO2 96 % 04/22/22 1202         Event Time   Out of Recovery 12:20:47         Pain/Abdullahi Score: Abdullahi Score: 10 (4/22/2022 12:13 PM)

## 2022-04-22 NOTE — ANESTHESIA PREPROCEDURE EVALUATION
04/22/2022  Jenifer Westfall is a 46 y.o., female.    Pre-op Assessment    I have reviewed the Patient Summary Reports.    I have reviewed the Nursing Notes. I have reviewed the NPO Status.   I have reviewed the Medications.     Review of Systems  Anesthesia Hx:  Denies Family Hx of Anesthesia complications.   Denies Personal Hx of Anesthesia complications.   Social:  Former Smoker    Cardiovascular:   Exercise tolerance: good Hypertension, well controlled Valvular problems/Murmurs Denies MI.  Denies CAD.    PVD hyperlipidemia ECG has been reviewed. CONCLUSIONS     1 - No wall motion abnormalities.     2 - Normal left ventricular systolic function (EF 55-60%).     3 - Normal left ventricular diastolic function.     4 - Normal right ventricular systolic function .     5 - Pulmonary hypertension. The estimated PA systolic pressure is 43 mmHg.     6 - Mild to moderate tricuspid reg    Normal sinus rhythm   Normal ECG   When compared with ECG of 02-NOV-2020 11:39,   Previous ECG has undetermined rhythm, needs review   Confirmed by KEREN SCHULZ MD ([id number]) on 7/30/2021 4:47:41 PM    Pulmonary:  Pulmonary Normal    Renal/:   renal calculi    Hepatic/GI:   Liver Disease, Hepatitis, C    Musculoskeletal:   Arthritis   Spine Disorders: cervical Disc disease and Degenerative disease    Neurological:   Neuromuscular Disease, Marfan syndrome,eye problem   Endocrine:   Diabetes, well controlled, type 2, using insulin  Obesity / BMI > 30  Psych:   Psychiatric History depression          Physical Exam  General:  Well nourished      Airway/Jaw/Neck:  Airway Findings: Mouth Opening: Small, but > 3cm   Tongue: Normal   Mallampati: II TM Distance: Normal   Neck ROM: Normal ROM       Dental:  Dental Findings: Periodontal disease, Severe     Chest/Lungs:  Chest/Lungs Findings: Clear to auscultation      Heart/Vascular:  Heart  Findings: Rhythm: Regular Rhythm             Anesthesia Plan  Type of Anesthesia, risks & benefits discussed:  Anesthesia Type:  MAC    Patient's Preference:   Plan Factors:          Intra-op Monitoring Plan: Standard ASA Monitors  Intra-op Monitoring Plan Comments:   Post Op Pain Control Plan: multimodal analgesia  Post Op Pain Control Plan Comments:     Induction:   IV  Beta Blocker:         Informed Consent: Informed consent signed with the Patient and all parties understand the risks and agree with anesthesia plan.  All questions answered.    ASA Score: 3     Day of Surgery Review of History & Physical: I have interviewed and examined the patient. I have reviewed the patient's H&P dated:  There are no significant changes.  H&P Update referred to the surgeon/provider.    Anesthesia Plan Notes: Previously intubated- Grade 4 view with Mac 3 previously. 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        Ready For Surgery From Anesthesia Perspective.           Physical Exam  General: Well nourished    Airway:  Mallampati: II   Mouth Opening: Small, but > 3cm  TM Distance: Normal  Tongue: Normal  Neck ROM: Normal ROM    Dental:  Periodontal disease, Severe    Chest/Lungs:  Clear to auscultation    Heart:  Rhythm: Regular Rhythm          Anesthesia Plan  Type of Anesthesia, risks & benefits discussed:    Anesthesia Type: MAC  Intra-op Monitoring Plan: Standard ASA Monitors  Post Op Pain Control Plan: multimodal analgesia  Induction:  IV  Informed Consent: Informed consent signed with the Patient and all parties understand the risks and agree with anesthesia plan.  All questions answered.   ASA Score: 3  Day of Surgery Review of History & Physical: H&P Update referred to the surgeon/provider.I have interviewed and examined the patient. I have reviewed the patient's H&P dated: There are no significant changes.   Anesthesia Plan Notes: Previously intubated- Grade 4 view with Mac 3 previously. NIBP on LT side( innominate  art stenosis).    Ready For Surgery From Anesthesia Perspective.       .

## 2022-04-22 NOTE — PROVATION PATIENT INSTRUCTIONS
Discharge Summary/Instructions after an Endoscopic Procedure  Patient Name: Jenifer Westfall  Patient MRN: [id number]  Patient YOB: 1975 Friday, April 22, 2022 Keith Hardwick MD  Dear patient,  As a result of recent federal legislation (The Federal Cures Act), you may   receive lab or pathology results from your procedure in your [facility]   account before your physician is able to contact you. Your physician or   their representative will relay the results to you with their   recommendations at their soonest availability.  Thank you,  RESTRICTIONS:  During your procedure today, you received medications for sedation.  These   medications may affect your judgment, balance and coordination.  Therefore,   for 24 hours, you have the following restrictions:   - DO NOT drive a car, operate machinery, make legal/financial decisions,   sign important papers or drink alcohol.    ACTIVITY:  Today: no heavy lifting, straining or running due to procedural   sedation/anesthesia.  The following day: return to full activity including work.  DIET:  Eat and drink normally unless instructed otherwise.     TREATMENT FOR COMMON SIDE EFFECTS:  - Mild abdominal pain, nausea, belching, bloating or excessive gas:  rest,   eat lightly and use a heating pad.  - Sore Throat: treat with throat lozenges and/or gargle with warm salt   water.  - Because air was used during the procedure, expelling large amounts of air   from your rectum or belching is normal.  - If a bowel prep was taken, you may not have a bowel movement for 1-3 days.    This is normal.  SYMPTOMS TO WATCH FOR AND REPORT TO YOUR PHYSICIAN:  1. Abdominal pain or bloating, other than gas cramps.  2. Chest pain.  3. Back pain.  4. Signs of infection such as: chills or fever occurring within 24 hours   after the procedure.  5. Rectal bleeding, which would show as bright red, maroon, or black stools.   (A tablespoon of blood from the rectum is not serious, especially if    hemorrhoids are present.)  6. Vomiting.  7. Weakness or dizziness.  GO DIRECTLY TO THE NEAREST EMERGENCY ROOM IF YOU HAVE ANY OF THE FOLLOWING:      Difficulty breathing              Chills and/or fever over 101 F   Persistent vomiting and/or vomiting blood   Severe abdominal pain   Severe chest pain   Black, tarry stools   Bleeding- more than one tablespoon   Any other symptom or condition that you feel may need urgent attention  Your doctor recommends these additional instructions:  If any biopsies were taken, your doctors clinic will contact you in 1 to 2   weeks with any results.  - Discharge patient to home.   - Resume previous diet.   - Continue present medications.   - Await pathology results.   - Return to referring physician.  For questions, problems or results please call your physician Keith Hardwick MD at Work:  [phone]  If you have any questions about the above instructions, call the GI   department at [phone] or call the endoscopy unit at [phone]   from 7am until 3 pm.  [facility] [place], EMERGENCY ROOM PHONE NUMBER:   [phone]  IF A COMPLICATION OR EMERGENCY SITUATION ARISES AND YOU ARE UNABLE TO REACH   YOUR PHYSICIAN - GO DIRECTLY TO THE EMERGENCY ROOM.  I have read or have had read to me these discharge instructions for my   procedure and have received a written copy.  I understand these   instructions and will follow-up with my physician if I have any questions.     __________________________________       _____________________________________  Nurse Signature                                          Patient/Designated   Responsible Party Signature  Keith Hardwick MD  4/22/2022 11:47:11 AM  This report has been verified and signed electronically.  Dear patient,  As a result of recent federal legislation (The Federal Cures Act), you may   receive lab or pathology results from your procedure in your [facility]   account before your physician is  able to contact you. Your physician or   their representative will relay the results to you with their   recommendations at their soonest availability.  Thank you,  PROVATION

## 2022-04-22 NOTE — H&P
PRE PROCEDURE H&P    Patient Name: Jenifer Westfall  MRN: [id number]  : 1975  Date of Procedure:  2022  Referring Physician: Keith Hardwick MD  Primary Physician: Primary Doctor No  Procedure Physician: Keith Hardwick MD       Planned Procedure: EGD  Diagnosis:   Chief Complaint: Same as above    HPI:  This is a 46 y.o. female here for evaluation of nausea and vomiting.   Patient has been experiencing the nausea and vomiting for 2-3 weeks.   She has been throwing up after eating.   She also feels like food is getting stuck in her throat.   She has a history of GERD on PPI and H2B.   She has been stressed out lately.   She lost her mom on .   Then she lost her two month old niece after that.   She was started on xanax and phenergan by another provider.        Past Medical History:   Past Medical History:   Diagnosis Date    Arthritis     DM (diabetes mellitus)      2017 Insulin x 3 years     Hepatitis C antibody positive in blood 2021    RNA NEGATIVE 3/6/2017, 3/22/22    Hyperlipidemia     Hypertension     IBS (irritable bowel syndrome)     Kidney stone     Marfan syndrome     Mitral valve prolapse         Past Surgical History:  Past Surgical History:   Procedure Laterality Date    ANGIOGRAPHY OF LOWER EXTREMITY N/A 2018    Procedure: ANGIOGRAM, LOWER EXTREMITY- LEFT LEG;  Surgeon: Roscoe Landeros MD;  Location: [facility] CATH LAB;  Service: Peripheral Vascular;  Laterality: N/A;    APPENDECTOMY      BUNIONECTOMY      both feet    cataract surgery  2019     SECTION      x 2    CHOLECYSTECTOMY      COLONOSCOPY N/A 2020    Procedure: COLONOSCOPY w/ biopsies;  Surgeon: Gio Georges MD;  Location: [facility];  Service: Endoscopy;  Laterality: N/A;    ESOPHAGOGASTRODUODENOSCOPY N/A 2019    Procedure: ESOPHAGOGASTRODUODENOSCOPY (EGD);  Surgeon: Jaron Sanders III, MD;  Location: [facility];  Service: Endoscopy;  Laterality: N/A;     SELECTIVE INJECTION OF ANESTHETIC AGENT AROUND LUMBAR SPINAL NERVE ROOT BY TRANSFORAMINAL APPROACH Bilateral 11/23/2020    Procedure: Bilateral L5/S1 TF DENY;  Surgeon: Jewel Walters MD;  Location: [facility];  Service: Pain Management;  Laterality: Bilateral;    TUBAL LIGATION          Home Medications:  Prior to Admission medications    Medication Sig Start Date End Date Taking? Authorizing Provider   ADMELOG SOLOSTAR U-100 INSULIN 100 unit/mL pen  5/27/19  Yes Historical Provider   ALPRAZolam (XANAX) 0.25 MG tablet Take 0.25 mg by mouth daily as needed. 6/12/21  Yes Historical Provider   ascorbic acid (VITAMIN C) 100 MG tablet Take 100 mg by mouth once daily.   Yes Historical Provider   aspirin (ECOTRIN) 81 MG EC tablet Take 81 mg by mouth once daily.   Yes Historical Provider   atenoloL (TENORMIN) 25 MG tablet Take 1 tablet (25 mg total) by mouth once daily. 10/19/21 10/19/22 Yes Anabella Mckeon PA-C   atorvastatin (LIPITOR) 80 MG tablet TAKE 1 TABLET BY MOUTH ONCE DAILY.  5/7/21  Yes Daniel Hsu MD   azelastine (ASTELIN) 137 mcg (0.1 %) nasal spray  2/23/21  Yes Historical Provider   diclofenac sodium (VOLTAREN) 1 % Gel Apply 2 g topically 3 (three) times daily. 9/24/20  Yes Herminia Puentes PA-C   dicyclomine (BENTYL) 20 mg tablet Take 1 tablet (20 mg total) by mouth before meals as needed (abd pain). 4/15/22  Yes Keith Hardwick MD   famotidine (PEPCID) 20 MG tablet TAKE 1 TABLET BY MOUTH TWICE DAILY 3/23/22  Yes Keith Hardwick MD   FLUoxetine (PROZAC) 40 MG capsule TAKE 1 CAPSULE BY MOUTH ONCE DAILY 11/12/18  Yes Stephanie Hernández NP   fluticasone propionate (FLONASE) 50 mcg/actuation nasal spray  2/23/21  Yes Historical Provider   gabapentin (NEURONTIN) 300 MG capsule Take by mouth. 3/17/22  Yes Historical Provider   glipiZIDE (GLUCOTROL) 10 MG tablet Take 1 tablet (10 mg total) by mouth 2 (two) times daily. 8/10/18  Yes Stephanie Hernández NP   insulin glargine,hum.rec.anlog (BASAGLAR KWIKPEN  "U-100 INSULIN SUBQ) Inject 40 Units into the skin 2 (two) times daily.   Yes Historical Provider   isosorbide mononitrate (IMDUR) 30 MG 24 hr tablet Take 1 tablet (30 mg total) by mouth once daily. 4/19/22 4/19/23 Yes Brian Delgado MD   liraglutide 0.6 mg/0.1 mL, 18 mg/3 mL, subq PNIJ (VICTOZA 2-DIAN) 0.6 mg/0.1 mL (18 mg/3 mL) PnIj Inject 1.2 Units into the skin.   Yes Historical Provider   lisinopril 10 MG tablet TAKE 1 TABLET (10 MG TOTAL) BY MOUTH ONCE DAILY. 7/27/17  Yes Stephanie Hernández NP   NOVOLOG FLEXPEN U-100 INSULIN 100 unit/mL (3 mL) InPn pen  7/15/20  Yes Historical Provider   pantoprazole (PROTONIX) 40 MG tablet TAKE 1 TABLET BY MOUTH ONCE DAILY 3/3/22  Yes Lizandro Larson PA-C   promethazine (PHENERGAN) 25 MG tablet  8/28/20  Yes Historical Provider   tiZANidine (ZANAFLEX) 4 MG tablet TAKE 1 TO 1 AND 1/2 TABLETS (4 TO 6MG TOTAL) BY MOUTH NIGHTLY AS NEEDED. 4/4/22  Yes Zandra Yeager MD   clopidogreL (PLAVIX) 75 mg tablet TAKE 1 TABLET BY MOUTH ONCE DAILY 11/8/21   Daniel Hsu MD   clotrimazole (LOTRIMIN) 1 % cream Apply to affected area 2 times daily 3/20/22   Amadeo Mchugh NP   HYDROcodone-acetaminophen (NORCO) 7.5-325 mg per tablet Take 1 tablet by mouth every 6 (six) hours as needed for Pain. 3/20/22   Amadeo Mchugh NP   LIDOcaine HCl 2% (LIDOCAINE VISCOUS) 2 % Soln by Mucous Membrane route 4 (four) times daily as needed (pain). 3/20/22   Amadeo Mchugh NP   PEN NEEDLE 31 gauge x 5/16" Ndle USE 5 TIMES DAILY WITH LANTUS AND HUMALOG 8/8/18   Evelyn Bello MD   pregabalin (LYRICA) 50 MG capsule Take 1 capsule (50 mg total) by mouth nightly for 10 days, THEN 1 capsule (50 mg total) 2 (two) times daily for 10 days, THEN 1 capsule (50 mg total) 3 (three) times daily. Do not take with gabapentin.. 1/27/22 3/18/22  Andrzej Peralta MD   TRUE METRIX GLUCOSE TEST STRIP Strp  5/15/19   Historical Provider        Allergies:  Review of patient's allergies indicates:   Allergen Reactions " "   Compazine [prochlorperazine edisylate] Rash    Contrast media Anaphylaxis    Dye Anaphylaxis    Levaquin [levofloxacin] Hives and Itching    Metformin Other (See Comments)     Upset stomach    Ibuprofen Other (See Comments)     Stomach pain    Morphine Other (See Comments)     Irritable        Social History:   Social History     Socioeconomic History    Marital status: Single   Tobacco Use    Smoking status: Former Smoker     Packs/day: 0.20     Years: 20.00     Pack years: 4.00     Types: Cigarettes     Quit date: 2015     Years since quittin.0    Smokeless tobacco: Never Used    Tobacco comment: "once in a blue moon"   Substance and Sexual Activity    Alcohol use: No     Alcohol/week: 0.0 standard drinks    Drug use: No    Sexual activity: Not Currently     Social Determinants of Health     Financial Resource Strain: Low Risk     Difficulty of Paying Living Expenses: Not hard at all   Food Insecurity: No Food Insecurity    Worried About Running Out of Food in the Last Year: Never true    Ran Out of Food in the Last Year: Never true   Transportation Needs: No Transportation Needs    Lack of Transportation (Medical): No    Lack of Transportation (Non-Medical): No   Physical Activity: Inactive    Days of Exercise per Week: 0 days    Minutes of Exercise per Session: 0 min   Stress: No Stress Concern Present    Feeling of Stress : Only a little   Social Connections: Socially Isolated    Frequency of Communication with Friends and Family: Three times a week    Frequency of Social Gatherings with Friends and Family: Once a week    Attends Zoroastrian Services: Never    Active Member of Clubs or Organizations: No    Attends Club or Organization Meetings: Never    Marital Status: Never    Housing Stability: Unknown    Unable to Pay for Housing in the Last Year: No    Unstable Housing in the Last Year: No       Family History:  Family History   Problem Relation Age of Onset    " "Heart disease Mother     Thrombophilia Father         DVT    Hypertension Father     Stroke Maternal Aunt     Heart disease Maternal Aunt     Stroke Maternal Uncle     Heart disease Maternal Uncle     Breast cancer Neg Hx     Colon cancer Neg Hx     Ovarian cancer Neg Hx        ROS: No acute cardiac events, no acute respiratory complaints.     Physical Exam (all patients):    BP (!) 105/58 (BP Location: Left arm, Patient Position: Sitting)   Pulse 87   Temp 97.5 °F (36.4 °C) (Skin)   Resp 20   Ht 5' 10" (1.778 m)   Wt 99.8 kg (220 lb)   SpO2 96%   Breastfeeding No   BMI 31.57 kg/m²   Lungs: Clear to auscultation bilaterally, respirations unlabored  Heart: Regular rate and rhythm, S1 and S2 normal, no obvious murmurs  Abdomen:         Soft, non-tender, bowel sounds normal, no masses, no organomegaly    Lab Results   Component Value Date    WBC 9.81 03/20/2022    MCV 92 03/20/2022    RDW 12.2 03/20/2022     03/20/2022    INR 0.9 03/06/2017     (H) 04/19/2022    HGBA1C 12.0 (H) 03/28/2022    BUN 11 04/19/2022     04/19/2022    K 4.6 04/19/2022     04/19/2022        SEDATION PLAN: per anesthesia      History reviewed, vital signs satisfactory, cardiopulmonary status satisfactory, sedation options, risks and plans have been discussed with the patient in front of endoscopy staff.   All their questions were answered and the patient agrees to the sedation procedures as planned and the patient is deemed an appropriate candidate for the sedation as planned.    Procedure explained to patient, informed consent obtained and placed in chart.    Keith Hardwick  4/22/2022  11:33 AM     "

## 2022-04-22 NOTE — TRANSFER OF CARE
"Anesthesia Transfer of Care Note    Patient: Jenifer Westfall    Procedure(s) Performed: Procedure(s) (LRB):  EGD (ESOPHAGOGASTRODUODENOSCOPY) (N/A)    Patient location: GI    Anesthesia Type: MAC    Transport from OR: Transported from OR on room air with adequate spontaneous ventilation    Post pain: adequate analgesia    Post assessment: no apparent anesthetic complications    Post vital signs: stable    Level of consciousness: sedated    Nausea/Vomiting: no nausea/vomiting    Complications: none    Transfer of care protocol was followed      Last vitals:   Visit Vitals  BP (!) 105/58 (BP Location: Left arm, Patient Position: Sitting)   Pulse 87   Temp 36.4 °C (97.5 °F) (Skin)   Resp 20   Ht 5' 10" (1.778 m)   Wt 99.8 kg (220 lb)   SpO2 96%   Breastfeeding No   BMI 31.57 kg/m²     "

## 2022-04-24 ENCOUNTER — HOSPITAL ENCOUNTER (EMERGENCY)
Facility: HOSPITAL | Age: 47
Discharge: HOME | End: 2022-04-24
Attending: EMERGENCY MEDICINE
Payer: MEDICAID

## 2022-04-24 VITALS
DIASTOLIC BLOOD PRESSURE: 65 MMHG | BODY MASS INDEX: 31.57 KG/M2 | SYSTOLIC BLOOD PRESSURE: 105 MMHG | OXYGEN SATURATION: 96 % | TEMPERATURE: 98 F | RESPIRATION RATE: 20 BRPM | WEIGHT: 220 LBS | HEART RATE: 89 BPM

## 2022-04-24 DIAGNOSIS — F41.9 ANXIETY: ICD-10-CM

## 2022-04-24 DIAGNOSIS — R91.1 PULMONARY NODULE: ICD-10-CM

## 2022-04-24 DIAGNOSIS — R07.9 CHEST PAIN SYNDROME: Primary | ICD-10-CM

## 2022-04-24 LAB
ALBUMIN SERPL BCP-MCNC: 3.3 G/DL (ref 3.5–5.2)
ALP SERPL-CCNC: 119 U/L (ref 55–135)
ALT SERPL W/O P-5'-P-CCNC: 22 U/L (ref 10–44)
ANION GAP SERPL CALC-SCNC: 11 MMOL/L (ref 8–16)
AST SERPL-CCNC: 19 U/L (ref 10–40)
BASOPHILS # BLD AUTO: 0.03 K/UL (ref 0–0.2)
BASOPHILS NFR BLD: 0.3 % (ref 0–1.9)
BILIRUB SERPL-MCNC: 0.3 MG/DL (ref 0.1–1)
BNP SERPL-MCNC: 22 PG/ML (ref 0–99)
BUN SERPL-MCNC: 9 MG/DL (ref 6–20)
CALCIUM SERPL-MCNC: 9.2 MG/DL (ref 8.7–10.5)
CHLORIDE SERPL-SCNC: 108 MMOL/L (ref 95–110)
CO2 SERPL-SCNC: 21 MMOL/L (ref 23–29)
CREAT SERPL-MCNC: 0.8 MG/DL (ref 0.5–1.4)
DIFFERENTIAL METHOD: NORMAL
EOSINOPHIL # BLD AUTO: 0.2 K/UL (ref 0–0.5)
EOSINOPHIL NFR BLD: 1.8 % (ref 0–8)
ERYTHROCYTE [DISTWIDTH] IN BLOOD BY AUTOMATED COUNT: 12.3 % (ref 11.5–14.5)
EST. GFR  (AFRICAN AMERICAN): >60 ML/MIN/1.73 M^2
EST. GFR  (NON AFRICAN AMERICAN): >60 ML/MIN/1.73 M^2
GLUCOSE SERPL-MCNC: 173 MG/DL (ref 70–110)
HCT VFR BLD AUTO: 38.7 % (ref 37–48.5)
HGB BLD-MCNC: 13 G/DL (ref 12–16)
IMM GRANULOCYTES # BLD AUTO: 0.03 K/UL (ref 0–0.04)
IMM GRANULOCYTES NFR BLD AUTO: 0.3 % (ref 0–0.5)
LYMPHOCYTES # BLD AUTO: 4.2 K/UL (ref 1–4.8)
LYMPHOCYTES NFR BLD: 41.8 % (ref 18–48)
MCH RBC QN AUTO: 29.8 PG (ref 27–31)
MCHC RBC AUTO-ENTMCNC: 33.6 G/DL (ref 32–36)
MCV RBC AUTO: 89 FL (ref 82–98)
MONOCYTES # BLD AUTO: 0.8 K/UL (ref 0.3–1)
MONOCYTES NFR BLD: 7.6 % (ref 4–15)
NEUTROPHILS # BLD AUTO: 4.8 K/UL (ref 1.8–7.7)
NEUTROPHILS NFR BLD: 48.2 % (ref 38–73)
NRBC BLD-RTO: 0 /100 WBC
PLATELET # BLD AUTO: 274 K/UL (ref 150–450)
PMV BLD AUTO: 9.3 FL (ref 9.2–12.9)
POTASSIUM SERPL-SCNC: 3.8 MMOL/L (ref 3.5–5.1)
PROT SERPL-MCNC: 6.5 G/DL (ref 6–8.4)
RBC # BLD AUTO: 4.36 M/UL (ref 4–5.4)
SODIUM SERPL-SCNC: 140 MMOL/L (ref 136–145)
TROPONIN I SERPL DL<=0.01 NG/ML-MCNC: <0.006 NG/ML (ref 0–0.03)
WBC # BLD AUTO: 10 K/UL (ref 3.9–12.7)

## 2022-04-24 PROCEDURE — 25000003 PHARM REV CODE 250: Performed by: EMERGENCY MEDICINE

## 2022-04-24 PROCEDURE — 83880 ASSAY OF NATRIURETIC PEPTIDE: CPT | Performed by: EMERGENCY MEDICINE

## 2022-04-24 PROCEDURE — 96374 THER/PROPH/DIAG INJ IV PUSH: CPT

## 2022-04-24 PROCEDURE — 99284 EMERGENCY DEPT VISIT MOD MDM: CPT | Mod: 25

## 2022-04-24 PROCEDURE — 85025 COMPLETE CBC W/AUTO DIFF WBC: CPT | Performed by: EMERGENCY MEDICINE

## 2022-04-24 PROCEDURE — 80053 COMPREHEN METABOLIC PANEL: CPT | Performed by: EMERGENCY MEDICINE

## 2022-04-24 PROCEDURE — 96375 TX/PRO/DX INJ NEW DRUG ADDON: CPT

## 2022-04-24 PROCEDURE — 63600175 PHARM REV CODE 636 W HCPCS: Performed by: EMERGENCY MEDICINE

## 2022-04-24 PROCEDURE — 84484 ASSAY OF TROPONIN QUANT: CPT | Performed by: EMERGENCY MEDICINE

## 2022-04-24 RX ORDER — ALPRAZOLAM 0.25 MG/1
0.25 TABLET ORAL
Status: COMPLETED | OUTPATIENT
Start: 2022-04-24 | End: 2022-04-24

## 2022-04-24 RX ORDER — MEPERIDINE HYDROCHLORIDE 25 MG/ML
25 INJECTION INTRAMUSCULAR; INTRAVENOUS; SUBCUTANEOUS
Status: COMPLETED | OUTPATIENT
Start: 2022-04-24 | End: 2022-04-24

## 2022-04-24 RX ORDER — ONDANSETRON 2 MG/ML
4 INJECTION INTRAMUSCULAR; INTRAVENOUS
Status: COMPLETED | OUTPATIENT
Start: 2022-04-24 | End: 2022-04-24

## 2022-04-24 RX ADMIN — ALPRAZOLAM 0.25 MG: 0.25 TABLET ORAL at 05:04

## 2022-04-24 RX ADMIN — MEPERIDINE HYDROCHLORIDE 25 MG: 25 INJECTION INTRAMUSCULAR; INTRAVENOUS; SUBCUTANEOUS at 05:04

## 2022-04-24 RX ADMIN — ONDANSETRON 4 MG: 2 INJECTION INTRAMUSCULAR; INTRAVENOUS at 05:04

## 2022-04-24 NOTE — ED PROVIDER NOTES
Encounter Date: 4/24/2022       History     Chief Complaint   Patient presents with    Chest Pain     States was sitting on sofa when she started having CP and SOB      HPI     4/24/2022, 6:05 PM.    History is provided by: patient.      Jenifer Westfall is a 46 y.o. female hx of anxiety, Marfan's, HTN, Mitral Valve Prolapse presenting to the Emergency Department for right sided chest pressure/tightness onset abruptly today while at home.  Sxs have been worsening and are moderate in severity. Pt describes the sx as tightness. No mitigating or exacerbating factors reported. Associated sx include shortness of breath. Pt denies fever, chills, cough, dizziness, leg swelling or pain, no calf pain, no syncope or presyncope, no N/V, diaphoresis, syncope or presyncope. No further complaints at this time.   Of note medical records reviewed patient recently admitted to [facility] at end of March this year for same, had negative nuclear stress test on 3/30/22 and underwent V/Q scan that revealed low probability for PE.  Patient also recently saw Dr. Delgado with cardiology as well for same, has echo scheduled.       PCP: Primary Doctor No    Review of patient's allergies indicates:   Allergen Reactions    Compazine [prochlorperazine edisylate] Rash    Contrast media Anaphylaxis    Dye Anaphylaxis    Levaquin [levofloxacin] Hives and Itching    Metformin Other (See Comments)     Upset stomach    Ibuprofen Other (See Comments)     Stomach pain    Morphine Other (See Comments)     Irritable     Past Medical History:   Diagnosis Date    Arthritis     DM (diabetes mellitus) 2009     03/01/2017 Insulin x 3 years 2013    Hepatitis C antibody positive in blood 03/04/2021    RNA NEGATIVE 3/6/2017, 3/22/22    Hyperlipidemia     Hypertension     IBS (irritable bowel syndrome)     Kidney stone     Marfan syndrome     Mitral valve prolapse      Past Surgical History:   Procedure Laterality Date    ANGIOGRAPHY OF LOWER  "EXTREMITY N/A 2018    Procedure: ANGIOGRAM, LOWER EXTREMITY- LEFT LEG;  Surgeon: Roscoe Landeros MD;  Location: [facility] CATH LAB;  Service: Peripheral Vascular;  Laterality: N/A;    APPENDECTOMY      BUNIONECTOMY      both feet    cataract surgery  2019     SECTION      x 2    CHOLECYSTECTOMY      COLONOSCOPY N/A 2020    Procedure: COLONOSCOPY w/ biopsies;  Surgeon: Gio Georges MD;  Location: [facility] ENDO;  Service: Endoscopy;  Laterality: N/A;    ESOPHAGOGASTRODUODENOSCOPY N/A 2019    Procedure: ESOPHAGOGASTRODUODENOSCOPY (EGD);  Surgeon: Jaron Sanders III, MD;  Location: [facility] ENDO;  Service: Endoscopy;  Laterality: N/A;    SELECTIVE INJECTION OF ANESTHETIC AGENT AROUND LUMBAR SPINAL NERVE ROOT BY TRANSFORAMINAL APPROACH Bilateral 2020    Procedure: Bilateral L5/S1 TF DENY;  Surgeon: Jewel Walters MD;  Location: [facility] PAIN MGT;  Service: Pain Management;  Laterality: Bilateral;    TUBAL LIGATION       Family History   Problem Relation Age of Onset    Heart disease Mother     Thrombophilia Father         DVT    Hypertension Father     Stroke Maternal Aunt     Heart disease Maternal Aunt     Stroke Maternal Uncle     Heart disease Maternal Uncle     Breast cancer Neg Hx     Colon cancer Neg Hx     Ovarian cancer Neg Hx      Social History     Tobacco Use    Smoking status: Former Smoker     Packs/day: 0.20     Years: 20.00     Pack years: 4.00     Types: Cigarettes     Quit date: 2015     Years since quittin.0    Smokeless tobacco: Never Used    Tobacco comment: "once in a blue moon"   Substance Use Topics    Alcohol use: No     Alcohol/week: 0.0 standard drinks    Drug use: No     Review of Systems   Constitutional: Negative.    HENT: Negative.    Respiratory: Positive for chest tightness and shortness of breath.    Cardiovascular: Negative.    Gastrointestinal: Negative.    Endocrine: Negative.    Genitourinary: Negative.    Musculoskeletal: " Negative.    Skin: Negative.    Neurological: Negative.    Hematological: Negative.    Psychiatric/Behavioral: Negative.        Physical Exam     Initial Vitals [04/24/22 1657]   BP Pulse Resp Temp SpO2   (!) 101/44 100 (!) 26 98.6 °F (37 °C) 96 %      MAP       --         Vitals:    04/24/22 1714 04/24/22 1736 04/24/22 1747 04/24/22 1802   BP: (!) 101/51   (!) 115/55   BP Location: Left arm      Patient Position:       Pulse: 92 90  92   Resp: (!) 35  (!) 22 20   Temp:    98.4 °F (36.9 °C)   TempSrc:    Oral   SpO2: 96%   96%   Weight:           Physical Exam  Nursing Notes and Vital Signs Reviewed.  Constitutional:  Well developed, well nourished.  Awake & alert.  She is in mild distress  Head:  Atraumatic.  Normocephalic.    Eyes:  PERRL.  EOMI.  Conjunctivae are not pale. No scleral icterus.  ENT:  Mucous membranes are moist and intact.  Oropharynx is clear and symmetric.    Neck:  Supple.  No JVD.  No lymphadenopathy.  Cardiovascular:  Regular rate.  Regular rhythm.  No murmurs, rubs, or gallops.  Distal pulses are 2+ and symmetric.  Pulmonary/Chest:  Tachypnic.   Clear to auscultation bilaterally.  No wheezing, rales or rhonchi.  Abdominal:  Soft and non-distended.  There is no tenderness.  No rebound, guarding, or rigidity.  No organomegaly.  Good bowel sounds.    Genitourinary: No CVA tenderness.  Musculoskeletal:  No edema.   No cyanosis.  No clubbing.  Moves all four extremities.   No calf tenderness.  Skin:  Skin is warm and dry.      Neurological:  Alert, awake, and appropriate.  Normal speech.  No acute focal neurological deficits are appreciated.  Psychiatric:  Good eye contact.  Appropriate in content/context. Anxious and hyperventilating.     ED Course   Procedures  Labs Reviewed   COMPREHENSIVE METABOLIC PANEL - Abnormal; Notable for the following components:       Result Value    CO2 21 (*)     Glucose 173 (*)     Albumin 3.3 (*)     All other components within normal limits   CBC W/ AUTO  DIFFERENTIAL   TROPONIN I   B-TYPE NATRIURETIC PEPTIDE     Results for orders placed or performed during the hospital encounter of 04/24/22   CBC auto differential   Result Value Ref Range    WBC 10.00 3.90 - 12.70 K/uL    RBC 4.36 4.00 - 5.40 M/uL    Hemoglobin 13.0 12.0 - 16.0 g/dL    Hematocrit 38.7 37.0 - 48.5 %    MCV 89 82 - 98 fL    MCH 29.8 27.0 - 31.0 pg    MCHC 33.6 32.0 - 36.0 g/dL    RDW 12.3 11.5 - 14.5 %    Platelets 274 150 - 450 K/uL    MPV 9.3 9.2 - 12.9 fL    Immature Granulocytes 0.3 0.0 - 0.5 %    Gran # (ANC) 4.8 1.8 - 7.7 K/uL    Immature Grans (Abs) 0.03 0.00 - 0.04 K/uL    Lymph # 4.2 1.0 - 4.8 K/uL    Mono # 0.8 0.3 - 1.0 K/uL    Eos # 0.2 0.0 - 0.5 K/uL    Baso # 0.03 0.00 - 0.20 K/uL    nRBC 0 0 /100 WBC    Gran % 48.2 38.0 - 73.0 %    Lymph % 41.8 18.0 - 48.0 %    Mono % 7.6 4.0 - 15.0 %    Eosinophil % 1.8 0.0 - 8.0 %    Basophil % 0.3 0.0 - 1.9 %    Differential Method Automated    Comprehensive metabolic panel   Result Value Ref Range    Sodium 140 136 - 145 mmol/L    Potassium 3.8 3.5 - 5.1 mmol/L    Chloride 108 95 - 110 mmol/L    CO2 21 (L) 23 - 29 mmol/L    Glucose 173 (H) 70 - 110 mg/dL    BUN 9 6 - 20 mg/dL    Creatinine 0.8 0.5 - 1.4 mg/dL    Calcium 9.2 8.7 - 10.5 mg/dL    Total Protein 6.5 6.0 - 8.4 g/dL    Albumin 3.3 (L) 3.5 - 5.2 g/dL    Total Bilirubin 0.3 0.1 - 1.0 mg/dL    Alkaline Phosphatase 119 55 - 135 U/L    AST 19 10 - 40 U/L    ALT 22 10 - 44 U/L    Anion Gap 11 8 - 16 mmol/L    eGFR if African American >60 >60 mL/min/1.73 m^2    eGFR if non African American >60 >60 mL/min/1.73 m^2   Troponin I #1   Result Value Ref Range    Troponin I <0.006 0.000 - 0.026 ng/mL   BNP   Result Value Ref Range    BNP 22 0 - 99 pg/mL     *Note: Due to a large number of results and/or encounters for the requested time period, some results have not been displayed. A complete set of results can be found in Results Review.       EKG Readings: (Independently Interpreted)   Initial  Reading: No STEMI. Rhythm: Normal Sinus Rhythm. Heart Rate: 98. Ectopy: No Ectopy. Conduction: Normal. ST Segments: Normal ST Segments. T Waves: Normal. Other Findings: Prolonged QT Interval. Other Impression: NSR with prolonged QT   4:53       Imaging Results           CT Chest Without Contrast (Final result)  Result time 04/24/22 18:11:01    Final result by Romaine Gibbs MD (04/24/22 18:11:01)                 Impression:      Spiculated appearing pulmonary nodule on the order of 1.3 cm not seen on the prior exam of 03/05/2021 in the right lung base. Three month follow-up CT, tissue sampling, or PET-CT should be considered.  Neoplasm is the diagnosis of exclusion.    Lungs are otherwise clear.    Irregular hepatic hypodensities not seen on the prior CT of the abdomen pelvis of 03/05/2021. It is unclear if these represent sequela of hepatic infarct or other etiology. Further evaluation is recommended. Consider ultrasound.    This report was flagged in Epic as abnormal.    All CT scans at this facility are performed  using dose modulation techniques as appropriate to performed exam including the following:  automated exposure control; adjustment of mA and/or kV according to the patients size (this includes techniques or standardized protocols for targeted exams where dose is matched to indication/reason for exam: i.e. extremities or head);  iterative reconstruction technique.      Electronically signed by: Romaine Gibbs  Date:    04/24/2022  Time:    18:11             Narrative:    EXAMINATION:  CT CHEST WITHOUT CONTRAST    CLINICAL HISTORY:  Dyspnea, chronic, unclear etiology;    TECHNIQUE:  Low dose axial images, sagittal and coronal reformations were obtained from the thoracic inlet to the lung bases. Contrast was not administered.    COMPARISON:  Multiple priors.    FINDINGS:  Base of Neck: No significant abnormality.    Thoracic soft tissues: Normal.    Aorta: Left-sided aortic arch.  No aneurysm and no  significant atherosclerosis    Heart: Normal size. No effusion.  Coronary artery atherosclerosis.    Pulmonary vasculature: Pulmonary arteries distribute normally.  There are four pulmonary veins.    Tracey/Mediastinum: No pathologic donita enlargement.    Airways: Patent.    Lungs/Pleura: Spiculated appearing pulmonary nodule on the order of 1.3 cm not seen on the prior exam of 03/05/2021 in the right lung base.  Three month follow-up CT, tissue sampling, or PET-CT should be considered.  Neoplasm is the diagnosis of exclusion.    Otherwise the lungs are clear.    Esophagus: Normal.    Upper Abdomen: Irregular hepatic hypodensities not seen on the prior CT of the abdomen pelvis of 03/05/2021.  It is unclear if these represent sequela of hepatic infarct or other etiology.  Further evaluation is recommended.  Consider ultrasound.    Bones: No acute fracture. No suspicious lytic or sclerotic lesions.                                 Medications   meperidine (PF) injection 25 mg (25 mg Intravenous Given 4/24/22 1747)   ondansetron injection 4 mg (4 mg Intravenous Given 4/24/22 1747)   ALPRAZolam tablet 0.25 mg (0.25 mg Oral Given 4/24/22 1735)          7:14 PM - Re-evaluation: The patient is resting comfortably and is in no acute distress, discussed CT findings and need for close follow up with pulmonology for further evaluation of pulmonary nodule, states she already has an appointment setup. She states that her symptoms have improved after treatment within ER. Discussed test results, shared treatment plan, specific conditions for return, and importance of follow up with patient and family.  She understands and agrees with the plan as discussed. Answered  her questions at this time. She has remained hemodynamically stable throughout the ED course and is appropriate for discharge home.                    Clinical Impression:   Final diagnoses:  [R07.9] Chest pain syndrome (Primary)  [F41.9] Anxiety  [R91.1] Pulmonary  nodule         ED Disposition Condition    Discharge Stable        ED Prescriptions     None        Follow-up Information     Follow up With Specialties Details Why Contact Info    Brian Delgado MD Interventional Cardiology, Cardiology Schedule an appointment as soon as possible for a visit in 2 days  [address]  [place] [address]  [phone]      [facility] PULMONARY MEDICINE Pulmonology Schedule an appointment as soon as possible for a visit in 2 days  [address] [facility] [address]  [phone]           Carla Astorga MD  04/24/22 3930

## 2022-04-25 VITALS
DIASTOLIC BLOOD PRESSURE: 68 MMHG | WEIGHT: 220 LBS | HEART RATE: 78 BPM | HEIGHT: 70 IN | OXYGEN SATURATION: 96 % | BODY MASS INDEX: 31.5 KG/M2 | TEMPERATURE: 98 F | RESPIRATION RATE: 16 BRPM | SYSTOLIC BLOOD PRESSURE: 110 MMHG

## 2022-04-27 ENCOUNTER — TELEPHONE (OUTPATIENT)
Dept: GASTROENTEROLOGY | Facility: CLINIC | Age: 47
End: 2022-04-27
Payer: MEDICAID

## 2022-04-27 NOTE — TELEPHONE ENCOUNTER
Pt called and reports she has had nausea, vomiting, abdominal cramps and diarrhea since Saturday. She is diabetic and can not eat or drink anything, BS yesterday was 467.  Reports she was so weak yesterday she called EMS to take her to [facility] in [place], they gave her IV fluids and IV reglan. Also diagnosed with UTI ( was prescribed Nitrofurantoin) and has only taken one dose so far this morning.   Reports taking Phenergan and Bentyl but neither giving any relief.     Had about 4 diarrhea episodes since 2 am this morning and no vomiting only severe nausea.  Recommended to back to the ED today but use [facility].   EGD done on 04/22/22, pathology still pending.     Advised her I would get with Dr Hardwick for any other recommendations.

## 2022-05-02 LAB
FINAL PATHOLOGIC DIAGNOSIS: NORMAL
GROSS: NORMAL
Lab: NORMAL

## 2022-05-04 ENCOUNTER — OFFICE VISIT (OUTPATIENT)
Dept: PULMONOLOGY | Facility: CLINIC | Age: 47
End: 2022-05-04
Payer: MEDICAID

## 2022-05-04 ENCOUNTER — LAB VISIT (OUTPATIENT)
Dept: LAB | Facility: HOSPITAL | Age: 47
End: 2022-05-04
Attending: PHYSICIAN ASSISTANT
Payer: MEDICAID

## 2022-05-04 VITALS
HEIGHT: 70 IN | WEIGHT: 207.13 LBS | OXYGEN SATURATION: 100 % | SYSTOLIC BLOOD PRESSURE: 100 MMHG | DIASTOLIC BLOOD PRESSURE: 62 MMHG | RESPIRATION RATE: 18 BRPM | HEART RATE: 88 BPM | BODY MASS INDEX: 29.65 KG/M2

## 2022-05-04 DIAGNOSIS — I10 PRIMARY HYPERTENSION: ICD-10-CM

## 2022-05-04 DIAGNOSIS — I27.20 PULMONARY HYPERTENSION: ICD-10-CM

## 2022-05-04 DIAGNOSIS — R06.09 DOE (DYSPNEA ON EXERTION): ICD-10-CM

## 2022-05-04 DIAGNOSIS — R91.1 PULMONARY NODULE: Primary | ICD-10-CM

## 2022-05-04 DIAGNOSIS — I73.9 PAD (PERIPHERAL ARTERY DISEASE): ICD-10-CM

## 2022-05-04 DIAGNOSIS — Z87.891 FORMER SMOKER: ICD-10-CM

## 2022-05-04 DIAGNOSIS — R91.1 PULMONARY NODULE: ICD-10-CM

## 2022-05-04 LAB
CRP SERPL-MCNC: 9.7 MG/L (ref 0–8.2)
ERYTHROCYTE [SEDIMENTATION RATE] IN BLOOD BY WESTERGREN METHOD: 45 MM/HR (ref 0–20)

## 2022-05-04 PROCEDURE — 99999 PR PBB SHADOW E&M-EST. PATIENT-LVL V: ICD-10-PCS | Mod: PBBFAC,,, | Performed by: PHYSICIAN ASSISTANT

## 2022-05-04 PROCEDURE — 3074F PR MOST RECENT SYSTOLIC BLOOD PRESSURE < 130 MM HG: ICD-10-PCS | Mod: CPTII,,, | Performed by: PHYSICIAN ASSISTANT

## 2022-05-04 PROCEDURE — 3008F PR BODY MASS INDEX (BMI) DOCUMENTED: ICD-10-PCS | Mod: CPTII,,, | Performed by: PHYSICIAN ASSISTANT

## 2022-05-04 PROCEDURE — 3078F PR MOST RECENT DIASTOLIC BLOOD PRESSURE < 80 MM HG: ICD-10-PCS | Mod: CPTII,,, | Performed by: PHYSICIAN ASSISTANT

## 2022-05-04 PROCEDURE — 36415 COLL VENOUS BLD VENIPUNCTURE: CPT | Performed by: PHYSICIAN ASSISTANT

## 2022-05-04 PROCEDURE — 1160F PR REVIEW ALL MEDS BY PRESCRIBER/CLIN PHARMACIST DOCUMENTED: ICD-10-PCS | Mod: CPTII,,, | Performed by: PHYSICIAN ASSISTANT

## 2022-05-04 PROCEDURE — 4010F PR ACE/ARB THEARPY RXD/TAKEN: ICD-10-PCS | Mod: CPTII,,, | Performed by: PHYSICIAN ASSISTANT

## 2022-05-04 PROCEDURE — 3008F BODY MASS INDEX DOCD: CPT | Mod: CPTII,,, | Performed by: PHYSICIAN ASSISTANT

## 2022-05-04 PROCEDURE — 85651 RBC SED RATE NONAUTOMATED: CPT | Performed by: PHYSICIAN ASSISTANT

## 2022-05-04 PROCEDURE — 3074F SYST BP LT 130 MM HG: CPT | Mod: CPTII,,, | Performed by: PHYSICIAN ASSISTANT

## 2022-05-04 PROCEDURE — 1159F MED LIST DOCD IN RCRD: CPT | Mod: CPTII,,, | Performed by: PHYSICIAN ASSISTANT

## 2022-05-04 PROCEDURE — 99215 OFFICE O/P EST HI 40 MIN: CPT | Mod: PBBFAC | Performed by: PHYSICIAN ASSISTANT

## 2022-05-04 PROCEDURE — 1159F PR MEDICATION LIST DOCUMENTED IN MEDICAL RECORD: ICD-10-PCS | Mod: CPTII,,, | Performed by: PHYSICIAN ASSISTANT

## 2022-05-04 PROCEDURE — 86140 C-REACTIVE PROTEIN: CPT | Performed by: PHYSICIAN ASSISTANT

## 2022-05-04 PROCEDURE — 4010F ACE/ARB THERAPY RXD/TAKEN: CPT | Mod: CPTII,,, | Performed by: PHYSICIAN ASSISTANT

## 2022-05-04 PROCEDURE — 99204 PR OFFICE/OUTPT VISIT, NEW, LEVL IV, 45-59 MIN: ICD-10-PCS | Mod: S$PBB,,, | Performed by: PHYSICIAN ASSISTANT

## 2022-05-04 PROCEDURE — 99204 OFFICE O/P NEW MOD 45 MIN: CPT | Mod: S$PBB,,, | Performed by: PHYSICIAN ASSISTANT

## 2022-05-04 PROCEDURE — 86038 ANTINUCLEAR ANTIBODIES: CPT | Performed by: PHYSICIAN ASSISTANT

## 2022-05-04 PROCEDURE — 1160F RVW MEDS BY RX/DR IN RCRD: CPT | Mod: CPTII,,, | Performed by: PHYSICIAN ASSISTANT

## 2022-05-04 PROCEDURE — 3078F DIAST BP <80 MM HG: CPT | Mod: CPTII,,, | Performed by: PHYSICIAN ASSISTANT

## 2022-05-04 PROCEDURE — 99999 PR PBB SHADOW E&M-EST. PATIENT-LVL V: CPT | Mod: PBBFAC,,, | Performed by: PHYSICIAN ASSISTANT

## 2022-05-04 RX ORDER — ALBUTEROL SULFATE 90 UG/1
2 AEROSOL, METERED RESPIRATORY (INHALATION) EVERY 6 HOURS PRN
Qty: 18 G | Refills: 0 | Status: SHIPPED | OUTPATIENT
Start: 2022-05-04 | End: 2022-11-16 | Stop reason: SDUPTHER

## 2022-05-04 NOTE — PROGRESS NOTES
Subjective:       Patient ID: Jenifer Westfall is a 46 y.o. female.    Chief Complaint: Shortness of Breath      47yo female referred by Brian Delgado MD for COPD  New pulmonary nodule found on chest CT 4/24/22  Quit smoking 1.5 years ago; smoked for 25 years about a pack a day  Grandmother had lung cancer  No cough, no sputum production  Chronic SOB with exertion, gets SOB with taking a shower and ADLs, mrC 3  Has not tried inhalers, gets relief with rest  1.3cm spiculated pulmonary nodule on Chest CT 4/24 found in ER; went to ER for chest pain   Takes Imdur for chest pain  No recent infections  No history of TB or exposure  No travel  Nodule on abdominal chest CT 3/2021 appears 7mm        Immunization History   Administered Date(s) Administered    COVID-19, MRNA, LN-S, PF (MODERNA FULL 0.5 ML DOSE) 08/03/2021    Hepatitis B, Adult 12/22/2014, 04/02/2015    Hepatitis B, Pediatric/Adolescent 06/14/2016    Influenza 1974, 11/20/2009, 11/26/2012, 10/09/2013, 10/30/2014, 12/03/2015    Influenza - Quadrivalent - PF *Preferred* (6 months and older) 09/30/2010    Influenza - Trivalent (ADULT) 11/20/2009, 11/26/2012, 10/09/2013, 10/30/2014, 12/03/2015    Influenza - Trivalent - PF (ADULT) 09/30/2010    Influenza A (H1N1) 2009 Monovalent - IM 11/20/2009    Influenza Split 11/20/2009, 11/26/2012, 10/09/2013    Pneumococcal Polysaccharide - 23 Valent 07/02/2012    Tdap 04/02/2015      Tobacco Use: Medium Risk    Smoking Tobacco Use: Former Smoker    Smokeless Tobacco Use: Never Used      Past Medical History:   Diagnosis Date    Arthritis     DM (diabetes mellitus) 2009     03/01/2017 Insulin x 3 years 2013    Hepatitis C antibody positive in blood 03/04/2021    RNA NEGATIVE 3/6/2017, 3/22/22    Hyperlipidemia     Hypertension     IBS (irritable bowel syndrome)     Kidney stone     Marfan syndrome     Mitral valve prolapse       Current Outpatient Medications on File Prior to Visit  "  Medication Sig Dispense Refill    ADMELOG SOLOSTAR U-100 INSULIN 100 unit/mL pen       ALPRAZolam (XANAX) 0.25 MG tablet Take 0.25 mg by mouth daily as needed.      ascorbic acid (VITAMIN C) 100 MG tablet Take 100 mg by mouth once daily.      aspirin (ECOTRIN) 81 MG EC tablet Take 81 mg by mouth once daily.      atenoloL (TENORMIN) 25 MG tablet Take 1 tablet (25 mg total) by mouth once daily. 30 tablet 11    atorvastatin (LIPITOR) 80 MG tablet TAKE 1 TABLET BY MOUTH ONCE DAILY.  30 tablet 11    azelastine (ASTELIN) 137 mcg (0.1 %) nasal spray       clopidogreL (PLAVIX) 75 mg tablet TAKE 1 TABLET BY MOUTH ONCE DAILY 90 tablet 2    diclofenac sodium (VOLTAREN) 1 % Gel Apply 2 g topically 3 (three) times daily. 1 Tube 2    dicyclomine (BENTYL) 20 mg tablet Take 1 tablet (20 mg total) by mouth before meals as needed (abd pain). 90 tablet 2    famotidine (PEPCID) 20 MG tablet TAKE 1 TABLET BY MOUTH TWICE DAILY 60 tablet 11    FLUoxetine (PROZAC) 40 MG capsule TAKE 1 CAPSULE BY MOUTH ONCE DAILY 90 capsule 0    fluticasone propionate (FLONASE) 50 mcg/actuation nasal spray       gabapentin (NEURONTIN) 300 MG capsule Take by mouth.      glipiZIDE (GLUCOTROL) 10 MG tablet Take 1 tablet (10 mg total) by mouth 2 (two) times daily. 60 tablet 0    insulin glargine,hum.rec.anlog (BASAGLAR KWIKPEN U-100 INSULIN SUBQ) Inject 40 Units into the skin 2 (two) times daily.      isosorbide mononitrate (IMDUR) 30 MG 24 hr tablet Take 1 tablet (30 mg total) by mouth once daily. 30 tablet 11    liraglutide 0.6 mg/0.1 mL, 18 mg/3 mL, subq PNIJ (VICTOZA 2-DIAN) 0.6 mg/0.1 mL (18 mg/3 mL) PnIj Inject 1.2 Units into the skin.      lisinopril 10 MG tablet TAKE 1 TABLET (10 MG TOTAL) BY MOUTH ONCE DAILY. 30 tablet 3    NOVOLOG FLEXPEN U-100 INSULIN 100 unit/mL (3 mL) InPn pen       pantoprazole (PROTONIX) 40 MG tablet TAKE 1 TABLET BY MOUTH ONCE DAILY 30 tablet 4    PEN NEEDLE 31 gauge x 5/16" Ndle USE 5 TIMES DAILY WITH " "LANTUS AND HUMALOG 100 each 3    promethazine (PHENERGAN) 25 MG tablet       tiZANidine (ZANAFLEX) 4 MG tablet TAKE 1 TO 1 AND 1/2 TABLETS (4 TO 6MG TOTAL) BY MOUTH NIGHTLY AS NEEDED. 45 tablet 5    TRUE METRIX GLUCOSE TEST STRIP Strp       clotrimazole (LOTRIMIN) 1 % cream Apply to affected area 2 times daily 60 g 0    HYDROcodone-acetaminophen (NORCO) 7.5-325 mg per tablet Take 1 tablet by mouth every 6 (six) hours as needed for Pain. 12 tablet 0    LIDOcaine HCl 2% (LIDOCAINE VISCOUS) 2 % Soln by Mucous Membrane route 4 (four) times daily as needed (pain). 200 mL 0    pregabalin (LYRICA) 50 MG capsule Take 1 capsule (50 mg total) by mouth nightly for 10 days, THEN 1 capsule (50 mg total) 2 (two) times daily for 10 days, THEN 1 capsule (50 mg total) 3 (three) times daily. Do not take with gabapentin.. 120 capsule 3     Current Facility-Administered Medications on File Prior to Visit   Medication Dose Route Frequency Provider Last Rate Last Admin    medroxyPROGESTERone (DEPO-PROVERA) injection 150 mg  150 mg Intramuscular Q90 Days Caitlyn Agustin, NP   150 mg at 02/01/22 1522        Review of Systems   Constitutional: Negative for fever, weight loss, appetite change, fatigue and weakness.   HENT: Negative for postnasal drip, rhinorrhea, sinus pressure, trouble swallowing and congestion.    Respiratory: Positive for dyspnea on extertion. Negative for cough, sputum production, choking, chest tightness, shortness of breath and wheezing.    Cardiovascular: Negative for chest pain and leg swelling.   Musculoskeletal: Positive for arthralgias. Negative for gait problem and joint swelling.   Gastrointestinal: Negative for nausea, vomiting and abdominal pain.   Neurological: Negative for dizziness, weakness and headaches.   All other systems reviewed and are negative.      Objective:       Vitals:    05/04/22 1330   BP: 100/62   Pulse: 88   Resp: 18   SpO2: 100%   Weight: 93.9 kg (207 lb 2 oz)   Height: 5' 10" " (1.778 m)       Physical Exam   Constitutional: She is oriented to person, place, and time. She appears well-developed and well-nourished. No distress.   HENT:   Head: Normocephalic.   Nose: Nose normal.   Mouth/Throat: Oropharynx is clear and moist.   Cardiovascular: Normal rate and regular rhythm.   Pulmonary/Chest: Effort normal and breath sounds normal. No respiratory distress. She has no wheezes. She has no rhonchi. She has no rales.   Musculoskeletal:         General: No edema.      Cervical back: Normal range of motion and neck supple.   Lymphadenopathy: No supraclavicular adenopathy is present.     She has no cervical adenopathy.   Neurological: She is alert and oriented to person, place, and time. Gait normal.   Skin: Skin is warm and dry.   Psychiatric: She has a normal mood and affect.   Vitals reviewed.    Personal Diagnostic Review    CT Chest Without Contrast  Narrative: EXAMINATION:  CT CHEST WITHOUT CONTRAST    CLINICAL HISTORY:  Dyspnea, chronic, unclear etiology;    TECHNIQUE:  Low dose axial images, sagittal and coronal reformations were obtained from the thoracic inlet to the lung bases. Contrast was not administered.    COMPARISON:  Multiple priors.    FINDINGS:  Base of Neck: No significant abnormality.    Thoracic soft tissues: Normal.    Aorta: Left-sided aortic arch.  No aneurysm and no significant atherosclerosis    Heart: Normal size. No effusion.  Coronary artery atherosclerosis.    Pulmonary vasculature: Pulmonary arteries distribute normally.  There are four pulmonary veins.    Tracey/Mediastinum: No pathologic donita enlargement.    Airways: Patent.    Lungs/Pleura: Spiculated appearing pulmonary nodule on the order of 1.3 cm not seen on the prior exam of 03/05/2021 in the right lung base.  Three month follow-up CT, tissue sampling, or PET-CT should be considered.  Neoplasm is the diagnosis of exclusion.    Otherwise the lungs are clear.    Esophagus: Normal.    Upper Abdomen: Irregular  hepatic hypodensities not seen on the prior CT of the abdomen pelvis of 03/05/2021.  It is unclear if these represent sequela of hepatic infarct or other etiology.  Further evaluation is recommended.  Consider ultrasound.    Bones: No acute fracture. No suspicious lytic or sclerotic lesions.  Impression: Spiculated appearing pulmonary nodule on the order of 1.3 cm not seen on the prior exam of 03/05/2021 in the right lung base. Three month follow-up CT, tissue sampling, or PET-CT should be considered.  Neoplasm is the diagnosis of exclusion.    Lungs are otherwise clear.    Irregular hepatic hypodensities not seen on the prior CT of the abdomen pelvis of 03/05/2021. It is unclear if these represent sequela of hepatic infarct or other etiology. Further evaluation is recommended. Consider ultrasound.    This report was flagged in Epic as abnormal.    All CT scans at this facility are performed  using dose modulation techniques as appropriate to performed exam including the following:  automated exposure control; adjustment of mA and/or kV according to the patients size (this includes techniques or standardized protocols for targeted exams where dose is matched to indication/reason for exam: i.e. extremities or head);  iterative reconstruction technique.    Electronically signed by: Romaine Gibbs  Date:    04/24/2022  Time:    18:11      Assessment/Plan:       Problem List Items Addressed This Visit        Pulmonary    Pulmonary hypertension     The estimated PA systolic pressure is 43 mmHg in 2018 on Echo  Has repeat Echo ordered by Dr. Delgado           Pulmonary nodule - Primary     1.3cm spiculated pulmonary nodule on Chest CT 4/24 found in ER; went to ER for chest pain at time of Chest CT  No recent infections  No history of TB or exposure  No travel  Nodule on abdominal chest CT 3/2021 appears 7mm  No cough or sputum production  chronic SOB  Labs today  PET scan           Relevant Orders    NM PET CT Routine Skull  to Mid Thigh    Sedimentation rate    C-REACTIVE PROTEIN    KODI Screen w/Reflex       Cardiac/Vascular    HTN (hypertension)     Stable/controlled           PAD (peripheral artery disease)     plavix  F/u regularly with cardiology             KAT (dyspnea on exertion)     Suspect COPD  Repeat Echo for pulmonary HTN  Has had VQ scan without evidence for PE   No edema today, clear lungs on exam  PFT and walk testing ordered  Albuterol prn           Relevant Medications    albuterol (VENTOLIN HFA) 90 mcg/actuation inhaler    Other Relevant Orders    KODI Screen w/Reflex    Complete PFT with bronchodilator    Stress test, pulmonary       Other    Former smoker     25 pack year history, quit 1.5 years ago with chantix               Follow up for pet scan and follow up with pulmonologist next available.   PFT and walk.    Discussed diagnosis, its evaluation, treatment and usual course. All questions answered.    Patient verbalized understanding of plan and left in no acute distress    Thank you for the courtesy of participating in the care of this patient    Rebecca Harris PA-C      Addendum 5/5/22: per Dr. Matos and radiologist recommendations: nodule appears inflammatory, will cancel pet scan, prednisone for 6 weeks and repeat chest ct in 3 months

## 2022-05-04 NOTE — ASSESSMENT & PLAN NOTE
Suspect COPD  Repeat Echo for pulmonary HTN  Has had VQ scan without evidence for PE   No edema today, clear lungs on exam  PFT and walk testing ordered  Albuterol prn

## 2022-05-04 NOTE — ASSESSMENT & PLAN NOTE
The estimated PA systolic pressure is 43 mmHg in 2018 on Echo  Has repeat Echo ordered by Dr. Delgado

## 2022-05-04 NOTE — ASSESSMENT & PLAN NOTE
1.3cm spiculated pulmonary nodule on Chest CT 4/24 found in ER; went to ER for chest pain at time of Chest CT  No recent infections  No history of TB or exposure  No travel  Nodule on abdominal chest CT 3/2021 appears 7mm  No cough or sputum production  chronic SOB  Labs today  PET scan

## 2022-05-05 LAB — ANA SER QL IF: NORMAL

## 2022-05-05 RX ORDER — PREDNISONE 20 MG/1
10 TABLET ORAL DAILY
Qty: 66 TABLET | Refills: 0 | Status: SHIPPED | OUTPATIENT
Start: 2022-05-05 | End: 2022-10-07

## 2022-05-09 ENCOUNTER — TELEPHONE (OUTPATIENT)
Dept: CARDIOLOGY | Facility: HOSPITAL | Age: 47
End: 2022-05-09
Payer: MEDICAID

## 2022-05-09 ENCOUNTER — TELEPHONE (OUTPATIENT)
Dept: PULMONOLOGY | Facility: CLINIC | Age: 47
End: 2022-05-09
Payer: MEDICAID

## 2022-05-09 NOTE — TELEPHONE ENCOUNTER
spoke with Pharmacy tech said that the prescription had been corrected.    ----- Message from Genny Lundy LPN sent at 5/5/2022  4:55 PM CDT -----    ----- Message -----  From: Cecily Riddle  Sent: 5/5/2022   4:54 PM CDT  To: Kimberly Resendiz Staff    Tita with [facility] called regarding the prescription for directions and quantity doesn't match. Please call the pharmacy back at [phone] and fax number is [phone]. Thx. El

## 2022-05-13 ENCOUNTER — HOSPITAL ENCOUNTER (OUTPATIENT)
Dept: CARDIOLOGY | Facility: HOSPITAL | Age: 47
Discharge: HOME | End: 2022-05-13
Attending: INTERNAL MEDICINE
Payer: MEDICAID

## 2022-05-13 VITALS
SYSTOLIC BLOOD PRESSURE: 100 MMHG | WEIGHT: 207 LBS | BODY MASS INDEX: 29.63 KG/M2 | DIASTOLIC BLOOD PRESSURE: 62 MMHG | HEIGHT: 70 IN

## 2022-05-13 DIAGNOSIS — I34.1 MVP (MITRAL VALVE PROLAPSE): ICD-10-CM

## 2022-05-13 DIAGNOSIS — R06.09 DOE (DYSPNEA ON EXERTION): ICD-10-CM

## 2022-05-13 DIAGNOSIS — Q87.40 MARFAN'S SYNDROME: ICD-10-CM

## 2022-05-13 DIAGNOSIS — I10 PRIMARY HYPERTENSION: ICD-10-CM

## 2022-05-13 DIAGNOSIS — R07.89 ATYPICAL CHEST PAIN: ICD-10-CM

## 2022-05-13 LAB
AORTIC ROOT ANNULUS: 3.49 CM
ASCENDING AORTA: 2.7 CM
AV INDEX (PROSTH): 0.81
AV MEAN GRADIENT: 3 MMHG
AV PEAK GRADIENT: 5 MMHG
AV VALVE AREA: 2.66 CM2
AV VELOCITY RATIO: 0.78
BSA FOR ECHO PROCEDURE: 2.15 M2
CV ECHO LV RWT: 0.44 CM
DOP CALC AO PEAK VEL: 1.12 M/S
DOP CALC AO VTI: 22.6 CM
DOP CALC LVOT AREA: 3.3 CM2
DOP CALC LVOT DIAMETER: 2.04 CM
DOP CALC LVOT PEAK VEL: 0.87 M/S
DOP CALC LVOT STROKE VOLUME: 60.11 CM3
DOP CALC RVOT PEAK VEL: 0.73 M/S
DOP CALC RVOT VTI: 15.7 CM
DOP CALCLVOT PEAK VEL VTI: 18.4 CM
E WAVE DECELERATION TIME: 246.6 MSEC
E/A RATIO: 0.78
E/E' RATIO: 7.53 M/S
ECHO EF ESTIMATED: 53 %
ECHO LV POSTERIOR WALL: 0.95 CM (ref 0.6–1.1)
EJECTION FRACTION: 55 %
FRACTIONAL SHORTENING: 27 % (ref 28–44)
INTERVENTRICULAR SEPTUM: 1.02 CM (ref 0.6–1.1)
IVC DIAMETER: 1.44 CM
LA MAJOR: 4.42 CM
LA MINOR: 4.12 CM
LA WIDTH: 3.4 CM
LEFT ATRIUM SIZE: 2.73 CM
LEFT ATRIUM VOLUME INDEX MOD: 17 ML/M2
LEFT ATRIUM VOLUME INDEX: 15.9 ML/M2
LEFT ATRIUM VOLUME MOD: 36 CM3
LEFT ATRIUM VOLUME: 33.65 CM3
LEFT INTERNAL DIMENSION IN SYSTOLE: 3.15 CM (ref 2.1–4)
LEFT VENTRICLE DIASTOLIC VOLUME INDEX: 39.38 ML/M2
LEFT VENTRICLE DIASTOLIC VOLUME: 83.48 ML
LEFT VENTRICLE MASS INDEX: 66 G/M2
LEFT VENTRICLE SYSTOLIC VOLUME INDEX: 18.6 ML/M2
LEFT VENTRICLE SYSTOLIC VOLUME: 39.45 ML
LEFT VENTRICULAR INTERNAL DIMENSION IN DIASTOLE: 4.31 CM (ref 3.5–6)
LEFT VENTRICULAR MASS: 140.05 G
LV LATERAL E/E' RATIO: 6.4 M/S
LV SEPTAL E/E' RATIO: 9.14 M/S
LVOT MG: 1.68 MMHG
LVOT MV: 0.6 CM/S
MV PEAK A VEL: 0.82 M/S
MV PEAK E VEL: 0.64 M/S
MV STENOSIS PRESSURE HALF TIME: 71.51 MS
MV VALVE AREA P 1/2 METHOD: 3.08 CM2
PV MEAN GRADIENT: 1.25 MMHG
PV MV: 0.54 M/S
PV PEAK VELOCITY: 0.81 CM/S
RA MAJOR: 3.4 CM
RA PRESSURE: 3 MMHG
RA WIDTH: 2.88 CM
RIGHT VENTRICULAR END-DIASTOLIC DIMENSION: 2.68 CM
SINUS: 3.41 CM
STJ: 2.82 CM
TDI LATERAL: 0.1 M/S
TDI SEPTAL: 0.07 M/S
TDI: 0.09 M/S
TRICUSPID ANNULAR PLANE SYSTOLIC EXCURSION: 2.08 CM

## 2022-05-13 PROCEDURE — C8929 TTE W OR WO FOL WCON,DOPPLER: HCPCS

## 2022-05-13 PROCEDURE — 93306 TTE W/DOPPLER COMPLETE: CPT | Mod: 26,,, | Performed by: INTERNAL MEDICINE

## 2022-05-13 PROCEDURE — 25500020 PHARM REV CODE 255: Performed by: INTERNAL MEDICINE

## 2022-05-13 PROCEDURE — 93306 ECHO (CUPID ONLY): ICD-10-PCS | Mod: 26,,, | Performed by: INTERNAL MEDICINE

## 2022-05-13 RX ORDER — SODIUM CHLORIDE 0.9 % (FLUSH) 0.9 %
10 SYRINGE (ML) INJECTION
Status: SHIPPED | OUTPATIENT
Start: 2022-05-13

## 2022-05-13 RX ADMIN — HUMAN ALBUMIN MICROSPHERES AND PERFLUTREN 0.66 MG: 10; .22 INJECTION, SOLUTION INTRAVENOUS at 02:05

## 2022-05-14 ENCOUNTER — TELEPHONE (OUTPATIENT)
Dept: CARDIOLOGY | Facility: CLINIC | Age: 47
End: 2022-05-14
Payer: MEDICAID

## 2022-05-16 ENCOUNTER — TELEPHONE (OUTPATIENT)
Dept: CARDIOLOGY | Facility: CLINIC | Age: 47
End: 2022-05-16
Payer: MEDICAID

## 2022-05-16 NOTE — TELEPHONE ENCOUNTER
Called patient to advise per Dr. Delgado     Please call pt.  Echo results stable.  Normal heart strength/function.     Dr Delgado    No answer lvm to return call

## 2022-05-16 NOTE — TELEPHONE ENCOUNTER
Domenica MISHRA Staff  Caller: [phone] (Today,  8:02 AM)  Type:  Patient Returning Call     Who Called:Jenifer   Who Left Message for Patient:DEANDRE   Does the patient know what this is regarding?:results   Would the patient rather a call back or a response via [facility]? Call back   Best Call Back Number:[phone]   Additional Information:       lvm to return call

## 2022-05-20 ENCOUNTER — TELEPHONE (OUTPATIENT)
Dept: CARDIOLOGY | Facility: CLINIC | Age: 47
End: 2022-05-20
Payer: MEDICAID

## 2022-05-20 ENCOUNTER — TELEPHONE (OUTPATIENT)
Dept: GASTROENTEROLOGY | Facility: CLINIC | Age: 47
End: 2022-05-20
Payer: MEDICAID

## 2022-05-20 NOTE — TELEPHONE ENCOUNTER
I attempted to contact the patient with this information:     Brian Delgado MD         5/14/22 2:18 PM  Note  Please call pt.  Echo results stable.  Normal heart strength/function.     Dr Delgado

## 2022-05-20 NOTE — TELEPHONE ENCOUNTER
----- Message from Sanjana Cohn sent at 5/20/2022  2:41 PM CDT -----  Contact: Jenifer Burgess called regarding a missed call, please give her a call back at [phone]      Thanks  kb

## 2022-05-20 NOTE — TELEPHONE ENCOUNTER
----- Message from Sanjana Cohn sent at 5/20/2022  2:39 PM CDT -----  Contact: Jenifer Burgess called regarding a spot was found on her liver and was informed to talk to her GI provider, please give her a call back at [phone]    Thanks  Kb

## 2022-05-20 NOTE — TELEPHONE ENCOUNTER
Returned pts call. She states she was seen at [facility] for stomach bug. A CT was done and she states it showed a lesion on her liver and was advised to follow up with either Gastro or Hepatology.    States he had a hx of Hep C and has seen Dr Wright about her liver in 2016 but has not followed up since.     Appt scheduled with Dr Duran for 06/01/22 at 3 pm. Pt agreed to plan and verbalized understanding.

## 2022-05-31 ENCOUNTER — TELEPHONE (OUTPATIENT)
Dept: CARDIOLOGY | Facility: CLINIC | Age: 47
End: 2022-05-31
Payer: MEDICAID

## 2022-05-31 ENCOUNTER — TELEPHONE (OUTPATIENT)
Dept: GASTROENTEROLOGY | Facility: CLINIC | Age: 47
End: 2022-05-31
Payer: MEDICAID

## 2022-05-31 NOTE — TELEPHONE ENCOUNTER
Returned call to pt who stated she wants to speak with someone concerning the diarrhea she is still having. Pt requested appt for tomorrow since she already has an appt with Hepatology. Pt scheduled with Omaira Watt.

## 2022-05-31 NOTE — TELEPHONE ENCOUNTER
----- Message from Uriel Willingham sent at 5/31/2022 11:09 AM CDT -----  Contact: Jenifer  .Type:  Sooner Apoointment Request    Caller is requesting a sooner appointment.  Caller declined first available appointment listed below.  Caller will not accept being placed on the waitlist and is requesting a message be sent to doctor.  Name of Caller: Jenifer  When is the first available appointment? 9/15/22  Symptoms: f/u stomach concerns   Would the patient rather a call back or a response via [facility]? call  Best Call Back Number: [phone]   Additional Information: request to be seen sooner than next available.  Thanks,  RP

## 2022-05-31 NOTE — TELEPHONE ENCOUNTER
Dr. Noble, pain management, would like to place a trial stimulator in patients back for help with pain. They would like to know if the patient can be off of blood thinner for 12 days.     ----- Message from Uriel Willingham sent at 5/31/2022 11:11 AM CDT -----  Contact: Jenifer  Patient is calling to speak with the nurse regarding concerns for stimulator procedure. Patient is requesting a call  back to discuss concerns and questions. Please give patient a call back at [phone] today as requested.   Thanks,  RP

## 2022-06-01 ENCOUNTER — TELEPHONE (OUTPATIENT)
Dept: CARDIOLOGY | Facility: CLINIC | Age: 47
End: 2022-06-01
Payer: MEDICAID

## 2022-06-01 ENCOUNTER — HOSPITAL ENCOUNTER (OUTPATIENT)
Dept: RADIOLOGY | Facility: HOSPITAL | Age: 47
Discharge: HOME | End: 2022-06-01
Attending: PHYSICIAN ASSISTANT
Payer: MEDICAID

## 2022-06-01 ENCOUNTER — OFFICE VISIT (OUTPATIENT)
Dept: HEPATOLOGY | Facility: CLINIC | Age: 47
End: 2022-06-01
Payer: MEDICAID

## 2022-06-01 ENCOUNTER — OFFICE VISIT (OUTPATIENT)
Dept: GASTROENTEROLOGY | Facility: CLINIC | Age: 47
End: 2022-06-01
Payer: MEDICAID

## 2022-06-01 VITALS
SYSTOLIC BLOOD PRESSURE: 128 MMHG | WEIGHT: 228.38 LBS | HEIGHT: 70 IN | DIASTOLIC BLOOD PRESSURE: 78 MMHG | HEART RATE: 70 BPM | OXYGEN SATURATION: 96 % | DIASTOLIC BLOOD PRESSURE: 78 MMHG | BODY MASS INDEX: 32.69 KG/M2 | WEIGHT: 222 LBS | SYSTOLIC BLOOD PRESSURE: 128 MMHG | BODY MASS INDEX: 31.78 KG/M2 | HEART RATE: 70 BPM | HEIGHT: 70 IN

## 2022-06-01 DIAGNOSIS — B18.2 CHRONIC HEPATITIS C WITHOUT HEPATIC COMA: Primary | ICD-10-CM

## 2022-06-01 DIAGNOSIS — R19.7 OVERFLOW DIARRHEA: Primary | ICD-10-CM

## 2022-06-01 DIAGNOSIS — R19.7 OVERFLOW DIARRHEA: ICD-10-CM

## 2022-06-01 PROCEDURE — 4010F PR ACE/ARB THEARPY RXD/TAKEN: ICD-10-PCS | Mod: CPTII,,, | Performed by: INTERNAL MEDICINE

## 2022-06-01 PROCEDURE — 3078F PR MOST RECENT DIASTOLIC BLOOD PRESSURE < 80 MM HG: ICD-10-PCS | Mod: CPTII,,, | Performed by: PHYSICIAN ASSISTANT

## 2022-06-01 PROCEDURE — 74019 XR ABDOMEN FLAT AND ERECT: ICD-10-PCS | Mod: 26,,, | Performed by: RADIOLOGY

## 2022-06-01 PROCEDURE — 99999 PR PBB SHADOW E&M-EST. PATIENT-LVL V: ICD-10-PCS | Mod: PBBFAC,,, | Performed by: PHYSICIAN ASSISTANT

## 2022-06-01 PROCEDURE — 3078F DIAST BP <80 MM HG: CPT | Mod: CPTII,,, | Performed by: PHYSICIAN ASSISTANT

## 2022-06-01 PROCEDURE — 4010F ACE/ARB THERAPY RXD/TAKEN: CPT | Mod: CPTII,,, | Performed by: INTERNAL MEDICINE

## 2022-06-01 PROCEDURE — 99213 PR OFFICE/OUTPT VISIT, EST, LEVL III, 20-29 MIN: ICD-10-PCS | Mod: S$PBB,,, | Performed by: PHYSICIAN ASSISTANT

## 2022-06-01 PROCEDURE — 3008F BODY MASS INDEX DOCD: CPT | Mod: CPTII,,, | Performed by: INTERNAL MEDICINE

## 2022-06-01 PROCEDURE — 3074F SYST BP LT 130 MM HG: CPT | Mod: CPTII,,, | Performed by: INTERNAL MEDICINE

## 2022-06-01 PROCEDURE — 3074F PR MOST RECENT SYSTOLIC BLOOD PRESSURE < 130 MM HG: ICD-10-PCS | Mod: CPTII,,, | Performed by: PHYSICIAN ASSISTANT

## 2022-06-01 PROCEDURE — 99999 PR PBB SHADOW E&M-EST. PATIENT-LVL V: ICD-10-PCS | Mod: PBBFAC,,, | Performed by: INTERNAL MEDICINE

## 2022-06-01 PROCEDURE — 99999 PR PBB SHADOW E&M-EST. PATIENT-LVL V: CPT | Mod: PBBFAC,,, | Performed by: INTERNAL MEDICINE

## 2022-06-01 PROCEDURE — 99204 OFFICE O/P NEW MOD 45 MIN: CPT | Mod: S$PBB,,, | Performed by: INTERNAL MEDICINE

## 2022-06-01 PROCEDURE — 1159F MED LIST DOCD IN RCRD: CPT | Mod: CPTII,,, | Performed by: INTERNAL MEDICINE

## 2022-06-01 PROCEDURE — 3074F SYST BP LT 130 MM HG: CPT | Mod: CPTII,,, | Performed by: PHYSICIAN ASSISTANT

## 2022-06-01 PROCEDURE — 1159F PR MEDICATION LIST DOCUMENTED IN MEDICAL RECORD: ICD-10-PCS | Mod: CPTII,,, | Performed by: INTERNAL MEDICINE

## 2022-06-01 PROCEDURE — 99213 OFFICE O/P EST LOW 20 MIN: CPT | Mod: S$PBB,,, | Performed by: PHYSICIAN ASSISTANT

## 2022-06-01 PROCEDURE — 3008F PR BODY MASS INDEX (BMI) DOCUMENTED: ICD-10-PCS | Mod: CPTII,,, | Performed by: PHYSICIAN ASSISTANT

## 2022-06-01 PROCEDURE — 4010F PR ACE/ARB THEARPY RXD/TAKEN: ICD-10-PCS | Mod: CPTII,,, | Performed by: PHYSICIAN ASSISTANT

## 2022-06-01 PROCEDURE — 4010F ACE/ARB THERAPY RXD/TAKEN: CPT | Mod: CPTII,,, | Performed by: PHYSICIAN ASSISTANT

## 2022-06-01 PROCEDURE — 99215 OFFICE O/P EST HI 40 MIN: CPT | Mod: PBBFAC,27 | Performed by: INTERNAL MEDICINE

## 2022-06-01 PROCEDURE — 99999 PR PBB SHADOW E&M-EST. PATIENT-LVL V: CPT | Mod: PBBFAC,,, | Performed by: PHYSICIAN ASSISTANT

## 2022-06-01 PROCEDURE — 74019 RADEX ABDOMEN 2 VIEWS: CPT | Mod: 26,,, | Performed by: RADIOLOGY

## 2022-06-01 PROCEDURE — 3008F PR BODY MASS INDEX (BMI) DOCUMENTED: ICD-10-PCS | Mod: CPTII,,, | Performed by: INTERNAL MEDICINE

## 2022-06-01 PROCEDURE — 3074F PR MOST RECENT SYSTOLIC BLOOD PRESSURE < 130 MM HG: ICD-10-PCS | Mod: CPTII,,, | Performed by: INTERNAL MEDICINE

## 2022-06-01 PROCEDURE — 1160F PR REVIEW ALL MEDS BY PRESCRIBER/CLIN PHARMACIST DOCUMENTED: ICD-10-PCS | Mod: CPTII,,, | Performed by: INTERNAL MEDICINE

## 2022-06-01 PROCEDURE — 1160F RVW MEDS BY RX/DR IN RCRD: CPT | Mod: CPTII,,, | Performed by: INTERNAL MEDICINE

## 2022-06-01 PROCEDURE — 74019 RADEX ABDOMEN 2 VIEWS: CPT | Mod: TC

## 2022-06-01 PROCEDURE — 3008F BODY MASS INDEX DOCD: CPT | Mod: CPTII,,, | Performed by: PHYSICIAN ASSISTANT

## 2022-06-01 PROCEDURE — 99204 PR OFFICE/OUTPT VISIT, NEW, LEVL IV, 45-59 MIN: ICD-10-PCS | Mod: S$PBB,,, | Performed by: INTERNAL MEDICINE

## 2022-06-01 PROCEDURE — 3078F DIAST BP <80 MM HG: CPT | Mod: CPTII,,, | Performed by: INTERNAL MEDICINE

## 2022-06-01 PROCEDURE — 3078F PR MOST RECENT DIASTOLIC BLOOD PRESSURE < 80 MM HG: ICD-10-PCS | Mod: CPTII,,, | Performed by: INTERNAL MEDICINE

## 2022-06-01 PROCEDURE — 99215 OFFICE O/P EST HI 40 MIN: CPT | Mod: PBBFAC | Performed by: PHYSICIAN ASSISTANT

## 2022-06-01 NOTE — PROGRESS NOTES
Subjective:      Patient ID: Jenifer Westfall is a 46 y.o. female.    Chief Complaint: Diarrhea (Noted for years and getting worse.), Irritable Bowel Syndrome, and Abdominal Cramping (Noted for years and getting worse.)    HPI:  Patient reports to clinic today for follow up of chronic diarrhea. She has been seen by numerous providers on our service, but is new to me. She reports she has had diarrhea since she was 19 years old after getting her gallbladder removed. Work up includes CT scan, colonoscopy with biopsies, stool studies, EGD. She has tried numerous medications including OTC antidiarrheals, colestipol and cholestyramine - none of which are helpful. She has since discontinued all of these medications with no change in symptoms. She does note history of anxiety and depression. There was mention upon chart review of a CT from 2021 which noted moderate constipation. Since diarrheal workup was normal, it was thought symptoms could be due to overflow. She denies blood in the stool, black stools, severe pain, fevers, abnormal weight loss.    Review of Systems   Constitutional: Negative for activity change, appetite change, chills, diaphoresis, fatigue and fever.   HENT: Negative for trouble swallowing.    Respiratory: Negative for cough and shortness of breath.    Cardiovascular: Negative for chest pain.   Gastrointestinal: Positive for constipation and diarrhea. Negative for abdominal distention, abdominal pain, anal bleeding, blood in stool, nausea and vomiting.   Musculoskeletal: Negative for back pain.   Skin: Negative for color change.   Neurological: Negative for dizziness and weakness.   Psychiatric/Behavioral: Negative for dysphoric mood. The patient is nervous/anxious.        Medical History: Reviewed    Social History: Reviewed    Allergies: Reviewed    Objective:     Physical Exam  Constitutional:       General: She is not in acute distress.     Appearance: Normal appearance. She is obese. She is not  ill-appearing, toxic-appearing or diaphoretic.   HENT:      Head: Normocephalic and atraumatic.   Eyes:      General: No scleral icterus.     Extraocular Movements: Extraocular movements intact.   Cardiovascular:      Rate and Rhythm: Normal rate and regular rhythm.   Pulmonary:      Effort: Pulmonary effort is normal. No respiratory distress.   Abdominal:      General: Bowel sounds are normal. There is no distension.      Palpations: Abdomen is soft.      Tenderness: There is no abdominal tenderness. There is no guarding.   Musculoskeletal:      Cervical back: Normal range of motion.      Comments: Ambulating with cane. Abnormal gait.   Skin:     General: Skin is warm and dry.      Coloration: Skin is not jaundiced or pale.   Neurological:      Mental Status: She is alert and oriented to person, place, and time.         Assessment:     1. Overflow diarrhea        Plan:     -See previous workup and treatment regimens listed in HPI.  -Possibility of overflow diarrhea, as previous CT noted moderate stool.  -Xray today for further evaluation.  -Discussion regarding anxiety and depression having an effect on the gut.    Jenifer was seen today for diarrhea, irritable bowel syndrome and abdominal cramping.    Diagnoses and all orders for this visit:    Overflow diarrhea  -     X-Ray Abdomen Flat And Erect; Future        No follow-ups on file.    Thank you for the opportunity to participate in the care of this patient.   Omaira Watt PA-C.

## 2022-06-01 NOTE — PROGRESS NOTES
Subjective:     Jenifer Westfall is here for evaluation of liver lesion that was found on CT chest incidentally      History of Present Illness:  Ms. Burgess is a 46-year-old female with known history of hepatitis C, Hep C treated in 2016. Post treatment biopsy ( below) .  She has multiple medical problems including Marfan syndrome, metabolic syndrome with hypertension, hyperlipidemia, diabetes mellitus, peripheral vascular disease, chronic neuropathy requiring a cane for stability.  She also reports blood clots in both lower extremities, currently Kamara injections that she has been on for several years are on hold.  She has pulmonary hypertension, not on oxygen supplements, had workup with a CT chest which incidentally showed hypodensities in the liver.    LIVER (NEEDLE BIOPSY) - 2016  -Portal-based lymphocytes and inflammation with no interface activity (grade 0-1 out of 4)  -Given history of chronic hepatitis C, post treatment, correlate with viral studies  -Macrosteatosis, 2%  -Scattered glycogenated nuclei  -Focal periportal fibrosis     CT abd 2021:  Liver: Normal size and attenuation. No focal lesions.  Gallbladder: Post cholecystectomy  Bile Ducts: No dilatation.  Pancreas: No obvious mass. No peripancreatic fat stranding.  Spleen: Normal.    CT CHEST 4/2022:  Irregular hepatic hypodensities not seen on the prior CT of the abdomen pelvis of 03/05/2021. It is unclear if these represent sequela of hepatic infarct or other etiology. Further evaluation is recommended. Consider ultrasound.     Incidental-yes  Symptomatic-no  Estrogen containing medication-yes  Obesity- yes  Metabolic syndrome- yes    Review of Systems   Constitutional: Positive for fatigue. Negative for fever and unexpected weight change.   Gastrointestinal: Negative for abdominal distention, abdominal pain, blood in stool, nausea and vomiting.   Musculoskeletal: Positive for arthralgias and myalgias. Negative for gait problem.   Skin: Negative  for pallor and rash.   Neurological: Positive for weakness. Negative for dizziness.   Hematological: Does not bruise/bleed easily.   Psychiatric/Behavioral: Negative for confusion, hallucinations and sleep disturbance.         Objective:     Physical Exam  Vitals and nursing note reviewed.   Constitutional:       Appearance: Normal appearance. She is obese.   Eyes:      General: No scleral icterus.  Cardiovascular:      Heart sounds: No murmur heard.  Pulmonary:      Effort: Pulmonary effort is normal.      Breath sounds: No wheezing, rhonchi or rales.   Abdominal:      General: Bowel sounds are normal. There is no distension.      Palpations: There is no mass.      Tenderness: There is no abdominal tenderness.   Musculoskeletal:         General: No tenderness.      Right lower leg: No edema.      Left lower leg: No edema.   Lymphadenopathy:      Cervical: No cervical adenopathy.   Skin:     Coloration: Skin is not jaundiced.      Findings: No bruising or rash.   Neurological:      Mental Status: She is alert and oriented to person, place, and time.      Coordination: Coordination normal.   Psychiatric:         Behavior: Behavior normal.         Thought Content: Thought content normal.           Computed MELD-Na score unavailable. Necessary lab results were not found in the last year.  Computed MELD score unavailable. Necessary lab results were not found in the last year.    WBC   Date Value Ref Range Status   04/24/2022 10.00 3.90 - 12.70 K/uL Final     Hemoglobin   Date Value Ref Range Status   04/24/2022 13.0 12.0 - 16.0 g/dL Final     Hematocrit   Date Value Ref Range Status   04/24/2022 38.7 37.0 - 48.5 % Final     Platelets   Date Value Ref Range Status   04/24/2022 274 150 - 450 K/uL Final     BUN   Date Value Ref Range Status   04/24/2022 9 6 - 20 mg/dL Final     Creatinine   Date Value Ref Range Status   04/24/2022 0.8 0.5 - 1.4 mg/dL Final     Glucose   Date Value Ref Range Status   04/24/2022 173 (H) 70 -  110 mg/dL Final     Calcium   Date Value Ref Range Status   04/24/2022 9.2 8.7 - 10.5 mg/dL Final     Sodium   Date Value Ref Range Status   04/24/2022 140 136 - 145 mmol/L Final     Potassium   Date Value Ref Range Status   04/24/2022 3.8 3.5 - 5.1 mmol/L Final     Chloride   Date Value Ref Range Status   04/24/2022 108 95 - 110 mmol/L Final     Magnesium   Date Value Ref Range Status   10/16/2016 2.3 1.6 - 2.6 mg/dL Final     AST   Date Value Ref Range Status   04/24/2022 19 10 - 40 U/L Final     ALT   Date Value Ref Range Status   04/24/2022 22 10 - 44 U/L Final     Alkaline Phosphatase   Date Value Ref Range Status   04/24/2022 119 55 - 135 U/L Final     Total Bilirubin   Date Value Ref Range Status   04/24/2022 0.3 0.1 - 1.0 mg/dL Final     Comment:     For infants and newborns, interpretation of results should be based  on gestational age, weight and in agreement with clinical  observations.    Premature Infant recommended reference ranges:  Up to 24 hours.............<8.0 mg/dL  Up to 48 hours............<12.0 mg/dL  3-5 days..................<15.0 mg/dL  6-29 days.................<15.0 mg/dL       Albumin   Date Value Ref Range Status   04/24/2022 3.3 (L) 3.5 - 5.2 g/dL Final     INR   Date Value Ref Range Status   03/06/2017 0.9 0.8 - 1.2 Final     Comment:     Coumadin Therapy:  2.0 - 3.0 for INR for all indicators except mechanical heart valves  and antiphospholipid syndromes which should use 2.5 - 3.5.           Assessment/Plan:      1. Abnormal Imaging/ Liver lesions:   Will initiate workup to rule out underlying liver disease.  Will obtain an ultrasound of liver.  Will check tumor markers.    2. History of hepatitis C:  Was treated in 2016, post treatment biopsy shows  2/4 fibrosis    3.Metabolic syndrome:   Reduction of risk factors for CVD  Continued treatment for HTN  Optimization of blood glucose control.  Continue lipid-lowering therapy  Increase physical activity - limited by neuropathy,  fibromyalgia    4.Obesity: Class-2   Discussed dietary recommendations- Low calorie, low carbohydrate, high protein diet with goal of loosing >3-5% to improve steatosis and >7-10% to improve histological changes if present    I have reviewed existing labs, imaging.  Educated patient about disease process, prognosis. Ordered required labs, images, procedures and discussed treatment plan.     TRC-1 month, if workup is positive, she will need a CT of the abdomen with contrast    Beverly Duran MD  Transplant Hepatologist  Dept of Hepatology, [place] [facility] [place]

## 2022-06-02 NOTE — PROGRESS NOTES
Called patient about critical glucose. She reported not taking her insulin earlier but did take it since the labs were draw. She will check her blood sugar again and take sliding scale if needed.

## 2022-06-03 ENCOUNTER — TELEPHONE (OUTPATIENT)
Dept: GASTROENTEROLOGY | Facility: CLINIC | Age: 47
End: 2022-06-03
Payer: MEDICAID

## 2022-06-03 NOTE — TELEPHONE ENCOUNTER
----- Message from Meera Pozo sent at 6/3/2022  9:27 AM CDT -----  Please call pt @ [phone] regarding a prep, pt have questions for nurse.

## 2022-06-07 ENCOUNTER — TELEPHONE (OUTPATIENT)
Dept: GASTROENTEROLOGY | Facility: CLINIC | Age: 47
End: 2022-06-07
Payer: MEDICAID

## 2022-06-07 NOTE — TELEPHONE ENCOUNTER
----- Message from Toby Mahoney sent at 6/7/2022 10:10 AM CDT -----  Contact: PT  Calling in regards to a prep. Call back at [phone]

## 2022-06-08 DIAGNOSIS — R19.7 OVERFLOW DIARRHEA: Primary | ICD-10-CM

## 2022-06-08 DIAGNOSIS — K59.00 CONSTIPATION, UNSPECIFIED CONSTIPATION TYPE: ICD-10-CM

## 2022-06-08 RX ORDER — POLYETHYLENE GLYCOL 3350 17 G/17G
1 POWDER, FOR SOLUTION ORAL ONCE
Qty: 1 EACH | Refills: 0 | Status: SHIPPED | OUTPATIENT
Start: 2022-06-08 | End: 2022-06-08

## 2022-06-17 ENCOUNTER — HOSPITAL ENCOUNTER (OUTPATIENT)
Dept: RADIOLOGY | Facility: HOSPITAL | Age: 47
Discharge: HOME | End: 2022-06-17
Attending: INTERNAL MEDICINE
Payer: MEDICAID

## 2022-06-17 ENCOUNTER — OFFICE VISIT (OUTPATIENT)
Dept: NEUROLOGY | Facility: CLINIC | Age: 47
End: 2022-06-17
Payer: MEDICAID

## 2022-06-17 VITALS
RESPIRATION RATE: 16 BRPM | WEIGHT: 228.38 LBS | OXYGEN SATURATION: 96 % | SYSTOLIC BLOOD PRESSURE: 164 MMHG | DIASTOLIC BLOOD PRESSURE: 80 MMHG | HEART RATE: 83 BPM | BODY MASS INDEX: 32.69 KG/M2 | HEIGHT: 70 IN

## 2022-06-17 DIAGNOSIS — Z79.4 TYPE 2 DIABETES MELLITUS WITH DIABETIC NEUROPATHY, WITH LONG-TERM CURRENT USE OF INSULIN: ICD-10-CM

## 2022-06-17 DIAGNOSIS — G89.29 CHRONIC MIDLINE LOW BACK PAIN WITHOUT SCIATICA: ICD-10-CM

## 2022-06-17 DIAGNOSIS — B18.2 CHRONIC HEPATITIS C WITHOUT HEPATIC COMA: ICD-10-CM

## 2022-06-17 DIAGNOSIS — M54.16 LUMBAR RADICULOPATHY: ICD-10-CM

## 2022-06-17 DIAGNOSIS — G62.9 NEUROPATHY: ICD-10-CM

## 2022-06-17 DIAGNOSIS — R29.898 WEAKNESS OF LEFT FOOT: ICD-10-CM

## 2022-06-17 DIAGNOSIS — M54.50 CHRONIC MIDLINE LOW BACK PAIN WITHOUT SCIATICA: ICD-10-CM

## 2022-06-17 DIAGNOSIS — E11.42 DIABETIC PERIPHERAL NEUROPATHY ASSOCIATED WITH TYPE 2 DIABETES MELLITUS: Primary | ICD-10-CM

## 2022-06-17 DIAGNOSIS — R20.2 NUMBNESS AND TINGLING: ICD-10-CM

## 2022-06-17 DIAGNOSIS — E11.40 TYPE 2 DIABETES MELLITUS WITH DIABETIC NEUROPATHY, WITH LONG-TERM CURRENT USE OF INSULIN: ICD-10-CM

## 2022-06-17 DIAGNOSIS — R20.0 NUMBNESS AND TINGLING: ICD-10-CM

## 2022-06-17 PROCEDURE — 4010F ACE/ARB THERAPY RXD/TAKEN: CPT | Mod: CPTII,,, | Performed by: NURSE PRACTITIONER

## 2022-06-17 PROCEDURE — 3079F DIAST BP 80-89 MM HG: CPT | Mod: CPTII,,, | Performed by: NURSE PRACTITIONER

## 2022-06-17 PROCEDURE — 1160F RVW MEDS BY RX/DR IN RCRD: CPT | Mod: CPTII,,, | Performed by: NURSE PRACTITIONER

## 2022-06-17 PROCEDURE — 99215 OFFICE O/P EST HI 40 MIN: CPT | Mod: PBBFAC,25 | Performed by: NURSE PRACTITIONER

## 2022-06-17 PROCEDURE — 3077F PR MOST RECENT SYSTOLIC BLOOD PRESSURE >= 140 MM HG: ICD-10-PCS | Mod: CPTII,,, | Performed by: NURSE PRACTITIONER

## 2022-06-17 PROCEDURE — 1160F PR REVIEW ALL MEDS BY PRESCRIBER/CLIN PHARMACIST DOCUMENTED: ICD-10-PCS | Mod: CPTII,,, | Performed by: NURSE PRACTITIONER

## 2022-06-17 PROCEDURE — 99999 PR PBB SHADOW E&M-EST. PATIENT-LVL V: CPT | Mod: PBBFAC,,, | Performed by: NURSE PRACTITIONER

## 2022-06-17 PROCEDURE — 76705 ECHO EXAM OF ABDOMEN: CPT | Mod: 26,,, | Performed by: RADIOLOGY

## 2022-06-17 PROCEDURE — 3077F SYST BP >= 140 MM HG: CPT | Mod: CPTII,,, | Performed by: NURSE PRACTITIONER

## 2022-06-17 PROCEDURE — 1159F PR MEDICATION LIST DOCUMENTED IN MEDICAL RECORD: ICD-10-PCS | Mod: CPTII,,, | Performed by: NURSE PRACTITIONER

## 2022-06-17 PROCEDURE — 99999 PR PBB SHADOW E&M-EST. PATIENT-LVL V: ICD-10-PCS | Mod: PBBFAC,,, | Performed by: NURSE PRACTITIONER

## 2022-06-17 PROCEDURE — 76705 US ABDOMEN LIMITED: ICD-10-PCS | Mod: 26,,, | Performed by: RADIOLOGY

## 2022-06-17 PROCEDURE — 99215 OFFICE O/P EST HI 40 MIN: CPT | Mod: S$PBB,,, | Performed by: NURSE PRACTITIONER

## 2022-06-17 PROCEDURE — 3008F PR BODY MASS INDEX (BMI) DOCUMENTED: ICD-10-PCS | Mod: CPTII,,, | Performed by: NURSE PRACTITIONER

## 2022-06-17 PROCEDURE — 3008F BODY MASS INDEX DOCD: CPT | Mod: CPTII,,, | Performed by: NURSE PRACTITIONER

## 2022-06-17 PROCEDURE — 3079F PR MOST RECENT DIASTOLIC BLOOD PRESSURE 80-89 MM HG: ICD-10-PCS | Mod: CPTII,,, | Performed by: NURSE PRACTITIONER

## 2022-06-17 PROCEDURE — 76705 ECHO EXAM OF ABDOMEN: CPT | Mod: TC

## 2022-06-17 PROCEDURE — 1159F MED LIST DOCD IN RCRD: CPT | Mod: CPTII,,, | Performed by: NURSE PRACTITIONER

## 2022-06-17 PROCEDURE — 4010F PR ACE/ARB THEARPY RXD/TAKEN: ICD-10-PCS | Mod: CPTII,,, | Performed by: NURSE PRACTITIONER

## 2022-06-17 PROCEDURE — 99215 PR OFFICE/OUTPT VISIT, EST, LEVL V, 40-54 MIN: ICD-10-PCS | Mod: S$PBB,,, | Performed by: NURSE PRACTITIONER

## 2022-06-17 RX ORDER — ALPRAZOLAM 0.5 MG/1
0.5 TABLET ORAL 2 TIMES DAILY PRN
COMMUNITY
Start: 2022-06-07 | End: 2023-09-05

## 2022-06-17 RX ORDER — OFLOXACIN 3 MG/ML
SOLUTION AURICULAR (OTIC)
COMMUNITY
Start: 2022-06-11 | End: 2022-10-07

## 2022-06-17 RX ORDER — GABAPENTIN 300 MG/1
900 CAPSULE ORAL 3 TIMES DAILY
Qty: 270 CAPSULE | Refills: 11 | Status: SHIPPED | OUTPATIENT
Start: 2022-06-17 | End: 2023-06-21 | Stop reason: SDUPTHER

## 2022-06-17 RX ORDER — PEN NEEDLE, DIABETIC 31 GX5/16"
1 NEEDLE, DISPOSABLE MISCELLANEOUS 3 TIMES DAILY
COMMUNITY
Start: 2022-06-08

## 2022-06-17 NOTE — PATIENT INSTRUCTIONS
Week 1: Take gabapentin 600 mg in morning, 600 mg in evening, 900 mg at night  Week 2: Take 900 mg in morning, 600 mg in evening, 900 mg at night  Week 3: Take 900 mg three times daily
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
## 2022-06-20 ENCOUNTER — PATIENT MESSAGE (OUTPATIENT)
Dept: HEPATOLOGY | Facility: CLINIC | Age: 47
End: 2022-06-20
Payer: MEDICAID

## 2022-06-23 DIAGNOSIS — M19.012 OSTEOARTHRITIS OF BOTH SHOULDERS: Primary | ICD-10-CM

## 2022-06-23 DIAGNOSIS — M19.011 OSTEOARTHRITIS OF BOTH SHOULDERS: Primary | ICD-10-CM

## 2022-07-01 ENCOUNTER — TELEPHONE (OUTPATIENT)
Dept: NEUROLOGY | Facility: CLINIC | Age: 47
End: 2022-07-01
Payer: MEDICAID

## 2022-07-01 NOTE — TELEPHONE ENCOUNTER
----- Message from Cecily Riddle sent at 7/1/2022 10:30 AM CDT -----  Pt states a referral was sent to [facility] but Medicaid isn't accepted. Call back number is [phone]. Thx. EL

## 2022-07-01 NOTE — TELEPHONE ENCOUNTER
Spoke with patient regarding a referral. The patient  stated that [facility] did not take her insurance. I informed the patient that I will let her provider know.-BR

## 2022-07-05 ENCOUNTER — TELEPHONE (OUTPATIENT)
Dept: GASTROENTEROLOGY | Facility: CLINIC | Age: 47
End: 2022-07-05
Payer: MEDICAID

## 2022-07-05 NOTE — TELEPHONE ENCOUNTER
----- Message from Yuliya Leyva sent at 7/5/2022 10:58 AM CDT -----  Contact: pt  Type:  Sooner Apoointment Request    Caller is requesting a sooner appointment.  Caller declined first available appointment listed below.  Caller will not accept being placed on the waitlist and is requesting a message be sent to doctor.  Name of Caller: pt   When is the first available appointment?  Symptoms: 4 week follow up   Would the patient rather a call back or a response via [facility]? Phone   Best Call Back Number:[phone]  Additional Information:

## 2022-07-12 ENCOUNTER — TELEPHONE (OUTPATIENT)
Dept: CARDIOLOGY | Facility: CLINIC | Age: 47
End: 2022-07-12
Payer: MEDICAID

## 2022-07-12 NOTE — TELEPHONE ENCOUNTER
----- Message from Margaret Glynn MA sent at 7/12/2022 10:19 AM CDT -----  [facility] is requesting clearance for this patient to have  bypass (fem) surgery and medication instructions for blood thinners.    Please advise, thanks     Fax: [phone]

## 2022-07-12 NOTE — TELEPHONE ENCOUNTER
Pt may proceed with vasc surgery at low to moderate CV risk and may hold plavix x 5 days for surgery.    Dr Delgado

## 2022-07-26 ENCOUNTER — TELEPHONE (OUTPATIENT)
Dept: RHEUMATOLOGY | Facility: CLINIC | Age: 47
End: 2022-07-26
Payer: MEDICAID

## 2022-07-26 NOTE — TELEPHONE ENCOUNTER
Spoke with pt and she needs to reschedule the appt on 8.11.22 due to have bypass surgery that week. Rescheduled to 9.15.22 at 1/30 pm at [facility]

## 2022-07-26 NOTE — TELEPHONE ENCOUNTER
----- Message from Bhavya Borrego sent at 7/26/2022 11:01 AM CDT -----  Regarding: reschedule  Contact: Patient  Patient is having surgery and will not be able to keep her August appointment and would like to reschedule for September if possible. The next available was in  March of 2023 due to patient's insurance. Please call to schedule at Ph .[phone] (home)

## 2022-08-01 ENCOUNTER — TELEPHONE (OUTPATIENT)
Dept: PULMONOLOGY | Facility: CLINIC | Age: 47
End: 2022-08-01
Payer: MEDICAID

## 2022-08-01 RX ORDER — PANTOPRAZOLE SODIUM 40 MG/1
TABLET, DELAYED RELEASE ORAL
Qty: 30 TABLET | Refills: 1 | Status: SHIPPED | OUTPATIENT
Start: 2022-08-01 | End: 2022-10-10 | Stop reason: SDUPTHER

## 2022-08-01 NOTE — TELEPHONE ENCOUNTER
Returned call to pt who stated protonix and pepcid are no longer covered by her insurance. Spoke with pharmacist at [facility]'s pharmacy who suggested trying a PA for the pantoprazole. Will submit PA via Covermymeds

## 2022-08-01 NOTE — TELEPHONE ENCOUNTER
----- Message from Uriel Willingham sent at 8/1/2022  4:06 PM CDT -----  Contact: Jenifer  Patient is calling to speak with the nurse regarding concerns for both pantoprazole (PROTONIX) 40 MG tablet and the famotidine (PEPCID) 20 MG tablet no longer are covered by patients insurance. Requests a call back to discuss alternative options. [facility] below is preferred. Please give patient a call back at [phone] today as requested.   [facility] - [place] - [address] [address]  [address] [address]  [place] [address]  Phone: [phone] Fax: [phone]  Thanks,  TRAVIS/AB

## 2022-08-02 ENCOUNTER — TELEPHONE (OUTPATIENT)
Dept: GASTROENTEROLOGY | Facility: CLINIC | Age: 47
End: 2022-08-02
Payer: MEDICAID

## 2022-08-25 ENCOUNTER — TELEPHONE (OUTPATIENT)
Dept: PULMONOLOGY | Facility: CLINIC | Age: 47
End: 2022-08-25
Payer: MEDICAID

## 2022-08-25 NOTE — TELEPHONE ENCOUNTER
Spoke with pt. Pt is having transportation issues and wont be able to make it on Sunday to do a covid test prior to PFT. Spoke with [facility]. Will do rapid test prior to PFT.

## 2022-08-25 NOTE — TELEPHONE ENCOUNTER
----- Message from Jenifer Guadarrama sent at 8/25/2022 11:47 AM CDT -----  Regarding: covid  Contact: patient  Patient states that she cannot get to her covid appt on Sunday, can she do it Monday, please call her back at [phone]

## 2022-08-30 ENCOUNTER — TELEPHONE (OUTPATIENT)
Dept: PULMONOLOGY | Facility: CLINIC | Age: 47
End: 2022-08-30
Payer: MEDICAID

## 2022-08-31 ENCOUNTER — TELEPHONE (OUTPATIENT)
Dept: PULMONOLOGY | Facility: CLINIC | Age: 47
End: 2022-08-31
Payer: MEDICAID

## 2022-08-31 DIAGNOSIS — Z01.811 PREOP RESPIRATORY EXAM: Primary | ICD-10-CM

## 2022-08-31 NOTE — TELEPHONE ENCOUNTER
Patient called in regards to scheduled appts. No answer/invalid contact. Order provider for testing made aware.

## 2022-09-01 ENCOUNTER — TELEPHONE (OUTPATIENT)
Dept: PULMONOLOGY | Facility: CLINIC | Age: 47
End: 2022-09-01
Payer: MEDICAID

## 2022-09-01 NOTE — TELEPHONE ENCOUNTER
----- Message from Kaleigh Espinosa sent at 9/1/2022 11:43 AM CDT -----  Contact: pt  The pt request a call to reschedule her PFT / Walk appt, no additional info given and can be reached at [phone] or [phone]///thxMW

## 2022-09-06 ENCOUNTER — TELEPHONE (OUTPATIENT)
Dept: GASTROENTEROLOGY | Facility: CLINIC | Age: 47
End: 2022-09-06
Payer: MEDICAID

## 2022-09-06 NOTE — TELEPHONE ENCOUNTER
----- Message from Luci Triana sent at 9/6/2022  2:58 PM CDT -----  Contact: Jenifer Burgess is returning a call in regards to her insurance not covering her famotidine (PEPCID) . Please call her back at [phone].      Thanks  CF

## 2022-09-06 NOTE — TELEPHONE ENCOUNTER
----- Message from Chloe Kelsey sent at 9/6/2022 10:13 AM CDT -----  Contact: [phone].  Patient would like a call in regards to a prescription -famotidine (PEPCID) 20 MG tablet     that her insurance does not want to cover it.Please call back at [phone].Thanks

## 2022-09-12 ENCOUNTER — TELEPHONE (OUTPATIENT)
Dept: GASTROENTEROLOGY | Facility: CLINIC | Age: 47
End: 2022-09-12
Payer: MEDICAID

## 2022-09-12 NOTE — TELEPHONE ENCOUNTER
----- Message from Maycol Harley sent at 9/12/2022  2:43 PM CDT -----  Pt is needing a call back regarding a prior auth that was supposed to be sent. Please call .[phone]

## 2022-09-19 ENCOUNTER — TELEPHONE (OUTPATIENT)
Dept: OBSTETRICS AND GYNECOLOGY | Facility: CLINIC | Age: 47
End: 2022-09-19
Payer: MEDICAID

## 2022-09-19 NOTE — TELEPHONE ENCOUNTER
----- Message from Susy Cox sent at 9/19/2022  9:39 AM CDT -----  Contact: Jenifer  Patient is calling to speak to the nurse regarding appt. Reports needing to reschedule due to pain in  foot and cant  walk. Please give patient a call back at .[phone]

## 2022-09-21 ENCOUNTER — TELEPHONE (OUTPATIENT)
Dept: CARDIOLOGY | Facility: CLINIC | Age: 47
End: 2022-09-21
Payer: MEDICAID

## 2022-09-21 ENCOUNTER — TELEPHONE (OUTPATIENT)
Dept: OBSTETRICS AND GYNECOLOGY | Facility: CLINIC | Age: 47
End: 2022-09-21
Payer: MEDICAID

## 2022-09-21 ENCOUNTER — TELEPHONE (OUTPATIENT)
Dept: RHEUMATOLOGY | Facility: CLINIC | Age: 47
End: 2022-09-21
Payer: MEDICAID

## 2022-09-21 NOTE — TELEPHONE ENCOUNTER
Called patient no answer left vm to return call         ----- Message from Nataly Willingham sent at 9/21/2022 12:20 PM CDT -----  Contact: Patient  Type:  Sooner Apoointment Request    Caller is requesting a sooner appointment.  Caller declined first available appointment listed below.  Caller will not accept being placed on the waitlist and is requesting a message be sent to doctor.  Name of Caller:Jenifer Westfall  When is the first available appointment?N/A  Symptoms:discuss blood pressure medications  Would the patient rather a call back or a response via [facility]? Call back  Best Call Back Number:[phone]  Additional Information:

## 2022-09-21 NOTE — TELEPHONE ENCOUNTER
----- Message from Nataly Willingham sent at 9/21/2022 12:20 PM CDT -----  Contact: Patient  Type:  Sooner Apoointment Request    Caller is requesting a sooner appointment.  Caller declined first available appointment listed below.  Caller will not accept being placed on the waitlist and is requesting a message be sent to doctor.  Name of Caller:Jenifer Westfall  When is the first available appointment?N/A  Symptoms:discuss blood pressure medications  Would the patient rather a call back or a response via [facility]? Call back  Best Call Back Number:[phone]  Additional Information:

## 2022-09-21 NOTE — TELEPHONE ENCOUNTER
----- Message from Nataly Willingham sent at 9/21/2022 12:24 PM CDT -----  Contact: Patient  Type:  Sooner Apoointment Request    Caller is requesting a sooner appointment.  Caller declined first available appointment listed below.  Caller will not accept being placed on the waitlist and is requesting a message be sent to doctor.  Name of Caller:Jenifer Khoi  When is the first available appointment?n/A  Symptoms:follow up  Would the patient rather a call back or a response via [facility]? Call back   Best Call Back Number:[phone]  Additional Information:

## 2022-09-21 NOTE — TELEPHONE ENCOUNTER
----- Message from Nataly Willingham sent at 9/21/2022 12:23 PM CDT -----  Contact: Patient  Patient called to consult with nurse or staff regarding her appointment. She missed the appointment and was told someone would contact her to reschedule. She hasn't been contacted and would like a call back if possible. She can be reached at [phone]. Thanks/

## 2022-09-22 ENCOUNTER — TELEPHONE (OUTPATIENT)
Dept: OBSTETRICS AND GYNECOLOGY | Facility: CLINIC | Age: 47
End: 2022-09-22
Payer: MEDICAID

## 2022-09-22 NOTE — TELEPHONE ENCOUNTER
----- Message from Rosalind Esteban sent at 9/21/2022  4:01 PM CDT -----  Contact: Patient [phone]  Patient is returning a phone call.  Who left a message for the patient: Noe Guerra LPN  Does patient know what this is regarding:  appt  Would you like a call back, or a response through your [facility] portal?:   call back  Comments:

## 2022-10-07 ENCOUNTER — OFFICE VISIT (OUTPATIENT)
Dept: OBSTETRICS AND GYNECOLOGY | Facility: CLINIC | Age: 47
End: 2022-10-07
Payer: MEDICAID

## 2022-10-07 VITALS
DIASTOLIC BLOOD PRESSURE: 60 MMHG | BODY MASS INDEX: 32.69 KG/M2 | SYSTOLIC BLOOD PRESSURE: 90 MMHG | HEIGHT: 70 IN | WEIGHT: 228.38 LBS

## 2022-10-07 DIAGNOSIS — N95.1 HOT FLASHES DUE TO MENOPAUSE: Primary | ICD-10-CM

## 2022-10-07 PROCEDURE — 1160F RVW MEDS BY RX/DR IN RCRD: CPT | Mod: CPTII,,, | Performed by: OBSTETRICS & GYNECOLOGY

## 2022-10-07 PROCEDURE — 3074F PR MOST RECENT SYSTOLIC BLOOD PRESSURE < 130 MM HG: ICD-10-PCS | Mod: CPTII,,, | Performed by: OBSTETRICS & GYNECOLOGY

## 2022-10-07 PROCEDURE — 3008F PR BODY MASS INDEX (BMI) DOCUMENTED: ICD-10-PCS | Mod: CPTII,,, | Performed by: OBSTETRICS & GYNECOLOGY

## 2022-10-07 PROCEDURE — 99999 PR PBB SHADOW E&M-EST. PATIENT-LVL III: ICD-10-PCS | Mod: PBBFAC,,, | Performed by: OBSTETRICS & GYNECOLOGY

## 2022-10-07 PROCEDURE — 99213 PR OFFICE/OUTPT VISIT, EST, LEVL III, 20-29 MIN: ICD-10-PCS | Mod: S$PBB,,, | Performed by: OBSTETRICS & GYNECOLOGY

## 2022-10-07 PROCEDURE — 3078F PR MOST RECENT DIASTOLIC BLOOD PRESSURE < 80 MM HG: ICD-10-PCS | Mod: CPTII,,, | Performed by: OBSTETRICS & GYNECOLOGY

## 2022-10-07 PROCEDURE — 3008F BODY MASS INDEX DOCD: CPT | Mod: CPTII,,, | Performed by: OBSTETRICS & GYNECOLOGY

## 2022-10-07 PROCEDURE — 3074F SYST BP LT 130 MM HG: CPT | Mod: CPTII,,, | Performed by: OBSTETRICS & GYNECOLOGY

## 2022-10-07 PROCEDURE — 1159F PR MEDICATION LIST DOCUMENTED IN MEDICAL RECORD: ICD-10-PCS | Mod: CPTII,,, | Performed by: OBSTETRICS & GYNECOLOGY

## 2022-10-07 PROCEDURE — 99999 PR PBB SHADOW E&M-EST. PATIENT-LVL III: CPT | Mod: PBBFAC,,, | Performed by: OBSTETRICS & GYNECOLOGY

## 2022-10-07 PROCEDURE — 1159F MED LIST DOCD IN RCRD: CPT | Mod: CPTII,,, | Performed by: OBSTETRICS & GYNECOLOGY

## 2022-10-07 PROCEDURE — 4010F ACE/ARB THERAPY RXD/TAKEN: CPT | Mod: CPTII,,, | Performed by: OBSTETRICS & GYNECOLOGY

## 2022-10-07 PROCEDURE — 99213 OFFICE O/P EST LOW 20 MIN: CPT | Mod: S$PBB,,, | Performed by: OBSTETRICS & GYNECOLOGY

## 2022-10-07 PROCEDURE — 1160F PR REVIEW ALL MEDS BY PRESCRIBER/CLIN PHARMACIST DOCUMENTED: ICD-10-PCS | Mod: CPTII,,, | Performed by: OBSTETRICS & GYNECOLOGY

## 2022-10-07 PROCEDURE — 4010F PR ACE/ARB THEARPY RXD/TAKEN: ICD-10-PCS | Mod: CPTII,,, | Performed by: OBSTETRICS & GYNECOLOGY

## 2022-10-07 PROCEDURE — 3078F DIAST BP <80 MM HG: CPT | Mod: CPTII,,, | Performed by: OBSTETRICS & GYNECOLOGY

## 2022-10-07 PROCEDURE — 99213 OFFICE O/P EST LOW 20 MIN: CPT | Mod: PBBFAC | Performed by: OBSTETRICS & GYNECOLOGY

## 2022-10-07 NOTE — PROGRESS NOTES
Subjective:       Patient ID: Jenifer Westfall is a 47 y.o. female.    Chief Complaint:  hormones      History of Present Illness  HPI  Pt complains of worsening hot flashes, night sweats, and moodiness.  Symptoms are overall well tolerated with fans and thermostat adjustment at home.  Was told she is not a candidate for HRT due to stents in her LLE requiring Plavix use.  Would like to discuss this and alternatives.  Stopped using Depo Provera earlier this year.  No menses since.    GYN & OB History  No LMP recorded. (Menstrual status: Birth Control).   Date of Last Pap: 2020    OB History    Para Term  AB Living   2 2 2         SAB IAB Ectopic Multiple Live Births                  # Outcome Date GA Lbr Renato/2nd Weight Sex Delivery Anes PTL Lv   2 Term      CS-Unspec      1 Term      CS-Unspec          Review of Systems  Review of Systems   Constitutional:  Negative for activity change, appetite change, chills, fatigue, fever and unexpected weight change.   Respiratory:  Negative for shortness of breath.    Cardiovascular:  Negative for chest pain, palpitations and leg swelling.   Gastrointestinal:  Negative for abdominal pain, bloating, blood in stool, constipation, diarrhea, nausea and vomiting.   Genitourinary:  Positive for hot flashes. Negative for dysuria, flank pain, frequency, genital sores, hematuria, pelvic pain, urgency, vaginal bleeding, vaginal discharge, vaginal pain, urinary incontinence, vaginal dryness and vaginal odor.   Musculoskeletal:  Negative for back pain.   Neurological:  Negative for syncope and headaches.         Objective:    Physical Exam:   Constitutional: She is oriented to person, place, and time. She appears well-developed and well-nourished. No distress.                           Neurological: She is alert and oriented to person, place, and time.     Psychiatric: She has a normal mood and affect. Her behavior is normal. Thought content normal.        Assessment:         1. Hot flashes due to menopause              Plan:      Hot flashes due to menopause  -     Pt was extensively counseled on menopause, including treatment options.  Risks associated with HRT use were reviewed with pt, including increased risk of heart disease, cardiac event, breast cancer, stoke, VTE, etc.  Medical history was reviewed and pt is not a candidate for HRT use.  Lifestyle modifications and alternatives discussed.  Pt voiced understanding and expressed desire to proceed with lifestyle modifications as symptoms are well tolerated.      Follow up if symptoms worsen or fail to improve.

## 2022-10-11 ENCOUNTER — TELEPHONE (OUTPATIENT)
Dept: GASTROENTEROLOGY | Facility: CLINIC | Age: 47
End: 2022-10-11
Payer: MEDICAID

## 2022-10-11 RX ORDER — PANTOPRAZOLE SODIUM 40 MG/1
40 TABLET, DELAYED RELEASE ORAL DAILY
Qty: 30 TABLET | Refills: 1 | Status: SHIPPED | OUTPATIENT
Start: 2022-10-11 | End: 2022-12-12

## 2022-10-11 NOTE — TELEPHONE ENCOUNTER
----- Message from Brisa Abarca sent at 10/11/2022  2:05 PM CDT -----  Contact: [phone]  Pt states she needs to be seen in order to get her medications filled but she has medicaid and there are no appts available. Can you please call her and assist with getting her scheduled? Thanks

## 2022-10-11 NOTE — TELEPHONE ENCOUNTER
Patient calling to schedule for medication refill follow up. Unable to schedule due to patient insurance. Staff message sent to Colin

## 2022-10-12 ENCOUNTER — PATIENT MESSAGE (OUTPATIENT)
Dept: GASTROENTEROLOGY | Facility: CLINIC | Age: 47
End: 2022-10-12
Payer: MEDICAID

## 2022-10-25 ENCOUNTER — TELEPHONE (OUTPATIENT)
Dept: CARDIOLOGY | Facility: CLINIC | Age: 47
End: 2022-10-25
Payer: MEDICAID

## 2022-10-27 ENCOUNTER — TELEPHONE (OUTPATIENT)
Dept: CARDIOLOGY | Facility: CLINIC | Age: 47
End: 2022-10-27
Payer: MEDICAID

## 2022-10-27 NOTE — TELEPHONE ENCOUNTER
[facility] MISH[facility] Staff  Caller: Jenifer (Today, 11:48 AM)  .Type:  Sooner Apoointment Request     Caller is requesting a sooner appointment.  Caller declined first available appointment listed below.  Caller will not accept being placed on the waitlist and is requesting a message be sent to doctor.   Name of Caller:  JENIFER   When is the first available appointment?  2023   Symptoms: BP concerns   Would the patient rather a call back or a response via [facility]? Call   Best Call Back Number:.[phone]   Additional Information: Patient reported speaking with nurse about sooner appt but haven't heard back as of yet. Please 4give pt a call.     Called patient no answer lvm called both numbers

## 2022-11-08 ENCOUNTER — TELEPHONE (OUTPATIENT)
Dept: GASTROENTEROLOGY | Facility: CLINIC | Age: 47
End: 2022-11-08
Payer: MEDICAID

## 2022-11-08 NOTE — TELEPHONE ENCOUNTER
Returned pts call, no answer. Msg left. Appt scheduled with Ms Kerr, next available is 02/14/22 at 11 am, booked appt.

## 2022-11-08 NOTE — TELEPHONE ENCOUNTER
----- Message from Luci Triana sent at 11/8/2022 11:00 AM CST -----  Contact: Jenifer Burgess would like a call back at [phone], Regards to getting her missed appointment from 11/7/22 reschedule.    Thanks  Td

## 2022-11-14 ENCOUNTER — OFFICE VISIT (OUTPATIENT)
Dept: NEUROLOGY | Facility: CLINIC | Age: 47
End: 2022-11-14
Payer: MEDICAID

## 2022-11-14 VITALS
WEIGHT: 242.31 LBS | RESPIRATION RATE: 16 BRPM | SYSTOLIC BLOOD PRESSURE: 145 MMHG | HEIGHT: 70 IN | OXYGEN SATURATION: 97 % | HEART RATE: 87 BPM | BODY MASS INDEX: 34.69 KG/M2 | DIASTOLIC BLOOD PRESSURE: 84 MMHG

## 2022-11-14 DIAGNOSIS — Z79.4 TYPE 2 DIABETES MELLITUS WITH DIABETIC NEUROPATHY, WITH LONG-TERM CURRENT USE OF INSULIN: ICD-10-CM

## 2022-11-14 DIAGNOSIS — R20.2 NUMBNESS AND TINGLING: ICD-10-CM

## 2022-11-14 DIAGNOSIS — M54.16 LUMBAR RADICULOPATHY: ICD-10-CM

## 2022-11-14 DIAGNOSIS — E11.40 TYPE 2 DIABETES MELLITUS WITH DIABETIC NEUROPATHY, WITH LONG-TERM CURRENT USE OF INSULIN: ICD-10-CM

## 2022-11-14 DIAGNOSIS — M54.50 CHRONIC MIDLINE LOW BACK PAIN WITHOUT SCIATICA: ICD-10-CM

## 2022-11-14 DIAGNOSIS — I73.9 CLAUDICATION IN PERIPHERAL VASCULAR DISEASE: ICD-10-CM

## 2022-11-14 DIAGNOSIS — I73.9 PAD (PERIPHERAL ARTERY DISEASE): ICD-10-CM

## 2022-11-14 DIAGNOSIS — I70.8 OCCLUSION OF RIGHT SUBCLAVIAN ARTERY: ICD-10-CM

## 2022-11-14 DIAGNOSIS — G62.9 NEUROPATHY: ICD-10-CM

## 2022-11-14 DIAGNOSIS — G89.29 CHRONIC MIDLINE LOW BACK PAIN WITHOUT SCIATICA: ICD-10-CM

## 2022-11-14 DIAGNOSIS — R20.0 NUMBNESS AND TINGLING: ICD-10-CM

## 2022-11-14 DIAGNOSIS — G43.009 MIGRAINE WITHOUT AURA AND WITHOUT STATUS MIGRAINOSUS, NOT INTRACTABLE: ICD-10-CM

## 2022-11-14 DIAGNOSIS — R29.898 WEAKNESS OF LEFT FOOT: ICD-10-CM

## 2022-11-14 DIAGNOSIS — E11.42 DIABETIC PERIPHERAL NEUROPATHY ASSOCIATED WITH TYPE 2 DIABETES MELLITUS: Primary | ICD-10-CM

## 2022-11-14 PROCEDURE — 99999 PR PBB SHADOW E&M-EST. PATIENT-LVL V: ICD-10-PCS | Mod: PBBFAC,,, | Performed by: NURSE PRACTITIONER

## 2022-11-14 PROCEDURE — 4010F PR ACE/ARB THEARPY RXD/TAKEN: ICD-10-PCS | Mod: CPTII,,, | Performed by: NURSE PRACTITIONER

## 2022-11-14 PROCEDURE — 3077F PR MOST RECENT SYSTOLIC BLOOD PRESSURE >= 140 MM HG: ICD-10-PCS | Mod: CPTII,,, | Performed by: NURSE PRACTITIONER

## 2022-11-14 PROCEDURE — 1160F PR REVIEW ALL MEDS BY PRESCRIBER/CLIN PHARMACIST DOCUMENTED: ICD-10-PCS | Mod: CPTII,,, | Performed by: NURSE PRACTITIONER

## 2022-11-14 PROCEDURE — 99215 PR OFFICE/OUTPT VISIT, EST, LEVL V, 40-54 MIN: ICD-10-PCS | Mod: S$PBB,,, | Performed by: NURSE PRACTITIONER

## 2022-11-14 PROCEDURE — 3079F DIAST BP 80-89 MM HG: CPT | Mod: CPTII,,, | Performed by: NURSE PRACTITIONER

## 2022-11-14 PROCEDURE — 1160F RVW MEDS BY RX/DR IN RCRD: CPT | Mod: CPTII,,, | Performed by: NURSE PRACTITIONER

## 2022-11-14 PROCEDURE — 99999 PR PBB SHADOW E&M-EST. PATIENT-LVL V: CPT | Mod: PBBFAC,,, | Performed by: NURSE PRACTITIONER

## 2022-11-14 PROCEDURE — 3077F SYST BP >= 140 MM HG: CPT | Mod: CPTII,,, | Performed by: NURSE PRACTITIONER

## 2022-11-14 PROCEDURE — 1159F PR MEDICATION LIST DOCUMENTED IN MEDICAL RECORD: ICD-10-PCS | Mod: CPTII,,, | Performed by: NURSE PRACTITIONER

## 2022-11-14 PROCEDURE — 99215 OFFICE O/P EST HI 40 MIN: CPT | Mod: PBBFAC | Performed by: NURSE PRACTITIONER

## 2022-11-14 PROCEDURE — 3079F PR MOST RECENT DIASTOLIC BLOOD PRESSURE 80-89 MM HG: ICD-10-PCS | Mod: CPTII,,, | Performed by: NURSE PRACTITIONER

## 2022-11-14 PROCEDURE — 99215 OFFICE O/P EST HI 40 MIN: CPT | Mod: S$PBB,,, | Performed by: NURSE PRACTITIONER

## 2022-11-14 PROCEDURE — 4010F ACE/ARB THERAPY RXD/TAKEN: CPT | Mod: CPTII,,, | Performed by: NURSE PRACTITIONER

## 2022-11-14 PROCEDURE — 1159F MED LIST DOCD IN RCRD: CPT | Mod: CPTII,,, | Performed by: NURSE PRACTITIONER

## 2022-11-14 PROCEDURE — 3008F PR BODY MASS INDEX (BMI) DOCUMENTED: ICD-10-PCS | Mod: CPTII,,, | Performed by: NURSE PRACTITIONER

## 2022-11-14 PROCEDURE — 3008F BODY MASS INDEX DOCD: CPT | Mod: CPTII,,, | Performed by: NURSE PRACTITIONER
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
## 2022-11-16 ENCOUNTER — OFFICE VISIT (OUTPATIENT)
Dept: PULMONOLOGY | Facility: CLINIC | Age: 47
End: 2022-11-16
Payer: MEDICAID

## 2022-11-16 ENCOUNTER — CLINICAL SUPPORT (OUTPATIENT)
Dept: PULMONOLOGY | Facility: CLINIC | Age: 47
End: 2022-11-16
Payer: MEDICAID

## 2022-11-16 VITALS
DIASTOLIC BLOOD PRESSURE: 100 MMHG | BODY MASS INDEX: 33.74 KG/M2 | OXYGEN SATURATION: 97 % | HEART RATE: 88 BPM | WEIGHT: 235.69 LBS | SYSTOLIC BLOOD PRESSURE: 140 MMHG | RESPIRATION RATE: 17 BRPM | HEIGHT: 70 IN

## 2022-11-16 VITALS — HEIGHT: 70 IN | WEIGHT: 235.69 LBS | BODY MASS INDEX: 33.74 KG/M2

## 2022-11-16 DIAGNOSIS — R91.1 SOLITARY PULMONARY NODULE: Primary | ICD-10-CM

## 2022-11-16 DIAGNOSIS — R06.09 DOE (DYSPNEA ON EXERTION): ICD-10-CM

## 2022-11-16 DIAGNOSIS — Z87.891 FORMER SMOKER: ICD-10-CM

## 2022-11-16 DIAGNOSIS — E66.9 OBESITY (BMI 30-39.9): ICD-10-CM

## 2022-11-16 PROCEDURE — 99999 PR PBB SHADOW E&M-EST. PATIENT-LVL I: ICD-10-PCS | Mod: PBBFAC,,,

## 2022-11-16 PROCEDURE — 3008F PR BODY MASS INDEX (BMI) DOCUMENTED: ICD-10-PCS | Mod: CPTII,,, | Performed by: INTERNAL MEDICINE

## 2022-11-16 PROCEDURE — 99999 PR PBB SHADOW E&M-EST. PATIENT-LVL III: ICD-10-PCS | Mod: PBBFAC,,, | Performed by: INTERNAL MEDICINE

## 2022-11-16 PROCEDURE — 94726 PLETHYSMOGRAPHY LUNG VOLUMES: CPT | Mod: 26,S$PBB,, | Performed by: INTERNAL MEDICINE

## 2022-11-16 PROCEDURE — 94618 PULMONARY STRESS TESTING: CPT | Mod: PBBFAC

## 2022-11-16 PROCEDURE — 4010F ACE/ARB THERAPY RXD/TAKEN: CPT | Mod: CPTII,,, | Performed by: INTERNAL MEDICINE

## 2022-11-16 PROCEDURE — 94618 PULMONARY STRESS TESTING: ICD-10-PCS | Mod: 26,S$PBB,, | Performed by: INTERNAL MEDICINE

## 2022-11-16 PROCEDURE — 99999 PR PBB SHADOW E&M-EST. PATIENT-LVL III: CPT | Mod: PBBFAC,,, | Performed by: INTERNAL MEDICINE

## 2022-11-16 PROCEDURE — 94060 EVALUATION OF WHEEZING: CPT | Mod: 26,59,S$PBB, | Performed by: INTERNAL MEDICINE

## 2022-11-16 PROCEDURE — 4010F PR ACE/ARB THEARPY RXD/TAKEN: ICD-10-PCS | Mod: CPTII,,, | Performed by: INTERNAL MEDICINE

## 2022-11-16 PROCEDURE — 3077F SYST BP >= 140 MM HG: CPT | Mod: CPTII,,, | Performed by: INTERNAL MEDICINE

## 2022-11-16 PROCEDURE — 99214 OFFICE O/P EST MOD 30 MIN: CPT | Mod: 25,S$PBB,, | Performed by: INTERNAL MEDICINE

## 2022-11-16 PROCEDURE — 1159F MED LIST DOCD IN RCRD: CPT | Mod: CPTII,,, | Performed by: INTERNAL MEDICINE

## 2022-11-16 PROCEDURE — 94729 DIFFUSING CAPACITY: CPT | Mod: PBBFAC

## 2022-11-16 PROCEDURE — 3077F PR MOST RECENT SYSTOLIC BLOOD PRESSURE >= 140 MM HG: ICD-10-PCS | Mod: CPTII,,, | Performed by: INTERNAL MEDICINE

## 2022-11-16 PROCEDURE — 94729 PR C02/MEMBANE DIFFUSE CAPACITY: ICD-10-PCS | Mod: 26,S$PBB,, | Performed by: INTERNAL MEDICINE

## 2022-11-16 PROCEDURE — 3080F DIAST BP >= 90 MM HG: CPT | Mod: CPTII,,, | Performed by: INTERNAL MEDICINE

## 2022-11-16 PROCEDURE — 94726 PLETHYSMOGRAPHY LUNG VOLUMES: CPT | Mod: PBBFAC

## 2022-11-16 PROCEDURE — 94726 PULM FUNCT TST PLETHYSMOGRAP: ICD-10-PCS | Mod: 26,S$PBB,, | Performed by: INTERNAL MEDICINE

## 2022-11-16 PROCEDURE — 94060 PR EVAL OF BRONCHOSPASM: ICD-10-PCS | Mod: 26,59,S$PBB, | Performed by: INTERNAL MEDICINE

## 2022-11-16 PROCEDURE — 3008F BODY MASS INDEX DOCD: CPT | Mod: CPTII,,, | Performed by: INTERNAL MEDICINE

## 2022-11-16 PROCEDURE — 94618 PULMONARY STRESS TESTING: CPT | Mod: 26,S$PBB,, | Performed by: INTERNAL MEDICINE

## 2022-11-16 PROCEDURE — 94060 EVALUATION OF WHEEZING: CPT | Mod: PBBFAC

## 2022-11-16 PROCEDURE — 1159F PR MEDICATION LIST DOCUMENTED IN MEDICAL RECORD: ICD-10-PCS | Mod: CPTII,,, | Performed by: INTERNAL MEDICINE

## 2022-11-16 PROCEDURE — 3080F PR MOST RECENT DIASTOLIC BLOOD PRESSURE >= 90 MM HG: ICD-10-PCS | Mod: CPTII,,, | Performed by: INTERNAL MEDICINE

## 2022-11-16 PROCEDURE — 99213 OFFICE O/P EST LOW 20 MIN: CPT | Mod: PBBFAC,27,25 | Performed by: INTERNAL MEDICINE

## 2022-11-16 PROCEDURE — 99999 PR PBB SHADOW E&M-EST. PATIENT-LVL I: CPT | Mod: PBBFAC,,,

## 2022-11-16 PROCEDURE — 99211 OFF/OP EST MAY X REQ PHY/QHP: CPT | Mod: PBBFAC,25

## 2022-11-16 PROCEDURE — 99214 PR OFFICE/OUTPT VISIT, EST, LEVL IV, 30-39 MIN: ICD-10-PCS | Mod: 25,S$PBB,, | Performed by: INTERNAL MEDICINE

## 2022-11-16 PROCEDURE — 94729 DIFFUSING CAPACITY: CPT | Mod: 26,S$PBB,, | Performed by: INTERNAL MEDICINE

## 2022-11-16 RX ORDER — ALBUTEROL SULFATE 90 UG/1
2 AEROSOL, METERED RESPIRATORY (INHALATION) EVERY 6 HOURS PRN
Qty: 18 G | Refills: 11 | Status: SHIPPED | OUTPATIENT
Start: 2022-11-16 | End: 2023-11-28

## 2022-11-16 NOTE — PROGRESS NOTES
Subjective:     Patient ID: Jenifer Westfall is a 47 y.o. female.    Chief Complaint:      HPI    Former smoker with peripheral neuropathy  Prescribed and inhaler but has not used  Dyspnea  Patient complains of shortness of breath. Symptoms occur while getting dressed. Symptoms began 2 years ago, gradually worsening since. Associated symptoms include  difficulty breathing, dyspnea on exertion, and shortness of breath. She denies chest pain, located left chest. She does not have had recent travel. Weight has been stable. Symptoms are exacerbated by minimal activity. Symptoms are alleviated by rest.     Lung Nodule  She presents for evaluation and treatment of a lung nodule. The patient reports that the imaging was performed to evaluate symptoms of dyspnea on exertion which have been present for 2 years and are gradually worsening. Symptoms are exacerbated by exercise and relieved by rest. Other symptoms include dyspnea on exertion. She has a history of 24 pack years. The patient has no known exposure to tuberculosis. The patient does not have a history of cancer.  Family history of lung cancer in grandmother  Mother with Coronary Artery Disease and Chronic Obstructive Pulmonary Disease     Abnormal CT of chest performed 4/24/2022  Problems with transportation   Follow up CT was not approved in time for CT to be performed today  Follow up CT of chest       Past Medical History:   Diagnosis Date    Arthritis     DM (diabetes mellitus) 2009     03/01/2017 Insulin x 3 years 2013    Hepatitis C antibody positive in blood 03/04/2021    RNA NEGATIVE 3/6/2017, 3/22/22    Hyperlipidemia     Hypertension     IBS (irritable bowel syndrome)     Kidney stone     Marfan syndrome     Mitral valve prolapse      Past Surgical History:   Procedure Laterality Date    ANGIOGRAPHY OF LOWER EXTREMITY N/A 06/26/2018    Procedure: ANGIOGRAM, LOWER EXTREMITY- LEFT LEG;  Surgeon: Roscoe Landeros MD;  Location: [facility] CATH LAB;  Service:  Peripheral Vascular;  Laterality: N/A;    APPENDECTOMY      BUNIONECTOMY      both feet    BYPASS GRAFT Bilateral     cataract surgery  2019     SECTION      x 2    CHOLECYSTECTOMY      COLONOSCOPY N/A 2020    Procedure: COLONOSCOPY w/ biopsies;  Surgeon: Gio Georges MD;  Location: [facility];  Service: Endoscopy;  Laterality: N/A;    ESOPHAGOGASTRODUODENOSCOPY N/A 2019    Procedure: ESOPHAGOGASTRODUODENOSCOPY (EGD);  Surgeon: Jaron Sanders III, MD;  Location: [facility];  Service: Endoscopy;  Laterality: N/A;    ESOPHAGOGASTRODUODENOSCOPY N/A 2022    Procedure: EGD (ESOPHAGOGASTRODUODENOSCOPY);  Surgeon: Keith Hardwick MD;  Location: [facility];  Service: Gastroenterology;  Laterality: N/A;    SELECTIVE INJECTION OF ANESTHETIC AGENT AROUND LUMBAR SPINAL NERVE ROOT BY TRANSFORAMINAL APPROACH Bilateral 2020    Procedure: Bilateral L5/S1 TF DENY;  Surgeon: Jewel Walters MD;  Location: [facility];  Service: Pain Management;  Laterality: Bilateral;    TUBAL LIGATION       Review of patient's allergies indicates:   Allergen Reactions    Compazine [prochlorperazine edisylate] Rash    Contrast media Anaphylaxis    Dye Anaphylaxis     Contrast dye    Iodinated contrast media Anaphylaxis     Other reaction(s): anaphylaxis    Levaquin [levofloxacin] Hives and Itching    Morphine Other (See Comments)     Irritable  Iritability  Irritable    Prochlorperazine Rash    Metformin Other (See Comments)     Upset stomach    Ibuprofen Other (See Comments) and Nausea And Vomiting     Stomach pain  Stomach pain    Abdominal pain      Current Outpatient Medications on File Prior to Visit   Medication Sig Dispense Refill    ADMELOG SOLOSTAR U-100 INSULIN 100 unit/mL pen       ALPRAZolam (XANAX) 0.25 MG tablet Take 0.25 mg by mouth daily as needed.      ALPRAZolam (XANAX) 0.5 MG tablet Take 0.5 mg by mouth 2 (two) times daily as needed.      ascorbic acid (VITAMIN C) 100 MG tablet Take 100  "mg by mouth once daily.      aspirin (ECOTRIN) 81 MG EC tablet Take 81 mg by mouth once daily.      atenoloL (TENORMIN) 25 MG tablet TAKE 1 TABLET (25 MG TOTAL) BY MOUTH ONCE DAILY. 30 tablet 11    atorvastatin (LIPITOR) 80 MG tablet TAKE 1 TABLET BY MOUTH ONCE DAILY. 30 tablet 11    azelastine (ASTELIN) 137 mcg (0.1 %) nasal spray       BD ULTRA-FINE [place] PEN NEEDLE 32 gauge x 5/32" Ndle Inject 1 each into the skin 3 (three) times daily.      clopidogreL (PLAVIX) 75 mg tablet TAKE 1 TABLET BY MOUTH ONCE DAILY 90 tablet 2    diclofenac sodium (VOLTAREN) 1 % Gel Apply 2 g topically 3 (three) times daily. 1 Tube 2    dicyclomine (BENTYL) 20 mg tablet TAKE 1 TABLET BY MOUTH BEFORE MEALS AS FOR NEEDED ABDOMINAL PAIN 90 tablet 2    famotidine (PEPCID) 20 MG tablet TAKE 1 TABLET BY MOUTH TWICE DAILY 60 tablet 11    FLUoxetine (PROZAC) 40 MG capsule TAKE 1 CAPSULE BY MOUTH ONCE DAILY 90 capsule 0    fluticasone propionate (FLONASE) 50 mcg/actuation nasal spray       gabapentin (NEURONTIN) 300 MG capsule Take 3 capsules (900 mg total) by mouth 3 (three) times daily. 270 capsule 11    glipiZIDE (GLUCOTROL) 10 MG tablet Take 1 tablet (10 mg total) by mouth 2 (two) times daily. 60 tablet 0    insulin glargine,hum.rec.anlog (BASAGLAR KWIKPEN U-100 INSULIN SUBQ) Inject 40 Units into the skin 2 (two) times daily.      isosorbide mononitrate (IMDUR) 30 MG 24 hr tablet Take 1 tablet (30 mg total) by mouth once daily. 30 tablet 11    liraglutide 0.6 mg/0.1 mL, 18 mg/3 mL, subq PNIJ (VICTOZA 2-DIAN) 0.6 mg/0.1 mL (18 mg/3 mL) PnIj Inject 1.2 Units into the skin.      lisinopril 10 MG tablet TAKE 1 TABLET (10 MG TOTAL) BY MOUTH ONCE DAILY. 30 tablet 3    NOVOLOG FLEXPEN U-100 INSULIN 100 unit/mL (3 mL) InPn pen       pantoprazole (PROTONIX) 40 MG tablet Take 1 tablet (40 mg total) by mouth once daily. 30 tablet 1    PEN NEEDLE 31 gauge x 5/16" Ndle USE 5 TIMES DAILY WITH LANTUS AND HUMALOG 100 each 3    promethazine (PHENERGAN) 25 MG " "tablet       rimegepant 75 mg odt Take 1 tablet (75 mg total) by mouth as needed for Migraine (max dose 3 doses within 1 week). Place ODT tablet on the tongue; alternatively the ODT tablet may be placed under the tongue 8 tablet 5    tiZANidine (ZANAFLEX) 4 MG tablet TAKE 1 TO 1 AND 1/2 TABLETS (4 TO 6MG TOTAL) BY MOUTH NIGHTLY AS NEEDED. 45 tablet 5    TRUE METRIX GLUCOSE TEST STRIP Strp       [DISCONTINUED] albuterol (VENTOLIN HFA) 90 mcg/actuation inhaler Inhale 2 puffs into the lungs every 6 (six) hours as needed for Wheezing. Rescue 18 g 0     Current Facility-Administered Medications on File Prior to Visit   Medication Dose Route Frequency Provider Last Rate Last Admin    sodium chloride 0.9% flush 10 mL  10 mL Intravenous PRN Brian Delgado MD         Social History     Socioeconomic History    Marital status: Single   Tobacco Use    Smoking status: Former     Packs/day: 1.00     Years: 24.00     Pack years: 24.00     Types: Cigarettes     Start date: 1996     Quit date: 2020     Years since quittin.8    Smokeless tobacco: Never    Tobacco comments:     "once in a blue moon"   Substance and Sexual Activity    Alcohol use: No     Alcohol/week: 0.0 standard drinks    Drug use: No    Sexual activity: Not Currently     Social Determinants of Health     Financial Resource Strain: Low Risk     Difficulty of Paying Living Expenses: Not hard at all   Food Insecurity: No Food Insecurity    Worried About Running Out of Food in the Last Year: Never true    Ran Out of Food in the Last Year: Never true   Transportation Needs: No Transportation Needs    Lack of Transportation (Medical): No    Lack of Transportation (Non-Medical): No   Physical Activity: Inactive    Days of Exercise per Week: 0 days    Minutes of Exercise per Session: 0 min   Stress: No Stress Concern Present    Feeling of Stress : Only a little   Social Connections: Socially Isolated    Frequency of Communication with Friends and Family: Three " "times a week    Frequency of Social Gatherings with Friends and Family: Once a week    Attends Holiness Services: Never    Active Member of Clubs or Organizations: No    Attends Club or Organization Meetings: Never    Marital Status: Never    Housing Stability: Unknown    Unable to Pay for Housing in the Last Year: No    Unstable Housing in the Last Year: No     Family History   Problem Relation Age of Onset    Heart disease Mother     Thrombophilia Father         DVT    Hypertension Father     Stroke Maternal Aunt     Heart disease Maternal Aunt     Stroke Maternal Uncle     Heart disease Maternal Uncle     Breast cancer Neg Hx     Colon cancer Neg Hx     Ovarian cancer Neg Hx        Review of Systems   Constitutional:  Negative for fever and fatigue.   HENT:  Negative for postnasal drip and rhinorrhea.    Eyes:  Negative for redness and itching.   Respiratory:  Negative for cough, shortness of breath, wheezing, dyspnea on extertion and Paroxysmal Nocturnal Dyspnea.    Cardiovascular:  Negative for chest pain.   Genitourinary:  Negative for difficulty urinating and hematuria.   Endocrine:  Negative for polyphagia, cold intolerance and heat intolerance.    Musculoskeletal:  Negative for arthralgias.   Skin:  Negative for rash.   Gastrointestinal:  Negative for nausea, vomiting, abdominal pain and abdominal distention.   Neurological:  Negative for dizziness and headaches.   Hematological:  Negative for adenopathy. Does not bruise/bleed easily and no excessive bruising.   Psychiatric/Behavioral:  The patient is not nervous/anxious.      Objective:      BP (!) 140/100   Pulse 88   Resp 17   Ht 5' 10" (1.778 m)   Wt 106.9 kg (235 lb 10.8 oz)   SpO2 97%   BMI 33.82 kg/m²   Physical Exam  Vitals and nursing note reviewed.   Constitutional:       Appearance: Normal appearance. She is well-developed.   HENT:      Head: Normocephalic and atraumatic.      Nose: Nose normal.   Eyes:      Conjunctiva/sclera: " Conjunctivae normal.      Pupils: Pupils are equal, round, and reactive to light.   Neck:      Thyroid: No thyromegaly.      Vascular: No JVD.      Trachea: No tracheal deviation.   Cardiovascular:      Rate and Rhythm: Normal rate and regular rhythm.      Heart sounds: Normal heart sounds.   Pulmonary:      Effort: Pulmonary effort is normal. No respiratory distress.      Breath sounds: Normal breath sounds. No wheezing or rales.   Chest:      Chest wall: No tenderness.   Abdominal:      General: Bowel sounds are normal.      Palpations: Abdomen is soft.   Musculoskeletal:         General: Normal range of motion.      Cervical back: Neck supple.   Lymphadenopathy:      Cervical: No cervical adenopathy.   Skin:     General: Skin is warm and dry.   Neurological:      Mental Status: She is alert and oriented to person, place, and time.      Gait: Gait abnormal.      Deep Tendon Reflexes: Reflexes abnormal.      Comments: Peripheral neuropathy     Personal Diagnostic Review  Pulmonary Function Studies - mild obstruction     Pulmonary Studies Review 11/16/2022   SpO2 97   Ordering Provider -   Interpreting Provider -   Performing nurse/tech/RT -   Diagnosis -   Height 70   Weight 3770.75   BMI (Calculated) 33.8   Predicted Distance 387.25   Patient Race -   6MWT Status -   Patient Reported -   Was O2 used? -   6MW Distance walked (feet) -   Distance walked (meters) -   Did patient stop? -   How many times? -   Stop Time 1 -   Restart Time 1 -   Stop Time 2 -   Restart Time 2 -   Did patient restart? -   Type of assistive device(s) used? -   Is extra documentation required for this patient? -   Oxygen Saturation -   Supplemental Oxygen -   Heart Rate -   Blood Pressure -   Clifford Dyspnea Rating  -   Oxygen Saturation -   Supplemental Oxygen -   Heart Rate -   Blood Pressure -   Clifford Dyspnea Rating  -   Recovery Time (seconds) -   Oxygen Saturation -   Supplemental Oxygen -   Heart Rate -   Blood Pressure -   Clifford Dyspnea  "Rating  -   Is procedure ready for interpretation? -   Did the patient stop or pause? -   How many times did the patient stop or pause? -   Stop Time 1 -   Restart Time 1 -   Pause Time 1 -   Stop Time 2 -   Restart Time 2 -   Pause Time 2 -   Total Time Walked (Calculated) -   Total Laps Walked -   Final Partial Lap Distance (feet) -   Total Distance Feet (Calculated) -   Total Distance Meters (Calculated) -   Predicted Distance Meters (Calculated) 519.92   Percentage of Predicted (Calculated) -   Peak VO2 (Calculated) -   Mets -   Has The Patient Had a Previous Six Minute Walk Test? -   Oxygen Qualification? -   [facility] - Pulmonary Function  Six Minute Walk      SUMMARY      Ordering Provider: Kimberly DAVIDSON              Interpreting Provider: Dr. Delgado  Performing nurse/tech/RT: Margoth CRT  Diagnosis:  (KAT)  Height: 5' 10" (177.8 cm)  Weight: 106.9 kg (235 lb 10.8 oz)  BMI (Calculated): 33.8              Patient Race:                                                                Phase Oxygen Assessment Supplemental O2 Heart   Rate Blood Pressure Clifford Dyspnea Scale Rating   Resting 98 % Room Air 81 bpm 166/61 0   Exercise             Minute             1 97 % Room Air 89 bpm       2 97 % Room Air 84 bpm       3 97 % Room Air 87 bpm       4 97 % Room Air 88 bpm       5 97 % Room Air 90 bpm       6  96 % Room Air 87 bpm 175/72 2   Recovery             Minute             1 97 % Room Air 82 bpm       2 98 % Room Air 78 bpm       3 98 % Room Air 77 bpm       4 98 % Room Air 76 bpm 167/74 0      Six Minute Walk Summary  6MWT Status: completed with stops  Patient Reported: Dizziness (neuropathy left foot)         Interpretation:  Did the patient stop or pause?: Yes  How many times did the patient stop or pause?: 2  Stop Time 1: 86  Restart Time 1: 148  Pause Time 1: 62 seconds  Stop Time 2: 276  Restart Time 2: 323  Pause Time 2: 47 seconds  Total Time Walked (Calculated): 251 seconds  Final Partial Lap Distance " (feet): 25 feet  Total Distance Meters (Calculated): 129.54 meters  Predicted Distance Meters (Calculated): 519.92 meters  Percentage of Predicted (Calculated): 24.92  Peak VO2 (Calculated): 7.87  Mets: 2.25  Has The Patient Had a Previous Six Minute Walk Test?: No     Previous 6MWT Results  Has The Patient Had a Previous Six Minute Walk Test?: No         US Abdomen Limited  Narrative: EXAMINATION:  US ABDOMEN LIMITED    CLINICAL HISTORY:  Chronic viral hepatitis C    TECHNIQUE:  Limited ultrasound of the right upper quadrant of the abdomen (including pancreas, liver, gallbladder, common bile duct, and spleen) was performed.    COMPARISON:  None.    FINDINGS:  Liver: Enlarged measuring 17.7 cm. Fatty liver infiltration. No focal hepatic lesions.    Gallbladder: The gallbladder is surgically absent.    Biliary system: The common duct demonstrates mild physiologic prominence, measuring 10.6 mm.  No intrahepatic ductal dilatation.    Spleen: Normal in size and echotexture, measuring 9.3 cm.    Miscellaneous: No upper abdominal ascites.  Impression: 1. Hepatomegaly and fatty infiltration of the liver.  2. S/p cholecystectomy.    Electronically signed by: JESSICA Stern MD  Date:    06/17/2022  Time:    10:25      Office Spirometry Results:     No flowsheet data found.  Pulmonary Studies Review 11/16/2022   SpO2 97   Ordering Provider -   Interpreting Provider -   Performing nurse/tech/RT -   Diagnosis -   Height 70   Weight 3770.75   BMI (Calculated) 33.8   Predicted Distance 387.25   Patient Race -   6MWT Status -   Patient Reported -   Was O2 used? -   6MW Distance walked (feet) -   Distance walked (meters) -   Did patient stop? -   How many times? -   Stop Time 1 -   Restart Time 1 -   Stop Time 2 -   Restart Time 2 -   Did patient restart? -   Type of assistive device(s) used? -   Is extra documentation required for this patient? -   Oxygen Saturation -   Supplemental Oxygen -   Heart Rate -   Blood Pressure -    Clifford Dyspnea Rating  -   Oxygen Saturation -   Supplemental Oxygen -   Heart Rate -   Blood Pressure -   Clifford Dyspnea Rating  -   Recovery Time (seconds) -   Oxygen Saturation -   Supplemental Oxygen -   Heart Rate -   Blood Pressure -   Clifford Dyspnea Rating  -   Is procedure ready for interpretation? -   Did the patient stop or pause? -   How many times did the patient stop or pause? -   Stop Time 1 -   Restart Time 1 -   Pause Time 1 -   Stop Time 2 -   Restart Time 2 -   Pause Time 2 -   Total Time Walked (Calculated) -   Total Laps Walked -   Final Partial Lap Distance (feet) -   Total Distance Feet (Calculated) -   Total Distance Meters (Calculated) -   Predicted Distance Meters (Calculated) 519.92   Percentage of Predicted (Calculated) -   Peak VO2 (Calculated) -   Mets -   Has The Patient Had a Previous Six Minute Walk Test? -   Oxygen Qualification? -         Assessment:       Obesity (BMI 30-39.9)  Last documentation =       BMI noted to be elevated. Changes in weight over time reviewed in chart (if available) and by patient recollection. Patient advised and counseled concerning the adverse medical consequences of obesity. Treatment options discussed - calorie restriction, increasing calorie expenditure, medical and surgical options noted.  The patient's severe obesity was monitored, evaluated, addressed and/or treated.   2013 AHA/ACC/TOS guideline for the management of overweight and obesity in adults: a report of the American College of Cardiology/American Heart Association Task Force on Practice Guidelines and The Obesity Society      Solitary pulmonary nodule  Needs follow CT of chest    Solitary pulmonary nodule    Former smoker    Obesity (BMI 30-39.9)    KAT (dyspnea on exertion)  -     albuterol (VENTOLIN HFA) 90 mcg/actuation inhaler; Inhale 2 puffs into the lungs every 6 (six) hours as needed for Wheezing. Rescue  Dispense: 18 g; Refill: 11        Outpatient Encounter Medications as of 11/16/2022  "  Medication Sig Dispense Refill    ADMELOG SOLOSTAR U-100 INSULIN 100 unit/mL pen       ALPRAZolam (XANAX) 0.25 MG tablet Take 0.25 mg by mouth daily as needed.      ALPRAZolam (XANAX) 0.5 MG tablet Take 0.5 mg by mouth 2 (two) times daily as needed.      ascorbic acid (VITAMIN C) 100 MG tablet Take 100 mg by mouth once daily.      aspirin (ECOTRIN) 81 MG EC tablet Take 81 mg by mouth once daily.      atenoloL (TENORMIN) 25 MG tablet TAKE 1 TABLET (25 MG TOTAL) BY MOUTH ONCE DAILY. 30 tablet 11    atorvastatin (LIPITOR) 80 MG tablet TAKE 1 TABLET BY MOUTH ONCE DAILY. 30 tablet 11    azelastine (ASTELIN) 137 mcg (0.1 %) nasal spray       BD ULTRA-FINE [place] PEN NEEDLE 32 gauge x 5/32" Ndle Inject 1 each into the skin 3 (three) times daily.      clopidogreL (PLAVIX) 75 mg tablet TAKE 1 TABLET BY MOUTH ONCE DAILY 90 tablet 2    diclofenac sodium (VOLTAREN) 1 % Gel Apply 2 g topically 3 (three) times daily. 1 Tube 2    dicyclomine (BENTYL) 20 mg tablet TAKE 1 TABLET BY MOUTH BEFORE MEALS AS FOR NEEDED ABDOMINAL PAIN 90 tablet 2    famotidine (PEPCID) 20 MG tablet TAKE 1 TABLET BY MOUTH TWICE DAILY 60 tablet 11    FLUoxetine (PROZAC) 40 MG capsule TAKE 1 CAPSULE BY MOUTH ONCE DAILY 90 capsule 0    fluticasone propionate (FLONASE) 50 mcg/actuation nasal spray       gabapentin (NEURONTIN) 300 MG capsule Take 3 capsules (900 mg total) by mouth 3 (three) times daily. 270 capsule 11    glipiZIDE (GLUCOTROL) 10 MG tablet Take 1 tablet (10 mg total) by mouth 2 (two) times daily. 60 tablet 0    insulin glargine,hum.rec.anlog (BASAGLAR KWIKPEN U-100 INSULIN SUBQ) Inject 40 Units into the skin 2 (two) times daily.      isosorbide mononitrate (IMDUR) 30 MG 24 hr tablet Take 1 tablet (30 mg total) by mouth once daily. 30 tablet 11    liraglutide 0.6 mg/0.1 mL, 18 mg/3 mL, subq PNIJ (VICTOZA 2-DIAN) 0.6 mg/0.1 mL (18 mg/3 mL) PnIj Inject 1.2 Units into the skin.      lisinopril 10 MG tablet TAKE 1 TABLET (10 MG TOTAL) BY MOUTH ONCE " "DAILY. 30 tablet 3    NOVOLOG FLEXPEN U-100 INSULIN 100 unit/mL (3 mL) InPn pen       pantoprazole (PROTONIX) 40 MG tablet Take 1 tablet (40 mg total) by mouth once daily. 30 tablet 1    PEN NEEDLE 31 gauge x 5/16" Ndle USE 5 TIMES DAILY WITH LANTUS AND HUMALOG 100 each 3    promethazine (PHENERGAN) 25 MG tablet       rimegepant 75 mg odt Take 1 tablet (75 mg total) by mouth as needed for Migraine (max dose 3 doses within 1 week). Place ODT tablet on the tongue; alternatively the ODT tablet may be placed under the tongue 8 tablet 5    tiZANidine (ZANAFLEX) 4 MG tablet TAKE 1 TO 1 AND 1/2 TABLETS (4 TO 6MG TOTAL) BY MOUTH NIGHTLY AS NEEDED. 45 tablet 5    TRUE METRIX GLUCOSE TEST STRIP Strp       [DISCONTINUED] albuterol (VENTOLIN HFA) 90 mcg/actuation inhaler Inhale 2 puffs into the lungs every 6 (six) hours as needed for Wheezing. Rescue 18 g 0    albuterol (VENTOLIN HFA) 90 mcg/actuation inhaler Inhale 2 puffs into the lungs every 6 (six) hours as needed for Wheezing. Rescue 18 g 11     Facility-Administered Encounter Medications as of 11/16/2022   Medication Dose Route Frequency Provider Last Rate Last Admin    sodium chloride 0.9% flush 10 mL  10 mL Intravenous PRN Brian Delgado MD         Plan:       Requested Prescriptions     Signed Prescriptions Disp Refills    albuterol (VENTOLIN HFA) 90 mcg/actuation inhaler 18 g 11     Sig: Inhale 2 puffs into the lungs every 6 (six) hours as needed for Wheezing. Rescue     Problem List Items Addressed This Visit       KAT (dyspnea on exertion)    Relevant Medications    albuterol (VENTOLIN HFA) 90 mcg/actuation inhaler    Former smoker    Obesity (BMI 30-39.9)    Overview     Last Assessment & Plan:   Counseled on weight loss after discharge especially on diabetic diet         Current Assessment & Plan     Last documentation =       BMI noted to be elevated. Changes in weight over time reviewed in chart (if available) and by patient recollection. Patient advised and " counseled concerning the adverse medical consequences of obesity. Treatment options discussed - calorie restriction, increasing calorie expenditure, medical and surgical options noted.  The patient's severe obesity was monitored, evaluated, addressed and/or treated.   2013 AHA/ACC/TOS guideline for the management of overweight and obesity in adults: a report of the American College of Cardiology/American Heart Association Task Force on Practice Guidelines and The Obesity Society           Solitary pulmonary nodule - Primary    Current Assessment & Plan     Needs follow CT of chest                 No follow-ups on file.    MEDICAL DECISION MAKING: Moderate to high complexity.  Overall, the multiple problems listed are of moderate to high severity that may impact quality of life and activities of daily living. Side effects of medications, treatment plan as well as options and alternatives reviewed and discussed with patient. There was counseling of patient concerning these issues.    Total time spent in counseling and coordination of care - 30  minutes of total time spent on the encounter, which includes face to face time and non-face to face time preparing to see the patient (eg, review of tests), Obtaining and/or reviewing separately obtained history, Documenting clinical information in the electronic or other health record, Independently interpreting results (not separately reported) and communicating results to the patient/family/caregiver, or Care coordination (not separately reported).    Time was used in discussion of prognosis, risks, benefits of treatment, instructions and compliance with regimen . Discussion with other physicians and/or health care providers - home health or for use of durable medical equipment (oxygen, nebulizers, CPAP, BiPAP) occurred.

## 2022-11-16 NOTE — PROCEDURES
"[facility] - Pulmonary Function  Six Minute Walk     SUMMARY     Ordering Provider: Kimberly DAVIDSON   Interpreting Provider: Dr. Delgado  Performing nurse/tech/RT: Margoth CRT  Diagnosis:  (KAT)  Height: 5' 10" (177.8 cm)  Weight: 106.9 kg (235 lb 10.8 oz)  BMI (Calculated): 33.8   Patient Race:             Phase Oxygen Assessment Supplemental O2 Heart   Rate Blood Pressure Clifford Dyspnea Scale Rating   Resting 98 % Room Air 81 bpm 166/61 0   Exercise        Minute        1 97 % Room Air 89 bpm     2 97 % Room Air 84 bpm     3 97 % Room Air 87 bpm     4 97 % Room Air 88 bpm     5 97 % Room Air 90 bpm     6  96 % Room Air 87 bpm 175/72 2   Recovery        Minute        1 97 % Room Air 82 bpm     2 98 % Room Air 78 bpm     3 98 % Room Air 77 bpm     4 98 % Room Air 76 bpm 167/74 0     Six Minute Walk Summary  6MWT Status: completed with stops  Patient Reported: Dizziness (neuropathy left foot)     Interpretation:  Did the patient stop or pause?: Yes  How many times did the patient stop or pause?: 2  Stop Time 1: 86  Restart Time 1: 148  Pause Time 1: 62 seconds  Stop Time 2: 276  Restart Time 2: 323  Pause Time 2: 47 seconds                    Total Time Walked (Calculated): 251 seconds  Final Partial Lap Distance (feet): 25 feet  Total Distance Meters (Calculated): 129.54 meters  Predicted Distance Meters (Calculated): 519.92 meters  Percentage of Predicted (Calculated): 24.92  Peak VO2 (Calculated): 7.87  Mets: 2.25  Has The Patient Had a Previous Six Minute Walk Test?: No       Previous 6MWT Results  Has The Patient Had a Previous Six Minute Walk Test?: No    Interpretation:  Total distance walked in six minutes is severely reduced indicating a reduction in overall  functional capacity. The patient did not meet criteria for supplemental oxygen prescription.    Clinical correlation suggested.  [] Mild exercise-induced hypoxemia described as an arterial oxygen saturation of 93-95% (or 3-4% less than at rest),  [] " Moderate exercise-induced hypoxemia as 89-93%  [] Severe exercise induced hypoxemia as < 89% O2 saturation.  Medicare Criteria for oxygen prescription comments:   When arterial oxygen saturation is at or below 88% during exercise on room air (severe exercise induced hypoxemia) then the patient falls under Medicare Group 1 criteria for supplemental oxygen prescription.  Details about Medicare Group Criteria coverage can be found at http://www.cms.[facility].gov/manuals/downloads/     Minh Delgado MD

## 2022-11-17 ENCOUNTER — OFFICE VISIT (OUTPATIENT)
Dept: CARDIOLOGY | Facility: CLINIC | Age: 47
End: 2022-11-17
Payer: MEDICAID

## 2022-11-17 ENCOUNTER — TELEPHONE (OUTPATIENT)
Dept: ORTHOPEDICS | Facility: CLINIC | Age: 47
End: 2022-11-17
Payer: MEDICAID

## 2022-11-17 VITALS
WEIGHT: 237.63 LBS | BODY MASS INDEX: 34.02 KG/M2 | OXYGEN SATURATION: 99 % | SYSTOLIC BLOOD PRESSURE: 140 MMHG | DIASTOLIC BLOOD PRESSURE: 76 MMHG | HEART RATE: 61 BPM | HEIGHT: 70 IN

## 2022-11-17 DIAGNOSIS — I10 PRIMARY HYPERTENSION: Primary | ICD-10-CM

## 2022-11-17 DIAGNOSIS — E66.9 OBESITY (BMI 30-39.9): ICD-10-CM

## 2022-11-17 DIAGNOSIS — Z79.4 TYPE 2 DIABETES MELLITUS WITH DIABETIC PERIPHERAL ANGIOPATHY WITHOUT GANGRENE, WITH LONG-TERM CURRENT USE OF INSULIN: ICD-10-CM

## 2022-11-17 DIAGNOSIS — E78.2 MIXED HYPERLIPIDEMIA: ICD-10-CM

## 2022-11-17 DIAGNOSIS — I27.20 PULMONARY HYPERTENSION: ICD-10-CM

## 2022-11-17 DIAGNOSIS — E11.51 TYPE 2 DIABETES MELLITUS WITH DIABETIC PERIPHERAL ANGIOPATHY WITHOUT GANGRENE, WITH LONG-TERM CURRENT USE OF INSULIN: ICD-10-CM

## 2022-11-17 DIAGNOSIS — Z87.891 FORMER SMOKER: ICD-10-CM

## 2022-11-17 DIAGNOSIS — R06.09 DOE (DYSPNEA ON EXERTION): ICD-10-CM

## 2022-11-17 DIAGNOSIS — Q87.40 MARFAN'S SYNDROME: ICD-10-CM

## 2022-11-17 LAB
BRPFT: ABNORMAL
DLCO ADJ PRE: 12.85 ML/(MIN*MMHG) (ref 22.54–34.01)
DLCO SINGLE BREATH LLN: 22.54
DLCO SINGLE BREATH PRE REF: 45.5 %
DLCO SINGLE BREATH REF: 28.27
DLCOC SBVA LLN: 3.5
DLCOC SBVA PRE REF: 75.2 %
DLCOC SBVA REF: 4.75
DLCOC SINGLE BREATH LLN: 22.54
DLCOC SINGLE BREATH PRE REF: 45.5 %
DLCOC SINGLE BREATH REF: 28.27
DLCOVA LLN: 3.5
DLCOVA PRE REF: 75.2 %
DLCOVA PRE: 3.57 ML/(MIN*MMHG*L) (ref 3.5–5.99)
DLCOVA REF: 4.75
DLVAADJ PRE: 3.57 ML/(MIN*MMHG*L) (ref 3.5–5.99)
ERV LLN: -16448.95
ERV PRE REF: 78.4 %
ERV REF: 1.05
FEF 25 75 CHG: 1.1 %
FEF 25 75 LLN: 1.82
FEF 25 75 POST REF: 97 %
FEF 25 75 PRE REF: 95.9 %
FEF 25 75 REF: 3.19
FET100 CHG: -10.5 %
FEV1 CHG: -6.5 %
FEV1 FVC CHG: 1.1 %
FEV1 FVC LLN: 69
FEV1 FVC POST REF: 105 %
FEV1 FVC PRE REF: 103.9 %
FEV1 FVC REF: 80
FEV1 LLN: 2.67
FEV1 POST REF: 68.1 %
FEV1 PRE REF: 72.8 %
FEV1 REF: 3.41
FRCPLETH LLN: 2.21
FRCPLETH PREREF: 108.8 %
FRCPLETH REF: 3.04
FVC CHG: -7.4 %
FVC LLN: 3.37
FVC POST REF: 64.4 %
FVC PRE REF: 69.5 %
FVC REF: 4.29
IVC PRE: 2.69 L (ref 3.37–5.21)
IVC SINGLE BREATH LLN: 3.37
IVC SINGLE BREATH PRE REF: 62.6 %
IVC SINGLE BREATH REF: 4.29
MVV LLN: 110
MVV PRE REF: 68.1 %
MVV REF: 129
PEF CHG: 5.6 %
PEF LLN: 5.73
PEF POST REF: 89.7 %
PEF PRE REF: 84.9 %
PEF REF: 7.78
POST FEF 25 75: 3.1 L/S (ref 1.82–4.56)
POST FET 100: 7.33 SEC
POST FEV1 FVC: 83.97 % (ref 69.03–90.95)
POST FEV1: 2.32 L (ref 2.67–4.14)
POST FVC: 2.76 L (ref 3.37–5.21)
POST PEF: 6.97 L/S (ref 5.73–9.83)
PRE DLCO: 12.85 ML/(MIN*MMHG) (ref 22.54–34.01)
PRE ERV: 0.82 L (ref -16448.95–16451.05)
PRE FEF 25 75: 3.06 L/S (ref 1.82–4.56)
PRE FET 100: 8.18 SEC
PRE FEV1 FVC: 83.08 % (ref 69.03–90.95)
PRE FEV1: 2.48 L (ref 2.67–4.14)
PRE FRC PL: 3.3 L
PRE FVC: 2.98 L (ref 3.37–5.21)
PRE MVV: 88 L/MIN (ref 109.85–148.63)
PRE PEF: 6.6 L/S (ref 5.73–9.83)
PRE RV: 2.46 L (ref 1.41–2.57)
PRE TLC: 5.44 L (ref 4.97–6.95)
RAW LLN: 3.06
RAW PRE REF: 97.3 %
RAW PRE: 2.98 CMH2O*S/L (ref 3.06–3.06)
RAW REF: 3.06
RV LLN: 1.41
RV PRE REF: 123.5 %
RV REF: 1.99
RVTLC LLN: 26
RVTLC PRE REF: 128 %
RVTLC PRE: 45.15 % (ref 25.69–44.87)
RVTLC REF: 35
TLC LLN: 4.97
TLC PRE REF: 91.3 %
TLC REF: 5.96
VA PRE: 3.6 L (ref 5.81–5.81)
VA SINGLE BREATH LLN: 5.81
VA SINGLE BREATH PRE REF: 62 %
VA SINGLE BREATH REF: 5.81
VC LLN: 3.37
VC PRE REF: 69.5 %
VC PRE: 2.98 L (ref 3.37–5.21)
VC REF: 4.29
VTGRAWPRE: 2.71 L

## 2022-11-17 PROCEDURE — 4010F ACE/ARB THERAPY RXD/TAKEN: CPT | Mod: CPTII,,, | Performed by: NURSE PRACTITIONER

## 2022-11-17 PROCEDURE — 99214 PR OFFICE/OUTPT VISIT, EST, LEVL IV, 30-39 MIN: ICD-10-PCS | Mod: S$PBB,,, | Performed by: NURSE PRACTITIONER

## 2022-11-17 PROCEDURE — 3008F BODY MASS INDEX DOCD: CPT | Mod: CPTII,,, | Performed by: NURSE PRACTITIONER

## 2022-11-17 PROCEDURE — 3078F DIAST BP <80 MM HG: CPT | Mod: CPTII,,, | Performed by: NURSE PRACTITIONER

## 2022-11-17 PROCEDURE — 1159F MED LIST DOCD IN RCRD: CPT | Mod: CPTII,,, | Performed by: NURSE PRACTITIONER

## 2022-11-17 PROCEDURE — 99215 OFFICE O/P EST HI 40 MIN: CPT | Mod: PBBFAC | Performed by: NURSE PRACTITIONER

## 2022-11-17 PROCEDURE — 3008F PR BODY MASS INDEX (BMI) DOCUMENTED: ICD-10-PCS | Mod: CPTII,,, | Performed by: NURSE PRACTITIONER

## 2022-11-17 PROCEDURE — 3078F PR MOST RECENT DIASTOLIC BLOOD PRESSURE < 80 MM HG: ICD-10-PCS | Mod: CPTII,,, | Performed by: NURSE PRACTITIONER

## 2022-11-17 PROCEDURE — 4010F PR ACE/ARB THEARPY RXD/TAKEN: ICD-10-PCS | Mod: CPTII,,, | Performed by: NURSE PRACTITIONER

## 2022-11-17 PROCEDURE — 99999 PR PBB SHADOW E&M-EST. PATIENT-LVL V: CPT | Mod: PBBFAC,,, | Performed by: NURSE PRACTITIONER

## 2022-11-17 PROCEDURE — 99214 OFFICE O/P EST MOD 30 MIN: CPT | Mod: S$PBB,,, | Performed by: NURSE PRACTITIONER

## 2022-11-17 PROCEDURE — 1159F PR MEDICATION LIST DOCUMENTED IN MEDICAL RECORD: ICD-10-PCS | Mod: CPTII,,, | Performed by: NURSE PRACTITIONER

## 2022-11-17 PROCEDURE — 3077F PR MOST RECENT SYSTOLIC BLOOD PRESSURE >= 140 MM HG: ICD-10-PCS | Mod: CPTII,,, | Performed by: NURSE PRACTITIONER

## 2022-11-17 PROCEDURE — 3077F SYST BP >= 140 MM HG: CPT | Mod: CPTII,,, | Performed by: NURSE PRACTITIONER

## 2022-11-17 PROCEDURE — 99999 PR PBB SHADOW E&M-EST. PATIENT-LVL V: ICD-10-PCS | Mod: PBBFAC,,, | Performed by: NURSE PRACTITIONER

## 2022-11-17 RX ORDER — DAPAGLIFLOZIN 10 MG/1
10 TABLET, FILM COATED ORAL DAILY
Qty: 30 TABLET | Refills: 6 | Status: SHIPPED | OUTPATIENT
Start: 2022-11-17 | End: 2023-06-14 | Stop reason: SDUPTHER

## 2022-11-17 NOTE — PROGRESS NOTES
Subjective:   Patient ID:  Jenifer Westfall is a 47 y.o. female who presents for evaluation of No chief complaint on file.      HPI    Jenifer Westfall is a 47 year old female who presents to Arrhythmia clinic for follow up. Her current medical conditions include HTN, HLP, MVP, DM Type II, IBS, PHTN. She returns today and states she is doing    She was recently seen at [facility] due to headaches with LH/Dizziness. She received IVF bolus 1 L, Reglan, Benadryl, Toradol and Fioricet prior to discharge.     She does endorse worsening in her anxiety around this time of year. Has been having ongoing issues with glucose control.     Denies chest pain or anginal equivalents. No shortness of breath, KAT or palpitations. Denies orthopnea, PND or abdominal bloating. Reports regular walking without any issues lately. She does endorse continued neuropathy pain to BLEs.  No recent falls, syncope or near syncopal events. Reports compliance with medications and dietary restrictions. NO CNS complaints to suggest TIA or CVA today. No signs of abnormal bleeding on ASA and Plavix.       Past Medical History:   Diagnosis Date    Arthritis     DM (diabetes mellitus)      2017 Insulin x 3 years     Hepatitis C antibody positive in blood 2021    RNA NEGATIVE 3/6/2017, 3/22/22    Hyperlipidemia     Hypertension     IBS (irritable bowel syndrome)     Kidney stone     Marfan syndrome     Mitral valve prolapse        Past Surgical History:   Procedure Laterality Date    ANGIOGRAPHY OF LOWER EXTREMITY N/A 2018    Procedure: ANGIOGRAM, LOWER EXTREMITY- LEFT LEG;  Surgeon: Roscoe Landeros MD;  Location: [facility] CATH LAB;  Service: Peripheral Vascular;  Laterality: N/A;    APPENDECTOMY      BUNIONECTOMY      both feet    BYPASS GRAFT Bilateral     cataract surgery  2019     SECTION      x 2    CHOLECYSTECTOMY      COLONOSCOPY N/A 2020    Procedure: COLONOSCOPY w/ biopsies;  Surgeon: Gio CUEVAS  "MD Destini;  Location: [facility];  Service: Endoscopy;  Laterality: N/A;    ESOPHAGOGASTRODUODENOSCOPY N/A 2019    Procedure: ESOPHAGOGASTRODUODENOSCOPY (EGD);  Surgeon: Jaron Sanders III, MD;  Location: [facility];  Service: Endoscopy;  Laterality: N/A;    ESOPHAGOGASTRODUODENOSCOPY N/A 2022    Procedure: EGD (ESOPHAGOGASTRODUODENOSCOPY);  Surgeon: Keith Hardwick MD;  Location: [facility];  Service: Gastroenterology;  Laterality: N/A;    SELECTIVE INJECTION OF ANESTHETIC AGENT AROUND LUMBAR SPINAL NERVE ROOT BY TRANSFORAMINAL APPROACH Bilateral 2020    Procedure: Bilateral L5/S1 TF DENY;  Surgeon: Jewel Walters MD;  Location: [facility];  Service: Pain Management;  Laterality: Bilateral;    TUBAL LIGATION         Social History     Tobacco Use    Smoking status: Former     Packs/day: 1.00     Years: 24.00     Pack years: 24.00     Types: Cigarettes     Start date: 1996     Quit date: 2020     Years since quittin.8    Smokeless tobacco: Never    Tobacco comments:     "once in a blue moon"   Substance Use Topics    Alcohol use: No     Alcohol/week: 0.0 standard drinks    Drug use: No       Family History   Problem Relation Age of Onset    Heart disease Mother     Thrombophilia Father         DVT    Hypertension Father     Stroke Maternal Aunt     Heart disease Maternal Aunt     Stroke Maternal Uncle     Heart disease Maternal Uncle     Breast cancer Neg Hx     Colon cancer Neg Hx     Ovarian cancer Neg Hx      Wt Readings from Last 3 Encounters:   22 107.8 kg (237 lb 10.5 oz)   22 106.9 kg (235 lb 10.8 oz)   22 106.9 kg (235 lb 10.8 oz)     Temp Readings from Last 3 Encounters:   22 98.2 °F (36.8 °C) (Oral)   22 97.6 °F (36.4 °C)   22 98.6 °F (37 °C) (Oral)     BP Readings from Last 3 Encounters:   22 (!) 140/76   22 (!) 140/100   22 (!) 145/84     Pulse Readings from Last 3 Encounters:   22 61   22 88 " "  11/14/22 87     Current Outpatient Medications on File Prior to Visit   Medication Sig Dispense Refill    ADMELOG SOLOSTAR U-100 INSULIN 100 unit/mL pen       albuterol (VENTOLIN HFA) 90 mcg/actuation inhaler Inhale 2 puffs into the lungs every 6 (six) hours as needed for Wheezing. Rescue 18 g 11    ALPRAZolam (XANAX) 0.25 MG tablet Take 0.25 mg by mouth daily as needed.      ALPRAZolam (XANAX) 0.5 MG tablet Take 0.5 mg by mouth 2 (two) times daily as needed.      ascorbic acid (VITAMIN C) 100 MG tablet Take 100 mg by mouth once daily.      aspirin (ECOTRIN) 81 MG EC tablet Take 81 mg by mouth once daily.      atenoloL (TENORMIN) 25 MG tablet TAKE 1 TABLET (25 MG TOTAL) BY MOUTH ONCE DAILY. 30 tablet 11    atorvastatin (LIPITOR) 80 MG tablet TAKE 1 TABLET BY MOUTH ONCE DAILY. 30 tablet 11    azelastine (ASTELIN) 137 mcg (0.1 %) nasal spray       BD ULTRA-FINE [place] PEN NEEDLE 32 gauge x 5/32" Ndle Inject 1 each into the skin 3 (three) times daily.      clopidogreL (PLAVIX) 75 mg tablet TAKE 1 TABLET BY MOUTH ONCE DAILY 90 tablet 2    diclofenac sodium (VOLTAREN) 1 % Gel Apply 2 g topically 3 (three) times daily. 1 Tube 2    dicyclomine (BENTYL) 20 mg tablet TAKE 1 TABLET BY MOUTH BEFORE MEALS AS FOR NEEDED ABDOMINAL PAIN 90 tablet 2    famotidine (PEPCID) 20 MG tablet TAKE 1 TABLET BY MOUTH TWICE DAILY 60 tablet 11    FLUoxetine (PROZAC) 40 MG capsule TAKE 1 CAPSULE BY MOUTH ONCE DAILY 90 capsule 0    fluticasone propionate (FLONASE) 50 mcg/actuation nasal spray       gabapentin (NEURONTIN) 300 MG capsule Take 3 capsules (900 mg total) by mouth 3 (three) times daily. 270 capsule 11    glipiZIDE (GLUCOTROL) 10 MG tablet Take 1 tablet (10 mg total) by mouth 2 (two) times daily. 60 tablet 0    insulin glargine,hum.rec.anlog (BASAGLAR KWIKPEN U-100 INSULIN SUBQ) Inject 40 Units into the skin 2 (two) times daily.      isosorbide mononitrate (IMDUR) 30 MG 24 hr tablet Take 1 tablet (30 mg total) by mouth once daily. 30 " "tablet 11    liraglutide 0.6 mg/0.1 mL, 18 mg/3 mL, subq PNIJ (VICTOZA 2-DIAN) 0.6 mg/0.1 mL (18 mg/3 mL) PnIj Inject 1.2 Units into the skin.      lisinopril 10 MG tablet TAKE 1 TABLET (10 MG TOTAL) BY MOUTH ONCE DAILY. 30 tablet 3    NOVOLOG FLEXPEN U-100 INSULIN 100 unit/mL (3 mL) InPn pen       pantoprazole (PROTONIX) 40 MG tablet Take 1 tablet (40 mg total) by mouth once daily. 30 tablet 1    PEN NEEDLE 31 gauge x 5/16" Ndle USE 5 TIMES DAILY WITH LANTUS AND HUMALOG 100 each 3    promethazine (PHENERGAN) 25 MG tablet       rimegepant 75 mg odt Take 1 tablet (75 mg total) by mouth as needed for Migraine (max dose 3 doses within 1 week). Place ODT tablet on the tongue; alternatively the ODT tablet may be placed under the tongue 8 tablet 5    tiZANidine (ZANAFLEX) 4 MG tablet TAKE 1 TO 1 AND 1/2 TABLETS (4 TO 6MG TOTAL) BY MOUTH NIGHTLY AS NEEDED. 45 tablet 5    TRUE METRIX GLUCOSE TEST STRIP Strp        Current Facility-Administered Medications on File Prior to Visit   Medication Dose Route Frequency Provider Last Rate Last Admin    sodium chloride 0.9% flush 10 mL  10 mL Intravenous PRN Brian Delgado MD           Review of Systems   Constitutional: Positive for malaise/fatigue.   HENT:  Negative for hearing loss and hoarse voice.    Eyes:  Negative for blurred vision and visual disturbance.   Cardiovascular:  Positive for dyspnea on exertion. Negative for chest pain, claudication, irregular heartbeat, leg swelling, near-syncope, orthopnea, palpitations, paroxysmal nocturnal dyspnea and syncope.   Respiratory:  Negative for cough, hemoptysis, shortness of breath, sleep disturbances due to breathing, snoring and wheezing.    Endocrine: Negative for cold intolerance and heat intolerance.   Hematologic/Lymphatic: Does not bruise/bleed easily.   Skin:  Negative for color change, dry skin and nail changes.   Musculoskeletal:  Positive for arthritis and back pain. Negative for joint pain and myalgias. " "  Gastrointestinal:  Negative for bloating, abdominal pain, constipation, nausea and vomiting.   Genitourinary:  Negative for dysuria, flank pain, hematuria and hesitancy.   Neurological:  Positive for numbness and paresthesias. Negative for headaches, light-headedness, loss of balance and weakness.   Psychiatric/Behavioral:  Negative for altered mental status.    Allergic/Immunologic: Negative for environmental allergies.     Objective:BP (!) 140/76 (BP Location: Left arm, Patient Position: Sitting, BP Method: Medium (Manual))   Pulse 61   Ht 5' 10" (1.778 m)   Wt 107.8 kg (237 lb 10.5 oz)   SpO2 99%   BMI 34.10 kg/m²      Physical Exam  Vitals and nursing note reviewed.   Constitutional:       General: She is not in acute distress.     Appearance: Normal appearance. She is well-developed. She is not ill-appearing.   HENT:      Head: Normocephalic and atraumatic.      Nose: Nose normal.      Mouth/Throat:      Mouth: Mucous membranes are moist.   Eyes:      Pupils: Pupils are equal, round, and reactive to light.   Neck:      Thyroid: No thyromegaly.      Vascular: No JVD.      Trachea: No tracheal deviation.   Cardiovascular:      Rate and Rhythm: Normal rate and regular rhythm.      Chest Wall: PMI is not displaced.      Pulses: Intact distal pulses.           Radial pulses are 2+ on the right side and 2+ on the left side.        Dorsalis pedis pulses are 2+ on the right side and 2+ on the left side.      Heart sounds: S1 normal and S2 normal. Heart sounds not distant. No murmur heard.  Pulmonary:      Effort: Pulmonary effort is normal. No respiratory distress.      Breath sounds: Normal breath sounds. No wheezing.   Abdominal:      General: Bowel sounds are normal. There is no distension.      Palpations: Abdomen is soft.      Tenderness: There is no abdominal tenderness.   Musculoskeletal:         General: No swelling. Normal range of motion.      Cervical back: Full passive range of motion without pain, " normal range of motion and neck supple.      Right ankle: No swelling.      Left ankle: No swelling.   Skin:     General: Skin is warm and dry.      Capillary Refill: Capillary refill takes less than 2 seconds.      Nails: There is no clubbing.   Neurological:      General: No focal deficit present.      Mental Status: She is alert and oriented to person, place, and time.      Motor: Weakness present.   Psychiatric:         Speech: Speech normal.         Behavior: Behavior normal.         Thought Content: Thought content normal.         Judgment: Judgment normal.       Lab Results   Component Value Date    CHOL 183 10/09/2020    CHOL 199 05/29/2019    CHOL 164 02/03/2017     Lab Results   Component Value Date    HDL 27 (L) 10/09/2020    HDL 32 (L) 05/29/2019    HDL 30 (L) 02/03/2017     Lab Results   Component Value Date    LDLCALC 112.6 10/09/2020    LDLCALC 108.0 05/29/2019    LDLCALC 103.8 02/03/2017     Lab Results   Component Value Date    TRIG 217 (H) 10/09/2020    TRIG 295 (H) 05/29/2019    TRIG 151 (H) 02/03/2017     Lab Results   Component Value Date    CHOLHDL 14.8 (L) 10/09/2020    CHOLHDL 16.1 (L) 05/29/2019    CHOLHDL 18.3 (L) 02/03/2017       Chemistry        Component Value Date/Time     (L) 06/01/2022 1541    K 3.9 06/01/2022 1541     06/01/2022 1541    CO2 20 (L) 06/01/2022 1541    BUN 12 06/01/2022 1541    CREATININE 1.1 06/01/2022 1541     (HH) 06/01/2022 1541        Component Value Date/Time    CALCIUM 9.3 06/01/2022 1541    ALKPHOS 155 (H) 06/01/2022 1541    AST 21 06/01/2022 1541    ALT 21 06/01/2022 1541    BILITOT 0.3 06/01/2022 1541    ESTGFRAFRICA >60.0 06/01/2022 1541    EGFRNONAA 59.9 (A) 06/01/2022 1541          Lab Results   Component Value Date    TSH 0.849 10/07/2015     Lab Results   Component Value Date    INR 0.9 06/01/2022    INR 0.9 03/06/2017    INR 0.9 10/16/2016     Lab Results   Component Value Date    WBC 8.02 06/01/2022    HGB 14.1 06/01/2022    HCT 42.5  06/01/2022    MCV 91 06/01/2022     06/01/2022     Results for orders placed during the hospital encounter of 05/13/22    Echo    Interpretation Summary  · The left ventricle is normal in size with concentric remodeling and normal systolic function.  · The estimated ejection fraction is 55%.  · Normal left ventricular diastolic function.  · Normal right ventricular size with normal right ventricular systolic function.  · Normal central venous pressure (3 mmHg).       Assessment:      1. Primary hypertension    2. Mixed hyperlipidemia    3. Pulmonary hypertension    4. KAT (dyspnea on exertion)    5. Marfan's syndrome    6. Type 2 diabetes mellitus with diabetic peripheral angiopathy without gangrene, with long-term current use of insulin    7. Obesity (BMI 30-39.9)    8. Former smoker        Plan:     Add Farxiga 10 mg daily  Continue all other medications  Profile BP at home  Encourage daily walking  RTC in 6 months or sooner if needed.    Nicole May, FNP-C [facility]

## 2022-12-02 ENCOUNTER — HOSPITAL ENCOUNTER (OUTPATIENT)
Dept: RADIOLOGY | Facility: HOSPITAL | Age: 47
Discharge: HOME | End: 2022-12-02
Attending: PHYSICIAN ASSISTANT
Payer: MEDICAID

## 2022-12-02 DIAGNOSIS — R91.1 PULMONARY NODULE: ICD-10-CM

## 2022-12-02 PROCEDURE — 71250 CT THORAX DX C-: CPT | Mod: TC

## 2022-12-07 ENCOUNTER — TELEPHONE (OUTPATIENT)
Dept: ORTHOPEDICS | Facility: CLINIC | Age: 47
End: 2022-12-07
Payer: MEDICAID

## 2022-12-07 ENCOUNTER — TELEPHONE (OUTPATIENT)
Dept: PULMONOLOGY | Facility: CLINIC | Age: 47
End: 2022-12-07
Payer: MEDICAID

## 2022-12-07 DIAGNOSIS — M25.511 RIGHT SHOULDER PAIN, UNSPECIFIED CHRONICITY: Primary | ICD-10-CM

## 2022-12-07 DIAGNOSIS — M54.2 NECK PAIN: ICD-10-CM

## 2022-12-07 NOTE — TELEPHONE ENCOUNTER
----- Message from Clemencia Reaves sent at 12/7/2022  8:35 AM CST -----  Contact: patient  Type:  Test Results    Who Called: Jenifer Westfall   Name of Test (Lab/Mammo/Etc): CT scan   Date of Test:  12/2/22   Ordering Provider:  Kimberly   Where the test was performed:  [facility]   Would the patient rather a call back or a response via [facility]? Call back   Best Call Back Number:  [phone]   Additional Information:

## 2022-12-07 NOTE — TELEPHONE ENCOUNTER
Returned patient's call and let her know that we would schedule her appointment with Dr. Jain on Tuesday, 12/13 at 3:30pm with x-rays preceding at 3pm. Patient verbalized understanding and was grateful for the call back.

## 2022-12-07 NOTE — TELEPHONE ENCOUNTER
----- Message from Clemencia Reaves sent at 12/7/2022  8:33 AM CST -----  Contact: Patient  Type:  Patient Returning Call    Who Called:Jenifer Westfall   Who Left Message for Patient: MA   Does the patient know what this is regarding?: referral   Would the patient rather a call back or a response via [facility]?  Call back   Best Call Back Number: [phone]   Additional Information:

## 2022-12-12 ENCOUNTER — PATIENT MESSAGE (OUTPATIENT)
Dept: GASTROENTEROLOGY | Facility: CLINIC | Age: 47
End: 2022-12-12
Payer: MEDICAID

## 2022-12-13 ENCOUNTER — HOSPITAL ENCOUNTER (OUTPATIENT)
Dept: RADIOLOGY | Facility: HOSPITAL | Age: 47
Discharge: HOME | End: 2022-12-13
Attending: STUDENT IN AN ORGANIZED HEALTH CARE EDUCATION/TRAINING PROGRAM
Payer: MEDICAID

## 2022-12-13 ENCOUNTER — OFFICE VISIT (OUTPATIENT)
Dept: ORTHOPEDICS | Facility: CLINIC | Age: 47
End: 2022-12-13
Payer: MEDICAID

## 2022-12-13 ENCOUNTER — TELEPHONE (OUTPATIENT)
Dept: ORTHOPEDICS | Facility: CLINIC | Age: 47
End: 2022-12-13
Payer: MEDICAID

## 2022-12-13 VITALS — HEIGHT: 70 IN | WEIGHT: 237 LBS | BODY MASS INDEX: 33.93 KG/M2

## 2022-12-13 DIAGNOSIS — M25.532 LEFT WRIST PAIN: ICD-10-CM

## 2022-12-13 DIAGNOSIS — M54.2 NECK PAIN: ICD-10-CM

## 2022-12-13 DIAGNOSIS — M25.532 LEFT WRIST PAIN: Primary | ICD-10-CM

## 2022-12-13 DIAGNOSIS — M54.12 CERVICAL RADICULOPATHY: ICD-10-CM

## 2022-12-13 DIAGNOSIS — M25.511 RIGHT SHOULDER PAIN, UNSPECIFIED CHRONICITY: ICD-10-CM

## 2022-12-13 DIAGNOSIS — M75.121 COMPLETE TEAR OF RIGHT ROTATOR CUFF, UNSPECIFIED WHETHER TRAUMATIC: Primary | ICD-10-CM

## 2022-12-13 PROCEDURE — 1160F RVW MEDS BY RX/DR IN RCRD: CPT | Mod: CPTII,,, | Performed by: STUDENT IN AN ORGANIZED HEALTH CARE EDUCATION/TRAINING PROGRAM

## 2022-12-13 PROCEDURE — 73110 X-RAY EXAM OF WRIST: CPT | Mod: TC,LT

## 2022-12-13 PROCEDURE — 1159F PR MEDICATION LIST DOCUMENTED IN MEDICAL RECORD: ICD-10-PCS | Mod: CPTII,,, | Performed by: STUDENT IN AN ORGANIZED HEALTH CARE EDUCATION/TRAINING PROGRAM

## 2022-12-13 PROCEDURE — 72040 X-RAY EXAM NECK SPINE 2-3 VW: CPT | Mod: 26,,, | Performed by: RADIOLOGY

## 2022-12-13 PROCEDURE — 4010F PR ACE/ARB THEARPY RXD/TAKEN: ICD-10-PCS | Mod: CPTII,,, | Performed by: STUDENT IN AN ORGANIZED HEALTH CARE EDUCATION/TRAINING PROGRAM

## 2022-12-13 PROCEDURE — 1159F MED LIST DOCD IN RCRD: CPT | Mod: CPTII,,, | Performed by: STUDENT IN AN ORGANIZED HEALTH CARE EDUCATION/TRAINING PROGRAM

## 2022-12-13 PROCEDURE — 72040 X-RAY EXAM NECK SPINE 2-3 VW: CPT | Mod: TC

## 2022-12-13 PROCEDURE — 1160F PR REVIEW ALL MEDS BY PRESCRIBER/CLIN PHARMACIST DOCUMENTED: ICD-10-PCS | Mod: CPTII,,, | Performed by: STUDENT IN AN ORGANIZED HEALTH CARE EDUCATION/TRAINING PROGRAM

## 2022-12-13 PROCEDURE — 3008F BODY MASS INDEX DOCD: CPT | Mod: CPTII,,, | Performed by: STUDENT IN AN ORGANIZED HEALTH CARE EDUCATION/TRAINING PROGRAM

## 2022-12-13 PROCEDURE — 73030 X-RAY EXAM OF SHOULDER: CPT | Mod: 26,RT,, | Performed by: RADIOLOGY

## 2022-12-13 PROCEDURE — 73030 X-RAY EXAM OF SHOULDER: CPT | Mod: TC,RT

## 2022-12-13 PROCEDURE — 99214 OFFICE O/P EST MOD 30 MIN: CPT | Mod: PBBFAC | Performed by: STUDENT IN AN ORGANIZED HEALTH CARE EDUCATION/TRAINING PROGRAM

## 2022-12-13 PROCEDURE — 99999 PR PBB SHADOW E&M-EST. PATIENT-LVL IV: CPT | Mod: PBBFAC,,, | Performed by: STUDENT IN AN ORGANIZED HEALTH CARE EDUCATION/TRAINING PROGRAM

## 2022-12-13 PROCEDURE — 72040 XR CERVICAL SPINE AP LATERAL: ICD-10-PCS | Mod: 26,,, | Performed by: RADIOLOGY

## 2022-12-13 PROCEDURE — 73110 XR WRIST COMPLETE 3 VIEWS LEFT: ICD-10-PCS | Mod: 26,LT,, | Performed by: RADIOLOGY

## 2022-12-13 PROCEDURE — 4010F ACE/ARB THERAPY RXD/TAKEN: CPT | Mod: CPTII,,, | Performed by: STUDENT IN AN ORGANIZED HEALTH CARE EDUCATION/TRAINING PROGRAM

## 2022-12-13 PROCEDURE — 73030 XR SHOULDER COMPLETE 2 OR MORE VIEWS RIGHT: ICD-10-PCS | Mod: 26,RT,, | Performed by: RADIOLOGY

## 2022-12-13 PROCEDURE — 99213 OFFICE O/P EST LOW 20 MIN: CPT | Mod: S$PBB,,, | Performed by: STUDENT IN AN ORGANIZED HEALTH CARE EDUCATION/TRAINING PROGRAM

## 2022-12-13 PROCEDURE — 3008F PR BODY MASS INDEX (BMI) DOCUMENTED: ICD-10-PCS | Mod: CPTII,,, | Performed by: STUDENT IN AN ORGANIZED HEALTH CARE EDUCATION/TRAINING PROGRAM

## 2022-12-13 PROCEDURE — 99213 PR OFFICE/OUTPT VISIT, EST, LEVL III, 20-29 MIN: ICD-10-PCS | Mod: S$PBB,,, | Performed by: STUDENT IN AN ORGANIZED HEALTH CARE EDUCATION/TRAINING PROGRAM

## 2022-12-13 PROCEDURE — 99999 PR PBB SHADOW E&M-EST. PATIENT-LVL IV: ICD-10-PCS | Mod: PBBFAC,,, | Performed by: STUDENT IN AN ORGANIZED HEALTH CARE EDUCATION/TRAINING PROGRAM

## 2022-12-13 PROCEDURE — 73110 X-RAY EXAM OF WRIST: CPT | Mod: 26,LT,, | Performed by: RADIOLOGY

## 2022-12-13 NOTE — PROGRESS NOTES
Orthopaedics Sports Medicine     Shoulder Initial Visit         2022    Referring MD: Self, Aaareferral    Chief Complaint   Patient presents with    Right Shoulder - Pain    Neck - Pain    Left Wrist - Pain         History of Present Illness:   Jenifer Westfall is a 47 y.o. right-hand dominant female who presents with RIGHT shoulder and neck pain and dysfunction. Patient also endorses left wrist pain    Onset of the symptoms was about 3 years ago (right shoulder and neck) and 2022 (left wrist)     Inciting event: gradual onset of symptoms for her right shoulder and neck; for her left wrist she woke up with the pain a few days ago.    Current symptoms include intermittent sharp, stabbing pain of the right shoulder which radiates to right neck. Patient also endorses intermittent burning pain of the left wrist which radiates to left fingers.    Pain is aggravated by movement      Evaluation to date: Shoulder (x-ray, MRI); Neck (x-ray), Wrist (x-ray)    Treatment to date: Rest, activity modification, Zanaflex, Gabapentin, physical therapy     Past Medical History:   Past Medical History:   Diagnosis Date    Arthritis     DM (diabetes mellitus)      2017 Insulin x 3 years     Hepatitis C antibody positive in blood 2021    RNA NEGATIVE 3/6/2017, 3/22/22    Hyperlipidemia     Hypertension     IBS (irritable bowel syndrome)     Kidney stone     Marfan syndrome     Mitral valve prolapse        Past Surgical History:   Past Surgical History:   Procedure Laterality Date    ANGIOGRAPHY OF LOWER EXTREMITY N/A 2018    Procedure: ANGIOGRAM, LOWER EXTREMITY- LEFT LEG;  Surgeon: Roscoe Landeros MD;  Location: [facility] CATH LAB;  Service: Peripheral Vascular;  Laterality: N/A;    APPENDECTOMY      BUNIONECTOMY      both feet    BYPASS GRAFT Bilateral     cataract surgery  2019     SECTION      x 2    CHOLECYSTECTOMY      COLONOSCOPY N/A 2020    Procedure: COLONOSCOPY w/  "biopsies;  Surgeon: Gio Georges MD;  Location: [facility];  Service: Endoscopy;  Laterality: N/A;    ESOPHAGOGASTRODUODENOSCOPY N/A 07/31/2019    Procedure: ESOPHAGOGASTRODUODENOSCOPY (EGD);  Surgeon: Jaron Sanders III, MD;  Location: [facility] ENDO;  Service: Endoscopy;  Laterality: N/A;    ESOPHAGOGASTRODUODENOSCOPY N/A 04/22/2022    Procedure: EGD (ESOPHAGOGASTRODUODENOSCOPY);  Surgeon: Keith Hardwick MD;  Location: [facility] ENDO;  Service: Gastroenterology;  Laterality: N/A;    SELECTIVE INJECTION OF ANESTHETIC AGENT AROUND LUMBAR SPINAL NERVE ROOT BY TRANSFORAMINAL APPROACH Bilateral 11/23/2020    Procedure: Bilateral L5/S1 TF DENY;  Surgeon: Jewel Walters MD;  Location: [facility];  Service: Pain Management;  Laterality: Bilateral;    TUBAL LIGATION         Medications:  Patient's Medications   New Prescriptions    No medications on file   Previous Medications    ADMELOG SOLOSTAR U-100 INSULIN 100 UNIT/ML PEN        ALBUTEROL (VENTOLIN HFA) 90 MCG/ACTUATION INHALER    Inhale 2 puffs into the lungs every 6 (six) hours as needed for Wheezing. Rescue    ALPRAZOLAM (XANAX) 0.25 MG TABLET    Take 0.25 mg by mouth daily as needed.    ALPRAZOLAM (XANAX) 0.5 MG TABLET    Take 0.5 mg by mouth 2 (two) times daily as needed.    ASCORBIC ACID (VITAMIN C) 100 MG TABLET    Take 100 mg by mouth once daily.    ASPIRIN (ECOTRIN) 81 MG EC TABLET    Take 81 mg by mouth once daily.    ATENOLOL (TENORMIN) 25 MG TABLET    TAKE 1 TABLET (25 MG TOTAL) BY MOUTH ONCE DAILY.    ATORVASTATIN (LIPITOR) 80 MG TABLET    TAKE 1 TABLET BY MOUTH ONCE DAILY.    AZELASTINE (ASTELIN) 137 MCG (0.1 %) NASAL SPRAY        BD ULTRA-FINE [place] PEN NEEDLE 32 GAUGE X 5/32" NDLE    Inject 1 each into the skin 3 (three) times daily.    CLOPIDOGREL (PLAVIX) 75 MG TABLET    TAKE 1 TABLET BY MOUTH ONCE DAILY    DAPAGLIFLOZIN (FARXIGA) 10 MG TABLET    Take 1 tablet (10 mg total) by mouth once daily.    DICLOFENAC SODIUM (VOLTAREN) 1 % GEL    Apply 2 g " "topically 3 (three) times daily.    DICYCLOMINE (BENTYL) 20 MG TABLET    TAKE 1 TABLET BY MOUTH BEFORE MEALS AS FOR NEEDED ABDOMINAL PAIN    FAMOTIDINE (PEPCID) 20 MG TABLET    TAKE 1 TABLET BY MOUTH TWICE DAILY    FLUOXETINE (PROZAC) 40 MG CAPSULE    TAKE 1 CAPSULE BY MOUTH ONCE DAILY    FLUTICASONE PROPIONATE (FLONASE) 50 MCG/ACTUATION NASAL SPRAY        GABAPENTIN (NEURONTIN) 300 MG CAPSULE    Take 3 capsules (900 mg total) by mouth 3 (three) times daily.    GLIPIZIDE (GLUCOTROL) 10 MG TABLET    Take 1 tablet (10 mg total) by mouth 2 (two) times daily.    INSULIN GLARGINE,HUM.REC.ANLOG (BASAGLAR KWIKPEN U-100 INSULIN SUBQ)    Inject 40 Units into the skin 2 (two) times daily.    ISOSORBIDE MONONITRATE (IMDUR) 30 MG 24 HR TABLET    Take 1 tablet (30 mg total) by mouth once daily.    LIRAGLUTIDE 0.6 MG/0.1 ML, 18 MG/3 ML, SUBQ PNIJ (VICTOZA 2-DIAN) 0.6 MG/0.1 ML (18 MG/3 ML) PNIJ    Inject 1.2 Units into the skin.    LISINOPRIL 10 MG TABLET    TAKE 1 TABLET (10 MG TOTAL) BY MOUTH ONCE DAILY.    NOVOLOG FLEXPEN U-100 INSULIN 100 UNIT/ML (3 ML) INPN PEN        PANTOPRAZOLE (PROTONIX) 40 MG TABLET    TAKE 1 TABLET BY MOUTH ONCE DAILY    PEN NEEDLE 31 GAUGE X 5/16" NDLE    USE 5 TIMES DAILY WITH LANTUS AND HUMALOG    PROMETHAZINE (PHENERGAN) 25 MG TABLET        RIMEGEPANT 75 MG ODT    Take 1 tablet (75 mg total) by mouth as needed for Migraine (max dose 3 doses within 1 week). Place ODT tablet on the tongue; alternatively the ODT tablet may be placed under the tongue    TIZANIDINE (ZANAFLEX) 4 MG TABLET    TAKE 1 TO 1 AND 1/2 TABLETS (4 TO 6MG TOTAL) BY MOUTH NIGHTLY AS NEEDED.    TRUE METRIX GLUCOSE TEST STRIP STRP       Modified Medications    No medications on file   Discontinued Medications    No medications on file       Allergies:   Review of patient's allergies indicates:   Allergen Reactions    Compazine [prochlorperazine edisylate] Rash    Contrast media Anaphylaxis    Dye Anaphylaxis     Contrast dye    " "Iodinated contrast media Anaphylaxis     Other reaction(s): anaphylaxis    Levaquin [levofloxacin] Hives and Itching    Morphine Other (See Comments)     Irritable  Iritability  Irritable    Prochlorperazine Rash    Metformin Other (See Comments)     Upset stomach    Ibuprofen Other (See Comments) and Nausea And Vomiting     Stomach pain  Stomach pain    Abdominal pain        Social History:   Home town: [place], [place]  Occupation: disabled  Alcohol use: She reports no history of alcohol use.  Tobacco use: She reports that she quit smoking about 2 years ago. Her smoking use included cigarettes. She started smoking about 26 years ago. She has a 24.00 pack-year smoking history. She has never used smokeless tobacco.    Review of systems:  History of recent illness, fevers, shakes, or chills: no  History of cardiac problems or chest pain: yes (mitral valve prolapse)  History of pulmonary problems or asthma: yes (asthma with use of inhaler)  History of diabetes: yes  History of prior dvt or clotting problems: yes (Plavix Rx)  History of sleep apnea: no      Physical Examination:  Estimated body mass index is 34.01 kg/m² as calculated from the following:    Height as of this encounter: 5' 10" (1.778 m).    Weight as of this encounter: 107.5 kg (237 lb).    General  Healthy appearing female in no acute distress  Alert and oriented, normal mood, appropriate affect    Shoulder Examination:  Patient is alert and oriented, no distress. Skin is intact. Neuro is normal with no focal motor or sensory findings.    Cervical exam. Tenderness along midline. Decreased cervical ROM. Positive Spurling's test    Physical Exam:  RIGHT    LEFT    Scap Dyskinesis/Winging (-)    (-)    Tenderness:          Greater Tuberosity             +    (-)  Bicipital Groove  +    (-)  AC joint   (-)    (-)  Other:     ROM:  Forward Elevation 100    180  Abduction  90    120  ER (at side)  50    80  IR   Unable to assess  Unable to " assess    Strength:   Supraspinatus  4/5    5/5  Infraspinatus  4/5    5/5  Subscap / IR  4+/5    5/5     Special Tests:   Neer:    +    (-)   Gordon:   +    (-)   SS Stress:   +    (-)   Bear Hug:   (-)    (-)   [place]'s:   (-)    (-)   Resisted Thrower's:   +    (-)   Cross Arm Abduction:  (-)    (-)    Neurovascular examination  - Motor grossly intact bilaterally to shoulder abduction, elbow flexion and extension, wrist flexion and extension, and intrinsic hand musculature  - Sensation intact to light touch bilaterally in axillary, median, radial, and ulnar distributions  - Symmetrical radial pulses      Imaging:  XR Results:  Results for orders placed during the hospital encounter of 12/13/22    X-ray Shoulder 2 or More Views Right    Narrative  EXAMINATION:  XR SHOULDER COMPLETE 2 OR MORE VIEWS RIGHT    CLINICAL HISTORY:  Pain in right shoulder    TECHNIQUE:  Two or three views of the right shoulder were performed.    COMPARISON:  01/27/2022, 07/30/2020, 08/02/2019 and 11/19/2018.    FINDINGS:  No acute fracture or dislocation.  No significant soft tissue swelling.    Humeral head normally positioned with the glenoid cavity.  Mild glenohumeral degenerative osteoarthrosis with mild joint space narrowing and small osteophyte formation.  Chronic small calcific densities are again present along the humeral head and proximal diaphysis of the humerus.    AC joint intact with mild degenerative osteoarthrosis.    Impression  Mild chronic changes of degenerative osteoarthrosis.      Electronically signed by: Mike Iqbal  Date:    12/13/2022  Time:    15:51    Narrative & Impression  EXAMINATION:  XR CERVICAL SPINE AP LATERAL     CLINICAL HISTORY:  Cervicalgia     TECHNIQUE:  AP, lateral and open mouth views of the cervical spine were performed.     COMPARISON:  12/08/2016.     FINDINGS:  Cervical vertebral bodies are normal in height and alignment.  No acute fracture or subluxation.  No significant prevertebral  soft tissue swelling.     There is mild multilevel degenerative disc disease.     Impression:     Mild multilevel degenerative disc disease.        Electronically signed by: Mike Iqbal  Date:                                            12/13/2022  Time:                                           15:52    Narrative & Impression  EXAMINATION:  XR WRIST COMPLETE 3 VIEWS LEFT     CLINICAL HISTORY:  Pain in left wrist     TECHNIQUE:  PA, lateral, and oblique views of the left wrist were performed.     COMPARISON:  None     FINDINGS:  No acute fracture or dislocation.  There is mild degenerative change seen at the 1st CMC joint.  Carpal alignment appears to be within normal limits.     Impression:     As above        Electronically signed by: Bebo Vaughan DO  Date:                                            12/13/2022  Time:                                           15:52      MRI Results:  Results for orders placed during the hospital encounter of 05/21/19    MRI Shoulder Without Contrast Right    Narrative  EXAMINATION:  MRI SHOULDER WITHOUT CONTRAST RIGHT    CLINICAL HISTORY:  Rotator cuff tear;  Pain in right shoulder    TECHNIQUE:  Multisequence, multiplanar MR imaging of the shoulder performed without contrast.    COMPARISON:  MRI shoulder 08/09/2017    FINDINGS:  Rotator cuff:    Supraspinatus: Complete tear present with retraction to the medial humeral head.    Infraspinatus: Intact with tendinosis    Subscapularis: Intact    Teres minor: Intact    Moderate to significant supraspinatus muscle bulk loss.    Labrum: No discrete tear with overall similar appearance    Biceps: Long head biceps tendon is intact.    Bone/cartilage: No acute fracture or concerning edema signal.  Tiny inferior posterior glenoid subcortical cyst present with minimal overlying cartilage fissuring.  Remaining glenohumeral cartilage appears intact.    Acromioclavicular joint:Degenerative hypertrophy findings with undersurface  spurring.    Miscellaneous: No significant joint effusion.  Trace subacromial/subdeltoid bursal fluid present.    IMPRESSION:    Complete tear of the supraspinatus tendon with muscle atrophy.  Infraspinatus tendinosis.    Acromioclavicular joint and degenerative hypertrophy changes with undersurface spurring.  Tiny posterior-inferior glenoid subcortical cyst with minimal overlying cartilage fissuring.    Possible mild subacromial/subdeltoid bursitis.      Electronically signed by: Guevara Tinajero MD  Date:    05/21/2019  Time:    14:55      CT Results:  No results found for this or any previous visit.      Physician Read: I agree with the above impression.      Impression:  47 y.o. female with Right shoulder rotator cuff arthropathy, cervical radiculopathy      Plan:  Discussed diagnosis and treatment options with patient today.  She has right shoulder pain and known rotator cuff tear on the right side.  She also has known peripheral neuropathy that severely affects her left foot and seems fairly also affecting her left hand.  Because of her neuropathy, she uses a cane in her right hand for ambulation.  Discussed with her that I think would be reasonable to proceed with injection in the right shoulder for symptomatic treatment of pain as result of her cuff tear.  However, think she would have a very difficult time with any kind of operative treatment because of her left lower extremity neuropathy and need to use a cane in her right hand.  Unable to perform Right shoulder CSI today due to A1c 12.0 with reviewing of her last lab from (3/28/2022) and possible upcoming spine surgery. Discussed if she has preoperative testing for her spine surgery and her A1c decreases that we can consider a right shoulder CSI in the future. She will reach out via [employer] with her lab results.  She is hopeful to schedule her surgery for spine simulator placement at the beginning of the year.   Recommend discussing her cervical  radiculopathy with her neurologist that has been managing her neuropathy.   Follow up as needed           All Jain MD    I, Lala Paulino, acted as a scribe for All Jain MD for the duration of this office visit.

## 2022-12-13 NOTE — TELEPHONE ENCOUNTER
Returned patient's call and let her know that we would schedule left wrist x-rays today, 12/13 at 2:45pm prior to her other x-rays and appointment with Dr. Jain at 3:30pm. Patient verbalized understanding and was grateful for the call back.

## 2022-12-13 NOTE — TELEPHONE ENCOUNTER
----- Message from Susy Cox sent at 12/13/2022 10:37 AM CST -----  Contact: Jenifer  Patient is calling to speak with the nurse regarding orders. Reports wanting x ray of the wrist. Patient states waking up with left wrist hurting for the wrist to the fingers. Please give patient a call back at .[phone].

## 2023-01-06 ENCOUNTER — TELEPHONE (OUTPATIENT)
Dept: CARDIOLOGY | Facility: CLINIC | Age: 48
End: 2023-01-06
Payer: MEDICAID

## 2023-01-06 ENCOUNTER — PATIENT MESSAGE (OUTPATIENT)
Dept: CARDIOLOGY | Facility: CLINIC | Age: 48
End: 2023-01-06
Payer: MEDICAID

## 2023-01-06 NOTE — TELEPHONE ENCOUNTER
Pt may proceed with spine stimulator surgery at low to moderate CV risk and may hold asa and plavix if necessary x 5 - 7 days.    Dr Delgado

## 2023-01-06 NOTE — TELEPHONE ENCOUNTER
Returned call in regards to     Antonia MISHRA Staff  Caller: Unspecified (Today,  8:53 AM)  Pt is requesting a call back. Pt didn't want to state the reason for call. Pt can be reached at [phone]     No answer lvm to return call

## 2023-01-24 ENCOUNTER — TELEPHONE (OUTPATIENT)
Dept: OBSTETRICS AND GYNECOLOGY | Facility: CLINIC | Age: 48
End: 2023-01-24
Payer: MEDICAID

## 2023-02-10 ENCOUNTER — TELEPHONE (OUTPATIENT)
Dept: OBSTETRICS AND GYNECOLOGY | Facility: CLINIC | Age: 48
End: 2023-02-10
Payer: MEDICAID

## 2023-02-10 ENCOUNTER — PATIENT MESSAGE (OUTPATIENT)
Dept: GASTROENTEROLOGY | Facility: CLINIC | Age: 48
End: 2023-02-10
Payer: MEDICAID

## 2023-02-10 DIAGNOSIS — Z12.39 ENCOUNTER FOR SCREENING FOR MALIGNANT NEOPLASM OF BREAST, UNSPECIFIED SCREENING MODALITY: Primary | ICD-10-CM

## 2023-02-10 NOTE — TELEPHONE ENCOUNTER
Patient calling to schedule mammogram.  Order placed and scheduled for 02/22/23 at 3pm the [facility].

## 2023-02-10 NOTE — TELEPHONE ENCOUNTER
----- Message from Samara Pete sent at 2/10/2023 11:54 AM CST -----  Contact: Jenifer Burgess is needing for her annual Mammogram order to be placed so she can schedule it. Please call her back at [phone].

## 2023-02-14 ENCOUNTER — OFFICE VISIT (OUTPATIENT)
Dept: GASTROENTEROLOGY | Facility: CLINIC | Age: 48
End: 2023-02-14
Payer: MEDICAID

## 2023-02-14 VITALS
DIASTOLIC BLOOD PRESSURE: 78 MMHG | BODY MASS INDEX: 34.24 KG/M2 | HEART RATE: 85 BPM | SYSTOLIC BLOOD PRESSURE: 140 MMHG | HEIGHT: 70 IN | OXYGEN SATURATION: 99 % | WEIGHT: 239.19 LBS

## 2023-02-14 DIAGNOSIS — R19.7 DIARRHEA, UNSPECIFIED TYPE: ICD-10-CM

## 2023-02-14 DIAGNOSIS — R13.10 DYSPHAGIA, UNSPECIFIED TYPE: Primary | ICD-10-CM

## 2023-02-14 DIAGNOSIS — K59.00 CONSTIPATION, UNSPECIFIED CONSTIPATION TYPE: ICD-10-CM

## 2023-02-14 PROCEDURE — 3078F PR MOST RECENT DIASTOLIC BLOOD PRESSURE < 80 MM HG: ICD-10-PCS | Mod: CPTII,,, | Performed by: PHYSICIAN ASSISTANT

## 2023-02-14 PROCEDURE — 99215 OFFICE O/P EST HI 40 MIN: CPT | Mod: PBBFAC | Performed by: PHYSICIAN ASSISTANT

## 2023-02-14 PROCEDURE — 99999 PR PBB SHADOW E&M-EST. PATIENT-LVL V: ICD-10-PCS | Mod: PBBFAC,,, | Performed by: PHYSICIAN ASSISTANT

## 2023-02-14 PROCEDURE — 1159F MED LIST DOCD IN RCRD: CPT | Mod: CPTII,,, | Performed by: PHYSICIAN ASSISTANT

## 2023-02-14 PROCEDURE — 3008F PR BODY MASS INDEX (BMI) DOCUMENTED: ICD-10-PCS | Mod: CPTII,,, | Performed by: PHYSICIAN ASSISTANT

## 2023-02-14 PROCEDURE — 1159F PR MEDICATION LIST DOCUMENTED IN MEDICAL RECORD: ICD-10-PCS | Mod: CPTII,,, | Performed by: PHYSICIAN ASSISTANT

## 2023-02-14 PROCEDURE — 99214 PR OFFICE/OUTPT VISIT, EST, LEVL IV, 30-39 MIN: ICD-10-PCS | Mod: S$PBB,,, | Performed by: PHYSICIAN ASSISTANT

## 2023-02-14 PROCEDURE — 3078F DIAST BP <80 MM HG: CPT | Mod: CPTII,,, | Performed by: PHYSICIAN ASSISTANT

## 2023-02-14 PROCEDURE — 99999 PR PBB SHADOW E&M-EST. PATIENT-LVL V: CPT | Mod: PBBFAC,,, | Performed by: PHYSICIAN ASSISTANT

## 2023-02-14 PROCEDURE — 3008F BODY MASS INDEX DOCD: CPT | Mod: CPTII,,, | Performed by: PHYSICIAN ASSISTANT

## 2023-02-14 PROCEDURE — 99214 OFFICE O/P EST MOD 30 MIN: CPT | Mod: S$PBB,,, | Performed by: PHYSICIAN ASSISTANT

## 2023-02-14 PROCEDURE — 3077F SYST BP >= 140 MM HG: CPT | Mod: CPTII,,, | Performed by: PHYSICIAN ASSISTANT

## 2023-02-14 PROCEDURE — 3077F PR MOST RECENT SYSTOLIC BLOOD PRESSURE >= 140 MM HG: ICD-10-PCS | Mod: CPTII,,, | Performed by: PHYSICIAN ASSISTANT

## 2023-02-14 RX ORDER — LACTULOSE 10 G/15ML
20 SOLUTION ORAL 2 TIMES DAILY
Qty: 1800 ML | Refills: 2 | Status: SHIPPED | OUTPATIENT
Start: 2023-02-14 | End: 2023-05-15

## 2023-02-14 NOTE — PROGRESS NOTES
"Subjective:      Patient ID: Jenifer Westfall is a 47 y.o. female.    Chief Complaint: Diarrhea and Dysphagia    HPI:  Patient reports to clinic today for follow up of the above. Last seen 6/2022 for diarrhea. Xray confirmed prominent stool in the colon. She was instructed to complete a bowel prep and then start on Miralax daily. She reports her symptoms were improved after the prep, but she was unable to get Miralax covered by her insurance, so she has not been taking it. She continues with diarrhea 1-2x per day. Has had colonoscopy with biopsies. She reports that even though she is having loose stools, she still feels very constipated - like there is formed stool that isn't moving. Can often feel nauseated after eating. Experiencing abdominal cramping often. Also complains of dysphagia. Often gets "choked up on food" and then has to regurgitate in order for symptoms to improve. Taking Protonix daily which keeps her reflux well controlled. She states dysphagia has been going on for many years. Had EGD in 2022 - appeared normal. Biopsies showed mild chronic gastritis. Last colonoscopy 2020 with 5 yr recall.      Review of Systems   Constitutional:  Negative for activity change, appetite change, chills, diaphoresis, fatigue and fever.   HENT:  Positive for trouble swallowing. Negative for sore throat and voice change.    Respiratory:  Positive for choking and shortness of breath (followed by pulmonology - has inhaler). Negative for cough.    Cardiovascular:  Negative for chest pain.   Gastrointestinal:  Positive for abdominal pain, constipation, diarrhea, nausea and vomiting. Negative for abdominal distention, anal bleeding, blood in stool and rectal pain.   Genitourinary:  Negative for difficulty urinating, dysuria and hematuria.   Skin:  Negative for color change and pallor.   Neurological:  Negative for dizziness, weakness and light-headedness.   Psychiatric/Behavioral:  Negative for dysphoric mood. The patient is " nervous/anxious.      Medical History: Reviewed    Social History: Reviewed    Allergies: Reviewed    Objective:     Physical Exam  Constitutional:       General: She is not in acute distress.     Appearance: Normal appearance. She is obese. She is not ill-appearing, toxic-appearing or diaphoretic.   HENT:      Head: Normocephalic and atraumatic.   Eyes:      General: No scleral icterus.     Extraocular Movements: Extraocular movements intact.   Cardiovascular:      Rate and Rhythm: Normal rate and regular rhythm.   Pulmonary:      Effort: Pulmonary effort is normal. No respiratory distress.   Abdominal:      General: Bowel sounds are normal. There is no distension.      Palpations: Abdomen is soft. There is no mass.      Tenderness: There is no abdominal tenderness. There is no guarding.   Musculoskeletal:         General: Normal range of motion.      Cervical back: Normal range of motion.   Skin:     General: Skin is warm and dry.      Coloration: Skin is not jaundiced or pale.   Neurological:      Mental Status: She is alert and oriented to person, place, and time.       Assessment:     1. Dysphagia, unspecified type    2. Constipation, unspecified constipation type    3. Diarrhea, unspecified type        Plan:     -Plan for manometry. Struggled with dysphagia for years. Worse with solids. EGD in 2022 with no abnormalities that would cause dysphagia.   -Continue Protonix daily.   -suspect her diarrhea is from overflow. Most recent Xray with prominent stool. Colonoscopy with biopsies negative. Reports she feels constipated. Feels as though she can't tolerate a prep due to nausea. Inquiring about starting a prescription medication for constipation. Trial of Lactulose.   -Follow up 3 months or after manometry.    Jenifer was seen today for diarrhea and dysphagia.    Diagnoses and all orders for this visit:    Dysphagia, unspecified type  -     Case Request Endoscopy: MANOMETRY, ESOPHAGUS, WITH IMPEDANCE  MEASUREMENT    Constipation, unspecified constipation type  -     lactulose (CHRONULAC) 20 gram/30 mL Soln; Take 30 mLs (20 g total) by mouth 2 (two) times daily.    Diarrhea, unspecified type  -     lactulose (CHRONULAC) 20 gram/30 mL Soln; Take 30 mLs (20 g total) by mouth 2 (two) times daily.        No follow-ups on file.    Thank you for the opportunity to participate in the care of this patient.   Omaira Watt PA-C.

## 2023-02-17 ENCOUNTER — TELEPHONE (OUTPATIENT)
Dept: NEUROLOGY | Facility: CLINIC | Age: 48
End: 2023-02-17
Payer: MEDICAID

## 2023-02-17 NOTE — TELEPHONE ENCOUNTER
Attempt to contact pt in regards to RS appt, left VM to contact office.    patient sent on cefpodoxime, lvm to call back admin line - Ashwini Urbina PA-C

## 2023-02-22 ENCOUNTER — TELEPHONE (OUTPATIENT)
Dept: NEUROLOGY | Facility: CLINIC | Age: 48
End: 2023-02-22
Payer: MEDICAID

## 2023-02-22 NOTE — TELEPHONE ENCOUNTER
Rescheduled upcoming appt due to NP no longer practicing at [facility]. Scheduled soonest available w/ the pt.

## 2023-02-24 ENCOUNTER — PATIENT MESSAGE (OUTPATIENT)
Dept: HEPATOLOGY | Facility: CLINIC | Age: 48
End: 2023-02-24
Payer: MEDICAID

## 2023-02-24 DIAGNOSIS — R19.7 DIARRHEA, UNSPECIFIED TYPE: Primary | ICD-10-CM

## 2023-02-24 DIAGNOSIS — K59.00 CONSTIPATION, UNSPECIFIED CONSTIPATION TYPE: ICD-10-CM

## 2023-03-07 ENCOUNTER — HOSPITAL ENCOUNTER (OUTPATIENT)
Dept: RADIOLOGY | Facility: HOSPITAL | Age: 48
Discharge: HOME | End: 2023-03-07
Attending: OBSTETRICS & GYNECOLOGY
Payer: MEDICAID

## 2023-03-07 DIAGNOSIS — Z12.39 ENCOUNTER FOR SCREENING FOR MALIGNANT NEOPLASM OF BREAST, UNSPECIFIED SCREENING MODALITY: ICD-10-CM

## 2023-03-07 PROCEDURE — 77063 BREAST TOMOSYNTHESIS BI: CPT | Mod: 26,,, | Performed by: RADIOLOGY

## 2023-03-07 PROCEDURE — 77063 MAMMO DIGITAL SCREENING BILAT WITH TOMO: ICD-10-PCS | Mod: 26,,, | Performed by: RADIOLOGY

## 2023-03-07 PROCEDURE — 77067 SCR MAMMO BI INCL CAD: CPT | Mod: 26,,, | Performed by: RADIOLOGY

## 2023-03-07 PROCEDURE — 77067 MAMMO DIGITAL SCREENING BILAT WITH TOMO: ICD-10-PCS | Mod: 26,,, | Performed by: RADIOLOGY

## 2023-03-07 PROCEDURE — 77067 SCR MAMMO BI INCL CAD: CPT | Mod: TC,PO

## 2023-03-21 ENCOUNTER — HOSPITAL ENCOUNTER (OUTPATIENT)
Dept: RADIOLOGY | Facility: HOSPITAL | Age: 48
Discharge: HOME | End: 2023-03-21
Attending: PHYSICIAN ASSISTANT
Payer: MEDICAID

## 2023-03-21 DIAGNOSIS — R19.7 DIARRHEA, UNSPECIFIED TYPE: ICD-10-CM

## 2023-03-21 DIAGNOSIS — K59.00 CONSTIPATION, UNSPECIFIED CONSTIPATION TYPE: ICD-10-CM

## 2023-03-21 PROCEDURE — 74019 RADEX ABDOMEN 2 VIEWS: CPT | Mod: TC,FY,PO

## 2023-03-21 PROCEDURE — 74019 RADEX ABDOMEN 2 VIEWS: CPT | Mod: 26,,, | Performed by: RADIOLOGY

## 2023-03-21 PROCEDURE — 74019 XR ABDOMEN FLAT AND ERECT: ICD-10-PCS | Mod: 26,,, | Performed by: RADIOLOGY

## 2023-03-22 ENCOUNTER — PATIENT MESSAGE (OUTPATIENT)
Dept: GASTROENTEROLOGY | Facility: CLINIC | Age: 48
End: 2023-03-22
Payer: MEDICAID

## 2023-03-23 ENCOUNTER — PATIENT MESSAGE (OUTPATIENT)
Dept: GASTROENTEROLOGY | Facility: CLINIC | Age: 48
End: 2023-03-23
Payer: MEDICAID

## 2023-03-23 DIAGNOSIS — K59.00 CONSTIPATION, UNSPECIFIED CONSTIPATION TYPE: Primary | ICD-10-CM

## 2023-04-05 ENCOUNTER — PATIENT MESSAGE (OUTPATIENT)
Dept: CARDIOLOGY | Facility: CLINIC | Age: 48
End: 2023-04-05
Payer: MEDICAID

## 2023-04-05 ENCOUNTER — TELEPHONE (OUTPATIENT)
Dept: CARDIOLOGY | Facility: CLINIC | Age: 48
End: 2023-04-05
Payer: MEDICAID

## 2023-04-05 NOTE — TELEPHONE ENCOUNTER
Pt may proceed with spine stimulator surgery at low to moderate CV risk and may hold asa and plavix if necessary x 5 - 7 days.     Dr Delgado        Ok I'm trying to see if Dr Delgado will have any type of test he may want done I'm in the process of getting a spine stimulator put in my back and the dr that is supposed to be doing it wants me to get a clearance letter from Dr Delgado saying that I'm good to have it done I know that I'm supposed to be off my blood thinners for the week before I have the surgery and the week after so I'm just trying to see if I need to come in to make an appointment with him or what so if you could find out and let me know asap because I have to go see the doctor in a couple of weeks if you have any other questions please give me a call at [phone] or just send me a message on here thanks

## 2023-04-06 ENCOUNTER — PATIENT MESSAGE (OUTPATIENT)
Dept: CARDIOLOGY | Facility: CLINIC | Age: 48
End: 2023-04-06
Payer: MEDICAID

## 2023-04-11 ENCOUNTER — HOSPITAL ENCOUNTER (OUTPATIENT)
Facility: HOSPITAL | Age: 48
Discharge: HOME | End: 2023-04-11
Attending: INTERNAL MEDICINE | Admitting: INTERNAL MEDICINE
Payer: MEDICAID

## 2023-04-11 VITALS
RESPIRATION RATE: 22 BRPM | WEIGHT: 220 LBS | SYSTOLIC BLOOD PRESSURE: 109 MMHG | TEMPERATURE: 98 F | HEIGHT: 70 IN | BODY MASS INDEX: 31.5 KG/M2 | OXYGEN SATURATION: 95 % | DIASTOLIC BLOOD PRESSURE: 51 MMHG | HEART RATE: 78 BPM

## 2023-04-11 PROCEDURE — 25000003 PHARM REV CODE 250: Performed by: INTERNAL MEDICINE

## 2023-04-11 PROCEDURE — 91037 ESOPH IMPED FUNCTION TEST: CPT | Mod: TC | Performed by: INTERNAL MEDICINE

## 2023-04-11 PROCEDURE — 91010 ESOPHAGUS MOTILITY STUDY: CPT | Mod: TC | Performed by: INTERNAL MEDICINE

## 2023-04-11 RX ORDER — LIDOCAINE HYDROCHLORIDE 20 MG/ML
2 SOLUTION OROPHARYNGEAL ONCE
Status: COMPLETED | OUTPATIENT
Start: 2023-04-11 | End: 2023-04-11

## 2023-04-11 RX ADMIN — LIDOCAINE HYDROCHLORIDE 2 ML: 20 SOLUTION ORAL at 09:04

## 2023-04-13 ENCOUNTER — HOSPITAL ENCOUNTER (EMERGENCY)
Facility: HOSPITAL | Age: 48
Discharge: HOME | End: 2023-04-13
Attending: EMERGENCY MEDICINE
Payer: MEDICAID

## 2023-04-13 ENCOUNTER — PATIENT MESSAGE (OUTPATIENT)
Dept: GASTROENTEROLOGY | Facility: CLINIC | Age: 48
End: 2023-04-13
Payer: MEDICAID

## 2023-04-13 VITALS
RESPIRATION RATE: 17 BRPM | TEMPERATURE: 99 F | DIASTOLIC BLOOD PRESSURE: 63 MMHG | HEART RATE: 79 BPM | SYSTOLIC BLOOD PRESSURE: 138 MMHG | OXYGEN SATURATION: 95 %

## 2023-04-13 DIAGNOSIS — R10.9 ACUTE RIGHT FLANK PAIN: ICD-10-CM

## 2023-04-13 DIAGNOSIS — R10.9 ABDOMINAL DISCOMFORT: Primary | ICD-10-CM
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
## 2023-04-13 LAB
ALBUMIN SERPL BCP-MCNC: 3.9 G/DL (ref 3.5–5.2)
ALP SERPL-CCNC: 199 U/L (ref 55–135)
ALT SERPL W/O P-5'-P-CCNC: 23 U/L (ref 10–44)
ANION GAP SERPL CALC-SCNC: 12 MMOL/L (ref 8–16)
AST SERPL-CCNC: 18 U/L (ref 10–40)
B-HCG UR QL: NEGATIVE
B-OH-BUTYR BLD STRIP-SCNC: 0.1 MMOL/L (ref 0–0.5)
BACTERIA #/AREA URNS HPF: ABNORMAL /HPF
BASOPHILS # BLD AUTO: 0.04 K/UL (ref 0–0.2)
BASOPHILS NFR BLD: 0.4 % (ref 0–1.9)
BILIRUB SERPL-MCNC: 0.3 MG/DL (ref 0.1–1)
BILIRUB UR QL STRIP: NEGATIVE
BUN SERPL-MCNC: 13 MG/DL (ref 6–20)
CALCIUM SERPL-MCNC: 9.4 MG/DL (ref 8.7–10.5)
CHLORIDE SERPL-SCNC: 102 MMOL/L (ref 95–110)
CLARITY UR: CLEAR
CO2 SERPL-SCNC: 16 MMOL/L (ref 23–29)
COLOR UR: COLORLESS
CREAT SERPL-MCNC: 1.2 MG/DL (ref 0.5–1.4)
DIFFERENTIAL METHOD: NORMAL
EOSINOPHIL # BLD AUTO: 0.2 K/UL (ref 0–0.5)
EOSINOPHIL NFR BLD: 1.5 % (ref 0–8)
ERYTHROCYTE [DISTWIDTH] IN BLOOD BY AUTOMATED COUNT: 13.2 % (ref 11.5–14.5)
EST. GFR  (NO RACE VARIABLE): 56 ML/MIN/1.73 M^2
GLUCOSE SERPL-MCNC: 555 MG/DL (ref 70–110)
GLUCOSE UR QL STRIP: ABNORMAL
HCT VFR BLD AUTO: 44.3 % (ref 37–48.5)
HGB BLD-MCNC: 14.5 G/DL (ref 12–16)
HGB UR QL STRIP: NEGATIVE
HYALINE CASTS #/AREA URNS LPF: 1 /LPF
IMM GRANULOCYTES # BLD AUTO: 0.04 K/UL (ref 0–0.04)
IMM GRANULOCYTES NFR BLD AUTO: 0.4 % (ref 0–0.5)
KETONES UR QL STRIP: NEGATIVE
LEUKOCYTE ESTERASE UR QL STRIP: ABNORMAL
LIPASE SERPL-CCNC: 30 U/L (ref 4–60)
LYMPHOCYTES # BLD AUTO: 3.4 K/UL (ref 1–4.8)
LYMPHOCYTES NFR BLD: 33.1 % (ref 18–48)
MCH RBC QN AUTO: 29.2 PG (ref 27–31)
MCHC RBC AUTO-ENTMCNC: 32.7 G/DL (ref 32–36)
MCV RBC AUTO: 89 FL (ref 82–98)
MICROSCOPIC COMMENT: ABNORMAL
MONOCYTES # BLD AUTO: 0.6 K/UL (ref 0.3–1)
MONOCYTES NFR BLD: 5.6 % (ref 4–15)
NEUTROPHILS # BLD AUTO: 6.1 K/UL (ref 1.8–7.7)
NEUTROPHILS NFR BLD: 59 % (ref 38–73)
NITRITE UR QL STRIP: NEGATIVE
NRBC BLD-RTO: 0 /100 WBC
PH UR STRIP: 5 [PH] (ref 5–8)
PLATELET # BLD AUTO: 294 K/UL (ref 150–450)
PMV BLD AUTO: 10 FL (ref 9.2–12.9)
POCT GLUCOSE: 275 MG/DL (ref 70–110)
POTASSIUM SERPL-SCNC: 4.5 MMOL/L (ref 3.5–5.1)
PROT SERPL-MCNC: 8 G/DL (ref 6–8.4)
PROT UR QL STRIP: NEGATIVE
RBC # BLD AUTO: 4.96 M/UL (ref 4–5.4)
RBC #/AREA URNS HPF: 10 /HPF (ref 0–4)
SODIUM SERPL-SCNC: 130 MMOL/L (ref 136–145)
SP GR UR STRIP: >1.03 (ref 1–1.03)
URN SPEC COLLECT METH UR: ABNORMAL
UROBILINOGEN UR STRIP-ACNC: NEGATIVE EU/DL
WBC # BLD AUTO: 10.36 K/UL (ref 3.9–12.7)
WBC #/AREA URNS HPF: 7 /HPF (ref 0–5)
YEAST URNS QL MICRO: ABNORMAL

## 2023-04-13 PROCEDURE — 80053 COMPREHEN METABOLIC PANEL: CPT | Performed by: PHYSICIAN ASSISTANT

## 2023-04-13 PROCEDURE — 81000 URINALYSIS NONAUTO W/SCOPE: CPT | Performed by: PHYSICIAN ASSISTANT

## 2023-04-13 PROCEDURE — 96375 TX/PRO/DX INJ NEW DRUG ADDON: CPT

## 2023-04-13 PROCEDURE — 82962 GLUCOSE BLOOD TEST: CPT

## 2023-04-13 PROCEDURE — 96361 HYDRATE IV INFUSION ADD-ON: CPT

## 2023-04-13 PROCEDURE — 99285 EMERGENCY DEPT VISIT HI MDM: CPT | Mod: 25

## 2023-04-13 PROCEDURE — 96365 THER/PROPH/DIAG IV INF INIT: CPT

## 2023-04-13 PROCEDURE — 85025 COMPLETE CBC W/AUTO DIFF WBC: CPT | Performed by: PHYSICIAN ASSISTANT

## 2023-04-13 PROCEDURE — 63600175 PHARM REV CODE 636 W HCPCS: Performed by: EMERGENCY MEDICINE

## 2023-04-13 PROCEDURE — 83690 ASSAY OF LIPASE: CPT | Performed by: PHYSICIAN ASSISTANT

## 2023-04-13 PROCEDURE — 82010 KETONE BODYS QUAN: CPT | Performed by: EMERGENCY MEDICINE

## 2023-04-13 PROCEDURE — 81025 URINE PREGNANCY TEST: CPT | Performed by: PHYSICIAN ASSISTANT

## 2023-04-13 PROCEDURE — 25000003 PHARM REV CODE 250: Performed by: EMERGENCY MEDICINE

## 2023-04-13 RX ORDER — HYDROMORPHONE HYDROCHLORIDE 2 MG/ML
1 INJECTION, SOLUTION INTRAMUSCULAR; INTRAVENOUS; SUBCUTANEOUS
Status: COMPLETED | OUTPATIENT
Start: 2023-04-13 | End: 2023-04-13

## 2023-04-13 RX ORDER — HYDROCODONE BITARTRATE AND ACETAMINOPHEN 5; 325 MG/1; MG/1
1 TABLET ORAL EVERY 6 HOURS PRN
Qty: 8 TABLET | Refills: 0 | Status: SHIPPED | OUTPATIENT
Start: 2023-04-13 | End: 2023-09-05

## 2023-04-13 RX ORDER — METHOCARBAMOL 500 MG/1
500 TABLET, FILM COATED ORAL 3 TIMES DAILY
Qty: 20 TABLET | Refills: 0 | Status: SHIPPED | OUTPATIENT
Start: 2023-04-13 | End: 2023-04-20

## 2023-04-13 RX ORDER — ONDANSETRON 2 MG/ML
4 INJECTION INTRAMUSCULAR; INTRAVENOUS
Status: COMPLETED | OUTPATIENT
Start: 2023-04-13 | End: 2023-04-13

## 2023-04-13 RX ADMIN — CEFTRIAXONE 1 G: 1 INJECTION, POWDER, FOR SOLUTION INTRAMUSCULAR; INTRAVENOUS at 07:04

## 2023-04-13 RX ADMIN — SODIUM CHLORIDE 1000 ML: 9 INJECTION, SOLUTION INTRAVENOUS at 07:04

## 2023-04-13 RX ADMIN — INSULIN HUMAN 10 UNITS: 100 INJECTION, SOLUTION PARENTERAL at 08:04

## 2023-04-13 RX ADMIN — ONDANSETRON 4 MG: 2 INJECTION INTRAMUSCULAR; INTRAVENOUS at 07:04

## 2023-04-13 RX ADMIN — SODIUM CHLORIDE 1000 ML: 9 INJECTION, SOLUTION INTRAVENOUS at 08:04

## 2023-04-13 RX ADMIN — HYDROMORPHONE HYDROCHLORIDE 1 MG: 2 INJECTION INTRAMUSCULAR; INTRAVENOUS; SUBCUTANEOUS at 07:04

## 2023-04-13 NOTE — FIRST PROVIDER EVALUATION
Emergency Department TeleTriage Encounter Note      CHIEF COMPLAINT    Chief Complaint   Patient presents with    Abdominal Pain     Pt states she has been dealing with right sided abdominal pain. Pt also states she had a fall last week and the pain is stemming from that fall.        VITAL SIGNS   Initial Vitals [04/13/23 1652]   BP Pulse Resp Temp SpO2   135/61 81 19 99.3 °F (37.4 °C) 97 %      MAP       --            ALLERGIES    Review of patient's allergies indicates:   Allergen Reactions    Compazine [prochlorperazine edisylate] Rash    Contrast media Anaphylaxis    Dye Anaphylaxis     Contrast dye    Iodinated contrast media Anaphylaxis     Other reaction(s): anaphylaxis    Levaquin [levofloxacin] Hives and Itching    Morphine Other (See Comments)     Irritable  Iritability  Irritable    Prochlorperazine Rash    Metformin Other (See Comments)     Upset stomach    Ibuprofen Other (See Comments) and Nausea And Vomiting     Stomach pain  Stomach pain    Abdominal pain        PROVIDER TRIAGE NOTE  46 y/o female presenting with RUQ and RLQ abdominal pain , which began 1 week ago after a fall on her right side last week. She reports having xray's on 4/4/2023 (xray right ribs and chest - no a cute fracture or pneumothorax), but reports severe pain in the abdomen. She is taking tramadol without relief. She has some relief with muscle relaxer and gabapentin.   She reports pain with urinating.   Will order labs and UA pending ED provider evaluation. VSS.     Initial orders will be placed and care will be transferred to an alternate provider when patient is roomed for a full evaluation. Any additional orders and the final disposition will be determined by that provider.         ORDERS  Labs Reviewed - No data to display    ED Orders (720h ago, onward)      None              Virtual Visit Note: The provider triage portion of this emergency department evaluation and documentation was performed via [employer], a  HIPAA-compliant telemedicine application, in concert with a tele-presenter in the room. A face to face patient evaluation with one of my colleagues will occur once the patient is placed in an emergency department room.      DISCLAIMER: This note was prepared with [employer] voice recognition transcription software. Garbled syntax, mangled pronouns, and other bizarre constructions may be attributed to that software system.

## 2023-04-14 NOTE — ED PROVIDER NOTES
SCRIBE #1 NOTE: I, Luis Fernando Hastings, am scribing for, and in the presence of, Martha Man Do, MD. I have scribed the HPI, ROS, and PEx.     SCRIBE #2 NOTE: I, Alejo Levy, am scribing for, and in the presence of,  Irvin Paiz MD. I have scribed the remaining portions of the note not scribed by Scribe #1.   History      Chief Complaint   Patient presents with    Abdominal Pain     Pt states she has been dealing with right sided abdominal pain. Pt also states she had a fall last week and the pain is stemming from that fall.        Review of patient's allergies indicates:   Allergen Reactions    Compazine [prochlorperazine edisylate] Rash    Contrast media Anaphylaxis    Dye Anaphylaxis     Contrast dye    Iodinated contrast media Anaphylaxis     Other reaction(s): anaphylaxis    Levaquin [levofloxacin] Hives and Itching    Morphine Other (See Comments)     Irritable  Iritability  Irritable    Prochlorperazine Rash    Metformin Other (See Comments)     Upset stomach    Ibuprofen Other (See Comments) and Nausea And Vomiting     Stomach pain  Stomach pain    Abdominal pain         HPI   HPI    4/13/2023, 7:13 PM   History obtained from the patient      History of Present Illness: Jenifer Westfall is a 47 y.o. female patient with a PMHx of DM, HTN, Marfan syndrome who presents to the Emergency Department for R-sided abdominal pain, onset 1 week PTA following a mechanical fall. Pt states that she was sitting in a chair when the chair broke, causing her to fall onto her R side. Symptoms are intermittent and moderate in severity. No mitigating or exacerbating factors reported. Associated sxs include dysuria. Patient denies any fever, chills, n/v/d, SOB, CP, weakness, numbness, dizziness, headache, and all other sxs at this time. No prior Tx reported. No further complaints or concerns at this time.     Arrival mode: Personal vehicle     PCP: [facility]       Past Medical History:  Past Medical History:   Diagnosis Date     Arthritis     DM (diabetes mellitus)      2017 Insulin x 3 years     Hepatitis C antibody positive in blood 2021    RNA NEGATIVE 3/6/2017, 3/22/22    Hyperlipidemia     Hypertension     IBS (irritable bowel syndrome)     Kidney stone     Marfan syndrome     Mitral valve prolapse        Past Surgical History:  Past Surgical History:   Procedure Laterality Date    ANGIOGRAPHY OF LOWER EXTREMITY N/A 2018    Procedure: ANGIOGRAM, LOWER EXTREMITY- LEFT LEG;  Surgeon: Roscoe Landeros MD;  Location: [facility] CATH LAB;  Service: Peripheral Vascular;  Laterality: N/A;    APPENDECTOMY      BUNIONECTOMY      both feet    BYPASS GRAFT Bilateral     cataract surgery  2019     SECTION      x 2    CHOLECYSTECTOMY      COLONOSCOPY N/A 2020    Procedure: COLONOSCOPY w/ biopsies;  Surgeon: Gio Georges MD;  Location: [facility] ENDO;  Service: Endoscopy;  Laterality: N/A;    ESOPHAGEAL MANOMETRY WITH MEASUREMENT OF IMPEDANCE N/A 2023    Procedure: MANOMETRY, ESOPHAGUS, WITH IMPEDANCE MEASUREMENT;  Surgeon: Desmond Calderon RN;  Location: [facility];  Service: Endoscopy;  Laterality: N/A;    ESOPHAGOGASTRODUODENOSCOPY N/A 2019    Procedure: ESOPHAGOGASTRODUODENOSCOPY (EGD);  Surgeon: Jaron Sanders III, MD;  Location: [facility];  Service: Endoscopy;  Laterality: N/A;    ESOPHAGOGASTRODUODENOSCOPY N/A 2022    Procedure: EGD (ESOPHAGOGASTRODUODENOSCOPY);  Surgeon: Keith Hardwick MD;  Location: [facility];  Service: Gastroenterology;  Laterality: N/A;    SELECTIVE INJECTION OF ANESTHETIC AGENT AROUND LUMBAR SPINAL NERVE ROOT BY TRANSFORAMINAL APPROACH Bilateral 2020    Procedure: Bilateral L5/S1 TF DENY;  Surgeon: Jewel Walters MD;  Location: [facility] PAIN MGT;  Service: Pain Management;  Laterality: Bilateral;    TUBAL LIGATION           Family History:  Family History   Problem Relation Age of Onset    Heart disease Mother     Thrombophilia Father         DVT     "Hypertension Father     Stroke Maternal Aunt     Heart disease Maternal Aunt     Stroke Maternal Uncle     Heart disease Maternal Uncle     Breast cancer Neg Hx     Colon cancer Neg Hx     Ovarian cancer Neg Hx        Social History:  Social History     Tobacco Use    Smoking status: Former     Packs/day: 1.00     Years: 24.00     Pack years: 24.00     Types: Cigarettes     Start date: 1/1/1996     Quit date: 1/1/2020     Years since quitting: 3.2    Smokeless tobacco: Never    Tobacco comments:     "once in a blue moon"   Substance and Sexual Activity    Alcohol use: No     Alcohol/week: 0.0 standard drinks    Drug use: No    Sexual activity: Not Currently       ROS   Review of Systems   Constitutional:  Negative for chills and fever.   HENT:  Negative for sore throat.    Respiratory:  Negative for shortness of breath.    Cardiovascular:  Negative for chest pain.   Gastrointestinal:  Positive for abdominal pain (R, intermittent). Negative for diarrhea, nausea and vomiting.   Genitourinary:  Positive for dysuria.   Musculoskeletal:  Negative for back pain.   Skin:  Negative for rash.   Neurological:  Negative for dizziness, weakness, light-headedness, numbness and headaches.   Hematological:  Does not bruise/bleed easily.   All other systems reviewed and are negative.    Physical Exam      Initial Vitals [04/13/23 1652]   BP Pulse Resp Temp SpO2   135/61 81 19 99.3 °F (37.4 °C) 97 %      MAP       --          Physical Exam  Nursing Notes and Vital Signs Reviewed.  Constitutional: Patient is in mild distress. Well-developed and well-nourished.  Head: Atraumatic. Normocephalic.  Eyes: PERRL. EOM intact. Conjunctivae are not pale. No scleral icterus.  ENT: Mucous membranes are moist. Oropharynx is clear and symmetric.    Neck: Supple. Full ROM. No lymphadenopathy.  Cardiovascular: Regular rate. Regular rhythm. No murmurs, rubs, or gallops. Distal pulses are 2+ and symmetric.  Pulmonary/Chest: No respiratory distress. " Clear to auscultation bilaterally. No wheezing or rales.  Abdominal: Soft and non-distended.  There is no tenderness.  No rebound, guarding, or rigidity.   Musculoskeletal: Moves all extremities. No obvious deformities. No edema.  Skin: Warm and dry.  Neurological:  Alert, awake, and appropriate.  Normal speech.  No acute focal neurological deficits are appreciated.  Psychiatric: Normal affect. Good eye contact. Appropriate in content.    ED Course    Procedures  ED Vital Signs:  Vitals:    04/13/23 1652 04/13/23 1917 04/13/23 2040 04/13/23 2103   BP: 135/61  137/64 (!) 133/58   Pulse: 81  79 80   Resp: 19 18 18    Temp: 99.3 °F (37.4 °C)      TempSrc: Oral      SpO2: 97%  97% 95%    04/13/23 2117   BP: (!) 133/58   Pulse: 80   Resp: 17   Temp: 99 °F (37.2 °C)   TempSrc:    SpO2: 95%       Abnormal Lab Results:  Labs Reviewed   COMPREHENSIVE METABOLIC PANEL - Abnormal; Notable for the following components:       Result Value    Sodium 130 (*)     CO2 16 (*)     Glucose 555 (*)     Alkaline Phosphatase 199 (*)     eGFR 56 (*)     All other components within normal limits    Narrative:     GLU   critical result(s) called and verbal readback obtained from   JEAN-PIERRE RODRÍGUEZ RN   by EDJ1 04/13/2023 18:35   URINALYSIS, REFLEX TO URINE CULTURE - Abnormal; Notable for the following components:    Color, UA Colorless (*)     Specific Gravity, UA >1.030 (*)     Glucose, UA 4+ (*)     Leukocytes, UA 2+ (*)     All other components within normal limits    Narrative:     Specimen Source->Urine   URINALYSIS MICROSCOPIC - Abnormal; Notable for the following components:    RBC, UA 10 (*)     WBC, UA 7 (*)     Yeast, UA Many (*)     All other components within normal limits    Narrative:     Specimen Source->Urine   POCT GLUCOSE - Abnormal; Notable for the following components:    POCT Glucose 275 (*)     All other components within normal limits   CBC W/ AUTO DIFFERENTIAL   LIPASE   BETA - HYDROXYBUTYRATE, SERUM   PREGNANCY TEST, URINE  RAPID   PREGNANCY TEST, URINE RAPID    Narrative:     Specimen Source->Urine   POCT GLUCOSE MONITORING CONTINUOUS        All Lab Results:  Results for orders placed or performed during the hospital encounter of 04/13/23   CBC auto differential   Result Value Ref Range    WBC 10.36 3.90 - 12.70 K/uL    RBC 4.96 4.00 - 5.40 M/uL    Hemoglobin 14.5 12.0 - 16.0 g/dL    Hematocrit 44.3 37.0 - 48.5 %    MCV 89 82 - 98 fL    MCH 29.2 27.0 - 31.0 pg    MCHC 32.7 32.0 - 36.0 g/dL    RDW 13.2 11.5 - 14.5 %    Platelets 294 150 - 450 K/uL    MPV 10.0 9.2 - 12.9 fL    Immature Granulocytes 0.4 0.0 - 0.5 %    Gran # (ANC) 6.1 1.8 - 7.7 K/uL    Immature Grans (Abs) 0.04 0.00 - 0.04 K/uL    Lymph # 3.4 1.0 - 4.8 K/uL    Mono # 0.6 0.3 - 1.0 K/uL    Eos # 0.2 0.0 - 0.5 K/uL    Baso # 0.04 0.00 - 0.20 K/uL    nRBC 0 0 /100 WBC    Gran % 59.0 38.0 - 73.0 %    Lymph % 33.1 18.0 - 48.0 %    Mono % 5.6 4.0 - 15.0 %    Eosinophil % 1.5 0.0 - 8.0 %    Basophil % 0.4 0.0 - 1.9 %    Differential Method Automated    Comprehensive metabolic panel   Result Value Ref Range    Sodium 130 (L) 136 - 145 mmol/L    Potassium 4.5 3.5 - 5.1 mmol/L    Chloride 102 95 - 110 mmol/L    CO2 16 (L) 23 - 29 mmol/L    Glucose 555 (HH) 70 - 110 mg/dL    BUN 13 6 - 20 mg/dL    Creatinine 1.2 0.5 - 1.4 mg/dL    Calcium 9.4 8.7 - 10.5 mg/dL    Total Protein 8.0 6.0 - 8.4 g/dL    Albumin 3.9 3.5 - 5.2 g/dL    Total Bilirubin 0.3 0.1 - 1.0 mg/dL    Alkaline Phosphatase 199 (H) 55 - 135 U/L    AST 18 10 - 40 U/L    ALT 23 10 - 44 U/L    Anion Gap 12 8 - 16 mmol/L    eGFR 56 (A) >60 mL/min/1.73 m^2   Lipase   Result Value Ref Range    Lipase 30 4 - 60 U/L   Urinalysis, Reflex to Urine Culture Urine, Clean Catch    Specimen: Urine   Result Value Ref Range    Specimen UA Urine, Clean Catch     Color, UA Colorless (A) Yellow, Straw, Ashli    Appearance, UA Clear Clear    pH, UA 5.0 5.0 - 8.0    Specific Gravity, UA >1.030 (A) 1.005 - 1.030    Protein, UA Negative  Negative    Glucose, UA 4+ (A) Negative    Ketones, UA Negative Negative    Bilirubin (UA) Negative Negative    Occult Blood UA Negative Negative    Nitrite, UA Negative Negative    Urobilinogen, UA Negative <2.0 EU/dL    Leukocytes, UA 2+ (A) Negative   Urinalysis Microscopic   Result Value Ref Range    RBC, UA 10 (H) 0 - 4 /hpf    WBC, UA 7 (H) 0 - 5 /hpf    Bacteria Occasional None-Occ /hpf    Yeast, UA Many (A) None    Hyaline Casts, UA 1 0-1/lpf /lpf    Microscopic Comment SEE COMMENT    Beta - Hydroxybutyrate, Serum   Result Value Ref Range    Beta-Hydroxybutyrate 0.1 0.0 - 0.5 mmol/L   Pregnancy, urine rapid   Result Value Ref Range    Preg Test, Ur Negative    POCT glucose   Result Value Ref Range    POCT Glucose 275 (H) 70 - 110 mg/dL     *Note: Due to a large number of results and/or encounters for the requested time period, some results have not been displayed. A complete set of results can be found in Results Review.     Imaging Results:  Imaging Results              CT Renal Stone Study ABD Pelvis WO (Final result)  Result time 04/13/23 20:36:23      Final result by See Angeles MD (04/13/23 20:36:23)                   Impression:      14 mm nodule right lung base..    Hepatomegaly    Cholecystectomy    Atherosclerotic changes    Fem fem bypass.  Iliac stents    All CT scans   are performed using dose optimization techniques including the following: automated exposure control; adjustment of the mA and/or kV; use of iterative reconstruction technique.  Dose modulation was employed for ALARA by means of: Automated exposure control; adjustment of the mA and/or kV according to patient size (this includes techniques or standardized protocols for targeted exams where dose is matched to indication/reason for exam; i.e. extremities or head); and/or use of iterative reconstructive technique.      Electronically signed by: See Angeles  Date:    04/13/2023  Time:    20:36               Narrative:     EXAMINATION:  CT RENAL STONE STUDY ABD PELVIS WO    CLINICAL HISTORY:  Flank pain, kidney stone suspected;    TECHNIQUE:  Low dose axial images, sagittal and coronal reformations were obtained from the lung bases to the pubic symphysis.  Contrast was not administered.    COMPARISON:  None    FINDINGS:  Heart: Normal in size. No pericardial effusion.    Lung Bases: 14 mm nodule right lung base..    Liver: Hepatomegaly, with no focal hepatic lesions.    Gallbladder: Status post cholecystectomy.    Bile Ducts: No evidence of dilated ducts.    Pancreas: No mass or peripancreatic fat stranding.    Spleen: Unremarkable.    Adrenals: Unremarkable.    Kidneys/ Ureters: Unremarkable.    Bladder: No evidence of wall thickening.    Reproductive organs: Unremarkable.    GI Tract/Mesentery: No evidence of bowel obstruction or inflammation.    Peritoneal Space: No ascites. No free air.    Retroperitoneum: No significant adenopathy.    Abdominal wall: Unremarkable.    Vasculature: No  aneurysm.  Bi femoral bypass.  Atherosclerotic changes.  Iliac stents    Bones: No acute fracture.                                              The Emergency Provider reviewed the vital signs and test results, which are outlined above.    ED Discussion     8:00 PM: Dr. Thornton transfers care of patient to Dr. Paiz pending imaging results.    10:00 PM: Reassessed pt at this time. Discussed with pt all pertinent ED information and results. Discussed pt dx and plan of tx. Gave pt all f/u and return to the ED instructions. All questions and concerns were addressed at this time. Pt expresses understanding of information and instructions, and is comfortable with plan to discharge. Pt is stable for discharge.    I discussed with patient and/or family/caretaker that evaluation in the ED does not suggest any emergent or life threatening medical conditions requiring immediate intervention beyond what was provided in the ED, and I believe patient is safe for  discharge.  Regardless, an unremarkable evaluation in the ED does not preclude the development or presence of a serious of life threatening condition. As such, patient was instructed to return immediately for any worsening or change in current symptoms.           ED Medication(s):  Medications   sodium chloride 0.9% bolus 1,000 mL 1,000 mL (0 mLs Intravenous Stopped 4/13/23 2031)   ondansetron injection 4 mg (4 mg Intravenous Given 4/13/23 1917)   HYDROmorphone (PF) injection 1 mg (1 mg Intravenous Given 4/13/23 1917)   cefTRIAXone (ROCEPHIN) 1 g in dextrose 5 % in water (D5W) 5 % 50 mL IVPB (MB+) (0 g Intravenous Stopped 4/13/23 2030)   sodium chloride 0.9% bolus 1,000 mL 1,000 mL (1,000 mLs Intravenous New Bag 4/13/23 2030)   insulin regular injection 10 Units 0.1 mL (10 Units Intravenous Given 4/13/23 2031)     New Prescriptions    HYDROCODONE-ACETAMINOPHEN (NORCO) 5-325 MG PER TABLET    Take 1 tablet by mouth every 6 (six) hours as needed for Pain.    METHOCARBAMOL (ROBAXIN) 500 MG TAB    Take 1 tablet (500 mg total) by mouth 3 (three) times daily. for 7 days     Medical Decision Making    Medical Decision Making:   Clinical Tests:   Lab Tests: Ordered and Reviewed  Radiological Study: Ordered and Reviewed  ED Management:  This patient presents with right flank pain after a fall from standing. Good ROM and without remarkable findings on CT.     Differential includes Muscular strain, contusion, fracture, dislocation, ligamental, or tendon injuries.    Discussed limitations of XR/CT imaging in regards to soft tissue/ligamental/tendon injuries. Patient is to f/u with PCP for reevaluation and determination of need for advanced imaging.     Pain improved with treatment in the ED    Advised RICE therapy    Told to return to ED if symptoms worsen, return, or change in character.          Scribe Attestation:   Scribe #1: I performed the above scribed service and the documentation accurately describes the services I  performed. I attest to the accuracy of the note.    Attending:   Physician Attestation Statement for Scribe #1: I, Martha Man Do, MD, personally performed the services described in this documentation, as scribed by Luis Fernando Hastings, in my presence, and it is both accurate and complete.       Scribe Attestation:   Scribe #2: I performed the above scribed service and the documentation accurately describes the services I performed. I attest to the accuracy of the note.    Attending Attestation:           Physician Attestation for Scribe:    Physician Attestation Statement for Scribe #2: I, Irvin Paiz MD, reviewed documentation, as scribed by Alejo Levy in my presence, and it is both accurate and complete. I also acknowledge and confirm the content of the note done by Felixibe #1.        Clinical Impression       ICD-10-CM ICD-9-CM   1. Fall, initial encounter  W19.XXXA E888.9   2. Acute right flank pain  R10.9 789.09     338.19       Disposition:   Disposition: Discharged  Condition: Stable       Irvin Paiz MD  04/15/23 2108

## 2023-04-18 PROCEDURE — 91010 ESOPHAGUS MOTILITY STUDY: CPT | Mod: 26,,, | Performed by: INTERNAL MEDICINE

## 2023-04-18 PROCEDURE — 91010 PR ESOPHAGEAL MOTILITY STUDY, MA2METRY: ICD-10-PCS | Mod: 26,,, | Performed by: INTERNAL MEDICINE

## 2023-04-18 PROCEDURE — 91037 ESOPH IMPED FUNCTION TEST: CPT | Mod: 26,,, | Performed by: INTERNAL MEDICINE

## 2023-04-18 PROCEDURE — 91037 PR GERD TST W/ NASAL IMPEDENCE ELECTROD: ICD-10-PCS | Mod: 26,,, | Performed by: INTERNAL MEDICINE

## 2023-04-18 NOTE — [employer] PATIENT INSTRUCTIONS
Discharge Summary/Instructions after an Endoscopic Procedure  Patient Name: Jenifer Westfall  Patient MRN: [id number]  Patient YOB: 1975 Tuesday, April 18, 2023 Jadyn Santana MD  Dear patient,  As a result of recent federal legislation (The Federal Cures Act), you may   receive lab or pathology results from your procedure in your [facility]   account before your physician is able to contact you. Your physician or   their representative will relay the results to you with their   recommendations at their soonest availability.  Thank you,  RESTRICTIONS:  During your procedure today, you received medications for sedation.  These   medications may affect your judgment, balance and coordination.  Therefore,   for 24 hours, you have the following restrictions:   - DO NOT drive a car, operate machinery, make legal/financial decisions,   sign important papers or drink alcohol.    ACTIVITY:  Today: no heavy lifting, straining or running due to procedural   sedation/anesthesia.  The following day: return to full activity including work.  DIET:  Eat and drink normally unless instructed otherwise.     TREATMENT FOR COMMON SIDE EFFECTS:  - Mild abdominal pain, nausea, belching, bloating or excessive gas:  rest,   eat lightly and use a heating pad.  - Sore Throat: treat with throat lozenges and/or gargle with warm salt   water.  - Because air was used during the procedure, expelling large amounts of air   from your rectum or belching is normal.  - If a bowel prep was taken, you may not have a bowel movement for 1-3 days.    This is normal.  SYMPTOMS TO WATCH FOR AND REPORT TO YOUR PHYSICIAN:  1. Abdominal pain or bloating, other than gas cramps.  2. Chest pain.  3. Back pain.  4. Signs of infection such as: chills or fever occurring within 24 hours   after the procedure.  5. Rectal bleeding, which would show as bright red, maroon, or black stools.   (A tablespoon of blood from the rectum is not serious, especially if    hemorrhoids are present.)  6. Vomiting.  7. Weakness or dizziness.  GO DIRECTLY TO THE NEAREST EMERGENCY ROOM IF YOU HAVE ANY OF THE FOLLOWING:      Difficulty breathing              Chills and/or fever over 101 F   Persistent vomiting and/or vomiting blood   Severe abdominal pain   Severe chest pain   Black, tarry stools   Bleeding- more than one tablespoon   Any other symptom or condition that you feel may need urgent attention  Your doctor recommends these additional instructions:  If any biopsies were taken, your doctors clinic will contact you in 1 to 2   weeks with any results.  - Can consider CCB or nitrate therapy to see if this helps  For questions, problems or results please call your physician Jadyn Santana MD at Work:  [phone]  If you have any questions about the above instructions, call the GI   department at [phone] or call the endoscopy unit at [phone]   from 7am until 3 pm.  [facility] [place], EMERGENCY ROOM PHONE NUMBER:   [phone]  IF A COMPLICATION OR EMERGENCY SITUATION ARISES AND YOU ARE UNABLE TO REACH   YOUR PHYSICIAN - GO DIRECTLY TO THE EMERGENCY ROOM.  I have read or have had read to me these discharge instructions for my   procedure and have received a written copy.  I understand these   instructions and will follow-up with my physician if I have any questions.     __________________________________       _____________________________________  Nurse Signature                                          Patient/Designated   Responsible Party Signature  MD Jadyn Bruno MD  4/18/2023 9:28:37 AM  [employer]

## 2023-04-20 RX ORDER — DICYCLOMINE HYDROCHLORIDE 20 MG/1
20 TABLET ORAL 3 TIMES DAILY
Qty: 90 TABLET | Refills: 2 | Status: SHIPPED | OUTPATIENT
Start: 2023-04-20 | End: 2023-11-24 | Stop reason: SDUPTHER

## 2023-04-25 ENCOUNTER — PATIENT MESSAGE (OUTPATIENT)
Dept: GASTROENTEROLOGY | Facility: CLINIC | Age: 48
End: 2023-04-25
Payer: MEDICAID

## 2023-04-25 ENCOUNTER — TELEPHONE (OUTPATIENT)
Dept: PULMONOLOGY | Facility: CLINIC | Age: 48
End: 2023-04-25
Payer: MEDICAID

## 2023-04-25 NOTE — TELEPHONE ENCOUNTER
----- Message from Ronnie Logan sent at 4/25/2023  2:07 PM CDT -----  Contact: Terrie  Type:  Patient Returning Call    Who Called:Terrie  Who Left Message for Patient:Nurse  Does the patient know what this is regarding?:Missed call  Would the patient rather a call back or a response via [facility]? Call  Best Call Back Number:[phone]  Additional Information:

## 2023-04-25 NOTE — TELEPHONE ENCOUNTER
----- Message from Ana Cristina Novak sent at 4/25/2023  9:33 AM CDT -----  Regarding: appt needed  Name of Who is Calling: NAV KINNEY [[id number]]      What is the request in detail: Pt has a spot on her lung at one time that went away. Pt had a recent fall and per test, the spot is back. Needs an appt with provider. Please advise.      Can the clinic reply by [facility]: no       What Number to Call Back if not in [facility]: [phone]

## 2023-04-26 ENCOUNTER — PATIENT MESSAGE (OUTPATIENT)
Dept: GASTROENTEROLOGY | Facility: CLINIC | Age: 48
End: 2023-04-26
Payer: MEDICAID

## 2023-05-02 ENCOUNTER — OFFICE VISIT (OUTPATIENT)
Dept: GASTROENTEROLOGY | Facility: CLINIC | Age: 48
End: 2023-05-02
Payer: MEDICAID

## 2023-05-02 VITALS
SYSTOLIC BLOOD PRESSURE: 122 MMHG | HEIGHT: 70 IN | HEART RATE: 80 BPM | BODY MASS INDEX: 31.57 KG/M2 | DIASTOLIC BLOOD PRESSURE: 70 MMHG

## 2023-05-02 DIAGNOSIS — K22.4 DIFFUSE ESOPHAGEAL SPASM: ICD-10-CM

## 2023-05-02 PROCEDURE — 99999 PR PBB SHADOW E&M-EST. PATIENT-LVL V: ICD-10-PCS | Mod: PBBFAC,,, | Performed by: INTERNAL MEDICINE

## 2023-05-02 PROCEDURE — 3078F PR MOST RECENT DIASTOLIC BLOOD PRESSURE < 80 MM HG: ICD-10-PCS | Mod: CPTII,,, | Performed by: INTERNAL MEDICINE

## 2023-05-02 PROCEDURE — 1159F PR MEDICATION LIST DOCUMENTED IN MEDICAL RECORD: ICD-10-PCS | Mod: CPTII,,, | Performed by: INTERNAL MEDICINE

## 2023-05-02 PROCEDURE — 1160F PR REVIEW ALL MEDS BY PRESCRIBER/CLIN PHARMACIST DOCUMENTED: ICD-10-PCS | Mod: CPTII,,, | Performed by: INTERNAL MEDICINE

## 2023-05-02 PROCEDURE — 99214 PR OFFICE/OUTPT VISIT, EST, LEVL IV, 30-39 MIN: ICD-10-PCS | Mod: S$PBB,,, | Performed by: INTERNAL MEDICINE

## 2023-05-02 PROCEDURE — 99214 OFFICE O/P EST MOD 30 MIN: CPT | Mod: S$PBB,,, | Performed by: INTERNAL MEDICINE

## 2023-05-02 PROCEDURE — 3078F DIAST BP <80 MM HG: CPT | Mod: CPTII,,, | Performed by: INTERNAL MEDICINE

## 2023-05-02 PROCEDURE — 3074F SYST BP LT 130 MM HG: CPT | Mod: CPTII,,, | Performed by: INTERNAL MEDICINE

## 2023-05-02 PROCEDURE — 1159F MED LIST DOCD IN RCRD: CPT | Mod: CPTII,,, | Performed by: INTERNAL MEDICINE

## 2023-05-02 PROCEDURE — 3074F PR MOST RECENT SYSTOLIC BLOOD PRESSURE < 130 MM HG: ICD-10-PCS | Mod: CPTII,,, | Performed by: INTERNAL MEDICINE

## 2023-05-02 PROCEDURE — 1160F RVW MEDS BY RX/DR IN RCRD: CPT | Mod: CPTII,,, | Performed by: INTERNAL MEDICINE

## 2023-05-02 PROCEDURE — 99215 OFFICE O/P EST HI 40 MIN: CPT | Mod: PBBFAC,PO | Performed by: INTERNAL MEDICINE

## 2023-05-02 PROCEDURE — 99999 PR PBB SHADOW E&M-EST. PATIENT-LVL V: CPT | Mod: PBBFAC,,, | Performed by: INTERNAL MEDICINE

## 2023-05-02 PROCEDURE — 4010F PR ACE/ARB THEARPY RXD/TAKEN: ICD-10-PCS | Mod: CPTII,,, | Performed by: INTERNAL MEDICINE

## 2023-05-02 PROCEDURE — 4010F ACE/ARB THERAPY RXD/TAKEN: CPT | Mod: CPTII,,, | Performed by: INTERNAL MEDICINE

## 2023-05-02 PROCEDURE — 3008F BODY MASS INDEX DOCD: CPT | Mod: CPTII,,, | Performed by: INTERNAL MEDICINE

## 2023-05-02 PROCEDURE — 3008F PR BODY MASS INDEX (BMI) DOCUMENTED: ICD-10-PCS | Mod: CPTII,,, | Performed by: INTERNAL MEDICINE

## 2023-05-02 NOTE — ASSESSMENT & PLAN NOTE
Plan:   -Will start on Peppermint oil. Patient was instructed to take two mints under tongue prior to meals.

## 2023-05-02 NOTE — PROGRESS NOTES
"Clinic Progress Note:  [facility] Progress Note    Reason for Visit:  The encounter diagnosis was Diffuse esophageal spasm.    PCP: [facility]       HPI:  This is a 47 y.o. female here for evaluation of dysphagia and choking sensation.   This happens with solids with foods.   This has been going on for about one year.   Esophageal manometry revealed RIANNA.       ROS:  As per HPI       Allergies: Reviewed    Home Medications:   Medication List with Changes/Refills   Current Medications    ADMELOG SOLOSTAR U-100 INSULIN 100 UNIT/ML PEN        ALBUTEROL (VENTOLIN HFA) 90 MCG/ACTUATION INHALER    Inhale 2 puffs into the lungs every 6 (six) hours as needed for Wheezing. Rescue    ALPRAZOLAM (XANAX) 0.25 MG TABLET    Take 0.25 mg by mouth daily as needed.    ALPRAZOLAM (XANAX) 0.5 MG TABLET    Take 0.5 mg by mouth 2 (two) times daily as needed.    ASCORBIC ACID (VITAMIN C) 100 MG TABLET    Take 100 mg by mouth once daily.    ASPIRIN (ECOTRIN) 81 MG EC TABLET    Take 81 mg by mouth once daily.    ATENOLOL (TENORMIN) 25 MG TABLET    TAKE 1 TABLET (25 MG TOTAL) BY MOUTH ONCE DAILY.    ATORVASTATIN (LIPITOR) 80 MG TABLET    TAKE 1 TABLET BY MOUTH ONCE DAILY.    AZELASTINE (ASTELIN) 137 MCG (0.1 %) NASAL SPRAY        BD ULTRA-FINE [place] PEN NEEDLE 32 GAUGE X 5/32" NDLE    Inject 1 each into the skin 3 (three) times daily.    CLOPIDOGREL (PLAVIX) 75 MG TABLET    TAKE 1 TABLET BY MOUTH ONCE DAILY    DAPAGLIFLOZIN (FARXIGA) 10 MG TABLET    Take 1 tablet (10 mg total) by mouth once daily.    DICLOFENAC SODIUM (VOLTAREN) 1 % GEL    Apply 2 g topically 3 (three) times daily.    DICYCLOMINE (BENTYL) 20 MG TABLET    Take 1 tablet (20 mg total) by mouth 3 (three) times daily.    FAMOTIDINE (PEPCID) 20 MG TABLET    Take 1 tablet (20 mg total) by mouth 2 (two) times daily.    FLUOXETINE (PROZAC) 40 MG CAPSULE    TAKE 1 CAPSULE BY MOUTH ONCE DAILY    FLUTICASONE PROPIONATE (FLONASE) 50 MCG/ACTUATION NASAL SPRAY        " "GABAPENTIN (NEURONTIN) 300 MG CAPSULE    Take 3 capsules (900 mg total) by mouth 3 (three) times daily.    GLIPIZIDE (GLUCOTROL) 10 MG TABLET    Take 1 tablet (10 mg total) by mouth 2 (two) times daily.    HYDROCODONE-ACETAMINOPHEN (NORCO) 5-325 MG PER TABLET    Take 1 tablet by mouth every 6 (six) hours as needed for Pain.    INSULIN GLARGINE,HUM.REC.ANLOG (BASAGLAR KWIKPEN U-100 INSULIN SUBQ)    Inject 40 Units into the skin 2 (two) times daily.    ISOSORBIDE MONONITRATE (IMDUR) 30 MG 24 HR TABLET    Take 1 tablet (30 mg total) by mouth once daily.    LACTULOSE (CHRONULAC) 20 GRAM/30 ML SOLN    Take 30 mLs (20 g total) by mouth 2 (two) times daily.    LINACLOTIDE (LINZESS) 145 MCG CAP CAPSULE    Take 1 capsule (145 mcg total) by mouth before breakfast.    LIRAGLUTIDE 0.6 MG/0.1 ML, 18 MG/3 ML, SUBQ PNIJ (VICTOZA 2-DIAN) 0.6 MG/0.1 ML (18 MG/3 ML) PNIJ    Inject 1.2 Units into the skin.    LISINOPRIL 10 MG TABLET    TAKE 1 TABLET (10 MG TOTAL) BY MOUTH ONCE DAILY.    NOVOLOG FLEXPEN U-100 INSULIN 100 UNIT/ML (3 ML) INPN PEN        PANTOPRAZOLE (PROTONIX) 40 MG TABLET    TAKE 1 TABLET BY MOUTH ONCE DAILY    PEN NEEDLE 31 GAUGE X 5/16" NDLE    USE 5 TIMES DAILY WITH LANTUS AND HUMALOG    PROMETHAZINE (PHENERGAN) 25 MG TABLET        RIMEGEPANT 75 MG ODT    Take 1 tablet (75 mg total) by mouth as needed for Migraine (max dose 3 doses within 1 week). Place ODT tablet on the tongue; alternatively the ODT tablet may be placed under the tongue    TIZANIDINE (ZANAFLEX) 4 MG TABLET    TAKE 1 TO 1 AND 1/2 TABLETS (4 TO 6MG TOTAL) BY MOUTH NIGHTLY AS NEEDED.    TRUE METRIX GLUCOSE TEST STRIP STRP             Physical Exam:  Vital Signs:  /70 (BP Location: Left arm, Patient Position: Sitting, BP Method: Medium (Manual))   Pulse 80   Ht 5' 10" (1.778 m)   LMP  (LMP Unknown)   BMI 31.57 kg/m²   Body mass index is 31.57 kg/m².    Physical Exam  Constitutional:       Appearance: Normal appearance.   HENT:      " Mouth/Throat:      Pharynx: Oropharynx is clear.   Eyes:      Conjunctiva/sclera: Conjunctivae normal.   Pulmonary:      Effort: Pulmonary effort is normal.   Abdominal:      Palpations: Abdomen is soft.   Neurological:      Mental Status: She is alert.        Labs: Pertinent labs reviewed.      Assessment:  Problem List Items Addressed This Visit          GI    Diffuse esophageal spasm    Current Assessment & Plan     Plan:   -Will start on Peppermint oil. Patient was instructed to take two mints under tongue prior to meals.               Diffuse esophageal spasm                  Order summary:  No orders of the defined types were placed in this encounter.      Thank you so much for allowing me to participate in the care of Jenifer Hardwick MD  Gastroenterology and Hepatology  [facility]

## 2023-05-05 DIAGNOSIS — R06.02 SOB (SHORTNESS OF BREATH): Primary | ICD-10-CM

## 2023-05-05 DIAGNOSIS — R07.89 ATYPICAL CHEST PAIN: ICD-10-CM

## 2023-05-05 RX ORDER — ISOSORBIDE MONONITRATE 30 MG/1
30 TABLET, EXTENDED RELEASE ORAL DAILY
Qty: 30 TABLET | Refills: 11 | Status: SHIPPED | OUTPATIENT
Start: 2023-05-05 | End: 2024-05-04

## 2023-05-07 ENCOUNTER — PATIENT MESSAGE (OUTPATIENT)
Dept: GASTROENTEROLOGY | Facility: CLINIC | Age: 48
End: 2023-05-07
Payer: MEDICAID

## 2023-05-10 ENCOUNTER — OFFICE VISIT (OUTPATIENT)
Dept: PULMONOLOGY | Facility: CLINIC | Age: 48
End: 2023-05-10
Payer: MEDICAID

## 2023-05-10 ENCOUNTER — HOSPITAL ENCOUNTER (OUTPATIENT)
Dept: RADIOLOGY | Facility: HOSPITAL | Age: 48
Discharge: HOME | End: 2023-05-10
Attending: INTERNAL MEDICINE
Payer: MEDICAID

## 2023-05-10 ENCOUNTER — PATIENT MESSAGE (OUTPATIENT)
Dept: PULMONOLOGY | Facility: CLINIC | Age: 48
End: 2023-05-10

## 2023-05-10 VITALS
BODY MASS INDEX: 33.33 KG/M2 | HEIGHT: 70 IN | RESPIRATION RATE: 18 BRPM | SYSTOLIC BLOOD PRESSURE: 124 MMHG | DIASTOLIC BLOOD PRESSURE: 64 MMHG | WEIGHT: 232.81 LBS

## 2023-05-10 DIAGNOSIS — R06.02 SOB (SHORTNESS OF BREATH): ICD-10-CM

## 2023-05-10 DIAGNOSIS — R91.1 NODULE OF LOWER LOBE OF RIGHT LUNG: ICD-10-CM

## 2023-05-10 DIAGNOSIS — R91.1 SOLID NODULE OF LUNG GREATER THAN 8 MM IN DIAMETER: Primary | ICD-10-CM

## 2023-05-10 DIAGNOSIS — R91.1 SOLITARY PULMONARY NODULE: ICD-10-CM

## 2023-05-10 PROCEDURE — 3008F PR BODY MASS INDEX (BMI) DOCUMENTED: ICD-10-PCS | Mod: CPTII,,, | Performed by: INTERNAL MEDICINE

## 2023-05-10 PROCEDURE — 4010F ACE/ARB THERAPY RXD/TAKEN: CPT | Mod: CPTII,,, | Performed by: INTERNAL MEDICINE

## 2023-05-10 PROCEDURE — 3078F DIAST BP <80 MM HG: CPT | Mod: CPTII,,, | Performed by: INTERNAL MEDICINE

## 2023-05-10 PROCEDURE — 3008F BODY MASS INDEX DOCD: CPT | Mod: CPTII,,, | Performed by: INTERNAL MEDICINE

## 2023-05-10 PROCEDURE — 71046 X-RAY EXAM CHEST 2 VIEWS: CPT | Mod: TC

## 2023-05-10 PROCEDURE — 4010F PR ACE/ARB THEARPY RXD/TAKEN: ICD-10-PCS | Mod: CPTII,,, | Performed by: INTERNAL MEDICINE

## 2023-05-10 PROCEDURE — 3078F PR MOST RECENT DIASTOLIC BLOOD PRESSURE < 80 MM HG: ICD-10-PCS | Mod: CPTII,,, | Performed by: INTERNAL MEDICINE

## 2023-05-10 PROCEDURE — 71046 X-RAY EXAM CHEST 2 VIEWS: CPT | Mod: 26,,, | Performed by: RADIOLOGY

## 2023-05-10 PROCEDURE — 71046 XR CHEST PA AND LATERAL: ICD-10-PCS | Mod: 26,,, | Performed by: RADIOLOGY

## 2023-05-10 PROCEDURE — 3074F PR MOST RECENT SYSTOLIC BLOOD PRESSURE < 130 MM HG: ICD-10-PCS | Mod: CPTII,,, | Performed by: INTERNAL MEDICINE

## 2023-05-10 PROCEDURE — 99215 OFFICE O/P EST HI 40 MIN: CPT | Mod: S$PBB,,, | Performed by: INTERNAL MEDICINE

## 2023-05-10 PROCEDURE — 99213 OFFICE O/P EST LOW 20 MIN: CPT | Mod: PBBFAC,25 | Performed by: INTERNAL MEDICINE

## 2023-05-10 PROCEDURE — 3074F SYST BP LT 130 MM HG: CPT | Mod: CPTII,,, | Performed by: INTERNAL MEDICINE

## 2023-05-10 PROCEDURE — 99999 PR PBB SHADOW E&M-EST. PATIENT-LVL III: CPT | Mod: PBBFAC,,, | Performed by: INTERNAL MEDICINE

## 2023-05-10 PROCEDURE — 99215 PR OFFICE/OUTPT VISIT, EST, LEVL V, 40-54 MIN: ICD-10-PCS | Mod: S$PBB,,, | Performed by: INTERNAL MEDICINE

## 2023-05-10 PROCEDURE — 99999 PR PBB SHADOW E&M-EST. PATIENT-LVL III: ICD-10-PCS | Mod: PBBFAC,,, | Performed by: INTERNAL MEDICINE

## 2023-05-10 NOTE — PROGRESS NOTES
Subjective:     Patient ID: Jenifer Westfall is a 47 y.o. female.    Chief Complaint:      HPI    Seen at [facility] and told she had a nodule - seen in Emergency Room for a fall - injured right side- bruised ribs  Former smoker with peripheral neuropathy  Prescribed and inhaler but has not used  Dyspnea  Patient complains of shortness of breath. Symptoms occur while getting dressed. Symptoms began 2 years ago, gradually worsening since. Associated symptoms include  difficulty breathing, dyspnea on exertion, and shortness of breath. She denies chest pain, located left chest. She does not have had recent travel. Weight has been stable. Symptoms are exacerbated by minimal activity. Symptoms are alleviated by rest.      Lung Nodule  She presents for evaluation and treatment of a lung nodule. The patient reports that the imaging was performed to evaluate symptoms of dyspnea on exertion which have been present for 2 years and are gradually worsening. Symptoms are exacerbated by exercise and relieved by rest. Other symptoms include dyspnea on exertion. She has a history of 24 pack years. The patient has no known exposure to tuberculosis. The patient does not have a history of cancer.  Family history of lung cancer in grandmother  Mother with Coronary Artery Disease and Chronic Obstructive Pulmonary Disease      Abnormal CT of chest performed 4/24/2022  Problems with transportation   Follow up CT was not approved in time for CT to be performed today  Follow up CT of chest         Past Medical History:   Diagnosis Date    Arthritis     DM (diabetes mellitus) 2009     03/01/2017 Insulin x 3 years 2013    Hepatitis C antibody positive in blood 03/04/2021    RNA NEGATIVE 3/6/2017, 3/22/22    Hyperlipidemia     Hypertension     IBS (irritable bowel syndrome)     Kidney stone     Marfan syndrome     Mitral valve prolapse      Past Surgical History:   Procedure Laterality Date    ANGIOGRAPHY  OF LOWER EXTREMITY N/A 2018    Procedure: ANGIOGRAM, LOWER EXTREMITY- LEFT LEG;  Surgeon: Roscoe Landeros MD;  Location: [facility] CATH LAB;  Service: Peripheral Vascular;  Laterality: N/A;    APPENDECTOMY      BUNIONECTOMY      both feet    BYPASS GRAFT Bilateral     cataract surgery  2019     SECTION      x 2    CHOLECYSTECTOMY      COLONOSCOPY N/A 2020    Procedure: COLONOSCOPY w/ biopsies;  Surgeon: Gio Georges MD;  Location: [facility];  Service: Endoscopy;  Laterality: N/A;    ESOPHAGEAL MANOMETRY WITH MEASUREMENT OF IMPEDANCE N/A 2023    Procedure: MANOMETRY, ESOPHAGUS, WITH IMPEDANCE MEASUREMENT;  Surgeon: Desmond Calderon RN;  Location: [facility] ENDO;  Service: Endoscopy;  Laterality: N/A;    ESOPHAGOGASTRODUODENOSCOPY N/A 2019    Procedure: ESOPHAGOGASTRODUODENOSCOPY (EGD);  Surgeon: Jaron Sanders III, MD;  Location: [facility];  Service: Endoscopy;  Laterality: N/A;    ESOPHAGOGASTRODUODENOSCOPY N/A 2022    Procedure: EGD (ESOPHAGOGASTRODUODENOSCOPY);  Surgeon: Keith Hardwick MD;  Location: [facility];  Service: Gastroenterology;  Laterality: N/A;    SELECTIVE INJECTION OF ANESTHETIC AGENT AROUND LUMBAR SPINAL NERVE ROOT BY TRANSFORAMINAL APPROACH Bilateral 2020    Procedure: Bilateral L5/S1 TF DENY;  Surgeon: Jewel Walters MD;  Location: [facility] PAIN MGT;  Service: Pain Management;  Laterality: Bilateral;    TUBAL LIGATION       Review of patient's allergies indicates:   Allergen Reactions    Compazine [prochlorperazine edisylate] Rash    Contrast media Anaphylaxis    Dye Anaphylaxis     Contrast dye    Iodinated contrast media Anaphylaxis     Other reaction(s): anaphylaxis    Levaquin [levofloxacin] Hives and Itching    Morphine Other (See Comments)     Irritable  Iritability  Irritable    Prochlorperazine Rash    Metformin Other (See Comments)     Upset stomach    Ibuprofen Other (See Comments) and Nausea And Vomiting     Stomach pain  Stomach  "pain    Abdominal pain     Methocarbamol Nausea Only     Current Outpatient Medications on File Prior to Visit   Medication Sig Dispense Refill    ADMELOG SOLOSTAR U-100 INSULIN 100 unit/mL pen       albuterol (VENTOLIN HFA) 90 mcg/actuation inhaler Inhale 2 puffs into the lungs every 6 (six) hours as needed for Wheezing. Rescue 18 g 11    ALPRAZolam (XANAX) 0.25 MG tablet Take 0.25 mg by mouth daily as needed.      ALPRAZolam (XANAX) 0.5 MG tablet Take 0.5 mg by mouth 2 (two) times daily as needed.      ascorbic acid (VITAMIN C) 100 MG tablet Take 100 mg by mouth once daily.      aspirin (ECOTRIN) 81 MG EC tablet Take 81 mg by mouth once daily.      atenoloL (TENORMIN) 25 MG tablet TAKE 1 TABLET (25 MG TOTAL) BY MOUTH ONCE DAILY. 30 tablet 11    atorvastatin (LIPITOR) 80 MG tablet TAKE 1 TABLET BY MOUTH ONCE DAILY. 30 tablet 11    azelastine (ASTELIN) 137 mcg (0.1 %) nasal spray       BD ULTRA-FINE AMERICA PEN NEEDLE 32 gauge x 5/32" Ndle Inject 1 each into the skin 3 (three) times daily.      clopidogreL (PLAVIX) 75 mg tablet TAKE 1 TABLET BY MOUTH ONCE DAILY 90 tablet 2    dapagliflozin (FARXIGA) 10 mg tablet Take 1 tablet (10 mg total) by mouth once daily. 30 tablet 6    diclofenac sodium (VOLTAREN) 1 % Gel Apply 2 g topically 3 (three) times daily. 1 Tube 2    dicyclomine (BENTYL) 20 mg tablet Take 1 tablet (20 mg total) by mouth 3 (three) times daily. 90 tablet 2    famotidine (PEPCID) 20 MG tablet Take 1 tablet (20 mg total) by mouth 2 (two) times daily. 60 tablet 11    FLUoxetine (PROZAC) 40 MG capsule TAKE 1 CAPSULE BY MOUTH ONCE DAILY 90 capsule 0    fluticasone propionate (FLONASE) 50 mcg/actuation nasal spray       gabapentin (NEURONTIN) 300 MG capsule Take 3 capsules (900 mg total) by mouth 3 (three) times daily. 270 capsule 11    glipiZIDE (GLUCOTROL) 10 MG tablet Take 1 tablet (10 mg total) by mouth 2 (two) times daily. 60 tablet 0    HYDROcodone-acetaminophen (NORCO) 5-325 mg per tablet Take 1 tablet by " "mouth every 6 (six) hours as needed for Pain. (Patient not taking: Reported on 5/2/2023) 8 tablet 0    insulin glargine,hum.rec.anlog (BASAGLAR KWIKPEN U-100 INSULIN SUBQ) Inject 40 Units into the skin 2 (two) times daily.      isosorbide mononitrate (IMDUR) 30 MG 24 hr tablet TAKE 1 TABLET (30 MG TOTAL) BY MOUTH ONCE DAILY. 30 tablet 11    lactulose (CHRONULAC) 20 gram/30 mL Soln Take 30 mLs (20 g total) by mouth 2 (two) times daily. (Patient not taking: Reported on 5/2/2023) 1800 mL 2    linaCLOtide (LINZESS) 145 mcg Cap capsule Take 1 capsule (145 mcg total) by mouth before breakfast. 30 capsule 2    liraglutide 0.6 mg/0.1 mL, 18 mg/3 mL, subq PNIJ (VICTOZA 2-DIAN) 0.6 mg/0.1 mL (18 mg/3 mL) PnIj Inject 1.2 Units into the skin.      lisinopril 10 MG tablet TAKE 1 TABLET (10 MG TOTAL) BY MOUTH ONCE DAILY. 30 tablet 3    NOVOLOG FLEXPEN U-100 INSULIN 100 unit/mL (3 mL) InPn pen       pantoprazole (PROTONIX) 40 MG tablet TAKE 1 TABLET BY MOUTH ONCE DAILY 30 tablet 1    PEN NEEDLE 31 gauge x 5/16" Ndle USE 5 TIMES DAILY WITH LANTUS AND HUMALOG 100 each 3    promethazine (PHENERGAN) 25 MG tablet       rimegepant 75 mg odt Take 1 tablet (75 mg total) by mouth as needed for Migraine (max dose 3 doses within 1 week). Place ODT tablet on the tongue; alternatively the ODT tablet may be placed under the tongue 8 tablet 5    tiZANidine (ZANAFLEX) 4 MG tablet TAKE 1 TO 1 AND 1/2 TABLETS (4 TO 6MG TOTAL) BY MOUTH NIGHTLY AS NEEDED. 45 tablet 5    TRUE METRIX GLUCOSE TEST STRIP Strp        Current Facility-Administered Medications on File Prior to Visit   Medication Dose Route Frequency Provider Last Rate Last Admin    sodium chloride 0.9% flush 10 mL  10 mL Intravenous PRN Brian Delgado MD         Social History     Socioeconomic History    Marital status: Single   Tobacco Use    Smoking status: Former     Packs/day: 1.00     Years: 24.00     Pack years: 24.00     Types: Cigarettes     Start date: 1/1/1996     Quit date: " "1/1/2020     Years since quitting: 3.3    Smokeless tobacco: Never    Tobacco comments:     "once in a blue moon"   Substance and Sexual Activity    Alcohol use: No     Alcohol/week: 0.0 standard drinks    Drug use: No    Sexual activity: Not Currently     Family History   Problem Relation Age of Onset    Heart disease Mother     Thrombophilia Father         DVT    Hypertension Father     Stroke Maternal Aunt     Heart disease Maternal Aunt     Stroke Maternal Uncle     Heart disease Maternal Uncle     Breast cancer Neg Hx     Colon cancer Neg Hx     Ovarian cancer Neg Hx        Review of Systems   Constitutional:  Negative for fever and fatigue.   HENT:  Negative for postnasal drip and rhinorrhea.    Eyes:  Negative for redness and itching.   Respiratory:  Negative for cough, shortness of breath, wheezing, dyspnea on extertion and Paroxysmal Nocturnal Dyspnea.    Cardiovascular:  Negative for chest pain.   Genitourinary:  Negative for difficulty urinating and hematuria.   Endocrine:  Negative for polyphagia, cold intolerance and heat intolerance.    Musculoskeletal:  Negative for arthralgias.   Skin:  Negative for rash.   Gastrointestinal:  Negative for nausea, vomiting, abdominal pain and abdominal distention.        Problems swallowing   Neurological:  Positive for weakness. Negative for dizziness and headaches.   Hematological:  Negative for adenopathy. Does not bruise/bleed easily and no excessive bruising.   Psychiatric/Behavioral:  The patient is not nervous/anxious.      Objective:      /64   Resp 18   Ht 5' 10" (1.778 m)   Wt 105.6 kg (232 lb 12.9 oz)   LMP  (LMP Unknown)   BMI 33.40 kg/m²   Physical Exam  Vitals and nursing note reviewed.   Constitutional:       Appearance: Normal appearance. She is well-developed.   HENT:      Head: Normocephalic and atraumatic.      Nose: Nose normal.   Eyes:      Conjunctiva/sclera: Conjunctivae normal.      Pupils: Pupils are equal, round, and reactive to " light.   Neck:      Thyroid: No thyromegaly.      Vascular: No JVD.      Trachea: No tracheal deviation.   Cardiovascular:      Rate and Rhythm: Normal rate and regular rhythm.      Heart sounds: Normal heart sounds.   Pulmonary:      Effort: Pulmonary effort is normal. No respiratory distress.      Breath sounds: Normal breath sounds. No wheezing or rales.   Chest:      Chest wall: No tenderness.   Abdominal:      General: Bowel sounds are normal.      Palpations: Abdomen is soft.   Musculoskeletal:         General: Normal range of motion.      Cervical back: Neck supple.   Lymphadenopathy:      Cervical: No cervical adenopathy.   Skin:     General: Skin is warm and dry.   Neurological:      Mental Status: She is alert and oriented to person, place, and time.      Coordination: Coordination abnormal.      Gait: Gait abnormal.      Deep Tendon Reflexes: Reflexes abnormal.     Personal Diagnostic Review  Slowly enlarging right lower lobe nodule    CT Renal Stone Study ABD Pelvis WO  Order: [id number]  Status: Final result     Visible to patient: Yes (seen)     Next appt: 05/17/2023 at 01:00 PM in Cardiology (JUNE Monique)     0 Result Notes  Details    Reading Physician Reading Date Result Priority   See Angeles MD  [phone] 4/13/2023 STAT     Narrative & Impression  EXAMINATION:  CT RENAL STONE STUDY ABD PELVIS WO     CLINICAL HISTORY:  Flank pain, kidney stone suspected;     TECHNIQUE:  Low dose axial images, sagittal and coronal reformations were obtained from the lung bases to the pubic symphysis.  Contrast was not administered.     COMPARISON:  None     FINDINGS:  Heart: Normal in size. No pericardial effusion.     Lung Bases: 14 mm nodule right lung base..     Liver: Hepatomegaly, with no focal hepatic lesions.     Gallbladder: Status post cholecystectomy.     Bile Ducts: No evidence of dilated ducts.     Pancreas: No mass or peripancreatic fat stranding.     Spleen: Unremarkable.     Adrenals:  Unremarkable.     Kidneys/ Ureters: Unremarkable.     Bladder: No evidence of wall thickening.     Reproductive organs: Unremarkable.     GI Tract/Mesentery: No evidence of bowel obstruction or inflammation.     Peritoneal Space: No ascites. No free air.     Retroperitoneum: No significant adenopathy.     Abdominal wall: Unremarkable.     Vasculature: No  aneurysm.  Bi femoral bypass.  Atherosclerotic changes.  Iliac stents     Bones: No acute fracture.     Impression:     14 mm nodule right lung base..     Hepatomegaly     Cholecystectomy     Atherosclerotic changes     Fem fem bypass.  Iliac stents     All CT scans   are performed using dose optimization techniques including the following: automated exposure control; adjustment of the mA and/or kV; use of iterative reconstruction technique.  Dose modulation was employed for ALARA by means of: Automated exposure control; adjustment of the mA and/or kV according to patient size (this includes techniques or standardized protocols for targeted exams where dose is matched to indication/reason for exam; i.e. extremities or head); and/or use of iterative reconstructive technique.        Electronically signed by: See Angeles  Date:                                            04/13/2023  Time:                                           20:36           Exam Ended: 04/13/23 20:05 Last Resulted: 04/13/23 20:36               Pulmonary Studies Review 5/10/2023   SpO2 -   Ordering Provider -   Interpreting Provider -   Performing nurse/tech/RT -   Diagnosis -   Height 70   Weight 3724.89   BMI (Calculated) 33.4   Predicted Distance 389.74   Patient Race -   6MWT Status -   Patient Reported -   Was O2 used? -   6MW Distance walked (feet) -   Distance walked (meters) -   Did patient stop? -   How many times? -   Stop Time 1 -   Restart Time 1 -   Stop Time 2 -   Restart Time 2 -   Did patient restart? -   Type of assistive device(s) used? -   Is extra documentation required for  this patient? -   Oxygen Saturation -   Supplemental Oxygen -   Heart Rate -   Blood Pressure -   [place]ord Dyspnea Rating  -   Oxygen Saturation -   Supplemental Oxygen -   Heart Rate -   Blood Pressure -   Clifford Dyspnea Rating  -   Recovery Time (seconds) -   Oxygen Saturation -   Supplemental Oxygen -   Heart Rate -   Blood Pressure -   Clifford Dyspnea Rating  -   Is procedure ready for interpretation? -   Did the patient stop or pause? -   How many times did the patient stop or pause? -   Stop Time 1 -   Restart Time 1 -   Pause Time 1 -   Stop Time 2 -   Restart Time 2 -   Pause Time 2 -   Total Time Walked (Calculated) -   Total Laps Walked -   Final Partial Lap Distance (feet) -   Total Distance Feet (Calculated) -   Total Distance Meters (Calculated) -   Predicted Distance Meters (Calculated) 522.89   Percentage of Predicted (Calculated) -   Peak VO2 (Calculated) -   Mets -   Has The Patient Had a Previous Six Minute Walk Test? -   Oxygen Qualification? -       X-Ray Chest PA And Lateral  Narrative: EXAMINATION:  XR CHEST PA AND LATERAL    CLINICAL HISTORY:  SOB; Shortness of breath    TECHNIQUE:  PA and lateral views of the chest were performed.    COMPARISON:  06/07/2018    FINDINGS:  The lungs are clear and free of infiltrate.  No pleural effusion or pneumothorax. The heart is not enlarged.  Impression: 1.  No acute cardiopulmonary process.    Electronically signed by: Bebo Vaughan DO  Date:    05/10/2023  Time:    11:43      Office Spirometry Results:     No flowsheet data found.  Pulmonary Studies Review 5/10/2023   SpO2 -   Ordering Provider -   Interpreting Provider -   Performing nurse/tech/RT -   Diagnosis -   Height 70   Weight 3724.89   BMI (Calculated) 33.4   Predicted Distance 389.74   Patient Race -   6MWT Status -   Patient Reported -   Was O2 used? -   6MW Distance walked (feet) -   Distance walked (meters) -   Did patient stop? -   How many times? -   Stop Time 1 -   Restart Time 1 -   Stop Time 2  -   Restart Time 2 -   Did patient restart? -   Type of assistive device(s) used? -   Is extra documentation required for this patient? -   Oxygen Saturation -   Supplemental Oxygen -   Heart Rate -   Blood Pressure -   Clifford Dyspnea Rating  -   Oxygen Saturation -   Supplemental Oxygen -   Heart Rate -   Blood Pressure -   Clifford Dyspnea Rating  -   Recovery Time (seconds) -   Oxygen Saturation -   Supplemental Oxygen -   Heart Rate -   Blood Pressure -   Clifford Dyspnea Rating  -   Is procedure ready for interpretation? -   Did the patient stop or pause? -   How many times did the patient stop or pause? -   Stop Time 1 -   Restart Time 1 -   Pause Time 1 -   Stop Time 2 -   Restart Time 2 -   Pause Time 2 -   Total Time Walked (Calculated) -   Total Laps Walked -   Final Partial Lap Distance (feet) -   Total Distance Feet (Calculated) -   Total Distance Meters (Calculated) -   Predicted Distance Meters (Calculated) 522.89   Percentage of Predicted (Calculated) -   Peak VO2 (Calculated) -   Mets -   Has The Patient Had a Previous Six Minute Walk Test? -   Oxygen Qualification? -       REVIEW OF OUTSIDE RECORDS:  Narrative    XR RIBS 2 VIEW RIGHT W PA CHEST     INDICATION: right-sided chest/rib pain,  fall     COMPARISON: Chest radiograph dated 3/27/2023.     FINDINGS:     AP view of the chest and 6 views of the right rib cage were obtained.     Support Devices: None.   Cardiovascular/Mediastinum: The cardiopericardial silhouette is within normal limits. The mediastinal and hilar structures are normal.   Pulmonary: No focal consolidation. No pneumothorax. No pleural effusion.   Musculoskeletal: No acute rib fracture. No suspicious osseous lesion.  Procedure Note    Hernando Gaspar MD - 04/04/2023   Formatting of this note might be different from the original.   XR RIBS 2 VIEW RIGHT W PA CHEST     INDICATION: right-sided chest/rib pain,  fall     COMPARISON: Chest radiograph dated 3/27/2023.     FINDINGS:     AP view of the  "chest and 6 views of the right rib cage were obtained.     Support Devices: None.   Cardiovascular/Mediastinum: The cardiopericardial silhouette is within normal limits. The mediastinal and hilar structures are normal.   Pulmonary: No focal consolidation. No pneumothorax. No pleural effusion.   Musculoskeletal: No acute rib fracture. No suspicious osseous lesion.     IMPRESSION:     No acute fracture or traumatic malalignment. No pneumothorax.  Exam End: 04/04/23 19:14 Last Resulted: 04/04/23 19:18   Received From: [facility]  Result Received: 04/11/23 08:54            Assessment:            Solid nodule of lung greater than 8 mm in diameter  -     NM PET CT Routine Skull to Mid Thigh; Future; Expected date: 05/10/2023    Nodule of lower lobe of right lung  -     NM PET CT Routine Skull to Mid Thigh; Future; Expected date: 05/10/2023    Solitary pulmonary nodule  -     NM PET CT Routine Skull to Mid Thigh; Future; Expected date: 05/10/2023          Outpatient Encounter Medications as of 5/10/2023   Medication Sig Dispense Refill    ADMELOG SOLOSTAR U-100 INSULIN 100 unit/mL pen       albuterol (VENTOLIN HFA) 90 mcg/actuation inhaler Inhale 2 puffs into the lungs every 6 (six) hours as needed for Wheezing. Rescue 18 g 11    ALPRAZolam (XANAX) 0.25 MG tablet Take 0.25 mg by mouth daily as needed.      ALPRAZolam (XANAX) 0.5 MG tablet Take 0.5 mg by mouth 2 (two) times daily as needed.      ascorbic acid (VITAMIN C) 100 MG tablet Take 100 mg by mouth once daily.      aspirin (ECOTRIN) 81 MG EC tablet Take 81 mg by mouth once daily.      atenoloL (TENORMIN) 25 MG tablet TAKE 1 TABLET (25 MG TOTAL) BY MOUTH ONCE DAILY. 30 tablet 11    atorvastatin (LIPITOR) 80 MG tablet TAKE 1 TABLET BY MOUTH ONCE DAILY. 30 tablet 11    azelastine (ASTELIN) 137 mcg (0.1 %) nasal spray       BD ULTRA-FINE [place] PEN NEEDLE 32 gauge x 5/32" Ndle Inject 1 each into the " skin 3 (three) times daily.      clopidogreL (PLAVIX) 75 mg tablet TAKE 1 TABLET BY MOUTH ONCE DAILY 90 tablet 2    dapagliflozin (FARXIGA) 10 mg tablet Take 1 tablet (10 mg total) by mouth once daily. 30 tablet 6    diclofenac sodium (VOLTAREN) 1 % Gel Apply 2 g topically 3 (three) times daily. 1 Tube 2    dicyclomine (BENTYL) 20 mg tablet Take 1 tablet (20 mg total) by mouth 3 (three) times daily. 90 tablet 2    famotidine (PEPCID) 20 MG tablet Take 1 tablet (20 mg total) by mouth 2 (two) times daily. 60 tablet 11    FLUoxetine (PROZAC) 40 MG capsule TAKE 1 CAPSULE BY MOUTH ONCE DAILY 90 capsule 0    fluticasone propionate (FLONASE) 50 mcg/actuation nasal spray       gabapentin (NEURONTIN) 300 MG capsule Take 3 capsules (900 mg total) by mouth 3 (three) times daily. 270 capsule 11    glipiZIDE (GLUCOTROL) 10 MG tablet Take 1 tablet (10 mg total) by mouth 2 (two) times daily. 60 tablet 0    HYDROcodone-acetaminophen (NORCO) 5-325 mg per tablet Take 1 tablet by mouth every 6 (six) hours as needed for Pain. (Patient not taking: Reported on 5/2/2023) 8 tablet 0    insulin glargine,hum.rec.anlog (BASAGLAR KWIKPEN U-100 INSULIN SUBQ) Inject 40 Units into the skin 2 (two) times daily.      isosorbide mononitrate (IMDUR) 30 MG 24 hr tablet TAKE 1 TABLET (30 MG TOTAL) BY MOUTH ONCE DAILY. 30 tablet 11    lactulose (CHRONULAC) 20 gram/30 mL Soln Take 30 mLs (20 g total) by mouth 2 (two) times daily. (Patient not taking: Reported on 5/2/2023) 1800 mL 2    linaCLOtide (LINZESS) 145 mcg Cap capsule Take 1 capsule (145 mcg total) by mouth before breakfast. 30 capsule 2    liraglutide 0.6 mg/0.1 mL, 18 mg/3 mL, subq PNIJ (VICTOZA 2-DIAN) 0.6 mg/0.1 mL (18 mg/3 mL) PnIj Inject 1.2 Units into the skin.      lisinopril 10 MG tablet TAKE 1 TABLET (10 MG TOTAL) BY MOUTH ONCE DAILY. 30 tablet 3    NOVOLOG FLEXPEN U-100 INSULIN 100 unit/mL (3 mL) InPn pen       pantoprazole (PROTONIX) 40 MG tablet TAKE 1 TABLET BY MOUTH ONCE DAILY 30  "tablet 1    PEN NEEDLE 31 gauge x 5/16" Ndle USE 5 TIMES DAILY WITH LANTUS AND HUMALOG 100 each 3    promethazine (PHENERGAN) 25 MG tablet       rimegepant 75 mg odt Take 1 tablet (75 mg total) by mouth as needed for Migraine (max dose 3 doses within 1 week). Place ODT tablet on the tongue; alternatively the ODT tablet may be placed under the tongue 8 tablet 5    tiZANidine (ZANAFLEX) 4 MG tablet TAKE 1 TO 1 AND 1/2 TABLETS (4 TO 6MG TOTAL) BY MOUTH NIGHTLY AS NEEDED. 45 tablet 5    TRUE METRIX GLUCOSE TEST STRIP Strp       [DISCONTINUED] isosorbide mononitrate (IMDUR) 30 MG 24 hr tablet Take 1 tablet (30 mg total) by mouth once daily. 30 tablet 11     Facility-Administered Encounter Medications as of 5/10/2023   Medication Dose Route Frequency Provider Last Rate Last Admin    sodium chloride 0.9% flush 10 mL  10 mL Intravenous PRN Brian Delgado MD         Plan:       Requested Prescriptions      No prescriptions requested or ordered in this encounter     Problem List Items Addressed This Visit       Solitary pulmonary nodule    Relevant Orders    NM PET CT Routine Skull to Mid Thigh     Other Visit Diagnoses       Solid nodule of lung greater than 8 mm in diameter    -  Primary    Relevant Orders    NM PET CT Routine Skull to Mid Thigh    Nodule of lower lobe of right lung        Relevant Orders    NM PET CT Routine Skull to Mid Thigh               Follow up in about 3 weeks (around 5/31/2023) for PET followed by office visit.    MEDICAL DECISION MAKING: Moderate to high complexity.  Overall, the multiple problems listed are of moderate to high severity that may impact quality of life and activities of daily living. Side effects of medications, treatment plan as well as options and alternatives reviewed and discussed with patient. There was counseling of patient concerning these issues.    Total time spent in counseling and coordination of care - 45  minutes of total time spent on the encounter, which includes " face to face time and non-face to face time preparing to see the patient (eg, review of tests), Obtaining and/or reviewing separately obtained history, Documenting clinical information in the electronic or other health record, Independently interpreting results (not separately reported) and communicating results to the patient/family/caregiver, or Care coordination (not separately reported).    Time was used in discussion of prognosis, risks, benefits of treatment, instructions and compliance with regimen . Discussion with other physicians and/or health care providers - home health or for use of durable medical equipment (oxygen, nebulizers, CPAP, BiPAP) occurred.

## 2023-05-11 ENCOUNTER — TELEPHONE (OUTPATIENT)
Dept: PAIN MEDICINE | Facility: CLINIC | Age: 48
End: 2023-05-11
Payer: MEDICAID

## 2023-05-11 DIAGNOSIS — E11.42 DIABETIC PERIPHERAL NEUROPATHY ASSOCIATED WITH TYPE 2 DIABETES MELLITUS: Primary | ICD-10-CM

## 2023-05-11 NOTE — TELEPHONE ENCOUNTER
Received message from Ortho about this patient needing f/u appointment for chronic neck pain. Patient has been dismissed from department and additionally is a Medicaid patient. I called and spoke to her about this, and did give her the Medicaid hotline to inquire about other pain med providers.  Mili Patrick RN

## 2023-05-15 ENCOUNTER — PATIENT MESSAGE (OUTPATIENT)
Dept: ORTHOPEDICS | Facility: CLINIC | Age: 48
End: 2023-05-15
Payer: MEDICAID

## 2023-05-17 ENCOUNTER — TELEPHONE (OUTPATIENT)
Dept: SPORTS MEDICINE | Facility: CLINIC | Age: 48
End: 2023-05-17
Payer: MEDICAID

## 2023-05-17 NOTE — TELEPHONE ENCOUNTER
Office visit notes from Dr. Jain faxed to [facility] at [phone] with successful fax confirmation email receipt.

## 2023-05-19 ENCOUNTER — HOSPITAL ENCOUNTER (OUTPATIENT)
Dept: RADIOLOGY | Facility: HOSPITAL | Age: 48
Discharge: HOME | End: 2023-05-19
Attending: INTERNAL MEDICINE
Payer: MEDICAID

## 2023-05-19 DIAGNOSIS — R91.1 NODULE OF LOWER LOBE OF RIGHT LUNG: ICD-10-CM

## 2023-05-19 DIAGNOSIS — R91.1 SOLITARY PULMONARY NODULE: ICD-10-CM

## 2023-05-19 DIAGNOSIS — R91.1 SOLID NODULE OF LUNG GREATER THAN 8 MM IN DIAMETER: ICD-10-CM

## 2023-05-19 PROCEDURE — 78815 NM PET CT ROUTINE: ICD-10-PCS | Mod: 26,PI,, | Performed by: RADIOLOGY

## 2023-05-19 PROCEDURE — 78815 PET IMAGE W/CT SKULL-THIGH: CPT | Mod: 26,PI,, | Performed by: RADIOLOGY

## 2023-05-19 PROCEDURE — A9552 F18 FDG: HCPCS

## 2023-05-22 ENCOUNTER — LAB VISIT (OUTPATIENT)
Dept: LAB | Facility: HOSPITAL | Age: 48
End: 2023-05-22
Attending: INTERNAL MEDICINE
Payer: MEDICAID

## 2023-05-22 ENCOUNTER — OFFICE VISIT (OUTPATIENT)
Dept: PULMONOLOGY | Facility: CLINIC | Age: 48
End: 2023-05-22
Payer: MEDICAID

## 2023-05-22 VITALS
DIASTOLIC BLOOD PRESSURE: 72 MMHG | RESPIRATION RATE: 16 BRPM | SYSTOLIC BLOOD PRESSURE: 108 MMHG | BODY MASS INDEX: 34.18 KG/M2 | WEIGHT: 238.75 LBS | HEIGHT: 70 IN | OXYGEN SATURATION: 98 % | HEART RATE: 79 BPM

## 2023-05-22 DIAGNOSIS — J98.4 CAVITARY LESION OF LUNG: ICD-10-CM

## 2023-05-22 DIAGNOSIS — R91.1 NODULE OF LOWER LOBE OF RIGHT LUNG: Primary | ICD-10-CM

## 2023-05-22 DIAGNOSIS — Z23 NEED FOR VACCINATION FOR PNEUMOCOCCUS: ICD-10-CM

## 2023-05-22 DIAGNOSIS — E11.9 DIABETES MELLITUS WITH INSULIN THERAPY: ICD-10-CM

## 2023-05-22 DIAGNOSIS — Z79.4 DIABETES MELLITUS WITH INSULIN THERAPY: ICD-10-CM

## 2023-05-22 DIAGNOSIS — R91.1 NODULE OF LOWER LOBE OF RIGHT LUNG: ICD-10-CM

## 2023-05-22 DIAGNOSIS — Z87.891 STOPPED SMOKING WITH GREATER THAN 25 PACK YEAR HISTORY: ICD-10-CM

## 2023-05-22 PROCEDURE — 86431 RHEUMATOID FACTOR QUANT: CPT | Performed by: INTERNAL MEDICINE

## 2023-05-22 PROCEDURE — 86606 ASPERGILLUS ANTIBODY: CPT | Performed by: INTERNAL MEDICINE

## 2023-05-22 PROCEDURE — 86698 HISTOPLASMA ANTIBODY: CPT | Performed by: INTERNAL MEDICINE

## 2023-05-22 PROCEDURE — 3078F PR MOST RECENT DIASTOLIC BLOOD PRESSURE < 80 MM HG: ICD-10-PCS | Mod: CPTII,,, | Performed by: INTERNAL MEDICINE

## 2023-05-22 PROCEDURE — 82164 ANGIOTENSIN I ENZYME TEST: CPT | Performed by: INTERNAL MEDICINE

## 2023-05-22 PROCEDURE — 86480 TB TEST CELL IMMUN MEASURE: CPT | Performed by: INTERNAL MEDICINE

## 2023-05-22 PROCEDURE — 3008F BODY MASS INDEX DOCD: CPT | Mod: CPTII,,, | Performed by: INTERNAL MEDICINE

## 2023-05-22 PROCEDURE — 99214 OFFICE O/P EST MOD 30 MIN: CPT | Mod: S$PBB,,, | Performed by: INTERNAL MEDICINE

## 2023-05-22 PROCEDURE — 3074F PR MOST RECENT SYSTOLIC BLOOD PRESSURE < 130 MM HG: ICD-10-PCS | Mod: CPTII,,, | Performed by: INTERNAL MEDICINE

## 2023-05-22 PROCEDURE — 99999 PR PBB SHADOW E&M-EST. PATIENT-LVL V: ICD-10-PCS | Mod: PBBFAC,,, | Performed by: INTERNAL MEDICINE

## 2023-05-22 PROCEDURE — 86200 CCP ANTIBODY: CPT | Performed by: INTERNAL MEDICINE

## 2023-05-22 PROCEDURE — 36415 COLL VENOUS BLD VENIPUNCTURE: CPT | Performed by: INTERNAL MEDICINE

## 2023-05-22 PROCEDURE — 3008F PR BODY MASS INDEX (BMI) DOCUMENTED: ICD-10-PCS | Mod: CPTII,,, | Performed by: INTERNAL MEDICINE

## 2023-05-22 PROCEDURE — 86036 ANCA SCREEN EACH ANTIBODY: CPT | Performed by: INTERNAL MEDICINE

## 2023-05-22 PROCEDURE — 99999 PR PBB SHADOW E&M-EST. PATIENT-LVL V: CPT | Mod: PBBFAC,,, | Performed by: INTERNAL MEDICINE

## 2023-05-22 PROCEDURE — 3074F SYST BP LT 130 MM HG: CPT | Mod: CPTII,,, | Performed by: INTERNAL MEDICINE

## 2023-05-22 PROCEDURE — 99214 PR OFFICE/OUTPT VISIT, EST, LEVL IV, 30-39 MIN: ICD-10-PCS | Mod: S$PBB,,, | Performed by: INTERNAL MEDICINE

## 2023-05-22 PROCEDURE — 90677 PCV20 VACCINE IM: CPT | Mod: PBBFAC

## 2023-05-22 PROCEDURE — 4010F ACE/ARB THERAPY RXD/TAKEN: CPT | Mod: CPTII,,, | Performed by: INTERNAL MEDICINE

## 2023-05-22 PROCEDURE — 99215 OFFICE O/P EST HI 40 MIN: CPT | Mod: PBBFAC | Performed by: INTERNAL MEDICINE

## 2023-05-22 PROCEDURE — 1159F PR MEDICATION LIST DOCUMENTED IN MEDICAL RECORD: ICD-10-PCS | Mod: CPTII,,, | Performed by: INTERNAL MEDICINE

## 2023-05-22 PROCEDURE — 3078F DIAST BP <80 MM HG: CPT | Mod: CPTII,,, | Performed by: INTERNAL MEDICINE

## 2023-05-22 PROCEDURE — 1159F MED LIST DOCD IN RCRD: CPT | Mod: CPTII,,, | Performed by: INTERNAL MEDICINE

## 2023-05-22 PROCEDURE — 4010F PR ACE/ARB THEARPY RXD/TAKEN: ICD-10-PCS | Mod: CPTII,,, | Performed by: INTERNAL MEDICINE

## 2023-05-22 RX ORDER — DULAGLUTIDE 0.75 MG/.5ML
INJECTION, SOLUTION SUBCUTANEOUS
Status: ON HOLD | COMMUNITY
Start: 2023-05-17 | End: 2024-01-22 | Stop reason: HOSPADM

## 2023-05-22 RX ORDER — SPIRONOLACTONE 50 MG/1
50 TABLET, FILM COATED ORAL 2 TIMES DAILY
COMMUNITY
Start: 2023-05-05 | End: 2023-06-08 | Stop reason: SDUPTHER

## 2023-05-22 RX ORDER — BUSPIRONE HYDROCHLORIDE 30 MG/1
30 TABLET ORAL 2 TIMES DAILY
COMMUNITY
Start: 2023-05-17

## 2023-05-22 NOTE — PROGRESS NOTES
Pulmonary Outpatient Follow Up Visit     Subjective:    This patient is new to me.  Previous records with colleagues including office visits, testing were personally reviewed.  Outside records under care everywhere if available that includes office visits and testing were reviewed personally as well.     Patient ID: Jenifer Westfall is a 47 y.o. female.    Chief Complaint: Review PET Scan       HPI      47-year-old female patient with past medical history of 25 pack year smoking quit on Chantix presenting today to discuss PET scan results.      She has a spiculated right lower lobe nodule that has been stable since 2021 but was not present on prior CT scan of the chest.      Denies personal history of cancer.  Does have arthritis.  Follows with Rheumatology.        Review of Systems   Constitutional:  Positive for fatigue. Negative for fever and chills.   HENT:  Negative for nosebleeds.    Eyes:  Negative for redness.   Respiratory:  Positive for dyspnea on extertion. Negative for choking.    Cardiovascular:  Positive for leg swelling.   Genitourinary:  Negative for hematuria.   Endocrine:  Negative for cold intolerance.    Musculoskeletal:  Positive for back pain and gait problem.   Gastrointestinal:  Negative for vomiting.   Neurological:  Negative for syncope.   Hematological:  Negative for adenopathy.   Psychiatric/Behavioral:  Negative for confusion.      Outpatient Encounter Medications as of 5/22/2023   Medication Sig Dispense Refill    ADMELOG SOLOSTAR U-100 INSULIN 100 unit/mL pen       albuterol (VENTOLIN HFA) 90 mcg/actuation inhaler Inhale 2 puffs into the lungs every 6 (six) hours as needed for Wheezing. Rescue 18 g 11    ALPRAZolam (XANAX) 0.25 MG tablet Take 0.25 mg by mouth daily as needed.      ALPRAZolam (XANAX) 0.5 MG tablet Take 0.5 mg by mouth 2 (two) times daily as needed.      ascorbic acid (VITAMIN C) 100 MG tablet Take 100 mg by mouth once daily.       "aspirin (ECOTRIN) 81 MG EC tablet Take 81 mg by mouth once daily.      atenoloL (TENORMIN) 25 MG tablet TAKE 1 TABLET (25 MG TOTAL) BY MOUTH ONCE DAILY. 30 tablet 11    atorvastatin (LIPITOR) 80 MG tablet TAKE 1 TABLET BY MOUTH ONCE DAILY. 30 tablet 11    azelastine (ASTELIN) 137 mcg (0.1 %) nasal spray       BD ULTRA-FINE [place] PEN NEEDLE 32 gauge x 5/32" Ndle Inject 1 each into the skin 3 (three) times daily.      busPIRone (BUSPAR) 30 MG Tab       clopidogreL (PLAVIX) 75 mg tablet TAKE 1 TABLET BY MOUTH ONCE DAILY 90 tablet 2    dapagliflozin (FARXIGA) 10 mg tablet Take 1 tablet (10 mg total) by mouth once daily. 30 tablet 6    diclofenac sodium (VOLTAREN) 1 % Gel Apply 2 g topically 3 (three) times daily. 1 Tube 2    dicyclomine (BENTYL) 20 mg tablet Take 1 tablet (20 mg total) by mouth 3 (three) times daily. 90 tablet 2    famotidine (PEPCID) 20 MG tablet Take 1 tablet (20 mg total) by mouth 2 (two) times daily. 60 tablet 11    FLUoxetine (PROZAC) 40 MG capsule TAKE 1 CAPSULE BY MOUTH ONCE DAILY 90 capsule 0    fluticasone propionate (FLONASE) 50 mcg/actuation nasal spray       gabapentin (NEURONTIN) 300 MG capsule Take 3 capsules (900 mg total) by mouth 3 (three) times daily. 270 capsule 11    glipiZIDE (GLUCOTROL) 10 MG tablet Take 1 tablet (10 mg total) by mouth 2 (two) times daily. 60 tablet 0    HYDROcodone-acetaminophen (NORCO) 5-325 mg per tablet Take 1 tablet by mouth every 6 (six) hours as needed for Pain. 8 tablet 0    isosorbide mononitrate (IMDUR) 30 MG 24 hr tablet TAKE 1 TABLET (30 MG TOTAL) BY MOUTH ONCE DAILY. 30 tablet 11    linaCLOtide (LINZESS) 145 mcg Cap capsule Take 1 capsule (145 mcg total) by mouth before breakfast. 30 capsule 2    lisinopril 10 MG tablet TAKE 1 TABLET (10 MG TOTAL) BY MOUTH ONCE DAILY. 30 tablet 3    NOVOLOG FLEXPEN U-100 INSULIN 100 unit/mL (3 mL) InPn pen       pantoprazole (PROTONIX) 40 MG tablet TAKE 1 TABLET BY MOUTH ONCE DAILY 30 tablet 1    PEN NEEDLE 31 gauge x " "" Ndle USE 5 TIMES DAILY WITH LANTUS AND HUMALOG 100 each 3    promethazine (PHENERGAN) 25 MG tablet       rimegepant 75 mg odt Take 1 tablet (75 mg total) by mouth as needed for Migraine (max dose 3 doses within 1 week). Place ODT tablet on the tongue; alternatively the ODT tablet may be placed under the tongue 8 tablet 5    spironolactone (ALDACTONE) 50 MG tablet Take 50 mg by mouth 2 (two) times daily.      tiZANidine (ZANAFLEX) 4 MG tablet TAKE 1 TO 1 AND 1/2 TABLETS (4 TO 6MG TOTAL) BY MOUTH NIGHTLY AS NEEDED. 45 tablet 5    TRUE METRIX GLUCOSE TEST STRIP Strp       TRULICITY 0.75 mg/0.5 mL pen injector       [] lactulose (CHRONULAC) 20 gram/30 mL Soln Take 30 mLs (20 g total) by mouth 2 (two) times daily. (Patient not taking: Reported on 2023) 1800 mL 2    [DISCONTINUED] insulin glargine,hum.rec.anlog (BASAGLAR KWIKPEN U-100 INSULIN SUBQ) Inject 40 Units into the skin 2 (two) times daily.      [DISCONTINUED] liraglutide 0.6 mg/0.1 mL, 18 mg/3 mL, subq PNIJ (VICTOZA 2-DIAN) 0.6 mg/0.1 mL (18 mg/3 mL) PnIj Inject 1.2 Units into the skin.       Facility-Administered Encounter Medications as of 2023   Medication Dose Route Frequency Provider Last Rate Last Admin    sodium chloride 0.9% flush 10 mL  10 mL Intravenous PRN Brian Delgado MD           Objective:     Vital Signs (Most Recent)  Vital Signs  Pulse: 79  Resp: 16  SpO2: 98 %  BP: 108/72  Height and Weight  Height: 5' 10" (177.8 cm)  Weight: 108.3 kg (238 lb 12.1 oz)  BSA (Calculated - sq m): 2.31 sq meters  BMI (Calculated): 34.3  Weight in (lb) to have BMI = 25: 173.9]  Wt Readings from Last 2 Encounters:   23 108.3 kg (238 lb 12.1 oz)   05/10/23 105.6 kg (232 lb 12.9 oz)       Physical Exam   Constitutional: She is oriented to person, place, and time. She appears well-developed and well-nourished. No distress.   HENT:   Head: Normocephalic.   Cardiovascular: Normal rate and regular rhythm.   Pulmonary/Chest: Normal expansion and " effort normal. No stridor. No respiratory distress. She has decreased breath sounds. She exhibits no tenderness.   Abdominal: She exhibits no distension.   Musculoskeletal:         General: No tenderness.      Cervical back: Neck supple.   Lymphadenopathy:     She has no cervical adenopathy.   Neurological: She is alert and oriented to person, place, and time. Gait abnormal.   Skin: Skin is warm.   Psychiatric: She has a normal mood and affect. Her behavior is normal. Judgment and thought content normal.   Nursing note and vitals reviewed.    Laboratory  Lab Results   Component Value Date    WBC 10.36 04/13/2023    RBC 4.96 04/13/2023    HGB 14.5 04/13/2023    HCT 44.3 04/13/2023    MCV 89 04/13/2023    MCH 29.2 04/13/2023    MCHC 32.7 04/13/2023    RDW 13.2 04/13/2023     04/13/2023    MPV 10.0 04/13/2023    GRAN 6.1 04/13/2023    GRAN 59.0 04/13/2023    LYMPH 3.4 04/13/2023    LYMPH 33.1 04/13/2023    MONO 0.6 04/13/2023    MONO 5.6 04/13/2023    EOS 0.2 04/13/2023    BASO 0.04 04/13/2023    EOSINOPHIL 1.5 04/13/2023    BASOPHIL 0.4 04/13/2023       BMP  Lab Results   Component Value Date     (L) 04/13/2023    K 4.5 04/13/2023     04/13/2023    CO2 16 (L) 04/13/2023    BUN 13 04/13/2023    CREATININE 1.2 04/13/2023    CALCIUM 9.4 04/13/2023    ANIONGAP 12 04/13/2023    ESTGFRAFRICA >60.0 06/01/2022    EGFRNONAA 59.9 (A) 06/01/2022    AST 18 04/13/2023    ALT 23 04/13/2023    PROT 8.0 04/13/2023       Lab Results   Component Value Date    BNP 22 04/24/2022    BNP <10 04/19/2022    BNP <10 06/07/2018    BNP 42 10/16/2016    BNP 39 09/05/2016       Lab Results   Component Value Date    TSH 0.849 10/07/2015       Lab Results   Component Value Date    SEDRATE 45 (H) 05/04/2022       Lab Results   Component Value Date    CRP 9.7 (H) 05/04/2022     No results found for: IGE     No results found for: ASPERGILLUS  No results found for: AFUMIGATUSCL     No results found for: ACE     Diagnostic Results:  I  have personally reviewed today the following studies:    CLINICAL HISTORY:  Lung nodule, > 8mm; Solitary pulmonary nodule.  Evaluate for malignancy.     TECHNIQUE:  11.58 mCi of F18-FDG was administered intravenously in the right antecubital vein.  After an approximately 60 min distribution time, PET/CT images were acquired from the skull base to the mid thigh. Transmission images were acquired to correct for attenuation using a whole body low-dose CT scan without contrast with the arms positioned above the head. Glycemia at the time of injection was 139 mg/dL.     COMPARISON:  CT examinations from 10/14/2018-4/13/2023     FINDINGS:  Neck: No abnormal foci of FDG uptake identified.     Chest: Stable right lower lobe pulmonary nodule is non FDG avid.  No hypermetabolic intrathoracic adenopathy.     Abdomen: No abnormal foci of FDG uptake identified.     Pelvis: No abnormal foci of FDG uptake identified.     Musculoskeletal: No abnormal foci of FDG uptake identified.     Impression:     Stable appearance of the right lower lobe pulmonary nodule which is non FDG avid.  Recommend continued surveillance.     Assessment/Plan:   Nodule of lower lobe of right lung  -     CT Chest Without Contrast; Future; Expected date: 08/22/2023  -     RHEUMATOID FACTOR; Future; Expected date: 05/22/2023  -     CYCLIC CITRUL PEPTIDE ANTIBODY, IGG; Future; Expected date: 05/22/2023  -     Anti-Neutrophilic Cytoplasmic Antibody; Future; Expected date: 05/22/2023  -     Angiotensin Converting Enzyme; Future; Expected date: 05/22/2023  -     FUNGAL IMMUNODIFFUSION - BLOOD; Future; Expected date: 05/22/2023  -     QUANTIFERON GOLD TB; Future; Expected date: 05/22/2023    Cavitary lesion of lung  -     CT Chest Without Contrast; Future; Expected date: 08/22/2023  -     RHEUMATOID FACTOR; Future; Expected date: 05/22/2023  -     CYCLIC CITRUL PEPTIDE ANTIBODY, IGG; Future; Expected date: 05/22/2023  -     Anti-Neutrophilic Cytoplasmic Antibody;  Future; Expected date: 05/22/2023  -     Angiotensin Converting Enzyme; Future; Expected date: 05/22/2023  -     FUNGAL IMMUNODIFFUSION - BLOOD; Future; Expected date: 05/22/2023  -     QUANTIFERON GOLD TB; Future; Expected date: 05/22/2023    Stopped smoking with greater than 25 pack year history  -     CT Chest Without Contrast; Future; Expected date: 08/22/2023    Need for vaccination for pneumococcus  -     (In Office Administered) Pneumococcal Conjugate Vaccine (20 Valent) (IM)    Diabetes mellitus with insulin therapy  -     (In Office Administered) Pneumococcal Conjugate Vaccine (20 Valent) (IM)      Right lung nodule has cavitary appearance.  Not present on prior CT scans stable since 2021 and PET negative.      Most likely non malignant but can not rule out malignancy completely.  Will repeat CT scan of the chest check serologic inflammatory/infectious workup.    Follow up in about 3 months (around 8/22/2023).    This note was prepared using voice recognition system and is likely to have sound alike errors that may have been overlooked even after proof reading.  Please call me with any questions    Discussed diagnosis, its evaluation, treatment and usual course. All questions answered.      Jennifer Oliva MD

## 2023-05-23 LAB
CCP AB SER IA-ACNC: <0.5 U/ML
GAMMA INTERFERON BACKGROUND BLD IA-ACNC: 0.02 IU/ML
M TB IFN-G CD4+ BCKGRND COR BLD-ACNC: 0.02 IU/ML
MITOGEN IGNF BCKGRD COR BLD-ACNC: 9.98 IU/ML
RHEUMATOID FACT SERPL-ACNC: <13 IU/ML (ref 0–15)
TB GOLD PLUS: NEGATIVE
TB2 - NIL: 0.01 IU/ML

## 2023-05-24 LAB — ACE SERPL-CCNC: 15 U/L (ref 16–85)

## 2023-05-26 LAB
ANCA AB TITR SER IF: NORMAL TITER
P-ANCA TITR SER IF: NORMAL TITER

## 2023-05-28 LAB
ASPERGILLUS AB SER QL ID: NOT DETECTED
B DERMAT AB SER QL ID: NOT DETECTED
C IMMITIS AB SER QL ID: NOT DETECTED
H CAPSUL AB SER QL ID: NOT DETECTED

## 2023-05-30 ENCOUNTER — TELEPHONE (OUTPATIENT)
Dept: CARDIOLOGY | Facility: CLINIC | Age: 48
End: 2023-05-30
Payer: MEDICAID

## 2023-06-01 ENCOUNTER — TELEPHONE (OUTPATIENT)
Dept: CARDIOLOGY | Facility: CLINIC | Age: 48
End: 2023-06-01
Payer: MEDICAID

## 2023-06-01 ENCOUNTER — NURSE TRIAGE (OUTPATIENT)
Dept: ADMINISTRATIVE | Facility: CLINIC | Age: 48
End: 2023-06-01
Payer: MEDICAID

## 2023-06-01 NOTE — TELEPHONE ENCOUNTER
Pt reports blood pressure of 106/42 and feeling as if she is lightheaded, and too weak to stand up.   Advised, per protocol. Verbalizes understanding.    Reason for Disposition   Shock suspected (e.g., cold/pale/clammy skin, too weak to stand, low BP, rapid pulse)    Additional Information   Negative: Systolic BP < 90 and feeling dizzy, lightheaded, or weak    Protocols used: Blood Pressure - Low-A-OH

## 2023-06-01 NOTE — TELEPHONE ENCOUNTER
----- Message from Karyn Dixon sent at 6/1/2023  2:55 PM CDT -----  Pt is requesting a call back immediately, she is short of breath and bp is running low. 106/42. She wants to know if she should go to the er [phone]

## 2023-06-01 NOTE — TELEPHONE ENCOUNTER
Patient contacted, spoke to the son of the patient. The patient is en route to the ER.      Previous message:  Pt is requesting a call back immediately, she is short of breath and bp is running low. 106/42. She wants to know if she should go to the er [phone]

## 2023-06-01 NOTE — TELEPHONE ENCOUNTER
Called pt and son- emergency contact - no answer -  could not leave message on either phone

## 2023-06-04 ENCOUNTER — PATIENT MESSAGE (OUTPATIENT)
Dept: GASTROENTEROLOGY | Facility: CLINIC | Age: 48
End: 2023-06-04
Payer: MEDICAID

## 2023-06-08 ENCOUNTER — OFFICE VISIT (OUTPATIENT)
Dept: CARDIOLOGY | Facility: CLINIC | Age: 48
End: 2023-06-08
Payer: MEDICAID

## 2023-06-08 VITALS
WEIGHT: 241.38 LBS | BODY MASS INDEX: 34.56 KG/M2 | DIASTOLIC BLOOD PRESSURE: 76 MMHG | RESPIRATION RATE: 16 BRPM | HEART RATE: 85 BPM | HEIGHT: 70 IN | SYSTOLIC BLOOD PRESSURE: 98 MMHG | OXYGEN SATURATION: 97 %

## 2023-06-08 DIAGNOSIS — Z79.4 TYPE 2 DIABETES MELLITUS WITH DIABETIC NEUROPATHY, WITH LONG-TERM CURRENT USE OF INSULIN: ICD-10-CM

## 2023-06-08 DIAGNOSIS — I10 PRIMARY HYPERTENSION: ICD-10-CM

## 2023-06-08 DIAGNOSIS — I95.1 ORTHOSTATIC HYPOTENSION: ICD-10-CM

## 2023-06-08 DIAGNOSIS — I73.9 PAD (PERIPHERAL ARTERY DISEASE): ICD-10-CM

## 2023-06-08 DIAGNOSIS — E66.01 SEVERE OBESITY (BMI 35.0-35.9 WITH COMORBIDITY): ICD-10-CM

## 2023-06-08 DIAGNOSIS — E11.40 TYPE 2 DIABETES MELLITUS WITH DIABETIC NEUROPATHY, WITH LONG-TERM CURRENT USE OF INSULIN: ICD-10-CM

## 2023-06-08 DIAGNOSIS — I34.1 MVP (MITRAL VALVE PROLAPSE): ICD-10-CM

## 2023-06-08 DIAGNOSIS — R06.09 DOE (DYSPNEA ON EXERTION): ICD-10-CM

## 2023-06-08 DIAGNOSIS — I27.20 PULMONARY HYPERTENSION: Primary | ICD-10-CM

## 2023-06-08 DIAGNOSIS — E78.2 MIXED HYPERLIPIDEMIA: ICD-10-CM

## 2023-06-08 PROCEDURE — 99999 PR PBB SHADOW E&M-EST. PATIENT-LVL V: CPT | Mod: PBBFAC,,, | Performed by: NURSE PRACTITIONER

## 2023-06-08 PROCEDURE — 3008F PR BODY MASS INDEX (BMI) DOCUMENTED: ICD-10-PCS | Mod: CPTII,,, | Performed by: NURSE PRACTITIONER

## 2023-06-08 PROCEDURE — 4010F ACE/ARB THERAPY RXD/TAKEN: CPT | Mod: CPTII,,, | Performed by: NURSE PRACTITIONER

## 2023-06-08 PROCEDURE — 3008F BODY MASS INDEX DOCD: CPT | Mod: CPTII,,, | Performed by: NURSE PRACTITIONER

## 2023-06-08 PROCEDURE — 99215 OFFICE O/P EST HI 40 MIN: CPT | Mod: PBBFAC | Performed by: NURSE PRACTITIONER

## 2023-06-08 PROCEDURE — 3078F DIAST BP <80 MM HG: CPT | Mod: CPTII,,, | Performed by: NURSE PRACTITIONER

## 2023-06-08 PROCEDURE — 3074F PR MOST RECENT SYSTOLIC BLOOD PRESSURE < 130 MM HG: ICD-10-PCS | Mod: CPTII,,, | Performed by: NURSE PRACTITIONER

## 2023-06-08 PROCEDURE — 3078F PR MOST RECENT DIASTOLIC BLOOD PRESSURE < 80 MM HG: ICD-10-PCS | Mod: CPTII,,, | Performed by: NURSE PRACTITIONER

## 2023-06-08 PROCEDURE — 1159F PR MEDICATION LIST DOCUMENTED IN MEDICAL RECORD: ICD-10-PCS | Mod: CPTII,,, | Performed by: NURSE PRACTITIONER

## 2023-06-08 PROCEDURE — 4010F PR ACE/ARB THEARPY RXD/TAKEN: ICD-10-PCS | Mod: CPTII,,, | Performed by: NURSE PRACTITIONER

## 2023-06-08 PROCEDURE — 3074F SYST BP LT 130 MM HG: CPT | Mod: CPTII,,, | Performed by: NURSE PRACTITIONER

## 2023-06-08 PROCEDURE — 99214 PR OFFICE/OUTPT VISIT, EST, LEVL IV, 30-39 MIN: ICD-10-PCS | Mod: S$PBB,,, | Performed by: NURSE PRACTITIONER

## 2023-06-08 PROCEDURE — 99999 PR PBB SHADOW E&M-EST. PATIENT-LVL V: ICD-10-PCS | Mod: PBBFAC,,, | Performed by: NURSE PRACTITIONER

## 2023-06-08 PROCEDURE — 99214 OFFICE O/P EST MOD 30 MIN: CPT | Mod: S$PBB,,, | Performed by: NURSE PRACTITIONER

## 2023-06-08 PROCEDURE — 1159F MED LIST DOCD IN RCRD: CPT | Mod: CPTII,,, | Performed by: NURSE PRACTITIONER

## 2023-06-08 RX ORDER — SPIRONOLACTONE 50 MG/1
50 TABLET, FILM COATED ORAL DAILY
Qty: 30 TABLET | Refills: 11 | Status: SHIPPED | OUTPATIENT
Start: 2023-06-08 | End: 2023-11-28 | Stop reason: SINTOL

## 2023-06-08 RX ORDER — ATORVASTATIN CALCIUM 80 MG/1
TABLET, FILM COATED ORAL
Qty: 30 TABLET | Refills: 11 | Status: SHIPPED | OUTPATIENT
Start: 2023-06-08

## 2023-06-08 NOTE — PROGRESS NOTES
Subjective:   Patient ID:  Jenifer Westfall is a 47 y.o. female who presents for evaluation of Follow-up        HPI    Jenifer Westfall is a 47 year old female who presents to Arrhythmia clinic for follow up. Her current medical conditions include HTN, HLP, MVP, DM Type II, IBS, PHTN. She returns today and states she is doing    She was recently seen at [facility] due to headaches with LH/Dizziness. She received IVF bolus 1 L, Reglan, Benadryl, Toradol and Fioricet prior to discharge.     She does endorse worsening in her anxiety around this time of year. Has been having ongoing issues with glucose control.     Denies chest pain or anginal equivalents. No shortness of breath, KAT or palpitations. Denies orthopnea, PND or abdominal bloating. Reports regular walking without any issues lately. She does endorse continued neuropathy pain to BLEs.  No recent falls, syncope or near syncopal events. Reports compliance with medications and dietary restrictions. NO CNS complaints to suggest TIA or CVA today. No signs of abnormal bleeding on ASA and Plavix.       6/8/2023 update    Jenifer Westfall returns for 6 month follow up.     She has been having issues with low BP recently over the past week or so.     CTA completed due to elevated D Dimer, no evidence of aneurysm or dissection. Stable spiculated partially cavitated groundglass nodular opacity RLL. She received 1L IVFs and continued to have recurrent orthostasis and received additional IVF's in ED.     She returns today and still has low BP episodes.     Denies chest pain or anginal equivalents today.   NO shortness of breath, KAT or palpitations. No leg swelling today on exam.   Denies any recent falls, syncope or near syncopal events. Reports compliance with medications and dietary restrictions. NO signs of abnormal bleeding on ASA and Plavix.         Past Medical History:   Diagnosis Date    Arthritis     DM (diabetes mellitus) 2009     03/01/2017 Insulin x 3  years     Hepatitis C antibody positive in blood 2021    RNA NEGATIVE 3/6/2017, 3/22/22    Hyperlipidemia     Hypertension     IBS (irritable bowel syndrome)     Kidney stone     Marfan syndrome     Mitral valve prolapse        Past Surgical History:   Procedure Laterality Date    ANGIOGRAPHY OF LOWER EXTREMITY N/A 2018    Procedure: ANGIOGRAM, LOWER EXTREMITY- LEFT LEG;  Surgeon: Roscoe Landeros MD;  Location: [facility] CATH LAB;  Service: Peripheral Vascular;  Laterality: N/A;    APPENDECTOMY      BUNIONECTOMY      both feet    BYPASS GRAFT Bilateral     cataract surgery  2019     SECTION      x 2    CHOLECYSTECTOMY      COLONOSCOPY N/A 2020    Procedure: COLONOSCOPY w/ biopsies;  Surgeon: Goi Georges MD;  Location: [facility] ENDO;  Service: Endoscopy;  Laterality: N/A;    ESOPHAGEAL MANOMETRY WITH MEASUREMENT OF IMPEDANCE N/A 2023    Procedure: MANOMETRY, ESOPHAGUS, WITH IMPEDANCE MEASUREMENT;  Surgeon: Desmond Calderon RN;  Location: [facility];  Service: Endoscopy;  Laterality: N/A;    ESOPHAGOGASTRODUODENOSCOPY N/A 2019    Procedure: ESOPHAGOGASTRODUODENOSCOPY (EGD);  Surgeon: Jaron Sanders III, MD;  Location: [facility];  Service: Endoscopy;  Laterality: N/A;    ESOPHAGOGASTRODUODENOSCOPY N/A 2022    Procedure: EGD (ESOPHAGOGASTRODUODENOSCOPY);  Surgeon: Keith Hardwick MD;  Location: [facility];  Service: Gastroenterology;  Laterality: N/A;    SELECTIVE INJECTION OF ANESTHETIC AGENT AROUND LUMBAR SPINAL NERVE ROOT BY TRANSFORAMINAL APPROACH Bilateral 2020    Procedure: Bilateral L5/S1 TF DENY;  Surgeon: Jewel Walters MD;  Location: [facility] PAIN MGT;  Service: Pain Management;  Laterality: Bilateral;    TUBAL LIGATION         Social History     Tobacco Use    Smoking status: Former     Packs/day: 1.00     Years: 24.00     Pack years: 24.00     Types: Cigarettes     Start date: 1996     Quit date: 2020     Years since quitting: 3.4    Smokeless  "tobacco: Never    Tobacco comments:     "once in a blue moon"   Substance Use Topics    Alcohol use: No     Alcohol/week: 0.0 standard drinks    Drug use: No       Family History   Problem Relation Age of Onset    Heart disease Mother     Thrombophilia Father         DVT    Hypertension Father     Stroke Maternal Aunt     Heart disease Maternal Aunt     Stroke Maternal Uncle     Heart disease Maternal Uncle     Breast cancer Neg Hx     Colon cancer Neg Hx     Ovarian cancer Neg Hx      Wt Readings from Last 3 Encounters:   06/08/23 109.5 kg (241 lb 6.5 oz)   05/22/23 108.3 kg (238 lb 12.1 oz)   05/10/23 105.6 kg (232 lb 12.9 oz)     Temp Readings from Last 3 Encounters:   04/13/23 99 °F (37.2 °C)   04/11/23 97.8 °F (36.6 °C) (Tympanic)   04/24/22 98.2 °F (36.8 °C) (Oral)     BP Readings from Last 3 Encounters:   06/08/23 98/76   05/22/23 108/72   05/10/23 124/64     Pulse Readings from Last 3 Encounters:   06/08/23 85   05/22/23 79   05/02/23 80     Current Outpatient Medications on File Prior to Visit   Medication Sig Dispense Refill    ADMELOG SOLOSTAR U-100 INSULIN 100 unit/mL pen       albuterol (VENTOLIN HFA) 90 mcg/actuation inhaler Inhale 2 puffs into the lungs every 6 (six) hours as needed for Wheezing. Rescue 18 g 11    ALPRAZolam (XANAX) 0.25 MG tablet Take 0.25 mg by mouth daily as needed.      ALPRAZolam (XANAX) 0.5 MG tablet Take 0.5 mg by mouth 2 (two) times daily as needed.      ascorbic acid (VITAMIN C) 100 MG tablet Take 100 mg by mouth once daily.      aspirin (ECOTRIN) 81 MG EC tablet Take 81 mg by mouth once daily.      atenoloL (TENORMIN) 25 MG tablet TAKE 1 TABLET (25 MG TOTAL) BY MOUTH ONCE DAILY. 30 tablet 11    azelastine (ASTELIN) 137 mcg (0.1 %) nasal spray       BD ULTRA-FINE [place] PEN NEEDLE 32 gauge x 5/32" Ndle Inject 1 each into the skin 3 (three) times daily.      busPIRone (BUSPAR) 30 MG Tab       clopidogreL (PLAVIX) 75 mg tablet TAKE 1 TABLET BY MOUTH ONCE DAILY 90 tablet 2    " "dapagliflozin (FARXIGA) 10 mg tablet Take 1 tablet (10 mg total) by mouth once daily. 30 tablet 6    diclofenac sodium (VOLTAREN) 1 % Gel Apply 2 g topically 3 (three) times daily. 1 Tube 2    dicyclomine (BENTYL) 20 mg tablet Take 1 tablet (20 mg total) by mouth 3 (three) times daily. 90 tablet 2    famotidine (PEPCID) 20 MG tablet Take 1 tablet (20 mg total) by mouth 2 (two) times daily. 60 tablet 11    FLUoxetine (PROZAC) 40 MG capsule TAKE 1 CAPSULE BY MOUTH ONCE DAILY 90 capsule 0    fluticasone propionate (FLONASE) 50 mcg/actuation nasal spray       gabapentin (NEURONTIN) 300 MG capsule Take 3 capsules (900 mg total) by mouth 3 (three) times daily. 270 capsule 11    glipiZIDE (GLUCOTROL) 10 MG tablet Take 1 tablet (10 mg total) by mouth 2 (two) times daily. 60 tablet 0    HYDROcodone-acetaminophen (NORCO) 5-325 mg per tablet Take 1 tablet by mouth every 6 (six) hours as needed for Pain. 8 tablet 0    isosorbide mononitrate (IMDUR) 30 MG 24 hr tablet TAKE 1 TABLET (30 MG TOTAL) BY MOUTH ONCE DAILY. 30 tablet 11    linaCLOtide (LINZESS) 145 mcg Cap capsule Take 1 capsule (145 mcg total) by mouth before breakfast. 30 capsule 2    lisinopril 10 MG tablet TAKE 1 TABLET (10 MG TOTAL) BY MOUTH ONCE DAILY. 30 tablet 3    NOVOLOG FLEXPEN U-100 INSULIN 100 unit/mL (3 mL) InPn pen       pantoprazole (PROTONIX) 40 MG tablet TAKE 1 TABLET BY MOUTH ONCE DAILY 30 tablet 1    PEN NEEDLE 31 gauge x 5/16" Ndle USE 5 TIMES DAILY WITH LANTUS AND HUMALOG 100 each 3    promethazine (PHENERGAN) 25 MG tablet       rimegepant 75 mg odt Take 1 tablet (75 mg total) by mouth as needed for Migraine (max dose 3 doses within 1 week). Place ODT tablet on the tongue; alternatively the ODT tablet may be placed under the tongue 8 tablet 5    tiZANidine (ZANAFLEX) 4 MG tablet TAKE 1 TO 1 AND 1/2 TABLETS (4 TO 6MG TOTAL) BY MOUTH NIGHTLY AS NEEDED. 45 tablet 5    TRUE METRIX GLUCOSE TEST STRIP Strp       TRULICITY 0.75 mg/0.5 mL pen injector    " "   [DISCONTINUED] spironolactone (ALDACTONE) 50 MG tablet Take 50 mg by mouth 2 (two) times daily.      [DISCONTINUED] atorvastatin (LIPITOR) 80 MG tablet TAKE 1 TABLET BY MOUTH ONCE DAILY. 30 tablet 11     Current Facility-Administered Medications on File Prior to Visit   Medication Dose Route Frequency Provider Last Rate Last Admin    sodium chloride 0.9% flush 10 mL  10 mL Intravenous PRN Brian Delgado MD           Review of Systems   Constitutional: Positive for malaise/fatigue.   HENT:  Negative for hearing loss and hoarse voice.    Eyes:  Negative for blurred vision and visual disturbance.   Cardiovascular:  Positive for dyspnea on exertion. Negative for chest pain, claudication, irregular heartbeat, leg swelling, near-syncope, orthopnea, palpitations, paroxysmal nocturnal dyspnea and syncope.   Respiratory:  Negative for cough, hemoptysis, shortness of breath, sleep disturbances due to breathing, snoring and wheezing.    Endocrine: Negative for cold intolerance and heat intolerance.   Hematologic/Lymphatic: Does not bruise/bleed easily.   Skin:  Negative for color change, dry skin and nail changes.   Musculoskeletal:  Positive for arthritis and back pain. Negative for joint pain and myalgias.   Gastrointestinal:  Negative for bloating, abdominal pain, constipation, nausea and vomiting.   Genitourinary:  Negative for dysuria, flank pain, hematuria and hesitancy.   Neurological:  Positive for numbness and paresthesias. Negative for headaches, light-headedness, loss of balance and weakness.   Psychiatric/Behavioral:  Negative for altered mental status.    Allergic/Immunologic: Negative for environmental allergies.     Objective:BP 98/76   Pulse 85   Resp 16   Ht 5' 10" (1.778 m)   Wt 109.5 kg (241 lb 6.5 oz)   SpO2 97%   BMI 34.64 kg/m²      Physical Exam  Vitals and nursing note reviewed.   Constitutional:       General: She is not in acute distress.     Appearance: Normal appearance. She is " well-developed. She is not ill-appearing.   HENT:      Head: Normocephalic and atraumatic.      Nose: Nose normal.      Mouth/Throat:      Mouth: Mucous membranes are moist.   Eyes:      Pupils: Pupils are equal, round, and reactive to light.   Neck:      Thyroid: No thyromegaly.      Vascular: No JVD.      Trachea: No tracheal deviation.   Cardiovascular:      Rate and Rhythm: Normal rate and regular rhythm.      Chest Wall: PMI is not displaced.      Pulses: Intact distal pulses.           Radial pulses are 2+ on the right side and 2+ on the left side.        Dorsalis pedis pulses are 2+ on the right side and 2+ on the left side.      Heart sounds: S1 normal and S2 normal. Heart sounds not distant. No murmur heard.  Pulmonary:      Effort: Pulmonary effort is normal. No respiratory distress.      Breath sounds: Normal breath sounds. No wheezing.   Abdominal:      General: Bowel sounds are normal. There is no distension.      Palpations: Abdomen is soft.      Tenderness: There is no abdominal tenderness.   Musculoskeletal:         General: No swelling. Normal range of motion.      Cervical back: Full passive range of motion without pain, normal range of motion and neck supple.      Right ankle: No swelling.      Left ankle: No swelling.   Skin:     General: Skin is warm and dry.      Capillary Refill: Capillary refill takes less than 2 seconds.      Nails: There is no clubbing.   Neurological:      General: No focal deficit present.      Mental Status: She is alert and oriented to person, place, and time.      Motor: Weakness present.   Psychiatric:         Speech: Speech normal.         Behavior: Behavior normal.         Thought Content: Thought content normal.         Judgment: Judgment normal.       Lab Results   Component Value Date    CHOL 183 10/09/2020    CHOL 199 05/29/2019    CHOL 164 02/03/2017     Lab Results   Component Value Date    HDL 27 (L) 10/09/2020    HDL 32 (L) 05/29/2019    HDL 30 (L)  02/03/2017     Lab Results   Component Value Date    LDLCALC 112.6 10/09/2020    LDLCALC 108.0 05/29/2019    LDLCALC 103.8 02/03/2017     Lab Results   Component Value Date    TRIG 217 (H) 10/09/2020    TRIG 295 (H) 05/29/2019    TRIG 151 (H) 02/03/2017     Lab Results   Component Value Date    CHOLHDL 14.8 (L) 10/09/2020    CHOLHDL 16.1 (L) 05/29/2019    CHOLHDL 18.3 (L) 02/03/2017       Chemistry        Component Value Date/Time     (L) 04/13/2023 1746    K 4.5 04/13/2023 1746     04/13/2023 1746    CO2 16 (L) 04/13/2023 1746    BUN 13 04/13/2023 1746    CREATININE 1.2 04/13/2023 1746     (HH) 04/13/2023 1746        Component Value Date/Time    CALCIUM 9.4 04/13/2023 1746    ALKPHOS 199 (H) 04/13/2023 1746    AST 18 04/13/2023 1746    ALT 23 04/13/2023 1746    BILITOT 0.3 04/13/2023 1746    ESTGFRAFRICA >60.0 06/01/2022 1541    EGFRNONAA 59.9 (A) 06/01/2022 1541          Lab Results   Component Value Date    TSH 0.849 10/07/2015     Lab Results   Component Value Date    INR 0.9 06/01/2022    INR 0.9 03/06/2017    INR 0.9 10/16/2016     Lab Results   Component Value Date    WBC 10.36 04/13/2023    HGB 14.5 04/13/2023    HCT 44.3 04/13/2023    MCV 89 04/13/2023     04/13/2023     Results for orders placed during the hospital encounter of 05/13/22    Echo    Interpretation Summary  · The left ventricle is normal in size with concentric remodeling and normal systolic function.  · The estimated ejection fraction is 55%.  · Normal left ventricular diastolic function.  · Normal right ventricular size with normal right ventricular systolic function.  · Normal central venous pressure (3 mmHg).       Assessment:      1. Pulmonary hypertension    2. PAD (peripheral artery disease)    3. MVP (mitral valve prolapse)    4. Primary hypertension    5. Mixed hyperlipidemia    6. KAT (dyspnea on exertion)    7. Orthostatic hypotension    8. Type 2 diabetes mellitus with diabetic neuropathy, with long-term  current use of insulin    9. Severe obesity (BMI 35.0-35.9 with comorbidity)          Plan:     Decrease Aldactone to 50 mg daily  Continue all other CV medications  Dash diet 2 gm sodium restriction  Encourage daily walking as tolerated  Send BP log in 2 weeks to review  RTC in 6 months or sooner if needed.     Nicole May, FNP-C [facility]

## 2023-06-09 ENCOUNTER — PATIENT MESSAGE (OUTPATIENT)
Dept: GASTROENTEROLOGY | Facility: CLINIC | Age: 48
End: 2023-06-09
Payer: MEDICAID

## 2023-06-09 ENCOUNTER — TELEPHONE (OUTPATIENT)
Dept: GASTROENTEROLOGY | Facility: CLINIC | Age: 48
End: 2023-06-09
Payer: MEDICAID

## 2023-06-09 RX ORDER — OMEPRAZOLE 40 MG/1
40 CAPSULE, DELAYED RELEASE ORAL DAILY
Qty: 30 CAPSULE | Refills: 11 | Status: SHIPPED | OUTPATIENT
Start: 2023-06-09 | End: 2024-06-08

## 2023-06-12 ENCOUNTER — PATIENT MESSAGE (OUTPATIENT)
Dept: GASTROENTEROLOGY | Facility: CLINIC | Age: 48
End: 2023-06-12
Payer: MEDICAID

## 2023-06-14 DIAGNOSIS — Z79.4 TYPE 2 DIABETES MELLITUS WITH DIABETIC PERIPHERAL ANGIOPATHY WITHOUT GANGRENE, WITH LONG-TERM CURRENT USE OF INSULIN: ICD-10-CM

## 2023-06-14 DIAGNOSIS — E11.51 TYPE 2 DIABETES MELLITUS WITH DIABETIC PERIPHERAL ANGIOPATHY WITHOUT GANGRENE, WITH LONG-TERM CURRENT USE OF INSULIN: ICD-10-CM

## 2023-06-14 RX ORDER — DAPAGLIFLOZIN 10 MG/1
10 TABLET, FILM COATED ORAL DAILY
Qty: 30 TABLET | Refills: 6 | Status: SHIPPED | OUTPATIENT
Start: 2023-06-14

## 2023-06-21 DIAGNOSIS — R20.2 NUMBNESS AND TINGLING: ICD-10-CM

## 2023-06-21 DIAGNOSIS — R20.0 NUMBNESS AND TINGLING: ICD-10-CM

## 2023-06-21 RX ORDER — GABAPENTIN 300 MG/1
900 CAPSULE ORAL 3 TIMES DAILY
Qty: 270 CAPSULE | Refills: 11 | Status: SHIPPED | OUTPATIENT
Start: 2023-06-21

## 2023-06-21 NOTE — TELEPHONE ENCOUNTER
----- Message from Shadi Bee MA sent at 6/21/2023 11:57 AM CDT -----  Contact: Jenifer @ [phone]  Requesting an RX refill or new RX.    RX name and strength: gabapentin (NEURONTIN) 300 MG capsule    Is this a refill or new RX: Refill    Is this a 30 day or 90 day RX: N/A    Pharmacy name and phone:     [facility] [place] - [address] [facility] [address]  [address] [address] [address] [address]  [place] [address]  Phone: [phone] Fax: [phone]        Want to know if the prescription can be sent today since she has a ride to  all her other prescriptions from the pharmacy today.

## 2023-07-07 ENCOUNTER — PATIENT MESSAGE (OUTPATIENT)
Dept: INFECTIOUS DISEASES | Facility: CLINIC | Age: 48
End: 2023-07-07
Payer: MEDICAID

## 2023-07-09 ENCOUNTER — PATIENT MESSAGE (OUTPATIENT)
Dept: GASTROENTEROLOGY | Facility: CLINIC | Age: 48
End: 2023-07-09
Payer: MEDICAID

## 2023-07-21 ENCOUNTER — PATIENT MESSAGE (OUTPATIENT)
Dept: ORTHOPEDICS | Facility: CLINIC | Age: 48
End: 2023-07-21
Payer: MEDICAID

## 2023-08-15 ENCOUNTER — PATIENT MESSAGE (OUTPATIENT)
Dept: PULMONOLOGY | Facility: CLINIC | Age: 48
End: 2023-08-15
Payer: MEDICAID

## 2023-08-15 ENCOUNTER — PATIENT MESSAGE (OUTPATIENT)
Dept: GASTROENTEROLOGY | Facility: CLINIC | Age: 48
End: 2023-08-15
Payer: MEDICAID

## 2023-08-15 DIAGNOSIS — J44.1 COPD EXACERBATION: Primary | ICD-10-CM

## 2023-08-15 RX ORDER — DOXYCYCLINE 100 MG/1
100 CAPSULE ORAL EVERY 12 HOURS
Qty: 20 CAPSULE | Refills: 0 | Status: ON HOLD | OUTPATIENT
Start: 2023-08-15 | End: 2024-01-22 | Stop reason: HOSPADM

## 2023-08-15 RX ORDER — METHYLPREDNISOLONE 4 MG/1
TABLET ORAL
Qty: 1 EACH | Refills: 0 | Status: SHIPPED | OUTPATIENT
Start: 2023-08-15 | End: 2023-11-28 | Stop reason: ALTCHOICE

## 2023-08-29 ENCOUNTER — HOSPITAL ENCOUNTER (OUTPATIENT)
Dept: RADIOLOGY | Facility: HOSPITAL | Age: 48
Discharge: HOME | End: 2023-08-29
Attending: INTERNAL MEDICINE
Payer: MEDICAID

## 2023-08-29 ENCOUNTER — OFFICE VISIT (OUTPATIENT)
Dept: PULMONOLOGY | Facility: CLINIC | Age: 48
End: 2023-08-29
Payer: MEDICAID

## 2023-08-29 ENCOUNTER — HOSPITAL ENCOUNTER (EMERGENCY)
Facility: HOSPITAL | Age: 48
Discharge: HOME | End: 2023-08-30
Attending: EMERGENCY MEDICINE
Payer: MEDICAID

## 2023-08-29 VITALS
SYSTOLIC BLOOD PRESSURE: 100 MMHG | DIASTOLIC BLOOD PRESSURE: 88 MMHG | OXYGEN SATURATION: 98 % | HEIGHT: 70 IN | HEART RATE: 81 BPM | BODY MASS INDEX: 32.21 KG/M2 | RESPIRATION RATE: 22 BRPM | WEIGHT: 225 LBS

## 2023-08-29 VITALS
HEART RATE: 91 BPM | DIASTOLIC BLOOD PRESSURE: 58 MMHG | TEMPERATURE: 99 F | RESPIRATION RATE: 20 BRPM | OXYGEN SATURATION: 95 % | SYSTOLIC BLOOD PRESSURE: 123 MMHG

## 2023-08-29 DIAGNOSIS — Z87.891 FORMER SMOKER: ICD-10-CM

## 2023-08-29 DIAGNOSIS — R06.02 SOB (SHORTNESS OF BREATH): Primary | ICD-10-CM

## 2023-08-29 DIAGNOSIS — J98.4 CAVITARY LESION OF LUNG: ICD-10-CM

## 2023-08-29 DIAGNOSIS — R91.1 NODULE OF LOWER LOBE OF RIGHT LUNG: ICD-10-CM

## 2023-08-29 DIAGNOSIS — R91.1 SOLITARY PULMONARY NODULE: Primary | ICD-10-CM

## 2023-08-29 DIAGNOSIS — R07.89 ATYPICAL CHEST PAIN: ICD-10-CM

## 2023-08-29 DIAGNOSIS — Z87.891 STOPPED SMOKING WITH GREATER THAN 25 PACK YEAR HISTORY: ICD-10-CM

## 2023-08-29 DIAGNOSIS — R06.02 SHORTNESS OF BREATH: ICD-10-CM

## 2023-08-29 DIAGNOSIS — K21.9 GASTROESOPHAGEAL REFLUX DISEASE WITHOUT ESOPHAGITIS: ICD-10-CM

## 2023-08-29 LAB
ALBUMIN SERPL BCP-MCNC: 3.6 G/DL (ref 3.5–5.2)
ALP SERPL-CCNC: 147 U/L (ref 55–135)
ALT SERPL W/O P-5'-P-CCNC: 29 U/L (ref 10–44)
ANION GAP SERPL CALC-SCNC: 12 MMOL/L (ref 8–16)
AST SERPL-CCNC: 25 U/L (ref 10–40)
BASOPHILS # BLD AUTO: 0.05 K/UL (ref 0–0.2)
BASOPHILS NFR BLD: 0.4 % (ref 0–1.9)
BILIRUB SERPL-MCNC: 0.3 MG/DL (ref 0.1–1)
BNP SERPL-MCNC: <10 PG/ML (ref 0–99)
BUN SERPL-MCNC: 9 MG/DL (ref 6–20)
CALCIUM SERPL-MCNC: 8.7 MG/DL (ref 8.7–10.5)
CHLORIDE SERPL-SCNC: 106 MMOL/L (ref 95–110)
CO2 SERPL-SCNC: 18 MMOL/L (ref 23–29)
CREAT SERPL-MCNC: 1 MG/DL (ref 0.5–1.4)
DIFFERENTIAL METHOD: ABNORMAL
EOSINOPHIL # BLD AUTO: 0.2 K/UL (ref 0–0.5)
EOSINOPHIL NFR BLD: 1.2 % (ref 0–8)
ERYTHROCYTE [DISTWIDTH] IN BLOOD BY AUTOMATED COUNT: 13.3 % (ref 11.5–14.5)
EST. GFR  (NO RACE VARIABLE): >60 ML/MIN/1.73 M^2
GLUCOSE SERPL-MCNC: 256 MG/DL (ref 70–110)
HCT VFR BLD AUTO: 42.8 % (ref 37–48.5)
HGB BLD-MCNC: 13.9 G/DL (ref 12–16)
IMM GRANULOCYTES # BLD AUTO: 0.05 K/UL (ref 0–0.04)
IMM GRANULOCYTES NFR BLD AUTO: 0.4 % (ref 0–0.5)
INFLUENZA A, MOLECULAR: NEGATIVE
INFLUENZA B, MOLECULAR: NEGATIVE
LYMPHOCYTES # BLD AUTO: 3.7 K/UL (ref 1–4.8)
LYMPHOCYTES NFR BLD: 25.8 % (ref 18–48)
MCH RBC QN AUTO: 30.8 PG (ref 27–31)
MCHC RBC AUTO-ENTMCNC: 32.5 G/DL (ref 32–36)
MCV RBC AUTO: 95 FL (ref 82–98)
MONOCYTES # BLD AUTO: 0.9 K/UL (ref 0.3–1)
MONOCYTES NFR BLD: 6.2 % (ref 4–15)
NEUTROPHILS # BLD AUTO: 9.3 K/UL (ref 1.8–7.7)
NEUTROPHILS NFR BLD: 66 % (ref 38–73)
NRBC BLD-RTO: 0 /100 WBC
PLATELET # BLD AUTO: 275 K/UL (ref 150–450)
PMV BLD AUTO: 9.1 FL (ref 9.2–12.9)
POTASSIUM SERPL-SCNC: 4 MMOL/L (ref 3.5–5.1)
PROT SERPL-MCNC: 7.3 G/DL (ref 6–8.4)
RBC # BLD AUTO: 4.52 M/UL (ref 4–5.4)
SARS-COV-2 RDRP RESP QL NAA+PROBE: NEGATIVE
SODIUM SERPL-SCNC: 136 MMOL/L (ref 136–145)
SPECIMEN SOURCE: NORMAL
TROPONIN I SERPL DL<=0.01 NG/ML-MCNC: <0.006 NG/ML (ref 0–0.03)
WBC # BLD AUTO: 14.12 K/UL (ref 3.9–12.7)

## 2023-08-29 PROCEDURE — 25000003 PHARM REV CODE 250: Performed by: EMERGENCY MEDICINE

## 2023-08-29 PROCEDURE — 4010F PR ACE/ARB THEARPY RXD/TAKEN: ICD-10-PCS | Mod: CPTII,,, | Performed by: PHYSICIAN ASSISTANT

## 2023-08-29 PROCEDURE — 3079F PR MOST RECENT DIASTOLIC BLOOD PRESSURE 80-89 MM HG: ICD-10-PCS | Mod: CPTII,,, | Performed by: PHYSICIAN ASSISTANT

## 2023-08-29 PROCEDURE — 4010F ACE/ARB THERAPY RXD/TAKEN: CPT | Mod: CPTII,,, | Performed by: PHYSICIAN ASSISTANT

## 2023-08-29 PROCEDURE — 87502 INFLUENZA DNA AMP PROBE: CPT | Performed by: EMERGENCY MEDICINE

## 2023-08-29 PROCEDURE — 84484 ASSAY OF TROPONIN QUANT: CPT | Performed by: REGISTERED NURSE

## 2023-08-29 PROCEDURE — 96360 HYDRATION IV INFUSION INIT: CPT

## 2023-08-29 PROCEDURE — 1160F RVW MEDS BY RX/DR IN RCRD: CPT | Mod: CPTII,,, | Performed by: PHYSICIAN ASSISTANT

## 2023-08-29 PROCEDURE — 1160F PR REVIEW ALL MEDS BY PRESCRIBER/CLIN PHARMACIST DOCUMENTED: ICD-10-PCS | Mod: CPTII,,, | Performed by: PHYSICIAN ASSISTANT

## 2023-08-29 PROCEDURE — 3079F DIAST BP 80-89 MM HG: CPT | Mod: CPTII,,, | Performed by: PHYSICIAN ASSISTANT

## 2023-08-29 PROCEDURE — 83880 ASSAY OF NATRIURETIC PEPTIDE: CPT | Performed by: REGISTERED NURSE

## 2023-08-29 PROCEDURE — 93005 ELECTROCARDIOGRAM TRACING: CPT

## 2023-08-29 PROCEDURE — 71250 CT THORAX DX C-: CPT | Mod: 26,,, | Performed by: RADIOLOGY

## 2023-08-29 PROCEDURE — 3074F SYST BP LT 130 MM HG: CPT | Mod: CPTII,,, | Performed by: PHYSICIAN ASSISTANT

## 2023-08-29 PROCEDURE — 99999 PR PBB SHADOW E&M-EST. PATIENT-LVL V: CPT | Mod: PBBFAC,,, | Performed by: PHYSICIAN ASSISTANT

## 2023-08-29 PROCEDURE — 3074F PR MOST RECENT SYSTOLIC BLOOD PRESSURE < 130 MM HG: ICD-10-PCS | Mod: CPTII,,, | Performed by: PHYSICIAN ASSISTANT

## 2023-08-29 PROCEDURE — 1159F PR MEDICATION LIST DOCUMENTED IN MEDICAL RECORD: ICD-10-PCS | Mod: CPTII,,, | Performed by: PHYSICIAN ASSISTANT

## 2023-08-29 PROCEDURE — 93010 ELECTROCARDIOGRAM REPORT: CPT | Mod: ,,, | Performed by: STUDENT IN AN ORGANIZED HEALTH CARE EDUCATION/TRAINING PROGRAM

## 2023-08-29 PROCEDURE — 99215 OFFICE O/P EST HI 40 MIN: CPT | Mod: PBBFAC,25 | Performed by: PHYSICIAN ASSISTANT

## 2023-08-29 PROCEDURE — U0002 COVID-19 LAB TEST NON-CDC: HCPCS | Performed by: EMERGENCY MEDICINE

## 2023-08-29 PROCEDURE — 1159F MED LIST DOCD IN RCRD: CPT | Mod: CPTII,,, | Performed by: PHYSICIAN ASSISTANT

## 2023-08-29 PROCEDURE — 3008F PR BODY MASS INDEX (BMI) DOCUMENTED: ICD-10-PCS | Mod: CPTII,,, | Performed by: PHYSICIAN ASSISTANT

## 2023-08-29 PROCEDURE — 99214 OFFICE O/P EST MOD 30 MIN: CPT | Mod: S$PBB,,, | Performed by: PHYSICIAN ASSISTANT

## 2023-08-29 PROCEDURE — 80053 COMPREHEN METABOLIC PANEL: CPT | Performed by: REGISTERED NURSE

## 2023-08-29 PROCEDURE — 99214 PR OFFICE/OUTPT VISIT, EST, LEVL IV, 30-39 MIN: ICD-10-PCS | Mod: S$PBB,,, | Performed by: PHYSICIAN ASSISTANT

## 2023-08-29 PROCEDURE — 71250 CT THORAX DX C-: CPT | Mod: TC

## 2023-08-29 PROCEDURE — 71250 CT CHEST WITHOUT CONTRAST: ICD-10-PCS | Mod: 26,,, | Performed by: RADIOLOGY

## 2023-08-29 PROCEDURE — 93010 EKG 12-LEAD: ICD-10-PCS | Mod: ,,, | Performed by: STUDENT IN AN ORGANIZED HEALTH CARE EDUCATION/TRAINING PROGRAM

## 2023-08-29 PROCEDURE — 99999 PR PBB SHADOW E&M-EST. PATIENT-LVL V: ICD-10-PCS | Mod: PBBFAC,,, | Performed by: PHYSICIAN ASSISTANT

## 2023-08-29 PROCEDURE — 3008F BODY MASS INDEX DOCD: CPT | Mod: CPTII,,, | Performed by: PHYSICIAN ASSISTANT

## 2023-08-29 PROCEDURE — 99285 EMERGENCY DEPT VISIT HI MDM: CPT | Mod: 25,27

## 2023-08-29 PROCEDURE — 85025 COMPLETE CBC W/AUTO DIFF WBC: CPT | Performed by: REGISTERED NURSE

## 2023-08-29 RX ORDER — ASPIRIN 325 MG
325 TABLET ORAL
Status: COMPLETED | OUTPATIENT
Start: 2023-08-29 | End: 2023-08-29

## 2023-08-29 RX ORDER — MOMETASONE FUROATE 1 MG/ML
SOLUTION TOPICAL
COMMUNITY
Start: 2023-08-16

## 2023-08-29 RX ORDER — LIDOCAINE HYDROCHLORIDE 20 MG/ML
15 SOLUTION OROPHARYNGEAL ONCE
Status: DISCONTINUED | OUTPATIENT
Start: 2023-08-29 | End: 2023-08-29 | Stop reason: RX

## 2023-08-29 RX ORDER — TRAMADOL HYDROCHLORIDE 50 MG/1
50 TABLET ORAL EVERY 6 HOURS PRN
Status: ON HOLD | COMMUNITY
Start: 2023-08-10 | End: 2024-01-22 | Stop reason: HOSPADM

## 2023-08-29 RX ORDER — DOXYCYCLINE 100 MG/1
100 CAPSULE ORAL
COMMUNITY
Start: 2023-08-22 | End: 2023-09-01

## 2023-08-29 RX ORDER — INSULIN GLARGINE 100 [IU]/ML
40 INJECTION, SOLUTION SUBCUTANEOUS 2 TIMES DAILY
COMMUNITY
Start: 2023-08-23

## 2023-08-29 RX ORDER — MAG HYDROX/ALUMINUM HYD/SIMETH 200-200-20
30 SUSPENSION, ORAL (FINAL DOSE FORM) ORAL ONCE
Status: DISCONTINUED | OUTPATIENT
Start: 2023-08-29 | End: 2023-08-30 | Stop reason: HOSPADM

## 2023-08-29 RX ADMIN — SODIUM CHLORIDE 1000 ML: 9 INJECTION, SOLUTION INTRAVENOUS at 06:08

## 2023-08-29 RX ADMIN — ASPIRIN 325 MG ORAL TABLET 325 MG: 325 PILL ORAL at 06:08

## 2023-08-29 NOTE — ED PROVIDER NOTES
"SCRIBE #1 NOTE: I, Luis Fernando Hastings, am scribing for, and in the presence of, David Tamayo Jr., MD. I have scribed the HPI, ROS, and PEx.     SCRIBE #2 NOTE: I, Alejo Levy, am scribing for, and in the presence of,  Martha Man Do, MD. I have scribed the remaining portions of the note not scribed by Scribe #1.     History      Chief Complaint   Patient presents with    Shortness of Breath     Onset last Tuesday, seen at clinic today and sent here for eval. Dx with "lung infection" on 8/25 and rx abx. Pt is labored in triage worse with talking/exertion        Review of patient's allergies indicates:   Allergen Reactions    Compazine [prochlorperazine edisylate] Rash    Contrast media Anaphylaxis    Dye Anaphylaxis     Contrast dye    Iodinated contrast media Anaphylaxis     Other reaction(s): anaphylaxis    Levaquin [levofloxacin] Hives and Itching    Morphine Other (See Comments)     Irritable  Iritability  Irritable    Prochlorperazine Rash    Metformin Other (See Comments)     Upset stomach    Ibuprofen Other (See Comments) and Nausea And Vomiting     Stomach pain  Stomach pain    Abdominal pain     Methocarbamol Nausea Only        HPI   HPI    8/29/2023, 5:50 PM   History obtained from the patient      History of Present Illness: Jenifer Westfall is a 48 y.o. female patient with a PMHx of DM who presents to the Emergency Department for SOB, onset 1 week PTA. Symptoms are constant and moderate in severity. Sxs are worse with exertion. Associated sxs include cough and chest pain when coughing. Patient denies any fever, chills, n/v/d, weakness, numbness, dizziness, headache, and all other sxs at this time. Pt presented to [facility] ED on 8/25 for similar sxs, and states that she was started on abx to treat a "lung infection." No further complaints or concerns at this time.     Arrival mode: Personal vehicle    PCP: Kareen Moctezuma       Past Medical History:  Past Medical History:   Diagnosis Date    Arthritis     DM " (diabetes mellitus)      2017 Insulin x 3 years     Hepatitis C antibody positive in blood 2021    RNA NEGATIVE 3/6/2017, 3/22/22    Hyperlipidemia     Hypertension     IBS (irritable bowel syndrome)     Kidney stone     Marfan syndrome     Mitral valve prolapse        Past Surgical History:  Past Surgical History:   Procedure Laterality Date    ANGIOGRAPHY OF LOWER EXTREMITY N/A 2018    Procedure: ANGIOGRAM, LOWER EXTREMITY- LEFT LEG;  Surgeon: Roscoe Landeros MD;  Location: [facility] CATH LAB;  Service: Peripheral Vascular;  Laterality: N/A;    APPENDECTOMY      BUNIONECTOMY      both feet    BYPASS GRAFT Bilateral     cataract surgery  2019     SECTION      x 2    CHOLECYSTECTOMY      COLONOSCOPY N/A 2020    Procedure: COLONOSCOPY w/ biopsies;  Surgeon: Gio Georges MD;  Location: [facility] ENDO;  Service: Endoscopy;  Laterality: N/A;    ESOPHAGEAL MANOMETRY WITH MEASUREMENT OF IMPEDANCE N/A 2023    Procedure: MANOMETRY, ESOPHAGUS, WITH IMPEDANCE MEASUREMENT;  Surgeon: Desmond Calderon RN;  [facility];  Service: Endoscopy;  Laterality: N/A;    ESOPHAGOGASTRODUODENOSCOPY N/A 2019    Procedure: ESOPHAGOGASTRODUODENOSCOPY (EGD);  Surgeon: Jaron Sanders III, MD;  Location: [facility];  Service: Endoscopy;  Laterality: N/A;    ESOPHAGOGASTRODUODENOSCOPY N/A 2022    Procedure: EGD (ESOPHAGOGASTRODUODENOSCOPY);  Surgeon: Keith Hardwick MD;  Location: [facility];  Service: Gastroenterology;  Laterality: N/A;    SELECTIVE INJECTION OF ANESTHETIC AGENT AROUND LUMBAR SPINAL NERVE ROOT BY TRANSFORAMINAL APPROACH Bilateral 2020    Procedure: Bilateral L5/S1 TF DENY;  Surgeon: Jewel Walters MD;  Location: [facility] PAIN MGT;  Service: Pain Management;  Laterality: Bilateral;    TUBAL LIGATION           Family History:  Family History   Problem Relation Age of Onset    Heart disease Mother     Thrombophilia Father         DVT    Hypertension Father      "Stroke Maternal Aunt     Heart disease Maternal Aunt     Stroke Maternal Uncle     Heart disease Maternal Uncle     Breast cancer Neg Hx     Colon cancer Neg Hx     Ovarian cancer Neg Hx        Social History:  Social History     Tobacco Use    Smoking status: Former     Current packs/day: 0.00     Average packs/day: 1 pack/day for 24.0 years (24.0 ttl pk-yrs)     Types: Cigarettes     Start date: 1/1/1996     Quit date: 1/1/2020     Years since quitting: 3.6    Smokeless tobacco: Never    Tobacco comments:     "once in a blue moon"   Substance and Sexual Activity    Alcohol use: No     Alcohol/week: 0.0 standard drinks of alcohol    Drug use: No    Sexual activity: Not Currently       ROS   Review of Systems   Constitutional:  Negative for chills and fever.   HENT:  Negative for sore throat.    Respiratory:  Positive for cough and shortness of breath.    Cardiovascular:  Positive for chest pain (when coughing).   Gastrointestinal:  Negative for diarrhea, nausea and vomiting.   Genitourinary:  Negative for dysuria.   Musculoskeletal:  Negative for back pain.   Skin:  Negative for rash.   Neurological:  Negative for dizziness, weakness, numbness and headaches.   Hematological:  Does not bruise/bleed easily.   All other systems reviewed and are negative.    Physical Exam      Initial Vitals [08/29/23 1519]   BP Pulse Resp Temp SpO2   (!) 107/43 85 (!) 26 98.9 °F (37.2 °C) 96 %      MAP       --          Physical Exam  Nursing Notes and Vital Signs Reviewed.  Constitutional: Patient is in no acute distress. Well-developed and well-nourished.  Head: Atraumatic. Normocephalic.  Eyes: PERRL. EOM intact. Conjunctivae are not pale. No scleral icterus.  ENT: Mucous membranes are moist. Oropharynx is clear and symmetric.    Neck: Supple. Full ROM.   Cardiovascular: Regular rate. Regular rhythm. No murmurs, rubs, or gallops. Distal pulses are 2+ and symmetric.  Pulmonary/Chest: No respiratory distress. Clear to auscultation " bilaterally. No wheezing or rales.  Abdominal: Soft and non-distended.  There is no tenderness.  No rebound, guarding, or rigidity.  Musculoskeletal: Moves all extremities. No obvious deformities. No edema.  Skin: Warm and dry.  Neurological:  Alert, awake, and appropriate.  Normal speech.  No acute focal neurological deficits are appreciated.  Psychiatric: Normal affect. Good eye contact. Appropriate in content.    ED Course    Procedures  ED Vital Signs:  Vitals:    08/29/23 1519 08/29/23 1730 08/29/23 1830 08/29/23 1845   BP: (!) 107/43 (!) 140/76 (!) 159/67    Pulse: 85 83 84 84   Resp: (!) 26 (!) 25 (!) 28    Temp: 98.9 °F (37.2 °C)      TempSrc: Oral      SpO2: 96% 99% 99%     08/29/23 1900 08/29/23 1930 08/29/23 2100 08/29/23 2212   BP: (!) 168/77 (!) 165/73  (!) 118/55   Pulse:  81 83 88   Resp:  20 17 20   Temp:       TempSrc:       SpO2:  97% 98% 97%    08/29/23 2300   BP: (!) 123/58   Pulse: 91   Resp: 20   Temp:    TempSrc:    SpO2: 95%       Abnormal Lab Results:  Labs Reviewed   CBC W/ AUTO DIFFERENTIAL - Abnormal; Notable for the following components:       Result Value    WBC 14.12 (*)     MPV 9.1 (*)     Gran # (ANC) 9.3 (*)     Immature Grans (Abs) 0.05 (*)     All other components within normal limits   COMPREHENSIVE METABOLIC PANEL - Abnormal; Notable for the following components:    CO2 18 (*)     Glucose 256 (*)     Alkaline Phosphatase 147 (*)     All other components within normal limits   INFLUENZA A & B BY MOLECULAR   B-TYPE NATRIURETIC PEPTIDE   TROPONIN I   SARS-COV-2 RNA AMPLIFICATION, QUAL        All Lab Results:  Results for orders placed or performed during the hospital encounter of 08/29/23   Influenza A & B by Molecular    Specimen: Nasopharyngeal Swab   Result Value Ref Range    Influenza A, Molecular Negative Negative    Influenza B, Molecular Negative Negative    Flu A & B Source Nasal swab    CBC auto differential   Result Value Ref Range    WBC 14.12 (H) 3.90 - 12.70 K/uL     RBC 4.52 4.00 - 5.40 M/uL    Hemoglobin 13.9 12.0 - 16.0 g/dL    Hematocrit 42.8 37.0 - 48.5 %    MCV 95 82 - 98 fL    MCH 30.8 27.0 - 31.0 pg    MCHC 32.5 32.0 - 36.0 g/dL    RDW 13.3 11.5 - 14.5 %    Platelets 275 150 - 450 K/uL    MPV 9.1 (L) 9.2 - 12.9 fL    Immature Granulocytes 0.4 0.0 - 0.5 %    Gran # (ANC) 9.3 (H) 1.8 - 7.7 K/uL    Immature Grans (Abs) 0.05 (H) 0.00 - 0.04 K/uL    Lymph # 3.7 1.0 - 4.8 K/uL    Mono # 0.9 0.3 - 1.0 K/uL    Eos # 0.2 0.0 - 0.5 K/uL    Baso # 0.05 0.00 - 0.20 K/uL    nRBC 0 0 /100 WBC    Gran % 66.0 38.0 - 73.0 %    Lymph % 25.8 18.0 - 48.0 %    Mono % 6.2 4.0 - 15.0 %    Eosinophil % 1.2 0.0 - 8.0 %    Basophil % 0.4 0.0 - 1.9 %    Differential Method Automated    Comprehensive metabolic panel   Result Value Ref Range    Sodium 136 136 - 145 mmol/L    Potassium 4.0 3.5 - 5.1 mmol/L    Chloride 106 95 - 110 mmol/L    CO2 18 (L) 23 - 29 mmol/L    Glucose 256 (H) 70 - 110 mg/dL    BUN 9 6 - 20 mg/dL    Creatinine 1.0 0.5 - 1.4 mg/dL    Calcium 8.7 8.7 - 10.5 mg/dL    Total Protein 7.3 6.0 - 8.4 g/dL    Albumin 3.6 3.5 - 5.2 g/dL    Total Bilirubin 0.3 0.1 - 1.0 mg/dL    Alkaline Phosphatase 147 (H) 55 - 135 U/L    AST 25 10 - 40 U/L    ALT 29 10 - 44 U/L    eGFR >60 >60 mL/min/1.73 m^2    Anion Gap 12 8 - 16 mmol/L   Brain natriuretic peptide   Result Value Ref Range    BNP <10 0 - 99 pg/mL   Troponin I   Result Value Ref Range    Troponin I <0.006 0.000 - 0.026 ng/mL   COVID-19 Rapid Screening   Result Value Ref Range    SARS-CoV-2 RNA, Amplification, Qual Negative Negative     *Note: Due to a large number of results and/or encounters for the requested time period, some results have not been displayed. A complete set of results can be found in Results Review.     Imaging Results:  Imaging Results              X-Ray Chest AP Portable (Final result)  Result time 08/29/23 23:52:38      Final result by See Angeles MD (08/29/23 23:52:38)                   Impression:      No  acute abnormality.      Electronically signed by: See Angeles  Date:    08/29/2023  Time:    23:52               Narrative:    EXAMINATION:  XR CHEST AP PORTABLE    CLINICAL HISTORY:  chest pain;    TECHNIQUE:  Single frontal view of the chest was performed.    COMPARISON:  None    FINDINGS:  Mild interstitial opacities in the lung bases likely related to subsegmental atelectasis or scarring.No pleural effusion or pneumothorax.    The cardiac silhouette is normal in size. The hilar and mediastinal contours are unremarkable.    Bones are intact.                                       NM Lung Scan Ventilation Perfusion (Final result)  Result time 08/29/23 20:41:08      Final result by See Angeles MD (08/29/23 20:41:08)                   Impression:      This represents a low probability  of pulmonary embolism.      Electronically signed by: See Angeles  Date:    08/29/2023  Time:    20:41               Narrative:    EXAMINATION:  NM LUNG VENTILATION AND PERFUSION IMAGING    CLINICAL HISTORY:  Pulmonary embolism (PE) suspected, high prob;    TECHNIQUE:  1 mCi of Tc-99m-DTPA were placed in the nebulizer. Following the inhalation Tc-99m-DTPA in aerosol and the subsequent IV administration of 5.3 mCi of Tc-99m-MAA, multiple images of the thorax were obtained in various projections.    COMPARISON:  None.    FINDINGS:  Perfusion: Negative.    Ventilation: Negative.    CXR see recent chest CT with nodule in the right lower lobe.  Otherwise clear..                                     The EKG was ordered, reviewed, and independently interpreted by the ED provider.  Interpretation time: 15:24  Rate: 83 BPM  Rhythm: normal sinus rhythm  Interpretation: Prolonged QT. No STEMI.           The Emergency Provider reviewed the vital signs and test results, which are outlined above.    ED Discussion     8:00 PM: Dr. Tamayo transfers care of patient to Dr. Thornton pending imaging results.    11:09 PM: Reassessed pt at this time.  Labs within normal limits including CMP except bicarb. BMP and CBC are nml. VQ scan shows low probability of PE. Pt has full relief of CP which she describes as burning with GI cocktail. She states she usually takes Prilosec and pepsid. Discussed with pt all pertinent ED information and results. Discussed pt dx and plan of tx. Gave pt all f/u and return to the ED instructions. All questions and concerns were addressed at this time. Pt expresses understanding of information and instructions, and is comfortable with plan to discharge. Pt is stable for discharge.    I discussed with patient and/or family/caretaker that evaluation in the ED does not suggest any emergent or life threatening medical conditions requiring immediate intervention beyond what was provided in the ED, and I believe patient is safe for discharge.  Regardless, an unremarkable evaluation in the ED does not preclude the development or presence of a serious of life threatening condition. As such, patient was instructed to return immediately for any worsening or change in current symptoms.           ED Medication(s):  Medications   aluminum-magnesium hydroxide-simethicone 200-200-20 mg/5 mL suspension 30 mL (30 mLs Oral Not Given 8/29/23 2245)   sodium chloride 0.9% bolus 1,000 mL 1,000 mL (0 mLs Intravenous Stopped 8/29/23 1906)   aspirin tablet 325 mg (325 mg Oral Given 8/29/23 1805)       New Prescriptions    No medications on file     Medical Decision Making    Medical Decision Making  PATIENT WITH SHORTNESS BREATH PRIOR TO ARRIVAL AND WITH BURNING CHEST PAIN    Amount and/or Complexity of Data Reviewed  Labs: ordered. Decision-making details documented in ED Course.  Radiology: ordered. Decision-making details documented in ED Course.  ECG/medicine tests: ordered and independent interpretation performed. Decision-making details documented in ED Course.  Discussion of management or test interpretation with external provider(s): Labs within normal  limits including CMP except midly low bicarb. BMP and CBC were nml.   VQ scan shows low probability of PE.   Pt has full relief of CP which she describes as burning with GI cocktail. She states she usually takes Prilosec and pepcid at home.   Cardiac workup within normal limits patient felt comfortable going home.  X-ray in the emergency room did not show any pneumonia or new pathology.    Risk  OTC drugs.  Risk Details: Differential diagnoses:  Pneumonia, Congestive heart failure, Cardiac tamponade, Acute Myocardial infarction, Pleural effusion, Pulmonary edema, Pulmonary embolism, Electrolyte imbalance, Infectious etiology, pneumothorax, Anemia, Deconditioning, bronchospasm, COPD exacerbation, Asthma exacerbation, Pneumothorax, upper airway obstruction such: Aspiration, Foreign body                Scribe Attestation:   Scribe #1: I performed the above scribed service and the documentation accurately describes the services I performed. I attest to the accuracy of the note.    Attending:   Physician Attestation Statement for Scribe #1: I, David Tamayo Jr., MD, personally performed the services described in this documentation, as scribed by Luis Fernando Hastings, in my presence, and it is both accurate and complete.       Scribe Attestation:   Scribe #2: I performed the above scribed service and the documentation accurately describes the services I performed. I attest to the accuracy of the note.    Attending Attestation:           Physician Attestation for Scribe:    Physician Attestation Statement for Scribe #2: I, Martha Man Do, MD, reviewed documentation, as scribed by Alejo Levy in my presence, and it is both accurate and complete. I also acknowledge and confirm the content of the note done by Scribe #1.          Clinical Impression       ICD-10-CM ICD-9-CM   1. SOB (shortness of breath)  R06.02 786.05   2. Shortness of breath  R06.02 786.05   3. Gastroesophageal reflux disease without esophagitis  K21.9 530.81        Disposition:   Disposition: Discharged  Condition: Stable         Martha Thornton MD  08/30/23 9082

## 2023-08-29 NOTE — FIRST PROVIDER EVALUATION
Medical screening examination initiated.  I have conducted a focused provider triage encounter, findings are as follows:    Brief history of present illness:  Shortness of breath    Vitals:    08/29/23 1519   BP: (!) 107/43   BP Location: Right arm   Patient Position: Sitting   Pulse: 85   Resp: (!) 26   Temp: 98.9 °F (37.2 °C)   TempSrc: Oral   SpO2: 96%       Pertinent physical exam:  Vital signs stable, tachypneic, patient alert and oriented    Brief workup plan:  Workup    Preliminary workup initiated; this workup will be continued and followed by the physician or advanced practice provider that is assigned to the patient when roomed.

## 2023-08-29 NOTE — PROGRESS NOTES
Pulmonary Outpatient Follow Up Visit     Subjective:        Patient ID: Jenifer Westfall is a 48 y.o. female.    Chief Complaint: Pulmonary Nodules    8/29/23  Here for follow up of lung nodule  Recently started with worsening cough, chest pain, SOB  Went to ER 8/22, WBC 11; started doxycycline and medrol, developed nausea and vomiting, went back to ER 8/25, WBC 14, no new medications started, instructed to come to apt today  She complaints of worsening SOB and chest pain, not relived with albuterol, she notes no relief when she had neb treatment in ER  In office today clutching chest in pain, unable to complete sentences due to shortness of breath    5/2023 Dr. Oliva:    47-year-old female patient with past medical history of 25 pack year smoking quit on Chantix presenting today to discuss PET scan results.      She has a spiculated right lower lobe nodule that has been stable since 2021 but was not present on prior CT scan of the chest.      Denies personal history of cancer.  Does have arthritis.  Follows with Rheumatology.        Review of Systems   Constitutional:  Positive for fatigue. Negative for fever and chills.   HENT:  Negative for nosebleeds.    Eyes:  Negative for redness.   Respiratory:  Positive for cough, shortness of breath, dyspnea on extertion and use of rescue inhaler. Negative for choking.    Cardiovascular:  Positive for leg swelling.   Genitourinary:  Negative for hematuria.   Endocrine:  Negative for cold intolerance.    Musculoskeletal:  Positive for back pain and gait problem.   Gastrointestinal:  Positive for nausea and vomiting.   Neurological:  Negative for syncope.   Hematological:  Negative for adenopathy.   Psychiatric/Behavioral:  Negative for confusion.        Outpatient Encounter Medications as of 8/29/2023   Medication Sig Dispense Refill    ADMELOG SOLOSTAR U-100 INSULIN 100 unit/mL pen       albuterol (VENTOLIN HFA) 90 mcg/actuation  Health Maintenance Summary     Topic Due On Due Status Completed On Postpone Until Reason    Immunization-Zoster Jul 25, 1990 Postponed  Dec 1, 2017 Patient Refused    Immunization - Pneumococcal Jul 25, 1995 Overdue       Medicare Wellness Visit Jul 25, 1995 Postponed  Dec 1, 2017 Patient Refused    IMMUNIZATION - DTaP/Tdap/Td Jul 25, 1949 Postponed  Dec 1, 2017 Patient Refused    Immunization-Influenza  Completed Oct 2, 2017       Jul 25, 1942 Overdue             Patient is due for topics as listed above, she wishes to discuss with provider .      "inhaler Inhale 2 puffs into the lungs every 6 (six) hours as needed for Wheezing. Rescue 18 g 11    ascorbic acid (VITAMIN C) 100 MG tablet Take 100 mg by mouth once daily.      aspirin (ECOTRIN) 81 MG EC tablet Take 81 mg by mouth once daily.      atenoloL (TENORMIN) 25 MG tablet TAKE 1 TABLET (25 MG TOTAL) BY MOUTH ONCE DAILY. 30 tablet 11    atorvastatin (LIPITOR) 80 MG tablet TAKE 1 TABLET BY MOUTH ONCE DAILY. 30 tablet 11    azelastine (ASTELIN) 137 mcg (0.1 %) nasal spray       BD ULTRA-FINE [place] PEN NEEDLE 32 gauge x 5/32" Ndle Inject 1 each into the skin 3 (three) times daily.      busPIRone (BUSPAR) 30 MG Tab       clopidogreL (PLAVIX) 75 mg tablet TAKE 1 TABLET BY MOUTH ONCE DAILY 90 tablet 2    dapagliflozin propanediol (FARXIGA) 10 mg tablet Take 1 tablet (10 mg total) by mouth once daily. 30 tablet 6    diclofenac sodium (VOLTAREN) 1 % Gel Apply 2 g topically 3 (three) times daily. 1 Tube 2    dicyclomine (BENTYL) 20 mg tablet Take 1 tablet (20 mg total) by mouth 3 (three) times daily. 90 tablet 2    doxycycline (MONODOX) 100 MG capsule Take 1 capsule (100 mg total) by mouth every 12 (twelve) hours. 20 capsule 0    doxycycline (VIBRAMYCIN) 100 MG Cap Take 100 mg by mouth.      famotidine (PEPCID) 20 MG tablet Take 1 tablet (20 mg total) by mouth 2 (two) times daily. 60 tablet 11    FLUoxetine (PROZAC) 40 MG capsule TAKE 1 CAPSULE BY MOUTH ONCE DAILY 90 capsule 0    fluticasone propionate (FLONASE) 50 mcg/actuation nasal spray       gabapentin (NEURONTIN) 300 MG capsule Take 3 capsules (900 mg total) by mouth 3 (three) times daily. 270 capsule 11    glipiZIDE (GLUCOTROL) 10 MG tablet Take 1 tablet (10 mg total) by mouth 2 (two) times daily. 60 tablet 0    isosorbide mononitrate (IMDUR) 30 MG 24 hr tablet TAKE 1 TABLET (30 MG TOTAL) BY MOUTH ONCE DAILY. 30 tablet 11    LANTUS SOLOSTAR U-100 INSULIN glargine 100 units/mL SubQ pen Inject into the skin.      linaCLOtide (LINZESS) 145 mcg Cap capsule Take 1 " "capsule (145 mcg total) by mouth before breakfast. 30 capsule 2    lisinopril 10 MG tablet TAKE 1 TABLET (10 MG TOTAL) BY MOUTH ONCE DAILY. 30 tablet 3    methylPREDNISolone (MEDROL DOSEPACK) 4 mg tablet use as directed 1 each 0    mometasone (ELOCON) 0.1 % solution Apply topically.      NOVOLOG FLEXPEN U-100 INSULIN 100 unit/mL (3 mL) InPn pen       omeprazole (PRILOSEC) 40 MG capsule Take 1 capsule (40 mg total) by mouth once daily. 30 capsule 11    pantoprazole (PROTONIX) 40 MG tablet TAKE 1 TABLET BY MOUTH ONCE DAILY 30 tablet 1    PEN NEEDLE 31 gauge x 5/16" Ndle USE 5 TIMES DAILY WITH LANTUS AND HUMALOG 100 each 3    promethazine (PHENERGAN) 25 MG tablet       rimegepant 75 mg odt Take 1 tablet (75 mg total) by mouth as needed for Migraine (max dose 3 doses within 1 week). Place ODT tablet on the tongue; alternatively the ODT tablet may be placed under the tongue 8 tablet 5    spironolactone (ALDACTONE) 50 MG tablet Take 1 tablet (50 mg total) by mouth once daily. 30 tablet 11    tiZANidine (ZANAFLEX) 4 MG tablet TAKE 1 TO 1 AND 1/2 TABLETS (4 TO 6MG TOTAL) BY MOUTH NIGHTLY AS NEEDED. 45 tablet 5    traMADoL (ULTRAM) 50 mg tablet Take 50 mg by mouth every 6 (six) hours as needed.      TRUE METRIX GLUCOSE TEST STRIP Strp       TRULICITY 0.75 mg/0.5 mL pen injector       ALPRAZolam (XANAX) 0.25 MG tablet Take 0.25 mg by mouth daily as needed.      ALPRAZolam (XANAX) 0.5 MG tablet Take 0.5 mg by mouth 2 (two) times daily as needed.      HYDROcodone-acetaminophen (NORCO) 5-325 mg per tablet Take 1 tablet by mouth every 6 (six) hours as needed for Pain. 8 tablet 0     Facility-Administered Encounter Medications as of 8/29/2023   Medication Dose Route Frequency Provider Last Rate Last Admin    sodium chloride 0.9% flush 10 mL  10 mL Intravenous PRN Brian Delgado MD           Objective:     Vital Signs (Most Recent)  Vital Signs  Pulse: 81  Resp: (!) 22  SpO2: 98 %  BP: 100/88  Height and Weight  Height: 5' 10" " (177.8 cm)  Weight: 102.1 kg (225 lb)  BSA (Calculated - sq m): 2.24 sq meters  BMI (Calculated): 32.3  Weight in (lb) to have BMI = 25: 173.9]  Wt Readings from Last 2 Encounters:   08/29/23 102.1 kg (225 lb)   06/08/23 109.5 kg (241 lb 6.5 oz)       Physical Exam   Constitutional: She is oriented to person, place, and time. She appears well-developed and well-nourished.   Ill appearing    HENT:   Head: Normocephalic.   Cardiovascular: Normal rate and regular rhythm.   Pulmonary/Chest: Normal expansion and effort normal. No stridor. She has decreased breath sounds. She has wheezes. She exhibits no tenderness.   Abdominal: She exhibits no distension.   Musculoskeletal:         General: No tenderness.      Cervical back: Neck supple.   Neurological: She is alert and oriented to person, place, and time. Gait abnormal.   Skin: Skin is warm. She is diaphoretic.   Psychiatric: She has a normal mood and affect. Her behavior is normal. Judgment and thought content normal.   Nursing note and vitals reviewed.      Laboratory  Lab Results   Component Value Date    WBC 10.36 04/13/2023    RBC 4.96 04/13/2023    HGB 14.5 04/13/2023    HCT 44.3 04/13/2023    MCV 89 04/13/2023    MCH 29.2 04/13/2023    MCHC 32.7 04/13/2023    RDW 13.2 04/13/2023     04/13/2023    MPV 10.0 04/13/2023    GRAN 6.1 04/13/2023    GRAN 59.0 04/13/2023    LYMPH 3.4 04/13/2023    LYMPH 33.1 04/13/2023    MONO 0.6 04/13/2023    MONO 5.6 04/13/2023    EOS 0.2 04/13/2023    BASO 0.04 04/13/2023    EOSINOPHIL 1.5 04/13/2023    BASOPHIL 0.4 04/13/2023       BMP  Lab Results   Component Value Date     (L) 04/13/2023    K 4.5 04/13/2023     04/13/2023    CO2 16 (L) 04/13/2023    BUN 13 04/13/2023    CREATININE 1.2 04/13/2023    CALCIUM 9.4 04/13/2023    ANIONGAP 12 04/13/2023    ESTGFRAFRICA >60.0 06/01/2022    EGFRNONAA 59.9 (A) 06/01/2022    AST 18 04/13/2023    ALT 23 04/13/2023    PROT 8.0 04/13/2023       Lab Results   Component Value  "Date    BNP 11 (L) 08/22/2023    BNP 22 04/24/2022    BNP <10 04/19/2022    BNP <10 06/07/2018    BNP 42 10/16/2016    BNP 39 09/05/2016       Lab Results   Component Value Date    TSH 0.849 10/07/2015       Lab Results   Component Value Date    SEDRATE 45 (H) 05/04/2022       Lab Results   Component Value Date    CRP 9.7 (H) 05/04/2022     No results found for: "IGE"     Lab Results   Component Value Date    ASPERGILLUS Not Detected 05/22/2023     No results found for: "AFUMIGATUSCL"     Lab Results   Component Value Date    ACE 15 (L) 05/22/2023        Diagnostic Results:  I have personally reviewed today the following studies:    CLINICAL HISTORY:  Lung nodule, > 8mm; Solitary pulmonary nodule.  Evaluate for malignancy.     TECHNIQUE:  11.58 mCi of F18-FDG was administered intravenously in the right antecubital vein.  After an approximately 60 min distribution time, PET/CT images were acquired from the skull base to the mid thigh. Transmission images were acquired to correct for attenuation using a whole body low-dose CT scan without contrast with the arms positioned above the head. Glycemia at the time of injection was 139 mg/dL.     COMPARISON:  CT examinations from 10/14/2018-4/13/2023     FINDINGS:  Neck: No abnormal foci of FDG uptake identified.     Chest: Stable right lower lobe pulmonary nodule is non FDG avid.  No hypermetabolic intrathoracic adenopathy.     Abdomen: No abnormal foci of FDG uptake identified.     Pelvis: No abnormal foci of FDG uptake identified.     Musculoskeletal: No abnormal foci of FDG uptake identified.     Impression:     Stable appearance of the right lower lobe pulmonary nodule which is non FDG avid.  Recommend continued surveillance.     Assessment/Plan:   Solitary pulmonary nodule    Atypical chest pain    Former smoker      Right lung nodule has cavitary appearance.  Not present on prior CT scans stable since 2021 and PET negative.    Waiting on radiology report of Chest CT " today  Due to patients appearance and current condition, recommend ER evaluation today, her son who is with her during visit today will bring her; offered EMS transport and patient declines  Discussed with Dr. Matos in clinic, plan for nodifylung blood testing; will call patient to coordinate      This note was prepared using voice recognition system and is likely to have sound alike errors that may have been overlooked even after proof reading.  Please call me with any questions    Discussed diagnosis, its evaluation, treatment and usual course. All questions answered.      Rebecca Harris PA-C

## 2023-08-30 ENCOUNTER — TELEPHONE (OUTPATIENT)
Dept: PULMONOLOGY | Facility: CLINIC | Age: 48
End: 2023-08-30
Payer: MEDICAID

## 2023-09-05 ENCOUNTER — OFFICE VISIT (OUTPATIENT)
Dept: NEUROLOGY | Facility: CLINIC | Age: 48
End: 2023-09-05
Payer: MEDICAID

## 2023-09-05 VITALS
WEIGHT: 245.38 LBS | DIASTOLIC BLOOD PRESSURE: 70 MMHG | RESPIRATION RATE: 16 BRPM | BODY MASS INDEX: 35.13 KG/M2 | HEIGHT: 70 IN | HEART RATE: 92 BPM | SYSTOLIC BLOOD PRESSURE: 117 MMHG

## 2023-09-05 DIAGNOSIS — G62.9 NEUROPATHY: ICD-10-CM

## 2023-09-05 DIAGNOSIS — G43.009 MIGRAINE WITHOUT AURA AND WITHOUT STATUS MIGRAINOSUS, NOT INTRACTABLE: ICD-10-CM

## 2023-09-05 DIAGNOSIS — F32.A DEPRESSION, UNSPECIFIED DEPRESSION TYPE: ICD-10-CM

## 2023-09-05 DIAGNOSIS — M54.50 CHRONIC MIDLINE LOW BACK PAIN WITHOUT SCIATICA: ICD-10-CM

## 2023-09-05 DIAGNOSIS — I70.8 OCCLUSION OF RIGHT SUBCLAVIAN ARTERY: ICD-10-CM

## 2023-09-05 DIAGNOSIS — R26.81 UNSTEADY GAIT: ICD-10-CM

## 2023-09-05 DIAGNOSIS — E11.42 DIABETIC PERIPHERAL NEUROPATHY ASSOCIATED WITH TYPE 2 DIABETES MELLITUS: Primary | ICD-10-CM

## 2023-09-05 DIAGNOSIS — R20.0 NUMBNESS AND TINGLING: ICD-10-CM

## 2023-09-05 DIAGNOSIS — I73.9 PAD (PERIPHERAL ARTERY DISEASE): ICD-10-CM

## 2023-09-05 DIAGNOSIS — M54.16 LUMBAR RADICULOPATHY: ICD-10-CM

## 2023-09-05 DIAGNOSIS — G89.29 CHRONIC MIDLINE LOW BACK PAIN WITHOUT SCIATICA: ICD-10-CM

## 2023-09-05 DIAGNOSIS — R20.2 NUMBNESS AND TINGLING: ICD-10-CM

## 2023-09-05 PROCEDURE — 99417 PROLNG OP E/M EACH 15 MIN: CPT | Mod: S$PBB,,, | Performed by: NURSE PRACTITIONER

## 2023-09-05 PROCEDURE — 1160F PR REVIEW ALL MEDS BY PRESCRIBER/CLIN PHARMACIST DOCUMENTED: ICD-10-PCS | Mod: CPTII,,, | Performed by: NURSE PRACTITIONER

## 2023-09-05 PROCEDURE — 3078F DIAST BP <80 MM HG: CPT | Mod: CPTII,,, | Performed by: NURSE PRACTITIONER

## 2023-09-05 PROCEDURE — 99215 OFFICE O/P EST HI 40 MIN: CPT | Mod: S$PBB,,, | Performed by: NURSE PRACTITIONER

## 2023-09-05 PROCEDURE — 1160F RVW MEDS BY RX/DR IN RCRD: CPT | Mod: CPTII,,, | Performed by: NURSE PRACTITIONER

## 2023-09-05 PROCEDURE — 3008F PR BODY MASS INDEX (BMI) DOCUMENTED: ICD-10-PCS | Mod: CPTII,,, | Performed by: NURSE PRACTITIONER

## 2023-09-05 PROCEDURE — 99215 OFFICE O/P EST HI 40 MIN: CPT | Mod: PBBFAC | Performed by: NURSE PRACTITIONER

## 2023-09-05 PROCEDURE — 99999 PR PBB SHADOW E&M-EST. PATIENT-LVL V: CPT | Mod: PBBFAC,,, | Performed by: NURSE PRACTITIONER

## 2023-09-05 PROCEDURE — 99215 PR OFFICE/OUTPT VISIT, EST, LEVL V, 40-54 MIN: ICD-10-PCS | Mod: S$PBB,,, | Performed by: NURSE PRACTITIONER

## 2023-09-05 PROCEDURE — 3078F PR MOST RECENT DIASTOLIC BLOOD PRESSURE < 80 MM HG: ICD-10-PCS | Mod: CPTII,,, | Performed by: NURSE PRACTITIONER

## 2023-09-05 PROCEDURE — 4010F PR ACE/ARB THEARPY RXD/TAKEN: ICD-10-PCS | Mod: CPTII,,, | Performed by: NURSE PRACTITIONER

## 2023-09-05 PROCEDURE — 3074F SYST BP LT 130 MM HG: CPT | Mod: CPTII,,, | Performed by: NURSE PRACTITIONER

## 2023-09-05 PROCEDURE — 3074F PR MOST RECENT SYSTOLIC BLOOD PRESSURE < 130 MM HG: ICD-10-PCS | Mod: CPTII,,, | Performed by: NURSE PRACTITIONER

## 2023-09-05 PROCEDURE — 99417 PR PROLONGED SVC, OUTPT, W/WO DIRECT PT CONTACT,  EA ADDTL 15 MIN: ICD-10-PCS | Mod: S$PBB,,, | Performed by: NURSE PRACTITIONER

## 2023-09-05 PROCEDURE — 4010F ACE/ARB THERAPY RXD/TAKEN: CPT | Mod: CPTII,,, | Performed by: NURSE PRACTITIONER

## 2023-09-05 PROCEDURE — 3008F BODY MASS INDEX DOCD: CPT | Mod: CPTII,,, | Performed by: NURSE PRACTITIONER

## 2023-09-05 PROCEDURE — 1159F PR MEDICATION LIST DOCUMENTED IN MEDICAL RECORD: ICD-10-PCS | Mod: CPTII,,, | Performed by: NURSE PRACTITIONER

## 2023-09-05 PROCEDURE — 1159F MED LIST DOCD IN RCRD: CPT | Mod: CPTII,,, | Performed by: NURSE PRACTITIONER

## 2023-09-05 PROCEDURE — 99999 PR PBB SHADOW E&M-EST. PATIENT-LVL V: ICD-10-PCS | Mod: PBBFAC,,, | Performed by: NURSE PRACTITIONER
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
## 2023-09-13 ENCOUNTER — LAB VISIT (OUTPATIENT)
Dept: LAB | Facility: HOSPITAL | Age: 48
End: 2023-09-13
Attending: INTERNAL MEDICINE
Payer: MEDICAID

## 2023-09-13 ENCOUNTER — OFFICE VISIT (OUTPATIENT)
Dept: PULMONOLOGY | Facility: CLINIC | Age: 48
End: 2023-09-13
Payer: MEDICAID

## 2023-09-13 VITALS
RESPIRATION RATE: 17 BRPM | SYSTOLIC BLOOD PRESSURE: 120 MMHG | HEIGHT: 70 IN | DIASTOLIC BLOOD PRESSURE: 84 MMHG | BODY MASS INDEX: 34.78 KG/M2 | HEART RATE: 94 BPM | OXYGEN SATURATION: 96 % | WEIGHT: 242.94 LBS

## 2023-09-13 DIAGNOSIS — R91.1 NODULE OF LOWER LOBE OF RIGHT LUNG: Primary | ICD-10-CM

## 2023-09-13 DIAGNOSIS — J44.1 COPD EXACERBATION: ICD-10-CM

## 2023-09-13 PROCEDURE — 3008F BODY MASS INDEX DOCD: CPT | Mod: CPTII,,, | Performed by: INTERNAL MEDICINE

## 2023-09-13 PROCEDURE — 4010F ACE/ARB THERAPY RXD/TAKEN: CPT | Mod: CPTII,,, | Performed by: INTERNAL MEDICINE

## 2023-09-13 PROCEDURE — 87449 NOS EACH ORGANISM AG IA: CPT | Performed by: INTERNAL MEDICINE

## 2023-09-13 PROCEDURE — 99999 PR PBB SHADOW E&M-EST. PATIENT-LVL V: CPT | Mod: PBBFAC,,, | Performed by: INTERNAL MEDICINE

## 2023-09-13 PROCEDURE — 99999 PR PBB SHADOW E&M-EST. PATIENT-LVL V: ICD-10-PCS | Mod: PBBFAC,,, | Performed by: INTERNAL MEDICINE

## 2023-09-13 PROCEDURE — 36415 COLL VENOUS BLD VENIPUNCTURE: CPT | Performed by: INTERNAL MEDICINE

## 2023-09-13 PROCEDURE — 99215 OFFICE O/P EST HI 40 MIN: CPT | Mod: PBBFAC | Performed by: INTERNAL MEDICINE

## 2023-09-13 PROCEDURE — 3074F SYST BP LT 130 MM HG: CPT | Mod: CPTII,,, | Performed by: INTERNAL MEDICINE

## 2023-09-13 PROCEDURE — 99215 PR OFFICE/OUTPT VISIT, EST, LEVL V, 40-54 MIN: ICD-10-PCS | Mod: S$PBB,,, | Performed by: INTERNAL MEDICINE

## 2023-09-13 PROCEDURE — 3079F PR MOST RECENT DIASTOLIC BLOOD PRESSURE 80-89 MM HG: ICD-10-PCS | Mod: CPTII,,, | Performed by: INTERNAL MEDICINE

## 2023-09-13 PROCEDURE — 3079F DIAST BP 80-89 MM HG: CPT | Mod: CPTII,,, | Performed by: INTERNAL MEDICINE

## 2023-09-13 PROCEDURE — 86480 TB TEST CELL IMMUN MEASURE: CPT | Performed by: INTERNAL MEDICINE

## 2023-09-13 PROCEDURE — 1159F MED LIST DOCD IN RCRD: CPT | Mod: CPTII,,, | Performed by: INTERNAL MEDICINE

## 2023-09-13 PROCEDURE — 3074F PR MOST RECENT SYSTOLIC BLOOD PRESSURE < 130 MM HG: ICD-10-PCS | Mod: CPTII,,, | Performed by: INTERNAL MEDICINE

## 2023-09-13 PROCEDURE — 3008F PR BODY MASS INDEX (BMI) DOCUMENTED: ICD-10-PCS | Mod: CPTII,,, | Performed by: INTERNAL MEDICINE

## 2023-09-13 PROCEDURE — 1159F PR MEDICATION LIST DOCUMENTED IN MEDICAL RECORD: ICD-10-PCS | Mod: CPTII,,, | Performed by: INTERNAL MEDICINE

## 2023-09-13 PROCEDURE — 99215 OFFICE O/P EST HI 40 MIN: CPT | Mod: S$PBB,,, | Performed by: INTERNAL MEDICINE

## 2023-09-13 PROCEDURE — 4010F PR ACE/ARB THEARPY RXD/TAKEN: ICD-10-PCS | Mod: CPTII,,, | Performed by: INTERNAL MEDICINE

## 2023-09-13 RX ORDER — ALBUTEROL SULFATE 0.83 MG/ML
2.5 SOLUTION RESPIRATORY (INHALATION)
Qty: 360 ML | Refills: 11 | Status: SHIPPED | OUTPATIENT
Start: 2023-09-13 | End: 2024-09-12

## 2023-09-13 RX ORDER — PROMETHAZINE HYDROCHLORIDE AND CODEINE PHOSPHATE 6.25; 1 MG/5ML; MG/5ML
5 SOLUTION ORAL EVERY 4 HOURS PRN
Qty: 200 ML | Refills: 0 | Status: SHIPPED | OUTPATIENT
Start: 2023-09-13 | End: 2023-09-20

## 2023-09-13 RX ORDER — PREDNISONE 20 MG/1
TABLET ORAL
Qty: 20 TABLET | Refills: 0 | Status: SHIPPED | OUTPATIENT
Start: 2023-09-13 | End: 2023-11-28 | Stop reason: ALTCHOICE

## 2023-09-13 RX ORDER — AMOXICILLIN AND CLAVULANATE POTASSIUM 875; 125 MG/1; MG/1
1 TABLET, FILM COATED ORAL EVERY 12 HOURS
Qty: 28 TABLET | Refills: 0 | Status: SHIPPED | OUTPATIENT
Start: 2023-09-13 | End: 2023-11-28 | Stop reason: SINTOL

## 2023-09-13 NOTE — PROGRESS NOTES
Subjective:     Patient ID: Jenifer Westfall is a 48 y.o. female.    Chief Complaint:      HPI c/o cough - persistent for 2- 3 weeks   47-year-old female patient with past medical history of 25 pack year smoking quit on Chantix presenting today to discuss PET scan results.  She has a spiculated right lower lobe nodule that has been stable since 2021 but was not present on prior CT scan of the chest.   Denies personal history of cancer.  Does have arthritis.  Follows with Rheumatology.    Seen at [facility] and told she had a nodule - seen in Emergency Room for a fall - injured right side- bruised ribs  Former smoker with peripheral neuropathy  Prescribed and inhaler but has not used  Dyspnea  Patient complains of shortness of breath. Symptoms occur while getting dressed. Symptoms began 2 years ago, gradually worsening since. Associated symptoms include  difficulty breathing, dyspnea on exertion, and shortness of breath. She denies chest pain, located left chest. She does not have had recent travel. Weight has been stable. Symptoms are exacerbated by minimal activity. Symptoms are alleviated by rest.      Lung Nodule  She presents for evaluation and treatment of a lung nodule. The patient reports that the imaging was performed to evaluate symptoms of dyspnea on exertion which have been present for 2 years and are gradually worsening. Symptoms are exacerbated by exercise and relieved by rest. Other symptoms include dyspnea on exertion. She has a history of 24 pack years. The patient has no known exposure to tuberculosis. The patient does not have a history of cancer.  Family history of lung cancer in grandmother  Mother with Coronary Artery Disease and Chronic Obstructive Pulmonary Disease      Abnormal CT of chest performed 4/24/2022  Problems with transportation   Follow up CT was not approved in time for CT to be performed today  Follow up CT of chest     She   presents for  evaluation and treatment of cough  after being discharged from the hospital  3  weeks ago. Since discharge symptoms have unchanged course since that time. Patient denies fever or chills. Symptoms are aggravated by activity. Symptoms improve with rest.  Respiratory: positive for cough and sputum; Cardiovascular: no chest pain or palpitations.  Patient currently is not on home oxygen therapy..    MEDICAL RECORDS FROM THE HOSPITAL REVIEWED:  ED Notes  - documented in this encounter   Eri Reinoso PA - 08/25/2023 6:28 PM CDTFormatting of this note is different from the original.  History  Chief Complaint   Patient presents with   Vomiting   For 2 days       49 yo female with PMH of anxiety, depression, chronic low back pain, diabetes, hypertension, hyperlipidemia, IBS, Marfan's, MVP, PAD presents to ED with complaints of vomiting x 2 days. Patient was seen in ED on 8/22 and diagnosed with bronchitis, given doxycycline and albuterol inhaler. Reports compliance with meds. Still reports associated cough, diffuse chest pain and sob, this is unchanged since 8/22. Has tried phenergan at home without relief. Denies sick contacts, recent new food ingestion, fever, abdominal pain, new onset back pain, urinary complaints, or any other problems today.    History provided by: Patient and medical records   used: No     MSE/IPE     Past Medical History:   Diagnosis Date   Anxiety and depression   Arthritis   Chronic lower back pain   Difficulty walking   Diverticula, colon   DM (diabetes mellitus) (HCC)   HTN (hypertension)   Hyperlipidemia   Irritable bowel syndrome (IBS)   Left hip pain   Marfan syndrome   Marfan's syndrome   MVP (mitral valve prolapse)   Nephrolithiasis   Ovarian cyst   PAD (peripheral artery disease) (HCC)   Sciatica     Echo EF Estimated   Date Value Ref Range Status   06/29/2021 61.00 percent     Past Surgical History:   Procedure Laterality Date   ABDOMINAL AORTOGRAM N/A  2019   Performed by Desmond Tian MD at [facility] CARDIAC CATH LABS   Abdominal Aortogram With Ro N/A 2022   Performed by Desmond Tian MD at [facility] CARDIAC CATH LABS   APPENDECTOMY    SECTION   times 2   CHOLECYSTECTOMY   CT GUIDED BIOPSY LIVER 3/8/2016   CT GUIDED BIOPSY LIVER 3/8/2016   EXTREMITY BILATERAL N/A 2019   Performed by Desmond Tian MD at [facility] CARDIAC CATH LABS   EYE SURGERY   PHACO bilateraL   PTA Iliac N/A 2019   Performed by Desmond Tian MD at [facility] CARDIAC CATH LABS   VASCULAR SURGERY Bilateral   2 stents to left and 1 to right legs     Social History     Tobacco Use   Smoking status: Former   Packs/day: 0.50   Types: Cigarettes   Quit date:    Years since quittin.6   Passive exposure: Never   Smokeless tobacco: Never   Tobacco comments:   on Chantix   Vaping Use   Vaping Use: Never used   Substance Use Topics   Alcohol use: No   Drug use: No       Allergies   Allergen Reactions   Dye Anaphylaxis   Morphine Other (See Comments)   Iritability   Compazine [Prochlorperazine]   Levaquin [Levofloxacin]   Metformin   Ibuprofen Other (See Comments)   Abdominal pain       Review of Systems   Constitutional: Negative for chills and fever.   HENT: Negative for sore throat.   Eyes: Negative for visual disturbance.   Respiratory: Positive for cough and shortness of breath.   Cardiovascular: Positive for chest pain.   Gastrointestinal: Positive for nausea and vomiting. Negative for abdominal pain, constipation and diarrhea.   Genitourinary: Negative for dysuria.   Musculoskeletal: Negative for back pain and neck pain.   Skin: Negative for rash.   Neurological: Negative for dizziness, syncope, light-headedness, numbness and headaches.   All other systems reviewed and are negative.    Physical Exam  ED Triage Vitals [23 1738]   Temp Pulse Resp BP SpO2   98.9 °F (37.2 °C) 90 20 91/66 96 %       Physical Exam  Vitals reviewed.   Constitutional:   General: She is not in  "acute distress.  Appearance: She is obese. She is not ill-appearing, toxic-appearing or diaphoretic.   HENT:   Head: Normocephalic and atraumatic.   Mouth/Throat:   Mouth: Mucous membranes are moist.   Eyes:   Extraocular Movements: Extraocular movements intact.   Conjunctiva/sclera: Conjunctivae normal.   Pupils: Pupils are equal, round, and reactive to light.   Cardiovascular:   Rate and Rhythm: Regular rhythm. Tachycardia present.   Pulmonary:   Effort: Pulmonary effort is normal. No respiratory distress.   Breath sounds: Normal breath sounds. No wheezing.   Chest:   Chest wall: Tenderness (Diffuse) present.   Abdominal:   General: Abdomen is flat. Bowel sounds are normal. There is no distension.   Palpations: Abdomen is soft.   Tenderness: There is no abdominal tenderness. There is no guarding or rebound.   Musculoskeletal:   General: Normal range of motion.   Cervical back: Normal range of motion and neck supple.   Skin:  General: Skin is warm.   Capillary Refill: Capillary refill takes less than 2 seconds.   Neurological:   General: No focal deficit present.   Mental Status: She is alert.   Psychiatric:   Mood and Affect: Mood normal.   Behavior: Behavior normal.       ED Course and Medical Decision Making   Provider First Evaluation Time: 08/25/23 1806    Vitals:   08/25/23 1900 08/25/23 2005 08/25/23 2030 08/25/23 2148   BP: 107/76 111/63 120/67 139/69   Pulse: 84 95 90 85   Resp: 19 18 18 18   Temp: 98.5 °F (36.9 °C) 98.4 °F (36.9 °C) 98 °F (36.7 °C) 98.4 °F (36.9 °C)   TempSrc: Oral Oral Oral   SpO2: 93% 95% 94% 97%   Weight:   Height: 177.8 cm (70")         Procedures    No orders of the defined types were placed in this encounter.        Labs Reviewed   COMPREHENSIVE METABOLIC PANEL - Abnormal   Result Value   Creatinine Level 0.93   Blood Urea Nitrogen Level 11   Sodium Level 138   Potassium Level 4.2   Chloride Level 109   CO2 Level 15 (*)   Glucose Level 125 (*)   Calcium Level 9.2   Protein Total 6.9 "   Albumin Level 3.7   Bilirubin Total 0.2   Alkaline Phosphatase Level 121   SGOT (AST) 28   SGPT (ALT) 29   Anion Gap 14   EGFR 76   CBC WITH AUTO DIFFERENTIAL - Abnormal   White Blood Cell Count 14.7 (*)   Red Blood Cell Count 4.44   Hemoglobin 13.7   Hematocrit 43.2   Mean Corpuscular Volume 97   Mean Corpuscular Hemoglobin Conc 31.8   Red Cell Distribution Width 12.5   Platelet Count 344   Mean Platelet Volume 7.5   Neutrophils Abs 8.0   Lymphocytes Abs 5.4 (*)   Monocytes Abs 1.0   Eosinophils Abs 0.2   Basophils Abs 0.1   Neutrophils % 54   Lymphocytes % 37   Monocytes % 7   Eosinophils % 2   Basophils % 1   TROPONIN I LEVEL (TROPONIN ADV LEVEL) - Normal   Troponin I Level <0.01   BOBBY 2 FLU + SARS ANTIGEN GARCIA - Normal   Influenza A Antigen Negative   Influenza B Antigen Negative   SARS Antigen Negative   CBC AND DIFFERENTIAL       Medications   lactated ringers bolus 1,000 mL (0 mLs Intravenous Stopped 8/25/23 2006)   ondansetron (PF) (ZOFRAN) injection 4 mg (4 mg Intravenous Given 8/25/23 1854)   methocarbamoL (ROBAXIN) tablet 1,000 mg (1,000 mg Oral Given 8/25/23 2044)       XR Chest 1 View   Final Result   No evidence of an acute pulmonary process.         XR Chest 1 View    Result Date: 8/25/2023  XR CHEST 1 VIEW CLINICAL INDICATION: chest pain, other COMPARISON:8/22/2023 FINDINGS: Single portable radiograph of the chest. The lungs are well expanded. No focal consolidation, effusion, or pneumothorax. The cardiac and mediastinal silhouettes are within normal limits.     No evidence of an acute pulmonary process.    XR Chest 1 View    Result Date: 8/22/2023  XR CHEST 1 VIEW HISTORY: cough, shortness of breath TECHNIQUE: AP portable chest radiograph. Comparison:June 1, 2023 FINDINGS: No focal pulmonary opacity, pneumothorax, or gross pleural effusion is identified. The cardiomediastinal silhouette is within normal limits for AP portable technique. Osseous structures are unremarkable.     No acute  cardiopulmonary disease identified.     ED Course as of 08/26/23 0104   Fri Aug 25, 2023   1845 Attending note:  48-year-old female with history of irritable bowel syndrome, peripheral artery disease anxiety and hypertension, and diabetes presents complaining of persistent chest pain, frequent cough and generally feeling worse over the past few days. She was seen here and diagnosed with bronchitis and discharged home with doxycycline. She been taking her medication. She also has a prescription for Phenergan because she gets nauseated frequently with her irritable bowel syndrome. She has had increased nausea since being discharged. She does not appear acutely ill on physical exam. Breath sounds are clear bilaterally but she has a frequent cough with inspiration. Low suspicion of significant illness. We will obtain a chest x-ray to rule out progression into pneumonia. Will obtain basic labs to rule out cardiac involvement and to check electrolytes. [JL]   1939 XR Chest 1 View  IMPRESSION:  No evidence of an acute pulmonary process.  [NORBERTO]   2015 Troponin I Level: <0.01 [NORBERTO]   2019 Still have diffuse chest discomfort, will give robaxin. Discussed rest of lab results with patient. Still actively coughing, told her I suspect her cp is secondary to her coughing and that it may not resolve until her cough resolves. No longer nauseous or vomiting. [NORBERTO]   2102 White Blood Cell Count(!): 14.7 [NORBERTO]   2108 EKG stable compared to previous [NORBERTO]   2123 Patient CP feeling better after robaxin. Resting comfortably in bed, NAD, O2 sats >95%, afebrile, VSS. Discussed that her white count was a little higher than a few days ago, I suspect she has a viral syndrome. CXR did not show pna but her PCP would like to follow up CXR in 2 weeks. I told her to keep this appointment. Also discussed with her to continue doxy and albuterol inhaler as needed. Close follow up with PCP next week. Discussed strict return to ED precautions. Verbalized  understanding and is ready for discharge [NORBERTO]     ED Course User Index  [JL] Abdirahman Villa MD  [NORBERTO] Eri Reinoso PA       Medical Decision Making  Problems Addressed:  Nausea and vomiting, unspecified vomiting type: acute illness or injury    Amount and/or Complexity of Data Reviewed  Labs: ordered. Decision-making details documented in ED Course.  Radiology: ordered. Decision-making details documented in ED Course.  ECG/medicine tests: ordered.    Risk  Prescription drug management.      Disease Specific Documentation    The ER physician was directly involved with this patient's care and management. Supervising physician Dr. Villa        Diagnosis  1. Nausea and vomiting, unspecified vomiting type   2. Chest pain, unspecified type       Disposition and Plan  Discharge    Discharge Medication List as of 8/25/2023 9:28 PM   Things you need to do     Schedule an appointment with Aleja Glez PA-C as soon as possible for a visit in 3 day(s)   Return to ER for worsening symptoms or concerns   Phone: [phone]   Where: [address] [facility] [place] [address]       08/25/23 1837        Electronically signed by Abdirahman Villa MD at 08/26/2023 5:34 AM CDT    Associated attestation - Abdirahman Villa MD - 08/26/2023 5:34 AM CDT  Formatting of this note might be different from the original.  I personally evaluated the patient and performed my own physical exam and history. I agree with the history and physical as documented by the physician assistant. I agree with the medical workup and plan of care. I agree with the documented diagnosis.      Past Medical History:   Diagnosis Date    Arthritis     DM (diabetes mellitus) 2009     03/01/2017 Insulin x 3 years 2013    Hepatitis C antibody positive in blood 03/04/2021    RNA NEGATIVE 3/6/2017, 3/22/22    Hyperlipidemia     Hypertension     IBS (irritable bowel syndrome)     Kidney stone     Marfan syndrome     Mitral valve prolapse      Past Surgical  History:   Procedure Laterality Date    ANGIOGRAPHY OF LOWER EXTREMITY N/A 2018    Procedure: ANGIOGRAM, LOWER EXTREMITY- LEFT LEG;  Surgeon: Roscoe Landeros MD;  Location: [facility] CATH LAB;  Service: Peripheral Vascular;  Laterality: N/A;    APPENDECTOMY      BUNIONECTOMY      both feet    BYPASS GRAFT Bilateral     cataract surgery  2019     SECTION      x 2    CHOLECYSTECTOMY      COLONOSCOPY N/A 2020    Procedure: COLONOSCOPY w/ biopsies;  Surgeon: Gio Georges MD;  Location: [facility];  Service: Endoscopy;  Laterality: N/A;    ESOPHAGEAL MANOMETRY WITH MEASUREMENT OF IMPEDANCE N/A 2023    Procedure: MANOMETRY, ESOPHAGUS, WITH IMPEDANCE MEASUREMENT;  Surgeon: Desmond Calderon RN;  Location: [facility] ENDO;  Service: Endoscopy;  Laterality: N/A;    ESOPHAGOGASTRODUODENOSCOPY N/A 2019    Procedure: ESOPHAGOGASTRODUODENOSCOPY (EGD);  Surgeon: Jaron Sanders III, MD;  Location: [facility];  Service: Endoscopy;  Laterality: N/A;    ESOPHAGOGASTRODUODENOSCOPY N/A 2022    Procedure: EGD (ESOPHAGOGASTRODUODENOSCOPY);  Surgeon: Keith Hardwick MD;  Location: [facility];  Service: Gastroenterology;  Laterality: N/A;    SELECTIVE INJECTION OF ANESTHETIC AGENT AROUND LUMBAR SPINAL NERVE ROOT BY TRANSFORAMINAL APPROACH Bilateral 2020    Procedure: Bilateral L5/S1 TF DENY;  Surgeon: Jewel Walters MD;  Location: [facility] PAIN MGT;  Service: Pain Management;  Laterality: Bilateral;    TUBAL LIGATION       Review of patient's allergies indicates:   Allergen Reactions    Compazine [prochlorperazine edisylate] Rash    Contrast media Anaphylaxis    Dye Anaphylaxis     Contrast dye    Iodinated contrast media Anaphylaxis     Other reaction(s): anaphylaxis    Levaquin [levofloxacin] Hives and Itching    Morphine Other (See Comments)     Irritable  Iritability  Irritable    Prochlorperazine Rash    Metformin Other (See Comments)     Upset stomach    Ibuprofen Other (See Comments) and Nausea  "And Vomiting     Stomach pain  Stomach pain    Abdominal pain     Methocarbamol Nausea Only     Current Outpatient Medications on File Prior to Visit   Medication Sig Dispense Refill    ADMELOG SOLOSTAR U-100 INSULIN 100 unit/mL pen       albuterol (VENTOLIN HFA) 90 mcg/actuation inhaler Inhale 2 puffs into the lungs every 6 (six) hours as needed for Wheezing. Rescue 18 g 11    ascorbic acid (VITAMIN C) 100 MG tablet Take 100 mg by mouth once daily.      aspirin (ECOTRIN) 81 MG EC tablet Take 81 mg by mouth once daily.      atenoloL (TENORMIN) 25 MG tablet TAKE 1 TABLET (25 MG TOTAL) BY MOUTH ONCE DAILY. 30 tablet 11    atorvastatin (LIPITOR) 80 MG tablet TAKE 1 TABLET BY MOUTH ONCE DAILY. 30 tablet 11    azelastine (ASTELIN) 137 mcg (0.1 %) nasal spray       BD ULTRA-FINE [place] PEN NEEDLE 32 gauge x 5/32" Ndle Inject 1 each into the skin 3 (three) times daily.      busPIRone (BUSPAR) 30 MG Tab       clopidogreL (PLAVIX) 75 mg tablet TAKE 1 TABLET BY MOUTH ONCE DAILY 90 tablet 2    dapagliflozin propanediol (FARXIGA) 10 mg tablet Take 1 tablet (10 mg total) by mouth once daily. 30 tablet 6    diclofenac sodium (VOLTAREN) 1 % Gel Apply 2 g topically 3 (three) times daily. 1 Tube 2    dicyclomine (BENTYL) 20 mg tablet Take 1 tablet (20 mg total) by mouth 3 (three) times daily. 90 tablet 2    doxycycline (MONODOX) 100 MG capsule Take 1 capsule (100 mg total) by mouth every 12 (twelve) hours. 20 capsule 0    famotidine (PEPCID) 20 MG tablet Take 1 tablet (20 mg total) by mouth 2 (two) times daily. 60 tablet 11    FLUoxetine (PROZAC) 40 MG capsule TAKE 1 CAPSULE BY MOUTH ONCE DAILY 90 capsule 0    fluticasone propionate (FLONASE) 50 mcg/actuation nasal spray       gabapentin (NEURONTIN) 300 MG capsule Take 3 capsules (900 mg total) by mouth 3 (three) times daily. 270 capsule 11    glipiZIDE (GLUCOTROL) 10 MG tablet Take 1 tablet (10 mg total) by mouth 2 (two) times daily. 60 tablet 0    isosorbide mononitrate (IMDUR) 30 " "MG 24 hr tablet TAKE 1 TABLET (30 MG TOTAL) BY MOUTH ONCE DAILY. 30 tablet 11    LANTUS SOLOSTAR U-100 INSULIN glargine 100 units/mL SubQ pen Inject into the skin.      linaCLOtide (LINZESS) 145 mcg Cap capsule Take 1 capsule (145 mcg total) by mouth before breakfast. 30 capsule 2    lisinopril 10 MG tablet TAKE 1 TABLET (10 MG TOTAL) BY MOUTH ONCE DAILY. 30 tablet 3    methylPREDNISolone (MEDROL DOSEPACK) 4 mg tablet use as directed 1 each 0    mometasone (ELOCON) 0.1 % solution Apply topically.      NOVOLOG FLEXPEN U-100 INSULIN 100 unit/mL (3 mL) InPn pen       omeprazole (PRILOSEC) 40 MG capsule Take 1 capsule (40 mg total) by mouth once daily. 30 capsule 11    pantoprazole (PROTONIX) 40 MG tablet TAKE 1 TABLET BY MOUTH ONCE DAILY 30 tablet 1    PEN NEEDLE 31 gauge x 5/16" Ndle USE 5 TIMES DAILY WITH LANTUS AND HUMALOG 100 each 3    promethazine (PHENERGAN) 25 MG tablet       rimegepant 75 mg odt Take 1 tablet (75 mg total) by mouth as needed for Migraine (max dose 3 doses within 1 week). Place ODT tablet on the tongue; alternatively the ODT tablet may be placed under the tongue 8 tablet 5    spironolactone (ALDACTONE) 50 MG tablet Take 1 tablet (50 mg total) by mouth once daily. 30 tablet 11    tiZANidine (ZANAFLEX) 4 MG tablet TAKE 1 TO 1 AND 1/2 TABLETS (4 TO 6MG TOTAL) BY MOUTH NIGHTLY AS NEEDED. 45 tablet 5    traMADoL (ULTRAM) 50 mg tablet Take 50 mg by mouth every 6 (six) hours as needed.      TRUE METRIX GLUCOSE TEST STRIP Strp       TRULICITY 0.75 mg/0.5 mL pen injector        Current Facility-Administered Medications on File Prior to Visit   Medication Dose Route Frequency Provider Last Rate Last Admin    sodium chloride 0.9% flush 10 mL  10 mL Intravenous PRN Brian Delgado MD         Social History     Socioeconomic History    Marital status: Single   Tobacco Use    Smoking status: Former     Current packs/day: 0.00     Average packs/day: 1 pack/day for 24.0 years (24.0 ttl pk-yrs)     Types: " "Cigarettes     Start date: 1/1/1996     Quit date: 1/1/2020     Years since quitting: 3.7    Smokeless tobacco: Never    Tobacco comments:     "once in a blue moon"   Substance and Sexual Activity    Alcohol use: No     Alcohol/week: 0.0 standard drinks of alcohol    Drug use: No    Sexual activity: Not Currently     Social Determinants of Health     Financial Resource Strain: Low Risk  (11/22/2021)    Overall Financial Resource Strain (CARDIA)     Difficulty of Paying Living Expenses: Not hard at all   Food Insecurity: No Food Insecurity (11/22/2021)    Hunger Vital Sign     Worried About Running Out of Food in the Last Year: Never true     Ran Out of Food in the Last Year: Never true   Transportation Needs: No Transportation Needs (11/22/2021)    PRAPARE - Transportation     Lack of Transportation (Medical): No     Lack of Transportation (Non-Medical): No   Physical Activity: Inactive (11/22/2021)    Exercise Vital Sign     Days of Exercise per Week: 0 days     Minutes of Exercise per Session: 0 min   Stress: No Stress Concern Present (11/22/2021)    [employer] - Occupational Stress Questionnaire     Feeling of Stress : Only a little   Social Connections: Socially Isolated (11/22/2021)    Social Connection and Isolation Panel [NHANES]     Frequency of Communication with Friends and Family: Three times a week     Frequency of Social Gatherings with Friends and Family: Once a week     Attends Sabianism Services: Never     Active Member of Clubs or Organizations: No     Attends Club or Organization Meetings: Never     Marital Status: Never    Housing Stability: Unknown (11/22/2021)    Housing Stability Vital Sign     Unable to Pay for Housing in the Last Year: No     Unstable Housing in the Last Year: No     Family History   Problem Relation Age of Onset    Heart disease Mother     Thrombophilia Father         DVT    Hypertension Father     Stroke Maternal Aunt     Heart disease " "Maternal Aunt     Stroke Maternal Uncle     Heart disease Maternal Uncle     Breast cancer Neg Hx     Colon cancer Neg Hx     Ovarian cancer Neg Hx        Review of Systems   Constitutional:  Negative for fever and fatigue.   HENT:  Negative for postnasal drip and rhinorrhea.    Eyes:  Negative for redness and itching.   Respiratory:  Negative for cough, shortness of breath, wheezing, dyspnea on extertion and Paroxysmal Nocturnal Dyspnea.    Cardiovascular:  Negative for chest pain.   Genitourinary:  Negative for difficulty urinating and hematuria.   Endocrine:  Negative for polyphagia, cold intolerance and heat intolerance.    Musculoskeletal:  Negative for arthralgias.   Skin:  Negative for rash.   Gastrointestinal:  Negative for nausea, vomiting, abdominal pain and abdominal distention.        Problems swallowing   Neurological:  Positive for weakness. Negative for dizziness and headaches.   Hematological:  Negative for adenopathy. Does not bruise/bleed easily and no excessive bruising.   Psychiatric/Behavioral:  The patient is not nervous/anxious.        Objective:      /84   Pulse 94   Resp 17   Ht 5' 10" (1.778 m)   Wt 110.2 kg (242 lb 15.2 oz)   SpO2 96%   BMI 34.86 kg/m²   Physical Exam  Vitals and nursing note reviewed.   Constitutional:       Appearance: Normal appearance. She is well-developed.   HENT:      Head: Normocephalic and atraumatic.      Nose: Nose normal.   Eyes:      Conjunctiva/sclera: Conjunctivae normal.      Pupils: Pupils are equal, round, and reactive to light.   Neck:      Thyroid: No thyromegaly.      Vascular: No JVD.      Trachea: No tracheal deviation.   Cardiovascular:      Rate and Rhythm: Normal rate and regular rhythm.      Heart sounds: Normal heart sounds.   Pulmonary:      Effort: Pulmonary effort is normal. No respiratory distress.      Breath sounds: Wheezing and rhonchi present. No rales.   Chest:      Chest wall: No tenderness.   Abdominal:      General: Bowel " sounds are normal.      Palpations: Abdomen is soft.   Musculoskeletal:      Cervical back: Neck supple.   Lymphadenopathy:      Cervical: No cervical adenopathy.   Skin:     General: Skin is warm and dry.   Neurological:      Mental Status: She is alert and oriented to person, place, and time.      Coordination: Coordination abnormal.      Gait: Gait abnormal.      Deep Tendon Reflexes: Reflexes abnormal.      Comments: Neuropathy lower ext - left side     Personal Diagnostic Review  Slowly enlarging right lower lobe nodule    CT Renal Stone Study ABD Pelvis WO  Order: [id number]  Status: Final result     Visible to patient: Yes (seen)     Next appt: 05/17/2023 at 01:00 PM in Cardiology ([facility])     0 Result Notes  Details    Reading Physician Reading Date Result Priority   See Angeles MD  [phone] 4/13/2023 STAT     Narrative & Impression  EXAMINATION:  CT RENAL STONE STUDY ABD PELVIS WO     CLINICAL HISTORY:  Flank pain, kidney stone suspected;     TECHNIQUE:  Low dose axial images, sagittal and coronal reformations were obtained from the lung bases to the pubic symphysis.  Contrast was not administered.     COMPARISON:  None     FINDINGS:  Heart: Normal in size. No pericardial effusion.     Lung Bases: 14 mm nodule right lung base..     Liver: Hepatomegaly, with no focal hepatic lesions.     Gallbladder: Status post cholecystectomy.     Bile Ducts: No evidence of dilated ducts.     Pancreas: No mass or peripancreatic fat stranding.     Spleen: Unremarkable.     Adrenals: Unremarkable.     Kidneys/ Ureters: Unremarkable.     Bladder: No evidence of wall thickening.     Reproductive organs: Unremarkable.     GI Tract/Mesentery: No evidence of bowel obstruction or inflammation.     Peritoneal Space: No ascites. No free air.     Retroperitoneum: No significant adenopathy.     Abdominal wall: Unremarkable.     Vasculature: No  aneurysm.  Bi femoral bypass.  Atherosclerotic changes.  Iliac stents    "  Bones: No acute fracture.     Impression:     14 mm nodule right lung base..     Hepatomegaly     Cholecystectomy     Atherosclerotic changes     Fem fem bypass.  Iliac stents     All CT scans   are performed using dose optimization techniques including the following: automated exposure control; adjustment of the mA and/or kV; use of iterative reconstruction technique.  Dose modulation was employed for ALARA by means of: Automated exposure control; adjustment of the mA and/or kV according to patient size (this includes techniques or standardized protocols for targeted exams where dose is matched to indication/reason for exam; i.e. extremities or head); and/or use of iterative reconstructive technique.        Electronically signed by: See Angeles  Date:                                            04/13/2023  Time:                                           20:36           Exam Ended: 04/13/23 20:05 Last Resulted: 04/13/23 20:36                   9/13/2023     9:04 AM   Pulmonary Studies Review   SpO2 96 %   Height 5' 10" (1.778 m)   Weight 110.2 kg (242 lb 15.2 oz)   BMI (Calculated) 34.9   Predicted Distance 374.55   Predicted Distance Meters (Calculated) 506.58 meters       X-Ray Chest AP Portable  Narrative: EXAMINATION:  XR CHEST AP PORTABLE    CLINICAL HISTORY:  chest pain;    TECHNIQUE:  Single frontal view of the chest was performed.    COMPARISON:  None    FINDINGS:  Mild interstitial opacities in the lung bases likely related to subsegmental atelectasis or scarring.No pleural effusion or pneumothorax.    The cardiac silhouette is normal in size. The hilar and mediastinal contours are unremarkable.    Bones are intact.  Impression: No acute abnormality.    Electronically signed by: See Angeles  Date:    08/29/2023  Time:    23:52  NM Lung Scan Ventilation Perfusion  Narrative: EXAMINATION:  NM LUNG VENTILATION AND PERFUSION IMAGING    CLINICAL HISTORY:  Pulmonary embolism (PE) suspected, high " prob;    TECHNIQUE:  1 mCi of Tc-99m-DTPA were placed in the nebulizer. Following the inhalation Tc-99m-DTPA in aerosol and the subsequent IV administration of 5.3 mCi of Tc-99m-MAA, multiple images of the thorax were obtained in various projections.    COMPARISON:  None.    FINDINGS:  Perfusion: Negative.    Ventilation: Negative.    CXR see recent chest CT with nodule in the right lower lobe.  Otherwise clear..  Impression: This represents a low probability  of pulmonary embolism.    Electronically signed by: See Angeles  Date:    08/29/2023  Time:    20:41  CT Chest Without Contrast  Narrative: EXAMINATION:  CT CHEST WITHOUT CONTRAST    CLINICAL HISTORY:  Solitary pulmonary noduleright lung nodule;    COMPARISON:  CT abdomen pelvis April 13, 2023.  PET-CT May 19, 2023.  April 24, 2022.  The    TECHNIQUE:  Axial CT images were obtained of the CHEST.  Iterative reconstruction technique was used. The CT exam was performed using one or more of the following dose reduction techniques- Automated exposure control, adjustment of the mA and/or kV according to patient size, and/or use of iterative reconstructed technique.    FINDINGS:  Lungs: Slowly enlarging 1.5 x 1.2 cm spiculated pulmonary nodule right lower lobe surrounding the subsegmental bronchi.    Coronary artery calcification: Present.    Aorta: Atherosclerosis. No Aneurysm.    Pleural space:No pleural effusion or pneumothorax.    Heart: Normal size. No pericardial effusion.    Bones/joints: No acute osseous finding.    Other: No mediastinal or axillary lymphadenopathy. No acute finding in the visualized upper abdomen.  The gallbladder is surgically absent.  Impression: Slowly enlarging spiculated right lower lobe pulmonary nodule surrounding subsegmental bronchi.  Consider biopsy.    Electronically signed by: Marcus Jenkins  Date:    08/29/2023  Time:    16:20              Office Spirometry Results:         9/13/2023     9:04 AM 9/5/2023    10:48 AM 8/29/2023  "   11:00 PM 8/29/2023    10:12 PM 8/29/2023     9:00 PM 8/29/2023     7:30 PM 8/29/2023     6:30 PM   Pulmonary Function Tests   SpO2 96 %  95 % 97 % 98 % 97 % 99 %   Height 5' 10" (1.778 m) 5' 10" (1.778 m)        Weight 110.2 kg (242 lb 15.2 oz) 111.3 kg (245 lb 6 oz)        BMI (Calculated) 34.9 35.2              9/13/2023     9:04 AM   Pulmonary Studies Review   SpO2 96 %   Height 5' 10" (1.778 m)   Weight 110.2 kg (242 lb 15.2 oz)   BMI (Calculated) 34.9   Predicted Distance 374.55   Predicted Distance Meters (Calculated) 506.58 meters       REVIEW OF OUTSIDE RECORDS:  Narrative    XR RIBS 2 VIEW RIGHT W PA CHEST     INDICATION: right-sided chest/rib pain,  fall     COMPARISON: Chest radiograph dated 3/27/2023.     FINDINGS:     AP view of the chest and 6 views of the right rib cage were obtained.     Support Devices: None.   Cardiovascular/Mediastinum: The cardiopericardial silhouette is within normal limits. The mediastinal and hilar structures are normal.   Pulmonary: No focal consolidation. No pneumothorax. No pleural effusion.   Musculoskeletal: No acute rib fracture. No suspicious osseous lesion.  Procedure Note    Hernando Gaspar MD - 04/04/2023   Formatting of this note might be different from the original.   XR RIBS 2 VIEW RIGHT W PA CHEST     INDICATION: right-sided chest/rib pain,  fall     COMPARISON: Chest radiograph dated 3/27/2023.     FINDINGS:     AP view of the chest and 6 views of the right rib cage were obtained.     Support Devices: None.   Cardiovascular/Mediastinum: The cardiopericardial silhouette is within normal limits. The mediastinal and hilar structures are normal.   Pulmonary: No focal consolidation. No pneumothorax. No pleural effusion.   Musculoskeletal: No acute rib fracture. No suspicious osseous lesion.     IMPRESSION:     No acute fracture or traumatic malalignment. No pneumothorax.  Exam End: 04/04/23 19:14 Last Resulted: 04/04/23 19:18   Received From: [facility]  Result Received: 04/11/23 08:54            Assessment:            Nodule of lower lobe of right lung  -     CT Biopsy Lung w/ guidance; Future; Expected date: 09/13/2023    COPD exacerbation  -     predniSONE (DELTASONE) 20 MG tablet; Prednisone 60 mg/ day for 3 days, 40 mg/day for 3 days,20 mg/ day for 3 days, (1/2 tablet )10 mg a day for 3 days.  Dispense: 20 tablet; Refill: 0  -     albuterol (PROVENTIL) 2.5 mg /3 mL (0.083 %) nebulizer solution; Take 3 mLs (2.5 mg total) by nebulization every 4 to 6 hours as needed for Wheezing or Shortness of Breath.  Dispense: 360 mL; Refill: 11  -     NEBULIZER KIT (SUPPLIES) FOR HOME USE  -     NEBULIZER FOR HOME USE  -     amoxicillin-clavulanate 875-125mg (AUGMENTIN) 875-125 mg per tablet; Take 1 tablet by mouth every 12 (twelve) hours.  Dispense: 28 tablet; Refill: 0  -     promethazine-codeine 6.25-10 mg/5 ml (PHENERGAN WITH CODEINE) 6.25-10 mg/5 mL syrup; Take 5 mLs by mouth every 4 (four) hours as needed for Cough.  Dispense: 200 mL; Refill: 0  -     QUANTIFERON GOLD TB; Future; Expected date: 09/13/2023  -     Fungitell Assay For (1.3)-B-D-Glucans; Future; Expected date: 09/13/2023          Outpatient Encounter Medications as of 9/13/2023   Medication Sig Dispense Refill    ADMELOG SOLOSTAR U-100 INSULIN 100 unit/mL pen       albuterol (PROVENTIL) 2.5 mg /3 mL (0.083 %) nebulizer solution Take 3 mLs (2.5 mg total) by nebulization every 4 to 6 hours as needed for Wheezing or Shortness of Breath. 360 mL 11    albuterol (VENTOLIN HFA) 90 mcg/actuation inhaler Inhale 2 puffs into the lungs every 6 (six) hours as needed for Wheezing. Rescue 18 g 11    amoxicillin-clavulanate 875-125mg (AUGMENTIN) 875-125 mg per tablet Take 1 tablet by mouth every 12 (twelve) hours. 28 tablet 0    ascorbic acid (VITAMIN C) 100 MG tablet Take 100 mg by mouth once daily.      aspirin (ECOTRIN) 81 MG EC tablet Take 81  "mg by mouth once daily.      atenoloL (TENORMIN) 25 MG tablet TAKE 1 TABLET (25 MG TOTAL) BY MOUTH ONCE DAILY. 30 tablet 11    atorvastatin (LIPITOR) 80 MG tablet TAKE 1 TABLET BY MOUTH ONCE DAILY. 30 tablet 11    azelastine (ASTELIN) 137 mcg (0.1 %) nasal spray       BD ULTRA-FINE [place] PEN NEEDLE 32 gauge x 5/32" Ndle Inject 1 each into the skin 3 (three) times daily.      busPIRone (BUSPAR) 30 MG Tab       clopidogreL (PLAVIX) 75 mg tablet TAKE 1 TABLET BY MOUTH ONCE DAILY 90 tablet 2    dapagliflozin propanediol (FARXIGA) 10 mg tablet Take 1 tablet (10 mg total) by mouth once daily. 30 tablet 6    diclofenac sodium (VOLTAREN) 1 % Gel Apply 2 g topically 3 (three) times daily. 1 Tube 2    dicyclomine (BENTYL) 20 mg tablet Take 1 tablet (20 mg total) by mouth 3 (three) times daily. 90 tablet 2    doxycycline (MONODOX) 100 MG capsule Take 1 capsule (100 mg total) by mouth every 12 (twelve) hours. 20 capsule 0    [] doxycycline (VIBRAMYCIN) 100 MG Cap Take 100 mg by mouth.      famotidine (PEPCID) 20 MG tablet Take 1 tablet (20 mg total) by mouth 2 (two) times daily. 60 tablet 11    FLUoxetine (PROZAC) 40 MG capsule TAKE 1 CAPSULE BY MOUTH ONCE DAILY 90 capsule 0    fluticasone propionate (FLONASE) 50 mcg/actuation nasal spray       gabapentin (NEURONTIN) 300 MG capsule Take 3 capsules (900 mg total) by mouth 3 (three) times daily. 270 capsule 11    glipiZIDE (GLUCOTROL) 10 MG tablet Take 1 tablet (10 mg total) by mouth 2 (two) times daily. 60 tablet 0    isosorbide mononitrate (IMDUR) 30 MG 24 hr tablet TAKE 1 TABLET (30 MG TOTAL) BY MOUTH ONCE DAILY. 30 tablet 11    LANTUS SOLOSTAR U-100 INSULIN glargine 100 units/mL SubQ pen Inject into the skin.      linaCLOtide (LINZESS) 145 mcg Cap capsule Take 1 capsule (145 mcg total) by mouth before breakfast. 30 capsule 2    lisinopril 10 MG tablet TAKE 1 TABLET (10 MG TOTAL) BY MOUTH ONCE DAILY. 30 tablet 3    methylPREDNISolone (MEDROL DOSEPACK) 4 mg tablet use as " "directed 1 each 0    mometasone (ELOCON) 0.1 % solution Apply topically.      NOVOLOG FLEXPEN U-100 INSULIN 100 unit/mL (3 mL) InPn pen       omeprazole (PRILOSEC) 40 MG capsule Take 1 capsule (40 mg total) by mouth once daily. 30 capsule 11    pantoprazole (PROTONIX) 40 MG tablet TAKE 1 TABLET BY MOUTH ONCE DAILY 30 tablet 1    PEN NEEDLE 31 gauge x 5/16" Ndle USE 5 TIMES DAILY WITH LANTUS AND HUMALOG 100 each 3    predniSONE (DELTASONE) 20 MG tablet Prednisone 60 mg/ day for 3 days, 40 mg/day for 3 days,20 mg/ day for 3 days, (1/2 tablet )10 mg a day for 3 days. 20 tablet 0    promethazine (PHENERGAN) 25 MG tablet       promethazine-codeine 6.25-10 mg/5 ml (PHENERGAN WITH CODEINE) 6.25-10 mg/5 mL syrup Take 5 mLs by mouth every 4 (four) hours as needed for Cough. 200 mL 0    rimegepant 75 mg odt Take 1 tablet (75 mg total) by mouth as needed for Migraine (max dose 3 doses within 1 week). Place ODT tablet on the tongue; alternatively the ODT tablet may be placed under the tongue 8 tablet 5    spironolactone (ALDACTONE) 50 MG tablet Take 1 tablet (50 mg total) by mouth once daily. 30 tablet 11    tiZANidine (ZANAFLEX) 4 MG tablet TAKE 1 TO 1 AND 1/2 TABLETS (4 TO 6MG TOTAL) BY MOUTH NIGHTLY AS NEEDED. 45 tablet 5    traMADoL (ULTRAM) 50 mg tablet Take 50 mg by mouth every 6 (six) hours as needed.      TRUE METRIX GLUCOSE TEST STRIP Strp       TRULICITY 0.75 mg/0.5 mL pen injector       [DISCONTINUED] ALPRAZolam (XANAX) 0.25 MG tablet Take 0.25 mg by mouth daily as needed.      [DISCONTINUED] ALPRAZolam (XANAX) 0.5 MG tablet Take 0.5 mg by mouth 2 (two) times daily as needed.      [DISCONTINUED] HYDROcodone-acetaminophen (NORCO) 5-325 mg per tablet Take 1 tablet by mouth every 6 (six) hours as needed for Pain. 8 tablet 0     Facility-Administered Encounter Medications as of 9/13/2023   Medication Dose Route Frequency Provider Last Rate Last Admin    sodium chloride 0.9% flush 10 mL  10 mL Intravenous PRN Brian Delagdo " MD [facility]         Plan:       Requested Prescriptions     Signed Prescriptions Disp Refills    predniSONE (DELTASONE) 20 MG tablet 20 tablet 0     Sig: Prednisone 60 mg/ day for 3 days, 40 mg/day for 3 days,20 mg/ day for 3 days, (1/2 tablet )10 mg a day for 3 days.    albuterol (PROVENTIL) 2.5 mg /3 mL (0.083 %) nebulizer solution 360 mL 11     Sig: Take 3 mLs (2.5 mg total) by nebulization every 4 to 6 hours as needed for Wheezing or Shortness of Breath.    amoxicillin-clavulanate 875-125mg (AUGMENTIN) 875-125 mg per tablet 28 tablet 0     Sig: Take 1 tablet by mouth every 12 (twelve) hours.    promethazine-codeine 6.25-10 mg/5 ml (PHENERGAN WITH CODEINE) 6.25-10 mg/5 mL syrup 200 mL 0     Sig: Take 5 mLs by mouth every 4 (four) hours as needed for Cough.     Problem List Items Addressed This Visit    None  Visit Diagnoses       Nodule of lower lobe of right lung    -  Primary    Relevant Orders    CT Biopsy Lung w/ guidance    COPD exacerbation        Relevant Medications    predniSONE (DELTASONE) 20 MG tablet    albuterol (PROVENTIL) 2.5 mg /3 mL (0.083 %) nebulizer solution    amoxicillin-clavulanate 875-125mg (AUGMENTIN) 875-125 mg per tablet    promethazine-codeine 6.25-10 mg/5 ml (PHENERGAN WITH CODEINE) 6.25-10 mg/5 mL syrup    Other Relevant Orders    NEBULIZER KIT (SUPPLIES) FOR HOME USE    NEBULIZER FOR HOME USE    QUANTIFERON GOLD TB    Fungitell Assay For (1.3)-B-D-Glucans             Follow up in about 3 weeks (around 10/4/2023) for Review progress.    MEDICAL DECISION MAKING: Moderate to high complexity.  Overall, the multiple problems listed are of moderate to high severity that may impact quality of life and activities of daily living. Side effects of medications, treatment plan as well as options and alternatives reviewed and discussed with patient. There was counseling of patient concerning these issues.    Total time spent in counseling and coordination of care - 45  minutes of total time spent on  the encounter, which includes face to face time and non-face to face time preparing to see the patient (eg, review of tests), Obtaining and/or reviewing separately obtained history, Documenting clinical information in the electronic or other health record, Independently interpreting results (not separately reported) and communicating results to the patient/family/caregiver, or Care coordination (not separately reported).    Time was used in discussion of prognosis, risks, benefits of treatment, instructions and compliance with regimen . Discussion with other physicians and/or health care providers - home health or for use of durable medical equipment (oxygen, nebulizers, CPAP, BiPAP) occurred.

## 2023-09-13 NOTE — PATIENT INSTRUCTIONS
STOP PLAVIX and ASA     [facility]  [address] [place] [address]  (off [address])    Lung Biopsy Appointment    The radiologist will review your CT scan to determine when the lung biopsy will be scheduled. If you do not hear from the radiology department in 48 hours then call [phone].     This test is done in the Radiology Department on the 1st floor of [facility]    Lung Biopsy is performed to diagnose or determine the level of lung disease that cant be determined by regular lab testing.    Prior to Lung Biopsy: (VERY IMPORTANT-PLEASE READ)  You must be fasting: nothing to eat or drink after midnight the night before the test.  You must have a responsible adult to drive you home after procedure and stay with you the rest of the day.  Plan to stay approximately 4 hours after procedure is completed.  If you are on  Plavix, Aspirin or NSAIDS must be stopped 7 days prior to the procedure. (YOU MUST LET YOUR PHYSICIAN KNOW)  If you are on Coumadin / Warfarin then will need to stop 5 days prior to procedure  The ordering physician may order lab work to be done within 7 days of the procedure, to determine your blood clotting factor.  Diabetic patients: If you take oral glucose pills, dont take it the morning of the biopsy.    If you take insulin, take your normal dose as usual.  Blood pressure medications are to be taken the morning of procedure with a small sip of water at least 2 hours before biopsy.  Based on your medical condition, your physician may request other specific preparation.     During the test you will lie on your back with your right hand behind your head. You will need to lie completely still while the best spot to insert the biopsy needle is determine. Ultrasound, MRI, or CT may also be used to locate a specific spot in the lung. Then, your skin will be cleaned and a local anesthetic (numbing) will be applied to the skin. You will also be  given a light sedation. A biopsy needle will be inserted and advanced to the surface of the lung, where a tissue sample is obtained. After the biopsy you will remain lying on your right side for 2 hours. You may experience some pain around the site or in the shoulder. This isnt uncommon. Then, you will change position to lying on your back for 2-3 hours. You will be monitored closely during this time in recovery. You will be able to drink 2 hours and eat 4 hours after the procedure. The biopsy site will be dressed. A small amount of blood on dressing is normal.    What to expect when home for Lung Biopsy:  Bed rest for the rest of the day and evening.  Blood thinning medications can be resumed when instructed by prescribing physician.  Avoid heavy lifting and strenuous exercise for the next 2-3 days following the procedure.  You can resume your normal diet.  You wont be able to travel by airplane for up to 5 days after biopsy.    Contact doctors office or go to ER if you experience:  Excessive bleeding saturated dressing or bleeding that will not stop by biopsy site  Fever or chills  Severe pain in the abdomen  Difficulty breathing    If your biopsy needs to be rescheduled, please contact the Radiology Department at [phone].        .

## 2023-09-14 ENCOUNTER — PATIENT MESSAGE (OUTPATIENT)
Dept: PULMONOLOGY | Facility: CLINIC | Age: 48
End: 2023-09-14
Payer: MEDICAID

## 2023-09-14 ENCOUNTER — TELEPHONE (OUTPATIENT)
Dept: PULMONOLOGY | Facility: CLINIC | Age: 48
End: 2023-09-14
Payer: MEDICAID

## 2023-09-14 LAB
GAMMA INTERFERON BACKGROUND BLD IA-ACNC: 0.06 IU/ML
M TB IFN-G CD4+ BCKGRND COR BLD-ACNC: 0 IU/ML
M TB IFN-G CD4+ BCKGRND COR BLD-ACNC: 0.02 IU/ML
MITOGEN IGNF BCKGRD COR BLD-ACNC: 9.94 IU/ML
TB GOLD PLUS: NEGATIVE

## 2023-09-14 NOTE — PROGRESS NOTES
CALLED AND DISCUSSED NAVIGATIONAL BRONCHOSCOPY WITH DR. MCKENZIE AT [facility]   He has agreed to see the patient    Called patient and she has agreed to proceed     Will need to get patient CT scans on disk for further evaluation

## 2023-09-14 NOTE — TELEPHONE ENCOUNTER
Called patient  She agreed to navigational bronchoscopy    Discussed case with Dr. Frazier ([phone])  He will contact and schedule

## 2023-09-15 LAB
1,3 BETA GLUCAN SER-MCNC: <31 PG/ML
FUNGITELL COMMENTS: NEGATIVE

## 2023-09-18 ENCOUNTER — CLINICAL SUPPORT (OUTPATIENT)
Dept: REHABILITATION | Facility: HOSPITAL | Age: 48
End: 2023-09-18
Payer: MEDICAID

## 2023-09-18 ENCOUNTER — TELEPHONE (OUTPATIENT)
Dept: PULMONOLOGY | Facility: CLINIC | Age: 48
End: 2023-09-18
Payer: MEDICAID

## 2023-09-18 DIAGNOSIS — E11.42 DIABETIC PERIPHERAL NEUROPATHY ASSOCIATED WITH TYPE 2 DIABETES MELLITUS: ICD-10-CM

## 2023-09-18 DIAGNOSIS — M54.50 CHRONIC MIDLINE LOW BACK PAIN WITHOUT SCIATICA: ICD-10-CM

## 2023-09-18 DIAGNOSIS — R26.81 UNSTEADY GAIT: ICD-10-CM

## 2023-09-18 DIAGNOSIS — G89.29 CHRONIC MIDLINE LOW BACK PAIN WITHOUT SCIATICA: ICD-10-CM

## 2023-09-18 PROCEDURE — 97161 PT EVAL LOW COMPLEX 20 MIN: CPT | Mod: PN

## 2023-09-18 PROCEDURE — 97112 NEUROMUSCULAR REEDUCATION: CPT | Mod: PN

## 2023-09-20 ENCOUNTER — PATIENT MESSAGE (OUTPATIENT)
Dept: ORTHOPEDICS | Facility: CLINIC | Age: 48
End: 2023-09-20
Payer: MEDICAID

## 2023-09-20 NOTE — PLAN OF CARE
[facility]   Physical Therapy Initial Evaluation    Date: 9/18/2023     Name: Jenifer Westfall  Clinic Number: [id number]  Therapy Diagnosis:   Encounter Diagnoses   Name Primary?    Diabetic peripheral neuropathy associated with type 2 diabetes mellitus     Chronic midline low back pain without sciatica     Unsteady gait       Physician: Latonia Silver NP      Physician Orders: PT Eval and Treat  Medical Diagnosis from Referral:   E11.42 (ICD-10-CM) - Diabetic peripheral neuropathy associated with type 2 diabetes mellitus   M54.50,G89.29 (ICD-10-CM) - Chronic midline low back pain without sciatica   R26.81 (ICD-10-CM) - Unsteady gait     Evaluation Date: 9/18/2023  Authorization Period Expiration: 09/05/2025  Plan of Care Expiration: 11/17/2023    Progress Update: 10/18/2023   Visit # / Visits authorized: 1 / 1   FOTO: Visit #1 9/18/2023 - Scored: 1 / 3     PRECAUTIONS: Standard Precautions, Safety/fall precautions, and Diabetes:      Time In: 1415  Time Out: 1455  Total Appointment Time (timed & untimed codes): 40 minutes    SUBJECTIVE     Date of onset: 09/05/2023  Chief Complaint: back pain    History of current condition - Jenifer is a 48 y.o. female who presents to physical therapy reporting low back pain.  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  She has left foot neuropathy which she accounts for her falls.    States she was unable to get foot flat to fit orthotic..  States A1C is high and is on new insulin.   Maybe able to get spine stimulator if her A1C improves.  States she has shots in her back.  States she sleeps in a recliner dure acid reflux and easier to get in and out of.    Falls: [] No  [x] Yes:     Imaging: [] Xray [] MRI [] CT: Reviewed    Prior Therapy:  [] No  [x] Yes:   Social History: Pt lives with their family [x] House [] Apartment/Condo []other  Stairs: [] No  [x] Yes:   Occupation: None currently  Prior Level of Function: Independent with all  activities of daily living  Current Level of Function: Amb with cane with drop foot on left.    Pain:  Current 8/10, worst 10/10, best 3 /10   Location: low back and left leg  Description: Stings and burns  Aggravating Factors: walking and prolonged standing  Easing Factors: activity avoidance, rest    Pts goals: Pt reported goals are to improve pain and function    _______________________________________________________  Medical History:   Past Medical History:   Diagnosis Date    Arthritis     DM (diabetes mellitus)      2017 Insulin x 3 years     Hepatitis C antibody positive in blood 2021    RNA NEGATIVE 3/6/2017, 3/22/22    Hyperlipidemia     Hypertension     IBS (irritable bowel syndrome)     Kidney stone     Marfan syndrome     Mitral valve prolapse        Surgical History:   Jenifer Westfall  has a past surgical history that includes Cholecystectomy; Bunionectomy; Tubal ligation;  section; Appendectomy; Angiography of lower extremity (N/A, 2018); cataract surgery (2019); Esophagogastroduodenoscopy (N/A, 2019); Colonoscopy (N/A, 2020); Selective injection of anesthetic agent around lumbar spinal nerve root by transforaminal approach (Bilateral, 2020); Esophagogastroduodenoscopy (N/A, 2022); Bypass Graft (Bilateral); and Esophageal manometry with measurement of impedance (N/A, 2023).    Medications:   Jenifer has a current medication list which includes the following prescription(s): admelog solostar u-100 insulin, albuterol, albuterol, amoxicillin-clavulanate 875-125mg, ascorbic acid (vitamin c), aspirin, atenolol, atorvastatin, azelastine, bd ultra-fine jose pen needle, buspirone, clopidogrel, farxiga, diclofenac sodium, dicyclomine, doxycycline, famotidine, fluoxetine, fluticasone propionate, gabapentin, glipizide, isosorbide mononitrate, lantus solostar u-100 insulin, linaclotide, lisinopril, methylprednisolone, mometasone, novolog  flexpen u-100 insulin, omeprazole, pantoprazole, pen needle, prednisone, promethazine, promethazine-codeine 6.25-10 mg/5 ml, rimegepant, spironolactone, tizanidine, tramadol, true metrix glucose test strip, and trulicity, and the following Facility-Administered Medications: sodium chloride 0.9%.    Allergies:   Review of patient's allergies indicates:   Allergen Reactions    Compazine [prochlorperazine edisylate] Rash    Contrast media Anaphylaxis    Dye Anaphylaxis     Contrast dye    Iodinated contrast media Anaphylaxis     Other reaction(s): anaphylaxis    Levaquin [levofloxacin] Hives and Itching    Morphine Other (See Comments)     Irritable  Iritability  Irritable    Prochlorperazine Rash    Metformin Other (See Comments)     Upset stomach    Ibuprofen Other (See Comments) and Nausea And Vomiting     Stomach pain  Stomach pain    Abdominal pain     Methocarbamol Nausea Only          OBJECTIVE     Sensation:  Sensation is [x] Intact [] Impaired-- to light touch        LUMBAR-   Lumbar   Range of Motion Right%  (spine) Left% Function   & Pain Goal   Lumbar Flexion  20  []Functional  []Dysfunctional  []Painful  []Non-Painful 50º  Functional   Nonpainful   Lumbar Extension 10  []Functional  []Dysfunctional  []Painful  []Non-Painful 20º  Functional   Nonpainful   Lumbar Side Bending  NT due to instability NT due to instability []Functional  []Dysfunctional  []Painful  []Non-Painful 20º  Functional   Nonpainful   Lumbar Rotation NT due to instability NT due to instability []Functional  []Dysfunctional  []Painful  []Non-Painful 45º  Functional   Nonpainful      HIP-   Hip   Range of Motion Right   Left   Goal   Hip Flexion (120º) 90 85 120º   Hip Abduction (45º) Not tested Not tested  30º   Hip Extension (20º) Not tested  Not tested  15º    (*) pain and/or dysfunction(*) pain and/or dysfunction      LE STRENGTH -   Lower Extremity  Strength RIGHT   Goal   Hip Flexion  []1  []2  [x]3  []4  []5                []+ [x]-  4/5 B    Hip Extension  []1  []2  [x]3  []4  []5                [x]+ []- 4/5 B   Hip Abduction  []1  []2  [x]3  []4  []5                [x]+ []- 4/5 B   Knee Flexion []1  []2  []3  [x]4  []5                []+ [x]- 4/5 B   Knee Extension []1  []2  []3  [x]4  []5                []+ [x]- 4/5 B   Ankle Dorsiflexion []1  []2  []3  [x]4  []5                []+ [x]- 5/5 B   Ankle Plantarflexion []1  []2  []3  [x]4  []5                []+ [x]- 5/5 B     Lower Extremity  Strength LEFT   Goal   Hip Flexion  []1  []2  [x]3  []4  []5                []+ [x]- 4/5 B    Hip Extension  []1  []2  [x]3  []4  []5                [x]+ []- 4/5 B   Hip Abduction  []1  []2  [x]3  []4  []5                [x]+ []- 4/5 B   Knee Flexion []1  []2  []3  [x]4  []5                []+ [x]- 4/5 B   Knee Extension []1  []2  []3  [x]4  []5                []+ [x]- 4/5 B   Ankle Dorsiflexion []1  [x]2  []3  [x]4  []5                []+ [x]-    Ankle Plantarflexion []1  []2  [x]3  []4  []5                [x]+ []-       Severely tight hamstring bilaterally to 20-30 degrees from the mat in supine.    FUNCTION:     Intake Outcome Measure for FOTO NOC-Neuromuscular Disorder Survey    Therapist reviewed FOTO scores for Jenifer Westfall on 9/18/2023.   FOTO documents entered into [email or web address] - see Media section.    Intake Score: 40%         TREATMENT     Total Treatment time separate from Evaluation: (20) minutes    Jenifer received the following interventions:     Neuromuscular re-education activities to improve: Balance, Coordination, Kinesthetic, Sense, Proprioception, and Posture for 15 minutes. The following activities were included:       Activity   Details    Left manual calf stretch x     Sit to stand transfers x     Supine to sit transfer technique x     Supine marching x     Bridges x     Posterior pelvic tilts x        PATIENT EDUCATION AND HOME EXERCISES     Education/Self-Care provided:  (included in treatment) minutes   Patient educated on the impairments  noted above and the effects of physical therapy intervention to improve overall condition and Quality of Life  Patient was educated on all the above exercise prior/during/after for proper posture, positioning, and execution for safe performance with home exercise program.     Written Home Exercises Provided: yes. Prefers: [x] Printed [x] Electronic  Exercises were reviewed and Jenifer was able to demonstrate them prior to the end of the session.  Jenifer demonstrated good understanding of the education provided. See EMR under Patient Instructions for exercises provided during therapy sessions.      ASSESSMENT     Jenifer is a 48 y.o. female referred to outpatient Physical Therapy with a medical diagnosis of   Encounter Diagnoses   Name Primary?    Diabetic peripheral neuropathy associated with type 2 diabetes mellitus     Chronic midline low back pain without sciatica     Unsteady gait    .    Physical exam supports weakness and neuropathy and weakness impairments including: decreased range of motion, decreased muscular strength, decreased endurance, decreased muscular length/flexibility, impaired joint mobility, and impaired functional mobility.  She also demonstrates left plantarflexion contracture.    The above impairments will be addressed through manual therapy techniques, therapeutic exercises, functional training, and modalities as necessary. Patient was treated and educated on exercises for home program, progression of therapy, and benefits of therapy to achieve full functional mobility.       Pt prognosis is Good.   Pt will benefit from skilled outpatient Physical Therapy to address the deficits stated above and in the chart below, provide pt/family education, and to maximize pt's level of independence.     Plan of care discussed with patient: Yes  Pt's spiritual, cultural and educational needs considered and patient is agreeable to the plan of care and goals as stated below:     Anticipated Barriers for therapy:  none    Medical Necessity is demonstrated by the following:     History  Co-morbidities and personal factors that may impact the plan of care [x] LOW: no personal factors / co-morbidities  [] MODERATE: 1-2 personal factors / co-morbidities  [] HIGH: 3+ personal factors / co-morbidities    Moderate / High Support Documentation:   Co-morbidities affecting plan of care:   Past Medical History:   Diagnosis Date    Arthritis     DM (diabetes mellitus) 2009     03/01/2017 Insulin x 3 years 2013    Hepatitis C antibody positive in blood 03/04/2021    RNA NEGATIVE 3/6/2017, 3/22/22    Hyperlipidemia     Hypertension     IBS (irritable bowel syndrome)     Kidney stone     Marfan syndrome     Mitral valve prolapse        Personal Factors:   no deficits     Examination  Body Structures and Functions, activity limitations and participation restrictions that may impact the plan of care [x] LOW: addressing 1-2 elements  [] MODERATE: 3+ elements  [] HIGH: 4+ elements (please support below)    Moderate / High Support Documentation:      Clinical Presentation [x] LOW: stable  [] MODERATE: Evolving  [] HIGH: Unstable     Decision Making/ Complexity Score: low         SHORT TERM GOALS:  4 weeks  Progress Date Met   Recent signs and systems trend is improving in order to progress towards Long term goals.  [] Met  [] Not Met  [] Progressing    Patient will be independent with Home Exercise Program  in order to further progress and return to maximal function. [] Met  [] Not Met  [] Progressing    Pain rating at Worst: 5 /10 in order to progress towards increased independence with activity. [] Met  [] Not Met  [] Progressing    Patient will be able to correct postural deviations in sitting and standing, to decrease pain and promote postural awareness for injury prevention.  [] Met  [] Not Met  [] Progressing    Patient will improve functional outcome (FOTO) score: by 5% to increase self-worth & perceived functional ability towards  long term goals [] Met  [] Not Met  [] Progressing      LONG TERM GOALS: 8 weeks    Progress Date Met   Patient will return to normal activites of daily living, recreational, and work related activities with less pain and limitation.  [] Met  [] Not Met  [] Progressing    Patient will improve range of motion  to stated goals in order to return to maximal functional potential.  [] Met  [] Not Met  [] Progressing    Patient will improve Strength to stated goals of appropriate musculature in order to improve functional independence.  [] Met  [] Not Met  [] Progressing    Pain Rating at Best: 1/10 to improve Quality of Life.  [] Met  [] Not Met  [] Progressing    Patient will meet predicted functional outcome (FOTO) score: 50% to increase self-worth & perceived functional ability. [] Met  [] Not Met  [] Progressing    Patient will have met/partially met personal goal of: improve pain and function in transfers and gaitr [] Met  [] Not Met  [] Progressing          PLAN   Plan of care Certification: 9/18/2023 to 11/17/2023    Outpatient Physical Therapy 2 times weekly for 8 weeks to include any combination of the following interventions: virtual visits, dry needling, modalities, electrical stimulation (IFC, Pre-Mod, Attended with Functional Dry Needling), Gait Training, Manual Therapy, Moist Heat/ Ice, Neuromuscular Re-ed, Patient Education, Self Care, Therapeutic Exercise, Functional Training, and Therapeutic Activites     Thank you for this referral.    William Uribe, PT

## 2023-09-23 NOTE — PROGRESS NOTES
Orthopaedic Follow-Up Visit    Last Appointment: 12/13/22  Diagnosis: Right shoulder rotator cuff arthropathy, cervical radiculopathy  Prior Procedure: None    Jenifer Westfall is a 48 y.o. female who is here for f/u evaluation of her right shoulder. The patient was last seen here by me on 12/13/22 at which point she had right shoulder pain and known rotator cuff tear on the right side.  She also had known peripheral neuropathy that severely affected her left foot and seemed to also be affecting her left hand. Because of her neuropathy, she used a cane in her right hand for ambulation.  We discussed that I thought she would have a very difficult time with any kind of operative treatment because of her left lower extremity neuropathy and need to use a cane in her right hand. We discussed that I felt it would be reasonable to proceed with injection in the right shoulder for symptomatic treatment of pain as result of her cuff tear, however we were unable to perform injection due to A1c 12.0 with reviewing of her last lab from (3/28/2022) and possible upcoming spine surgery. Discussed if she has preoperative testing for her spine surgery and her A1c decreases that we can consider a right shoulder CSI in the future. The patient returns today reporting that the symptoms in her shoulder have continued. She reports her most recent A1C on 8/16/23 was 8.4.      To review her history, Jenifer Westfall is a 47 y.o. right-hand dominant female who presented on 12/13/22 with RIGHT shoulder and neck pain and dysfunction that began about 3 years ago. She had gradual onset of symptoms for her right shoulder and neck. Her symptoms included intermittent sharp, stabbing pain of the right shoulder which radiates to right neck. Her pain was aggravated by movement. She had tried rest, activity modification, Zanaflex, Gabapentin, and physical therapy.     Patient's medications, allergies, past medical, surgical, social and family histories were  reviewed and updated as appropriate.    Review of Systems   All systems reviewed were negative.  Specifically, the patient denies fever, chills, weight loss, chest pain, shortness of breath, or dyspnea on exertion.      Past Medical History:   Diagnosis Date    Arthritis     DM (diabetes mellitus)      2017 Insulin x 3 years     Hepatitis C antibody positive in blood 2021    RNA NEGATIVE 3/6/2017, 3/22/22    Hyperlipidemia     Hypertension     IBS (irritable bowel syndrome)     Kidney stone     Marfan syndrome     Mitral valve prolapse        Past Surgical History:   Procedure Laterality Date    ANGIOGRAPHY OF LOWER EXTREMITY N/A 2018    Procedure: ANGIOGRAM, LOWER EXTREMITY- LEFT LEG;  Surgeon: Roscoe Landeros MD;  Location: [facility] CATH LAB;  Service: Peripheral Vascular;  Laterality: N/A;    APPENDECTOMY      BUNIONECTOMY      both feet    BYPASS GRAFT Bilateral     cataract surgery  2019     SECTION      x 2    CHOLECYSTECTOMY      COLONOSCOPY N/A 2020    Procedure: COLONOSCOPY w/ biopsies;  Surgeon: Gio Georges MD;  Location: [facility];  Service: Endoscopy;  Laterality: N/A;    ESOPHAGEAL MANOMETRY WITH MEASUREMENT OF IMPEDANCE N/A 2023    Procedure: MANOMETRY, ESOPHAGUS, WITH IMPEDANCE MEASUREMENT;  Surgeon: Desmond Calderon RN;  Location: [facility];  Service: Endoscopy;  Laterality: N/A;    ESOPHAGOGASTRODUODENOSCOPY N/A 2019    Procedure: ESOPHAGOGASTRODUODENOSCOPY (EGD);  Surgeon: Jaron Sanders III, MD;  Location: [facility];  Service: Endoscopy;  Laterality: N/A;    ESOPHAGOGASTRODUODENOSCOPY N/A 2022    Procedure: EGD (ESOPHAGOGASTRODUODENOSCOPY);  Surgeon: Keith Hardwick MD;  Location: [facility];  Service: Gastroenterology;  Laterality: N/A;    SELECTIVE INJECTION OF ANESTHETIC AGENT AROUND LUMBAR SPINAL NERVE ROOT BY TRANSFORAMINAL APPROACH Bilateral 2020    Procedure: Bilateral L5/S1 TF DENY;  Surgeon: Jewel Walters MD;   "Location: [facility] PAIN T;  Service: Pain Management;  Laterality: Bilateral;    TUBAL LIGATION         Patient's Medications   New Prescriptions    No medications on file   Previous Medications    ADMELOG SOLOSTAR U-100 INSULIN 100 UNIT/ML PEN        ALBUTEROL (PROVENTIL) 2.5 MG /3 ML (0.083 %) NEBULIZER SOLUTION    Take 3 mLs (2.5 mg total) by nebulization every 4 to 6 hours as needed for Wheezing or Shortness of Breath.    ALBUTEROL (VENTOLIN HFA) 90 MCG/ACTUATION INHALER    Inhale 2 puffs into the lungs every 6 (six) hours as needed for Wheezing. Rescue    AMOXICILLIN-CLAVULANATE 875-125MG (AUGMENTIN) 875-125 MG PER TABLET    Take 1 tablet by mouth every 12 (twelve) hours.    ASCORBIC ACID (VITAMIN C) 100 MG TABLET    Take 100 mg by mouth once daily.    ASPIRIN (ECOTRIN) 81 MG EC TABLET    Take 81 mg by mouth once daily.    ATENOLOL (TENORMIN) 25 MG TABLET    TAKE 1 TABLET (25 MG TOTAL) BY MOUTH ONCE DAILY.    ATORVASTATIN (LIPITOR) 80 MG TABLET    TAKE 1 TABLET BY MOUTH ONCE DAILY.    AZELASTINE (ASTELIN) 137 MCG (0.1 %) NASAL SPRAY        BD ULTRA-FINE [place] PEN NEEDLE 32 GAUGE X 5/32" NDLE    Inject 1 each into the skin 3 (three) times daily.    BUSPIRONE (BUSPAR) 30 MG TAB        CLOPIDOGREL (PLAVIX) 75 MG TABLET    TAKE 1 TABLET BY MOUTH ONCE DAILY    DAPAGLIFLOZIN PROPANEDIOL (FARXIGA) 10 MG TABLET    Take 1 tablet (10 mg total) by mouth once daily.    DICLOFENAC SODIUM (VOLTAREN) 1 % GEL    Apply 2 g topically 3 (three) times daily.    DICYCLOMINE (BENTYL) 20 MG TABLET    Take 1 tablet (20 mg total) by mouth 3 (three) times daily.    DOXYCYCLINE (MONODOX) 100 MG CAPSULE    Take 1 capsule (100 mg total) by mouth every 12 (twelve) hours.    FAMOTIDINE (PEPCID) 20 MG TABLET    Take 1 tablet (20 mg total) by mouth 2 (two) times daily.    FLUOXETINE (PROZAC) 40 MG CAPSULE    TAKE 1 CAPSULE BY MOUTH ONCE DAILY    FLUTICASONE PROPIONATE (FLONASE) 50 MCG/ACTUATION NASAL SPRAY        GABAPENTIN (NEURONTIN) 300 MG " "CAPSULE    Take 3 capsules (900 mg total) by mouth 3 (three) times daily.    GLIPIZIDE (GLUCOTROL) 10 MG TABLET    Take 1 tablet (10 mg total) by mouth 2 (two) times daily.    ISOSORBIDE MONONITRATE (IMDUR) 30 MG 24 HR TABLET    TAKE 1 TABLET (30 MG TOTAL) BY MOUTH ONCE DAILY.    LANTUS SOLOSTAR U-100 INSULIN GLARGINE 100 UNITS/ML SUBQ PEN    Inject into the skin.    LINACLOTIDE (LINZESS) 145 MCG CAP CAPSULE    Take 1 capsule (145 mcg total) by mouth before breakfast.    LISINOPRIL 10 MG TABLET    TAKE 1 TABLET (10 MG TOTAL) BY MOUTH ONCE DAILY.    METHYLPREDNISOLONE (MEDROL DOSEPACK) 4 MG TABLET    use as directed    MOMETASONE (ELOCON) 0.1 % SOLUTION    Apply topically.    NOVOLOG FLEXPEN U-100 INSULIN 100 UNIT/ML (3 ML) INPN PEN        OMEPRAZOLE (PRILOSEC) 40 MG CAPSULE    Take 1 capsule (40 mg total) by mouth once daily.    PANTOPRAZOLE (PROTONIX) 40 MG TABLET    TAKE 1 TABLET BY MOUTH ONCE DAILY    PEN NEEDLE 31 GAUGE X 5/16" NDLE    USE 5 TIMES DAILY WITH LANTUS AND HUMALOG    PREDNISONE (DELTASONE) 20 MG TABLET    Prednisone 60 mg/ day for 3 days, 40 mg/day for 3 days,20 mg/ day for 3 days, (1/2 tablet )10 mg a day for 3 days.    PROMETHAZINE (PHENERGAN) 25 MG TABLET        RIMEGEPANT 75 MG ODT    Take 1 tablet (75 mg total) by mouth as needed for Migraine (max dose 3 doses within 1 week). Place ODT tablet on the tongue; alternatively the ODT tablet may be placed under the tongue    SPIRONOLACTONE (ALDACTONE) 50 MG TABLET    Take 1 tablet (50 mg total) by mouth once daily.    TIZANIDINE (ZANAFLEX) 4 MG TABLET    TAKE 1 TO 1 AND 1/2 TABLETS (4 TO 6MG TOTAL) BY MOUTH NIGHTLY AS NEEDED.    TRAMADOL (ULTRAM) 50 MG TABLET    Take 50 mg by mouth every 6 (six) hours as needed.    TRUE METRIX GLUCOSE TEST STRIP STRP        TRULICITY 0.75 MG/0.5 ML PEN INJECTOR       Modified Medications    No medications on file   Discontinued Medications    No medications on file       Family History   Problem Relation Age of Onset "    Heart disease Mother     Thrombophilia Father         DVT    Hypertension Father     Stroke Maternal Aunt     Heart disease Maternal Aunt     Stroke Maternal Uncle     Heart disease Maternal Uncle     Breast cancer Neg Hx     Colon cancer Neg Hx     Ovarian cancer Neg Hx        Review of patient's allergies indicates:   Allergen Reactions    Compazine [prochlorperazine edisylate] Rash    Contrast media Anaphylaxis    Dye Anaphylaxis     Contrast dye    Iodinated contrast media Anaphylaxis     Other reaction(s): anaphylaxis    Levaquin [levofloxacin] Hives and Itching    Morphine Other (See Comments)     Irritable  Iritability  Irritable    Prochlorperazine Rash    Metformin Other (See Comments)     Upset stomach    Ibuprofen Other (See Comments) and Nausea And Vomiting     Stomach pain  Stomach pain    Abdominal pain     Methocarbamol Nausea Only         Objective:      Physical Exam  Cervical exam. Tenderness along midline. Decreased cervical ROM. Positive Spurling's test     Physical Exam:                       RIGHT                                     LEFT     Scap Dyskinesis/Winging       (-)                                             (-)     Tenderness:                                                                              Greater Tuberosity                  +                                              (-)  Bicipital Groove                       +                                              (-)  AC joint                                   (-)                                             (-)  Other:      ROM:  Forward Elevation       100                                          180  Abduction                    90                                            120  ER (at side)                 50                                            80  IR                                 Hip                      Back pocket     Strength:   Supraspinatus             4/5                                            5/5  Infraspinatus               4/5                                           5/5  Subscap / IR               4+/5                                         5/5      Special Tests:              Neer:                                       +                                              (-)              Gordon:                                 +                                              (-)              SS Stress:                               +                                              (-)              Bear Hug:                                (-)                                             (-)              Poinsett's:                                 (-)                                             (-)              Resisted Thrower's:                +                                              (-)              Cross Arm Abduction:             (-)                                             (-)    Neurovascular examination  - Motor grossly intact bilaterally to shoulder abduction, elbow flexion and extension, wrist flexion and extension, and intrinsic hand musculature  - Sensation intact to light touch bilaterally in axillary, median, radial, and ulnar distributions  - Symmetrical radial pulses    Imaging:    XR Results:  Results for orders placed during the hospital encounter of 12/13/22    X-ray Shoulder 2 or More Views Right    Narrative  EXAMINATION:  XR SHOULDER COMPLETE 2 OR MORE VIEWS RIGHT    CLINICAL HISTORY:  Pain in right shoulder    TECHNIQUE:  Two or three views of the right shoulder were performed.    COMPARISON:  01/27/2022, 07/30/2020, 08/02/2019 and 11/19/2018.    FINDINGS:  No acute fracture or dislocation.  No significant soft tissue swelling.    Humeral head normally positioned with the glenoid cavity.  Mild glenohumeral degenerative osteoarthrosis with mild joint space narrowing and small osteophyte formation.  Chronic small calcific densities are again present along the humeral head and  proximal diaphysis of the humerus.    AC joint intact with mild degenerative osteoarthrosis.    Impression  Mild chronic changes of degenerative osteoarthrosis.      Electronically signed by: Mike Iqbal  Date:    12/13/2022  Time:    15:51      MRI Results:  Results for orders placed during the hospital encounter of 05/21/19    MRI Shoulder Without Contrast Right    Narrative  EXAMINATION:  MRI SHOULDER WITHOUT CONTRAST RIGHT    CLINICAL HISTORY:  Rotator cuff tear;  Pain in right shoulder    TECHNIQUE:  Multisequence, multiplanar MR imaging of the shoulder performed without contrast.    COMPARISON:  MRI shoulder 08/09/2017    FINDINGS:  Rotator cuff:    Supraspinatus: Complete tear present with retraction to the medial humeral head.    Infraspinatus: Intact with tendinosis    Subscapularis: Intact    Teres minor: Intact    Moderate to significant supraspinatus muscle bulk loss.    Labrum: No discrete tear with overall similar appearance    Biceps: Long head biceps tendon is intact.    Bone/cartilage: No acute fracture or concerning edema signal.  Tiny inferior posterior glenoid subcortical cyst present with minimal overlying cartilage fissuring.  Remaining glenohumeral cartilage appears intact.    Acromioclavicular joint:Degenerative hypertrophy findings with undersurface spurring.    Miscellaneous: No significant joint effusion.  Trace subacromial/subdeltoid bursal fluid present.    IMPRESSION:    Complete tear of the supraspinatus tendon with muscle atrophy.  Infraspinatus tendinosis.    Acromioclavicular joint and degenerative hypertrophy changes with undersurface spurring.  Tiny posterior-inferior glenoid subcortical cyst with minimal overlying cartilage fissuring.    Possible mild subacromial/subdeltoid bursitis.      Electronically signed by: Guevara Tinajero MD  Date:    05/21/2019  Time:    14:55      CT Results:  No results found for this or any previous visit.      Physician read: I agree with the  above impression.    Assessment/Plan:   Jenifer Westfall is a 48 y.o. female with right shoulder rotator cuff tear     Plan:    She has right shoulder pain and known rotator cuff tear on the right side.  She also has known peripheral neuropathy that severely affects her left foot and seems fairly also affecting her left hand.  Because of her neuropathy, she uses a cane in her right hand for ambulation.  Discussed with her that I think would be reasonable to proceed with injection in the right shoulder for symptomatic treatment of pain as result of her cuff tear. However, think she would have a very difficult time with any kind of operative treatment because of her left lower extremity neuropathy and need to use a cane in her right hand.  Due to her lower A1C I feel it is reasonable to proceed with right shoulder corticosteroid injection into the subacromial space at this time. The patient is in agreement with this plan.   Procedure performed today and patient tolerated the procedure well with no immediate complications.   Follow up with me as needed.           All Jain MD    I, Shahzad Ravi, acted as a scribe for All Jain MD for the duration of this office visit.

## 2023-09-27 ENCOUNTER — OFFICE VISIT (OUTPATIENT)
Dept: SPORTS MEDICINE | Facility: CLINIC | Age: 48
End: 2023-09-27
Payer: MEDICAID

## 2023-09-27 VITALS — HEIGHT: 70 IN | BODY MASS INDEX: 34.78 KG/M2 | RESPIRATION RATE: 16 BRPM | WEIGHT: 242.94 LBS

## 2023-09-27 DIAGNOSIS — M75.101 ROTATOR CUFF TEAR ARTHROPATHY OF RIGHT SHOULDER: ICD-10-CM

## 2023-09-27 DIAGNOSIS — M12.811 ROTATOR CUFF TEAR ARTHROPATHY OF RIGHT SHOULDER: ICD-10-CM

## 2023-09-27 DIAGNOSIS — M75.121 NONTRAUMATIC COMPLETE TEAR OF RIGHT ROTATOR CUFF: Primary | ICD-10-CM

## 2023-09-27 PROCEDURE — 99214 PR OFFICE/OUTPT VISIT, EST, LEVL IV, 30-39 MIN: ICD-10-PCS | Mod: S$PBB,25,, | Performed by: STUDENT IN AN ORGANIZED HEALTH CARE EDUCATION/TRAINING PROGRAM

## 2023-09-27 PROCEDURE — 99214 OFFICE O/P EST MOD 30 MIN: CPT | Mod: S$PBB,25,, | Performed by: STUDENT IN AN ORGANIZED HEALTH CARE EDUCATION/TRAINING PROGRAM

## 2023-09-27 PROCEDURE — 20610 DRAIN/INJ JOINT/BURSA W/O US: CPT | Mod: PBBFAC,RT | Performed by: STUDENT IN AN ORGANIZED HEALTH CARE EDUCATION/TRAINING PROGRAM

## 2023-09-27 PROCEDURE — 1159F MED LIST DOCD IN RCRD: CPT | Mod: CPTII,,, | Performed by: STUDENT IN AN ORGANIZED HEALTH CARE EDUCATION/TRAINING PROGRAM

## 2023-09-27 PROCEDURE — 1160F PR REVIEW ALL MEDS BY PRESCRIBER/CLIN PHARMACIST DOCUMENTED: ICD-10-PCS | Mod: CPTII,,, | Performed by: STUDENT IN AN ORGANIZED HEALTH CARE EDUCATION/TRAINING PROGRAM

## 2023-09-27 PROCEDURE — 3008F PR BODY MASS INDEX (BMI) DOCUMENTED: ICD-10-PCS | Mod: CPTII,,, | Performed by: STUDENT IN AN ORGANIZED HEALTH CARE EDUCATION/TRAINING PROGRAM

## 2023-09-27 PROCEDURE — 4010F ACE/ARB THERAPY RXD/TAKEN: CPT | Mod: CPTII,,, | Performed by: STUDENT IN AN ORGANIZED HEALTH CARE EDUCATION/TRAINING PROGRAM

## 2023-09-27 PROCEDURE — 1160F RVW MEDS BY RX/DR IN RCRD: CPT | Mod: CPTII,,, | Performed by: STUDENT IN AN ORGANIZED HEALTH CARE EDUCATION/TRAINING PROGRAM

## 2023-09-27 PROCEDURE — 99999PBSHW PR PBB SHADOW TECHNICAL ONLY FILED TO HB: ICD-10-PCS | Mod: PBBFAC,,,

## 2023-09-27 PROCEDURE — 1159F PR MEDICATION LIST DOCUMENTED IN MEDICAL RECORD: ICD-10-PCS | Mod: CPTII,,, | Performed by: STUDENT IN AN ORGANIZED HEALTH CARE EDUCATION/TRAINING PROGRAM

## 2023-09-27 PROCEDURE — 99999 PR PBB SHADOW E&M-EST. PATIENT-LVL IV: CPT | Mod: PBBFAC,,, | Performed by: STUDENT IN AN ORGANIZED HEALTH CARE EDUCATION/TRAINING PROGRAM

## 2023-09-27 PROCEDURE — 4010F PR ACE/ARB THEARPY RXD/TAKEN: ICD-10-PCS | Mod: CPTII,,, | Performed by: STUDENT IN AN ORGANIZED HEALTH CARE EDUCATION/TRAINING PROGRAM

## 2023-09-27 PROCEDURE — 3008F BODY MASS INDEX DOCD: CPT | Mod: CPTII,,, | Performed by: STUDENT IN AN ORGANIZED HEALTH CARE EDUCATION/TRAINING PROGRAM

## 2023-09-27 PROCEDURE — 99999PBSHW PR PBB SHADOW TECHNICAL ONLY FILED TO HB: Mod: PBBFAC,,,

## 2023-09-27 PROCEDURE — 99214 OFFICE O/P EST MOD 30 MIN: CPT | Mod: PBBFAC,25 | Performed by: STUDENT IN AN ORGANIZED HEALTH CARE EDUCATION/TRAINING PROGRAM

## 2023-09-27 PROCEDURE — 99999 PR PBB SHADOW E&M-EST. PATIENT-LVL IV: ICD-10-PCS | Mod: PBBFAC,,, | Performed by: STUDENT IN AN ORGANIZED HEALTH CARE EDUCATION/TRAINING PROGRAM

## 2023-09-27 PROCEDURE — 20610 LARGE JOINT ASPIRATION/INJECTION: R SUBACROMIAL BURSA: ICD-10-PCS | Mod: S$PBB,RT,, | Performed by: STUDENT IN AN ORGANIZED HEALTH CARE EDUCATION/TRAINING PROGRAM

## 2023-09-27 RX ADMIN — TRIAMCINOLONE ACETONIDE 40 MG: 200 INJECTION, SUSPENSION INTRA-ARTICULAR; INTRAMUSCULAR at 01:09

## 2023-10-04 ENCOUNTER — PATIENT MESSAGE (OUTPATIENT)
Dept: ORTHOPEDICS | Facility: CLINIC | Age: 48
End: 2023-10-04
Payer: MEDICAID

## 2023-10-09 ENCOUNTER — CLINICAL SUPPORT (OUTPATIENT)
Dept: REHABILITATION | Facility: HOSPITAL | Age: 48
End: 2023-10-09
Payer: MEDICAID

## 2023-10-09 DIAGNOSIS — R26.81 UNSTEADY GAIT: Primary | ICD-10-CM

## 2023-10-09 PROCEDURE — 97110 THERAPEUTIC EXERCISES: CPT | Mod: PN

## 2023-10-11 DIAGNOSIS — I10 ESSENTIAL HYPERTENSION: Primary | ICD-10-CM

## 2023-10-11 RX ORDER — ATENOLOL 25 MG/1
25 TABLET ORAL
Qty: 30 TABLET | Refills: 11 | Status: SHIPPED | OUTPATIENT
Start: 2023-10-11

## 2023-10-12 NOTE — PROGRESS NOTES
[facility]   Physical Therapy Treatment Note      Name: Jenifer Westfall  Clinic Number: [id number]    Therapy Diagnosis:   Encounter Diagnosis   Name Primary?    Unsteady gait Yes     Physician: Latonia Silver NP    Visit Date: 10/9/2023    Physician Orders: PT Eval and Treat  Medical Diagnosis from Referral:   E11.42 (ICD-10-CM) - Diabetic peripheral neuropathy associated with type 2 diabetes mellitus   M54.50,G89.29 (ICD-10-CM) - Chronic midline low back pain without sciatica   R26.81 (ICD-10-CM) - Unsteady gait      Evaluation Date: 9/18/2023  Authorization Period Expiration: 09/05/2025  Plan of Care Expiration: 11/17/2023                          Progress Update: 10/18/2023            Visit # / Visits authorized: 1 / 1 ;  1/20          FOTO: Visit #1 9/18/2023 - Scored: 1 / 3      PRECAUTIONS: Standard Precautions, Safety/fall precautions, and Diabetes:       Time In: 1220  Time Out: 1315  Total Appointment Time (timed & untimed codes): 55 minutes    PTA Visit #: 0/5       Subjective     Patient reports: difficulty with transportation and weakness.  She was compliant with home exercise program.  Response to previous treatment: N/A  Functional change: N/A    Pain: 7/10  Location: low back and left leg    Objective      Objective Measures updated at progress report unless specified.     Treatment     Jenifer received the treatments listed below:      therapeutic exercises to develop strength and ROM for 55 minutes including:  NuStep 5 minutes  Supine TKE 3x10  Seated hip abduction 3x10  Heel slides 3x10  Seated cervical motion  Shrugs 3x10   Scapular retraction  Shuttle 5 bands 2 minutes  Bridges 3x10  Seated LAQ 3x10  Seated hip flexion 3x10    Patient Education and Home Exercises       Education provided:   - Home program    Written Home Exercises Provided: Patient instructed to cont prior HEP. Exercises were reviewed and Jenifer was able to demonstrate them prior to the end of the  session.  Jenifer demonstrated good  understanding of the education provided. See Electronic Medical Record under Patient Instructions for exercises provided during therapy sessions    Assessment     The patient demonstrates weakness and limitations in range of motion. She requires extra rest and cues with demonstration to complete motion    Jenifer Is progressing well towards her goals.   Patient prognosis is Good.     Patient will continue to benefit from skilled outpatient physical therapy to address the deficits listed in the problem list box on initial evaluation, provide pt/family education and to maximize pt's level of independence in the home and community environment.     Patient's spiritual, cultural and educational needs considered and pt agreeable to plan of care and goals.     Anticipated barriers to physical therapy: None    SHORT TERM GOALS:  4 weeks  Progress Date Met   Recent signs and systems trend is improving in order to progress towards Long term goals.  [] Met  [] Not Met  [] Progressing     Patient will be independent with Home Exercise Program  in order to further progress and return to maximal function. [] Met  [] Not Met  [] Progressing     Pain rating at Worst: 5 /10 in order to progress towards increased independence with activity. [] Met  [] Not Met  [] Progressing     Patient will be able to correct postural deviations in sitting and standing, to decrease pain and promote postural awareness for injury prevention.  [] Met  [] Not Met  [] Progressing     Patient will improve functional outcome (FOTO) score: by 5% to increase self-worth & perceived functional ability towards long term goals [] Met  [] Not Met  [] Progressing        LONG TERM GOALS: 8 weeks     Progress Date Met   Patient will return to normal activites of daily living, recreational, and work related activities with less pain and limitation.  [] Met  [] Not Met  [] Progressing     Patient will improve range of motion  to stated  goals in order to return to maximal functional potential.  [] Met  [] Not Met  [] Progressing     Patient will improve Strength to stated goals of appropriate musculature in order to improve functional independence.  [] Met  [] Not Met  [] Progressing     Pain Rating at Best: 1/10 to improve Quality of Life.  [] Met  [] Not Met  [] Progressing     Patient will meet predicted functional outcome (FOTO) score: 50% to increase self-worth & perceived functional ability. [] Met  [] Not Met  [] Progressing     Patient will have met/partially met personal goal of: improve pain and function in transfers and gaitr [] Met  [] Not Met  [] Progressing              PLAN   Plan of care Certification: 9/18/2023 to 11/17/2023     Outpatient Physical Therapy 2 times weekly for 8 weeks to include any combination of the following interventions: virtual visits, dry needling, modalities, electrical stimulation (IFC, Pre-Mod, Attended with Functional Dry Needling), Gait Training, Manual Therapy, Moist Heat/ Ice, Neuromuscular Re-ed, Patient Education, Self Care, Therapeutic Exercise, Functional Training, and Therapeutic Activites        William Uribe, PT

## 2023-10-13 RX ORDER — TRIAMCINOLONE ACETONIDE 40 MG/ML
40 INJECTION, SUSPENSION INTRA-ARTICULAR; INTRAMUSCULAR
Status: DISCONTINUED | OUTPATIENT
Start: 2023-09-27 | End: 2023-10-13 | Stop reason: HOSPADM

## 2023-10-13 NOTE — PROCEDURES
Large Joint Aspiration/Injection: R subacromial bursa    Date/Time: 9/27/2023 1:15 PM    Performed by: All Jain MD  Authorized by: All Jain MD    Consent Done?:  Yes (Verbal)  Indications:  Pain  Site marked: the procedure site was marked    Timeout: prior to procedure the correct patient, procedure, and site was verified    Prep: patient was prepped and draped in usual sterile fashion      Local anesthesia used?: Yes    Anesthesia:  Local infiltration  Local anesthetic:  Lidocaine 1% without epinephrine    Details:  Needle Size:  22 G  Ultrasonic Guidance for needle placement?: No    Approach:  Posterior  Location:  Shoulder  Site:  R subacromial bursa  Medications:  40 mg triamcinolone acetonide 40 mg/mL  Patient tolerance:  Patient tolerated the procedure well with no immediate complications

## 2023-10-18 ENCOUNTER — PATIENT MESSAGE (OUTPATIENT)
Dept: PULMONOLOGY | Facility: CLINIC | Age: 48
End: 2023-10-18
Payer: MEDICAID

## 2023-10-18 ENCOUNTER — TELEPHONE (OUTPATIENT)
Dept: PULMONOLOGY | Facility: CLINIC | Age: 48
End: 2023-10-18
Payer: MEDICAID

## 2023-10-18 DIAGNOSIS — J41.0 SIMPLE CHRONIC BRONCHITIS: ICD-10-CM

## 2023-10-18 DIAGNOSIS — Z01.811 PREOP RESPIRATORY EXAM: ICD-10-CM

## 2023-10-18 DIAGNOSIS — R09.02 EXERCISE HYPOXEMIA: ICD-10-CM

## 2023-10-18 DIAGNOSIS — R91.1 NODULE OF LOWER LOBE OF RIGHT LUNG: Primary | ICD-10-CM

## 2023-10-18 NOTE — TELEPHONE ENCOUNTER
Called patient to discuss biopsy results      Needs evaluation for chest surgery  Pulmonary Function Studies and 6 min walk

## 2023-10-19 ENCOUNTER — PATIENT MESSAGE (OUTPATIENT)
Dept: REHABILITATION | Facility: HOSPITAL | Age: 48
End: 2023-10-19
Payer: MEDICAID

## 2023-10-19 ENCOUNTER — TELEPHONE (OUTPATIENT)
Dept: PULMONOLOGY | Facility: CLINIC | Age: 48
End: 2023-10-19
Payer: MEDICAID

## 2023-10-20 DIAGNOSIS — C34.90 MALIGNANT NEOPLASM OF LUNG, UNSPECIFIED LATERALITY, UNSPECIFIED PART OF LUNG: Primary | ICD-10-CM

## 2023-10-26 ENCOUNTER — OFFICE VISIT (OUTPATIENT)
Dept: CARDIOTHORACIC SURGERY | Facility: CLINIC | Age: 48
End: 2023-10-26
Payer: MEDICAID

## 2023-10-26 ENCOUNTER — CLINICAL SUPPORT (OUTPATIENT)
Dept: PULMONOLOGY | Facility: CLINIC | Age: 48
End: 2023-10-26
Payer: MEDICAID

## 2023-10-26 VITALS
HEART RATE: 91 BPM | SYSTOLIC BLOOD PRESSURE: 102 MMHG | WEIGHT: 242 LBS | BODY MASS INDEX: 34.65 KG/M2 | HEIGHT: 70 IN | DIASTOLIC BLOOD PRESSURE: 64 MMHG | OXYGEN SATURATION: 96 %

## 2023-10-26 DIAGNOSIS — G43.009 MIGRAINE WITHOUT AURA AND WITHOUT STATUS MIGRAINOSUS, NOT INTRACTABLE: ICD-10-CM

## 2023-10-26 DIAGNOSIS — R09.02 EXERCISE HYPOXEMIA: ICD-10-CM

## 2023-10-26 DIAGNOSIS — Z79.4 TYPE 2 DIABETES MELLITUS WITH DIABETIC PERIPHERAL ANGIOPATHY WITHOUT GANGRENE, WITH LONG-TERM CURRENT USE OF INSULIN: Primary | ICD-10-CM

## 2023-10-26 DIAGNOSIS — M54.32 SCIATICA OF LEFT SIDE: ICD-10-CM

## 2023-10-26 DIAGNOSIS — M50.30 DDD (DEGENERATIVE DISC DISEASE), CERVICAL: Chronic | ICD-10-CM

## 2023-10-26 DIAGNOSIS — I10 PRIMARY HYPERTENSION: ICD-10-CM

## 2023-10-26 DIAGNOSIS — G62.9 NEUROPATHY: ICD-10-CM

## 2023-10-26 DIAGNOSIS — I73.9 PAD (PERIPHERAL ARTERY DISEASE): ICD-10-CM

## 2023-10-26 DIAGNOSIS — J41.0 SIMPLE CHRONIC BRONCHITIS: ICD-10-CM

## 2023-10-26 DIAGNOSIS — Q87.40 MARFAN'S SYNDROME: ICD-10-CM

## 2023-10-26 DIAGNOSIS — I34.1 MVP (MITRAL VALVE PROLAPSE): ICD-10-CM

## 2023-10-26 DIAGNOSIS — I70.8 OCCLUSION OF RIGHT SUBCLAVIAN ARTERY: ICD-10-CM

## 2023-10-26 DIAGNOSIS — F32.0 CURRENT MILD EPISODE OF MAJOR DEPRESSIVE DISORDER WITHOUT PRIOR EPISODE: ICD-10-CM

## 2023-10-26 DIAGNOSIS — Z01.811 PREOP RESPIRATORY EXAM: ICD-10-CM

## 2023-10-26 DIAGNOSIS — K74.00 LIVER FIBROSIS: ICD-10-CM

## 2023-10-26 DIAGNOSIS — E78.2 MIXED HYPERLIPIDEMIA: ICD-10-CM

## 2023-10-26 DIAGNOSIS — E55.9 VITAMIN D DEFICIENCY: ICD-10-CM

## 2023-10-26 DIAGNOSIS — E11.51 TYPE 2 DIABETES MELLITUS WITH DIABETIC PERIPHERAL ANGIOPATHY WITHOUT GANGRENE, WITH LONG-TERM CURRENT USE OF INSULIN: Primary | ICD-10-CM

## 2023-10-26 DIAGNOSIS — I27.20 PULMONARY HYPERTENSION: ICD-10-CM

## 2023-10-26 DIAGNOSIS — C34.90 MALIGNANT NEOPLASM OF LUNG, UNSPECIFIED LATERALITY, UNSPECIFIED PART OF LUNG: ICD-10-CM

## 2023-10-26 LAB
ALLENS TEST: ABNORMAL
BRPFT: ABNORMAL
DELSYS: ABNORMAL
DLCO ADJ PRE: 12.17 ML/(MIN*MMHG) (ref 22.39–33.86)
DLCO SINGLE BREATH LLN: 22.39
DLCO SINGLE BREATH PRE REF: 43.3 %
DLCO SINGLE BREATH REF: 28.13
DLCOC SBVA LLN: 3.48
DLCOC SBVA PRE REF: 71.8 %
DLCOC SBVA REF: 4.72
DLCOC SINGLE BREATH LLN: 22.39
DLCOC SINGLE BREATH PRE REF: 43.3 %
DLCOC SINGLE BREATH REF: 28.13
DLCOVA LLN: 3.48
DLCOVA PRE REF: 71.8 %
DLCOVA PRE: 3.39 ML/(MIN*MMHG*L) (ref 3.48–5.96)
DLCOVA REF: 4.72
DLVAADJ PRE: 3.39 ML/(MIN*MMHG*L) (ref 3.48–5.96)
ERV LLN: -16448.97
ERV PRE REF: 86.9 %
ERV REF: 1.03
FEF 25 75 LLN: 1.78
FEF 25 75 PRE REF: 88.8 %
FEF 25 75 REF: 3.14
FEV1 FVC LLN: 69
FEV1 FVC PRE REF: 104 %
FEV1 FVC REF: 80
FEV1 LLN: 2.64
FEV1 PRE REF: 64.6 %
FEV1 REF: 3.38
FIO2: 21
FRCPLETH LLN: 2.21
FRCPLETH PREREF: 104.4 %
FRCPLETH REF: 3.04
FVC LLN: 3.34
FVC PRE REF: 61.6 %
FVC REF: 4.26
HCO3 UR-SCNC: 17.1 MMOL/L (ref 24–28)
IVC PRE: 2.61 L (ref 3.34–5.19)
IVC SINGLE BREATH LLN: 3.34
IVC SINGLE BREATH PRE REF: 61.1 %
IVC SINGLE BREATH REF: 4.26
MODE: ABNORMAL
MVV LLN: 109
MVV PRE REF: 52.2 %
MVV REF: 128
PCO2 BLDA: 27.5 MMHG (ref 35–45)
PEF LLN: 5.7
PEF PRE REF: 73.2 %
PEF REF: 7.75
PH SMN: 7.4 [PH] (ref 7.35–7.45)
PO2 BLDA: 91 MMHG (ref 80–100)
POC BE: -8 MMOL/L
POC SATURATED O2: 97 % (ref 95–100)
PRE DLCO: 12.17 ML/(MIN*MMHG) (ref 22.39–33.86)
PRE ERV: 0.9 L (ref -16448.97–16451.03)
PRE FEF 25 75: 2.79 L/S (ref 1.78–4.5)
PRE FET 100: 6.03 SEC
PRE FEV1 FVC: 83.06 % (ref 68.86–90.86)
PRE FEV1: 2.18 L (ref 2.64–4.11)
PRE FRC PL: 3.17 L
PRE FVC: 2.63 L (ref 3.34–5.19)
PRE MVV: 67 L/MIN (ref 109–147.48)
PRE PEF: 5.67 L/S (ref 5.7–9.8)
PRE RV: 2.24 L (ref 1.43–2.58)
PRE TLC: 5.1 L (ref 4.97–6.95)
RAW LLN: 3.06
RAW PRE REF: 85.5 %
RAW PRE: 2.61 CMH2O*S/L (ref 3.06–3.06)
RAW REF: 3.06
RV LLN: 1.43
RV PRE REF: 111.7 %
RV REF: 2.01
RVTLC LLN: 26
RVTLC PRE REF: 123.3 %
RVTLC PRE: 43.92 % (ref 26.03–45.21)
RVTLC REF: 36
SAMPLE: ABNORMAL
SITE: ABNORMAL
TLC LLN: 4.97
TLC PRE REF: 85.6 %
TLC REF: 5.96
VA PRE: 3.59 L (ref 5.81–5.81)
VA SINGLE BREATH LLN: 5.81
VA SINGLE BREATH PRE REF: 61.8 %
VA SINGLE BREATH REF: 5.81
VC LLN: 3.34
VC PRE REF: 67.1 %
VC PRE: 2.86 L (ref 3.34–5.19)
VC REF: 4.26
VTGRAWPRE: 2.86 L

## 2023-10-26 PROCEDURE — 99999 PR PBB SHADOW E&M-EST. PATIENT-LVL V: CPT | Mod: PBBFAC,,, | Performed by: THORACIC SURGERY (CARDIOTHORACIC VASCULAR SURGERY)

## 2023-10-26 PROCEDURE — 94726 PLETHYSMOGRAPHY LUNG VOLUMES: CPT | Mod: 26,S$PBB,, | Performed by: INTERNAL MEDICINE

## 2023-10-26 PROCEDURE — 82803 BLOOD GASES ANY COMBINATION: CPT | Mod: PBBFAC

## 2023-10-26 PROCEDURE — 3074F PR MOST RECENT SYSTOLIC BLOOD PRESSURE < 130 MM HG: ICD-10-PCS | Mod: CPTII,,, | Performed by: THORACIC SURGERY (CARDIOTHORACIC VASCULAR SURGERY)

## 2023-10-26 PROCEDURE — 3078F DIAST BP <80 MM HG: CPT | Mod: CPTII,,, | Performed by: THORACIC SURGERY (CARDIOTHORACIC VASCULAR SURGERY)

## 2023-10-26 PROCEDURE — 4010F ACE/ARB THERAPY RXD/TAKEN: CPT | Mod: CPTII,,, | Performed by: THORACIC SURGERY (CARDIOTHORACIC VASCULAR SURGERY)

## 2023-10-26 PROCEDURE — 94010 BREATHING CAPACITY TEST: CPT | Mod: 26,S$PBB,, | Performed by: INTERNAL MEDICINE

## 2023-10-26 PROCEDURE — 94726 PLETHYSMOGRAPHY LUNG VOLUMES: CPT | Mod: PBBFAC

## 2023-10-26 PROCEDURE — 94010 BREATHING CAPACITY TEST: CPT | Mod: PBBFAC

## 2023-10-26 PROCEDURE — 99203 OFFICE O/P NEW LOW 30 MIN: CPT | Mod: S$PBB,,, | Performed by: THORACIC SURGERY (CARDIOTHORACIC VASCULAR SURGERY)

## 2023-10-26 PROCEDURE — 94726 PULM FUNCT TST PLETHYSMOGRAP: ICD-10-PCS | Mod: 26,S$PBB,, | Performed by: INTERNAL MEDICINE

## 2023-10-26 PROCEDURE — 3008F PR BODY MASS INDEX (BMI) DOCUMENTED: ICD-10-PCS | Mod: CPTII,,, | Performed by: THORACIC SURGERY (CARDIOTHORACIC VASCULAR SURGERY)

## 2023-10-26 PROCEDURE — 99203 PR OFFICE/OUTPT VISIT, NEW, LEVL III, 30-44 MIN: ICD-10-PCS | Mod: S$PBB,,, | Performed by: THORACIC SURGERY (CARDIOTHORACIC VASCULAR SURGERY)

## 2023-10-26 PROCEDURE — 99999 PR PBB SHADOW E&M-EST. PATIENT-LVL V: ICD-10-PCS | Mod: PBBFAC,,, | Performed by: THORACIC SURGERY (CARDIOTHORACIC VASCULAR SURGERY)

## 2023-10-26 PROCEDURE — 99999PBSHW PR PBB SHADOW TECHNICAL ONLY FILED TO HB: Mod: PBBFAC,,,

## 2023-10-26 PROCEDURE — 3074F SYST BP LT 130 MM HG: CPT | Mod: CPTII,,, | Performed by: THORACIC SURGERY (CARDIOTHORACIC VASCULAR SURGERY)

## 2023-10-26 PROCEDURE — 94010 BREATHING CAPACITY TEST: ICD-10-PCS | Mod: 26,S$PBB,, | Performed by: INTERNAL MEDICINE

## 2023-10-26 PROCEDURE — 99999PBSHW PR PBB SHADOW TECHNICAL ONLY FILED TO HB: ICD-10-PCS | Mod: PBBFAC,,,

## 2023-10-26 PROCEDURE — 36600 PR WITHDRAWAL OF ARTERIAL BLOOD: ICD-10-PCS | Mod: 59,S$PBB,, | Performed by: INTERNAL MEDICINE

## 2023-10-26 PROCEDURE — 36600 WITHDRAWAL OF ARTERIAL BLOOD: CPT | Mod: PBBFAC

## 2023-10-26 PROCEDURE — 99215 OFFICE O/P EST HI 40 MIN: CPT | Mod: PBBFAC,25 | Performed by: THORACIC SURGERY (CARDIOTHORACIC VASCULAR SURGERY)

## 2023-10-26 PROCEDURE — 1159F PR MEDICATION LIST DOCUMENTED IN MEDICAL RECORD: ICD-10-PCS | Mod: CPTII,,, | Performed by: THORACIC SURGERY (CARDIOTHORACIC VASCULAR SURGERY)

## 2023-10-26 PROCEDURE — 4010F PR ACE/ARB THEARPY RXD/TAKEN: ICD-10-PCS | Mod: CPTII,,, | Performed by: THORACIC SURGERY (CARDIOTHORACIC VASCULAR SURGERY)

## 2023-10-26 PROCEDURE — 3008F BODY MASS INDEX DOCD: CPT | Mod: CPTII,,, | Performed by: THORACIC SURGERY (CARDIOTHORACIC VASCULAR SURGERY)

## 2023-10-26 PROCEDURE — 36600 WITHDRAWAL OF ARTERIAL BLOOD: CPT | Mod: 59,S$PBB,, | Performed by: INTERNAL MEDICINE

## 2023-10-26 PROCEDURE — 94060 EVALUATION OF WHEEZING: CPT | Mod: PBBFAC

## 2023-10-26 PROCEDURE — 94729 PR C02/MEMBANE DIFFUSE CAPACITY: ICD-10-PCS | Mod: 26,S$PBB,, | Performed by: INTERNAL MEDICINE

## 2023-10-26 PROCEDURE — 1159F MED LIST DOCD IN RCRD: CPT | Mod: CPTII,,, | Performed by: THORACIC SURGERY (CARDIOTHORACIC VASCULAR SURGERY)

## 2023-10-26 PROCEDURE — 94729 DIFFUSING CAPACITY: CPT | Mod: PBBFAC

## 2023-10-26 PROCEDURE — 3078F PR MOST RECENT DIASTOLIC BLOOD PRESSURE < 80 MM HG: ICD-10-PCS | Mod: CPTII,,, | Performed by: THORACIC SURGERY (CARDIOTHORACIC VASCULAR SURGERY)

## 2023-10-26 PROCEDURE — 94729 DIFFUSING CAPACITY: CPT | Mod: 26,S$PBB,, | Performed by: INTERNAL MEDICINE

## 2023-10-26 NOTE — PROGRESS NOTES
Subjective:      Patient ID: Jenifer Westfall is a 48 y.o. female.    Chief Complaint: Results    HPI:  48-year-old female with a history of longstanding lung nodule more than 2 years without any change and PET negative had a bronchoscopy and biopsy which showed a mucinous adenocarcinoma.  She was an ex-smoker quit 3 years ago has multiple medical problems including dyspnea on mild exertion.  She has a longstanding history of Marfan syndrome and she walks with a walking stick.  She also gets tired with minimal walking.    Review of patient's allergies indicates:   Allergen Reactions    Compazine [prochlorperazine edisylate] Rash    Contrast media Anaphylaxis    Dye Anaphylaxis     Contrast dye    Iodinated contrast media Anaphylaxis     Other reaction(s): anaphylaxis    Levaquin [levofloxacin] Hives and Itching    Morphine Other (See Comments)     Irritable  Iritability  Irritable    Prochlorperazine Rash    Metformin Other (See Comments)     Upset stomach    Ibuprofen Other (See Comments) and Nausea And Vomiting     Stomach pain  Stomach pain    Abdominal pain     Methocarbamol Nausea Only       Past Medical History:   Diagnosis Date    Arthritis     DM (diabetes mellitus) 2009     03/01/2017 Insulin x 3 years 2013    Hepatitis C antibody positive in blood 03/04/2021    RNA NEGATIVE 3/6/2017, 3/22/22    Hyperlipidemia     Hypertension     IBS (irritable bowel syndrome)     Kidney stone     Marfan syndrome     Mitral valve prolapse        Family History   Problem Relation Age of Onset    Heart disease Mother     Thrombophilia Father         DVT    Hypertension Father     Stroke Maternal Aunt     Heart disease Maternal Aunt     Stroke Maternal Uncle     Heart disease Maternal Uncle     Breast cancer Neg Hx     Colon cancer Neg Hx     Ovarian cancer Neg Hx        Social History     Socioeconomic History    Marital status: Single   Tobacco Use    Smoking status: Former     Current packs/day: 0.00     Average  "packs/day: 1 pack/day for 24.0 years (24.0 ttl pk-yrs)     Types: Cigarettes     Start date: 1/1/1996     Quit date: 1/1/2020     Years since quitting: 3.8    Smokeless tobacco: Never    Tobacco comments:     "once in a blue moon"   Substance and Sexual Activity    Alcohol use: No     Alcohol/week: 0.0 standard drinks of alcohol    Drug use: No    Sexual activity: Not Currently     Social Determinants of Health     Financial Resource Strain: Low Risk  (11/22/2021)    Overall Financial Resource Strain (CARDIA)     Difficulty of Paying Living Expenses: Not hard at all   Food Insecurity: No Food Insecurity (11/22/2021)    Hunger Vital Sign     Worried About Running Out of Food in the Last Year: Never true     Ran Out of Food in the Last Year: Never true   Transportation Needs: No Transportation Needs (11/22/2021)    PRAPARE - Transportation     Lack of Transportation (Medical): No     Lack of Transportation (Non-Medical): No   Physical Activity: Inactive (11/22/2021)    Exercise Vital Sign     Days of Exercise per Week: 0 days     Minutes of Exercise per Session: 0 min   Stress: No Stress Concern Present (11/22/2021)    [employer] [place] [employer] - Occupational Stress Questionnaire     Feeling of Stress : Only a little   Social Connections: Socially Isolated (11/22/2021)    Social Connection and Isolation Panel [NHANES]     Frequency of Communication with Friends and Family: Three times a week     Frequency of Social Gatherings with Friends and Family: Once a week     Attends Mandaen Services: Never     Active Member of Clubs or Organizations: No     Attends Club or Organization Meetings: Never     Marital Status: Never    Housing Stability: Unknown (11/22/2021)    Housing Stability Vital Sign     Unable to Pay for Housing in the Last Year: No     Unstable Housing in the Last Year: No       Past Surgical History:   Procedure Laterality Date    ANGIOGRAPHY OF LOWER EXTREMITY N/A 06/26/2018    " Procedure: ANGIOGRAM, LOWER EXTREMITY- LEFT LEG;  Surgeon: Roscoe Landeros MD;  Location: [facility] CATH LAB;  Service: Peripheral Vascular;  Laterality: N/A;    APPENDECTOMY      BUNIONECTOMY      both feet    BYPASS GRAFT Bilateral     cataract surgery  2019     SECTION      x 2    CHOLECYSTECTOMY      COLONOSCOPY N/A 2020    Procedure: COLONOSCOPY w/ biopsies;  Surgeon: Gio Georges MD;  Location: [facility];  Service: Endoscopy;  Laterality: N/A;    ESOPHAGEAL MANOMETRY WITH MEASUREMENT OF IMPEDANCE N/A 2023    Procedure: MANOMETRY, ESOPHAGUS, WITH IMPEDANCE MEASUREMENT;  Surgeon: Desmond Calderon RN;  Location: [facility];  Service: Endoscopy;  Laterality: N/A;    ESOPHAGOGASTRODUODENOSCOPY N/A 2019    Procedure: ESOPHAGOGASTRODUODENOSCOPY (EGD);  Surgeon: Jaron Sanders III, MD;  Location: [facility];  Service: Endoscopy;  Laterality: N/A;    ESOPHAGOGASTRODUODENOSCOPY N/A 2022    Procedure: EGD (ESOPHAGOGASTRODUODENOSCOPY);  Surgeon: Keith Hardwick MD;  Location: [facility];  Service: Gastroenterology;  Laterality: N/A;    SELECTIVE INJECTION OF ANESTHETIC AGENT AROUND LUMBAR SPINAL NERVE ROOT BY TRANSFORAMINAL APPROACH Bilateral 2020    Procedure: Bilateral L5/S1 TF DENY;  Surgeon: Jewel Walters MD;  Location: [facility] PAIN MGT;  Service: Pain Management;  Laterality: Bilateral;    TUBAL LIGATION         Review of Systems   Constitutional:  Negative for activity change and appetite change.   HENT:  Negative for dental problem, nosebleeds and sore throat.    Eyes:  Negative for discharge and visual disturbance.   Respiratory:  Positive for cough, chest tightness and shortness of breath. Negative for stridor.    Cardiovascular:  Negative for leg swelling.   Gastrointestinal:  Negative for abdominal distention and abdominal pain.   Genitourinary:  Negative for difficulty urinating and dysuria.   Musculoskeletal:  Positive for arthralgias, gait problem, myalgias, neck pain  "and neck stiffness. Negative for back pain and joint swelling.   Allergic/Immunologic: Negative for environmental allergies.   Neurological:  Negative for dizziness, syncope and headaches.   Hematological:  Does not bruise/bleed easily.   Psychiatric/Behavioral:  Negative for behavioral problems.           Objective:   /64 (BP Location: Left arm, Patient Position: Sitting, BP Method: Large (Manual))   Pulse 91   Ht 5' 10" (1.778 m)   Wt 109.8 kg (242 lb)   SpO2 96%   BMI 34.72 kg/m²     X-Ray Chest AP Portable  Narrative: EXAMINATION:  XR CHEST AP PORTABLE    CLINICAL HISTORY:  chest pain;    TECHNIQUE:  Single frontal view of the chest was performed.    COMPARISON:  None    FINDINGS:  Mild interstitial opacities in the lung bases likely related to subsegmental atelectasis or scarring.No pleural effusion or pneumothorax.    The cardiac silhouette is normal in size. The hilar and mediastinal contours are unremarkable.    Bones are intact.  Impression: No acute abnormality.    Electronically signed by: See Angeles  Date:    08/29/2023  Time:    23:52  NM Lung Scan Ventilation Perfusion  Narrative: EXAMINATION:  NM LUNG VENTILATION AND PERFUSION IMAGING    CLINICAL HISTORY:  Pulmonary embolism (PE) suspected, high prob;    TECHNIQUE:  1 mCi of Tc-99m-DTPA were placed in the nebulizer. Following the inhalation Tc-99m-DTPA in aerosol and the subsequent IV administration of 5.3 mCi of Tc-99m-MAA, multiple images of the thorax were obtained in various projections.    COMPARISON:  None.    FINDINGS:  Perfusion: Negative.    Ventilation: Negative.    CXR see recent chest CT with nodule in the right lower lobe.  Otherwise clear..  Impression: This represents a low probability  of pulmonary embolism.    Electronically signed by: See Angeles  Date:    08/29/2023  Time:    20:41  CT Chest Without Contrast  Narrative: EXAMINATION:  CT CHEST WITHOUT CONTRAST    CLINICAL HISTORY:  Solitary pulmonary noduleright lung " nodule;    COMPARISON:  CT abdomen pelvis April 13, 2023.  PET-CT May 19, 2023.  April 24, 2022.  The    TECHNIQUE:  Axial CT images were obtained of the CHEST.  Iterative reconstruction technique was used. The CT exam was performed using one or more of the following dose reduction techniques- Automated exposure control, adjustment of the mA and/or kV according to patient size, and/or use of iterative reconstructed technique.    FINDINGS:  Lungs: Slowly enlarging 1.5 x 1.2 cm spiculated pulmonary nodule right lower lobe surrounding the subsegmental bronchi.    Coronary artery calcification: Present.    Aorta: Atherosclerosis. No Aneurysm.    Pleural space:No pleural effusion or pneumothorax.    Heart: Normal size. No pericardial effusion.    Bones/joints: No acute osseous finding.    Other: No mediastinal or axillary lymphadenopathy. No acute finding in the visualized upper abdomen.  The gallbladder is surgically absent.  Impression: Slowly enlarging spiculated right lower lobe pulmonary nodule surrounding subsegmental bronchi.  Consider biopsy.    Electronically signed by: Marcus Jenkins  Date:    08/29/2023  Time:    16:20         Physical Exam  Vitals and nursing note reviewed.   Constitutional:       Appearance: She is obese.   HENT:      Head: Normocephalic.      Mouth/Throat:      Mouth: Mucous membranes are moist.   Eyes:      Extraocular Movements: Extraocular movements intact.      Pupils: Pupils are equal, round, and reactive to light.   Cardiovascular:      Rate and Rhythm: Normal rate and regular rhythm.   Pulmonary:      Effort: Pulmonary effort is normal.      Breath sounds: Rales present.   Abdominal:      Palpations: Abdomen is soft.   Musculoskeletal:         General: Normal range of motion.      Cervical back: Normal range of motion and neck supple.   Skin:     General: Skin is warm.      Capillary Refill: Capillary refill takes less than 2 seconds.   Neurological:      General: No focal deficit  present.      Mental Status: She is alert and oriented to person, place, and time.   Psychiatric:         Mood and Affect: Mood normal.         Assessment:     1. Type 2 diabetes mellitus with diabetic peripheral angiopathy without gangrene, with long-term current use of insulin    2. Malignant neoplasm of lung, unspecified laterality, unspecified part of lung    3. Primary hypertension    4. Mixed hyperlipidemia    5. MVP (mitral valve prolapse)    6. PAD (peripheral artery disease)    7. Occlusion of right subclavian artery    8. Pulmonary hypertension    9. Migraine without aura and without status migrainosus, not intractable    10. Vitamin D deficiency    11. Marfan's syndrome    12. Current mild episode of major depressive disorder without prior episode    13. Sciatica of left side    14. Neuropathy    15. Liver fibrosis    16. DDD (degenerative disc disease), cervical        Plan   48-year-old female with a history of multiple medical problems has a newly diagnosed mucinous adenocarcinoma of the right lower lobe which has been a longstanding presentation more than 2 years with a PET negative with no mediastinal disease.  Cardiac clearance from Dr. Brian Delgado.  She will be a candidate for right VATS and right lower segmentectomy because of her low-grade nature of the tumor as well as her body habitus.        Chente Cabrera MD,   [facility]  [place]

## 2023-10-26 NOTE — PROCEDURES
ABG completed. See ABG Below.    Component      Latest Ref Rng 10/26/2023   POC PH      7.35 - 7.45  7.402    POC PCO2      35 - 45 mmHg 27.5 (LL)    POC PO2      80 - 100 mmHg 91    POC HCO3      24 - 28 mmol/L 17.1 (L)    POC BE      -2 to 2 mmol/L -8 (L)    POC SATURATED O2      95 - 100 % 97    Sample ARTERIAL    Site LR    Allens Test Pass    DelSys Room Air    Mode SPONT    FiO2 21       Legend:  (LL) Low Panic  (L) Low      Interpretation:    [x] Arterial blood gases on room air are abnormal demonstrating hypocarbia (pCO2 < 35 mmHg)      The table below summarizes the main interpretations of the relationship between the arterial blood gases, pH, pCO2 and HCO-3    []    I

## 2023-10-27 ENCOUNTER — TELEPHONE (OUTPATIENT)
Dept: CARDIOLOGY | Facility: HOSPITAL | Age: 48
End: 2023-10-27
Payer: MEDICAID

## 2023-10-30 ENCOUNTER — TELEPHONE (OUTPATIENT)
Dept: CARDIOTHORACIC SURGERY | Facility: CLINIC | Age: 48
End: 2023-10-30
Payer: MEDICAID

## 2023-10-30 ENCOUNTER — TELEPHONE (OUTPATIENT)
Dept: CARDIOLOGY | Facility: HOSPITAL | Age: 48
End: 2023-10-30
Payer: MEDICAID

## 2023-10-30 NOTE — TELEPHONE ENCOUNTER
Patient contacted to inform her that the special procedures department was trying to get tin touch with her to set up her Nuke test. The son responded to the call and stated he stated he would have his mom call back.

## 2023-10-31 ENCOUNTER — TELEPHONE (OUTPATIENT)
Dept: CARDIOLOGY | Facility: HOSPITAL | Age: 48
End: 2023-10-31
Payer: MEDICAID

## 2023-10-31 ENCOUNTER — PATIENT MESSAGE (OUTPATIENT)
Dept: CARDIOLOGY | Facility: HOSPITAL | Age: 48
End: 2023-10-31
Payer: MEDICAID

## 2023-10-31 NOTE — TELEPHONE ENCOUNTER
Patient spoke to scheduling and set up Nuke test. The clinic will send a staff message to get the patient set up with pulmonary.      ----- Message from Yuliana Peñaloza RN sent at 10/23/2023  5:40 PM CDT -----  Scheduled for this Thursday - did not call pt - wasn't sure if anything else needs to be ordered  please fix if need be and confirm with pt - thanks   ----- Message -----  From: Lien Fu LPN  Sent: 10/20/2023   2:09 PM CDT  To: Rick Eldridge Staff    Please schedule this pt referred by Dr. Danny lockett.  Thank you!

## 2023-11-08 ENCOUNTER — TELEPHONE (OUTPATIENT)
Dept: CARDIOTHORACIC SURGERY | Facility: CLINIC | Age: 48
End: 2023-11-08
Payer: MEDICAID

## 2023-11-08 ENCOUNTER — TELEPHONE (OUTPATIENT)
Dept: CARDIOLOGY | Facility: CLINIC | Age: 48
End: 2023-11-08
Payer: MEDICAID

## 2023-11-08 NOTE — TELEPHONE ENCOUNTER
Pending peer to peer, awaiting for Provider     ----- Message from Marko Gao sent at 11/8/2023  4:08 PM CST -----  Contact: Jenifer Argueta is calling to speak with someone regarding a stress test. Please call back at [phone]                  Thanks  SW

## 2023-11-08 NOTE — TELEPHONE ENCOUNTER
Patient contacted and was advised that she has not coverage for the testing. The patient was advised that she should call the numbers provided to find out about her case.   Patient has no current income.   [phone]  Stress test cost for a payment plan will be discussed.the patient stated understanding with no questions.

## 2023-11-12 ENCOUNTER — PATIENT MESSAGE (OUTPATIENT)
Dept: PULMONOLOGY | Facility: CLINIC | Age: 48
End: 2023-11-12
Payer: MEDICAID

## 2023-11-13 ENCOUNTER — TELEPHONE (OUTPATIENT)
Dept: CARDIOLOGY | Facility: CLINIC | Age: 48
End: 2023-11-13
Payer: MEDICAID

## 2023-11-13 ENCOUNTER — TELEPHONE (OUTPATIENT)
Dept: PULMONOLOGY | Facility: CLINIC | Age: 48
End: 2023-11-13

## 2023-11-13 ENCOUNTER — TELEPHONE (OUTPATIENT)
Dept: CARDIOLOGY | Facility: HOSPITAL | Age: 48
End: 2023-11-13
Payer: MEDICAID

## 2023-11-13 NOTE — TELEPHONE ENCOUNTER
Message 11-  Patient contacted and was advised that she has not coverage for the testing. The patient was advised that she should call the numbers provided to find out about her case.   Patient has no current income.             Message 11- form scheduling department.  Pre Service dept on 11/3/23 and 11/9/23 on this pt concerning a Peer 2 Peer needing to be performed, along with the denial Letter (Pt does not meet 3rd party guidelines) please call pt's INS and have P2P performed.     11-    The provider will be notified of the P2P requested for services.Provider currently out of town. He will return on 11-.

## 2023-11-14 ENCOUNTER — TELEPHONE (OUTPATIENT)
Dept: CARDIOLOGY | Facility: CLINIC | Age: 48
End: 2023-11-14
Payer: MEDICAID

## 2023-11-14 NOTE — TELEPHONE ENCOUNTER
Patient contacted and set up a sooner appointment with Julisa for cardiac clearance before December. The patient confirmed the appointment and location.    Previous message:  Please see Letters sent from Pre Service dept on 11/3/23 and 11/9/23, concerning a Peer 2 Peer needing to be performed, along with the denial Letter (Pt does not meet 3rd party guidelines).... please have the Dr. Cabrera to call pt's INS and have P2P performed. Thank you in advance.

## 2023-11-21 DIAGNOSIS — I27.20 PULMONARY HYPERTENSION: Primary | ICD-10-CM

## 2023-11-24 DIAGNOSIS — R10.9 ABDOMINAL DISCOMFORT: ICD-10-CM

## 2023-11-27 RX ORDER — DICYCLOMINE HYDROCHLORIDE 20 MG/1
20 TABLET ORAL
Qty: 90 TABLET | Refills: 2 | Status: SHIPPED | OUTPATIENT
Start: 2023-11-27 | End: 2024-02-23

## 2023-11-28 ENCOUNTER — OFFICE VISIT (OUTPATIENT)
Dept: CARDIOLOGY | Facility: CLINIC | Age: 48
End: 2023-11-28
Payer: MEDICAID

## 2023-11-28 ENCOUNTER — HOSPITAL ENCOUNTER (OUTPATIENT)
Dept: CARDIOLOGY | Facility: HOSPITAL | Age: 48
Discharge: HOME | End: 2023-11-28
Payer: MEDICAID

## 2023-11-28 VITALS
HEIGHT: 70 IN | WEIGHT: 239.63 LBS | DIASTOLIC BLOOD PRESSURE: 64 MMHG | BODY MASS INDEX: 34.3 KG/M2 | SYSTOLIC BLOOD PRESSURE: 106 MMHG | OXYGEN SATURATION: 98 % | HEART RATE: 91 BPM

## 2023-11-28 DIAGNOSIS — R06.09 DOE (DYSPNEA ON EXERTION): ICD-10-CM

## 2023-11-28 DIAGNOSIS — I27.20 PULMONARY HYPERTENSION: ICD-10-CM

## 2023-11-28 DIAGNOSIS — E78.2 MIXED HYPERLIPIDEMIA: ICD-10-CM

## 2023-11-28 DIAGNOSIS — I10 PRIMARY HYPERTENSION: ICD-10-CM

## 2023-11-28 DIAGNOSIS — E66.01 SEVERE OBESITY (BMI 35.0-35.9 WITH COMORBIDITY): ICD-10-CM

## 2023-11-28 DIAGNOSIS — R91.1 SOLITARY PULMONARY NODULE: ICD-10-CM

## 2023-11-28 DIAGNOSIS — Z01.810 PREOP CARDIOVASCULAR EXAM: Primary | ICD-10-CM

## 2023-11-28 DIAGNOSIS — I95.1 ORTHOSTATIC HYPOTENSION: ICD-10-CM

## 2023-11-28 DIAGNOSIS — Q87.40 MARFAN'S SYNDROME: ICD-10-CM

## 2023-11-28 PROCEDURE — 99215 OFFICE O/P EST HI 40 MIN: CPT | Mod: S$PBB,,, | Performed by: NURSE PRACTITIONER

## 2023-11-28 PROCEDURE — 99999 PR PBB SHADOW E&M-EST. PATIENT-LVL V: ICD-10-PCS | Mod: PBBFAC,,, | Performed by: NURSE PRACTITIONER

## 2023-11-28 PROCEDURE — 93005 ELECTROCARDIOGRAM TRACING: CPT

## 2023-11-28 PROCEDURE — 3008F BODY MASS INDEX DOCD: CPT | Mod: CPTII,,, | Performed by: NURSE PRACTITIONER

## 2023-11-28 PROCEDURE — 3078F PR MOST RECENT DIASTOLIC BLOOD PRESSURE < 80 MM HG: ICD-10-PCS | Mod: CPTII,,, | Performed by: NURSE PRACTITIONER

## 2023-11-28 PROCEDURE — 4010F PR ACE/ARB THEARPY RXD/TAKEN: ICD-10-PCS | Mod: CPTII,,, | Performed by: NURSE PRACTITIONER

## 2023-11-28 PROCEDURE — 3074F SYST BP LT 130 MM HG: CPT | Mod: CPTII,,, | Performed by: NURSE PRACTITIONER

## 2023-11-28 PROCEDURE — 3008F PR BODY MASS INDEX (BMI) DOCUMENTED: ICD-10-PCS | Mod: CPTII,,, | Performed by: NURSE PRACTITIONER

## 2023-11-28 PROCEDURE — 1159F PR MEDICATION LIST DOCUMENTED IN MEDICAL RECORD: ICD-10-PCS | Mod: CPTII,,, | Performed by: NURSE PRACTITIONER

## 2023-11-28 PROCEDURE — 99215 OFFICE O/P EST HI 40 MIN: CPT | Mod: PBBFAC | Performed by: NURSE PRACTITIONER

## 2023-11-28 PROCEDURE — 99999 PR PBB SHADOW E&M-EST. PATIENT-LVL V: CPT | Mod: PBBFAC,,, | Performed by: NURSE PRACTITIONER

## 2023-11-28 PROCEDURE — 3074F PR MOST RECENT SYSTOLIC BLOOD PRESSURE < 130 MM HG: ICD-10-PCS | Mod: CPTII,,, | Performed by: NURSE PRACTITIONER

## 2023-11-28 PROCEDURE — 93010 EKG 12-LEAD: ICD-10-PCS | Mod: ,,, | Performed by: INTERNAL MEDICINE

## 2023-11-28 PROCEDURE — 4010F ACE/ARB THERAPY RXD/TAKEN: CPT | Mod: CPTII,,, | Performed by: NURSE PRACTITIONER

## 2023-11-28 PROCEDURE — 93010 ELECTROCARDIOGRAM REPORT: CPT | Mod: ,,, | Performed by: INTERNAL MEDICINE

## 2023-11-28 PROCEDURE — 1159F MED LIST DOCD IN RCRD: CPT | Mod: CPTII,,, | Performed by: NURSE PRACTITIONER

## 2023-11-28 PROCEDURE — 3078F DIAST BP <80 MM HG: CPT | Mod: CPTII,,, | Performed by: NURSE PRACTITIONER

## 2023-11-28 PROCEDURE — 99215 PR OFFICE/OUTPT VISIT, EST, LEVL V, 40-54 MIN: ICD-10-PCS | Mod: S$PBB,,, | Performed by: NURSE PRACTITIONER

## 2023-11-28 NOTE — PROGRESS NOTES
Subjective:   Patient ID:  Jenifer Westfall is a 48 y.o. female who presents for evaluation of No chief complaint on file.        HPI    Jenifer Westfall is a 47 year old female who presents to Arrhythmia clinic for follow up. Her current medical conditions include HTN, HLP, MVP, DM Type II, IBS, PHTN. She returns today and states she is doing    She was recently seen at [facility] due to headaches with LH/Dizziness. She received IVF bolus 1 L, Reglan, Benadryl, Toradol and Fioricet prior to discharge.     She does endorse worsening in her anxiety around this time of year. Has been having ongoing issues with glucose control.     Denies chest pain or anginal equivalents. No shortness of breath, KAT or palpitations. Denies orthopnea, PND or abdominal bloating. Reports regular walking without any issues lately. She does endorse continued neuropathy pain to BLEs.  No recent falls, syncope or near syncopal events. Reports compliance with medications and dietary restrictions. NO CNS complaints to suggest TIA or CVA today. No signs of abnormal bleeding on ASA and Plavix.       6/8/2023 update    Jenifer Westfall returns for 6 month follow up.     She has been having issues with low BP recently over the past week or so.     CTA completed due to elevated D Dimer, no evidence of aneurysm or dissection. Stable spiculated partially cavitated groundglass nodular opacity RLL. She received 1L IVFs and continued to have recurrent orthostasis and received additional IVF's in ED.     She returns today and still has low BP episodes.     Denies chest pain or anginal equivalents today.   NO shortness of breath, KAT or palpitations. No leg swelling today on exam.   Denies any recent falls, syncope or near syncopal events. Reports compliance with medications and dietary restrictions. NO signs of abnormal bleeding on ASA and Plavix.     11/28/2023 update    Jenifer Westfall returns for cardiac risk stratification prior to surgery.     Plan  for MPI stress test next month to further evaluate.     EKG- NSR, Prolonged QT, HR 90 QTc 491 ms, GUANAKO 154 ms.     Denies any chest pain or anginal equivalents. She does endorse some degree of shortness of breath with any amount of exertional activities. BP stable today in office.     She is unable to walk for long distances due to shortness of breath and use of cane.     She was seen in ED on 23 due to shortness of breath symptoms.     Reports compliance with medications and dietary restrictions. Denies any recent syncopal events.     Past Medical History:   Diagnosis Date    Arthritis     DM (diabetes mellitus)      2017 Insulin x 3 years     Hepatitis C antibody positive in blood 2021    RNA NEGATIVE 3/6/2017, 3/22/22    Hyperlipidemia     Hypertension     IBS (irritable bowel syndrome)     Kidney stone     Marfan syndrome     Mitral valve prolapse        Past Surgical History:   Procedure Laterality Date    ANGIOGRAPHY OF LOWER EXTREMITY N/A 2018    Procedure: ANGIOGRAM, LOWER EXTREMITY- LEFT LEG;  Surgeon: Roscoe Landeros MD;  Location: [facility] CATH LAB;  Service: Peripheral Vascular;  Laterality: N/A;    APPENDECTOMY      BUNIONECTOMY      both feet    BYPASS GRAFT Bilateral     cataract surgery  2019     SECTION      x 2    CHOLECYSTECTOMY      COLONOSCOPY N/A 2020    Procedure: COLONOSCOPY w/ biopsies;  Surgeon: Gio Georges MD;  Location: [facility];  Service: Endoscopy;  Laterality: N/A;    ESOPHAGEAL MANOMETRY WITH MEASUREMENT OF IMPEDANCE N/A 2023    Procedure: MANOMETRY, ESOPHAGUS, WITH IMPEDANCE MEASUREMENT;  Surgeon: Desmond Calderon RN;  Location: [facility];  Service: Endoscopy;  Laterality: N/A;    ESOPHAGOGASTRODUODENOSCOPY N/A 2019    Procedure: ESOPHAGOGASTRODUODENOSCOPY (EGD);  Surgeon: Jaron Sanders III, MD;  Location: [facility];  Service: Endoscopy;  Laterality: N/A;    ESOPHAGOGASTRODUODENOSCOPY N/A 2022    Procedure: EGD  "(ESOPHAGOGASTRODUODENOSCOPY);  Surgeon: Keith Hardwick MD;  Location: [facility] ENDO;  Service: Gastroenterology;  Laterality: N/A;    SELECTIVE INJECTION OF ANESTHETIC AGENT AROUND LUMBAR SPINAL NERVE ROOT BY TRANSFORAMINAL APPROACH Bilateral 11/23/2020    Procedure: Bilateral L5/S1 TF DENY;  Surgeon: Jewel Walters MD;  Location: [facility] PAIN MGT;  Service: Pain Management;  Laterality: Bilateral;    TUBAL LIGATION         Social History     Tobacco Use    Smoking status: Former     Current packs/day: 0.00     Average packs/day: 1 pack/day for 24.0 years (24.0 ttl pk-yrs)     Types: Cigarettes     Start date: 1/1/1996     Quit date: 1/1/2020     Years since quitting: 3.9    Smokeless tobacco: Never    Tobacco comments:     "once in a blue moon"   Substance Use Topics    Alcohol use: No     Alcohol/week: 0.0 standard drinks of alcohol    Drug use: No       Family History   Problem Relation Age of Onset    Heart disease Mother     Thrombophilia Father         DVT    Hypertension Father     Stroke Maternal Aunt     Heart disease Maternal Aunt     Stroke Maternal Uncle     Heart disease Maternal Uncle     Breast cancer Neg Hx     Colon cancer Neg Hx     Ovarian cancer Neg Hx      Wt Readings from Last 3 Encounters:   11/28/23 108.7 kg (239 lb 10.2 oz)   10/26/23 109.8 kg (242 lb)   09/27/23 110.2 kg (242 lb 15.2 oz)     Temp Readings from Last 3 Encounters:   08/29/23 98.9 °F (37.2 °C) (Oral)   04/13/23 99 °F (37.2 °C)   04/11/23 97.8 °F (36.6 °C) (Tympanic)     BP Readings from Last 3 Encounters:   11/28/23 106/64   10/26/23 102/64   09/13/23 120/84     Pulse Readings from Last 3 Encounters:   11/28/23 91   10/26/23 91   09/13/23 94     Current Outpatient Medications on File Prior to Visit   Medication Sig Dispense Refill    ADMELOG SOLOSTAR U-100 INSULIN 100 unit/mL pen       albuterol (PROVENTIL) 2.5 mg /3 mL (0.083 %) nebulizer solution Take 3 mLs (2.5 mg total) by nebulization every 4 to 6 hours as needed for " "Wheezing or Shortness of Breath. 360 mL 11    ascorbic acid (VITAMIN C) 100 MG tablet Take 100 mg by mouth once daily.      aspirin (ECOTRIN) 81 MG EC tablet Take 81 mg by mouth once daily.      atenoloL (TENORMIN) 25 MG tablet TAKE 1 TABLET BY MOUTH ONCE DAILY 30 tablet 11    atorvastatin (LIPITOR) 80 MG tablet TAKE 1 TABLET BY MOUTH ONCE DAILY. 30 tablet 11    azelastine (ASTELIN) 137 mcg (0.1 %) nasal spray       BD ULTRA-FINE [place] PEN NEEDLE 32 gauge x 5/32" Ndle Inject 1 each into the skin 3 (three) times daily.      busPIRone (BUSPAR) 30 MG Tab       clopidogreL (PLAVIX) 75 mg tablet TAKE 1 TABLET BY MOUTH ONCE DAILY 90 tablet 2    dapagliflozin propanediol (FARXIGA) 10 mg tablet Take 1 tablet (10 mg total) by mouth once daily. 30 tablet 6    diclofenac sodium (VOLTAREN) 1 % Gel Apply 2 g topically 3 (three) times daily. 1 Tube 2    dicyclomine (BENTYL) 20 mg tablet Take 1 tablet (20 mg total) by mouth before meals as needed. 90 tablet 2    doxycycline (MONODOX) 100 MG capsule Take 1 capsule (100 mg total) by mouth every 12 (twelve) hours. 20 capsule 0    famotidine (PEPCID) 20 MG tablet Take 1 tablet (20 mg total) by mouth 2 (two) times daily. 60 tablet 11    FLUoxetine (PROZAC) 40 MG capsule TAKE 1 CAPSULE BY MOUTH ONCE DAILY 90 capsule 0    fluticasone propionate (FLONASE) 50 mcg/actuation nasal spray       gabapentin (NEURONTIN) 300 MG capsule Take 3 capsules (900 mg total) by mouth 3 (three) times daily. 270 capsule 11    glipiZIDE (GLUCOTROL) 10 MG tablet Take 1 tablet (10 mg total) by mouth 2 (two) times daily. 60 tablet 0    isosorbide mononitrate (IMDUR) 30 MG 24 hr tablet TAKE 1 TABLET (30 MG TOTAL) BY MOUTH ONCE DAILY. 30 tablet 11    LANTUS SOLOSTAR U-100 INSULIN glargine 100 units/mL SubQ pen Inject into the skin.      linaCLOtide (LINZESS) 145 mcg Cap capsule Take 1 capsule (145 mcg total) by mouth before breakfast. 30 capsule 2    lisinopril 10 MG tablet TAKE 1 TABLET (10 MG TOTAL) BY MOUTH ONCE " "DAILY. 30 tablet 3    mometasone (ELOCON) 0.1 % solution Apply topically.      NOVOLOG FLEXPEN U-100 INSULIN 100 unit/mL (3 mL) InPn pen       omeprazole (PRILOSEC) 40 MG capsule Take 1 capsule (40 mg total) by mouth once daily. 30 capsule 11    pantoprazole (PROTONIX) 40 MG tablet TAKE 1 TABLET BY MOUTH ONCE DAILY 30 tablet 1    promethazine (PHENERGAN) 25 MG tablet       spironolactone (ALDACTONE) 50 MG tablet Take 1 tablet (50 mg total) by mouth once daily. 30 tablet 11    tiZANidine (ZANAFLEX) 4 MG tablet TAKE 1 TO 1 AND 1/2 TABLETS (4 TO 6MG TOTAL) BY MOUTH NIGHTLY AS NEEDED. 45 tablet 5    traMADoL (ULTRAM) 50 mg tablet Take 50 mg by mouth every 6 (six) hours as needed.      TRULICITY 0.75 mg/0.5 mL pen injector       amoxicillin-clavulanate 875-125mg (AUGMENTIN) 875-125 mg per tablet Take 1 tablet by mouth every 12 (twelve) hours. (Patient not taking: Reported on 11/28/2023) 28 tablet 0    methylPREDNISolone (MEDROL DOSEPACK) 4 mg tablet use as directed (Patient not taking: Reported on 11/28/2023) 1 each 0    PEN NEEDLE 31 gauge x 5/16" Ndle USE 5 TIMES DAILY WITH LANTUS AND HUMALOG 100 each 3    predniSONE (DELTASONE) 20 MG tablet Prednisone 60 mg/ day for 3 days, 40 mg/day for 3 days,20 mg/ day for 3 days, (1/2 tablet )10 mg a day for 3 days. (Patient not taking: Reported on 11/28/2023) 20 tablet 0    rimegepant 75 mg odt Take 1 tablet (75 mg total) by mouth as needed for Migraine (max dose 3 doses within 1 week). Place ODT tablet on the tongue; alternatively the ODT tablet may be placed under the tongue (Patient not taking: Reported on 11/28/2023) 8 tablet 5    TRUE METRIX GLUCOSE TEST STRIP Strp        Current Facility-Administered Medications on File Prior to Visit   Medication Dose Route Frequency Provider Last Rate Last Admin    sodium chloride 0.9% flush 10 mL  10 mL Intravenous PRN Brian Delgado MD           Review of Systems   Constitutional: Positive for malaise/fatigue.   HENT:  Negative for " "hearing loss and hoarse voice.    Eyes:  Negative for blurred vision and visual disturbance.   Cardiovascular:  Positive for dyspnea on exertion. Negative for chest pain, claudication, irregular heartbeat, leg swelling, near-syncope, orthopnea, palpitations, paroxysmal nocturnal dyspnea and syncope.   Respiratory:  Negative for cough, hemoptysis, shortness of breath, sleep disturbances due to breathing, snoring and wheezing.    Endocrine: Negative for cold intolerance and heat intolerance.   Hematologic/Lymphatic: Does not bruise/bleed easily.   Skin:  Negative for color change, dry skin and nail changes.   Musculoskeletal:  Positive for arthritis and back pain. Negative for joint pain and myalgias.   Gastrointestinal:  Negative for bloating, abdominal pain, constipation, nausea and vomiting.   Genitourinary:  Negative for dysuria, flank pain, hematuria and hesitancy.   Neurological:  Positive for numbness and paresthesias. Negative for headaches, light-headedness, loss of balance and weakness.   Psychiatric/Behavioral:  Negative for altered mental status.    Allergic/Immunologic: Negative for environmental allergies.       Objective:/64 (BP Location: Left arm, Patient Position: Sitting, BP Method: Large (Manual))   Pulse 91   Ht 5' 10" (1.778 m)   Wt 108.7 kg (239 lb 10.2 oz)   SpO2 98%   BMI 34.38 kg/m²      Physical Exam  Vitals and nursing note reviewed.   Constitutional:       General: She is not in acute distress.     Appearance: Normal appearance. She is well-developed. She is not ill-appearing.   HENT:      Head: Normocephalic and atraumatic.      Nose: Nose normal.      Mouth/Throat:      Mouth: Mucous membranes are moist.   Eyes:      Pupils: Pupils are equal, round, and reactive to light.   Neck:      Thyroid: No thyromegaly.      Vascular: No JVD.      Trachea: No tracheal deviation.   Cardiovascular:      Rate and Rhythm: Normal rate and regular rhythm.      Chest Wall: PMI is not displaced. "      Pulses: Intact distal pulses.           Radial pulses are 2+ on the right side and 2+ on the left side.        Dorsalis pedis pulses are 2+ on the right side and 2+ on the left side.      Heart sounds: S1 normal and S2 normal. Heart sounds not distant. No murmur heard.  Pulmonary:      Effort: Pulmonary effort is normal. No respiratory distress.      Breath sounds: Normal breath sounds. No wheezing.   Abdominal:      General: Bowel sounds are normal. There is no distension.      Palpations: Abdomen is soft.      Tenderness: There is no abdominal tenderness.   Musculoskeletal:         General: No swelling. Normal range of motion.      Cervical back: Full passive range of motion without pain, normal range of motion and neck supple.      Right ankle: No swelling.      Left ankle: No swelling.   Skin:     General: Skin is warm and dry.      Capillary Refill: Capillary refill takes less than 2 seconds.      Nails: There is no clubbing.   Neurological:      General: No focal deficit present.      Mental Status: She is alert and oriented to person, place, and time.      Motor: Weakness present.   Psychiatric:         Speech: Speech normal.         Behavior: Behavior normal.         Thought Content: Thought content normal.         Judgment: Judgment normal.         Lab Results   Component Value Date    CHOL 189 03/28/2022    CHOL 165 06/29/2021    CHOL 183 10/09/2020     Lab Results   Component Value Date    HDL 34 (L) 03/28/2022    HDL 29 (L) 06/29/2021    HDL 27 (L) 10/09/2020     Lab Results   Component Value Date    LDLCALC 119 03/28/2022    LDLCALC 103 06/29/2021    LDLCALC 112.6 10/09/2020     Lab Results   Component Value Date    TRIG 182 (H) 03/28/2022    TRIG 165 (H) 06/29/2021    TRIG 217 (H) 10/09/2020     Lab Results   Component Value Date    CHOLHDL 14.8 (L) 10/09/2020    CHOLHDL 16.1 (L) 05/29/2019    CHOLHDL 18.3 (L) 02/03/2017       Chemistry        Component Value Date/Time     11/11/2023 1813      08/29/2023 1647    K 4.1 11/11/2023 1813    K 4.0 08/29/2023 1647     (H) 11/11/2023 1813     08/29/2023 1647    CO2 18 (L) 11/11/2023 1813    CO2 18 (L) 08/29/2023 1647    BUN 9 11/11/2023 1813    BUN 9 08/29/2023 1647    CREATININE 0.82 11/11/2023 1813    CREATININE 1.0 08/29/2023 1647     (H) 08/29/2023 1647        Component Value Date/Time    CALCIUM 8.3 (L) 11/11/2023 1813    CALCIUM 8.7 08/29/2023 1647    ALKPHOS 147 (H) 08/29/2023 1647    AST 25 08/29/2023 1647    ALT 29 08/29/2023 1647    BILITOT 0.3 08/29/2023 1647    ESTGFRAFRICA 88 11/11/2023 1813    ESTGFRAFRICA >60.0 06/01/2022 1541    EGFRNONAA 59.9 (A) 06/01/2022 1541          Lab Results   Component Value Date    TSH 0.849 10/07/2015     Lab Results   Component Value Date    INR 0.9 06/01/2022    INR 0.9 01/21/2022    INR 1.0 12/12/2021     Lab Results   Component Value Date    WBC 14.12 (H) 08/29/2023    HGB 13.9 08/29/2023    HCT 42.8 08/29/2023    MCV 95 08/29/2023     08/29/2023     Results for orders placed during the hospital encounter of 05/13/22    Echo    Interpretation Summary  · The left ventricle is normal in size with concentric remodeling and normal systolic function.  · The estimated ejection fraction is 55%.  · Normal left ventricular diastolic function.  · Normal right ventricular size with normal right ventricular systolic function.  · Normal central venous pressure (3 mmHg).       Assessment:      1. Preop cardiovascular exam    2. Pulmonary hypertension    3. KAT (dyspnea on exertion)    4. Orthostatic hypotension    5. Primary hypertension    6. Mixed hyperlipidemia    7. Solitary pulmonary nodule    8. Marfan's syndrome    9. Severe obesity (BMI 35.0-35.9 with comorbidity)          Plan:       Continue all other CV medications for now  Profile BP at home, call if any issues with low BP  Dash diet 2 gm sodium restriction  Encourage daily walking as tolerated  MPI stress test scheduled soon for  cardiac clearance stratification      Nicole May, FNP-C [facility]

## 2023-11-30 NOTE — TELEPHONE ENCOUNTER
"Spoke with patient, made her aware the nurse will return her call on Monday when she return to "possibly" reschedule her surgery. Pt seen Dr. Newton yesterday and wants Dr. Gotti to see her visit which is possibly why she is trying to reschedule. Pt verbalized understanding. -AS  " Other

## 2023-12-08 ENCOUNTER — PATIENT MESSAGE (OUTPATIENT)
Dept: PULMONOLOGY | Facility: CLINIC | Age: 48
End: 2023-12-08
Payer: MEDICAID

## 2023-12-12 ENCOUNTER — TELEPHONE (OUTPATIENT)
Dept: CARDIOLOGY | Facility: HOSPITAL | Age: 48
End: 2023-12-12
Payer: MEDICAID

## 2023-12-18 ENCOUNTER — PATIENT MESSAGE (OUTPATIENT)
Dept: GASTROENTEROLOGY | Facility: CLINIC | Age: 48
End: 2023-12-18
Payer: MEDICAID

## 2023-12-27 ENCOUNTER — TELEPHONE (OUTPATIENT)
Dept: CARDIOLOGY | Facility: CLINIC | Age: 48
End: 2023-12-27

## 2023-12-27 DIAGNOSIS — Z79.4 TYPE 2 DIABETES MELLITUS WITH DIABETIC NEUROPATHY, WITH LONG-TERM CURRENT USE OF INSULIN: ICD-10-CM

## 2023-12-27 DIAGNOSIS — E11.40 TYPE 2 DIABETES MELLITUS WITH DIABETIC NEUROPATHY, WITH LONG-TERM CURRENT USE OF INSULIN: ICD-10-CM

## 2023-12-27 DIAGNOSIS — R06.09 DOE (DYSPNEA ON EXERTION): Primary | ICD-10-CM

## 2023-12-27 NOTE — TELEPHONE ENCOUNTER
Please advise. The patient has cancelled the nuke test x3. She stated she had a migraine headache that has lasted 2 days, she is ready to get the testing set back up.  The patient stated she would like to get the Nuke rescheduled.     Her last office visit notes stated:  11/28/2023 update     Jenifer Westfall returns for cardiac risk stratification prior to surgery.      Plan for MPI stress test next month to further evaluate.      EKG- NSR, Prolonged QT, HR 90 QTc 491 ms, GUANAKO 154 ms.      Denies any chest pain or anginal equivalents. She does endorse some degree of shortness of breath with any amount of exertional activities. BP stable today in office.      She is unable to walk for long distances due to shortness of breath and use of cane.      She was seen in ED on 11/11/23 due to shortness of breath symptoms.

## 2023-12-29 ENCOUNTER — TELEPHONE (OUTPATIENT)
Dept: CARDIOLOGY | Facility: HOSPITAL | Age: 48
End: 2023-12-29
Payer: MEDICAID

## 2023-12-29 ENCOUNTER — TELEPHONE (OUTPATIENT)
Dept: CARDIOTHORACIC SURGERY | Facility: CLINIC | Age: 48
End: 2023-12-29
Payer: MEDICAID

## 2024-01-02 ENCOUNTER — PATIENT MESSAGE (OUTPATIENT)
Dept: RHEUMATOLOGY | Facility: CLINIC | Age: 49
End: 2024-01-02
Payer: MEDICAID

## 2024-01-02 ENCOUNTER — PATIENT MESSAGE (OUTPATIENT)
Dept: PULMONOLOGY | Facility: CLINIC | Age: 49
End: 2024-01-02
Payer: MEDICAID

## 2024-01-03 ENCOUNTER — TELEPHONE (OUTPATIENT)
Dept: CARDIOLOGY | Facility: CLINIC | Age: 49
End: 2024-01-03
Payer: MEDICAID

## 2024-01-03 DIAGNOSIS — R06.09 DOE (DYSPNEA ON EXERTION): Primary | ICD-10-CM

## 2024-01-03 RX ORDER — SODIUM CHLORIDE 0.9 % (FLUSH) 0.9 %
10 SYRINGE (ML) INJECTION
Status: ACTIVE | OUTPATIENT
Start: 2024-01-03

## 2024-01-03 NOTE — TELEPHONE ENCOUNTER
Spoke with pt- she is feeling better - scheduled dobutamine stress test for 1/9 at [facility] for 10:30- pt aware and confirmed appt - will be in  -- will hold caffeine 24 hours prior and nothing to eat after midnight  -- no longer needs NUC- Dobutamine stress is sufficient    If cleared - potential schedule Sx for 1/19 10am with DR Cabrera in CVT at [facility]   Pt verbalized understanding of above information

## 2024-01-04 ENCOUNTER — TELEPHONE (OUTPATIENT)
Dept: CARDIOLOGY | Facility: HOSPITAL | Age: 49
End: 2024-01-04
Payer: MEDICAID

## 2024-01-08 ENCOUNTER — TELEPHONE (OUTPATIENT)
Dept: CARDIOLOGY | Facility: CLINIC | Age: 49
End: 2024-01-08
Payer: MEDICAID

## 2024-01-08 NOTE — TELEPHONE ENCOUNTER
See below:    Dobutamine stress URGENT for Cardiothoracic Sx clearance    Sent message to pre-service and called pt to come in for procedure- pt verbalized understanding    ----- Message from JUNE Montano sent at 1/8/2024  3:18 PM CST -----  Regarding: RE: pt called  yes  ----- Message -----  From: Yuliana Peñaloza RN  Sent: 1/8/2024   2:22 PM CST  To: JUNE Montano  Subject: FW: pt called                                    Do you want to make test Urgent for approval?  Please advise  Thanks   ----- Message -----  From: Ilda Walters  Sent: 1/8/2024   8:56 AM CST  To: Jade Egan Staff  Subject: pt called                                        Name of Who is Calling: NIRMAL NAV L [[id number]]      What is the request in detail: pt states she got a message that her stress test hadn't been approved yet by her insurance.  The patient was told to contact the office about getting the Dr to assist in getting the test approved or another test ordered . Please advise       Can the clinic reply by [facility]: No       What Number to Call Back if not in [facility]: Telephone Information:        [phone]

## 2024-01-09 ENCOUNTER — HOSPITAL ENCOUNTER (OUTPATIENT)
Dept: CARDIOLOGY | Facility: HOSPITAL | Age: 49
Discharge: HOME | End: 2024-01-09
Attending: NURSE PRACTITIONER
Payer: MEDICAID

## 2024-01-09 VITALS
BODY MASS INDEX: 32.42 KG/M2 | DIASTOLIC BLOOD PRESSURE: 60 MMHG | HEIGHT: 70 IN | WEIGHT: 226.44 LBS | SYSTOLIC BLOOD PRESSURE: 151 MMHG | HEART RATE: 89 BPM

## 2024-01-09 DIAGNOSIS — R06.09 DOE (DYSPNEA ON EXERTION): ICD-10-CM

## 2024-01-09 PROCEDURE — 25500020 PHARM REV CODE 255: Performed by: NURSE PRACTITIONER

## 2024-01-09 PROCEDURE — 93320 DOPPLER ECHO COMPLETE: CPT | Mod: 26,,, | Performed by: INTERNAL MEDICINE

## 2024-01-09 PROCEDURE — 93351 STRESS TTE COMPLETE: CPT | Mod: 26,,, | Performed by: INTERNAL MEDICINE

## 2024-01-09 PROCEDURE — 63600175 PHARM REV CODE 636 W HCPCS: Performed by: NURSE PRACTITIONER

## 2024-01-09 PROCEDURE — 93351 STRESS TTE COMPLETE: CPT

## 2024-01-09 PROCEDURE — 93325 DOPPLER ECHO COLOR FLOW MAPG: CPT | Mod: 26,,, | Performed by: INTERNAL MEDICINE

## 2024-01-09 RX ORDER — DOBUTAMINE HYDROCHLORIDE 400 MG/100ML
10 INJECTION INTRAVENOUS CONTINUOUS
Status: SHIPPED | OUTPATIENT
Start: 2024-01-09

## 2024-01-09 RX ORDER — ATROPINE SULFATE 0.1 MG/ML
0.25 INJECTION INTRAVENOUS
Status: COMPLETED | OUTPATIENT
Start: 2024-01-09 | End: 2024-01-09

## 2024-01-09 RX ADMIN — DOBUTAMINE HYDROCHLORIDE 10 MCG/KG/MIN: 400 INJECTION INTRAVENOUS at 11:01

## 2024-01-09 RX ADMIN — HUMAN ALBUMIN MICROSPHERES AND PERFLUTREN 0.11 MG: 10; .22 INJECTION, SOLUTION INTRAVENOUS at 11:01

## 2024-01-09 RX ADMIN — ATROPINE SULFATE 0.25 MG: 0.1 INJECTION INTRAVENOUS at 11:01

## 2024-01-10 ENCOUNTER — TELEPHONE (OUTPATIENT)
Dept: CARDIOTHORACIC SURGERY | Facility: CLINIC | Age: 49
End: 2024-01-10
Payer: MEDICAID

## 2024-01-10 ENCOUNTER — TELEPHONE (OUTPATIENT)
Dept: CARDIOLOGY | Facility: CLINIC | Age: 49
End: 2024-01-10
Payer: MEDICAID

## 2024-01-10 ENCOUNTER — PATIENT MESSAGE (OUTPATIENT)
Dept: CARDIOTHORACIC SURGERY | Facility: CLINIC | Age: 49
End: 2024-01-10
Payer: MEDICAID

## 2024-01-10 LAB
AORTIC ROOT ANNULUS: 3.46 CM
AV INDEX (PROSTH): 0.88
AV MEAN GRADIENT: 3 MMHG
AV PEAK GRADIENT: 5 MMHG
AV VALVE AREA BY VELOCITY RATIO: 2.57 CM²
AV VALVE AREA: 2.65 CM²
AV VELOCITY RATIO: 0.85
BSA FOR ECHO PROCEDURE: 2.25 M2
CV ECHO LV RWT: 0.6 CM
CV STRESS BASE HR: 89 BPM
DIASTOLIC BLOOD PRESSURE: 60 MMHG
DOP CALC AO PEAK VEL: 1.16 M/S
DOP CALC AO VTI: 21.6 CM
DOP CALC LVOT AREA: 3 CM2
DOP CALC LVOT DIAMETER: 1.96 CM
DOP CALC LVOT PEAK VEL: 0.99 M/S
DOP CALC LVOT STROKE VOLUME: 57.3 CM3
DOP CALC RVOT PEAK VEL: 0.66 M/S
DOP CALC RVOT VTI: 11.5 CM
DOP CALCLVOT PEAK VEL VTI: 19 CM
E WAVE DECELERATION TIME: 153.08 MSEC
E/A RATIO: 0.76
E/E' RATIO: 8 M/S
ECHO LV POSTERIOR WALL: 1.06 CM (ref 0.6–1.1)
FRACTIONAL SHORTENING: 27 % (ref 28–44)
INTERVENTRICULAR SEPTUM: 1.02 CM (ref 0.6–1.1)
IVRT: 94.2 MSEC
LA MAJOR: 3.47 CM
LA MINOR: 3.6 CM
LA WIDTH: 3.5 CM
LEFT ATRIUM SIZE: 2.44 CM
LEFT ATRIUM VOLUME INDEX: 11.7 ML/M2
LEFT ATRIUM VOLUME: 25.65 CM3
LEFT INTERNAL DIMENSION IN SYSTOLE: 2.59 CM (ref 2.1–4)
LEFT VENTRICLE DIASTOLIC VOLUME INDEX: 24.04 ML/M2
LEFT VENTRICLE DIASTOLIC VOLUME: 52.89 ML
LEFT VENTRICLE MASS INDEX: 51 G/M2
LEFT VENTRICLE SYSTOLIC VOLUME INDEX: 11.1 ML/M2
LEFT VENTRICLE SYSTOLIC VOLUME: 24.32 ML
LEFT VENTRICULAR INTERNAL DIMENSION IN DIASTOLE: 3.56 CM (ref 3.5–6)
LEFT VENTRICULAR MASS: 112.33 G
LV LATERAL E/E' RATIO: 7.56 M/S
LV SEPTAL E/E' RATIO: 8.5 M/S
LVOT MG: 2 MMHG
LVOT MV: 0.67 CM/S
MV PEAK A VEL: 0.9 M/S
MV PEAK E VEL: 0.68 M/S
MV STENOSIS PRESSURE HALF TIME: 44.39 MS
MV VALVE AREA P 1/2 METHOD: 4.96 CM2
OHS CV CPX 1 MINUTE RECOVERY HEART RATE: 141 BPM
OHS CV CPX 85 PERCENT MAX PREDICTED HEART RATE MALE: 145
OHS CV CPX MAX PREDICTED HEART RATE: 171
OHS CV CPX PATIENT IS FEMALE: 1
OHS CV CPX PATIENT IS MALE: 0
OHS CV CPX PEAK DIASTOLIC BLOOD PRESSURE: 51 MMHG
OHS CV CPX PEAK HEAR RATE: 141 BPM
OHS CV CPX PEAK RATE PRESSURE PRODUCT: ABNORMAL
OHS CV CPX PEAK SYSTOLIC BLOOD PRESSURE: 211 MMHG
OHS CV CPX PERCENT MAX PREDICTED HEART RATE ACHIEVED: 87
OHS CV CPX RATE PRESSURE PRODUCT PRESENTING: ABNORMAL
OHS CV INITIAL DOSE: 10 MCG/KG/MIN
OHS CV PEAK DOSE: 30 MCG/KG/MIN
PV MEAN GRADIENT: 1 MMHG
PV PEAK GRADIENT: 4 MMHG
PV PEAK VELOCITY: 0.94 M/S
RA MAJOR: 3.13 CM
RA PRESSURE ESTIMATED: 3 MMHG
RA WIDTH: 3.25 CM
RIGHT VENTRICULAR END-DIASTOLIC DIMENSION: 3.16 CM
SINUS: 3.37 CM
STJ: 3.39 CM
SYSTOLIC BLOOD PRESSURE: 151 MMHG
TDI LATERAL: 0.09 M/S
TDI SEPTAL: 0.08 M/S
TDI: 0.09 M/S
TRICUSPID ANNULAR PLANE SYSTOLIC EXCURSION: 1.39 CM
Z-SCORE OF LEFT VENTRICULAR DIMENSION IN END DIASTOLE: -7.47
Z-SCORE OF LEFT VENTRICULAR DIMENSION IN END SYSTOLE: -4.48

## 2024-01-10 NOTE — TELEPHONE ENCOUNTER
Patient contacted and was advised that we will call her tomorrow to find out when she should hold her blood thinners. The patient stated understanding with no questions.    Im trying to see if I need to be off my plavix and aspirin to have the surgery done if so what day should I stop taking them and also just trying to get an estimated time to what time I need to report to the hospital and is it still scheduled for January 19.

## 2024-01-10 NOTE — TELEPHONE ENCOUNTER
Called patient to review results. Patient stated that someone had just called her about her procedure and will call her back with an update. Patient verbalized understanding. No questions or concerns. KA    Called to review results. No answer when called and no voicemail. Phone line rang and gave a busy signal. TOM    ----- Message from JUNE Montano sent at 1/10/2024 10:14 AM CST -----  Please call patient    Stress test reviewed  Negative for ischemia  Ok to proceed with surgery at moderate CV risk.     Thanks  Julisa

## 2024-01-10 NOTE — TELEPHONE ENCOUNTER
----- Message from DILIP Montano-IRENA sent at 1/10/2024 10:14 AM CST -----  Please call patient    Stress test reviewed  Negative for ischemia  Ok to proceed with surgery at moderate CV risk.     Thanks  Julisa

## 2024-01-12 ENCOUNTER — PATIENT MESSAGE (OUTPATIENT)
Dept: PULMONOLOGY | Facility: CLINIC | Age: 49
End: 2024-01-12
Payer: MEDICAID

## 2024-01-16 RX ORDER — CLOPIDOGREL BISULFATE 75 MG/1
75 TABLET ORAL DAILY
Qty: 90 TABLET | Refills: 2 | Status: SHIPPED | OUTPATIENT
Start: 2024-01-16

## 2024-01-17 ENCOUNTER — PATIENT MESSAGE (OUTPATIENT)
Dept: PULMONOLOGY | Facility: CLINIC | Age: 49
End: 2024-01-17
Payer: MEDICAID

## 2024-01-17 ENCOUNTER — TELEPHONE (OUTPATIENT)
Dept: CARDIOTHORACIC SURGERY | Facility: CLINIC | Age: 49
End: 2024-01-17
Payer: MEDICAID

## 2024-01-17 ENCOUNTER — TELEPHONE (OUTPATIENT)
Dept: CARDIOLOGY | Facility: CLINIC | Age: 49
End: 2024-01-17
Payer: MEDICAID

## 2024-01-17 NOTE — PRE-PROCEDURE INSTRUCTIONS
Pre op instructions reviewed with patient per phone on 1/17/24: Spoke about pre op process and surgery instructions, verbalized understanding.    Surgery is scheduled on 1/19/24.  We will call you the business day prior to surgery to confirm arrival time (after 2:30 pm), as it is subject to change due to cancellations & emergencies.    Please report to the [facility] (1st Floor) at [facility] located off of [facility] (2nd building on the left, in front of the [facility]),address: [address] [place] [address] [place]. [address]       Blood Thinners: I WILL SEND YOU PLAVIX AND ASPIRIN INSTRUCTIONS AS SOON AS I HEAR FROM THE CARDIOLOGY OFFICE.     INSTRUCTIONS IMPORTANT!!!  Do Not Eat, Drink, or Smoke after 12 midnight! NO WATER after midnight! OK to brush teeth, no gum, candy or mints!    Take only these medicines with a small swallow of water-morning of surgery:  Isosorbide mononitrate  Gabapentin  Omeprazole  Famotidine  Buspar  Inhaler, if needed  Nasal Spray, if needed    *Additional Medication Instructions:   -TAKE 1/2 YOUR NORMAL DOSE OF LANTUS ON THE EVENING PRIOR TO SURGERY (TAKE 20 UNITS).  - DO NOT TAKE YOUR EVENING DOSE OF NOVOLOG     ____  NO Acrylic/fake nails or nail polish worn day of surgery (specifically hand/arm & foot surgeries).  ____  NO powder, lotions, deodorants, oils or creams on body.  ____  Please Remove All jewelry & piercings prior to surgery.  ____  Please Remove Dentures, Hearing Aids & Contact Lens prior to the start of surgery.  ____  Please bring photo ID and insurance information to hospital (Leave Valuables at Home).  ____  If going home the same day, arrange for a ride home. You will not be able to drive 24 hrs if Anesthesia was used.   ____  Females (ages 11-60) may need to give a urine sample the morning of surgery; please see Pre op Nurse prior to voiding.  ____  Wear clean, loose fitting clothing. Allow for dressings, bandages.  ____  Stop all Aspirin products, Ibuprofen,  Advil, Motrin & Aleve at least 5-7 days before surgery, unless otherwise instructed by your doctor, or the nurse.   ____  Blood Thinners are stopped based on your Provider's recommendation; Call Surgeon's Office to inquire when to stop/hold.  ____  Stop taking any Fish Oil supplements or Vitamins at least 5 days prior to surgery, unless instructed otherwise by your Doctor.            Diabetic Patients: If you take diabetic medication, do NOT take morning of surgery unless instructed by             Doctor. Metformin to be stopped 24 hrs prior to surgery time. DO NOT take long-acting insulin the evening before surgery. Blood sugars will be checked in pre-op morning of.    Bathing Instructions:    -Do not shave your face or body the day before or the day of surgery.              -Shower & Rinse your body as usual with anti-bacterial Soap (Dial), on the evening prior to surgery and the morning of surgery.               -Rinse your body thoroughly.                -Pat yourself day with a clean, soft towel.               -Do not use lotion, cream, powder or deodorant               -Dress in clean clothes     [facility] Visitor/Ride Policy:  Only 2 adults allowed (over the age of 18) to accompany you to the Hospital. You Must have a ride home from a responsible adult that you know and trust. Medical Transport, Uber or Lyft can only be used if patient has a responsible adult to accompany them during ride home.    Post-Op Instructions: You will receive Post-op/Discharge instructions by your Discharge Nurse prior to going home. Please call your Surgeon's office with any post-surgery questions/concerns.    *Call [facility] with surgery instruction questions @ [phone] - [place]  or [phone]  (Mon-Fri 8 am to 4 pm)    *If you are running late or have questions the morning of surgery, please call the Surgery Dept @ [phone]  *Insurance/ Financial Questions, please call [phone].

## 2024-01-17 NOTE — TELEPHONE ENCOUNTER
Spoke to ms Rosaline and stated that patient can hold ASA and Plavix for 5 days prior to surgery and resume once safe post op per Julisa Hansen NP. KA

## 2024-01-17 NOTE — TELEPHONE ENCOUNTER
Patient was contacted and was advised that Pre-op Nurse would get in contact with her.  Previous message:  Patient is set up to have surgery on 1/19 she is on Plavix. Dr. Cabrera was not sure if she was having procedure due to insurance and peer to peer completion.     Pre-op Nurse spoke to patient. Cardiology says she may hold the ASA and Plavix 5 days prior to surgery. Pt's last dose of both was last night.

## 2024-01-18 ENCOUNTER — ANESTHESIA EVENT (OUTPATIENT)
Dept: SURGERY | Facility: HOSPITAL | Age: 49
DRG: 165 | End: 2024-01-18
Payer: MEDICAID

## 2024-01-18 ENCOUNTER — TELEPHONE (OUTPATIENT)
Dept: PREADMISSION TESTING | Facility: HOSPITAL | Age: 49
End: 2024-01-18
Payer: MEDICAID

## 2024-01-18 RX ORDER — ALPRAZOLAM 0.25 MG/1
0.25 TABLET ORAL 2 TIMES DAILY PRN
COMMUNITY

## 2024-01-18 NOTE — PRE-PROCEDURE INSTRUCTIONS
The following orders noted, per Secure Chat, from Dr. Cabrera: Patient to hold Plavix tonight and hold ASA on morning of surgery. Patient informed, via phone call and MY CHART message. Pt. verbalized understanding and was grateful for the information.

## 2024-01-18 NOTE — TELEPHONE ENCOUNTER
Called and spoke with patient about the following:     Please arrive to [facility] at 8:00am on 1/19/24 for your scheduled procedure.  Address: [address] [place] [address] [place] [address] (2nd Building on left, 1st Floor Lobby)  >>>NO eating or drinking after midnight unless instructed otherwise by your Surgeon<<<    Thank you,  -[facility] Pre [facility]mit Testing [facility].  Mon-Fri 7:30 am - 4 pm [phone]

## 2024-01-19 ENCOUNTER — ANESTHESIA (OUTPATIENT)
Dept: SURGERY | Facility: HOSPITAL | Age: 49
DRG: 165 | End: 2024-01-19
Payer: MEDICAID

## 2024-01-19 ENCOUNTER — HOSPITAL ENCOUNTER (INPATIENT)
Facility: HOSPITAL | Age: 49
LOS: 4 days | Discharge: HOME HEALTH | DRG: 165 | End: 2024-01-23
Attending: THORACIC SURGERY (CARDIOTHORACIC VASCULAR SURGERY) | Admitting: THORACIC SURGERY (CARDIOTHORACIC VASCULAR SURGERY)
Payer: MEDICAID

## 2024-01-19 DIAGNOSIS — Z79.4 TYPE 2 DIABETES MELLITUS WITH DIABETIC NEUROPATHY, WITH LONG-TERM CURRENT USE OF INSULIN: ICD-10-CM

## 2024-01-19 DIAGNOSIS — C34.30 MALIGNANT NEOPLASM OF LOWER LOBE OF LUNG, UNSPECIFIED LATERALITY: ICD-10-CM

## 2024-01-19 DIAGNOSIS — C34.90 MALIGNANT NEOPLASM OF LUNG, UNSPECIFIED LATERALITY, UNSPECIFIED PART OF LUNG: Primary | ICD-10-CM

## 2024-01-19 DIAGNOSIS — K59.00 CONSTIPATION, UNSPECIFIED CONSTIPATION TYPE: ICD-10-CM

## 2024-01-19 DIAGNOSIS — E11.51 TYPE 2 DIABETES MELLITUS WITH DIABETIC PERIPHERAL ANGIOPATHY WITHOUT GANGRENE, WITH LONG-TERM CURRENT USE OF INSULIN: ICD-10-CM

## 2024-01-19 DIAGNOSIS — E11.40 TYPE 2 DIABETES MELLITUS WITH DIABETIC NEUROPATHY, WITH LONG-TERM CURRENT USE OF INSULIN: ICD-10-CM

## 2024-01-19 DIAGNOSIS — Z79.4 TYPE 2 DIABETES MELLITUS WITH DIABETIC PERIPHERAL ANGIOPATHY WITHOUT GANGRENE, WITH LONG-TERM CURRENT USE OF INSULIN: ICD-10-CM

## 2024-01-19 LAB
ABO + RH BLD: NORMAL
ANION GAP SERPL CALC-SCNC: 10 MMOL/L (ref 8–16)
ANION GAP SERPL CALC-SCNC: 13 MMOL/L (ref 8–16)
BASOPHILS # BLD AUTO: 0.05 K/UL (ref 0–0.2)
BASOPHILS NFR BLD: 0.4 % (ref 0–1.9)
BLD GP AB SCN CELLS X3 SERPL QL: NORMAL
BUN SERPL-MCNC: 11 MG/DL (ref 6–20)
BUN SERPL-MCNC: 12 MG/DL (ref 6–20)
CALCIUM SERPL-MCNC: 8.6 MG/DL (ref 8.7–10.5)
CALCIUM SERPL-MCNC: 9 MG/DL (ref 8.7–10.5)
CHLORIDE SERPL-SCNC: 104 MMOL/L (ref 95–110)
CHLORIDE SERPL-SCNC: 105 MMOL/L (ref 95–110)
CO2 SERPL-SCNC: 22 MMOL/L (ref 23–29)
CO2 SERPL-SCNC: 22 MMOL/L (ref 23–29)
CREAT SERPL-MCNC: 1 MG/DL (ref 0.5–1.4)
CREAT SERPL-MCNC: 1 MG/DL (ref 0.5–1.4)
DIFFERENTIAL METHOD BLD: ABNORMAL
EOSINOPHIL # BLD AUTO: 0.1 K/UL (ref 0–0.5)
EOSINOPHIL NFR BLD: 1 % (ref 0–8)
ERYTHROCYTE [DISTWIDTH] IN BLOOD BY AUTOMATED COUNT: 13.5 % (ref 11.5–14.5)
EST. GFR  (NO RACE VARIABLE): >60 ML/MIN/1.73 M^2
EST. GFR  (NO RACE VARIABLE): >60 ML/MIN/1.73 M^2
GLUCOSE SERPL-MCNC: 195 MG/DL (ref 70–110)
GLUCOSE SERPL-MCNC: 333 MG/DL (ref 70–110)
HCT VFR BLD AUTO: 44.9 % (ref 37–48.5)
HGB BLD-MCNC: 14.4 G/DL (ref 12–16)
IMM GRANULOCYTES # BLD AUTO: 0.07 K/UL (ref 0–0.04)
IMM GRANULOCYTES NFR BLD AUTO: 0.5 % (ref 0–0.5)
LYMPHOCYTES # BLD AUTO: 2 K/UL (ref 1–4.8)
LYMPHOCYTES NFR BLD: 14.4 % (ref 18–48)
MCH RBC QN AUTO: 29.6 PG (ref 27–31)
MCHC RBC AUTO-ENTMCNC: 32.1 G/DL (ref 32–36)
MCV RBC AUTO: 92 FL (ref 82–98)
MONOCYTES # BLD AUTO: 0.8 K/UL (ref 0.3–1)
MONOCYTES NFR BLD: 5.9 % (ref 4–15)
NEUTROPHILS # BLD AUTO: 10.7 K/UL (ref 1.8–7.7)
NEUTROPHILS NFR BLD: 77.8 % (ref 38–73)
NRBC BLD-RTO: 0 /100 WBC
PLATELET # BLD AUTO: 304 K/UL (ref 150–450)
PMV BLD AUTO: 10.2 FL (ref 9.2–12.9)
POCT GLUCOSE: 138 MG/DL (ref 70–110)
POCT GLUCOSE: 229 MG/DL (ref 70–110)
POCT GLUCOSE: 286 MG/DL (ref 70–110)
POTASSIUM SERPL-SCNC: 4.2 MMOL/L (ref 3.5–5.1)
POTASSIUM SERPL-SCNC: 5.7 MMOL/L (ref 3.5–5.1)
RBC # BLD AUTO: 4.86 M/UL (ref 4–5.4)
SODIUM SERPL-SCNC: 137 MMOL/L (ref 136–145)
SODIUM SERPL-SCNC: 139 MMOL/L (ref 136–145)
WBC # BLD AUTO: 13.68 K/UL (ref 3.9–12.7)

## 2024-01-19 PROCEDURE — 63600175 PHARM REV CODE 636 W HCPCS: Performed by: NURSE ANESTHETIST, CERTIFIED REGISTERED

## 2024-01-19 PROCEDURE — 86850 RBC ANTIBODY SCREEN: CPT | Performed by: THORACIC SURGERY (CARDIOTHORACIC VASCULAR SURGERY)

## 2024-01-19 PROCEDURE — 8E0W4CZ ROBOTIC ASSISTED PROCEDURE OF TRUNK REGION, PERCUTANEOUS ENDOSCOPIC APPROACH: ICD-10-PCS | Performed by: THORACIC SURGERY (CARDIOTHORACIC VASCULAR SURGERY)

## 2024-01-19 PROCEDURE — 21400001 HC TELEMETRY ROOM

## 2024-01-19 PROCEDURE — 71000033 HC RECOVERY, INTIAL HOUR: Performed by: THORACIC SURGERY (CARDIOTHORACIC VASCULAR SURGERY)

## 2024-01-19 PROCEDURE — 25000003 PHARM REV CODE 250: Performed by: NURSE ANESTHETIST, CERTIFIED REGISTERED

## 2024-01-19 PROCEDURE — C1729 CATH, DRAINAGE: HCPCS | Performed by: THORACIC SURGERY (CARDIOTHORACIC VASCULAR SURGERY)

## 2024-01-19 PROCEDURE — 88309 TISSUE EXAM BY PATHOLOGIST: CPT | Mod: 26,,, | Performed by: PATHOLOGY

## 2024-01-19 PROCEDURE — 25000003 PHARM REV CODE 250: Performed by: THORACIC SURGERY (CARDIOTHORACIC VASCULAR SURGERY)

## 2024-01-19 PROCEDURE — 25000003 PHARM REV CODE 250

## 2024-01-19 PROCEDURE — 63600175 PHARM REV CODE 636 W HCPCS

## 2024-01-19 PROCEDURE — 88305 TISSUE EXAM BY PATHOLOGIST: CPT | Mod: 26,,, | Performed by: PATHOLOGY

## 2024-01-19 PROCEDURE — 25000242 PHARM REV CODE 250 ALT 637 W/ HCPCS

## 2024-01-19 PROCEDURE — 88307 TISSUE EXAM BY PATHOLOGIST: CPT | Performed by: PATHOLOGY

## 2024-01-19 PROCEDURE — 94799 UNLISTED PULMONARY SVC/PX: CPT | Mod: XB

## 2024-01-19 PROCEDURE — 63600175 PHARM REV CODE 636 W HCPCS: Performed by: THORACIC SURGERY (CARDIOTHORACIC VASCULAR SURGERY)

## 2024-01-19 PROCEDURE — 80048 BASIC METABOLIC PNL TOTAL CA: CPT | Performed by: ANESTHESIOLOGY

## 2024-01-19 PROCEDURE — 85025 COMPLETE CBC W/AUTO DIFF WBC: CPT | Performed by: THORACIC SURGERY (CARDIOTHORACIC VASCULAR SURGERY)

## 2024-01-19 PROCEDURE — 94761 N-INVAS EAR/PLS OXIMETRY MLT: CPT

## 2024-01-19 PROCEDURE — 88309 TISSUE EXAM BY PATHOLOGIST: CPT | Performed by: PATHOLOGY

## 2024-01-19 PROCEDURE — 86920 COMPATIBILITY TEST SPIN: CPT | Performed by: THORACIC SURGERY (CARDIOTHORACIC VASCULAR SURGERY)

## 2024-01-19 PROCEDURE — 27201423 OPTIME MED/SURG SUP & DEVICES STERILE SUPPLY: Performed by: THORACIC SURGERY (CARDIOTHORACIC VASCULAR SURGERY)

## 2024-01-19 PROCEDURE — 32674 THORACOSCOPY LYMPH NODE EXC: CPT | Mod: ,,, | Performed by: THORACIC SURGERY (CARDIOTHORACIC VASCULAR SURGERY)

## 2024-01-19 PROCEDURE — 88305 TISSUE EXAM BY PATHOLOGIST: CPT | Performed by: PATHOLOGY

## 2024-01-19 PROCEDURE — 71000039 HC RECOVERY, EACH ADD'L HOUR: Performed by: THORACIC SURGERY (CARDIOTHORACIC VASCULAR SURGERY)

## 2024-01-19 PROCEDURE — 32663 THORACOSCOPY W/LOBECTOMY: CPT | Mod: RT,,, | Performed by: THORACIC SURGERY (CARDIOTHORACIC VASCULAR SURGERY)

## 2024-01-19 PROCEDURE — C9290 INJ, BUPIVACAINE LIPOSOME: HCPCS | Performed by: THORACIC SURGERY (CARDIOTHORACIC VASCULAR SURGERY)

## 2024-01-19 PROCEDURE — 36000712 HC OR TIME LEV V 1ST 15 MIN: Performed by: THORACIC SURGERY (CARDIOTHORACIC VASCULAR SURGERY)

## 2024-01-19 PROCEDURE — 0BTF4ZZ RESECTION OF RIGHT LOWER LUNG LOBE, PERCUTANEOUS ENDOSCOPIC APPROACH: ICD-10-PCS | Performed by: THORACIC SURGERY (CARDIOTHORACIC VASCULAR SURGERY)

## 2024-01-19 PROCEDURE — 37000009 HC ANESTHESIA EA ADD 15 MINS: Performed by: THORACIC SURGERY (CARDIOTHORACIC VASCULAR SURGERY)

## 2024-01-19 PROCEDURE — 80048 BASIC METABOLIC PNL TOTAL CA: CPT | Mod: 91 | Performed by: THORACIC SURGERY (CARDIOTHORACIC VASCULAR SURGERY)

## 2024-01-19 PROCEDURE — 36000713 HC OR TIME LEV V EA ADD 15 MIN: Performed by: THORACIC SURGERY (CARDIOTHORACIC VASCULAR SURGERY)

## 2024-01-19 PROCEDURE — 63600175 PHARM REV CODE 636 W HCPCS: Performed by: ANESTHESIOLOGY

## 2024-01-19 PROCEDURE — 07B74ZZ EXCISION OF THORAX LYMPHATIC, PERCUTANEOUS ENDOSCOPIC APPROACH: ICD-10-PCS | Performed by: THORACIC SURGERY (CARDIOTHORACIC VASCULAR SURGERY)

## 2024-01-19 PROCEDURE — 99900035 HC TECH TIME PER 15 MIN (STAT)

## 2024-01-19 PROCEDURE — 27000221 HC OXYGEN, UP TO 24 HOURS

## 2024-01-19 PROCEDURE — 37000008 HC ANESTHESIA 1ST 15 MINUTES: Performed by: THORACIC SURGERY (CARDIOTHORACIC VASCULAR SURGERY)

## 2024-01-19 RX ORDER — HYDROMORPHONE HYDROCHLORIDE 2 MG/ML
1 INJECTION, SOLUTION INTRAMUSCULAR; INTRAVENOUS; SUBCUTANEOUS EVERY 6 HOURS PRN
Status: DISCONTINUED | OUTPATIENT
Start: 2024-01-19 | End: 2024-01-19

## 2024-01-19 RX ORDER — HYDROCODONE BITARTRATE AND ACETAMINOPHEN 500; 5 MG/1; MG/1
TABLET ORAL
Status: DISCONTINUED | OUTPATIENT
Start: 2024-01-19 | End: 2024-01-19 | Stop reason: HOSPADM

## 2024-01-19 RX ORDER — MUPIROCIN 20 MG/G
1 OINTMENT TOPICAL 2 TIMES DAILY
Status: DISCONTINUED | OUTPATIENT
Start: 2024-01-19 | End: 2024-01-23 | Stop reason: HOSPADM

## 2024-01-19 RX ORDER — METOCLOPRAMIDE HYDROCHLORIDE 5 MG/ML
5 INJECTION INTRAMUSCULAR; INTRAVENOUS EVERY 6 HOURS PRN
Status: DISCONTINUED | OUTPATIENT
Start: 2024-01-19 | End: 2024-01-23 | Stop reason: HOSPADM

## 2024-01-19 RX ORDER — PROPOFOL 10 MG/ML
VIAL (ML) INTRAVENOUS
Status: DISCONTINUED | OUTPATIENT
Start: 2024-01-19 | End: 2024-01-19

## 2024-01-19 RX ORDER — GABAPENTIN 300 MG/1
300 CAPSULE ORAL 3 TIMES DAILY
Status: DISCONTINUED | OUTPATIENT
Start: 2024-01-19 | End: 2024-01-23 | Stop reason: HOSPADM

## 2024-01-19 RX ORDER — OXYCODONE AND ACETAMINOPHEN 5; 325 MG/1; MG/1
1 TABLET ORAL EVERY 4 HOURS PRN
Status: DISCONTINUED | OUTPATIENT
Start: 2024-01-19 | End: 2024-01-20

## 2024-01-19 RX ORDER — ROCURONIUM BROMIDE 10 MG/ML
INJECTION, SOLUTION INTRAVENOUS
Status: DISCONTINUED | OUTPATIENT
Start: 2024-01-19 | End: 2024-01-19

## 2024-01-19 RX ORDER — IPRATROPIUM BROMIDE 0.5 MG/2.5ML
0.5 SOLUTION RESPIRATORY (INHALATION) EVERY 4 HOURS
Status: DISCONTINUED | OUTPATIENT
Start: 2024-01-20 | End: 2024-01-23 | Stop reason: HOSPADM

## 2024-01-19 RX ORDER — KETOROLAC TROMETHAMINE 30 MG/ML
30 INJECTION, SOLUTION INTRAMUSCULAR; INTRAVENOUS ONCE
Status: COMPLETED | OUTPATIENT
Start: 2024-01-19 | End: 2024-01-19

## 2024-01-19 RX ORDER — PHENYLEPHRINE HYDROCHLORIDE 10 MG/ML
INJECTION INTRAVENOUS
Status: DISCONTINUED | OUTPATIENT
Start: 2024-01-19 | End: 2024-01-19

## 2024-01-19 RX ORDER — HYDROMORPHONE HYDROCHLORIDE 2 MG/ML
1 INJECTION, SOLUTION INTRAMUSCULAR; INTRAVENOUS; SUBCUTANEOUS EVERY 4 HOURS PRN
Status: DISCONTINUED | OUTPATIENT
Start: 2024-01-19 | End: 2024-01-20 | Stop reason: CLARIF

## 2024-01-19 RX ORDER — HYDROMORPHONE HYDROCHLORIDE 2 MG/ML
0.2 INJECTION, SOLUTION INTRAMUSCULAR; INTRAVENOUS; SUBCUTANEOUS EVERY 5 MIN PRN
Status: DISCONTINUED | OUTPATIENT
Start: 2024-01-19 | End: 2024-01-19 | Stop reason: HOSPADM

## 2024-01-19 RX ORDER — SODIUM CHLORIDE, SODIUM LACTATE, POTASSIUM CHLORIDE, CALCIUM CHLORIDE 600; 310; 30; 20 MG/100ML; MG/100ML; MG/100ML; MG/100ML
INJECTION, SOLUTION INTRAVENOUS CONTINUOUS PRN
Status: DISCONTINUED | OUTPATIENT
Start: 2024-01-19 | End: 2024-01-19

## 2024-01-19 RX ORDER — LIDOCAINE HYDROCHLORIDE 10 MG/ML
INJECTION, SOLUTION EPIDURAL; INFILTRATION; INTRACAUDAL; PERINEURAL
Status: DISCONTINUED | OUTPATIENT
Start: 2024-01-19 | End: 2024-01-19

## 2024-01-19 RX ORDER — AZELASTINE 1 MG/ML
1 SPRAY, METERED NASAL 2 TIMES DAILY
Status: DISCONTINUED | OUTPATIENT
Start: 2024-01-19 | End: 2024-01-23 | Stop reason: HOSPADM

## 2024-01-19 RX ORDER — CEFAZOLIN SODIUM 1 G/3ML
INJECTION, POWDER, FOR SOLUTION INTRAMUSCULAR; INTRAVENOUS
Status: DISCONTINUED | OUTPATIENT
Start: 2024-01-19 | End: 2024-01-19

## 2024-01-19 RX ORDER — TIZANIDINE 4 MG/1
4 TABLET ORAL NIGHTLY PRN
Status: DISCONTINUED | OUTPATIENT
Start: 2024-01-19 | End: 2024-01-23

## 2024-01-19 RX ORDER — KETOROLAC TROMETHAMINE 30 MG/ML
30 INJECTION, SOLUTION INTRAMUSCULAR; INTRAVENOUS ONCE
Status: DISCONTINUED | OUTPATIENT
Start: 2024-01-19 | End: 2024-01-19 | Stop reason: SDUPTHER

## 2024-01-19 RX ORDER — OXYCODONE HYDROCHLORIDE 5 MG/1
5 TABLET ORAL
Status: DISCONTINUED | OUTPATIENT
Start: 2024-01-19 | End: 2024-01-19 | Stop reason: HOSPADM

## 2024-01-19 RX ORDER — FAMOTIDINE 20 MG/1
20 TABLET, FILM COATED ORAL 2 TIMES DAILY
Status: DISCONTINUED | OUTPATIENT
Start: 2024-01-19 | End: 2024-01-23 | Stop reason: HOSPADM

## 2024-01-19 RX ORDER — MIDAZOLAM HYDROCHLORIDE 1 MG/ML
INJECTION INTRAMUSCULAR; INTRAVENOUS
Status: DISCONTINUED | OUTPATIENT
Start: 2024-01-19 | End: 2024-01-19

## 2024-01-19 RX ORDER — CEFAZOLIN SODIUM 2 G/50ML
2 SOLUTION INTRAVENOUS
Status: COMPLETED | OUTPATIENT
Start: 2024-01-20 | End: 2024-01-21

## 2024-01-19 RX ORDER — FENTANYL CITRATE 50 UG/ML
INJECTION, SOLUTION INTRAMUSCULAR; INTRAVENOUS
Status: DISCONTINUED | OUTPATIENT
Start: 2024-01-19 | End: 2024-01-19

## 2024-01-19 RX ORDER — DEXTROSE MONOHYDRATE, SODIUM CHLORIDE, AND POTASSIUM CHLORIDE 50; 1.49; 4.5 G/1000ML; G/1000ML; G/1000ML
INJECTION, SOLUTION INTRAVENOUS CONTINUOUS
Status: DISCONTINUED | OUTPATIENT
Start: 2024-01-19 | End: 2024-01-22

## 2024-01-19 RX ORDER — ISOSORBIDE MONONITRATE 30 MG/1
30 TABLET, EXTENDED RELEASE ORAL DAILY
Status: DISCONTINUED | OUTPATIENT
Start: 2024-01-20 | End: 2024-01-23 | Stop reason: HOSPADM

## 2024-01-19 RX ORDER — ONDANSETRON 8 MG/1
8 TABLET, ORALLY DISINTEGRATING ORAL EVERY 8 HOURS PRN
Status: DISCONTINUED | OUTPATIENT
Start: 2024-01-19 | End: 2024-01-23 | Stop reason: HOSPADM

## 2024-01-19 RX ORDER — FLUOXETINE HYDROCHLORIDE 20 MG/1
40 CAPSULE ORAL DAILY
Status: DISCONTINUED | OUTPATIENT
Start: 2024-01-20 | End: 2024-01-23 | Stop reason: HOSPADM

## 2024-01-19 RX ORDER — ALPRAZOLAM 0.5 MG/1
0.5 TABLET ORAL 3 TIMES DAILY PRN
Status: DISCONTINUED | OUTPATIENT
Start: 2024-01-19 | End: 2024-01-23 | Stop reason: HOSPADM

## 2024-01-19 RX ORDER — ATORVASTATIN CALCIUM 40 MG/1
40 TABLET, FILM COATED ORAL DAILY
Status: DISCONTINUED | OUTPATIENT
Start: 2024-01-20 | End: 2024-01-23 | Stop reason: HOSPADM

## 2024-01-19 RX ORDER — ONDANSETRON HYDROCHLORIDE 2 MG/ML
INJECTION, SOLUTION INTRAVENOUS
Status: DISCONTINUED | OUTPATIENT
Start: 2024-01-19 | End: 2024-01-19

## 2024-01-19 RX ORDER — BUSPIRONE HYDROCHLORIDE 5 MG/1
5 TABLET ORAL 2 TIMES DAILY
Status: DISCONTINUED | OUTPATIENT
Start: 2024-01-19 | End: 2024-01-23 | Stop reason: HOSPADM

## 2024-01-19 RX ORDER — FAMOTIDINE 20 MG/1
20 TABLET, FILM COATED ORAL 2 TIMES DAILY
Status: DISCONTINUED | OUTPATIENT
Start: 2024-01-19 | End: 2024-01-19 | Stop reason: SDUPTHER

## 2024-01-19 RX ORDER — KETAMINE HCL IN 0.9 % NACL 50 MG/5 ML
SYRINGE (ML) INTRAVENOUS
Status: DISCONTINUED | OUTPATIENT
Start: 2024-01-19 | End: 2024-01-19

## 2024-01-19 RX ORDER — ALBUTEROL SULFATE 0.83 MG/ML
2.5 SOLUTION RESPIRATORY (INHALATION) EVERY 4 HOURS PRN
Status: DISCONTINUED | OUTPATIENT
Start: 2024-01-19 | End: 2024-01-19 | Stop reason: HOSPADM

## 2024-01-19 RX ORDER — ASPIRIN 81 MG/1
81 TABLET ORAL DAILY
Status: DISCONTINUED | OUTPATIENT
Start: 2024-01-20 | End: 2024-01-23 | Stop reason: HOSPADM

## 2024-01-19 RX ORDER — LABETALOL HYDROCHLORIDE 5 MG/ML
INJECTION, SOLUTION INTRAVENOUS
Status: DISCONTINUED | OUTPATIENT
Start: 2024-01-19 | End: 2024-01-19

## 2024-01-19 RX ORDER — MEPERIDINE HYDROCHLORIDE 25 MG/ML
12.5 INJECTION INTRAMUSCULAR; INTRAVENOUS; SUBCUTANEOUS ONCE
Status: COMPLETED | OUTPATIENT
Start: 2024-01-19 | End: 2024-01-19

## 2024-01-19 RX ORDER — BUPIVACAINE HYDROCHLORIDE 2.5 MG/ML
INJECTION, SOLUTION EPIDURAL; INFILTRATION; INTRACAUDAL
Status: DISCONTINUED | OUTPATIENT
Start: 2024-01-19 | End: 2024-01-19 | Stop reason: HOSPADM

## 2024-01-19 RX ORDER — ALBUTEROL SULFATE 90 UG/1
AEROSOL, METERED RESPIRATORY (INHALATION)
Status: DISCONTINUED | OUTPATIENT
Start: 2024-01-19 | End: 2024-01-19

## 2024-01-19 RX ORDER — POLYETHYLENE GLYCOL 3350 17 G/17G
17 POWDER, FOR SOLUTION ORAL DAILY
Status: DISCONTINUED | OUTPATIENT
Start: 2024-01-20 | End: 2024-01-19 | Stop reason: SDUPTHER

## 2024-01-19 RX ORDER — POLYETHYLENE GLYCOL 3350 17 G/17G
17 POWDER, FOR SOLUTION ORAL DAILY
Status: DISCONTINUED | OUTPATIENT
Start: 2024-01-20 | End: 2024-01-23 | Stop reason: HOSPADM

## 2024-01-19 RX ORDER — DIPHENHYDRAMINE HYDROCHLORIDE 50 MG/ML
25 INJECTION INTRAMUSCULAR; INTRAVENOUS EVERY 6 HOURS PRN
Status: DISCONTINUED | OUTPATIENT
Start: 2024-01-19 | End: 2024-01-19 | Stop reason: HOSPADM

## 2024-01-19 RX ORDER — BISACODYL 10 MG/1
10 SUPPOSITORY RECTAL DAILY PRN
Status: DISCONTINUED | OUTPATIENT
Start: 2024-01-20 | End: 2024-01-23 | Stop reason: HOSPADM

## 2024-01-19 RX ORDER — DICYCLOMINE HYDROCHLORIDE 10 MG/1
10 CAPSULE ORAL 4 TIMES DAILY
Status: DISCONTINUED | OUTPATIENT
Start: 2024-01-19 | End: 2024-01-23 | Stop reason: HOSPADM

## 2024-01-19 RX ORDER — METOPROLOL TARTRATE 1 MG/ML
INJECTION, SOLUTION INTRAVENOUS
Status: DISCONTINUED | OUTPATIENT
Start: 2024-01-19 | End: 2024-01-19

## 2024-01-19 RX ORDER — SODIUM CHLORIDE 0.9 % (FLUSH) 0.9 %
3 SYRINGE (ML) INJECTION
Status: DISCONTINUED | OUTPATIENT
Start: 2024-01-19 | End: 2024-01-23 | Stop reason: HOSPADM

## 2024-01-19 RX ORDER — ENOXAPARIN SODIUM 100 MG/ML
40 INJECTION SUBCUTANEOUS EVERY 24 HOURS
Status: DISCONTINUED | OUTPATIENT
Start: 2024-01-19 | End: 2024-01-22

## 2024-01-19 RX ORDER — ONDANSETRON HYDROCHLORIDE 2 MG/ML
4 INJECTION, SOLUTION INTRAVENOUS DAILY PRN
Status: DISCONTINUED | OUTPATIENT
Start: 2024-01-19 | End: 2024-01-19 | Stop reason: HOSPADM

## 2024-01-19 RX ORDER — DOXYCYCLINE HYCLATE 100 MG
100 TABLET ORAL EVERY 12 HOURS
Status: DISCONTINUED | OUTPATIENT
Start: 2024-01-19 | End: 2024-01-22

## 2024-01-19 RX ORDER — DEXAMETHASONE SODIUM PHOSPHATE 4 MG/ML
INJECTION, SOLUTION INTRA-ARTICULAR; INTRALESIONAL; INTRAMUSCULAR; INTRAVENOUS; SOFT TISSUE
Status: DISCONTINUED | OUTPATIENT
Start: 2024-01-19 | End: 2024-01-19

## 2024-01-19 RX ADMIN — METOPROLOL TARTRATE 2.5 MG: 1 INJECTION, SOLUTION INTRAVENOUS at 03:01

## 2024-01-19 RX ADMIN — LABETALOL HYDROCHLORIDE 10 MG: 5 INJECTION, SOLUTION INTRAVENOUS at 03:01

## 2024-01-19 RX ADMIN — FENTANYL CITRATE 50 MCG: 50 INJECTION, SOLUTION INTRAMUSCULAR; INTRAVENOUS at 12:01

## 2024-01-19 RX ADMIN — Medication 20 MG: at 01:01

## 2024-01-19 RX ADMIN — HYDROMORPHONE HYDROCHLORIDE 0.2 MG: 2 INJECTION INTRAMUSCULAR; INTRAVENOUS; SUBCUTANEOUS at 07:01

## 2024-01-19 RX ADMIN — ROCURONIUM BROMIDE 20 MG: 10 SOLUTION INTRAVENOUS at 02:01

## 2024-01-19 RX ADMIN — LIDOCAINE HYDROCHLORIDE 100 MG: 10 SOLUTION INTRAVENOUS at 05:01

## 2024-01-19 RX ADMIN — Medication 10 MG: at 03:01

## 2024-01-19 RX ADMIN — ROCURONIUM BROMIDE 20 MG: 10 SOLUTION INTRAVENOUS at 04:01

## 2024-01-19 RX ADMIN — MEPERIDINE HYDROCHLORIDE 12.5 MG: 25 INJECTION INTRAMUSCULAR; INTRAVENOUS; SUBCUTANEOUS at 06:01

## 2024-01-19 RX ADMIN — Medication 10 MG: at 02:01

## 2024-01-19 RX ADMIN — INSULIN HUMAN 6 UNITS: 100 INJECTION, SOLUTION PARENTERAL at 09:01

## 2024-01-19 RX ADMIN — ONDANSETRON 4 MG: 2 INJECTION INTRAMUSCULAR; INTRAVENOUS at 05:01

## 2024-01-19 RX ADMIN — HYDROMORPHONE HYDROCHLORIDE 0.2 MG: 2 INJECTION INTRAMUSCULAR; INTRAVENOUS; SUBCUTANEOUS at 06:01

## 2024-01-19 RX ADMIN — PROPOFOL 20 MG: 10 INJECTION, EMULSION INTRAVENOUS at 06:01

## 2024-01-19 RX ADMIN — SUGAMMADEX 400 MG: 100 INJECTION, SOLUTION INTRAVENOUS at 05:01

## 2024-01-19 RX ADMIN — DOXYCYCLINE HYCLATE 100 MG: 100 TABLET, COATED ORAL at 09:01

## 2024-01-19 RX ADMIN — FAMOTIDINE 20 MG: 20 TABLET ORAL at 09:01

## 2024-01-19 RX ADMIN — DEXAMETHASONE SODIUM PHOSPHATE 8 MG: 4 INJECTION, SOLUTION INTRA-ARTICULAR; INTRALESIONAL; INTRAMUSCULAR; INTRAVENOUS; SOFT TISSUE at 01:01

## 2024-01-19 RX ADMIN — GABAPENTIN 300 MG: 300 CAPSULE ORAL at 09:01

## 2024-01-19 RX ADMIN — BUSPIRONE HYDROCHLORIDE 5 MG: 5 TABLET ORAL at 09:01

## 2024-01-19 RX ADMIN — ALBUTEROL SULFATE 8 PUFF: 90 AEROSOL, METERED RESPIRATORY (INHALATION) at 05:01

## 2024-01-19 RX ADMIN — SODIUM CHLORIDE, SODIUM LACTATE, POTASSIUM CHLORIDE, AND CALCIUM CHLORIDE: .6; .31; .03; .02 INJECTION, SOLUTION INTRAVENOUS at 03:01

## 2024-01-19 RX ADMIN — OXYCODONE HYDROCHLORIDE AND ACETAMINOPHEN 1 TABLET: 5; 325 TABLET ORAL at 11:01

## 2024-01-19 RX ADMIN — PHENYLEPHRINE HYDROCHLORIDE 100 MCG: 10 INJECTION INTRAVENOUS at 02:01

## 2024-01-19 RX ADMIN — MIDAZOLAM HYDROCHLORIDE 2 MG: 1 INJECTION, SOLUTION INTRAMUSCULAR; INTRAVENOUS at 12:01

## 2024-01-19 RX ADMIN — Medication 10 MG: at 04:01

## 2024-01-19 RX ADMIN — LIDOCAINE HYDROCHLORIDE 100 MG: 10 SOLUTION INTRAVENOUS at 12:01

## 2024-01-19 RX ADMIN — ROCURONIUM BROMIDE 30 MG: 10 SOLUTION INTRAVENOUS at 01:01

## 2024-01-19 RX ADMIN — DICYCLOMINE HYDROCHLORIDE 10 MG: 10 CAPSULE ORAL at 09:01

## 2024-01-19 RX ADMIN — PROPOFOL 2 MG: 10 INJECTION, EMULSION INTRAVENOUS at 05:01

## 2024-01-19 RX ADMIN — HYDROMORPHONE HYDROCHLORIDE 0.2 MG: 2 INJECTION INTRAMUSCULAR; INTRAVENOUS; SUBCUTANEOUS at 08:01

## 2024-01-19 RX ADMIN — SODIUM CHLORIDE, SODIUM LACTATE, POTASSIUM CHLORIDE, AND CALCIUM CHLORIDE: .6; .31; .03; .02 INJECTION, SOLUTION INTRAVENOUS at 12:01

## 2024-01-19 RX ADMIN — PHENYLEPHRINE HYDROCHLORIDE 100 MCG: 10 INJECTION INTRAVENOUS at 12:01

## 2024-01-19 RX ADMIN — METOPROLOL TARTRATE 2.5 MG: 1 INJECTION, SOLUTION INTRAVENOUS at 02:01

## 2024-01-19 RX ADMIN — ROCURONIUM BROMIDE 50 MG: 10 SOLUTION INTRAVENOUS at 12:01

## 2024-01-19 RX ADMIN — CEFAZOLIN 2 G: 330 INJECTION, POWDER, FOR SOLUTION INTRAMUSCULAR; INTRAVENOUS at 01:01

## 2024-01-19 RX ADMIN — CEFAZOLIN 2 G: 330 INJECTION, POWDER, FOR SOLUTION INTRAMUSCULAR; INTRAVENOUS at 05:01

## 2024-01-19 RX ADMIN — KETOROLAC TROMETHAMINE 30 MG: 30 INJECTION, SOLUTION INTRAMUSCULAR; INTRAVENOUS at 07:01

## 2024-01-19 RX ADMIN — ALBUTEROL SULFATE 8 PUFF: 90 AEROSOL, METERED RESPIRATORY (INHALATION) at 06:01

## 2024-01-19 RX ADMIN — ROCURONIUM BROMIDE 20 MG: 10 SOLUTION INTRAVENOUS at 03:01

## 2024-01-19 RX ADMIN — MUPIROCIN 1 G: 20 OINTMENT TOPICAL at 09:01

## 2024-01-19 RX ADMIN — PROPOFOL 50 MG: 10 INJECTION, EMULSION INTRAVENOUS at 03:01

## 2024-01-19 RX ADMIN — AZELASTINE HYDROCHLORIDE 137 MCG: 137 SPRAY, METERED NASAL at 09:01

## 2024-01-19 RX ADMIN — PROPOFOL 120 MG: 10 INJECTION, EMULSION INTRAVENOUS at 12:01

## 2024-01-19 RX ADMIN — FENTANYL CITRATE 50 MCG: 50 INJECTION, SOLUTION INTRAMUSCULAR; INTRAVENOUS at 01:01

## 2024-01-19 RX ADMIN — PHENYLEPHRINE HYDROCHLORIDE 150 MCG: 10 INJECTION INTRAVENOUS at 01:01

## 2024-01-19 RX ADMIN — SODIUM CHLORIDE, POTASSIUM CHLORIDE, SODIUM LACTATE AND CALCIUM CHLORIDE: 600; 310; 30; 20 INJECTION, SOLUTION INTRAVENOUS at 12:01

## 2024-01-19 RX ADMIN — PHENYLEPHRINE HYDROCHLORIDE 50 MCG: 10 INJECTION INTRAVENOUS at 02:01

## 2024-01-19 RX ADMIN — ROCURONIUM BROMIDE 30 MG: 10 SOLUTION INTRAVENOUS at 02:01

## 2024-01-19 RX ADMIN — PROPOFOL 20 MG: 10 INJECTION, EMULSION INTRAVENOUS at 05:01

## 2024-01-19 RX ADMIN — PROPOFOL 50 MG: 10 INJECTION, EMULSION INTRAVENOUS at 02:01

## 2024-01-19 RX ADMIN — DEXTROSE, SODIUM CHLORIDE, AND POTASSIUM CHLORIDE: 5; .45; .15 INJECTION INTRAVENOUS at 10:01

## 2024-01-19 NOTE — ANESTHESIA PREPROCEDURE EVALUATION
01/19/2024  Jenifer Westfall is a 48 y.o., female.    Patient Active Problem List   Diagnosis    Diarrhea    Vitamin D deficiency    Type 2 diabetes mellitus with circulatory disorder, with long-term current use of insulin    Chronic midline low back pain without sciatica    IBS (irritable bowel syndrome)    Depression    Marfan's syndrome    HTN (hypertension)    HLD (hyperlipidemia)    MVP (mitral valve prolapse)    Bursitis, hip    Innominate artery stenosis    PAD (peripheral artery disease)    Occlusion of right subclavian artery    Sciatica of left side    Dysmenorrhea    Type 2 diabetes mellitus with diabetic neuropathy, with long-term current use of insulin    Neuropathy    Abnormal lung function test    Preop cardiovascular exam    Liver fibrosis    Acute pain of right knee    Primary osteoarthritis of both knees    DDD (degenerative disc disease), cervical    Chronic bursitis of right shoulder    Left ankle swelling    Iliac artery stenosis, left    Claudication in peripheral vascular disease, left leg    Rotator cuff impingement syndrome of right shoulder    Rotator cuff tendonitis, right    Diabetic peripheral neuropathy associated with type 2 diabetes mellitus    GERD (gastroesophageal reflux disease)    Complete tear of right rotator cuff    Myofascial pain    Colitis    Obesity (BMI 30-39.9)    Colon polyps    Chronic hip pain, left    Pulmonary hypertension    Lumbar radiculopathy    Nausea and vomiting    Other dysphagia    Numbness and tingling    KAT (dyspnea on exertion)    Atypical chest pain    Former smoker    Solitary pulmonary nodule    Diffuse esophageal spasm    Orthostatic hypotension    Severe obesity (BMI 35.0-35.9 with comorbidity)    Unsteady gait    Migraine without aura and without status migrainosus, not intractable      Pre-op Assessment    I have reviewed the Patient Summary  Reports.     I have reviewed the Nursing Notes. I have reviewed the NPO Status.   I have reviewed the Medications.     Review of Systems  Anesthesia Hx:             Denies Family Hx of Anesthesia complications.    Denies Personal Hx of Anesthesia complications.                    Social:  Former Smoker       Hematology/Oncology:  Hematology Normal                     Current/Recent Cancer.            Oncology Comments: RLL Lung cancer     EENT/Dental:  EENT/Dental Normal           Cardiovascular:     Hypertension (Systemic and Pulmonary)  Denies Valvular problems/Murmurs.         PVD  KAT  ECG has been reviewed. Cleared by cardiologist  Stopped PLAVIX over a week ago    Marfan's syndrome    RIGHT SUBCLAVIAN OCCLUSION                         Pulmonary:      Shortness of breath (Chronic and unchanged)  Sleep Apnea COVID infection three weeks ago    SaO2 95% today               Renal/:   renal calculi               Hepatic/GI:     GERD Liver Disease, (Cirrhosis) Hepatitis, C           Musculoskeletal:  Arthritis          Spine Disorders: cervical Degenerative disease           Neurological:      Headaches           Peripheral Neuropathy                          Endocrine:  Diabetes         Obesity / BMI > 30  Dermatological:  Skin Normal    Psych:   anxiety (Chronic) depression                Physical Exam  General: Alert and Cooperative    Airway:  Mallampati: II   Mouth Opening: Normal  TM Distance: Normal  Tongue: Large  Neck ROM: Normal ROM    Dental:  Edentulous    2021 TTE   FINDINGS:   Left Ventricle: Normal left ventricular cavity size. Normal left ventricular systolic   function. LVEF 55 - 65%. Normal wall thickness.   Right Ventricle: Normal right ventricular size.   Left Atrium: The left atrium is normal in size.   Right Atrium: The right atrium is normal in size.   Mitral Valve: No or trivial mitral valve regurgitation. No mitral valve stenosis.   Aortic Valve: The aortic valve has a tricuspid  configuration. No or trivial aortic valve   regurgitation. No aortic valve stenosis. AV peak PG 7 mmHg   Tricuspid Valve: No or trivial tricuspid valve regurgitation.   Pulmonic Valve: No or trivial pulmonic valve regurgitation.   Pericardium: Normal pericardium without evidence of pericardial effusion.   Aorta: Normal aortic root size.   IVC: Normal IVC size with >50% collapse with inspiration. Normal estimated RAP around 0-5   mmHg.       Anesthesia Plan  Type of Anesthesia, risks & benefits discussed:    Anesthesia Type: Gen ETT  Intra-op Monitoring Plan: Standard ASA Monitors and Art Line  Induction:  IV  Informed Consent: Informed consent signed with the Patient and all parties understand the risks and agree with anesthesia plan.  All questions answered.   ASA Score: 3  Day of Surgery Review of History & Physical: I have interviewed and examined the patient. I have reviewed the patient's H&P dated:   Anesthesia Plan Notes: PLACE BP CUFF AND ARTERIAL LINE ON LUE    CHECK LABS    Ready For Surgery From Anesthesia Perspective.     .

## 2024-01-19 NOTE — H&P
[facility] ([facility])  Cardiology  History and Physical     Patient Name: Jenifer Westfall  MRN: [id number]  Admission Date: 2024  Code Status: Full Code   Attending Provider: Chente Cabrera MD  Primary Care Physician: [facility]  Principal Problem:<principal problem not specified>    Patient information was obtained from patient and primary team.     Subjective:     Chief Complaint:       HPI: 48-year-old female with a history of longstanding lung nodule more than 2 years without any change and PET negative had a bronchoscopy and biopsy which showed a mucinous adenocarcinoma.  She was an ex-smoker quit 3 years ago has multiple medical problems including dyspnea on mild exertion.  She has a longstanding history of Marfan syndrome and she walks with a walking stick.  She also gets tired with minimal walking.     Past Medical History:   Diagnosis Date    Arthritis     DM (diabetes mellitus)      2017 Insulin x 3 years     Hepatitis C antibody positive in blood 2021    RNA NEGATIVE 3/6/2017, 3/22/22    Hyperlipidemia     Hypertension     IBS (irritable bowel syndrome)     Kidney stone     Marfan syndrome     Mitral valve prolapse     Sleep apnea     moild: no cpap needed       Past Surgical History:   Procedure Laterality Date    ANGIOGRAPHY OF LOWER EXTREMITY N/A 2018    Procedure: ANGIOGRAM, LOWER EXTREMITY- LEFT LEG;  Surgeon: Roscoe Landeros MD;  Location: [facility] CATH LAB;  Service: Peripheral Vascular;  Laterality: N/A;    APPENDECTOMY      BUNIONECTOMY      both feet    BYPASS GRAFT Bilateral     cataract surgery  2019     SECTION      x 2    CHOLECYSTECTOMY      COLONOSCOPY N/A 2020    Procedure: COLONOSCOPY w/ biopsies;  Surgeon: Gio Georges MD;  Location: [facility] ENDO;  Service: Endoscopy;  Laterality: N/A;    ESOPHAGEAL MANOMETRY WITH MEASUREMENT OF IMPEDANCE N/A 2023    Procedure: MANOMETRY, ESOPHAGUS, WITH IMPEDANCE MEASUREMENT;  Surgeon:  Desmond Calderon RN;  Location: [facility] ENDO;  Service: Endoscopy;  Laterality: N/A;    ESOPHAGOGASTRODUODENOSCOPY N/A 07/31/2019    Procedure: ESOPHAGOGASTRODUODENOSCOPY (EGD);  Surgeon: Jaron Sanders III, MD;  Location: [facility] ENDO;  Service: Endoscopy;  Laterality: N/A;    ESOPHAGOGASTRODUODENOSCOPY N/A 04/22/2022    Procedure: EGD (ESOPHAGOGASTRODUODENOSCOPY);  Surgeon: Keith Hardwick MD;  Location: [facility] ENDO;  Service: Gastroenterology;  Laterality: N/A;    SELECTIVE INJECTION OF ANESTHETIC AGENT AROUND LUMBAR SPINAL NERVE ROOT BY TRANSFORAMINAL APPROACH Bilateral 11/23/2020    Procedure: Bilateral L5/S1 TF DENY;  Surgeon: Jewel Walters MD;  Location: [facility];  Service: Pain Management;  Laterality: Bilateral;    TUBAL LIGATION         Review of patient's allergies indicates:   Allergen Reactions    Compazine [prochlorperazine edisylate] Rash    Contrast media Anaphylaxis    Dye Anaphylaxis     Contrast dye    Iodinated contrast media Anaphylaxis     Other reaction(s): anaphylaxis    Levaquin [levofloxacin] Hives and Itching    Morphine Other (See Comments)     Irritable  Iritability  Irritable    Prochlorperazine Rash    Metformin Other (See Comments)     Upset stomach    Ibuprofen Other (See Comments) and Nausea And Vomiting     Stomach pain  Stomach pain    Abdominal pain     Methocarbamol Nausea Only       No current facility-administered medications on file prior to encounter.     Current Outpatient Medications on File Prior to Encounter   Medication Sig    albuterol (PROVENTIL) 2.5 mg /3 mL (0.083 %) nebulizer solution Take 3 mLs (2.5 mg total) by nebulization every 4 to 6 hours as needed for Wheezing or Shortness of Breath.    ascorbic acid (VITAMIN C) 100 MG tablet Take 100 mg by mouth once daily.    aspirin (ECOTRIN) 81 MG EC tablet Take 81 mg by mouth every evening.    atenoloL (TENORMIN) 25 MG tablet TAKE 1 TABLET BY MOUTH ONCE DAILY (Patient taking differently: Take 25 mg by mouth every  evening.)    atorvastatin (LIPITOR) 80 MG tablet TAKE 1 TABLET BY MOUTH ONCE DAILY. (Patient taking differently: Take 80 mg by mouth every evening.)    azelastine (ASTELIN) 137 mcg (0.1 %) nasal spray 2 sprays by Nasal route daily as needed for Rhinitis.    busPIRone (BUSPAR) 30 MG Tab Take 30 mg by mouth 2 (two) times daily.    dapagliflozin propanediol (FARXIGA) 10 mg tablet Take 1 tablet (10 mg total) by mouth once daily.    dicyclomine (BENTYL) 20 mg tablet Take 1 tablet (20 mg total) by mouth before meals as needed. (Patient taking differently: Take 20 mg by mouth before meals as needed.)    famotidine (PEPCID) 20 MG tablet Take 1 tablet (20 mg total) by mouth 2 (two) times daily.    FLUoxetine (PROZAC) 40 MG capsule TAKE 1 CAPSULE BY MOUTH ONCE DAILY (Patient taking differently: Take 40 mg by mouth every evening.)    fluticasone propionate (FLONASE) 50 mcg/actuation nasal spray 2 sprays by Each Nostril route daily as needed.    gabapentin (NEURONTIN) 300 MG capsule Take 3 capsules (900 mg total) by mouth 3 (three) times daily.    glipiZIDE (GLUCOTROL) 10 MG tablet Take 1 tablet (10 mg total) by mouth 2 (two) times daily.    isosorbide mononitrate (IMDUR) 30 MG 24 hr tablet TAKE 1 TABLET (30 MG TOTAL) BY MOUTH ONCE DAILY.    LANTUS SOLOSTAR U-100 INSULIN glargine 100 units/mL SubQ pen Inject 40 Units into the skin 2 (two) times a day.    linaCLOtide (LINZESS) 145 mcg Cap capsule Take 1 capsule (145 mcg total) by mouth before breakfast. (Patient taking differently: Take 145 mcg by mouth daily as needed.)    lisinopril 10 MG tablet TAKE 1 TABLET (10 MG TOTAL) BY MOUTH ONCE DAILY. (Patient taking differently: Take 5 mg by mouth every evening.)    NOVOLOG FLEXPEN U-100 INSULIN 100 unit/mL (3 mL) InPn pen Inject into the skin 3 (three) times daily with meals.    omeprazole (PRILOSEC) 40 MG capsule Take 1 capsule (40 mg total) by mouth once daily.    promethazine (PHENERGAN) 25 MG tablet     tiZANidine (ZANAFLEX) 4  "MG tablet TAKE 1 TO 1 AND 1/2 TABLETS (4 TO 6MG TOTAL) BY MOUTH NIGHTLY AS NEEDED. (Patient taking differently: Take 4 mg by mouth before meals as needed.)    ADMELOG SOLOSTAR U-100 INSULIN 100 unit/mL pen     BD ULTRA-FINE [place] PEN NEEDLE 32 gauge x 5/32" Ndle Inject 1 each into the skin 3 (three) times daily.    diclofenac sodium (VOLTAREN) 1 % Gel Apply 2 g topically 3 (three) times daily.    doxycycline (MONODOX) 100 MG capsule Take 1 capsule (100 mg total) by mouth every 12 (twelve) hours.    mometasone (ELOCON) 0.1 % solution Apply topically.    pantoprazole (PROTONIX) 40 MG tablet TAKE 1 TABLET BY MOUTH ONCE DAILY    PEN NEEDLE 31 gauge x 5/16" Ndle USE 5 TIMES DAILY WITH LANTUS AND HUMALOG    rimegepant 75 mg odt Take 1 tablet (75 mg total) by mouth as needed for Migraine (max dose 3 doses within 1 week). Place ODT tablet on the tongue; alternatively the ODT tablet may be placed under the tongue    traMADoL (ULTRAM) 50 mg tablet Take 50 mg by mouth every 6 (six) hours as needed.    TRUE METRIX GLUCOSE TEST STRIP Strp     TRULICITY 0.75 mg/0.5 mL pen injector     ZINC ORAL Take 1 tablet by mouth once daily. PT UNSURE OF DOSE     Family History       Problem Relation (Age of Onset)    Heart disease Mother, Maternal Aunt, Maternal Uncle    Hypertension Father    Stroke Maternal Aunt, Maternal Uncle    Thrombophilia Father          Tobacco Use    Smoking status: Former     Current packs/day: 0.00     Average packs/day: 1 pack/day for 24.0 years (24.0 ttl pk-yrs)     Types: Cigarettes     Start date: 1996     Quit date: 2020     Years since quittin.0    Smokeless tobacco: Never    Tobacco comments:     "once in a blue moon"   Substance and Sexual Activity    Alcohol use: Never    Drug use: No    Sexual activity: Not Currently     Review of Systems   Constitutional: Positive for decreased appetite and malaise/fatigue.   HENT: Negative.     Eyes: Negative.    Respiratory:  Positive for shortness of " breath.    Endocrine: Negative.    Hematologic/Lymphatic: Negative.    Skin: Negative.    Musculoskeletal: Negative.    Psychiatric/Behavioral: Negative.     Allergic/Immunologic: Negative.      Objective:     Vital Signs (Most Recent):  Temp: 97.9 °F (36.6 °C) (01/19/24 0913)  Pulse: 87 (01/19/24 0913)  Resp: 20 (01/19/24 0913)  BP: (!) 101/53 (01/19/24 0913)  SpO2: 96 % (01/19/24 0913) Vital Signs (24h Range):  Temp:  [97.9 °F (36.6 °C)] 97.9 °F (36.6 °C)  Pulse:  [87] 87  Resp:  [20] 20  SpO2:  [96 %] 96 %  BP: (101)/(53) 101/53     Weight: 102.5 kg (226 lb)  Body mass index is 32.43 kg/m².    SpO2: 96 %       No intake or output data in the 24 hours ending 01/19/24 1149    Lines/Drains/Airways       Peripheral Intravenous Line  Duration                  Peripheral IV - Single Lumen 01/19/24 0923 20 G Left Forearm <1 day                    Physical Exam  Vitals reviewed.   Constitutional:       Appearance: Normal appearance.   HENT:      Head: Normocephalic and atraumatic.      Mouth/Throat:      Mouth: Mucous membranes are moist.   Eyes:      Extraocular Movements: Extraocular movements intact.      Pupils: Pupils are equal, round, and reactive to light.   Cardiovascular:      Rate and Rhythm: Normal rate and regular rhythm.      Pulses: Normal pulses.      Heart sounds: Normal heart sounds.   Pulmonary:      Effort: Pulmonary effort is normal.      Breath sounds: Normal breath sounds.   Abdominal:      Palpations: Abdomen is soft.      Tenderness: There is no abdominal tenderness. There is no guarding.   Musculoskeletal:         General: Normal range of motion.      Cervical back: Normal range of motion and neck supple.   Skin:     General: Skin is warm.      Capillary Refill: Capillary refill takes less than 2 seconds.   Neurological:      General: No focal deficit present.      Mental Status: She is alert and oriented to person, place, and time.   Psychiatric:         Mood and Affect: Mood normal.      Reviewed the CT scan which showed right lower lobe nodule 2.2 cm    Significant Labs: BMP:   Recent Labs   Lab 01/19/24  0949   *      K 4.2      CO2 22*   BUN 11   CREATININE 1.0   CALCIUM 9.0    and CBC   Recent Labs   Lab 01/19/24  0910   WBC 13.68*   HGB 14.4   HCT 44.9          Significant Imaging: CT scan:  Revealed cavitary right lower lobe nodule no significant lymphadenopathy  Assessment and Plan:   48-year-old female with a history of difficulty in breathing cough had a right lower lobe nodule which was followed up for more than 2 years showed some increase in size had a biopsy which revealed mucinous adenocarcinoma.  The patient was seen as an outpatient she was worked up as an outpatient after obtaining a cardiac clearance she was posted for VATS right lower lobectomy mediastinal lymph node dissection and multiple intercostal nerve block.  Risks benefits and alternatives of the procedure has been discussed with the patient patient understands the risk of infection bleeding intraoperative cardiac events and agreed to proceed.  There are no hospital problems to display for this patient.      VTE Risk Mitigation (From admission, onward)           Ordered     IP VTE HIGH RISK PATIENT  Once         01/19/24 0840     Place sequential compression device  Until discontinued         01/19/24 0840                    Chente Cabrera MD  Cardiology   [facility] ([facility])

## 2024-01-20 LAB
ANION GAP SERPL CALC-SCNC: 11 MMOL/L (ref 8–16)
BASOPHILS # BLD AUTO: 0.03 K/UL (ref 0–0.2)
BASOPHILS NFR BLD: 0.2 % (ref 0–1.9)
BUN SERPL-MCNC: 14 MG/DL (ref 6–20)
CALCIUM SERPL-MCNC: 8.1 MG/DL (ref 8.7–10.5)
CHLORIDE SERPL-SCNC: 104 MMOL/L (ref 95–110)
CO2 SERPL-SCNC: 20 MMOL/L (ref 23–29)
CREAT SERPL-MCNC: 0.9 MG/DL (ref 0.5–1.4)
DIFFERENTIAL METHOD BLD: ABNORMAL
EOSINOPHIL # BLD AUTO: 0 K/UL (ref 0–0.5)
EOSINOPHIL NFR BLD: 0.1 % (ref 0–8)
ERYTHROCYTE [DISTWIDTH] IN BLOOD BY AUTOMATED COUNT: 13.6 % (ref 11.5–14.5)
EST. GFR  (NO RACE VARIABLE): >60 ML/MIN/1.73 M^2
ESTIMATED AVG GLUCOSE: 209 MG/DL (ref 68–131)
GLUCOSE SERPL-MCNC: 290 MG/DL (ref 70–110)
HBA1C MFR BLD: 8.9 % (ref 4–5.6)
HCT VFR BLD AUTO: 38.6 % (ref 37–48.5)
HGB BLD-MCNC: 12.4 G/DL (ref 12–16)
IMM GRANULOCYTES # BLD AUTO: 0.04 K/UL (ref 0–0.04)
IMM GRANULOCYTES NFR BLD AUTO: 0.3 % (ref 0–0.5)
LYMPHOCYTES # BLD AUTO: 1.6 K/UL (ref 1–4.8)
LYMPHOCYTES NFR BLD: 12 % (ref 18–48)
MCH RBC QN AUTO: 29.8 PG (ref 27–31)
MCHC RBC AUTO-ENTMCNC: 32.1 G/DL (ref 32–36)
MCV RBC AUTO: 93 FL (ref 82–98)
MONOCYTES # BLD AUTO: 1.2 K/UL (ref 0.3–1)
MONOCYTES NFR BLD: 8.9 % (ref 4–15)
NEUTROPHILS # BLD AUTO: 10.1 K/UL (ref 1.8–7.7)
NEUTROPHILS NFR BLD: 78.5 % (ref 38–73)
NRBC BLD-RTO: 0 /100 WBC
PLATELET # BLD AUTO: 282 K/UL (ref 150–450)
PMV BLD AUTO: 10.1 FL (ref 9.2–12.9)
POCT GLUCOSE: 268 MG/DL (ref 70–110)
POCT GLUCOSE: 291 MG/DL (ref 70–110)
POCT GLUCOSE: 332 MG/DL (ref 70–110)
POTASSIUM SERPL-SCNC: 5.1 MMOL/L (ref 3.5–5.1)
RBC # BLD AUTO: 4.16 M/UL (ref 4–5.4)
SODIUM SERPL-SCNC: 135 MMOL/L (ref 136–145)
WBC # BLD AUTO: 12.87 K/UL (ref 3.9–12.7)

## 2024-01-20 PROCEDURE — 27000221 HC OXYGEN, UP TO 24 HOURS

## 2024-01-20 PROCEDURE — 94640 AIRWAY INHALATION TREATMENT: CPT

## 2024-01-20 PROCEDURE — 36415 COLL VENOUS BLD VENIPUNCTURE: CPT | Mod: XB | Performed by: THORACIC SURGERY (CARDIOTHORACIC VASCULAR SURGERY)

## 2024-01-20 PROCEDURE — 25000242 PHARM REV CODE 250 ALT 637 W/ HCPCS: Performed by: THORACIC SURGERY (CARDIOTHORACIC VASCULAR SURGERY)

## 2024-01-20 PROCEDURE — 94799 UNLISTED PULMONARY SVC/PX: CPT | Mod: XB

## 2024-01-20 PROCEDURE — 85025 COMPLETE CBC W/AUTO DIFF WBC: CPT | Performed by: THORACIC SURGERY (CARDIOTHORACIC VASCULAR SURGERY)

## 2024-01-20 PROCEDURE — 99900035 HC TECH TIME PER 15 MIN (STAT)

## 2024-01-20 PROCEDURE — 94761 N-INVAS EAR/PLS OXIMETRY MLT: CPT

## 2024-01-20 PROCEDURE — 83036 HEMOGLOBIN GLYCOSYLATED A1C: CPT | Performed by: THORACIC SURGERY (CARDIOTHORACIC VASCULAR SURGERY)

## 2024-01-20 PROCEDURE — 25000003 PHARM REV CODE 250: Performed by: THORACIC SURGERY (CARDIOTHORACIC VASCULAR SURGERY)

## 2024-01-20 PROCEDURE — 21400001 HC TELEMETRY ROOM

## 2024-01-20 PROCEDURE — 80048 BASIC METABOLIC PNL TOTAL CA: CPT | Performed by: THORACIC SURGERY (CARDIOTHORACIC VASCULAR SURGERY)

## 2024-01-20 PROCEDURE — 63600175 PHARM REV CODE 636 W HCPCS: Performed by: THORACIC SURGERY (CARDIOTHORACIC VASCULAR SURGERY)

## 2024-01-20 RX ORDER — OXYCODONE AND ACETAMINOPHEN 5; 325 MG/1; MG/1
2 TABLET ORAL EVERY 6 HOURS PRN
Status: DISCONTINUED | OUTPATIENT
Start: 2024-01-20 | End: 2024-01-23 | Stop reason: HOSPADM

## 2024-01-20 RX ORDER — IBUPROFEN 200 MG
24 TABLET ORAL
Status: DISCONTINUED | OUTPATIENT
Start: 2024-01-20 | End: 2024-01-23 | Stop reason: HOSPADM

## 2024-01-20 RX ORDER — INSULIN ASPART 100 [IU]/ML
1-10 INJECTION, SOLUTION INTRAVENOUS; SUBCUTANEOUS
Status: DISCONTINUED | OUTPATIENT
Start: 2024-01-20 | End: 2024-01-23 | Stop reason: HOSPADM

## 2024-01-20 RX ORDER — HYDROMORPHONE HYDROCHLORIDE 1 MG/ML
1 INJECTION, SOLUTION INTRAMUSCULAR; INTRAVENOUS; SUBCUTANEOUS EVERY 4 HOURS PRN
Status: DISCONTINUED | OUTPATIENT
Start: 2024-01-20 | End: 2024-01-22

## 2024-01-20 RX ORDER — IBUPROFEN 200 MG
16 TABLET ORAL
Status: DISCONTINUED | OUTPATIENT
Start: 2024-01-20 | End: 2024-01-23 | Stop reason: HOSPADM

## 2024-01-20 RX ORDER — INSULIN ASPART 100 [IU]/ML
8 INJECTION, SOLUTION INTRAVENOUS; SUBCUTANEOUS
Status: DISCONTINUED | OUTPATIENT
Start: 2024-01-20 | End: 2024-01-23 | Stop reason: HOSPADM

## 2024-01-20 RX ORDER — METOPROLOL TARTRATE 25 MG/1
25 TABLET, FILM COATED ORAL 2 TIMES DAILY
Status: DISCONTINUED | OUTPATIENT
Start: 2024-01-20 | End: 2024-01-22

## 2024-01-20 RX ORDER — GLUCAGON 1 MG
1 KIT INJECTION
Status: DISCONTINUED | OUTPATIENT
Start: 2024-01-20 | End: 2024-01-23 | Stop reason: HOSPADM

## 2024-01-20 RX ADMIN — DICYCLOMINE HYDROCHLORIDE 10 MG: 10 CAPSULE ORAL at 08:01

## 2024-01-20 RX ADMIN — ISOSORBIDE MONONITRATE 30 MG: 30 TABLET, EXTENDED RELEASE ORAL at 08:01

## 2024-01-20 RX ADMIN — ALPRAZOLAM 0.5 MG: 0.5 TABLET ORAL at 09:01

## 2024-01-20 RX ADMIN — CEFAZOLIN SODIUM 2 G: 2 SOLUTION INTRAVENOUS at 05:01

## 2024-01-20 RX ADMIN — INSULIN ASPART 3 UNITS: 100 INJECTION, SOLUTION INTRAVENOUS; SUBCUTANEOUS at 08:01

## 2024-01-20 RX ADMIN — ALPRAZOLAM 0.5 MG: 0.5 TABLET ORAL at 04:01

## 2024-01-20 RX ADMIN — HYDROMORPHONE HYDROCHLORIDE 1 MG: 1 INJECTION, SOLUTION INTRAMUSCULAR; INTRAVENOUS; SUBCUTANEOUS at 02:01

## 2024-01-20 RX ADMIN — ASPIRIN 81 MG: 81 TABLET, COATED ORAL at 08:01

## 2024-01-20 RX ADMIN — ATORVASTATIN CALCIUM 40 MG: 40 TABLET, FILM COATED ORAL at 08:01

## 2024-01-20 RX ADMIN — AZELASTINE HYDROCHLORIDE 137 MCG: 137 SPRAY, METERED NASAL at 08:01

## 2024-01-20 RX ADMIN — IPRATROPIUM BROMIDE 0.5 MG: 0.5 SOLUTION RESPIRATORY (INHALATION) at 08:01

## 2024-01-20 RX ADMIN — DOXYCYCLINE HYCLATE 100 MG: 100 TABLET, COATED ORAL at 08:01

## 2024-01-20 RX ADMIN — POLYETHYLENE GLYCOL 3350 17 G: 17 POWDER, FOR SOLUTION ORAL at 08:01

## 2024-01-20 RX ADMIN — MUPIROCIN 1 G: 20 OINTMENT TOPICAL at 08:01

## 2024-01-20 RX ADMIN — OXYCODONE HYDROCHLORIDE AND ACETAMINOPHEN 2 TABLET: 5; 325 TABLET ORAL at 11:01

## 2024-01-20 RX ADMIN — FAMOTIDINE 20 MG: 20 TABLET ORAL at 08:01

## 2024-01-20 RX ADMIN — CEFAZOLIN SODIUM 2 G: 2 SOLUTION INTRAVENOUS at 12:01

## 2024-01-20 RX ADMIN — IPRATROPIUM BROMIDE 0.5 MG: 0.5 SOLUTION RESPIRATORY (INHALATION) at 03:01

## 2024-01-20 RX ADMIN — BUSPIRONE HYDROCHLORIDE 5 MG: 5 TABLET ORAL at 08:01

## 2024-01-20 RX ADMIN — FLUOXETINE 40 MG: 20 CAPSULE ORAL at 08:01

## 2024-01-20 RX ADMIN — CEFAZOLIN SODIUM 2 G: 2 SOLUTION INTRAVENOUS at 09:01

## 2024-01-20 RX ADMIN — INSULIN ASPART 8 UNITS: 100 INJECTION, SOLUTION INTRAVENOUS; SUBCUTANEOUS at 12:01

## 2024-01-20 RX ADMIN — ONDANSETRON 8 MG: 8 TABLET, ORALLY DISINTEGRATING ORAL at 01:01

## 2024-01-20 RX ADMIN — METOPROLOL TARTRATE 25 MG: 25 TABLET, FILM COATED ORAL at 11:01

## 2024-01-20 RX ADMIN — IPRATROPIUM BROMIDE 0.5 MG: 0.5 SOLUTION RESPIRATORY (INHALATION) at 12:01

## 2024-01-20 RX ADMIN — DICYCLOMINE HYDROCHLORIDE 10 MG: 10 CAPSULE ORAL at 12:01

## 2024-01-20 RX ADMIN — DICYCLOMINE HYDROCHLORIDE 10 MG: 10 CAPSULE ORAL at 04:01

## 2024-01-20 RX ADMIN — HYDROMORPHONE HYDROCHLORIDE 1 MG: 1 INJECTION, SOLUTION INTRAMUSCULAR; INTRAVENOUS; SUBCUTANEOUS at 08:01

## 2024-01-20 RX ADMIN — METOPROLOL TARTRATE 25 MG: 25 TABLET, FILM COATED ORAL at 08:01

## 2024-01-20 RX ADMIN — ONDANSETRON 8 MG: 8 TABLET, ORALLY DISINTEGRATING ORAL at 02:01

## 2024-01-20 RX ADMIN — GABAPENTIN 300 MG: 300 CAPSULE ORAL at 08:01

## 2024-01-20 RX ADMIN — LINACLOTIDE 72 MCG: 72 CAPSULE, GELATIN COATED ORAL at 06:01

## 2024-01-20 RX ADMIN — TIZANIDINE 4 MG: 4 TABLET ORAL at 08:01

## 2024-01-20 RX ADMIN — ENOXAPARIN SODIUM 40 MG: 40 INJECTION SUBCUTANEOUS at 04:01

## 2024-01-20 RX ADMIN — GABAPENTIN 300 MG: 300 CAPSULE ORAL at 04:01

## 2024-01-20 RX ADMIN — INSULIN DETEMIR 30 UNITS: 100 INJECTION, SOLUTION SUBCUTANEOUS at 12:01

## 2024-01-20 RX ADMIN — OXYCODONE HYDROCHLORIDE AND ACETAMINOPHEN 1 TABLET: 5; 325 TABLET ORAL at 03:01

## 2024-01-20 RX ADMIN — INSULIN DETEMIR 30 UNITS: 100 INJECTION, SOLUTION SUBCUTANEOUS at 08:01

## 2024-01-20 RX ADMIN — IPRATROPIUM BROMIDE 0.5 MG: 0.5 SOLUTION RESPIRATORY (INHALATION) at 05:01

## 2024-01-20 RX ADMIN — OXYCODONE HYDROCHLORIDE AND ACETAMINOPHEN 2 TABLET: 5; 325 TABLET ORAL at 05:01

## 2024-01-20 RX ADMIN — INSULIN ASPART 8 UNITS: 100 INJECTION, SOLUTION INTRAVENOUS; SUBCUTANEOUS at 04:01

## 2024-01-20 RX ADMIN — INSULIN ASPART 6 UNITS: 100 INJECTION, SOLUTION INTRAVENOUS; SUBCUTANEOUS at 04:01

## 2024-01-20 NOTE — PT/OT/SLP PROGRESS
Physical Therapy      Patient Name:  Jenifer Westfall   MRN:  [id number]    Patient not seen today secondary to Patient unwilling to participate secondary to reports of high pain. Will follow-up at next available time.

## 2024-01-20 NOTE — PLAN OF CARE
[facility] (Hospital)  Initial Discharge Assessment       Primary Care Provider: Kareen Moctezuma    Admission Diagnosis: Malignant neoplasm of lung, unspecified laterality, unspecified part of lung [C34.90]  Type 2 diabetes mellitus with diabetic peripheral angiopathy without gangrene, with long-term current use of insulin [E11.51, Z79.4]    Admission Date: 1/19/2024  Expected Discharge Date:     Transition of Care Barriers: None    Payor: MEDICAID / Plan: [place] [employer] / Product Type: Managed Medicaid /     Extended Emergency Contact Information  Primary Emergency Contact: Darion Westfall  Address: [address] [place], [place] [id number] [place] [address]  Mobile Phone: [phone]  Relation: Son   needed? No  Secondary Emergency Contact: Chaitanya Starkey  Address: [address] [place], [place] [id number] [place] [address]  Mobile Phone: [phone]  Relation: Son    Discharge Plan A: Home         [facility] [place] - [address] [facility] [address]  [address] [address]  [place] [address]  Phone: [phone] Fax: [phone]      Initial Assessment (most recent)       Adult Discharge Assessment - 01/20/24 1155          Discharge Assessment    Assessment Type Discharge Planning Assessment     Confirmed/corrected address, phone number and insurance Yes     Confirmed Demographics Correct on Facesheet     Source of Information patient     Does patient/caregiver understand observation status Yes     Communicated LILLIE with patient/caregiver Date not available/Unable to determine     People in Home child(juan daniel), adult     Do you expect to return to your current living situation? Yes     Do you have help at home or someone to help you manage your care at home? No     Prior to hospitilization cognitive status: Alert/Oriented     Current cognitive status: Alert/Oriented     Equipment Currently Used at Home cane, straight      Readmission within 30 days? No     Patient currently being followed by outpatient case management? No     Do you currently have service(s) that help you manage your care at home? No     Do you take prescription medications? Yes     Do you have prescription coverage? Yes     Do you have any problems affording any of your prescribed medications? No     Is the patient taking medications as prescribed? yes     Who is going to help you get home at discharge? family     Are you on dialysis? No     Do you take coumadin? No     Discharge Plan A Home     DME Needed Upon Discharge  none     Discharge Plan discussed with: Patient     Transition of Care Barriers None

## 2024-01-20 NOTE — PROGRESS NOTES
Subjective:      Patient ID: Jenifer Westfall is a 48 y.o. female.    Chief Complaint: No chief complaint on file.    HPI: HPI:  48-year-old female with a history of longstanding lung nodule more than 2 years without any change and PET negative had a bronchoscopy and biopsy which showed a mucinous adenocarcinoma.  She was an ex-smoker quit 3 years ago has multiple medical problems including dyspnea on mild exertion.  She has a longstanding history of Marfan syndrome and she walks with a walking stick.  She also gets tired with minimal walking.     1/19/2024  VATS, ROBOT-ASSISTED, OR ROBOT-ASSISTED lobectomy BLOCK, NERVE, INTERCOSTAL, 2 OR MORE, EXCISION, LYMPH NODE, LOBECTOMY-ROBOTIC       01/20/2024 patient had lot of pain issues overnight otherwise she is hemodynamically stable chest tube output minimal drainage no air leak Mathias catheter was discontinued this morning.             Review of patient's allergies indicates:   Allergen Reactions    Compazine [prochlorperazine edisylate] Rash    Contrast media Anaphylaxis    Dye Anaphylaxis     Contrast dye    Iodinated contrast media Anaphylaxis     Other reaction(s): anaphylaxis    Levaquin [levofloxacin] Hives and Itching    Morphine Other (See Comments)     Irritable  Iritability  Irritable    Prochlorperazine Rash    Metformin Other (See Comments)     Upset stomach    Ibuprofen Other (See Comments) and Nausea And Vomiting     Stomach pain  Stomach pain    Abdominal pain     Methocarbamol Nausea Only       Past Medical History:   Diagnosis Date    Arthritis     DM (diabetes mellitus) 2009     03/01/2017 Insulin x 3 years 2013    Hepatitis C antibody positive in blood 03/04/2021    RNA NEGATIVE 3/6/2017, 3/22/22    Hyperlipidemia     Hypertension     IBS (irritable bowel syndrome)     Kidney stone     Marfan syndrome     Mitral valve prolapse     Sleep apnea     moild: no cpap needed       Family History   Problem Relation Age of Onset    Heart disease Mother   "   Thrombophilia Father         DVT    Hypertension Father     Stroke Maternal Aunt     Heart disease Maternal Aunt     Stroke Maternal Uncle     Heart disease Maternal Uncle     Breast cancer Neg Hx     Colon cancer Neg Hx     Ovarian cancer Neg Hx        Social History     Socioeconomic History    Marital status: Single   Tobacco Use    Smoking status: Former     Current packs/day: 0.00     Average packs/day: 1 pack/day for 24.0 years (24.0 ttl pk-yrs)     Types: Cigarettes     Start date: 1996     Quit date: 2020     Years since quittin.0    Smokeless tobacco: Never    Tobacco comments:     "once in a blue moon"   Substance and Sexual Activity    Alcohol use: Never    Drug use: No    Sexual activity: Not Currently     Social Determinants of Health     Financial Resource Strain: Low Risk  (2023)    Overall Financial Resource Strain (CARDIA)     Difficulty of Paying Living Expenses: Not very hard   Food Insecurity: No Food Insecurity (2023)    Hunger Vital Sign     Worried About Running Out of Food in the Last Year: Never true     Ran Out of Food in the Last Year: Never true   Transportation Needs: No Transportation Needs (2023)    PRAPARE - Transportation     Lack of Transportation (Medical): No     Lack of Transportation (Non-Medical): No   Physical Activity: Insufficiently Active (2023)    Exercise Vital Sign     Days of Exercise per Week: 1 day     Minutes of Exercise per Session: 10 min   Stress: No Stress Concern Present (2023)    [place] [employer] - Occupational Stress Questionnaire     Feeling of Stress : Only a little   Social Connections: Unknown (2023)    Social Connection and Isolation Panel [NHANES]     Frequency of Communication with Friends and Family: More than three times a week     Frequency of Social Gatherings with Friends and Family: Once a week     Active Member of Clubs or Organizations: No     Attends Club or Organization " Meetings: Never     Marital Status: Never    Housing Stability: Low Risk  (2023)    Housing Stability Vital Sign     Unable to Pay for Housing in the Last Year: No     Number of Places Lived in the Last Year: 1     Unstable Housing in the Last Year: No       Past Surgical History:   Procedure Laterality Date    ANGIOGRAPHY OF LOWER EXTREMITY N/A 2018    Procedure: ANGIOGRAM, LOWER EXTREMITY- LEFT LEG;  Surgeon: Roscoe Landeros MD;  Location: [facility] CATH LAB;  Service: Peripheral Vascular;  Laterality: N/A;    APPENDECTOMY      BUNIONECTOMY      both feet    BYPASS GRAFT Bilateral     cataract surgery  2019     SECTION      x 2    CHOLECYSTECTOMY      COLONOSCOPY N/A 2020    Procedure: COLONOSCOPY w/ biopsies;  Surgeon: Gio Georges MD;  Location: [facility];  Service: Endoscopy;  Laterality: N/A;    ESOPHAGEAL MANOMETRY WITH MEASUREMENT OF IMPEDANCE N/A 2023    Procedure: MANOMETRY, ESOPHAGUS, WITH IMPEDANCE MEASUREMENT;  Surgeon: Desmond Calderon RN;  Location: [facility] ENDO;  Service: Endoscopy;  Laterality: N/A;    ESOPHAGOGASTRODUODENOSCOPY N/A 2019    Procedure: ESOPHAGOGASTRODUODENOSCOPY (EGD);  Surgeon: Jaron Sanders III, MD;  Location: [facility];  Service: Endoscopy;  Laterality: N/A;    ESOPHAGOGASTRODUODENOSCOPY N/A 2022    Procedure: EGD (ESOPHAGOGASTRODUODENOSCOPY);  Surgeon: Keith Hardwick MD;  Location: [facility];  Service: Gastroenterology;  Laterality: N/A;    SELECTIVE INJECTION OF ANESTHETIC AGENT AROUND LUMBAR SPINAL NERVE ROOT BY TRANSFORAMINAL APPROACH Bilateral 2020    Procedure: Bilateral L5/S1 TF DENY;  Surgeon: Jewel Walters MD;  Location: [facility] PAIN MGT;  Service: Pain Management;  Laterality: Bilateral;    TUBAL LIGATION         Review of Systems   Constitutional:  Positive for activity change and fatigue. Negative for appetite change.   HENT:  Negative for dental problem, nosebleeds and sore throat.    Eyes:  Negative for  "discharge and visual disturbance.   Respiratory:  Positive for cough and shortness of breath. Negative for chest tightness and stridor.    Cardiovascular:  Negative for leg swelling.   Gastrointestinal:  Negative for abdominal distention and abdominal pain.   Genitourinary:  Negative for difficulty urinating and dysuria.   Musculoskeletal:  Positive for arthralgias, back pain, gait problem, joint swelling, myalgias, neck pain and neck stiffness.   Allergic/Immunologic: Negative for environmental allergies.   Neurological:  Positive for weakness. Negative for dizziness, syncope and headaches.   Hematological:  Does not bruise/bleed easily.   Psychiatric/Behavioral:  Negative for behavioral problems.           Objective:   BP (!) 129/52 (BP Location: Left arm, Patient Position: Lying)   Pulse 98   Temp 98.1 °F (36.7 °C) (Oral)   Resp 20   Ht 5' 10" (1.778 m)   Wt 102.5 kg (226 lb)   SpO2 96%   Breastfeeding No   BMI 32.43 kg/m²     X-Ray Chest 1 View  Narrative: EXAMINATION:  XR CHEST 1 VIEW    CLINICAL HISTORY:  Right lower lobectomy; Type 2 diabetes mellitus with diabetic neuropathy, unspecified    TECHNIQUE:  AP view of the chest was performed.    COMPARISON:  01/19/2024    FINDINGS:  Stable postoperative changes.  No acute infiltrates.  Subsegmental left basilar discoid atelectasis.  Stable position of a right-sided chest tube.  No pneumothorax.    No acute osseous findings demonstrated.  Impression: As above.    Electronically signed by: Jensen Hansen MD  Date:    01/20/2024  Time:    09:52         Physical Exam    Assessment:     1. Type 2 diabetes mellitus with diabetic neuropathy, with long-term current use of insulin    2. Malignant neoplasm of lung, unspecified laterality, unspecified part of lung    3. Type 2 diabetes mellitus with diabetic peripheral angiopathy without gangrene, with long-term current use of insulin          Plan   Postop day 1 status post robotic right lower lobectomy mediastinal " lymph node dissection and multiple intercostal nerve blocks.  Neuro pain control as needed with oxycodone Dilaudid as needed muscle relaxants p.r.n.  Cardiovascular aspirin beta-blocker  Hyperlipidemia statin  Respiratory aggressive pulmonary toilet out of bed incentive spirometry  Chest tube to water seal  Chest physical therapy as needed  GI cardiac diet  Renal discontinue the Mathias catheter  DVT and GI prophylaxis with bilateral SCDs and Pepcid  PTOT  Ambulate            Chente Cabrera MD  [facility]  [place]

## 2024-01-20 NOTE — OP NOTE
VATS, ROBOT-ASSISTED, OR ROBOT-ASSISTED lobectomy BLOCK, NERVE, INTERCOSTAL, 2 OR MORE, EXCISION, LYMPH NODE, LOBECTOMY-ROBOTIC  Procedure Note    Jenifer Westfall  1/19/2024      Pre-op Diagnosis: Malignant neoplasm of lung, unspecified laterality, unspecified part of lung [C34.90]  Type 2 diabetes mellitus  Obesity  Marfan syndrome  Myalgia  Hypertension  Hyperlipidemia  Arthritis  Chronic pain syndrome       Post-op Diagnosis: Post-Op Diagnosis Codes:     * Malignant neoplasm of lung, unspecified laterality, unspecified part of lung [C34.90]  Same  Procedure(s):  VATS, ROBOT-ASSISTED, OR ROBOT-ASSISTED right lower lobectomy BLOCK, NERVE, INTERCOSTAL, 2 OR MORE  EXCISION, LYMPH NODE mediastinal lymph node dissection  LOBECTOMY-ROBOTIC    Surgeon(s):  Hieu Cabrera MD    Anesthesia: General    Staff:   Circulator: Mary Draper RN; Nacho Stein RN; Karen Dumont RN  Scrub Person: Fiona Triana ST; Stefany Alvarez; Abby Lockhart ST  RN First Assistant: Elke Espinal RN    Estimated Blood Loss: less than 50 mL                 Specimens:   Specimens (From admission, onward)       Start     Ordered    01/19/24 1743  Specimen to Pathology, Surgery Cardiovascular  Once        Comments: Pre-op Diagnosis: Malignant neoplasm of lung, unspecified laterality, unspecified part of lung [C34.90]Procedure(s):VATS, ROBOT-ASSISTED, OR ROBOT-ASSISTED THORACOTOMYBLOCK, NERVE, INTERCOSTAL, 2 OR MORE Number of specimens: 4Name of specimens: 1. Level 11 Lymph Node - PERM 2. Level 7 Lymph Node - PERM3. 4R Lymph Node - PERM4. Right Lower Lobe - PERM     References:    Click here for ordering Quick Tip   Question Answer Comment   Procedure Type: Cardiovascular    Specimen Class: Routine/Screening    Which provider would you like to cc? HIEU CABRERA    Release to patient Immediate        01/19/24 1743                           Drains:  24 Cuban Help Text      This SmartLink retrieves the last documented  value for LDA assessment data, retrieved by either LDA type or by LDA group ID.    The patient was taken to the operating room placed in a supine position endotracheal general anesthesia was given monitoring lines were placed preoperative antibiotics were given surgical time-out was done.  The patient was then placed in a left lateral position with adequate padding protecting the pressure points on a beanbag with double-lumen endotracheal tube and a Mathias catheter in place and secured to the table with multiple points of tapes.  A Sahale Snacksi robot was then trouble shot draped in sterile fashion and kept ready for docking.  After the patient was prepared sterilely and draped, I started my port placement with 8 mm camera port in the 8th intercostal space in the anterior axillary line.  A 8 mm 0 degree scope was inserted through it and diagnostic thoracoscopy was done in a systematic fashion with carbon dioxide insufflation.  There were complete fissure with anthracotic lungs was identified.  I could not see the mass on the right lower lobe because it was a deeper location.  I did not see any malignant deficits in the pleural cavity at this time I decided to proceed with right lower lobectomy.  I then placed a 12 mm port in the 8th intercostal space in the midclavicular line a 12 mm port in the 9th intercostal space in the anterior axillary line and a 12 mm port in the 8th intercostal space in the posterior axillary line and an 8 mm port in the infrascapular region in the 8th intercostal space.  Multiple intercostal nerve block was given with 0.25% Marcaine with Exparel involving the nerve roots 6789 and 10  The robot was then docked and the target was aimed.  First took down the inferior pulmonary ligament with the help of bipolar cautery to expose the inferior pulmonary vein.  After checking the anatomy I fired a vascular load robotic stapler to take down the inferior pulmonary vein.  Turned my attention towards  anterior and posterior hilar dissection.  I completed the posterior hilar dissection to identify the bronchus intermedius and  the bronchus intermedius from the pulmonary artery as much as possible.  I then pushed the lung posteriorly to complete the anterior hilar dissection were the pulmonary artery and its branches were identified.  After encircling the pulmonary artery with a vessel loop I fired a endovascular stapler to take down both the lower lobar branches and the superior segmental artery of the lower lobe in 1 firing of vascular load stapler.  This expose the bronchus intermedius and I dissected it all the way below the middle lobe bronchus takeoff.  At this point I trial clamped and inflated the lung and saw both upper lobe and the middle lobe expanding well.  At this time I completed the stapler firing to resect the specimen and sent it for pathology.  I went ahead and started dissecting the mediastinal lymph node dissection I got a 4R lymph nodes level 7 lymph node level 8 level Lovenox lymph node and sent it for pathology.  Hemostasis was checked and was obtained with bipolar cautery as well as with fibular topical hemostatic agent.  At the time of inflation I did not see any major air leak.  At this time I placed a 24 straight chest tube through the anterior port and I pulled the posterior ports.  All the ports were closed in multiple layers.  Sterile dressing was applied patient was extubated was transferred to the recovery in stable condition.        Chente Cabrera     Date: 1/19/2024  Time: 6:20 PM

## 2024-01-20 NOTE — PT/OT/SLP PROGRESS
Occupational Therapy      Patient Name:  Jenifer Westfall   MRN:  [id number]    Patient not seen today secondary to Pain. Will follow-up at next opportunity.    1/20/2024

## 2024-01-20 NOTE — ANESTHESIA POSTPROCEDURE EVALUATION
Anesthesia Post Evaluation    Patient: Jenifer Westfall    Procedure(s) Performed: Procedure(s) (LRB):  VATS, ROBOT-ASSISTED, OR ROBOT-ASSISTED THORACOTOMY (Right)  BLOCK, NERVE, INTERCOSTAL, 2 OR MORE (Right)  EXCISION, LYMPH NODE (Right)  LOBECTOMY-ROBOTIC (Right)    Final Anesthesia Type: general      Patient location during evaluation: PACU  Patient participation: Yes- Able to Participate  Level of consciousness: awake  Post-procedure vital signs: reviewed and stable  Pain management: adequate  Airway patency: patent    PONV status at discharge: No PONV  Anesthetic complications: no      Cardiovascular status: hemodynamically stable  Respiratory status: unassisted  Hydration status: euvolemic  Follow-up not needed.              Vitals Value Taken Time   /56 01/20/24 1613   Temp 36.1 °C (97 °F) 01/20/24 1613   Pulse 86 01/20/24 1613   Resp 18 01/20/24 1732   SpO2 100 % 01/20/24 1613         Event Time   Out of Recovery 20:13:40         Pain/Abdullahi Score: Pain Rating Prior to Med Admin: 10 (1/20/2024  5:32 PM)  Pain Rating Post Med Admin: 4 (1/20/2024  2:57 PM)  Abdullahi Score: 8 (1/19/2024  8:00 PM)

## 2024-01-21 LAB
BASOPHILS # BLD AUTO: 0.03 K/UL (ref 0–0.2)
BASOPHILS NFR BLD: 0.3 % (ref 0–1.9)
DIFFERENTIAL METHOD BLD: ABNORMAL
EOSINOPHIL # BLD AUTO: 0.2 K/UL (ref 0–0.5)
EOSINOPHIL NFR BLD: 1.5 % (ref 0–8)
ERYTHROCYTE [DISTWIDTH] IN BLOOD BY AUTOMATED COUNT: 13.9 % (ref 11.5–14.5)
HCT VFR BLD AUTO: 36.1 % (ref 37–48.5)
HGB BLD-MCNC: 11.7 G/DL (ref 12–16)
IMM GRANULOCYTES # BLD AUTO: 0.03 K/UL (ref 0–0.04)
IMM GRANULOCYTES NFR BLD AUTO: 0.3 % (ref 0–0.5)
LYMPHOCYTES # BLD AUTO: 2.3 K/UL (ref 1–4.8)
LYMPHOCYTES NFR BLD: 22.1 % (ref 18–48)
MCH RBC QN AUTO: 30 PG (ref 27–31)
MCHC RBC AUTO-ENTMCNC: 32.4 G/DL (ref 32–36)
MCV RBC AUTO: 93 FL (ref 82–98)
MONOCYTES # BLD AUTO: 0.9 K/UL (ref 0.3–1)
MONOCYTES NFR BLD: 8.7 % (ref 4–15)
NEUTROPHILS # BLD AUTO: 7.1 K/UL (ref 1.8–7.7)
NEUTROPHILS NFR BLD: 67.1 % (ref 38–73)
NRBC BLD-RTO: 0 /100 WBC
PLATELET # BLD AUTO: 231 K/UL (ref 150–450)
PMV BLD AUTO: 10.5 FL (ref 9.2–12.9)
POCT GLUCOSE: 217 MG/DL (ref 70–110)
POCT GLUCOSE: 249 MG/DL (ref 70–110)
POCT GLUCOSE: 250 MG/DL (ref 70–110)
POCT GLUCOSE: 285 MG/DL (ref 70–110)
RBC # BLD AUTO: 3.9 M/UL (ref 4–5.4)
WBC # BLD AUTO: 10.52 K/UL (ref 3.9–12.7)

## 2024-01-21 PROCEDURE — 25000003 PHARM REV CODE 250: Performed by: THORACIC SURGERY (CARDIOTHORACIC VASCULAR SURGERY)

## 2024-01-21 PROCEDURE — 85025 COMPLETE CBC W/AUTO DIFF WBC: CPT | Performed by: THORACIC SURGERY (CARDIOTHORACIC VASCULAR SURGERY)

## 2024-01-21 PROCEDURE — 94799 UNLISTED PULMONARY SVC/PX: CPT | Mod: XB

## 2024-01-21 PROCEDURE — 27000221 HC OXYGEN, UP TO 24 HOURS

## 2024-01-21 PROCEDURE — 25000242 PHARM REV CODE 250 ALT 637 W/ HCPCS: Performed by: THORACIC SURGERY (CARDIOTHORACIC VASCULAR SURGERY)

## 2024-01-21 PROCEDURE — 97162 PT EVAL MOD COMPLEX 30 MIN: CPT

## 2024-01-21 PROCEDURE — 97530 THERAPEUTIC ACTIVITIES: CPT

## 2024-01-21 PROCEDURE — 94761 N-INVAS EAR/PLS OXIMETRY MLT: CPT

## 2024-01-21 PROCEDURE — 94640 AIRWAY INHALATION TREATMENT: CPT

## 2024-01-21 PROCEDURE — 97166 OT EVAL MOD COMPLEX 45 MIN: CPT

## 2024-01-21 PROCEDURE — 99900035 HC TECH TIME PER 15 MIN (STAT)

## 2024-01-21 PROCEDURE — 63600175 PHARM REV CODE 636 W HCPCS: Performed by: THORACIC SURGERY (CARDIOTHORACIC VASCULAR SURGERY)

## 2024-01-21 PROCEDURE — 21400001 HC TELEMETRY ROOM

## 2024-01-21 PROCEDURE — 36415 COLL VENOUS BLD VENIPUNCTURE: CPT | Performed by: THORACIC SURGERY (CARDIOTHORACIC VASCULAR SURGERY)

## 2024-01-21 RX ADMIN — ISOSORBIDE MONONITRATE 30 MG: 30 TABLET, EXTENDED RELEASE ORAL at 08:01

## 2024-01-21 RX ADMIN — GABAPENTIN 300 MG: 300 CAPSULE ORAL at 08:01

## 2024-01-21 RX ADMIN — FAMOTIDINE 20 MG: 20 TABLET ORAL at 08:01

## 2024-01-21 RX ADMIN — POLYETHYLENE GLYCOL 3350 17 G: 17 POWDER, FOR SOLUTION ORAL at 08:01

## 2024-01-21 RX ADMIN — AZELASTINE HYDROCHLORIDE 137 MCG: 137 SPRAY, METERED NASAL at 09:01

## 2024-01-21 RX ADMIN — CEFAZOLIN SODIUM 2 G: 2 SOLUTION INTRAVENOUS at 04:01

## 2024-01-21 RX ADMIN — HYDROMORPHONE HYDROCHLORIDE 1 MG: 1 INJECTION, SOLUTION INTRAMUSCULAR; INTRAVENOUS; SUBCUTANEOUS at 12:01

## 2024-01-21 RX ADMIN — IPRATROPIUM BROMIDE 0.5 MG: 0.5 SOLUTION RESPIRATORY (INHALATION) at 04:01

## 2024-01-21 RX ADMIN — GABAPENTIN 300 MG: 300 CAPSULE ORAL at 02:01

## 2024-01-21 RX ADMIN — METOPROLOL TARTRATE 25 MG: 25 TABLET, FILM COATED ORAL at 09:01

## 2024-01-21 RX ADMIN — INSULIN ASPART 4 UNITS: 100 INJECTION, SOLUTION INTRAVENOUS; SUBCUTANEOUS at 04:01

## 2024-01-21 RX ADMIN — MUPIROCIN 1 G: 20 OINTMENT TOPICAL at 08:01

## 2024-01-21 RX ADMIN — CEFAZOLIN SODIUM 2 G: 2 SOLUTION INTRAVENOUS at 12:01

## 2024-01-21 RX ADMIN — METOPROLOL TARTRATE 25 MG: 25 TABLET, FILM COATED ORAL at 08:01

## 2024-01-21 RX ADMIN — MUPIROCIN 1 G: 20 OINTMENT TOPICAL at 09:01

## 2024-01-21 RX ADMIN — FAMOTIDINE 20 MG: 20 TABLET ORAL at 09:01

## 2024-01-21 RX ADMIN — DEXTROSE, SODIUM CHLORIDE, AND POTASSIUM CHLORIDE: 5; .45; .15 INJECTION INTRAVENOUS at 06:01

## 2024-01-21 RX ADMIN — DOXYCYCLINE HYCLATE 100 MG: 100 TABLET, COATED ORAL at 09:01

## 2024-01-21 RX ADMIN — DOXYCYCLINE HYCLATE 100 MG: 100 TABLET, COATED ORAL at 08:01

## 2024-01-21 RX ADMIN — IPRATROPIUM BROMIDE 0.5 MG: 0.5 SOLUTION RESPIRATORY (INHALATION) at 08:01

## 2024-01-21 RX ADMIN — INSULIN ASPART 4 UNITS: 100 INJECTION, SOLUTION INTRAVENOUS; SUBCUTANEOUS at 06:01

## 2024-01-21 RX ADMIN — DICYCLOMINE HYDROCHLORIDE 10 MG: 10 CAPSULE ORAL at 09:01

## 2024-01-21 RX ADMIN — ATORVASTATIN CALCIUM 40 MG: 40 TABLET, FILM COATED ORAL at 08:01

## 2024-01-21 RX ADMIN — DICYCLOMINE HYDROCHLORIDE 10 MG: 10 CAPSULE ORAL at 04:01

## 2024-01-21 RX ADMIN — LINACLOTIDE 72 MCG: 72 CAPSULE, GELATIN COATED ORAL at 06:01

## 2024-01-21 RX ADMIN — ENOXAPARIN SODIUM 40 MG: 40 INJECTION SUBCUTANEOUS at 04:01

## 2024-01-21 RX ADMIN — BUSPIRONE HYDROCHLORIDE 5 MG: 5 TABLET ORAL at 09:01

## 2024-01-21 RX ADMIN — ONDANSETRON 8 MG: 8 TABLET, ORALLY DISINTEGRATING ORAL at 12:01

## 2024-01-21 RX ADMIN — IPRATROPIUM BROMIDE 0.5 MG: 0.5 SOLUTION RESPIRATORY (INHALATION) at 12:01

## 2024-01-21 RX ADMIN — HYDROMORPHONE HYDROCHLORIDE 1 MG: 1 INJECTION, SOLUTION INTRAMUSCULAR; INTRAVENOUS; SUBCUTANEOUS at 02:01

## 2024-01-21 RX ADMIN — OXYCODONE HYDROCHLORIDE AND ACETAMINOPHEN 2 TABLET: 5; 325 TABLET ORAL at 07:01

## 2024-01-21 RX ADMIN — INSULIN ASPART 8 UNITS: 100 INJECTION, SOLUTION INTRAVENOUS; SUBCUTANEOUS at 08:01

## 2024-01-21 RX ADMIN — CEFAZOLIN SODIUM 2 G: 2 SOLUTION INTRAVENOUS at 08:01

## 2024-01-21 RX ADMIN — GABAPENTIN 300 MG: 300 CAPSULE ORAL at 09:01

## 2024-01-21 RX ADMIN — FLUOXETINE 40 MG: 20 CAPSULE ORAL at 08:01

## 2024-01-21 RX ADMIN — INSULIN ASPART 6 UNITS: 100 INJECTION, SOLUTION INTRAVENOUS; SUBCUTANEOUS at 11:01

## 2024-01-21 RX ADMIN — ASPIRIN 81 MG: 81 TABLET, COATED ORAL at 08:01

## 2024-01-21 RX ADMIN — BUSPIRONE HYDROCHLORIDE 5 MG: 5 TABLET ORAL at 08:01

## 2024-01-21 RX ADMIN — DICYCLOMINE HYDROCHLORIDE 10 MG: 10 CAPSULE ORAL at 12:01

## 2024-01-21 RX ADMIN — INSULIN ASPART 2 UNITS: 100 INJECTION, SOLUTION INTRAVENOUS; SUBCUTANEOUS at 09:01

## 2024-01-21 RX ADMIN — OXYCODONE HYDROCHLORIDE AND ACETAMINOPHEN 2 TABLET: 5; 325 TABLET ORAL at 11:01

## 2024-01-21 RX ADMIN — DICYCLOMINE HYDROCHLORIDE 10 MG: 10 CAPSULE ORAL at 08:01

## 2024-01-21 RX ADMIN — OXYCODONE HYDROCHLORIDE AND ACETAMINOPHEN 2 TABLET: 5; 325 TABLET ORAL at 04:01

## 2024-01-21 RX ADMIN — INSULIN DETEMIR 30 UNITS: 100 INJECTION, SOLUTION SUBCUTANEOUS at 08:01

## 2024-01-21 RX ADMIN — HYDROMORPHONE HYDROCHLORIDE 1 MG: 1 INJECTION, SOLUTION INTRAMUSCULAR; INTRAVENOUS; SUBCUTANEOUS at 07:01

## 2024-01-21 RX ADMIN — INSULIN ASPART 8 UNITS: 100 INJECTION, SOLUTION INTRAVENOUS; SUBCUTANEOUS at 04:01

## 2024-01-21 RX ADMIN — INSULIN ASPART 8 UNITS: 100 INJECTION, SOLUTION INTRAVENOUS; SUBCUTANEOUS at 11:01

## 2024-01-21 RX ADMIN — AZELASTINE HYDROCHLORIDE 137 MCG: 137 SPRAY, METERED NASAL at 08:01

## 2024-01-21 NOTE — PLAN OF CARE
Goals to be met by: 2/5/24     Patient will increase functional independence with ADLs by performing:    LE Dressing with Moderate Assistance.  Toileting from toilet with Minimal Assistance for hygiene and clothing management.   Toilet transfer to toilet with Stand-by Assistance.    DC with high intensity OT indicated.

## 2024-01-21 NOTE — PT/OT/SLP EVAL
Occupational Therapy Evaluation and Treatment    Name: Jenifer Westfall  MRN: [id number]  Admitting Diagnosis: <principal problem not specified>  Recent Surgery: Procedure(s) (LRB):  VATS, ROBOT-ASSISTED, OR ROBOT-ASSISTED THORACOTOMY (Right)  BLOCK, NERVE, INTERCOSTAL, 2 OR MORE (Right)  EXCISION, LYMPH NODE (Right)  LOBECTOMY-ROBOTIC (Right) 2 Days Post-Op    Recommendations:     Discharge Recommendations: High Intensity Therapy  Level of Assistance Recommended: 24 hours significant assistance  Discharge Equipment Recommendations: to be determined by next level of care  Barriers to discharge: None    Assessment:     Jenifer Westfall is a 48 y.o. female with a medical diagnosis of <principal problem not specified>. She presents with performance deficits affecting function including weakness, impaired self care skills, impaired endurance, impaired functional mobility, impaired balance, pain, impaired cardiopulmonary response to activity.     Rehab Prognosis: Good; patient would benefit from acute OT services to address these deficits and reach maximum level of function.    Plan:     Patient to be seen 2 x/week to address the above listed problems via self-care/home management, therapeutic activities, therapeutic exercises, neuromuscular re-education  Plan of Care Expires: 02/04/24  Plan of Care Reviewed with: patient    Subjective     Chief Complaint: pain from chest tube  Patient Comments/Goals: DC home with family at prior level of function.  Pain/Comfort:  Pain Rating 1: 8/10  Location - Side 1: Right  Location - Orientation 1: lateral  Location 1: chest  Pain Addressed 1: Nurse notified  Pain Rating Post-Intervention 1: 8/10    Patients cultural, spiritual, Roman Catholic conflicts given the current situation: no    Social History:  Living Environment: Patient lives with their son in a single story home with number of outside stair(s): 12 and number of inside stair(s): 0  Prior Level of Function: Prior to admission,  patient was independent  Roles and Routines: Patient was not driving and not working prior to admission.  Equipment Used at Home: cane, straight  DME owned (not currently used): none  Assistance Upon Discharge: family    Objective:     Communicated with nurse Kathy prior to session. Patient found HOB elevated with chest tube, bed alarm upon OT entry to room.    General Precautions: Standard, fall   Orthopedic Precautions: N/A   Braces: N/A    Respiratory Status: Nasal cannula, flow 3 L/min    Occupational Performance    Gait belt applied - Yes    Bed Mobility:   Rolling/Turning to Right with minimum assistance  Scooting anteriorly to EOB to have both feet planted on floor: minimum assistance  Supine to sit from right side of bed with minimum assistance    Functional Mobility/Transfers:  Sit <> Stand Transfer with minimum assistance with rolling walker  Toilet Transfer Stand Pivot technique with minimum assistance with rolling walker    Activities of Daily Living:  Upper Body Dressing: minimum assistance  Lower Body Dressing: maximal assistance  Toileting: maximal assistance    Cognitive/Visual Perceptual:  Cognitive/Psychosocial Skills:    -     Oriented to: Person, Place, Time, Situation  -     Safety awareness/insight to disability: intact  -     Mood/Affect/Coping skills/emotional control: Appropriate to situation and Cooperative  Visual/Perceptual:    -     Intact    Physical Exam:  Balance:    -     Sitting: independence  -     Standing: minimum assistance  Upper Extremity Range of Motion:     -       Right Upper Extremity: WNL  -       Left Upper Extremity: WNL  Upper Extremity Strength:    -       Right Upper Extremity: WNL  -       Left Upper Extremity: WNL    AMPAC 6 Click ADL:  AMPAC Total Score: 20    Treatment & Education:  Therapist provided facilitation and instruction of proper body mechanics, energy conservation, and fall prevention strategies during tasks listed above  Patient educated on role of  OT, POC, and goals for therapy  Patient educated on importance of OOB activities with staff member assistance and sitting OOB majority of the day    Patient left up in chair with all lines intact and call button in reach.    GOALS:   Multidisciplinary Problems       Occupational Therapy Goals          Problem: Occupational Therapy    Goal Priority Disciplines Outcome Interventions   Occupational Therapy Goal     OT, PT/OT     Description: Goals to be met by: 24     Patient will increase functional independence with ADLs by performing:    LE Dressing with Moderate Assistance.  Toileting from toilet with Minimal Assistance for hygiene and clothing management.   Toilet transfer to toilet with Stand-by Assistance.                         History:     Past Medical History:   Diagnosis Date    Arthritis     DM (diabetes mellitus)      2017 Insulin x 3 years     Hepatitis C antibody positive in blood 2021    RNA NEGATIVE 3/6/2017, 3/22/22    Hyperlipidemia     Hypertension     IBS (irritable bowel syndrome)     Kidney stone     Marfan syndrome     Mitral valve prolapse     Sleep apnea     moild: no cpap needed         Past Surgical History:   Procedure Laterality Date    ANGIOGRAPHY OF LOWER EXTREMITY N/A 2018    Procedure: ANGIOGRAM, LOWER EXTREMITY- LEFT LEG;  Surgeon: Roscoe Landeros MD;  Location: [facility] CATH LAB;  Service: Peripheral Vascular;  Laterality: N/A;    APPENDECTOMY      BUNIONECTOMY      both feet    BYPASS GRAFT Bilateral     cataract surgery  2019     SECTION      x 2    CHOLECYSTECTOMY      COLONOSCOPY N/A 2020    Procedure: COLONOSCOPY w/ biopsies;  Surgeon: Gio Georges MD;  Location: [facility] ENDO;  Service: Endoscopy;  Laterality: N/A;    ESOPHAGEAL MANOMETRY WITH MEASUREMENT OF IMPEDANCE N/A 2023    Procedure: MANOMETRY, ESOPHAGUS, WITH IMPEDANCE MEASUREMENT;  Surgeon: Desmond Calderon RN;  Location: [facility];  Service: Endoscopy;  Laterality:  N/A;    ESOPHAGOGASTRODUODENOSCOPY N/A 07/31/2019    Procedure: ESOPHAGOGASTRODUODENOSCOPY (EGD);  Surgeon: Jaron Sanders III, MD;  Location: [facility];  Service: Endoscopy;  Laterality: N/A;    ESOPHAGOGASTRODUODENOSCOPY N/A 04/22/2022    Procedure: EGD (ESOPHAGOGASTRODUODENOSCOPY);  Surgeon: Keith Hardwick MD;  Location: [facility];  Service: Gastroenterology;  Laterality: N/A;    SELECTIVE INJECTION OF ANESTHETIC AGENT AROUND LUMBAR SPINAL NERVE ROOT BY TRANSFORAMINAL APPROACH Bilateral 11/23/2020    Procedure: Bilateral L5/S1 TF DENY;  Surgeon: Jewel Walters MD;  Location: [facility];  Service: Pain Management;  Laterality: Bilateral;    TUBAL LIGATION         Time Tracking:     OT Date of Treatment: 01/21/24  OT Start Time: 1100  OT Stop Time: 1130  OT Total Time (min): 30 min    Billable Minutes: Evaluation 15 and Therapeutic Activity 15    1/21/2024

## 2024-01-21 NOTE — PT/OT/SLP EVAL
Physical Therapy Evaluation    Patient Name:  Jenifer Westfall   MRN:  [id number]    Recommendations:     Discharge Recommendations: High Intensity Therapy   Discharge Equipment Recommendations: to be determined by next level of care   Barriers to discharge: None    Assessment:     Jenifer Westfall is a 48 y.o. female admitted with a medical diagnosis of <principal problem not specified>.  She presents with the following impairments/functional limitations: weakness, impaired balance, decreased safety awareness, impaired endurance, pain, impaired self care skills, decreased coordination, decreased ROM, impaired functional mobility, decreased upper extremity function, gait instability, decreased lower extremity function.    Rehab Prognosis: Good; patient would benefit from acute skilled PT services to address these deficits and reach maximum level of function.    Recent Surgery: Procedure(s) (LRB):  VATS, ROBOT-ASSISTED, OR ROBOT-ASSISTED THORACOTOMY (Right)  BLOCK, NERVE, INTERCOSTAL, 2 OR MORE (Right)  EXCISION, LYMPH NODE (Right)  LOBECTOMY-ROBOTIC (Right) 2 Days Post-Op    Plan:     During this hospitalization, patient to be seen 3 x/week to address the identified rehab impairments via gait training, therapeutic activities, therapeutic exercises and progress toward the following goals:    Plan of Care Expires:  02/04/24    Subjective     Chief Complaint: Right chest pain.  Patient/Family Comments/goals: Get better and return to prior level of function.  Pain/Comfort:  Pain Rating 1: 8/10  Location - Side 1: Right  Location - Orientation 1: lateral  Location 1: chest  Pain Addressed 1: Cessation of Activity, Distraction, Reposition  Pain Rating Post-Intervention 1: 8/10    Patients cultural, spiritual, Roman Catholic conflicts given the current situation: no    Living Environment:    Patient lives with her two sons in an elevated home on [address]. Home has 12 steps and bilateral hand rails to enter.  Prior to admission,  patients level of function was ambulating with a SPC, Mod I with ADLs. Patient does not drive or work.  Equipment used at home: cane, straight, shower chair, bedside commode, walker, rolling.  DME owned (not currently used): none.  Upon discharge, patient will have assistance from unknown.    Objective:     Communicated with CARMINA Chavez prior to session.  Patient found supine with chest tube, telemetry, peripheral IV, bed alarm, oxygen  upon PT entry to room.    General Precautions: Standard, fall  Orthopedic Precautions:N/A   Braces: N/A  Respiratory Status: Nasal cannula, flow 3 L/min    Exams:  Cognitive Exam:  Patient is oriented to Person, Place, Time, and Situation  RLE ROM: WFL  RLE Strength: WFL  LLE ROM: WFL  LLE Strength: WFL    Functional Mobility:  Bed Mobility:     Supine to Sit: minimum assistance  Transfers:     Sit to Stand:  minimum assistance with rolling walker  Bed to Chair: minimum assistance with  rolling walker  using  Step Transfer  Gait: 3 steps to bedside chair, Min A with RW      AM-PAC 6 CLICK MOBILITY  Total Score:16       Treatment & Education:    Patient found supine in bed upon PT entrance into room. PT provided education on role of PT and POC. Patient agreeable to therapy session.    Patient completed:     Sup>sit: Min A with use of bed rail    Scooting: SBA to place feet on the floor    Seated EOB balance: SBA    STS: Min A with RW and verbal cues for hand placement for safety with transfer    Gait: 3 step transfer to chair, Min A with RW and cues for gait sequencing with RW    Patient left up in chair with all lines intact and call button in reach.    GOALS:   Multidisciplinary Problems       Physical Therapy Goals          Problem: Physical Therapy    Goal Priority Disciplines Outcome Goal Variances Interventions   Physical Therapy Goal     PT, PT/OT      Description: Patient will be seen a minimal of 3 out of 7 days a week.    LTG to be met by 02/04/24:    Bed mobility:  SBA  Transfers: CGA with RW  Gait: CGA with RW x50                       History:     Past Medical History:   Diagnosis Date    Arthritis     DM (diabetes mellitus)      2017 Insulin x 3 years     Hepatitis C antibody positive in blood 2021    RNA NEGATIVE 3/6/2017, 3/22/22    Hyperlipidemia     Hypertension     IBS (irritable bowel syndrome)     Kidney stone     Marfan syndrome     Mitral valve prolapse     Sleep apnea     moild: no cpap needed       Past Surgical History:   Procedure Laterality Date    ANGIOGRAPHY OF LOWER EXTREMITY N/A 2018    Procedure: ANGIOGRAM, LOWER EXTREMITY- LEFT LEG;  Surgeon: Roscoe Landeros MD;  Location: [facility] CATH LAB;  Service: Peripheral Vascular;  Laterality: N/A;    APPENDECTOMY      BUNIONECTOMY      both feet    BYPASS GRAFT Bilateral     cataract surgery  2019     SECTION      x 2    CHOLECYSTECTOMY      COLONOSCOPY N/A 2020    Procedure: COLONOSCOPY w/ biopsies;  Surgeon: Gio Georges MD;  Location: [facility];  Service: Endoscopy;  Laterality: N/A;    ESOPHAGEAL MANOMETRY WITH MEASUREMENT OF IMPEDANCE N/A 2023    Procedure: MANOMETRY, ESOPHAGUS, WITH IMPEDANCE MEASUREMENT;  Surgeon: Desmond Calderon RN;  Location: [facility];  Service: Endoscopy;  Laterality: N/A;    ESOPHAGOGASTRODUODENOSCOPY N/A 2019    Procedure: ESOPHAGOGASTRODUODENOSCOPY (EGD);  Surgeon: Jaron Sanders III, MD;  Location: [facility];  Service: Endoscopy;  Laterality: N/A;    ESOPHAGOGASTRODUODENOSCOPY N/A 2022    Procedure: EGD (ESOPHAGOGASTRODUODENOSCOPY);  Surgeon: Keith Hardwick MD;  Location: [facility];  Service: Gastroenterology;  Laterality: N/A;    SELECTIVE INJECTION OF ANESTHETIC AGENT AROUND LUMBAR SPINAL NERVE ROOT BY TRANSFORAMINAL APPROACH Bilateral 2020    Procedure: Bilateral L5/S1 TF DENY;  Surgeon: Jewel Walters MD;  Location: [facility] PAIN MGT;  Service: Pain Management;  Laterality: Bilateral;    TUBAL  LIGATION         Time Tracking:     PT Received On: 01/21/24  PT Start Time: 1100     PT Stop Time: 1123  PT Total Time (min): 23 min     Billable Minutes: Evaluation 11 and Therapeutic Activity 12      01/21/2024

## 2024-01-21 NOTE — PLAN OF CARE
Initial PT eval completed. Patient Min A with bed mobility and Min A with RW with 3 step transfer to bedside chair. Recommend high intensity therapy.

## 2024-01-21 NOTE — PROGRESS NOTES
Subjective:      Patient ID: Jenifer Westfall is a 48 y.o. female.    Chief Complaint: No chief complaint on file.    HPI: HPI:  48-year-old female with a history of longstanding lung nodule more than 2 years without any change and PET negative had a bronchoscopy and biopsy which showed a mucinous adenocarcinoma.  She was an ex-smoker quit 3 years ago has multiple medical problems including dyspnea on mild exertion.  She has a longstanding history of Marfan syndrome and she walks with a walking stick.  She also gets tired with minimal walking.     1/19/2024  VATS, ROBOT-ASSISTED, OR ROBOT-ASSISTED lobectomy BLOCK, NERVE, INTERCOSTAL, 2 OR MORE, EXCISION, LYMPH NODE, LOBECTOMY-ROBOTIC       01/20/2024 patient had lot of pain issues overnight otherwise she is hemodynamically stable chest tube output minimal drainage no air leak Mathias catheter was discontinued this morning.    01/21/2024 patient is in the bed lot of pain issues better than yesterday chest tube output minimal no air leak poor inspiratory effort incentive spirometry less than 500 hemodynamically stable blood sugars have been running a little high           Review of patient's allergies indicates:   Allergen Reactions    Compazine [prochlorperazine edisylate] Rash    Contrast media Anaphylaxis    Dye Anaphylaxis     Contrast dye    Iodinated contrast media Anaphylaxis     Other reaction(s): anaphylaxis    Levaquin [levofloxacin] Hives and Itching    Morphine Other (See Comments)     Irritable  Iritability  Irritable    Prochlorperazine Rash    Metformin Other (See Comments)     Upset stomach    Ibuprofen Other (See Comments) and Nausea And Vomiting     Stomach pain  Stomach pain    Abdominal pain     Methocarbamol Nausea Only       Past Medical History:   Diagnosis Date    Arthritis     DM (diabetes mellitus) 2009     03/01/2017 Insulin x 3 years 2013    Hepatitis C antibody positive in blood 03/04/2021    RNA NEGATIVE 3/6/2017, 3/22/22     "Hyperlipidemia     Hypertension     IBS (irritable bowel syndrome)     Kidney stone     Marfan syndrome     Mitral valve prolapse     Sleep apnea     moild: no cpap needed       Family History   Problem Relation Age of Onset    Heart disease Mother     Thrombophilia Father         DVT    Hypertension Father     Stroke Maternal Aunt     Heart disease Maternal Aunt     Stroke Maternal Uncle     Heart disease Maternal Uncle     Breast cancer Neg Hx     Colon cancer Neg Hx     Ovarian cancer Neg Hx        Social History     Socioeconomic History    Marital status: Single   Tobacco Use    Smoking status: Former     Current packs/day: 0.00     Average packs/day: 1 pack/day for 24.0 years (24.0 ttl pk-yrs)     Types: Cigarettes     Start date: 1996     Quit date: 2020     Years since quittin.0    Smokeless tobacco: Never    Tobacco comments:     "once in a blue moon"   Substance and Sexual Activity    Alcohol use: Never    Drug use: No    Sexual activity: Not Currently     Social Determinants of Health     Financial Resource Strain: Low Risk  (2023)    Overall Financial Resource Strain (CARDIA)     Difficulty of Paying Living Expenses: Not very hard   Food Insecurity: No Food Insecurity (2023)    Hunger Vital Sign     Worried About Running Out of Food in the Last Year: Never true     Ran Out of Food in the Last Year: Never true   Transportation Needs: No Transportation Needs (2023)    PRAPARE - Transportation     Lack of Transportation (Medical): No     Lack of Transportation (Non-Medical): No   Physical Activity: Insufficiently Active (2023)    Exercise Vital Sign     Days of Exercise per Week: 1 day     Minutes of Exercise per Session: 10 min   Stress: No Stress Concern Present (2023)    [employer] - Occupational Stress Questionnaire     Feeling of Stress : Only a little   Social Connections: Unknown (2023)    Social Connection and Isolation " Panel [NHANES]     Frequency of Communication with Friends and Family: More than three times a week     Frequency of Social Gatherings with Friends and Family: Once a week     Active Member of Clubs or Organizations: No     Attends Club or Organization Meetings: Never     Marital Status: Never    Housing Stability: Low Risk  (2023)    Housing Stability Vital Sign     Unable to Pay for Housing in the Last Year: No     Number of Places Lived in the Last Year: 1     Unstable Housing in the Last Year: No       Past Surgical History:   Procedure Laterality Date    ANGIOGRAPHY OF LOWER EXTREMITY N/A 2018    Procedure: ANGIOGRAM, LOWER EXTREMITY- LEFT LEG;  Surgeon: Roscoe Landeros MD;  Location: [facility] CATH LAB;  Service: Peripheral Vascular;  Laterality: N/A;    APPENDECTOMY      BUNIONECTOMY      both feet    BYPASS GRAFT Bilateral     cataract surgery  2019     SECTION      x 2    CHOLECYSTECTOMY      COLONOSCOPY N/A 2020    Procedure: COLONOSCOPY w/ biopsies;  Surgeon: Gio Georges MD;  Location: [facility];  Service: Endoscopy;  Laterality: N/A;    ESOPHAGEAL MANOMETRY WITH MEASUREMENT OF IMPEDANCE N/A 2023    Procedure: MANOMETRY, ESOPHAGUS, WITH IMPEDANCE MEASUREMENT;  Surgeon: Desmond Calderon RN;  Location: [facility];  Service: Endoscopy;  Laterality: N/A;    ESOPHAGOGASTRODUODENOSCOPY N/A 2019    Procedure: ESOPHAGOGASTRODUODENOSCOPY (EGD);  Surgeon: Jaron Sanders III, MD;  Location: [facility];  Service: Endoscopy;  Laterality: N/A;    ESOPHAGOGASTRODUODENOSCOPY N/A 2022    Procedure: EGD (ESOPHAGOGASTRODUODENOSCOPY);  Surgeon: Keith Hardwick MD;  Location: [facility];  Service: Gastroenterology;  Laterality: N/A;    SELECTIVE INJECTION OF ANESTHETIC AGENT AROUND LUMBAR SPINAL NERVE ROOT BY TRANSFORAMINAL APPROACH Bilateral 2020    Procedure: Bilateral L5/S1 TF DENY;  Surgeon: Jewel Walters MD;  Location: [facility] PAIN MGT;  Service: Pain  "Management;  Laterality: Bilateral;    TUBAL LIGATION         Review of Systems   Constitutional:  Positive for activity change and fatigue. Negative for appetite change.   HENT:  Negative for dental problem, nosebleeds and sore throat.    Eyes:  Negative for discharge and visual disturbance.   Respiratory:  Positive for cough and shortness of breath. Negative for chest tightness and stridor.    Cardiovascular:  Negative for leg swelling.   Gastrointestinal:  Negative for abdominal distention and abdominal pain.   Genitourinary:  Negative for difficulty urinating and dysuria.   Musculoskeletal:  Positive for arthralgias, back pain, gait problem, joint swelling, myalgias, neck pain and neck stiffness.   Allergic/Immunologic: Negative for environmental allergies.   Neurological:  Positive for weakness. Negative for dizziness, syncope and headaches.   Hematological:  Does not bruise/bleed easily.   Psychiatric/Behavioral:  Negative for behavioral problems.           Objective:   BP (!) 120/56   Pulse 84   Temp 98.6 °F (37 °C)   Resp 20   Ht 5' 10" (1.778 m)   Wt 102.5 kg (226 lb)   SpO2 95%   Breastfeeding No   BMI 32.43 kg/m²     X-Ray Chest 1 View  Narrative: EXAMINATION:  XR CHEST 1 VIEW<Comparisons>    CLINICAL HISTORY:  Right lower lobectomy;    TECHNIQUE:  Portable chest x-ray    COMPARISON:  01/20/2024    FINDINGS:  The right chest tube remains in place.  No pneumothorax is identified.  Poor inspiratory result.  Questionable developing atelectatic change right lung base.  Impression: See above.    Electronically signed by: William Harper  Date:    01/21/2024  Time:    09:26         Physical Exam    Assessment:     1. Type 2 diabetes mellitus with diabetic neuropathy, with long-term current use of insulin    2. Malignant neoplasm of lung, unspecified laterality, unspecified part of lung    3. Type 2 diabetes mellitus with diabetic peripheral angiopathy without gangrene, with long-term current use of insulin  "         Plan   Postop day 2 status post robotic right lower lobectomy mediastinal lymph node dissection and multiple intercostal nerve blocks.  Neuro pain control as needed with oxycodone Dilaudid as needed muscle relaxants p.r.n.  Cardiovascular aspirin beta-blocker  Hyperlipidemia statin  Respiratory aggressive pulmonary toilet out of bed incentive spirometry  Chest tube to water seal  Chest physical therapy as needed  GI cardiac diet  Hyperglycemia diabetes mellitus on long-acting insulin and sliding scale.  We will increase her home dose  Renal discontinue the Mathias catheter  DVT and GI prophylaxis with bilateral SCDs and Pepcid  PTOT  Ambulate            Chente Cabrera MD  [facility]  [place]

## 2024-01-21 NOTE — PLAN OF CARE
Ongoing (interventions implemented as appropriate)  Pt is alert and oriented.   VSS  Pt able to make needs known.  Pt remained afebrile throughout this shift.   Pt remained free of falls this shift.   Prn pain medication given.  Plan of care reviewed. Patient verbalizes understanding.   Pt moving/turing independent. Frequent weight shifting encouraged.  Patient normal sinus rhythm on monitor.   Bed low, side rails up x 2, wheels locked, call light in reach.   Hourly rounding completed.   Will continue to observe.

## 2024-01-22 PROBLEM — C34.90 MALIGNANT NEOPLASM OF LUNG: Status: ACTIVE | Noted: 2024-01-22

## 2024-01-22 LAB
BASOPHILS # BLD AUTO: 0.03 K/UL (ref 0–0.2)
BASOPHILS NFR BLD: 0.3 % (ref 0–1.9)
DIFFERENTIAL METHOD BLD: ABNORMAL
EOSINOPHIL # BLD AUTO: 0.3 K/UL (ref 0–0.5)
EOSINOPHIL NFR BLD: 3.3 % (ref 0–8)
ERYTHROCYTE [DISTWIDTH] IN BLOOD BY AUTOMATED COUNT: 13.5 % (ref 11.5–14.5)
HCT VFR BLD AUTO: 35.1 % (ref 37–48.5)
HGB BLD-MCNC: 11.2 G/DL (ref 12–16)
IMM GRANULOCYTES # BLD AUTO: 0.02 K/UL (ref 0–0.04)
IMM GRANULOCYTES NFR BLD AUTO: 0.2 % (ref 0–0.5)
LYMPHOCYTES # BLD AUTO: 2.3 K/UL (ref 1–4.8)
LYMPHOCYTES NFR BLD: 25.7 % (ref 18–48)
MCH RBC QN AUTO: 29.3 PG (ref 27–31)
MCHC RBC AUTO-ENTMCNC: 31.9 G/DL (ref 32–36)
MCV RBC AUTO: 92 FL (ref 82–98)
MONOCYTES # BLD AUTO: 0.7 K/UL (ref 0.3–1)
MONOCYTES NFR BLD: 7.8 % (ref 4–15)
NEUTROPHILS # BLD AUTO: 5.5 K/UL (ref 1.8–7.7)
NEUTROPHILS NFR BLD: 62.7 % (ref 38–73)
NRBC BLD-RTO: 0 /100 WBC
PLATELET # BLD AUTO: 193 K/UL (ref 150–450)
PLATELET BLD QL SMEAR: ABNORMAL
PMV BLD AUTO: 11 FL (ref 9.2–12.9)
POCT GLUCOSE: 141 MG/DL (ref 70–110)
POCT GLUCOSE: 178 MG/DL (ref 70–110)
POCT GLUCOSE: 194 MG/DL (ref 70–110)
POCT GLUCOSE: 233 MG/DL (ref 70–110)
POCT GLUCOSE: 244 MG/DL (ref 70–110)
RBC # BLD AUTO: 3.82 M/UL (ref 4–5.4)
WBC # BLD AUTO: 8.84 K/UL (ref 3.9–12.7)

## 2024-01-22 PROCEDURE — 25000242 PHARM REV CODE 250 ALT 637 W/ HCPCS: Performed by: THORACIC SURGERY (CARDIOTHORACIC VASCULAR SURGERY)

## 2024-01-22 PROCEDURE — 21400001 HC TELEMETRY ROOM

## 2024-01-22 PROCEDURE — 99900035 HC TECH TIME PER 15 MIN (STAT)

## 2024-01-22 PROCEDURE — 94799 UNLISTED PULMONARY SVC/PX: CPT | Mod: XB

## 2024-01-22 PROCEDURE — 85025 COMPLETE CBC W/AUTO DIFF WBC: CPT | Performed by: THORACIC SURGERY (CARDIOTHORACIC VASCULAR SURGERY)

## 2024-01-22 PROCEDURE — 27000221 HC OXYGEN, UP TO 24 HOURS

## 2024-01-22 PROCEDURE — 97116 GAIT TRAINING THERAPY: CPT | Mod: CQ

## 2024-01-22 PROCEDURE — 97530 THERAPEUTIC ACTIVITIES: CPT | Mod: CQ

## 2024-01-22 PROCEDURE — 94640 AIRWAY INHALATION TREATMENT: CPT

## 2024-01-22 PROCEDURE — 94761 N-INVAS EAR/PLS OXIMETRY MLT: CPT

## 2024-01-22 PROCEDURE — 25000003 PHARM REV CODE 250: Performed by: THORACIC SURGERY (CARDIOTHORACIC VASCULAR SURGERY)

## 2024-01-22 PROCEDURE — 63600175 PHARM REV CODE 636 W HCPCS: Performed by: THORACIC SURGERY (CARDIOTHORACIC VASCULAR SURGERY)

## 2024-01-22 PROCEDURE — 36415 COLL VENOUS BLD VENIPUNCTURE: CPT | Performed by: THORACIC SURGERY (CARDIOTHORACIC VASCULAR SURGERY)

## 2024-01-22 RX ORDER — OXYCODONE AND ACETAMINOPHEN 5; 325 MG/1; MG/1
1 TABLET ORAL EVERY 4 HOURS PRN
Qty: 20 TABLET | Refills: 0 | Status: SHIPPED | OUTPATIENT
Start: 2024-01-22

## 2024-01-22 RX ADMIN — ENOXAPARIN SODIUM 40 MG: 40 INJECTION SUBCUTANEOUS at 04:01

## 2024-01-22 RX ADMIN — ATORVASTATIN CALCIUM 40 MG: 40 TABLET, FILM COATED ORAL at 08:01

## 2024-01-22 RX ADMIN — INSULIN ASPART 8 UNITS: 100 INJECTION, SOLUTION INTRAVENOUS; SUBCUTANEOUS at 12:01

## 2024-01-22 RX ADMIN — DICYCLOMINE HYDROCHLORIDE 10 MG: 10 CAPSULE ORAL at 04:01

## 2024-01-22 RX ADMIN — FLUOXETINE 40 MG: 20 CAPSULE ORAL at 08:01

## 2024-01-22 RX ADMIN — INSULIN ASPART 2 UNITS: 100 INJECTION, SOLUTION INTRAVENOUS; SUBCUTANEOUS at 08:01

## 2024-01-22 RX ADMIN — IPRATROPIUM BROMIDE 0.5 MG: 0.5 SOLUTION RESPIRATORY (INHALATION) at 12:01

## 2024-01-22 RX ADMIN — GABAPENTIN 300 MG: 300 CAPSULE ORAL at 08:01

## 2024-01-22 RX ADMIN — INSULIN ASPART 8 UNITS: 100 INJECTION, SOLUTION INTRAVENOUS; SUBCUTANEOUS at 08:01

## 2024-01-22 RX ADMIN — IPRATROPIUM BROMIDE 0.5 MG: 0.5 SOLUTION RESPIRATORY (INHALATION) at 07:01

## 2024-01-22 RX ADMIN — DICYCLOMINE HYDROCHLORIDE 10 MG: 10 CAPSULE ORAL at 12:01

## 2024-01-22 RX ADMIN — AZELASTINE HYDROCHLORIDE 137 MCG: 137 SPRAY, METERED NASAL at 08:01

## 2024-01-22 RX ADMIN — MUPIROCIN 1 G: 20 OINTMENT TOPICAL at 08:01

## 2024-01-22 RX ADMIN — IPRATROPIUM BROMIDE 0.5 MG: 0.5 SOLUTION RESPIRATORY (INHALATION) at 11:01

## 2024-01-22 RX ADMIN — TIZANIDINE 4 MG: 4 TABLET ORAL at 09:01

## 2024-01-22 RX ADMIN — INSULIN ASPART 2 UNITS: 100 INJECTION, SOLUTION INTRAVENOUS; SUBCUTANEOUS at 05:01

## 2024-01-22 RX ADMIN — ASPIRIN 81 MG: 81 TABLET, COATED ORAL at 08:01

## 2024-01-22 RX ADMIN — DICYCLOMINE HYDROCHLORIDE 10 MG: 10 CAPSULE ORAL at 08:01

## 2024-01-22 RX ADMIN — BUSPIRONE HYDROCHLORIDE 5 MG: 5 TABLET ORAL at 08:01

## 2024-01-22 RX ADMIN — IPRATROPIUM BROMIDE 0.5 MG: 0.5 SOLUTION RESPIRATORY (INHALATION) at 04:01

## 2024-01-22 RX ADMIN — OXYCODONE HYDROCHLORIDE AND ACETAMINOPHEN 2 TABLET: 5; 325 TABLET ORAL at 09:01

## 2024-01-22 RX ADMIN — IPRATROPIUM BROMIDE 0.5 MG: 0.5 SOLUTION RESPIRATORY (INHALATION) at 08:01

## 2024-01-22 RX ADMIN — OXYCODONE HYDROCHLORIDE AND ACETAMINOPHEN 2 TABLET: 5; 325 TABLET ORAL at 08:01

## 2024-01-22 RX ADMIN — POLYETHYLENE GLYCOL 3350 17 G: 17 POWDER, FOR SOLUTION ORAL at 08:01

## 2024-01-22 RX ADMIN — HYDROMORPHONE HYDROCHLORIDE 1 MG: 1 INJECTION, SOLUTION INTRAMUSCULAR; INTRAVENOUS; SUBCUTANEOUS at 01:01

## 2024-01-22 RX ADMIN — GABAPENTIN 300 MG: 300 CAPSULE ORAL at 02:01

## 2024-01-22 RX ADMIN — FAMOTIDINE 20 MG: 20 TABLET ORAL at 08:01

## 2024-01-22 RX ADMIN — ISOSORBIDE MONONITRATE 30 MG: 30 TABLET, EXTENDED RELEASE ORAL at 08:01

## 2024-01-22 RX ADMIN — METOPROLOL TARTRATE 25 MG: 25 TABLET, FILM COATED ORAL at 08:01

## 2024-01-22 RX ADMIN — LINACLOTIDE 72 MCG: 72 CAPSULE, GELATIN COATED ORAL at 05:01

## 2024-01-22 RX ADMIN — DOXYCYCLINE HYCLATE 100 MG: 100 TABLET, COATED ORAL at 08:01

## 2024-01-22 RX ADMIN — IPRATROPIUM BROMIDE 0.5 MG: 0.5 SOLUTION RESPIRATORY (INHALATION) at 01:01

## 2024-01-22 RX ADMIN — IPRATROPIUM BROMIDE 0.5 MG: 0.5 SOLUTION RESPIRATORY (INHALATION) at 03:01

## 2024-01-22 RX ADMIN — INSULIN ASPART 4 UNITS: 100 INJECTION, SOLUTION INTRAVENOUS; SUBCUTANEOUS at 12:01

## 2024-01-22 RX ADMIN — INSULIN ASPART 8 UNITS: 100 INJECTION, SOLUTION INTRAVENOUS; SUBCUTANEOUS at 04:01

## 2024-01-22 NOTE — PLAN OF CARE
A252/A252 LIANNE Westfall is a 48 y.o.female admitted on 1/19/2024 for Malignant neoplasm of lung   Code Status: Full Code MRN: [id number]   Review of patient's allergies indicates:   Allergen Reactions    Compazine [prochlorperazine edisylate] Rash    Contrast media Anaphylaxis    Dye Anaphylaxis     Contrast dye    Iodinated contrast media Anaphylaxis     Other reaction(s): anaphylaxis    Levaquin [levofloxacin] Hives and Itching    Morphine Other (See Comments)     Irritable  Iritability  Irritable    Prochlorperazine Rash    Metformin Other (See Comments)     Upset stomach    Ibuprofen Other (See Comments) and Nausea And Vomiting     Stomach pain  Stomach pain    Abdominal pain     Methocarbamol Nausea Only     Past Medical History:   Diagnosis Date    Arthritis     DM (diabetes mellitus) 2009     03/01/2017 Insulin x 3 years 2013    Hepatitis C antibody positive in blood 03/04/2021    RNA NEGATIVE 3/6/2017, 3/22/22    Hyperlipidemia     Hypertension     IBS (irritable bowel syndrome)     Kidney stone     Marfan syndrome     Mitral valve prolapse     Sleep apnea     moild: no cpap needed      PRN meds    ALPRAZolam, 0.5 mg, TID PRN  bisacodyL, 10 mg, Daily PRN  dextrose 10%, 12.5 g, PRN  dextrose 10%, 25 g, PRN  glucagon (human recombinant), 1 mg, PRN  glucose, 16 g, PRN  glucose, 24 g, PRN  insulin aspart U-100, 1-10 Units, QID (AC + HS) PRN  metoclopramide, 5 mg, Q6H PRN  ondansetron, 8 mg, Q8H PRN  oxyCODONE-acetaminophen, 2 tablet, Q6H PRN  sodium chloride 0.9%, 3 mL, PRN  tiZANidine, 4 mg, Nightly PRN      Chart check completed. Will continue plan of care. Awaiting SNF placement.     Orientation: oriented x 4  Benjamin Coma Scale Score: 15     Lead Monitored: Lead II Rhythm: normal sinus rhythm    Cardiac/Telemetry Box Number: [id number]  VTE Required Core Measure: Pharmacological prophylaxis initiated/maintained, (SCDs) Sequential compression device initiated/maintained Last Bowel Movement: 01/17/24  Diet  Adult Regular (IDDSI Level 7)  Diet Cardiac  Voiding Characteristics: external catheter  Geovani Score: 18  Fall Risk Score: 15  Accucheck []   Freq?      Lines/Drains/Airways       Drain  Duration                  Chest Tube 01/19/24 1722 Tube - 1 Right Pleural 24 Fr. 3 days    Female External Urinary Catheter w/ Suction 01/22/24 0007 <1 day              Peripheral Intravenous Line  Duration                  Peripheral IV - Single Lumen 01/19/24 0923 20 G Left Forearm 3 days         Peripheral IV - Single Lumen 01/19/24 1238 18 G Right Forearm 3 days                       Problem: Adult Inpatient Plan of Care  Goal: Plan of Care Review  Outcome: Ongoing, Progressing  Goal: Patient-Specific Goal (Individualized)  Outcome: Ongoing, Progressing  Goal: Absence of Hospital-Acquired Illness or Injury  Outcome: Ongoing, Progressing  Goal: Optimal Comfort and Wellbeing  Outcome: Ongoing, Progressing  Goal: Readiness for Transition of Care  Outcome: Ongoing, Progressing     Problem: Diabetes Comorbidity  Goal: Blood Glucose Level Within Targeted Range  Outcome: Ongoing, Progressing     Problem: Infection  Goal: Absence of Infection Signs and Symptoms  Outcome: Ongoing, Progressing     Problem: Fall Injury Risk  Goal: Absence of Fall and Fall-Related Injury  Outcome: Ongoing, Progressing     Problem: Skin Injury Risk Increased  Goal: Skin Health and Integrity  Outcome: Ongoing, Progressing

## 2024-01-22 NOTE — PT/OT/SLP PROGRESS
"Physical Therapy  Treatment    Jenifer Westfall   MRN: [id number]   Admitting Diagnosis: Malignant neoplasm of lung    PT Received On: 01/22/24  PT Start Time: 1015     PT Stop Time: 1040    PT Total Time (min): 25 min       Billable Minutes:  Gait Training 15 and Therapeutic Activity 10    Treatment Type: Treatment  PT/PTA: PTA     Number of PTA visits since last PT visit: 1       General Precautions: Standard, fall  Orthopedic Precautions: N/A  Braces: N/A  Respiratory Status: Nasal cannula, flow 3 L/min    Spiritual, Cultural Beliefs, Latter day Practices, Values that Affect Care: no    Subjective:  Communicated with patient's nurse, Simón, and completed Epic chart review prior to session.  Patient agreed to PT session. "If I don't start now I'm not going to get my strength back."    Pain/Comfort  Pain Rating 1: 0/10  Pain Rating Post-Intervention 1: 0/10    Objective:   Patient found with: telemetry, peripheral IV, bed alarm, oxygen    Supine > sit EOB: CGA    Forward scoot towards EOB: CGA    STS from EOB > RW: Min A (VC for hand placement)    10ft x2 trials w/ RW: Min A (Multiple standing rest breaks throughout both trials; intermittent VC for safety w/ RW mgmt)    Stand pivot T/F to chair w/ RW: Min A (VC for safety w/ RW mgmt and sequencing of task)    Reviewed AROM TE to BLE including: hip flex/ext, knee flex/ext, ankle PF/DF  To be completed a minimum of 10 reps for each LE in order to promote return of function, strength and ROM.     Educated patient on importance of increased tolerance to upright position and direct impact on CV endurance and strength. Patient encouraged to sit up in chair/ EOB, for a minimum of 2 consecutive hours, 3x per day. Encouraged patient to perform AROM TE to BLE throughout the day within all available planes of motion. Re enforced importance of utilizing call light to meet needs in room and not attempt to get up without staff assistance. Patient verbalized understanding and agreed to " comply.       AM-PAC 6 CLICK MOBILITY  How much help from another person does this patient currently need?   1 = Unable, Total/Dependent Assistance  2 = A lot, Maximum/Moderate Assistance  3 = A little, Minimum/Contact Guard/Supervision  4 = None, Modified [place]/Independent    Turning over in bed (including adjusting bedclothes, sheets and blankets)?: 3  Sitting down on and standing up from a chair with arms (e.g., wheelchair, bedside commode, etc.): 3  Moving from lying on back to sitting on the side of the bed?: 3  Moving to and from a bed to a chair (including a wheelchair)?: 3  Need to walk in hospital room?: 3  Climbing 3-5 steps with a railing?: 1 (NT)  Basic Mobility Total Score: 16    AM-PAC Raw Score CMS G-Code Modifier Level of Impairment Assistance   6 % Total / Unable   7 - 9 CM 80 - 100% Maximal Assist   10 - 14 CL 60 - 80% Moderate Assist   15 - 19 CK 40 - 60% Moderate Assist   20 - 22 CJ 20 - 40% Minimal Assist   23 CI 1-20% SBA / CGA   24 CH 0% Independent/ Mod I     Patient left up in chair with call button in reach.    Assessment:  Jenifer Westfall is a 48 y.o. female with a medical diagnosis of Malignant neoplasm of lung and presents with overall decline in functional mobility. Patient would continue to benefit from skilled PT to address functional limitations listed below in order to return to PLOF/decrease caregiver burden. Patient was well fatigued by end of session. Recommendation is for continued skilled PT services upon D/C from hospital.     Rehab identified problem list/impairments: weakness, impaired endurance, impaired self care skills, impaired functional mobility, gait instability, impaired balance, decreased coordination, decreased upper extremity function, decreased lower extremity function, decreased safety awareness, pain, decreased ROM, impaired cardiopulmonary response to activity    Rehab potential is fair.    Activity tolerance: Fair    Discharge recommendations:   (RECOMMEND CONTINUED PT SERVICES UPON D/C)      Barriers to discharge:      Equipment recommendations: walker, rolling, bedside commode     GOALS:   Multidisciplinary Problems       Physical Therapy Goals          Problem: Physical Therapy    Goal Priority Disciplines Outcome Goal Variances Interventions   Physical Therapy Goal     PT, PT/OT      Description: Patient will be seen a minimal of 3 out of 7 days a week.    LTG to be met by 02/04/24:    Bed mobility: SBA  Transfers: CGA with RW  Gait: CGA with RW x50                       PLAN:    Patient to be seen 3 x/week to address the above listed problems via gait training, therapeutic activities, therapeutic exercises  Plan of Care expires: 02/04/24  Plan of Care reviewed with: patient         01/22/2024

## 2024-01-22 NOTE — PLAN OF CARE
Home O2 referral sent to [facility] for processing. DARIUSZ notified liaisonJoceline DC today. SW to f/u on delivery status.

## 2024-01-22 NOTE — PROGRESS NOTES
Home Oxygen Evaluation    Date Performed: 1/22/2024    1) Patient's Home O2 Sat on room air, while at rest: 86        If O2 sats on room air at rest are 88% or below, patient qualifies. No additional testing needed. Document N/A in steps 2 and 3. If 89% or above, complete steps 2.      2) Patient's O2 Sat on room air while exercising: na        If O2 sats on room air while exercising remain 89% or above patient does not qualify, no further testing needed Document N/A in step 3. If O2 sats on room air while exercising are 88% or below, continue to step 3.      3) Patient's O2 Sat while exercising on O2: 95 at 2 LPM         (Must show improvement from #2 for patients to qualify)    If O2 sats improve on oxygen, patient qualifies for portable oxygen. If not, the patient does not qualify.

## 2024-01-22 NOTE — PLAN OF CARE
SW spoke to MD regarding recs for placement and pt's inability to access her home d/t stairs. Pt does not have other family to assist with concern. Pt voiced she'd feel safer with placement to build strength. Pt agreeable to referral to [facility]. Referral sent and liaison notified. SW to follow status.

## 2024-01-22 NOTE — PLAN OF CARE
01/22/24 1459   Post-Acute Status   Post-Acute Authorization Placement   Post-Acute Placement Status Pending payor review/awaiting authorization (if required)   Discharge Delays None known at this time   Discharge Plan   Discharge Plan A Rehab   Discharge Plan B Skilled [facility] submitted for authorization.   SNF referrals sent in the event insurance does not approve IRF.   SW to follow up on auth status in AM.   MD updated.

## 2024-01-22 NOTE — DISCHARGE SUMMARY
[facility] - Telemetry (Hospital)  Discharge Note  Short Stay  Procedure(s) (LRB):  VATS, ROBOT-ASSISTED, OR ROBOT-ASSISTED THORACOTOMY (Right)  BLOCK, NERVE, INTERCOSTAL, 2 OR MORE (Right)  EXCISION, LYMPH NODE (Right)  LOBECTOMY-ROBOTIC (Right)    HPI:  48-year-old female with a history of longstanding lung nodule more than 2 years without any change and PET negative had a bronchoscopy and biopsy which showed a mucinous adenocarcinoma.  She was an ex-smoker quit 3 years ago has multiple medical problems including dyspnea on mild exertion.  She has a longstanding history of Marfan syndrome and she walks with a walking stick.  She also gets tired with minimal walking.      1/19/2024  VATS, ROBOT-ASSISTED, OR ROBOT-ASSISTED lobectomy BLOCK, NERVE, INTERCOSTAL, 2 OR MORE, EXCISION, LYMPH NODE, LOBECTOMY-ROBOTIC         01/20/2024 patient had lot of pain issues overnight otherwise she is hemodynamically stable chest tube output minimal drainage no air leak Mathias catheter was discontinued this morning.     01/21/2024 patient is in the bed lot of pain issues better than yesterday chest tube output minimal no air leak poor inspiratory effort incentive spirometry less than 500 hemodynamically stable blood sugars have been running a little high    01/22/2024 patient chest tube output diminished no air leak serosanguineous drainage patient ambulated and walked yesterday blood sugars have been well controlled pain is controlled with medications appetite is back.  Incisions healing well          OUTCOME: Patient tolerated treatment/procedure well without complication and is now ready for discharge.    [place] [facility]    FINAL DIAGNOSIS:  Malignant neoplasm of lung    FOLLOWUP: In clinic    DISCHARGE INSTRUCTIONS:    Discharge Procedure Orders   Ambulatory referral/consult to Home Health   Standing Status: Future   Referral Priority: Routine Referral Type: Home Health   Referral Reason: Specialty Services Required    Requested Specialty: Home Health Services   Number of Visits Requested: 1     Diet Cardiac     No dressing needed     Activity as tolerated        TIME SPENT ON DISCHARGE: 35 minutes

## 2024-01-22 NOTE — PLAN OF CARE
Ongoing (interventions implemented as appropriate)  Pt is alert and oriented.   VSS  Pt able to make needs known.  Pt remained afebrile throughout this shift.   Pt remained free of falls this shift.   Pt denies pain this shift.  Plan of care reviewed. Patient verbalizes understanding.   Pt moving/turing independent. Frequent weight shifting encouraged.  Patient normal sinus rhythm on monitor.   Bed low, side rails up x 2, wheels locked, call light in reach.   Hourly rounding completed.   Will continue to observe.

## 2024-01-22 NOTE — PLAN OF CARE
01/22/24 0937   Post-Acute Status   Post-Acute Authorization Home Health   Home Health Status Referrals Sent   Discharge Delays None known at this time   Discharge Plan   Discharge Plan A Home with family;Home Health     DARIUSZ spoke with MD regarding anticipated DC today with home health.  DARIUSZ met with patient at bedside to discuss recs for home health. Services discussed; pt agreeable to referral to [facility]. Pt reports help at home from sons and they will assist with transportation home.   Referral sent to [facility]; orders needed prior to DC.

## 2024-01-23 VITALS
OXYGEN SATURATION: 97 % | RESPIRATION RATE: 20 BRPM | WEIGHT: 248 LBS | SYSTOLIC BLOOD PRESSURE: 118 MMHG | TEMPERATURE: 98 F | DIASTOLIC BLOOD PRESSURE: 87 MMHG | HEIGHT: 70 IN | BODY MASS INDEX: 35.5 KG/M2 | HEART RATE: 75 BPM

## 2024-01-23 LAB
BASOPHILS # BLD AUTO: 0.02 K/UL (ref 0–0.2)
BASOPHILS NFR BLD: 0.3 % (ref 0–1.9)
BLD PROD TYP BPU: NORMAL
BLOOD UNIT EXPIRATION DATE: NORMAL
BLOOD UNIT TYPE CODE: 5100
BLOOD UNIT TYPE: NORMAL
CODING SYSTEM: NORMAL
CROSSMATCH INTERPRETATION: NORMAL
DIFFERENTIAL METHOD BLD: ABNORMAL
DISPENSE STATUS: NORMAL
EOSINOPHIL # BLD AUTO: 0.3 K/UL (ref 0–0.5)
EOSINOPHIL NFR BLD: 3.5 % (ref 0–8)
ERYTHROCYTE [DISTWIDTH] IN BLOOD BY AUTOMATED COUNT: 13.4 % (ref 11.5–14.5)
FINAL PATHOLOGIC DIAGNOSIS: NORMAL
GROSS: NORMAL
HCT VFR BLD AUTO: 35.8 % (ref 37–48.5)
HGB BLD-MCNC: 11.4 G/DL (ref 12–16)
IMM GRANULOCYTES # BLD AUTO: 0.01 K/UL (ref 0–0.04)
IMM GRANULOCYTES NFR BLD AUTO: 0.1 % (ref 0–0.5)
LYMPHOCYTES # BLD AUTO: 2.5 K/UL (ref 1–4.8)
LYMPHOCYTES NFR BLD: 34.7 % (ref 18–48)
Lab: NORMAL
MCH RBC QN AUTO: 29.6 PG (ref 27–31)
MCHC RBC AUTO-ENTMCNC: 31.8 G/DL (ref 32–36)
MCV RBC AUTO: 93 FL (ref 82–98)
MICROSCOPIC EXAM: NORMAL
MONOCYTES # BLD AUTO: 0.7 K/UL (ref 0.3–1)
MONOCYTES NFR BLD: 9.5 % (ref 4–15)
NEUTROPHILS # BLD AUTO: 3.8 K/UL (ref 1.8–7.7)
NEUTROPHILS NFR BLD: 51.9 % (ref 38–73)
NRBC BLD-RTO: 0 /100 WBC
NUM UNITS TRANS PACKED RBC: NORMAL
PLATELET # BLD AUTO: 231 K/UL (ref 150–450)
PMV BLD AUTO: 10.6 FL (ref 9.2–12.9)
POCT GLUCOSE: 122 MG/DL (ref 70–110)
POCT GLUCOSE: 235 MG/DL (ref 70–110)
POCT GLUCOSE: 302 MG/DL (ref 70–110)
RBC # BLD AUTO: 3.85 M/UL (ref 4–5.4)
WBC # BLD AUTO: 7.24 K/UL (ref 3.9–12.7)

## 2024-01-23 PROCEDURE — 97530 THERAPEUTIC ACTIVITIES: CPT | Mod: CQ

## 2024-01-23 PROCEDURE — 25000003 PHARM REV CODE 250: Performed by: THORACIC SURGERY (CARDIOTHORACIC VASCULAR SURGERY)

## 2024-01-23 PROCEDURE — 94761 N-INVAS EAR/PLS OXIMETRY MLT: CPT

## 2024-01-23 PROCEDURE — 94640 AIRWAY INHALATION TREATMENT: CPT

## 2024-01-23 PROCEDURE — 97116 GAIT TRAINING THERAPY: CPT | Mod: CQ

## 2024-01-23 PROCEDURE — 85025 COMPLETE CBC W/AUTO DIFF WBC: CPT | Performed by: THORACIC SURGERY (CARDIOTHORACIC VASCULAR SURGERY)

## 2024-01-23 PROCEDURE — 25000242 PHARM REV CODE 250 ALT 637 W/ HCPCS: Performed by: THORACIC SURGERY (CARDIOTHORACIC VASCULAR SURGERY)

## 2024-01-23 PROCEDURE — 27000221 HC OXYGEN, UP TO 24 HOURS

## 2024-01-23 PROCEDURE — 94799 UNLISTED PULMONARY SVC/PX: CPT | Mod: XB

## 2024-01-23 PROCEDURE — 36415 COLL VENOUS BLD VENIPUNCTURE: CPT | Performed by: THORACIC SURGERY (CARDIOTHORACIC VASCULAR SURGERY)

## 2024-01-23 PROCEDURE — 99900035 HC TECH TIME PER 15 MIN (STAT)

## 2024-01-23 PROCEDURE — 97530 THERAPEUTIC ACTIVITIES: CPT

## 2024-01-23 RX ORDER — TIZANIDINE 4 MG/1
4 TABLET ORAL 3 TIMES DAILY PRN
Status: DISCONTINUED | OUTPATIENT
Start: 2024-01-23 | End: 2024-01-23 | Stop reason: HOSPADM

## 2024-01-23 RX ADMIN — DICYCLOMINE HYDROCHLORIDE 10 MG: 10 CAPSULE ORAL at 02:01

## 2024-01-23 RX ADMIN — IPRATROPIUM BROMIDE 0.5 MG: 0.5 SOLUTION RESPIRATORY (INHALATION) at 04:01

## 2024-01-23 RX ADMIN — MUPIROCIN 1 G: 20 OINTMENT TOPICAL at 08:01

## 2024-01-23 RX ADMIN — OXYCODONE HYDROCHLORIDE AND ACETAMINOPHEN 2 TABLET: 5; 325 TABLET ORAL at 04:01

## 2024-01-23 RX ADMIN — ATORVASTATIN CALCIUM 40 MG: 40 TABLET, FILM COATED ORAL at 08:01

## 2024-01-23 RX ADMIN — DICYCLOMINE HYDROCHLORIDE 10 MG: 10 CAPSULE ORAL at 04:01

## 2024-01-23 RX ADMIN — ASPIRIN 81 MG: 81 TABLET, COATED ORAL at 08:01

## 2024-01-23 RX ADMIN — FAMOTIDINE 20 MG: 20 TABLET ORAL at 08:01

## 2024-01-23 RX ADMIN — OXYCODONE HYDROCHLORIDE AND ACETAMINOPHEN 2 TABLET: 5; 325 TABLET ORAL at 11:01

## 2024-01-23 RX ADMIN — POLYETHYLENE GLYCOL 3350 17 G: 17 POWDER, FOR SOLUTION ORAL at 08:01

## 2024-01-23 RX ADMIN — INSULIN ASPART 8 UNITS: 100 INJECTION, SOLUTION INTRAVENOUS; SUBCUTANEOUS at 04:01

## 2024-01-23 RX ADMIN — INSULIN ASPART 4 UNITS: 100 INJECTION, SOLUTION INTRAVENOUS; SUBCUTANEOUS at 11:01

## 2024-01-23 RX ADMIN — TIZANIDINE 4 MG: 4 TABLET ORAL at 11:01

## 2024-01-23 RX ADMIN — LINACLOTIDE 72 MCG: 72 CAPSULE, GELATIN COATED ORAL at 05:01

## 2024-01-23 RX ADMIN — DICYCLOMINE HYDROCHLORIDE 10 MG: 10 CAPSULE ORAL at 08:01

## 2024-01-23 RX ADMIN — ALPRAZOLAM 0.5 MG: 0.5 TABLET ORAL at 08:01

## 2024-01-23 RX ADMIN — INSULIN ASPART 8 UNITS: 100 INJECTION, SOLUTION INTRAVENOUS; SUBCUTANEOUS at 11:01

## 2024-01-23 RX ADMIN — GABAPENTIN 300 MG: 300 CAPSULE ORAL at 08:01

## 2024-01-23 RX ADMIN — FLUOXETINE 40 MG: 20 CAPSULE ORAL at 08:01

## 2024-01-23 RX ADMIN — GABAPENTIN 300 MG: 300 CAPSULE ORAL at 03:01

## 2024-01-23 RX ADMIN — IPRATROPIUM BROMIDE 0.5 MG: 0.5 SOLUTION RESPIRATORY (INHALATION) at 08:01

## 2024-01-23 RX ADMIN — AZELASTINE HYDROCHLORIDE 137 MCG: 137 SPRAY, METERED NASAL at 08:01

## 2024-01-23 RX ADMIN — IPRATROPIUM BROMIDE 0.5 MG: 0.5 SOLUTION RESPIRATORY (INHALATION) at 11:01

## 2024-01-23 RX ADMIN — BUSPIRONE HYDROCHLORIDE 5 MG: 5 TABLET ORAL at 08:01

## 2024-01-23 RX ADMIN — ISOSORBIDE MONONITRATE 30 MG: 30 TABLET, EXTENDED RELEASE ORAL at 08:01

## 2024-01-23 NOTE — PT/OT/SLP PROGRESS
Physical Therapy      Patient Name:  Jenifer Westflal   MRN:  [id number]    Patient not seen today secondary to Nursing care. Will follow-up at next available opportunity.

## 2024-01-23 NOTE — PLAN OF CARE
01/23/24 1442   Post-Acute Status   Post-Acute Authorization Placement   Post-Acute Placement Status Set-up Complete/Auth obtained   Home Health Status Discharge Plan Changed   Discharge Delays None known at this time   Discharge Plan   Discharge Plan A Rehab     Authorization received for patient to discharge to [facility]. Facility can admit pt today;  ETA is 5PM. Facility liasion to update pt on plan.   SW awaiting number for report to provide to bedside nurse.   MD updated.

## 2024-01-23 NOTE — PT/OT/SLP PROGRESS
Physical Therapy  Treatment    Jenifer Westfall   MRN: [id number]   Admitting Diagnosis: Malignant neoplasm of lung    PT Received On: 01/23/24  PT Start Time: 0915     PT Stop Time: 0945    PT Total Time (min): 30 min       Billable Minutes:  Gait Training 15 and Therapeutic Activity 15    Treatment Type: Treatment  PT/PTA: PTA     Number of PTA visits since last PT visit: 2       General Precautions: Standard, fall  Orthopedic Precautions: N/A  Braces: N/A  Respiratory Status: Nasal cannula, flow 2 L/min    Spiritual, Cultural Beliefs, Anabaptist Practices, Values that Affect Care: no    Subjective:  Communicated with patient's nurse, Simón, and completed Epic chart review prior to session.  Patient agreed to PT session.     Pain/Comfort  Pain Rating 1: 8/10 (FORMER CHEST TUBE SITE)    Objective:   Patient found with: telemetry, peripheral IV, oxygen    Supine > sit EOB: CGA    Forward scoot towards EOB: SBA    STS from EOB > RW: Min A (VC for hand placement)    30ft x2 trials w/ RW Min A (multiple standing rest breaks of varying lengths throughout both trials; increased time required to complete)    Stand pivot T/F to chair w/ RW: Min A     Reviewed AROM TE to BLE including: hip flex/ext, knee flex/ext, ankle PF/DF  To be completed a minimum of 10 reps for each LE in order to promote return of function, strength and ROM.     Educated patient on importance of increased tolerance to upright position and direct impact on CV endurance and strength. Patient encouraged to sit up in chair/ EOB, for a minimum of 2 consecutive hours, 3x per day. Encouraged patient to perform AROM TE to BLE throughout the day within all available planes of motion. Re enforced importance of utilizing call light to meet needs in room and not attempt to get up without staff assistance. Patient verbalized understanding and agreed to comply.     AM-PAC 6 CLICK MOBILITY  How much help from another person does this patient currently need?   1 = Unable,  Total/Dependent Assistance  2 = A lot, Maximum/Moderate Assistance  3 = A little, Minimum/Contact Guard/Supervision  4 = None, Modified [place]/Independent    Turning over in bed (including adjusting bedclothes, sheets and blankets)?: 3  Sitting down on and standing up from a chair with arms (e.g., wheelchair, bedside commode, etc.): 3  Moving from lying on back to sitting on the side of the bed?: 3  Moving to and from a bed to a chair (including a wheelchair)?: 3  Need to walk in hospital room?: 3  Climbing 3-5 steps with a railing?: 1 (NT)  Basic Mobility Total Score: 16    AM-PAC Raw Score CMS G-Code Modifier Level of Impairment Assistance   6 % Total / Unable   7 - 9 CM 80 - 100% Maximal Assist   10 - 14 CL 60 - 80% Moderate Assist   15 - 19 CK 40 - 60% Moderate Assist   20 - 22 CJ 20 - 40% Minimal Assist   23 CI 1-20% SBA / CGA   24 CH 0% Independent/ Mod I     Patient left up in chair with call button in reach.    Assessment:  Jenifer Westfall is a 48 y.o. female with a medical diagnosis of Malignant neoplasm of lung and presents with overall decline in functional mobility. Patient would continue to benefit from skilled PT to address functional limitations listed below in order to return to PLOF/decrease caregiver burden.     Rehab identified problem list/impairments: weakness, impaired endurance, impaired self care skills, impaired functional mobility, gait instability, impaired balance, decreased coordination, decreased upper extremity function, decreased lower extremity function, decreased safety awareness, pain, impaired coordination, decreased ROM, impaired cardiopulmonary response to activity    Rehab potential is good.    Activity tolerance: Fair    Discharge recommendations:  (RECOMMEND CONTINUED PT SERVICES UPON D/C)      Barriers to discharge:      Equipment recommendations: bedside commode, walker, rolling     GOALS:   Multidisciplinary Problems       Physical Therapy Goals           Problem: Physical Therapy    Goal Priority Disciplines Outcome Goal Variances Interventions   Physical Therapy Goal     PT, PT/OT      Description: Patient will be seen a minimal of 3 out of 7 days a week.    LTG to be met by 02/04/24:    Bed mobility: SBA  Transfers: CGA with RW  Gait: CGA with RW x50                       PLAN:    Patient to be seen 3 x/week to address the above listed problems via gait training, therapeutic activities, therapeutic exercises  Plan of Care expires: 02/04/24  Plan of Care reviewed with: patient         01/23/2024

## 2024-01-23 NOTE — PLAN OF CARE
[facility] - Telemetry (Hospital)  Discharge Final Note    Primary Care Provider: Kareen Moctezuma    Expected Discharge Date: 1/23/2024    Final Discharge Note (most recent)       Final Note - 01/23/24 1603          Final Note    Assessment Type Final Discharge Note     Anticipated Discharge Disposition Home or Self Care     Hospital Resources/Appts/Education Provided Post-Acute resouces added to AVS        Post-Acute Status    Post-Acute Authorization Placement     Post-Acute Placement Status Set-up Complete/Auth obtained     Home Health Status Discharge Plan Changed     Discharge Delays None known at this time                   Pt to discharge to [facility] today. Pt agreeable to plan.  Number for report provided to bedside nurse: [phone].  at 5PM.     No CM needs for discharge.     Important Message from Medicare             After-discharge care                Home Medical Care       [facility]   Service: Home Health Services    [address] [facility] [address]  [facility] [address]   Phone: [phone]

## 2024-01-23 NOTE — PLAN OF CARE
A252/A252 LIANNE Westfall is a 48 y.o.female admitted on 1/19/2024 for Malignant neoplasm of lung   Code Status: Full Code MRN: [id number]   Review of patient's allergies indicates:   Allergen Reactions    Compazine [prochlorperazine edisylate] Rash    Contrast media Anaphylaxis    Dye Anaphylaxis     Contrast dye    Iodinated contrast media Anaphylaxis     Other reaction(s): anaphylaxis    Levaquin [levofloxacin] Hives and Itching    Morphine Other (See Comments)     Irritable  Iritability  Irritable    Prochlorperazine Rash    Metformin Other (See Comments)     Upset stomach    Ibuprofen Other (See Comments) and Nausea And Vomiting     Stomach pain  Stomach pain    Abdominal pain     Methocarbamol Nausea Only     Past Medical History:   Diagnosis Date    Arthritis     DM (diabetes mellitus) 2009     03/01/2017 Insulin x 3 years 2013    Hepatitis C antibody positive in blood 03/04/2021    RNA NEGATIVE 3/6/2017, 3/22/22    Hyperlipidemia     Hypertension     IBS (irritable bowel syndrome)     Kidney stone     Marfan syndrome     Mitral valve prolapse     Sleep apnea     moild: no cpap needed      PRN meds    ALPRAZolam, 0.5 mg, TID PRN  bisacodyL, 10 mg, Daily PRN  dextrose 10%, 12.5 g, PRN  dextrose 10%, 25 g, PRN  glucagon (human recombinant), 1 mg, PRN  glucose, 16 g, PRN  glucose, 24 g, PRN  insulin aspart U-100, 1-10 Units, QID (AC + HS) PRN  metoclopramide, 5 mg, Q6H PRN  ondansetron, 8 mg, Q8H PRN  oxyCODONE-acetaminophen, 2 tablet, Q6H PRN  sodium chloride 0.9%, 3 mL, PRN  tiZANidine, 4 mg, TID PRN      [facility]  AVS Discharge instructions received and reviewed with pt and family at bedside.  Pt voiced understanding and all questions answered to satisfaction.  Stressed importance to making and keeping all follow up appointments.  Paper prescription at bedside and reviewed with pt.  Tele monitor removed and brought to monitor tech.  IV d/c'd with tip intact,  pressure dressing applied.  Pt discharged via w/c by [facility] transportation.     Orientation: oriented x 4  Benjamin Coma Scale Score: 15     Lead Monitored: Lead II Rhythm: normal sinus rhythm    Cardiac/Telemetry Box Number: [id number]  VTE Required Core Measure: (SCDs) Sequential compression device initiated/maintained Last Bowel Movement: 01/17/24  Diet Adult Regular (IDDSI Level 7)  Diet Cardiac  Diet Cardiac  Voiding Characteristics: incontinence  Geovani Score: 18  Fall Risk Score: 15  Accucheck [x]   Freq? ACHS     Lines/Drains/Airways       None                      Problem: Adult Inpatient Plan of Care  Goal: Plan of Care Review  Outcome: Met  Goal: Patient-Specific Goal (Individualized)  Outcome: Met  Goal: Absence of Hospital-Acquired Illness or Injury  Outcome: Met  Goal: Optimal Comfort and Wellbeing  Outcome: Met  Goal: Readiness for Transition of Care  Outcome: Met     Problem: Diabetes Comorbidity  Goal: Blood Glucose Level Within Targeted Range  Outcome: Met     Problem: Infection  Goal: Absence of Infection Signs and Symptoms  Outcome: Met     Problem: Fall Injury Risk  Goal: Absence of Fall and Fall-Related Injury  Outcome: Met     Problem: Skin Injury Risk Increased  Goal: Skin Health and Integrity  Outcome: Met

## 2024-01-23 NOTE — PT/OT/SLP PROGRESS
Occupational Therapy   Treatment    Name: Jenifer Westfall  MRN: [id number]  Admitting Diagnosis:  Malignant neoplasm of lung  4 Days Post-Op    Recommendations:     Discharge Recommendations: High Intensity Therapy  Discharge Equipment Recommendations:  to be determined by next level of care  Barriers to discharge:  Inaccessible home environment    Assessment:     Jenifer Westfall is a 48 y.o. female with a medical diagnosis of Malignant neoplasm of lung.  She presents with the following  performance deficits affecting function are weakness, impaired endurance, impaired self care skills, impaired functional mobility, gait instability, impaired balance, decreased coordination, decreased upper extremity function, decreased lower extremity function, decreased safety awareness, pain, decreased ROM, impaired cardiopulmonary response to activity.     Rehab Prognosis:  Good; patient would benefit from acute skilled OT services to address these deficits and reach maximum level of function.       Plan:     Patient to be seen 2 x/week to address the above listed problems via self-care/home management, therapeutic activities, therapeutic exercises  Plan of Care Expires: 02/04/24  Plan of Care Reviewed with: patient    Subjective     Chief Complaint: weakness, fatigue  Patient/Family Comments/goals: get better, return home  Pain/Comfort:  Pain Rating 1: 8/10  Location - Side 1: Right  Location 1: chest (former chest tube site)  Pain Addressed 1: Reposition, Distraction  Pain Rating Post-Intervention 1: 8/10    Objective:     Communicated with: Nurse and epic chart review prior to session.  Patient found HOB elevated with peripheral IV, telemetry, oxygen upon OT entry to room.    General Precautions: Standard, fall    Orthopedic Precautions:N/A  Braces: N/A  Respiratory Status: Nasal cannula, flow 2 L/min     Occupational Performance:     Bed Mobility:    Patient completed Rolling/Turning to Right with contact guard  assistance  Patient completed Scooting/Bridging with contact guard assistance  Patient completed Supine to Sit with contact guard assistance     Functional Mobility/Transfers:  Patient completed Sit <> Stand Transfer with minimum assistance  with  rolling walker   Patient completed Bed <> Chair Transfer using Stand Pivot technique with minimum assistance with rolling walker  Functional Mobility: Patient completed x30ft x2 functional mobility with Min A and RW to increase dynamic standing balance and activity tolerance needed for ADL completion. Required multiple standing rest breaks and extended time to complete.    Activities of Daily Living:  Lower Body Dressing: maximal assistance rajan socks    [facility] 6 Click ADL: 18    Treatment & Education:  Educated pt on pursed lip breathing and importance of activity pacing for SOB. Patient educated on role of OT in acute setting and benefits of participation. Educated on techniques to use to increase independence and decrease fall risk with functional transfers. Educated on importance of OOB activity and calling for A to transfer back to bed. Encouraged completion of B UE AROM therex throughout the day to tolerance to increase functional strength and activity tolerance. Educated patient on importance of increased tolerance to upright position and direct impact on CV endurance and strength. Patient encouraged to sit up in chair for a minimum of 2 consecutive hours per day.  Patient stated understanding and in agreement with POC.     Patient left up in chair with all lines intact and call button in reach    GOALS:   Multidisciplinary Problems       Occupational Therapy Goals          Problem: Occupational Therapy    Goal Priority Disciplines Outcome Interventions   Occupational Therapy Goal     OT, PT/OT Ongoing, Progressing    Description: Goals to be met by: 2/5/24     Patient will increase functional independence with ADLs by performing:    LE Dressing with Moderate  Assistance.  Toileting from toilet with Minimal Assistance for hygiene and clothing management.   Toilet transfer to toilet with Stand-by Assistance.                         Time Tracking:     OT Date of Treatment: 01/23/24  OT Start Time: 0915  OT Stop Time: 0945  OT Total Time (min): 30 min    Billable Minutes:Therapeutic Activity 30    OT/ALEXIA: OT     Manasa Akbar OT     1/23/2024

## 2024-01-30 ENCOUNTER — PATIENT MESSAGE (OUTPATIENT)
Dept: CARDIOTHORACIC SURGERY | Facility: CLINIC | Age: 49
End: 2024-01-30
Payer: MEDICAID

## 2024-01-31 ENCOUNTER — PATIENT MESSAGE (OUTPATIENT)
Dept: CARDIOLOGY | Facility: CLINIC | Age: 49
End: 2024-01-31
Payer: MEDICAID

## 2024-02-01 DIAGNOSIS — C26.9 MUCINOUS ADENOCARCINOMA OF GASTROINTESTINAL TRACT: Primary | ICD-10-CM

## 2024-02-12 ENCOUNTER — TELEPHONE (OUTPATIENT)
Dept: GASTROENTEROLOGY | Facility: CLINIC | Age: 49
End: 2024-02-12
Payer: MEDICAID

## 2024-02-12 NOTE — TELEPHONE ENCOUNTER
Returned call to pt who stated she needs an appointment asap for nausea/vomiting. Scheduled first available Virtual with Dr Santana on 2/13/24. Pt is not too familiar with Virtual visits but will try her best and let us know if she is unable to make the appointment work.

## 2024-02-12 NOTE — TELEPHONE ENCOUNTER
----- Message from Kristy Davis sent at 2/12/2024 11:00 AM CST -----  Contact: [phone]  Type:  Sooner Apoointment Request    Caller is requesting a sooner appointment.  Caller declined first available appointment listed below.  Caller will not accept being placed on the waitlist and is requesting a message be sent to doctor.  Name of Caller:Jenifer   When is the first available appointment?n/a   Symptoms:vomiting and nausea   Would the patient rather a call back or a response via [employer]? Call back   Best Call Back Number:[phone]   Additional Information: n/a      Thanks Kb

## 2024-02-13 ENCOUNTER — TELEPHONE (OUTPATIENT)
Dept: CARDIOLOGY | Facility: CLINIC | Age: 49
End: 2024-02-13
Payer: MEDICAID

## 2024-02-13 NOTE — TELEPHONE ENCOUNTER
----- Message from Erin Glynn sent at 2/13/2024  4:04 PM CST -----  Contact: Jenifer Burgess is calling to speak to the nurse regarding questions she have about the surgery. Please give her a call at [phone]    Thanks  RENETTA

## 2024-02-13 NOTE — TELEPHONE ENCOUNTER
Had Sx 2/19 lung removal - pt states she has a hernia that has now come up on her stomach- just noticed today- about golf size ball felt middle of stomach towards right side some pain (7/8 pain) on right side of stomach after getting home but pt taking percocet as prescribed but still has pain through it   Pt also complains of slight fever- took tylenol earlier   Pt also stated had a hard time having bowel movemnt after initial Sx      Instructed pt to go to ER now to get checked out - also reminded her of follow up on Thursday   Pt verbalized understanding and will go to ER   Please advise      ----- Message from Erin Glynn sent at 2/13/2024  4:04 PM CST -----  Contact: Jenifer Burgess is calling to speak to the nurse regarding questions she have about the surgery. Please give her a call at [phone]    Thanks  RENETTA

## 2024-02-15 ENCOUNTER — OFFICE VISIT (OUTPATIENT)
Dept: CARDIOTHORACIC SURGERY | Facility: CLINIC | Age: 49
End: 2024-02-15
Payer: MEDICAID

## 2024-02-15 ENCOUNTER — TELEPHONE (OUTPATIENT)
Dept: CARDIOTHORACIC SURGERY | Facility: CLINIC | Age: 49
End: 2024-02-15
Payer: MEDICAID

## 2024-02-15 VITALS
HEART RATE: 65 BPM | DIASTOLIC BLOOD PRESSURE: 60 MMHG | OXYGEN SATURATION: 96 % | WEIGHT: 246.94 LBS | SYSTOLIC BLOOD PRESSURE: 98 MMHG | BODY MASS INDEX: 35.43 KG/M2

## 2024-02-15 DIAGNOSIS — K74.00 LIVER FIBROSIS: ICD-10-CM

## 2024-02-15 DIAGNOSIS — E55.9 VITAMIN D DEFICIENCY: ICD-10-CM

## 2024-02-15 DIAGNOSIS — I73.9 PAD (PERIPHERAL ARTERY DISEASE): ICD-10-CM

## 2024-02-15 DIAGNOSIS — I70.8 OCCLUSION OF RIGHT SUBCLAVIAN ARTERY: ICD-10-CM

## 2024-02-15 DIAGNOSIS — Q87.40 MARFAN'S SYNDROME: ICD-10-CM

## 2024-02-15 DIAGNOSIS — M54.32 SCIATICA OF LEFT SIDE: ICD-10-CM

## 2024-02-15 DIAGNOSIS — M50.30 DDD (DEGENERATIVE DISC DISEASE), CERVICAL: ICD-10-CM

## 2024-02-15 DIAGNOSIS — I34.1 MVP (MITRAL VALVE PROLAPSE): ICD-10-CM

## 2024-02-15 DIAGNOSIS — I10 PRIMARY HYPERTENSION: ICD-10-CM

## 2024-02-15 DIAGNOSIS — Z79.4 TYPE 2 DIABETES MELLITUS WITH DIABETIC PERIPHERAL ANGIOPATHY WITHOUT GANGRENE, WITH LONG-TERM CURRENT USE OF INSULIN: ICD-10-CM

## 2024-02-15 DIAGNOSIS — I27.20 PULMONARY HYPERTENSION: ICD-10-CM

## 2024-02-15 DIAGNOSIS — F32.0 CURRENT MILD EPISODE OF MAJOR DEPRESSIVE DISORDER WITHOUT PRIOR EPISODE: ICD-10-CM

## 2024-02-15 DIAGNOSIS — E78.2 MIXED HYPERLIPIDEMIA: ICD-10-CM

## 2024-02-15 DIAGNOSIS — C34.90 MALIGNANT NEOPLASM OF LUNG, UNSPECIFIED LATERALITY, UNSPECIFIED PART OF LUNG: Primary | ICD-10-CM

## 2024-02-15 DIAGNOSIS — E11.51 TYPE 2 DIABETES MELLITUS WITH DIABETIC PERIPHERAL ANGIOPATHY WITHOUT GANGRENE, WITH LONG-TERM CURRENT USE OF INSULIN: ICD-10-CM

## 2024-02-15 DIAGNOSIS — G43.009 MIGRAINE WITHOUT AURA AND WITHOUT STATUS MIGRAINOSUS, NOT INTRACTABLE: ICD-10-CM

## 2024-02-15 PROCEDURE — 3052F HG A1C>EQUAL 8.0%<EQUAL 9.0%: CPT | Mod: CPTII,,, | Performed by: THORACIC SURGERY (CARDIOTHORACIC VASCULAR SURGERY)

## 2024-02-15 PROCEDURE — 3078F DIAST BP <80 MM HG: CPT | Mod: CPTII,,, | Performed by: THORACIC SURGERY (CARDIOTHORACIC VASCULAR SURGERY)

## 2024-02-15 PROCEDURE — 4010F ACE/ARB THERAPY RXD/TAKEN: CPT | Mod: CPTII,,, | Performed by: THORACIC SURGERY (CARDIOTHORACIC VASCULAR SURGERY)

## 2024-02-15 PROCEDURE — 3074F SYST BP LT 130 MM HG: CPT | Mod: CPTII,,, | Performed by: THORACIC SURGERY (CARDIOTHORACIC VASCULAR SURGERY)

## 2024-02-15 PROCEDURE — 99215 OFFICE O/P EST HI 40 MIN: CPT | Mod: PBBFAC | Performed by: THORACIC SURGERY (CARDIOTHORACIC VASCULAR SURGERY)

## 2024-02-15 PROCEDURE — 99024 POSTOP FOLLOW-UP VISIT: CPT | Mod: ,,, | Performed by: THORACIC SURGERY (CARDIOTHORACIC VASCULAR SURGERY)

## 2024-02-15 PROCEDURE — 99999 PR PBB SHADOW E&M-EST. PATIENT-LVL V: CPT | Mod: PBBFAC,,, | Performed by: THORACIC SURGERY (CARDIOTHORACIC VASCULAR SURGERY)

## 2024-02-15 PROCEDURE — 1159F MED LIST DOCD IN RCRD: CPT | Mod: CPTII,,, | Performed by: THORACIC SURGERY (CARDIOTHORACIC VASCULAR SURGERY)

## 2024-02-15 NOTE — TELEPHONE ENCOUNTER
Patient contacted to find out if she has reliable transportation for her appointment. The patient can call back to reschedule.

## 2024-02-15 NOTE — PROGRESS NOTES
Subjective:      Patient ID: Jenifer Westfall is a 48 y.o. female.    Chief Complaint: No chief complaint on file.    HPI:  48-year-old female with multiple medical problems debility right lower lobe adenocarcinoma Marfan syndrome arthritis obesity limited mobility underwent robot assisted right lower lobectomy is here for the follow-up visit 2 weeks.  The patient has some incisional discomfort otherwise no major complaints at this time physical activity is back to her baseline.    Review of patient's allergies indicates:   Allergen Reactions    Compazine [prochlorperazine edisylate] Rash    Contrast media Anaphylaxis    Dye Anaphylaxis     Contrast dye    Iodinated contrast media Anaphylaxis     Other reaction(s): anaphylaxis    Levaquin [levofloxacin] Hives and Itching    Morphine Other (See Comments)     Irritable  Iritability  Irritable    Prochlorperazine Rash    Metformin Other (See Comments)     Upset stomach    Ibuprofen Other (See Comments) and Nausea And Vomiting     Stomach pain  Stomach pain    Abdominal pain     Methocarbamol Nausea Only       Review of Systems   Constitutional:  Negative for activity change and appetite change.   HENT:  Negative for dental problem, nosebleeds and sore throat.    Eyes:  Negative for discharge and visual disturbance.   Respiratory:  Negative for cough, chest tightness and stridor.    Cardiovascular:  Negative for leg swelling.   Gastrointestinal:  Negative for abdominal distention and abdominal pain.   Genitourinary:  Negative for difficulty urinating and dysuria.   Musculoskeletal:  Negative for arthralgias, back pain and joint swelling.   Allergic/Immunologic: Negative for environmental allergies.   Neurological:  Negative for dizziness, syncope and headaches.   Hematological:  Does not bruise/bleed easily.   Psychiatric/Behavioral:  Negative for behavioral problems.           Objective:   BP 98/60   Pulse 65   Wt 112 kg (246 lb 14.6 oz)   SpO2 96%   BMI 35.43 kg/m²      X-Ray Chest 1 View  Narrative: EXAMINATION:  XR CHEST 1 VIEW    CLINICAL HISTORY:  , Right sided effusion;    COMPARISON:  chest 01/21/2024    FINDINGS:  One view.  Interval removal of right pleural catheter.  No evidence of pneumothorax.  Increasing density at the right base could represent atelectasis or reaccumulation of pleural fluid.  The left lung remains essentially clear.  Impression: Increasing dense at the right base could represent atelectasis or reaccumulation of pleural fluid.  No pneumothorax.    Electronically signed by: Mimi Garcia MD  Date:    01/22/2024  Time:    12:12         Physical Exam  Vitals reviewed.   Constitutional:       Appearance: Normal appearance.   HENT:      Head: Normocephalic and atraumatic.      Mouth/Throat:      Mouth: Mucous membranes are moist.   Eyes:      Extraocular Movements: Extraocular movements intact.   Cardiovascular:      Rate and Rhythm: Normal rate and regular rhythm.      Pulses: Normal pulses.      Heart sounds: Normal heart sounds.   Pulmonary:      Effort: Pulmonary effort is normal.      Breath sounds: Normal breath sounds.   Abdominal:      Palpations: Abdomen is soft.   Musculoskeletal:         General: Normal range of motion.      Cervical back: Normal range of motion and neck supple.   Skin:     General: Skin is warm.      Capillary Refill: Capillary refill takes less than 2 seconds.   Neurological:      General: No focal deficit present.      Mental Status: She is alert and oriented to person, place, and time.   Psychiatric:         Mood and Affect: Mood normal.         Behavior: Behavior normal.         Assessment:     1. Malignant neoplasm of lung, unspecified laterality, unspecified part of lung    2. Type 2 diabetes mellitus with diabetic peripheral angiopathy without gangrene, with long-term current use of insulin    3. Primary hypertension    4. PAD (peripheral artery disease)    5. MVP (mitral valve prolapse)    6. Mixed hyperlipidemia     7. Occlusion of right subclavian artery    8. Pulmonary hypertension    9. Migraine without aura and without status migrainosus, not intractable    10. Vitamin D deficiency    11. Marfan's syndrome    12. Current mild episode of major depressive disorder without prior episode    13. Sciatica of left side    14. Liver fibrosis    15. DDD (degenerative disc disease), cervical          Plan   48-year-old female who had a right lower lobectomy for T1 a N0 M0 non-small-cell carcinoma of the right lower lobe is here for the 1st follow-up visit patient had a robotic assisted right lower lobectomy and mediastinal lymph node dissection she went to rehab because of her physical condition and her social situation she has been discharged from the rehab and she is getting outpatient physical therapy at this time no major complaints incisions are healing.  Continue physical therapy chest physical therapy incentive spirometry I will see her back in 2 months' time with a chest x-ray in the office          Chente Carbera MD  [facility]  [place]

## 2024-02-16 ENCOUNTER — PATIENT MESSAGE (OUTPATIENT)
Dept: PULMONOLOGY | Facility: CLINIC | Age: 49
End: 2024-02-16
Payer: MEDICAID

## 2024-02-16 ENCOUNTER — PATIENT MESSAGE (OUTPATIENT)
Dept: CARDIOTHORACIC SURGERY | Facility: CLINIC | Age: 49
End: 2024-02-16
Payer: MEDICAID

## 2024-02-20 ENCOUNTER — TELEPHONE (OUTPATIENT)
Dept: PULMONOLOGY | Facility: CLINIC | Age: 49
End: 2024-02-20
Payer: MEDICAID

## 2024-02-20 NOTE — TELEPHONE ENCOUNTER
----- Message from Kristy Davis sent at 2/20/2024  9:19 AM CST -----  Contact: [phone]  Type:  Sooner Apoointment Request    Caller is requesting a sooner appointment.  Caller declined first available appointment listed below.  Caller will not accept being placed on the waitlist and is requesting a message be sent to doctor.  Name of Caller:Jenifer   When is the first available appointment?n/a   Symptoms:nodules follow up   Would the patient rather a call back or a response via [facility]? Call back   Best Call Back Number:[phone]   Additional Information: n/a      Thanks KB

## 2024-02-21 ENCOUNTER — PATIENT MESSAGE (OUTPATIENT)
Dept: PULMONOLOGY | Facility: CLINIC | Age: 49
End: 2024-02-21
Payer: MEDICAID

## 2024-02-21 ENCOUNTER — TELEPHONE (OUTPATIENT)
Dept: PULMONOLOGY | Facility: CLINIC | Age: 49
End: 2024-02-21
Payer: MEDICAID

## 2024-02-21 ENCOUNTER — PATIENT MESSAGE (OUTPATIENT)
Dept: SPORTS MEDICINE | Facility: CLINIC | Age: 49
End: 2024-02-21
Payer: MEDICAID

## 2024-02-21 ENCOUNTER — TELEPHONE (OUTPATIENT)
Dept: CARDIOTHORACIC SURGERY | Facility: CLINIC | Age: 49
End: 2024-02-21
Payer: MEDICAID

## 2024-02-21 DIAGNOSIS — M25.512 LEFT SHOULDER PAIN, UNSPECIFIED CHRONICITY: Primary | ICD-10-CM

## 2024-02-21 DIAGNOSIS — R91.1 SOLITARY PULMONARY NODULE: Primary | ICD-10-CM

## 2024-02-21 NOTE — TELEPHONE ENCOUNTER
----- Message from Jonnathan Logan sent at 2/21/2024  1:13 PM CST -----  Contact: pt  ..Type:  Patient Returning Call    Who Called: pt   Who Left Message for Patient: nurse   Does the patient know what this is regarding?:   Would the patient rather a call back or a response via [facility]?  Call   Best Call Back Number:[phone]   Additional Information:

## 2024-02-21 NOTE — TELEPHONE ENCOUNTER
Pt is calling stating she is having dizzy spells/ and low oxygen in the 80s, needing suggestions  . Please call back at [phone] . Patient was advised that she should go to the ER if the breathing becomes more labored and uncomfortable. The patient stated this shortness of breath has become more often than usual since her surgery. The patient stated understanding with no questions or concerns.

## 2024-02-23 DIAGNOSIS — R10.9 ABDOMINAL DISCOMFORT: ICD-10-CM

## 2024-02-23 RX ORDER — DICYCLOMINE HYDROCHLORIDE 20 MG/1
20 TABLET ORAL
Qty: 90 TABLET | Refills: 2 | Status: SHIPPED | OUTPATIENT
Start: 2024-02-23 | End: 2024-05-16

## 2024-02-27 ENCOUNTER — CLINICAL SUPPORT (OUTPATIENT)
Dept: PULMONOLOGY | Facility: CLINIC | Age: 49
End: 2024-02-27
Attending: THORACIC SURGERY (CARDIOTHORACIC VASCULAR SURGERY)
Payer: MEDICAID

## 2024-02-27 ENCOUNTER — OFFICE VISIT (OUTPATIENT)
Dept: PULMONOLOGY | Facility: CLINIC | Age: 49
End: 2024-02-27
Payer: MEDICAID

## 2024-02-27 ENCOUNTER — HOSPITAL ENCOUNTER (OUTPATIENT)
Dept: RADIOLOGY | Facility: HOSPITAL | Age: 49
Discharge: HOME | End: 2024-02-27
Attending: THORACIC SURGERY (CARDIOTHORACIC VASCULAR SURGERY)
Payer: MEDICAID

## 2024-02-27 VITALS
WEIGHT: 246.94 LBS | SYSTOLIC BLOOD PRESSURE: 110 MMHG | RESPIRATION RATE: 17 BRPM | HEIGHT: 70 IN | WEIGHT: 246.94 LBS | OXYGEN SATURATION: 96 % | BODY MASS INDEX: 35.35 KG/M2 | HEART RATE: 84 BPM | DIASTOLIC BLOOD PRESSURE: 64 MMHG | BODY MASS INDEX: 35.35 KG/M2 | HEIGHT: 70 IN

## 2024-02-27 DIAGNOSIS — C34.90 MALIGNANT NEOPLASM OF LUNG, UNSPECIFIED LATERALITY, UNSPECIFIED PART OF LUNG: ICD-10-CM

## 2024-02-27 DIAGNOSIS — J41.0 SIMPLE CHRONIC BRONCHITIS: Primary | ICD-10-CM

## 2024-02-27 DIAGNOSIS — R91.1 SOLITARY PULMONARY NODULE: ICD-10-CM

## 2024-02-27 DIAGNOSIS — Z87.891 FORMER SMOKER: ICD-10-CM

## 2024-02-27 DIAGNOSIS — R06.09 DOE (DYSPNEA ON EXERTION): ICD-10-CM

## 2024-02-27 PROCEDURE — 3008F BODY MASS INDEX DOCD: CPT | Mod: CPTII,,, | Performed by: PHYSICIAN ASSISTANT

## 2024-02-27 PROCEDURE — 1159F MED LIST DOCD IN RCRD: CPT | Mod: CPTII,,, | Performed by: PHYSICIAN ASSISTANT

## 2024-02-27 PROCEDURE — 99999 PR PBB SHADOW E&M-EST. PATIENT-LVL V: CPT | Mod: PBBFAC,,, | Performed by: PHYSICIAN ASSISTANT

## 2024-02-27 PROCEDURE — 71046 X-RAY EXAM CHEST 2 VIEWS: CPT | Mod: 26,,, | Performed by: RADIOLOGY

## 2024-02-27 PROCEDURE — 3078F DIAST BP <80 MM HG: CPT | Mod: CPTII,,, | Performed by: PHYSICIAN ASSISTANT

## 2024-02-27 PROCEDURE — 94618 PULMONARY STRESS TESTING: CPT | Mod: PBBFAC

## 2024-02-27 PROCEDURE — 99999 PR PBB SHADOW E&M-EST. PATIENT-LVL I: CPT | Mod: PBBFAC,,,

## 2024-02-27 PROCEDURE — 99214 OFFICE O/P EST MOD 30 MIN: CPT | Mod: 25,S$PBB,, | Performed by: PHYSICIAN ASSISTANT

## 2024-02-27 PROCEDURE — 4010F ACE/ARB THERAPY RXD/TAKEN: CPT | Mod: CPTII,,, | Performed by: PHYSICIAN ASSISTANT

## 2024-02-27 PROCEDURE — 94618 PULMONARY STRESS TESTING: CPT | Mod: 26,S$PBB,, | Performed by: INTERNAL MEDICINE

## 2024-02-27 PROCEDURE — 99215 OFFICE O/P EST HI 40 MIN: CPT | Mod: PBBFAC,25,27 | Performed by: PHYSICIAN ASSISTANT

## 2024-02-27 PROCEDURE — 1160F RVW MEDS BY RX/DR IN RCRD: CPT | Mod: CPTII,,, | Performed by: PHYSICIAN ASSISTANT

## 2024-02-27 PROCEDURE — 99211 OFF/OP EST MAY X REQ PHY/QHP: CPT | Mod: PBBFAC,25

## 2024-02-27 PROCEDURE — 71046 X-RAY EXAM CHEST 2 VIEWS: CPT | Mod: TC

## 2024-02-27 PROCEDURE — 3074F SYST BP LT 130 MM HG: CPT | Mod: CPTII,,, | Performed by: PHYSICIAN ASSISTANT

## 2024-02-27 PROCEDURE — 3052F HG A1C>EQUAL 8.0%<EQUAL 9.0%: CPT | Mod: CPTII,,, | Performed by: PHYSICIAN ASSISTANT

## 2024-02-27 RX ORDER — TIOTROPIUM BROMIDE 18 UG/1
18 CAPSULE ORAL; RESPIRATORY (INHALATION) DAILY
Qty: 30 CAPSULE | Refills: 11 | Status: SHIPPED | OUTPATIENT
Start: 2024-02-27 | End: 2025-02-26

## 2024-02-27 NOTE — PROCEDURES
"[facility] - Pulmonary Function  Six Minute Walk     SUMMARY     Ordering Provider: Danny DURAN   Interpreting Provider: Danny DURAN  Performing nurse/tech/RT: LS RRT  Diagnosis:  (Pulmonary Nodule)  Height: 5' 10" (177.8 cm)  Weight: 112 kg (246 lb 14.6 oz)  BMI (Calculated): 35.4   Patient Race:             Phase Oxygen Assessment Supplemental O2 Heart   Rate Blood Pressure Clifford Dyspnea Scale Rating   Resting 95 % Room Air 86 bpm 123/58 2   Exercise        Minute        1 95 % Room Air 89 bpm     2 95 % Room Air 90 bpm     3 96 % Room Air 91 bpm     4 92 % Room Air 91 bpm     5 96 % Room Air 90 bpm     6  96 % Room Air 88 bpm 111/70 5-6   Recovery        Minute        1 97 % Room Air 89 bpm     2 96 % Room Air 85 bpm     3 95 % Room Air 84 bpm     4 96 % Room Air 84 bpm 110/64 4     Six Minute Walk Summary  6MWT Status: not completed  Patient Reported: Chest pain, Dyspnea, Dizziness, Lightheadedness     Interpretation:  Did the patient stop or pause?: Yes  How many times did the patient stop or pause?: 3  Stop Time 1: 60  Restart Time 1: 105  Pause Time 1: 45 seconds  Stop Time 2: 150  Restart Time 2: 180  Pause Time 2: 30 seconds  Stop Time 3: 210 seconds  Restart Time 3: 240 seconds  Pause Time 3: 30 seconds           Total Time Walked (Calculated): 255 seconds  Final Partial Lap Distance (feet): 100 feet  Total Distance Meters (Calculated): 30.48 meters  Predicted Distance Meters (Calculated): 502.46 meters  Percentage of Predicted (Calculated): 6.07  Peak VO2 (Calculated): 4.89  Mets: 1.4  Has The Patient Had a Previous Six Minute Walk Test?: Yes       Previous 6MWT Results  Has The Patient Had a Previous Six Minute Walk Test?: Yes  Date of Previous Test: 11/16/22  Total Time Walked: 251 seconds  Total Distance (meters): 129  Predicted Distance (meters): 519 meters  Percentage of Predicted: 24.9  Percent Change (Calculated): 0.76      Interpretation:  Total distance walked in six minutes is severely reduced " indicating a reduction in overall  functional capacity. The patient did not meet criteria for supplemental oxygen prescription.    Clinical correlation suggested.  [] Mild exercise-induced hypoxemia described as an arterial oxygen saturation of 93-95% (or 3-4% less than at rest),  [x] Moderate exercise-induced hypoxemia as 89-93%  [] Severe exercise induced hypoxemia as < 89% O2 saturation.  Medicare Criteria for oxygen prescription comments:   When arterial oxygen saturation is at or below 88% during exercise on room air (severe exercise induced hypoxemia) then the patient falls under Medicare Group 1 criteria for supplemental oxygen prescription.  Details about Medicare Group Criteria coverage can be found at http://www.cms.[facility].gov/manuals/downloads/     Minh Delgado MD

## 2024-02-27 NOTE — ASSESSMENT & PLAN NOTE
S/p right lobectomy  Smoking history, no obstruction on darrick 1 year ago  Will get repeat darrick  Walk today No significant desaturations requiring oxygen  Start spiriva, advised to use albuterol prn for shortness of breath  Continue incentive spirometry

## 2024-02-27 NOTE — PROGRESS NOTES
Subjective:       Patient ID: Jenifer Westfall is a 48 y.o. female.    Chief Complaint: SOB      2/27/2024  Here for SOB  Established patient, last seen by Dr Delgado for biopsy of lung  Found to be cancer; she is now s/p right lobectomy 1/2024, following with cardiothoracic surgery  Here today to see if she qualifies for oxygen  Has significant KAT  She does use incentive spirometry daily  Has albuterol but does not use daily  No other inhalers  Does have a nebulizer machine  Completed 6mwd today without significant desaturations requiring oxygen  Does have a have a smoking history; darrick 10/2023 with restriction  No cough or fever  Chest X Ray today stable    Immunization History   Administered Date(s) Administered    COVID-19, MRNA, LN-S, PF (MODERNA FULL 0.5 ML DOSE) 08/03/2021    Hepatitis B, Adult 12/22/2014, 04/02/2015    Hepatitis B, Pediatric/Adolescent 06/14/2016    Influenza 1974, 11/20/2009, 11/26/2012, 10/09/2013, 10/30/2014, 12/03/2015    Influenza - Quadrivalent 10/18/2022    Influenza - Quadrivalent - PF *Preferred* (6 months and older) 09/30/2010    Influenza - Trivalent (ADULT) 11/20/2009, 11/26/2012, 10/09/2013, 10/30/2014, 12/03/2015    Influenza - Trivalent - PF (ADULT) 09/30/2010    Influenza A (H1N1) 2009 Monovalent - IM 11/20/2009    Influenza Split 11/20/2009, 11/26/2012, 10/09/2013    Pneumococcal Conjugate - 20 Valent 05/22/2023    Pneumococcal Polysaccharide - 23 Valent 07/02/2012    Tdap 04/02/2015      Tobacco Use: Medium Risk (2/27/2024)    Patient History     Smoking Tobacco Use: Former     Smokeless Tobacco Use: Never     Passive Exposure: Not on file      Past Medical History:   Diagnosis Date    Arthritis     DM (diabetes mellitus) 2009     03/01/2017 Insulin x 3 years 2013    Hepatitis C antibody positive in blood 03/04/2021    RNA NEGATIVE 3/6/2017, 3/22/22    Hyperlipidemia     Hypertension     IBS (irritable bowel syndrome)     Kidney stone     Marfan syndrome      "Mitral valve prolapse     Sleep apnea     moild: no cpap needed      Current Outpatient Medications on File Prior to Visit   Medication Sig Dispense Refill    albuterol (PROVENTIL) 2.5 mg /3 mL (0.083 %) nebulizer solution Take 3 mLs (2.5 mg total) by nebulization every 4 to 6 hours as needed for Wheezing or Shortness of Breath. 360 mL 11    ALPRAZolam (XANAX) 0.25 MG tablet Take 0.25 mg by mouth 2 (two) times daily as needed for Anxiety.      ascorbic acid (VITAMIN C) 100 MG tablet Take 100 mg by mouth once daily.      aspirin (ECOTRIN) 81 MG EC tablet Take 81 mg by mouth every evening.      atenoloL (TENORMIN) 25 MG tablet TAKE 1 TABLET BY MOUTH ONCE DAILY (Patient taking differently: Take 25 mg by mouth every evening.) 30 tablet 11    atorvastatin (LIPITOR) 80 MG tablet TAKE 1 TABLET BY MOUTH ONCE DAILY. (Patient taking differently: Take 80 mg by mouth every evening.) 30 tablet 11    azelastine (ASTELIN) 137 mcg (0.1 %) nasal spray 2 sprays by Nasal route daily as needed for Rhinitis.      BD ULTRA-FINE AMERICA PEN NEEDLE 32 gauge x 5/32" Ndle Inject 1 each into the skin 3 (three) times daily.      busPIRone (BUSPAR) 30 MG Tab Take 30 mg by mouth 2 (two) times daily.      clopidogreL (PLAVIX) 75 mg tablet Take 1 tablet (75 mg total) by mouth once daily. (Patient taking differently: Take 75 mg by mouth every evening.) 90 tablet 2    dapagliflozin propanediol (FARXIGA) 10 mg tablet Take 1 tablet (10 mg total) by mouth once daily. 30 tablet 6    dicyclomine (BENTYL) 20 mg tablet Take 1 tablet (20 mg total) by mouth before meals as needed (abd pain). 90 tablet 2    famotidine (PEPCID) 20 MG tablet Take 1 tablet (20 mg total) by mouth 2 (two) times daily. 60 tablet 11    FLUoxetine (PROZAC) 40 MG capsule TAKE 1 CAPSULE BY MOUTH ONCE DAILY (Patient taking differently: Take 40 mg by mouth every evening.) 90 capsule 0    fluticasone propionate (FLONASE) 50 mcg/actuation nasal spray 2 sprays by Each Nostril route daily as " "needed.      gabapentin (NEURONTIN) 300 MG capsule Take 3 capsules (900 mg total) by mouth 3 (three) times daily. 270 capsule 11    glipiZIDE (GLUCOTROL) 10 MG tablet Take 1 tablet (10 mg total) by mouth 2 (two) times daily. 60 tablet 0    isosorbide mononitrate (IMDUR) 30 MG 24 hr tablet TAKE 1 TABLET (30 MG TOTAL) BY MOUTH ONCE DAILY. 30 tablet 11    LANTUS SOLOSTAR U-100 INSULIN glargine 100 units/mL SubQ pen Inject 40 Units into the skin 2 (two) times a day.      linaCLOtide (LINZESS) 145 mcg Cap capsule Take 1 capsule (145 mcg total) by mouth before breakfast. (Patient taking differently: Take 145 mcg by mouth daily as needed.) 30 capsule 2    lisinopril 10 MG tablet TAKE 1 TABLET (10 MG TOTAL) BY MOUTH ONCE DAILY. (Patient taking differently: Take 5 mg by mouth every evening.) 30 tablet 3    mometasone (ELOCON) 0.1 % solution Apply topically.      NOVOLOG FLEXPEN U-100 INSULIN 100 unit/mL (3 mL) InPn pen Inject into the skin 3 (three) times daily with meals.      omeprazole (PRILOSEC) 40 MG capsule Take 1 capsule (40 mg total) by mouth once daily. 30 capsule 11    oxyCODONE-acetaminophen (PERCOCET) 5-325 mg per tablet Take 1 tablet by mouth every 4 (four) hours as needed for Pain. 20 tablet 0    PEN NEEDLE 31 gauge x 5/16" Ndle USE 5 TIMES DAILY WITH LANTUS AND HUMALOG 100 each 3    promethazine (PHENERGAN) 25 MG tablet       SITagliptin phosphate (JANUVIA) 50 MG Tab Take 50 mg by mouth once daily. Added to chart 1/18/24      tiZANidine (ZANAFLEX) 4 MG tablet TAKE 1 TO 1 AND 1/2 TABLETS (4 TO 6MG TOTAL) BY MOUTH NIGHTLY AS NEEDED. (Patient taking differently: Take 4 mg by mouth before meals as needed.) 45 tablet 5    TRUE METRIX GLUCOSE TEST STRIP Strp       ZINC ORAL Take 1 tablet by mouth once daily. PT UNSURE OF DOSE       Current Facility-Administered Medications on File Prior to Visit   Medication Dose Route Frequency Provider Last Rate Last Admin    DOBUtamine 1000 mg in D5W 250 mL infusion  10 " "mcg/kg/min (Order-Specific) Intravenous Continuous Julisa Hansen, FNP-C 15.4 mL/hr at 01/09/24 1113 10 mcg/kg/min at 01/09/24 1113    sodium chloride 0.9% flush 10 mL  10 mL Intravenous PRN Brian Delgado MD        sodium chloride 0.9% flush 10 mL  10 mL Intravenous PRN Chente Cabrera MD        sodium chloride 0.9% flush 10 mL  10 mL Intravenous PRN Chente Cabrera MD            Review of Systems   Constitutional:  Positive for weakness. Negative for fever and fatigue.   HENT:  Negative for congestion.    Respiratory:  Positive for shortness of breath, previous hospitalization due to pulmonary problems and dyspnea on extertion. Negative for cough, hemoptysis, sputum production and wheezing.    Cardiovascular:  Negative for chest pain and leg swelling.   Musculoskeletal:  Positive for gait problem.       Objective:       Vitals:    02/27/24 1448   BP: 110/64   Pulse: 84   Resp: 17   SpO2: 96%   Weight: 112 kg (246 lb 14.6 oz)   Height: 5' 10" (1.778 m)       Physical Exam   Constitutional: She is oriented to person, place, and time. She appears well-developed and well-nourished. No distress.   HENT:   Head: Normocephalic.   Nose: Nose normal.   Mouth/Throat: Oropharynx is clear and moist.   Cardiovascular: Normal rate and regular rhythm.   Pulmonary/Chest: Effort normal. No respiratory distress. She has no wheezes. She has rhonchi. She has no rales.   Musculoskeletal:         General: No edema.      Cervical back: Normal range of motion and neck supple.   Neurological: She is alert and oriented to person, place, and time. Gait abnormal.   Skin: Skin is warm and dry.   Psychiatric: She has a normal mood and affect.   Vitals reviewed.    Personal Diagnostic Review    X-Ray Chest PA And Lateral  Narrative: EXAM:   XR CHEST PA AND LATERAL    CLINICAL HISTORY: Lung cancer.    COMPARISON: 12/02/2022.    TECHNIQUE: PA and lateral views    FINDINGS:  Status post right lobectomy with associated volume loss and " elevation right hemidiaphragm.  The left lung is clear. No pneumothorax.  The cardiac silhouette size and contour is within normal limits.  Impression: No acute findings.    Finalized on: 2/27/2024 2:42 PM By:  JONEL Hansen MD, MD  [facility]# [id number]      2024-02-27 14:44:[id number]    [facility]      Phase Oxygen Assessment Supplemental O2 Heart   Rate Blood Pressure Clifford Dyspnea Scale Rating   Resting 95 % Room Air 86 bpm 123/58 2   Exercise             Minute             1 95 % Room Air 89 bpm       2 95 % Room Air 90 bpm       3 96 % Room Air 91 bpm       4 92 % Room Air 91 bpm       5 96 % Room Air 90 bpm       6  96 % Room Air 88 bpm 111/70 5-6   Recovery             Minute             1 97 % Room Air 89 bpm       2 96 % Room Air 85 bpm       3 95 % Room Air 84 bpm       4 96 % Room Air 84 bpm 110/64 4      Six Minute Walk Summary  6MWT Status: not completed  Patient Reported: Chest pain, Dyspnea, Dizziness, Lightheadedness              Interpretation:  Did the patient stop or pause?: Yes  How many times did the patient stop or pause?: 3  Stop Time 1: 60  Restart Time 1: 105  Pause Time 1: 45 seconds  Stop Time 2: 150  Restart Time 2: 180  Pause Time 2: 30 seconds  Stop Time 3: 210 seconds  Restart Time 3: 240 seconds  Pause Time 3: 30 seconds  Total Time Walked (Calculated): 255 seconds  Final Partial Lap Distance (feet): 100 feet  Total Distance Meters (Calculated): 30.48 meters  Predicted Distance Meters (Calculated): 502.46 meters  Percentage of Predicted (Calculated): 6.07  Peak VO2 (Calculated): 4.89  Mets: 1.4  Has The Patient Had a Previous Six Minute Walk Test?: Yes     Previous 6MWT Results  Has The Patient Had a Previous Six Minute Walk Test?: Yes  Date of Previous Test: 11/16/22  Total Time Walked: 251 seconds  Total Distance (meters): 129  Predicted Distance (meters): 519 meters  Percentage of Predicted: 24.9  Percent Change (Calculated): 0.76           Assessment/Plan:       Problem List Items Addressed This  Visit          Pulmonary    Simple chronic bronchitis - Primary    Relevant Medications    tiotropium (SPIRIVA) 18 mcg inhalation capsule    Other Relevant Orders    Complete PFT with bronchodilator    Stress test, pulmonary       Cardiac/Vascular    KAT (dyspnea on exertion)     S/p right lobectomy  Smoking history, no obstruction on darrick 1 year ago  Will get repeat darrick  Walk today No significant desaturations requiring oxygen  Start spiriva, advised to use albuterol prn for shortness of breath  Continue incentive spirometry             Oncology    Malignant neoplasm of lung     S/p right lobectomy  Follows with cardiothoracic surgeon             Other    Former smoker     25 pack year history, quit 1.5 years ago with chantix            Follow up in about 4 months (around 6/27/2024) for pft, walk, follow up pulmonologist .    Discussed diagnosis, its evaluation, treatment and usual course. All questions answered.    Patient verbalized understanding of plan and left in no acute distress    Thank you for the courtesy of participating in the care of this patient    ABENA MuhammadBanner [facility]

## 2024-03-21 ENCOUNTER — PATIENT MESSAGE (OUTPATIENT)
Dept: CARDIOTHORACIC SURGERY | Facility: CLINIC | Age: 49
End: 2024-03-21
Payer: MEDICAID

## 2024-04-18 ENCOUNTER — OFFICE VISIT (OUTPATIENT)
Dept: OBSTETRICS AND GYNECOLOGY | Facility: CLINIC | Age: 49
End: 2024-04-18
Payer: MEDICAID

## 2024-04-18 VITALS
HEIGHT: 70 IN | DIASTOLIC BLOOD PRESSURE: 66 MMHG | WEIGHT: 214.94 LBS | SYSTOLIC BLOOD PRESSURE: 102 MMHG | BODY MASS INDEX: 30.77 KG/M2

## 2024-04-18 DIAGNOSIS — Z01.419 WELL WOMAN EXAM WITH ROUTINE GYNECOLOGICAL EXAM: Primary | ICD-10-CM

## 2024-04-18 PROCEDURE — 99999 PR PBB SHADOW E&M-EST. PATIENT-LVL V: CPT | Mod: PBBFAC,,, | Performed by: OBSTETRICS & GYNECOLOGY

## 2024-04-18 PROCEDURE — 3052F HG A1C>EQUAL 8.0%<EQUAL 9.0%: CPT | Mod: CPTII,,, | Performed by: OBSTETRICS & GYNECOLOGY

## 2024-04-18 PROCEDURE — 4010F ACE/ARB THERAPY RXD/TAKEN: CPT | Mod: CPTII,,, | Performed by: OBSTETRICS & GYNECOLOGY

## 2024-04-18 PROCEDURE — 3008F BODY MASS INDEX DOCD: CPT | Mod: CPTII,,, | Performed by: OBSTETRICS & GYNECOLOGY

## 2024-04-18 PROCEDURE — 1159F MED LIST DOCD IN RCRD: CPT | Mod: CPTII,,, | Performed by: OBSTETRICS & GYNECOLOGY

## 2024-04-18 PROCEDURE — 3078F DIAST BP <80 MM HG: CPT | Mod: CPTII,,, | Performed by: OBSTETRICS & GYNECOLOGY

## 2024-04-18 PROCEDURE — 3074F SYST BP LT 130 MM HG: CPT | Mod: CPTII,,, | Performed by: OBSTETRICS & GYNECOLOGY

## 2024-04-18 PROCEDURE — 1160F RVW MEDS BY RX/DR IN RCRD: CPT | Mod: CPTII,,, | Performed by: OBSTETRICS & GYNECOLOGY

## 2024-04-18 PROCEDURE — 87624 HPV HI-RISK TYP POOLED RSLT: CPT | Performed by: OBSTETRICS & GYNECOLOGY

## 2024-04-18 PROCEDURE — 99396 PREV VISIT EST AGE 40-64: CPT | Mod: S$PBB,,, | Performed by: OBSTETRICS & GYNECOLOGY

## 2024-04-18 PROCEDURE — 88175 CYTOPATH C/V AUTO FLUID REDO: CPT | Performed by: OBSTETRICS & GYNECOLOGY

## 2024-04-18 PROCEDURE — 99215 OFFICE O/P EST HI 40 MIN: CPT | Mod: PBBFAC | Performed by: OBSTETRICS & GYNECOLOGY

## 2024-04-18 NOTE — PROGRESS NOTES
Subjective     Patient ID: Jenifer Westfall is a 48 y.o. female.    Chief Complaint:  Well Woman      History of Present Illness  HPI  Annual Exam-Postmenopausal  Patient presents for annual exam. The patient has no complaints today. The patient is not sexually active. GYN screening history: last pap: approximate date  and was abnormal: ASCUS HPV Neg (see pap history below) . The patient is not taking hormone replacement therapy. Patient denies post-menopausal vaginal bleeding. The patient wears seatbelts: yes. The patient participates in regular exercise: no. Has the patient ever been transfused or tattooed?: no. The patient reports that there is not domestic violence in her life.    GYN & OB History  No LMP recorded. Patient is perimenopausal.   Date of Last Pap: 2020    OB History    Para Term  AB Living   2 2 2         SAB IAB Ectopic Multiple Live Births                  # Outcome Date GA Lbr Renato/2nd Weight Sex Type Anes PTL Lv   2 Term      CS-Unspec      1 Term      CS-Unspec          Review of Systems  Review of Systems   Constitutional:  Negative for activity change, appetite change, chills, fatigue, fever and unexpected weight change.   Respiratory:  Negative for shortness of breath.    Cardiovascular:  Negative for chest pain, palpitations and leg swelling.   Gastrointestinal:  Positive for constipation and diarrhea. Negative for abdominal pain, bloating, blood in stool, nausea and vomiting.   Genitourinary:  Negative for dysuria, flank pain, frequency, genital sores, hematuria, hot flashes, pelvic pain, urgency, vaginal bleeding, vaginal discharge, vaginal pain, urinary incontinence, postmenopausal bleeding, vaginal dryness and vaginal odor.   Musculoskeletal:  Positive for arthralgias and back pain.   Integumentary:  Negative for breast mass, nipple discharge, breast skin changes and breast tenderness.   Neurological:  Negative for syncope and headaches.   Breast: Negative for  asymmetry, lump, mass, mastodynia, nipple discharge, skin changes and tenderness         Objective   Physical Exam:   Constitutional: She is oriented to person, place, and time. She appears well-developed and well-nourished. No distress.    HENT:   Head: Normocephalic and atraumatic.    Eyes: Pupils are equal, round, and reactive to light. EOM are normal.     Cardiovascular:  Normal rate, regular rhythm and normal heart sounds.             Pulmonary/Chest: Effort normal and breath sounds normal.        Abdominal: Soft. Bowel sounds are normal. She exhibits no distension. There is no abdominal tenderness.     Genitourinary:    Vagina, uterus, right adnexa and left adnexa normal.      Pelvic exam was performed with patient supine.   There is no rash, tenderness, lesion or injury on the right labia. There is no rash, tenderness, lesion or injury on the left labia. Cervix is normal. Right adnexum displays no mass, no tenderness and no fullness. Left adnexum displays no mass, no tenderness and no fullness. No erythema, vaginal discharge, tenderness or bleeding in the vagina.    No foreign body in the vagina.      No signs of injury in the vagina.   Cervix exhibits no motion tenderness, no discharge and no friability. Uterus is not deviated, not enlarged and not tender.           Musculoskeletal: Normal range of motion and moves all extremeties. No tenderness or edema.       Neurological: She is alert and oriented to person, place, and time.    Skin: Skin is warm and dry.    Psychiatric: She has a normal mood and affect. Her behavior is normal. Thought content normal.      Pap history:  12/20 - ASCUS HPV neg  10/19 - AI 2  07/19 - LSIL HPV+  02/16 - NILM        Assessment and Plan     1. Well woman exam with routine gynecological exam           Plan:  Well woman exam with routine gynecological exam  -     Liquid-Based Pap Smear, Screening  -     HPV High Risk Genotypes, PCR  -     Mammo Digital Screening Bilat; Future;  Expected date: 04/18/2024  -     Pt was counseled on cervical/vaginal screening guidelines and recommendations.  See pap history above.  If today's pap smear result is negative, next pap smear will be due in 1 yr.  -     Pt was advised on current breast cancer screening recommendations.  Pt desires to proceed with screening MMG.  -     Follow up with PCP for routine health maintenance needs.      Follow up in about 1 year (around 4/18/2025).

## 2024-04-22 ENCOUNTER — TELEPHONE (OUTPATIENT)
Dept: NEUROLOGY | Facility: CLINIC | Age: 49
End: 2024-04-22
Payer: MEDICAID

## 2024-04-22 NOTE — TELEPHONE ENCOUNTER
----- Message from Soha Clemons sent at 4/22/2024 12:02 PM CDT -----  Contact: Jenifer Turcios is calling in regards to appt. Reports being unable to make today's appt and is needing to be contacted at [phone] to reschedule.

## 2024-04-25 ENCOUNTER — HOSPITAL ENCOUNTER (OUTPATIENT)
Dept: RADIOLOGY | Facility: HOSPITAL | Age: 49
Discharge: HOME | End: 2024-04-25
Attending: OBSTETRICS & GYNECOLOGY
Payer: MEDICAID

## 2024-04-25 DIAGNOSIS — Z01.419 WELL WOMAN EXAM WITH ROUTINE GYNECOLOGICAL EXAM: ICD-10-CM

## 2024-04-25 PROCEDURE — 77067 SCR MAMMO BI INCL CAD: CPT | Mod: TC

## 2024-04-25 PROCEDURE — 77063 BREAST TOMOSYNTHESIS BI: CPT | Mod: 26,,, | Performed by: RADIOLOGY

## 2024-04-25 PROCEDURE — 77067 SCR MAMMO BI INCL CAD: CPT | Mod: 26,,, | Performed by: RADIOLOGY

## 2024-04-26 LAB
FINAL PATHOLOGIC DIAGNOSIS: NORMAL
HPV HR 12 DNA SPEC QL NAA+PROBE: NEGATIVE
HPV16 AG SPEC QL: NEGATIVE
HPV18 DNA SPEC QL NAA+PROBE: NEGATIVE
Lab: NORMAL

## 2024-05-13 ENCOUNTER — TELEPHONE (OUTPATIENT)
Dept: SPORTS MEDICINE | Facility: CLINIC | Age: 49
End: 2024-05-13
Payer: MEDICAID

## 2024-05-13 ENCOUNTER — TELEPHONE (OUTPATIENT)
Dept: GASTROENTEROLOGY | Facility: CLINIC | Age: 49
End: 2024-05-13

## 2024-05-13 NOTE — TELEPHONE ENCOUNTER
----- Message from Juli Jean sent at 5/13/2024  2:30 PM CDT -----  Regarding: RX REfill  Contact: Jenifer  .Type:  RX Refill Request    Who Called: Jenifer   Refill or New Rx: refill   RX Name and Strength:  famotidine (PEPCID) 20 MG tablet           How is the patient currently taking it? (ex. 1XDay):Take 1 tablet (20 mg total) by mouth 2 (two) times daily. - Oral  Is this a 30 day or 90 day RX: 30   Preferred Pharmacy with phone number:   [facility] -   [address]  [facility] [address]  Phone: [phone] Fax: [phone]     Local or Mail Order: local   Ordering Provider: JONEL Garcia   Would the patient rather a call back or a response via [facility]? call  Best Call Back Number: [phone]  Additional Information:   Thi s was sent with 11 refills but the patient says they have requested a refill and has not been

## 2024-05-13 NOTE — TELEPHONE ENCOUNTER
Refills requested on Pepcid 20 mg BID; last refill given on 05/02/23 04/10/23 60 tabs with 11 refills; last office visit on 05/02/23 dread Hardwick

## 2024-05-16 ENCOUNTER — PATIENT MESSAGE (OUTPATIENT)
Dept: SPORTS MEDICINE | Facility: CLINIC | Age: 49
End: 2024-05-16
Payer: MEDICAID

## 2024-05-16 DIAGNOSIS — R10.9 ABDOMINAL DISCOMFORT: ICD-10-CM

## 2024-05-16 RX ORDER — DICYCLOMINE HYDROCHLORIDE 20 MG/1
20 TABLET ORAL
Qty: 90 TABLET | Refills: 2 | Status: SHIPPED | OUTPATIENT
Start: 2024-05-16

## 2024-05-19 ENCOUNTER — PATIENT MESSAGE (OUTPATIENT)
Dept: GASTROENTEROLOGY | Facility: CLINIC | Age: 49
End: 2024-05-19
Payer: MEDICAID

## 2024-05-19 NOTE — PROGRESS NOTES
Orthopaedics Sports Medicine     Shoulder Initial Visit         2024    Referring MD: No ref. provider found    Chief Complaint   Patient presents with    Left Shoulder - Pain         History of Present Illness:   Jenifer Westfall is a 48 y.o. right-hand dominant female who presents with left shoulder pain and dysfunction. She has previously been treated by me for right shoulder rotator cuff tear.     Onset of the symptoms was approximately 1 year ago.      Inciting event: No specific injury or trauma, gradual onset and worsening of symptoms.      Current symptoms include left shoulder pain that she describes as constant throbbing. She rates the pain an 8/10.      Pain is aggravated by lifting, reaching, weight bearing through left arm      Evaluation to date: X-Ray,      Treatment to date: Rest, activity modification     Past Medical History:   Past Medical History:   Diagnosis Date    Arthritis     DM (diabetes mellitus)      2017 Insulin x 3 years     Hepatitis C antibody positive in blood 2021    RNA NEGATIVE 3/6/2017, 3/22/22    Hyperlipidemia     Hypertension     IBS (irritable bowel syndrome)     Kidney stone     Marfan syndrome     Mitral valve prolapse     Sleep apnea     moild: no cpap needed       Past Surgical History:   Past Surgical History:   Procedure Laterality Date    ANGIOGRAPHY OF LOWER EXTREMITY N/A 2018    Procedure: ANGIOGRAM, LOWER EXTREMITY- LEFT LEG;  Surgeon: Roscoe Landeros MD;  Location: [facility] CATH LAB;  Service: Peripheral Vascular;  Laterality: N/A;    APPENDECTOMY      BUNIONECTOMY      both feet    BYPASS GRAFT Bilateral     cataract surgery  2019     SECTION      x 2    CHOLECYSTECTOMY      COLONOSCOPY N/A 2020    Procedure: COLONOSCOPY w/ biopsies;  Surgeon: Gio Georges MD;  Location: [facility] ENDO;  Service: Endoscopy;  Laterality: N/A;    ESOPHAGEAL MANOMETRY WITH MEASUREMENT OF IMPEDANCE N/A 2023    Procedure:  MANOMETRY, ESOPHAGUS, WITH IMPEDANCE MEASUREMENT;  Surgeon: Desmond Calderon RN;  Location: [facility] ENDO;  Service: Endoscopy;  Laterality: N/A;    ESOPHAGOGASTRODUODENOSCOPY N/A 07/31/2019    Procedure: ESOPHAGOGASTRODUODENOSCOPY (EGD);  Surgeon: Jaron Sanders III, MD;  Location: [facility] ENDO;  Service: Endoscopy;  Laterality: N/A;    ESOPHAGOGASTRODUODENOSCOPY N/A 04/22/2022    Procedure: EGD (ESOPHAGOGASTRODUODENOSCOPY);  Surgeon: Keith Hardwick MD;  Location: [facility];  Service: Gastroenterology;  Laterality: N/A;    INJECTION OF ANESTHETIC AGENT AROUND MULTIPLE INTERCOSTAL NERVES Right 1/19/2024    Procedure: BLOCK, NERVE, INTERCOSTAL, 2 OR MORE;  Surgeon: Chente Cabrera MD;  Location: [facility];  Service: Cardiothoracic;  Laterality: Right;    LOBECTOMY-ROBOTIC Right 1/19/2024    Procedure: LOBECTOMY-ROBOTIC;  Surgeon: Chente Cabrera MD;  Location: [facility];  Service: Cardiothoracic;  Laterality: Right;  Lower Lobe    ROBOT-ASSISTED LAPAROSCOPIC LYMPHADENECTOMY USING DA GOMEZ XI Right 1/19/2024    Procedure: XI ROBOTIC LYMPHADENECTOMY;  Surgeon: Chente Cabrera MD;  Location: [facility];  Service: Cardiothoracic;  Laterality: Right;  Robotic mediastinal lymph node dissection    SELECTIVE INJECTION OF ANESTHETIC AGENT AROUND LUMBAR SPINAL NERVE ROOT BY TRANSFORAMINAL APPROACH Bilateral 11/23/2020    Procedure: Bilateral L5/S1 TF DENY;  Surgeon: Jewel Walters MD;  Location: [facility] PAIN MGT;  Service: Pain Management;  Laterality: Bilateral;    TUBAL LIGATION      VATS, ROBOT-ASSISTED, OR ROBOT-ASSISTED THORACOTOMY Right 1/19/2024    Procedure: VATS, ROBOT-ASSISTED, OR ROBOT-ASSISTED THORACOTOMY;  Surgeon: Chente Cabrera MD;  Location: [facility];  Service: Cardiothoracic;  Laterality: Right;  Robot assisted right lower lobectomy and mediastinal lymph node dissection       Medications:  Patient's Medications   New Prescriptions    No medications on file   Previous Medications    ALBUTEROL (PROVENTIL) 2.5 MG  "/3 ML (0.083 %) NEBULIZER SOLUTION    Take 3 mLs (2.5 mg total) by nebulization every 4 to 6 hours as needed for Wheezing or Shortness of Breath.    ALPRAZOLAM (XANAX) 0.25 MG TABLET    Take 0.25 mg by mouth 2 (two) times daily as needed for Anxiety.    ASCORBIC ACID (VITAMIN C) 100 MG TABLET    Take 100 mg by mouth once daily.    ASPIRIN (ECOTRIN) 81 MG EC TABLET    Take 81 mg by mouth every evening.    ATENOLOL (TENORMIN) 25 MG TABLET    TAKE 1 TABLET BY MOUTH ONCE DAILY    ATORVASTATIN (LIPITOR) 80 MG TABLET    TAKE 1 TABLET BY MOUTH ONCE DAILY.    AZELASTINE (ASTELIN) 137 MCG (0.1 %) NASAL SPRAY    2 sprays by Nasal route daily as needed for Rhinitis.    BD ULTRA-FINE AMERICA PEN NEEDLE 32 GAUGE X 5/32" NDLE    Inject 1 each into the skin 3 (three) times daily.    BUSPIRONE (BUSPAR) 30 MG TAB    Take 30 mg by mouth 2 (two) times daily.    CLOPIDOGREL (PLAVIX) 75 MG TABLET    Take 1 tablet (75 mg total) by mouth once daily.    DAPAGLIFLOZIN PROPANEDIOL (FARXIGA) 10 MG TABLET    Take 1 tablet (10 mg total) by mouth once daily.    DICYCLOMINE (BENTYL) 20 MG TABLET    TAKE 1 TABLET (20 MG TOTAL) BY MOUTH BEFORE MEALS AS NEEDED (ABD PAIN).    FAMOTIDINE (PEPCID) 20 MG TABLET    Take 1 tablet (20 mg total) by mouth 2 (two) times daily.    FLUOXETINE (PROZAC) 40 MG CAPSULE    TAKE 1 CAPSULE BY MOUTH ONCE DAILY    FLUTICASONE PROPIONATE (FLONASE) 50 MCG/ACTUATION NASAL SPRAY    2 sprays by Each Nostril route daily as needed.    GABAPENTIN (NEURONTIN) 300 MG CAPSULE    Take 3 capsules (900 mg total) by mouth 3 (three) times daily.    GLIPIZIDE (GLUCOTROL) 10 MG TABLET    Take 1 tablet (10 mg total) by mouth 2 (two) times daily.    ISOSORBIDE MONONITRATE (IMDUR) 30 MG 24 HR TABLET    TAKE 1 TABLET (30 MG TOTAL) BY MOUTH ONCE DAILY.    LANTUS SOLOSTAR U-100 INSULIN GLARGINE 100 UNITS/ML SUBQ PEN    Inject 40 Units into the skin 2 (two) times a day.    LINACLOTIDE (LINZESS) 145 MCG CAP CAPSULE    Take 1 capsule (145 mcg total) " "by mouth before breakfast.    LISINOPRIL 10 MG TABLET    TAKE 1 TABLET (10 MG TOTAL) BY MOUTH ONCE DAILY.    MOMETASONE (ELOCON) 0.1 % SOLUTION    Apply topically.    NOVOLOG FLEXPEN U-100 INSULIN 100 UNIT/ML (3 ML) INPN PEN    Inject into the skin 3 (three) times daily with meals.    OMEPRAZOLE (PRILOSEC) 40 MG CAPSULE    Take 1 capsule (40 mg total) by mouth once daily.    OXYCODONE-ACETAMINOPHEN (PERCOCET) 5-325 MG PER TABLET    Take 1 tablet by mouth every 4 (four) hours as needed for Pain.    PEN NEEDLE 31 GAUGE X 5/16" NDLE    USE 5 TIMES DAILY WITH LANTUS AND HUMALOG    PROMETHAZINE (PHENERGAN) 25 MG TABLET        SITAGLIPTIN PHOSPHATE (JANUVIA) 50 MG TAB    Take 50 mg by mouth once daily. Added to chart 1/18/24    TIOTROPIUM (SPIRIVA) 18 MCG INHALATION CAPSULE    Inhale 1 capsule (18 mcg total) into the lungs once daily. Controller    TIZANIDINE (ZANAFLEX) 4 MG TABLET    TAKE 1 TO 1 AND 1/2 TABLETS (4 TO 6MG TOTAL) BY MOUTH NIGHTLY AS NEEDED.    TRUE METRIX GLUCOSE TEST STRIP STRP        ZINC ORAL    Take 1 tablet by mouth once daily. PT UNSURE OF DOSE   Modified Medications    No medications on file   Discontinued Medications    No medications on file       Allergies:   Review of patient's allergies indicates:   Allergen Reactions    Compazine [prochlorperazine edisylate] Rash    Contrast media Anaphylaxis    Dye Anaphylaxis     Contrast dye    Iodinated contrast media Anaphylaxis     Other reaction(s): anaphylaxis    Levaquin [levofloxacin] Hives and Itching    Morphine Other (See Comments)     Irritable  Iritability  Irritable    Prochlorperazine Rash    Metformin Other (See Comments)     Upset stomach    Ibuprofen Other (See Comments) and Nausea And Vomiting     Stomach pain  Stomach pain    Abdominal pain     Methocarbamol Nausea Only       Social History:   Home town: [place], [place]  Alcohol use: She reports no history of alcohol use.  Tobacco use: She reports that she quit smoking about 4 years " "ago. Her smoking use included cigarettes. She started smoking about 28 years ago. She has a 24 pack-year smoking history. She has never used smokeless tobacco.      Physical Examination:  Estimated body mass index is 30.84 kg/m² as calculated from the following:    Height as of this encounter: 5' 10" (1.778 m).    Weight as of this encounter: 97.5 kg (214 lb 15.2 oz).    General  Healthy appearing female in no acute distress  Alert and oriented, normal mood, appropriate affect    Shoulder Examination:  Patient is alert and oriented, no distress. Skin is intact. Neuro is normal with no focal motor or sensory findings.    Cervical exam is unremarkable. Intact cervical ROM. Negative Spurling's test    Physical Exam:  RIGHT    LEFT    Scap Dyskinesis/Winging (-)    (-)    Tenderness:          Greater Tuberosity             (-)    +  Bicipital Groove  (-)    (-)  AC joint   (-)    (-)  Other:     ROM:  Forward Elevation 150    160  Abduction  100    100  ER (at side)  50    60      Strength:   Supraspinatus  4/5    4/5  Infraspinatus  5/5    5/5  Subscap / IR  5/5    5/5     Special Tests:   Neer:    (-)    +   Gordon:   (-)    +   SS Stress:   (-)    +   Bear Hug:   (-)    (-)   Freedom's:   (-)    (-)   Resisted Thrower's:   (-)    (-)   Cross Arm Abduction:  (-)    (-)    Neurovascular examination  - Motor grossly intact bilaterally to shoulder abduction, elbow flexion and extension, wrist flexion and extension, and intrinsic hand musculature  - Sensation intact to light touch bilaterally in axillary, median, radial, and ulnar distributions  - Symmetrical radial pulses      Imaging:  XR Results:  X-Ray Shoulder 2 or More Views Left  Narrative: EXAM:  XR SHOULDER COMPLETE 2 OR MORE VIEWS LEFT    CLINICAL HISTORY:  Pain left shoulder.    4 views left shoulder.    FINDINGS: Minimal spurring of the glenoid and inferior humeral head.  Acromioclavicular joint space is normal.  No fracture or dislocation.  Visualized left " lung is clear.  Impression:  Minimal arthritic change of the left glenohumeral joint space.  No acute findings.    Finalized on: 5/22/2024 2:41 PM By:  William Ohara MD  R# [id number]      2024-05-22 14:43:[id number]    [facility]          MRI Results:  No results found for this or any previous visit.      CT Results:  No results found for this or any previous visit.      Physician Read: I agree with the above impression.      Impression:  48 y.o. female with left shoulder greater tuberosity  degenerative changes, suspected rotator cuff tear      Plan:  Discussed diagnosis and treatment options with patient today. Her symptoms and physical exam are most consistent with left shoulder rotator cuff tear arthropathy.   Discussed non-operative treatment options in the form of rest, activity modifications, oral anti-inflammatories, corticosteroid injections, and physical therapy/physician directed home exercise program.   I recommend proceeding with right shoulder corticosteroid injection into the subacromial space. The patient is in agreement with this plan.   Procedure performed today and patient tolerated the procedure well with no immediate complications.   Follow up with me as needed.            All Jain MD    I, Shahzad Ravi, acted as a scribe for All Jain MD for the duration of this office visit.

## 2024-05-22 ENCOUNTER — OFFICE VISIT (OUTPATIENT)
Dept: SPORTS MEDICINE | Facility: CLINIC | Age: 49
End: 2024-05-22
Payer: MEDICAID

## 2024-05-22 ENCOUNTER — HOSPITAL ENCOUNTER (OUTPATIENT)
Dept: RADIOLOGY | Facility: HOSPITAL | Age: 49
Discharge: HOME | End: 2024-05-22
Attending: STUDENT IN AN ORGANIZED HEALTH CARE EDUCATION/TRAINING PROGRAM
Payer: MEDICAID

## 2024-05-22 VITALS — HEIGHT: 70 IN | WEIGHT: 214.94 LBS | BODY MASS INDEX: 30.77 KG/M2 | RESPIRATION RATE: 17 BRPM

## 2024-05-22 DIAGNOSIS — M75.102 ROTATOR CUFF TEAR ARTHROPATHY OF LEFT SHOULDER: Primary | ICD-10-CM

## 2024-05-22 DIAGNOSIS — M12.812 ROTATOR CUFF TEAR ARTHROPATHY OF LEFT SHOULDER: Primary | ICD-10-CM

## 2024-05-22 DIAGNOSIS — M25.512 LEFT SHOULDER PAIN, UNSPECIFIED CHRONICITY: ICD-10-CM

## 2024-05-22 PROCEDURE — 3008F BODY MASS INDEX DOCD: CPT | Mod: CPTII,,, | Performed by: STUDENT IN AN ORGANIZED HEALTH CARE EDUCATION/TRAINING PROGRAM

## 2024-05-22 PROCEDURE — 99214 OFFICE O/P EST MOD 30 MIN: CPT | Mod: S$PBB,25,, | Performed by: STUDENT IN AN ORGANIZED HEALTH CARE EDUCATION/TRAINING PROGRAM

## 2024-05-22 PROCEDURE — 99999 PR PBB SHADOW E&M-EST. PATIENT-LVL IV: CPT | Mod: PBBFAC,,, | Performed by: STUDENT IN AN ORGANIZED HEALTH CARE EDUCATION/TRAINING PROGRAM

## 2024-05-22 PROCEDURE — 1160F RVW MEDS BY RX/DR IN RCRD: CPT | Mod: CPTII,,, | Performed by: STUDENT IN AN ORGANIZED HEALTH CARE EDUCATION/TRAINING PROGRAM

## 2024-05-22 PROCEDURE — 4010F ACE/ARB THERAPY RXD/TAKEN: CPT | Mod: CPTII,,, | Performed by: STUDENT IN AN ORGANIZED HEALTH CARE EDUCATION/TRAINING PROGRAM

## 2024-05-22 PROCEDURE — 3052F HG A1C>EQUAL 8.0%<EQUAL 9.0%: CPT | Mod: CPTII,,, | Performed by: STUDENT IN AN ORGANIZED HEALTH CARE EDUCATION/TRAINING PROGRAM

## 2024-05-22 PROCEDURE — 20610 DRAIN/INJ JOINT/BURSA W/O US: CPT | Mod: PBBFAC,LT | Performed by: STUDENT IN AN ORGANIZED HEALTH CARE EDUCATION/TRAINING PROGRAM

## 2024-05-22 PROCEDURE — 99214 OFFICE O/P EST MOD 30 MIN: CPT | Mod: PBBFAC,25 | Performed by: STUDENT IN AN ORGANIZED HEALTH CARE EDUCATION/TRAINING PROGRAM

## 2024-05-22 PROCEDURE — 73030 X-RAY EXAM OF SHOULDER: CPT | Mod: TC,LT

## 2024-05-22 PROCEDURE — 73030 X-RAY EXAM OF SHOULDER: CPT | Mod: 26,LT,, | Performed by: RADIOLOGY

## 2024-05-22 PROCEDURE — 1159F MED LIST DOCD IN RCRD: CPT | Mod: CPTII,,, | Performed by: STUDENT IN AN ORGANIZED HEALTH CARE EDUCATION/TRAINING PROGRAM

## 2024-05-22 PROCEDURE — 99999PBSHW PR PBB SHADOW TECHNICAL ONLY FILED TO HB: Mod: PBBFAC,,,

## 2024-05-22 RX ADMIN — TRIAMCINOLONE ACETONIDE 40 MG: 200 INJECTION, SUSPENSION INTRA-ARTICULAR; INTRAMUSCULAR at 02:05

## 2024-05-23 ENCOUNTER — PATIENT MESSAGE (OUTPATIENT)
Dept: GASTROENTEROLOGY | Facility: CLINIC | Age: 49
End: 2024-05-23
Payer: MEDICAID

## 2024-05-23 DIAGNOSIS — R07.89 ATYPICAL CHEST PAIN: ICD-10-CM

## 2024-05-23 RX ORDER — FAMOTIDINE 20 MG/1
20 TABLET, FILM COATED ORAL 2 TIMES DAILY
Qty: 60 TABLET | Refills: 11 | Status: SHIPPED | OUTPATIENT
Start: 2024-05-23

## 2024-05-23 RX ORDER — ISOSORBIDE MONONITRATE 30 MG/1
30 TABLET, EXTENDED RELEASE ORAL DAILY
Qty: 30 TABLET | Refills: 11 | Status: SHIPPED | OUTPATIENT
Start: 2024-05-23 | End: 2025-05-23

## 2024-05-24 RX ORDER — TRIAMCINOLONE ACETONIDE 40 MG/ML
40 INJECTION, SUSPENSION INTRA-ARTICULAR; INTRAMUSCULAR
Status: DISCONTINUED | OUTPATIENT
Start: 2024-05-22 | End: 2024-05-24 | Stop reason: HOSPADM

## 2024-05-24 NOTE — PROCEDURES
Large Joint Aspiration/Injection: L subacromial bursa    Date/Time: 5/22/2024 2:45 PM    Performed by: All Jain MD  Authorized by: All Jain MD    Consent Done?:  Yes (Verbal)  Indications:  Pain  Site marked: the procedure site was marked    Timeout: prior to procedure the correct patient, procedure, and site was verified    Prep: patient was prepped and draped in usual sterile fashion      Local anesthesia used?: Yes    Anesthesia:  Local infiltration  Local anesthetic:  Lidocaine 1% without epinephrine    Details:  Needle Size:  22 G  Ultrasonic Guidance for needle placement?: No    Approach:  Posterior  Location:  Shoulder  Site:  L subacromial bursa  Medications:  40 mg triamcinolone acetonide 40 mg/mL  Patient tolerance:  Patient tolerated the procedure well with no immediate complications

## 2024-05-27 ENCOUNTER — PATIENT MESSAGE (OUTPATIENT)
Dept: NEUROLOGY | Facility: CLINIC | Age: 49
End: 2024-05-27
Payer: MEDICAID

## 2024-05-29 ENCOUNTER — PATIENT MESSAGE (OUTPATIENT)
Dept: SPORTS MEDICINE | Facility: CLINIC | Age: 49
End: 2024-05-29
Payer: MEDICAID

## 2024-06-06 ENCOUNTER — PATIENT MESSAGE (OUTPATIENT)
Dept: GASTROENTEROLOGY | Facility: CLINIC | Age: 49
End: 2024-06-06
Payer: MEDICAID

## 2024-06-13 ENCOUNTER — PATIENT MESSAGE (OUTPATIENT)
Dept: GASTROENTEROLOGY | Facility: CLINIC | Age: 49
End: 2024-06-13
Payer: MEDICAID

## 2024-06-13 RX ORDER — ATORVASTATIN CALCIUM 80 MG/1
80 TABLET, FILM COATED ORAL DAILY
Qty: 30 TABLET | Refills: 11 | Status: SHIPPED | OUTPATIENT
Start: 2024-06-13

## 2024-06-14 RX ORDER — PROMETHAZINE HYDROCHLORIDE 25 MG/1
25 TABLET ORAL EVERY 6 HOURS PRN
Qty: 90 TABLET | Refills: 0 | OUTPATIENT
Start: 2024-06-14

## 2024-06-26 ENCOUNTER — PATIENT MESSAGE (OUTPATIENT)
Dept: GASTROENTEROLOGY | Facility: CLINIC | Age: 49
End: 2024-06-26
Payer: MEDICAID

## 2024-06-26 DIAGNOSIS — R20.0 NUMBNESS AND TINGLING: ICD-10-CM

## 2024-06-26 DIAGNOSIS — R20.2 NUMBNESS AND TINGLING: ICD-10-CM

## 2024-06-26 RX ORDER — GABAPENTIN 300 MG/1
900 CAPSULE ORAL 3 TIMES DAILY
Qty: 270 CAPSULE | Refills: 11 | OUTPATIENT
Start: 2024-06-26

## 2024-06-26 RX ORDER — GABAPENTIN 300 MG/1
900 CAPSULE ORAL 3 TIMES DAILY
Qty: 270 CAPSULE | Refills: 11 | Status: SHIPPED | OUTPATIENT
Start: 2024-06-26

## 2024-06-27 ENCOUNTER — PATIENT MESSAGE (OUTPATIENT)
Dept: NEUROLOGY | Facility: CLINIC | Age: 49
End: 2024-06-27
Payer: MEDICAID

## 2024-06-27 RX ORDER — OMEPRAZOLE 40 MG/1
40 CAPSULE, DELAYED RELEASE ORAL DAILY
Qty: 30 CAPSULE | Refills: 11 | Status: SHIPPED | OUTPATIENT
Start: 2024-06-27 | End: 2025-06-27

## 2024-06-27 NOTE — TELEPHONE ENCOUNTER
Can you please send this medication into the pharmacy. Pt is scheduled 9/24 to see you but she would like medicine sent in until appointment. Please send attached medication  to pharmacy. Pt was advised she need to keep scheduled appointment.

## 2024-07-17 ENCOUNTER — PATIENT MESSAGE (OUTPATIENT)
Dept: OBSTETRICS AND GYNECOLOGY | Facility: CLINIC | Age: 49
End: 2024-07-17
Payer: MEDICAID

## 2024-07-17 ENCOUNTER — PATIENT MESSAGE (OUTPATIENT)
Dept: CARDIOLOGY | Facility: CLINIC | Age: 49
End: 2024-07-17
Payer: MEDICAID

## 2024-07-17 ENCOUNTER — E-VISIT (OUTPATIENT)
Dept: OBSTETRICS AND GYNECOLOGY | Facility: CLINIC | Age: 49
End: 2024-07-17
Payer: MEDICAID

## 2024-07-17 DIAGNOSIS — N89.8 VAGINAL DISCHARGE: Primary | ICD-10-CM

## 2024-07-17 RX ORDER — METRONIDAZOLE 500 MG/1
500 TABLET ORAL 2 TIMES DAILY
Qty: 14 TABLET | Refills: 0 | Status: SHIPPED | OUTPATIENT
Start: 2024-07-17 | End: 2024-07-24

## 2024-07-17 RX ORDER — FLUCONAZOLE 150 MG/1
150 TABLET ORAL ONCE
Qty: 1 TABLET | Refills: 0 | Status: SHIPPED | OUTPATIENT
Start: 2024-07-17 | End: 2024-07-17

## 2024-07-17 NOTE — PROGRESS NOTES
Patient ID: Jenifer Westfall is a 48 y.o. female.    Chief Complaint: Vaginal Discharge (Entered automatically based on patient selection in [employer].)    The patient initiated a request through [employer] on 7/17/2024 for evaluation and management with a chief complaint of Vaginal Discharge (Entered automatically based on patient selection in [employer].)     I evaluated the questionnaire submission on 07/17/2024.    [facility] Peq Evisit Vaginal Discharge    7/17/2024  2:49 PM CDT - Filed by Patient   Do you agree to participate in an E-Visit? Yes   If you have any of the following symptoms,  please do not complete an E-Visit,  schedule an appointment with your provider: I acknowledge   Choose the state of your primary residence [place]   Are you pregnant, could you be pregnant, or are you breast feeding? None of the above   What is the main issue you would like addressed today? Yeast infection itching around the vagina area   Which of the following are you experiencing? Vaginal Itching    Are you having pain while passing urine? No, I have no pain while urinating.   Which of the following applies to your vaginal discharge? I have no discharge.    Which of the following are you experiencing? None of the above   Do you have any sores on your genitals? No    Have you taken antibiotics recently? I have not been on any antibiotics    Do you use any of the following? None of the above   Which of the following applies to your menstrual period? I do not have menstrual periods   Have you had similar symptoms in the past? Yes, I have had similar symptoms once before.   When you had similar symptoms in the past, did any of the following work? Pills for yeast infection   Have you had a fever? No   During the last 2 months, have you had sexual contact with a specific person for the first time? No   Provide any additional information you feel is important. Vaginal itching and burning   Please attach any relevant images or files    Are you  able to take your vital signs? Yes   Systolic Blood Pressure:    Diastolic Blood Pressure:    Weight: 210   Height:    Pulse:    Temperature:    Respiration rate:    Pulse Oxygen:          Encounter Diagnosis   Name Primary?    Vaginal discharge Yes        No orders of the defined types were placed in this encounter.     Medications Ordered This Encounter   Medications    fluconazole (DIFLUCAN) 150 MG Tab     Sig: Take 1 tablet (150 mg total) by mouth once. for 1 dose     Dispense:  1 tablet     Refill:  0    metroNIDAZOLE (FLAGYL) 500 MG tablet     Sig: Take 1 tablet (500 mg total) by mouth 2 (two) times daily. for 7 days     Dispense:  14 tablet     Refill:  0        Follow up if symptoms worsen or fail to improve.      E-Visit Time Tracking:    Day 1 Time (in minutes): 10    Total Time (in minutes): 10

## 2024-07-23 ENCOUNTER — PATIENT MESSAGE (OUTPATIENT)
Dept: CARDIOLOGY | Facility: CLINIC | Age: 49
End: 2024-07-23
Payer: MEDICAID

## 2024-07-23 ENCOUNTER — TELEPHONE (OUTPATIENT)
Dept: NEUROLOGY | Facility: CLINIC | Age: 49
End: 2024-07-23
Payer: MEDICAID

## 2024-07-23 NOTE — TELEPHONE ENCOUNTER
Spoke with patient in regards to her appt. Stated she was going give us a call to see about her transportation. I advised her just to give us a call before the times gets closer if she would like to switch over to virtual appt. Patient did verbalize understanding.

## 2024-07-23 NOTE — TELEPHONE ENCOUNTER
----- Message from Nicki Quinones sent at 7/23/2024 10:16 AM CDT -----  Contact: Jenifer [phone]  Would like to receive medical advice.    Would they like a call back or a response via [facility]:  call back    Additional information:  Jenifer is calling to say that Transportation seems to be running late and would like to know if the provider can push the appt back a little after 11am. Pt states she does not want to miss this appt today. Jenifer states if transportation does come soon she will call back if she has not heard back from anyone in the office. Please call Jenifer back for advice

## 2024-07-24 ENCOUNTER — PATIENT MESSAGE (OUTPATIENT)
Dept: HEPATOLOGY | Facility: CLINIC | Age: 49
End: 2024-07-24
Payer: MEDICAID

## 2024-07-24 ENCOUNTER — HOSPITAL ENCOUNTER (OUTPATIENT)
Dept: CARDIOLOGY | Facility: HOSPITAL | Age: 49
Discharge: HOME | End: 2024-07-24
Payer: MEDICAID

## 2024-07-24 ENCOUNTER — OFFICE VISIT (OUTPATIENT)
Dept: CARDIOLOGY | Facility: CLINIC | Age: 49
End: 2024-07-24
Payer: MEDICAID

## 2024-07-24 VITALS
SYSTOLIC BLOOD PRESSURE: 100 MMHG | WEIGHT: 217.13 LBS | DIASTOLIC BLOOD PRESSURE: 64 MMHG | HEART RATE: 83 BPM | OXYGEN SATURATION: 96 % | BODY MASS INDEX: 31.16 KG/M2

## 2024-07-24 DIAGNOSIS — E78.2 MIXED HYPERLIPIDEMIA: ICD-10-CM

## 2024-07-24 DIAGNOSIS — I10 PRIMARY HYPERTENSION: Primary | ICD-10-CM

## 2024-07-24 DIAGNOSIS — I10 ESSENTIAL HYPERTENSION: ICD-10-CM

## 2024-07-24 DIAGNOSIS — R06.09 DOE (DYSPNEA ON EXERTION): ICD-10-CM

## 2024-07-24 DIAGNOSIS — I10 PRIMARY HYPERTENSION: ICD-10-CM

## 2024-07-24 DIAGNOSIS — E66.9 OBESITY (BMI 30-39.9): ICD-10-CM

## 2024-07-24 DIAGNOSIS — I73.9 PAD (PERIPHERAL ARTERY DISEASE): ICD-10-CM

## 2024-07-24 LAB
OHS QRS DURATION: 86 MS
OHS QTC CALCULATION: 490 MS

## 2024-07-24 PROCEDURE — 99999 PR PBB SHADOW E&M-EST. PATIENT-LVL IV: CPT | Mod: PBBFAC,,, | Performed by: NURSE PRACTITIONER

## 2024-07-24 PROCEDURE — 3078F DIAST BP <80 MM HG: CPT | Mod: CPTII,,, | Performed by: NURSE PRACTITIONER

## 2024-07-24 PROCEDURE — 3052F HG A1C>EQUAL 8.0%<EQUAL 9.0%: CPT | Mod: CPTII,,, | Performed by: NURSE PRACTITIONER

## 2024-07-24 PROCEDURE — 99214 OFFICE O/P EST MOD 30 MIN: CPT | Mod: S$PBB,,, | Performed by: NURSE PRACTITIONER

## 2024-07-24 PROCEDURE — 3074F SYST BP LT 130 MM HG: CPT | Mod: CPTII,,, | Performed by: NURSE PRACTITIONER

## 2024-07-24 PROCEDURE — 93005 ELECTROCARDIOGRAM TRACING: CPT

## 2024-07-24 PROCEDURE — 1159F MED LIST DOCD IN RCRD: CPT | Mod: CPTII,,, | Performed by: NURSE PRACTITIONER

## 2024-07-24 PROCEDURE — 4010F ACE/ARB THERAPY RXD/TAKEN: CPT | Mod: CPTII,,, | Performed by: NURSE PRACTITIONER

## 2024-07-24 PROCEDURE — 99214 OFFICE O/P EST MOD 30 MIN: CPT | Mod: PBBFAC | Performed by: NURSE PRACTITIONER

## 2024-07-24 PROCEDURE — 3008F BODY MASS INDEX DOCD: CPT | Mod: CPTII,,, | Performed by: NURSE PRACTITIONER

## 2024-07-24 PROCEDURE — 93010 ELECTROCARDIOGRAM REPORT: CPT | Mod: ,,, | Performed by: INTERNAL MEDICINE

## 2024-07-24 RX ORDER — CLOPIDOGREL BISULFATE 75 MG/1
75 TABLET ORAL DAILY
Qty: 90 TABLET | Refills: 3 | Status: SHIPPED | OUTPATIENT
Start: 2024-07-24

## 2024-07-24 RX ORDER — ATORVASTATIN CALCIUM 80 MG/1
80 TABLET, FILM COATED ORAL DAILY
Qty: 90 TABLET | Refills: 3 | Status: ACTIVE | OUTPATIENT
Start: 2024-07-24

## 2024-07-24 RX ORDER — LISINOPRIL 5 MG/1
5 TABLET ORAL NIGHTLY
Qty: 90 TABLET | Refills: 3 | Status: SHIPPED | OUTPATIENT
Start: 2024-07-24

## 2024-07-24 RX ORDER — ATENOLOL 25 MG/1
25 TABLET ORAL NIGHTLY
Qty: 90 TABLET | Refills: 3 | Status: SHIPPED | OUTPATIENT
Start: 2024-07-24

## 2024-07-24 RX ORDER — LISINOPRIL 5 MG/1
5 TABLET ORAL NIGHTLY
Start: 2024-07-24 | End: 2024-07-24 | Stop reason: SDUPTHER

## 2024-07-24 NOTE — PROGRESS NOTES
Subjective:   Patient ID:  Jenifer Westfall is a 49 y.o. female who presents for evaluation of No chief complaint on file.        HPI    Jenifer Westfall is a 47 year old female who presents to Arrhythmia clinic for follow up. Her current medical conditions include HTN, HLP, MVP, DM Type II, IBS, PHTN. She returns today and states she is doing    She was recently seen at [facility] due to headaches with LH/Dizziness. She received IVF bolus 1 L, Reglan, Benadryl, Toradol and Fioricet prior to discharge.     She does endorse worsening in her anxiety around this time of year. Has been having ongoing issues with glucose control.     Denies chest pain or anginal equivalents. No shortness of breath, KAT or palpitations. Denies orthopnea, PND or abdominal bloating. Reports regular walking without any issues lately. She does endorse continued neuropathy pain to BLEs.  No recent falls, syncope or near syncopal events. Reports compliance with medications and dietary restrictions. NO CNS complaints to suggest TIA or CVA today. No signs of abnormal bleeding on ASA and Plavix.       6/8/2023 update    Jenifer Westfall returns for 6 month follow up.     She has been having issues with low BP recently over the past week or so.     CTA completed due to elevated D Dimer, no evidence of aneurysm or dissection. Stable spiculated partially cavitated groundglass nodular opacity RLL. She received 1L IVFs and continued to have recurrent orthostasis and received additional IVF's in ED.     She returns today and still has low BP episodes.     Denies chest pain or anginal equivalents today.   NO shortness of breath, KAT or palpitations. No leg swelling today on exam.   Denies any recent falls, syncope or near syncopal events. Reports compliance with medications and dietary restrictions. NO signs of abnormal bleeding on ASA and Plavix.     11/28/2023 update    Jenifer Westfall returns for cardiac risk stratification prior to surgery.     Plan  for MPI stress test next month to further evaluate.     EKG- NSR, Prolonged QT, HR 90 QTc 491 ms, GUANAKO 154 ms.     Denies any chest pain or anginal equivalents. She does endorse some degree of shortness of breath with any amount of exertional activities. BP stable today in office.     She is unable to walk for long distances due to shortness of breath and use of cane.     She was seen in ED on 11/11/23 due to shortness of breath symptoms.     Reports compliance with medications and dietary restrictions. Denies any recent syncopal events.     7/24/2024 update    Jenifer Westfall returns for follow up.     EKG- NSR, Prolonged  ms (stable today in office), Guanako 136 ms.    Needs refills on her HTN and cholesterol lowering medications.     Needs further lipid optimization given  despite high intensity statin compliance. Will start Repatha injections for further lipid lowering.     Reports compliance with her medications and dietary restrictions. She cannot walk far distances which limits her ability to do much activity.     Denies chest pain or anginal equivalents. Still endorses shortness of breath when she exerts herself. Has worsening neuropathy to left leg.  Denies orthopnea, PND or abdominal bloating. Reports regular walking without any issues lately. NO leg swelling or claudications. No recent falls, syncope or near syncopal events. Reports compliance with medications and dietary restrictions. NO CNS complaints to suggest TIA or CVA today. No signs of abnormal bleeding on ASA and Plavix.     Past Medical History:   Diagnosis Date    Arthritis     DM (diabetes mellitus) 2009     03/01/2017 Insulin x 3 years 2013    Hepatitis C antibody positive in blood 03/04/2021    RNA NEGATIVE 3/6/2017, 3/22/22    Hyperlipidemia     Hypertension     IBS (irritable bowel syndrome)     Kidney stone     Marfan syndrome     Mitral valve prolapse     Sleep apnea     moild: no cpap needed       Past Surgical History:    Procedure Laterality Date    ANGIOGRAPHY OF LOWER EXTREMITY N/A 2018    Procedure: ANGIOGRAM, LOWER EXTREMITY- LEFT LEG;  Surgeon: Roscoe Landeros MD;  Location: [facility] CATH LAB;  Service: Peripheral Vascular;  Laterality: N/A;    APPENDECTOMY      BUNIONECTOMY      both feet    BYPASS GRAFT Bilateral     cataract surgery  2019     SECTION      x 2    CHOLECYSTECTOMY      COLONOSCOPY N/A 2020    Procedure: COLONOSCOPY w/ biopsies;  Surgeon: Gio Georges MD;  Location: [facility];  Service: Endoscopy;  Laterality: N/A;    ESOPHAGEAL MANOMETRY WITH MEASUREMENT OF IMPEDANCE N/A 2023    Procedure: MANOMETRY, ESOPHAGUS, WITH IMPEDANCE MEASUREMENT;  Surgeon: Desmond Calderon RN;  Location: [facility] ENDO;  Service: Endoscopy;  Laterality: N/A;    ESOPHAGOGASTRODUODENOSCOPY N/A 2019    Procedure: ESOPHAGOGASTRODUODENOSCOPY (EGD);  Surgeon: Jaron Sanders III, MD;  Location: [facility];  Service: Endoscopy;  Laterality: N/A;    ESOPHAGOGASTRODUODENOSCOPY N/A 2022    Procedure: EGD (ESOPHAGOGASTRODUODENOSCOPY);  Surgeon: Keith Hardwick MD;  Location: [facility];  Service: Gastroenterology;  Laterality: N/A;    INJECTION OF ANESTHETIC AGENT AROUND MULTIPLE INTERCOSTAL NERVES Right 2024    Procedure: BLOCK, NERVE, INTERCOSTAL, 2 OR MORE;  Surgeon: Chente Cabrera MD;  Location: [facility];  Service: Cardiothoracic;  Laterality: Right;    LOBECTOMY-ROBOTIC Right 2024    Procedure: LOBECTOMY-ROBOTIC;  Surgeon: Chente Cabrera MD;  Location: [facility];  Service: Cardiothoracic;  Laterality: Right;  Lower Lobe    ROBOT-ASSISTED LAPAROSCOPIC LYMPHADENECTOMY USING DA GOMEZ XI Right 2024    Procedure: XI ROBOTIC LYMPHADENECTOMY;  Surgeon: Chente Cabrera MD;  Location: [facility];  Service: Cardiothoracic;  Laterality: Right;  Robotic mediastinal lymph node dissection    SELECTIVE INJECTION OF ANESTHETIC AGENT AROUND LUMBAR SPINAL NERVE ROOT BY TRANSFORAMINAL APPROACH  "Bilateral 2020    Procedure: Bilateral L5/S1 TF DENY;  Surgeon: Jewel Walters MD;  Location: [facility] MGT;  Service: Pain Management;  Laterality: Bilateral;    TUBAL LIGATION      VATS, ROBOT-ASSISTED, OR ROBOT-ASSISTED THORACOTOMY Right 2024    Procedure: VATS, ROBOT-ASSISTED, OR ROBOT-ASSISTED THORACOTOMY;  Surgeon: Chente Cabrera MD;  Location: [facility] OR;  Service: Cardiothoracic;  Laterality: Right;  Robot assisted right lower lobectomy and mediastinal lymph node dissection       Social History     Tobacco Use    Smoking status: Former     Current packs/day: 0.00     Average packs/day: 1 pack/day for 24.0 years (24.0 ttl pk-yrs)     Types: Cigarettes     Start date: 1996     Quit date: 2020     Years since quittin.5    Smokeless tobacco: Never    Tobacco comments:     "once in a blue moon"   Substance Use Topics    Alcohol use: Never    Drug use: No       Family History   Problem Relation Name Age of Onset    Heart disease Mother      Thrombophilia Father          DVT    Hypertension Father      Stroke Maternal Aunt      Heart disease Maternal Aunt      Stroke Maternal Uncle      Heart disease Maternal Uncle      Breast cancer Neg Hx      Colon cancer Neg Hx      Ovarian cancer Neg Hx       Wt Readings from Last 3 Encounters:   24 98.5 kg (217 lb 2.5 oz)   24 97.5 kg (214 lb 15.2 oz)   24 97.5 kg (214 lb 15.2 oz)     Temp Readings from Last 3 Encounters:   24 98.4 °F (36.9 °C) (Oral)   23 98.9 °F (37.2 °C) (Oral)   23 99 °F (37.2 °C)     BP Readings from Last 3 Encounters:   24 100/64   24 102/66   24 110/64     Pulse Readings from Last 3 Encounters:   24 83   24 84   02/15/24 65     Current Outpatient Medications on File Prior to Visit   Medication Sig Dispense Refill    albuterol (PROVENTIL) 2.5 mg /3 mL (0.083 %) nebulizer solution Take 3 mLs (2.5 mg total) by nebulization every 4 to 6 hours as needed for Wheezing " "or Shortness of Breath. 360 mL 11    ALPRAZolam (XANAX) 0.25 MG tablet Take 0.25 mg by mouth 2 (two) times daily as needed for Anxiety.      ascorbic acid (VITAMIN C) 100 MG tablet Take 100 mg by mouth once daily.      aspirin (ECOTRIN) 81 MG EC tablet Take 81 mg by mouth every evening.      azelastine (ASTELIN) 137 mcg (0.1 %) nasal spray 2 sprays by Nasal route daily as needed for Rhinitis.      BD ULTRA-FINE [place] PEN NEEDLE 32 gauge x 5/32" Ndle Inject 1 each into the skin 3 (three) times daily.      busPIRone (BUSPAR) 30 MG Tab Take 30 mg by mouth 2 (two) times daily.      dapagliflozin propanediol (FARXIGA) 10 mg tablet Take 1 tablet (10 mg total) by mouth once daily. 30 tablet 6    dicyclomine (BENTYL) 20 mg tablet TAKE 1 TABLET (20 MG TOTAL) BY MOUTH BEFORE MEALS AS NEEDED (ABD PAIN). 90 tablet 2    famotidine (PEPCID) 20 MG tablet TAKE 1 TABLET (20 MG TOTAL) BY MOUTH 2 (TWO) TIMES DAILY. 60 tablet 11    famotidine (PEPCID) 20 MG tablet Take 1 tablet (20 mg total) by mouth 2 (two) times daily. 60 tablet 11    FLUoxetine (PROZAC) 40 MG capsule TAKE 1 CAPSULE BY MOUTH ONCE DAILY (Patient taking differently: Take 40 mg by mouth every evening.) 90 capsule 0    fluticasone propionate (FLONASE) 50 mcg/actuation nasal spray 2 sprays by Each Nostril route daily as needed.      gabapentin (NEURONTIN) 300 MG capsule TAKE 3 CAPSULES (900 MG TOTAL) BY MOUTH 3 (THREE) TIMES DAILY. 270 capsule 11    glipiZIDE (GLUCOTROL) 10 MG tablet Take 1 tablet (10 mg total) by mouth 2 (two) times daily. 60 tablet 0    isosorbide mononitrate (IMDUR) 30 MG 24 hr tablet TAKE 1 TABLET (30 MG TOTAL) BY MOUTH ONCE DAILY. 30 tablet 11    LANTUS SOLOSTAR U-100 INSULIN glargine 100 units/mL SubQ pen Inject 40 Units into the skin 2 (two) times a day.      linaCLOtide (LINZESS) 145 mcg Cap capsule Take 1 capsule (145 mcg total) by mouth before breakfast. (Patient taking differently: Take 145 mcg by mouth daily as needed.) 30 capsule 2    " "metroNIDAZOLE (FLAGYL) 500 MG tablet Take 1 tablet (500 mg total) by mouth 2 (two) times daily. for 7 days 14 tablet 0    mometasone (ELOCON) 0.1 % solution Apply topically.      NOVOLOG FLEXPEN U-100 INSULIN 100 unit/mL (3 mL) InPn pen Inject into the skin 3 (three) times daily with meals.      omeprazole (PRILOSEC) 40 MG capsule Take 1 capsule (40 mg total) by mouth once daily. 30 capsule 11    PEN NEEDLE 31 gauge x 5/16" Ndle USE 5 TIMES DAILY WITH LANTUS AND HUMALOG 100 each 3    promethazine (PHENERGAN) 25 MG tablet       tiotropium (SPIRIVA) 18 mcg inhalation capsule Inhale 1 capsule (18 mcg total) into the lungs once daily. Controller 30 capsule 11    tiZANidine (ZANAFLEX) 4 MG tablet TAKE 1 TO 1 AND 1/2 TABLETS (4 TO 6MG TOTAL) BY MOUTH NIGHTLY AS NEEDED. (Patient taking differently: Take 4 mg by mouth before meals as needed.) 45 tablet 5    TRUE METRIX GLUCOSE TEST STRIP Strp       ZINC ORAL Take 1 tablet by mouth once daily. PT UNSURE OF DOSE      [DISCONTINUED] atenoloL (TENORMIN) 25 MG tablet TAKE 1 TABLET BY MOUTH ONCE DAILY (Patient taking differently: Take 25 mg by mouth every evening.) 30 tablet 11    [DISCONTINUED] atorvastatin (LIPITOR) 80 MG tablet Take 1 tablet (80 mg total) by mouth once daily. 30 tablet 11    [DISCONTINUED] clopidogreL (PLAVIX) 75 mg tablet Take 1 tablet (75 mg total) by mouth once daily. (Patient taking differently: Take 75 mg by mouth every evening.) 90 tablet 2    [DISCONTINUED] lisinopril 10 MG tablet TAKE 1 TABLET (10 MG TOTAL) BY MOUTH ONCE DAILY. (Patient taking differently: Take 5 mg by mouth every evening.) 30 tablet 3    oxyCODONE-acetaminophen (PERCOCET) 5-325 mg per tablet Take 1 tablet by mouth every 4 (four) hours as needed for Pain. 20 tablet 0    SITagliptin phosphate (JANUVIA) 50 MG Tab Take 50 mg by mouth once daily. Added to chart 1/18/24       Current Facility-Administered Medications on File Prior to Visit   Medication Dose Route Frequency Provider Last " Rate Last Admin    DOBUtamine 1000 mg in D5W 250 mL infusion  10 mcg/kg/min (Order-Specific) Intravenous Continuous Julisa Hansen, FNP-C 15.4 mL/hr at 01/09/24 1113 10 mcg/kg/min at 01/09/24 1113    sodium chloride 0.9% flush 10 mL  10 mL Intravenous PRN Brian Delgado MD        sodium chloride 0.9% flush 10 mL  10 mL Intravenous PRN Chente Cabrera MD        sodium chloride 0.9% flush 10 mL  10 mL Intravenous PRN Chente Cabrera MD           Review of Systems   Constitutional: Positive for malaise/fatigue.   HENT:  Negative for hearing loss and hoarse voice.    Eyes:  Negative for blurred vision and visual disturbance.   Cardiovascular:  Positive for dyspnea on exertion. Negative for chest pain, claudication, irregular heartbeat, leg swelling, near-syncope, orthopnea, palpitations, paroxysmal nocturnal dyspnea and syncope.   Respiratory:  Negative for cough, hemoptysis, shortness of breath, sleep disturbances due to breathing, snoring and wheezing.    Endocrine: Negative for cold intolerance and heat intolerance.   Hematologic/Lymphatic: Does not bruise/bleed easily.   Skin:  Negative for color change, dry skin and nail changes.   Musculoskeletal:  Positive for arthritis and back pain. Negative for joint pain and myalgias.   Gastrointestinal:  Negative for bloating, abdominal pain, constipation, nausea and vomiting.   Genitourinary:  Negative for dysuria, flank pain, hematuria and hesitancy.   Neurological:  Positive for numbness and paresthesias. Negative for headaches, light-headedness, loss of balance and weakness.   Psychiatric/Behavioral:  Negative for altered mental status.    Allergic/Immunologic: Negative for environmental allergies.       Objective:/64 (BP Location: Left arm, Patient Position: Sitting)   Pulse 83   Wt 98.5 kg (217 lb 2.5 oz)   SpO2 96%   BMI 31.16 kg/m²      Physical Exam  Vitals and nursing note reviewed.   Constitutional:       General: She is not in acute distress.      Appearance: Normal appearance. She is well-developed. She is not ill-appearing.   HENT:      Head: Normocephalic and atraumatic.      Nose: Nose normal.      Mouth/Throat:      Mouth: Mucous membranes are moist.   Eyes:      Pupils: Pupils are equal, round, and reactive to light.   Neck:      Thyroid: No thyromegaly.      Vascular: No JVD.      Trachea: No tracheal deviation.   Cardiovascular:      Rate and Rhythm: Normal rate and regular rhythm.      Chest Wall: PMI is not displaced.      Pulses: Intact distal pulses.           Radial pulses are 2+ on the right side and 2+ on the left side.        Dorsalis pedis pulses are 2+ on the right side and 2+ on the left side.      Heart sounds: S1 normal and S2 normal. Heart sounds not distant. No murmur heard.  Pulmonary:      Effort: Pulmonary effort is normal. No respiratory distress.      Breath sounds: Normal breath sounds. No wheezing.   Abdominal:      General: Bowel sounds are normal. There is no distension.      Palpations: Abdomen is soft.      Tenderness: There is no abdominal tenderness.   Musculoskeletal:         General: No swelling. Normal range of motion.      Cervical back: Full passive range of motion without pain, normal range of motion and neck supple.      Right ankle: No swelling.      Left ankle: No swelling.   Skin:     General: Skin is warm and dry.      Capillary Refill: Capillary refill takes less than 2 seconds.      Nails: There is no clubbing.   Neurological:      General: No focal deficit present.      Mental Status: She is alert and oriented to person, place, and time.      Motor: Weakness present.   Psychiatric:         Speech: Speech normal.         Behavior: Behavior normal.         Thought Content: Thought content normal.         Judgment: Judgment normal.         Lab Results   Component Value Date    CHOL 216 (H) 03/11/2024    CHOL 189 03/28/2022    CHOL 165 06/29/2021     Lab Results   Component Value Date    HDL 40 03/11/2024    HDL  34 (L) 03/28/2022    HDL 29 (L) 06/29/2021     Lab Results   Component Value Date    LDLCALC 129 (H) 03/11/2024    LDLCALC 119 03/28/2022    LDLCALC 103 06/29/2021     Lab Results   Component Value Date    TRIG 234 (H) 03/11/2024    TRIG 182 (H) 03/28/2022    TRIG 165 (H) 06/29/2021     Lab Results   Component Value Date    CHOLHDL 5.4 (H) 03/11/2024    CHOLHDL 14.8 (L) 10/09/2020    CHOLHDL 16.1 (L) 05/29/2019       Chemistry        Component Value Date/Time     03/11/2024 0832     (L) 01/20/2024 0544    K 4.2 03/11/2024 0832    K 5.1 01/20/2024 0544     01/20/2024 0544    CO2 23 03/11/2024 0832    CO2 20 (L) 01/20/2024 0544    BUN 13 03/11/2024 0832    BUN 14 01/20/2024 0544    CREATININE 0.81 03/11/2024 0832    CREATININE 0.9 01/20/2024 0544     (H) 01/20/2024 0544        Component Value Date/Time    CALCIUM 9.0 03/11/2024 0832    CALCIUM 8.1 (L) 01/20/2024 0544    ALKPHOS 152 (H) 03/11/2024 0832    ALKPHOS 147 (H) 08/29/2023 1647    AST 44 (H) 03/11/2024 0832    AST 25 08/29/2023 1647    ALT 45 (H) 03/11/2024 0832    ALT 29 08/29/2023 1647    BILITOT 0.3 (L) 03/11/2024 0832    BILITOT 0.3 08/29/2023 1647    ESTGFRAFRICA 88 11/11/2023 1813    ESTGFRAFRICA >60.0 06/01/2022 1541    EGFRNONAA 59.9 (A) 06/01/2022 1541          Lab Results   Component Value Date    TSH 0.849 10/07/2015     Lab Results   Component Value Date    INR 0.9 06/01/2022    INR 0.9 01/21/2022    INR 1.0 12/12/2021     Lab Results   Component Value Date    WBC 7.24 01/23/2024    HGB 11.4 (L) 01/23/2024    HCT 35.8 (L) 01/23/2024    MCV 93 01/23/2024     01/23/2024     Results for orders placed during the hospital encounter of 05/13/22    Echo    Interpretation Summary  · The left ventricle is normal in size with concentric remodeling and normal systolic function.  · The estimated ejection fraction is 55%.  · Normal left ventricular diastolic function.  · Normal right ventricular size with normal right ventricular  systolic function.  · Normal central venous pressure (3 mmHg).       Assessment:      1. Primary hypertension    2. PAD (peripheral artery disease)    3. KAT (dyspnea on exertion)    4. Obesity (BMI 30-39.9)    5. Essential hypertension    6. Mixed hyperlipidemia        Plan:     If SBP <100, she should hold her BP medication  Continue ASA Statin Plavix ACEi BB  Dash diet 2 gm sodium restriction  Profile BP at home  Add Repatha for further lipid lowering therapy  RTC in 6 months      Nicole May, FNP-C [facility]

## 2024-07-30 ENCOUNTER — TELEPHONE (OUTPATIENT)
Dept: HEPATOLOGY | Facility: CLINIC | Age: 49
End: 2024-07-30
Payer: MEDICAID

## 2024-07-30 NOTE — TELEPHONE ENCOUNTER
Returned patient's call and informed her that she would need to contact her pcp to have them send a referral to [facility]. I did provide her with the fax number to have the referral sent.

## 2024-07-30 NOTE — TELEPHONE ENCOUNTER
----- Message from Karen Shannon sent at 7/30/2024  1:29 PM CDT -----  Name of Caller:NAV KINNEY [[id number]]        When is the first available appointment? N/a        Symptoms: some pain in right side of stomach and might be due to liver     Best Call Back Number Telephone Information:  Mobile          [phone]            Additional Information: pt will like to schedule an appointment for her liver and abd liver/bloodwork she had done in the er

## 2024-08-01 ENCOUNTER — PATIENT MESSAGE (OUTPATIENT)
Dept: OBSTETRICS AND GYNECOLOGY | Facility: CLINIC | Age: 49
End: 2024-08-01
Payer: MEDICAID

## 2024-08-05 ENCOUNTER — PATIENT MESSAGE (OUTPATIENT)
Dept: NEUROLOGY | Facility: CLINIC | Age: 49
End: 2024-08-05
Payer: MEDICAID

## 2024-08-09 DIAGNOSIS — R10.9 ABDOMINAL DISCOMFORT: ICD-10-CM

## 2024-08-09 RX ORDER — DICYCLOMINE HYDROCHLORIDE 20 MG/1
TABLET ORAL
Qty: 100 TABLET | Refills: 2 | Status: SHIPPED | OUTPATIENT
Start: 2024-08-09

## 2024-08-12 ENCOUNTER — PATIENT MESSAGE (OUTPATIENT)
Dept: SPORTS MEDICINE | Facility: CLINIC | Age: 49
End: 2024-08-12
Payer: MEDICAID

## 2024-08-14 ENCOUNTER — TELEPHONE (OUTPATIENT)
Dept: GASTROENTEROLOGY | Facility: CLINIC | Age: 49
End: 2024-08-14
Payer: MEDICAID

## 2024-08-14 ENCOUNTER — PATIENT MESSAGE (OUTPATIENT)
Dept: CARDIOLOGY | Facility: CLINIC | Age: 49
End: 2024-08-14
Payer: MEDICAID

## 2024-08-14 DIAGNOSIS — E78.2 MIXED HYPERLIPIDEMIA: Primary | ICD-10-CM

## 2024-08-16 NOTE — PROGRESS NOTES
Orthopaedic Follow-Up Visit    Last Appointment: 9/27/23  Diagnosis: Right shoulder rotator cuff tear   Prior Procedure: None    Jenifer Westfall is a 49 y.o. female who is here for f/u evaluation of her right shoulder. The patient was last seen here by me for her right shoulder on 9/27/23 at which point we performed injection in the right shoulder for symptomatic treatment of pain as result of her cuff tear. However, think she would have a very difficult time with any kind of operative treatment because of her left lower extremity neuropathy and need to use a cane in her right hand. She returned on 5/22/24 noting left shoulder pain. She had left shoulder greater tuberosity  degenerative changes and exam suspicious for rotator cuff tear. We elected to proceed with left SAS CSI at that time. The patient returns today reporting A1C is now 7.7 and returns to discuss further treatment options. Notes bilateral shoulder pain, right worse than left.     To review her history, Jenifer Westfall is a 49 y.o. right-hand dominant female who presented on 12/13/22 with RIGHT shoulder and neck pain and dysfunction that began about 3 years ago. She had gradual onset of symptoms for her right shoulder and neck. Her symptoms included intermittent sharp, stabbing pain of the right shoulder which radiates to right neck. Her pain was aggravated by movement. She had tried rest, activity modification, Zanaflex, Gabapentin, and physical therapy. She had right shoulder pain and known rotator cuff tear on the right side.  She also had known peripheral neuropathy that severely affected her left foot and seemed to also be affecting her left hand. Because of her neuropathy, she used a cane in her right hand for ambulation.  We discussed that I thought she would have a very difficult time with any kind of operative treatment because of her left lower extremity neuropathy and need to use a cane in her right hand. We discussed that I felt it would be  reasonable to proceed with injection in the right shoulder for symptomatic treatment of pain as result of her cuff tear, however we were unable to perform injection due to A1c 12.0 with reviewing of her last lab from (3/28/2022) and possible upcoming spine surgery. Discussed if she has preoperative testing for her spine surgery and her A1c decreases that we can consider a right shoulder CSI in the future.     Patient's medications, allergies, past medical, surgical, social and family histories were reviewed and updated as appropriate.    Review of Systems   All systems reviewed were negative.  Specifically, the patient denies fever, chills, weight loss, chest pain, shortness of breath, or dyspnea on exertion.      Past Medical History:   Diagnosis Date    Arthritis     DM (diabetes mellitus)      2017 Insulin x 3 years     Hepatitis C antibody positive in blood 2021    RNA NEGATIVE 3/6/2017, 3/22/22    Hyperlipidemia     Hypertension     IBS (irritable bowel syndrome)     Kidney stone     Marfan syndrome     Mitral valve prolapse     Sleep apnea     moild: no cpap needed       Past Surgical History:   Procedure Laterality Date    ANGIOGRAPHY OF LOWER EXTREMITY N/A 2018    Procedure: ANGIOGRAM, LOWER EXTREMITY- LEFT LEG;  Surgeon: Roscoe Landeros MD;  Location: [facility] CATH LAB;  Service: Peripheral Vascular;  Laterality: N/A;    APPENDECTOMY      BUNIONECTOMY      both feet    BYPASS GRAFT Bilateral     cataract surgery  2019     SECTION      x 2    CHOLECYSTECTOMY      COLONOSCOPY N/A 2020    Procedure: COLONOSCOPY w/ biopsies;  Surgeon: Gio Georges MD;  Location: [facility] ENDO;  Service: Endoscopy;  Laterality: N/A;    ESOPHAGEAL MANOMETRY WITH MEASUREMENT OF IMPEDANCE N/A 2023    Procedure: MANOMETRY, ESOPHAGUS, WITH IMPEDANCE MEASUREMENT;  Surgeon: Desmond Calderon RN;  Location: [facility];  Service: Endoscopy;  Laterality: N/A;    ESOPHAGOGASTRODUODENOSCOPY N/A  07/31/2019    Procedure: ESOPHAGOGASTRODUODENOSCOPY (EGD);  Surgeon: Jaron Sanders III, MD;  Location: [facility] ENDO;  Service: Endoscopy;  Laterality: N/A;    ESOPHAGOGASTRODUODENOSCOPY N/A 04/22/2022    Procedure: EGD (ESOPHAGOGASTRODUODENOSCOPY);  Surgeon: Keith Hardwick MD;  Location: [facility] ENDO;  Service: Gastroenterology;  Laterality: N/A;    INJECTION OF ANESTHETIC AGENT AROUND MULTIPLE INTERCOSTAL NERVES Right 1/19/2024    Procedure: BLOCK, NERVE, INTERCOSTAL, 2 OR MORE;  Surgeon: Chente Cabrera MD;  Location: [facility] OR;  Service: Cardiothoracic;  Laterality: Right;    LOBECTOMY-ROBOTIC Right 1/19/2024    Procedure: LOBECTOMY-ROBOTIC;  Surgeon: Chente Cabrera MD;  Location: [facility];  Service: Cardiothoracic;  Laterality: Right;  Lower Lobe    ROBOT-ASSISTED LAPAROSCOPIC LYMPHADENECTOMY USING DA GOMEZ XI Right 1/19/2024    Procedure: XI ROBOTIC LYMPHADENECTOMY;  Surgeon: Chente Cabrera MD;  Location: [facility];  Service: Cardiothoracic;  Laterality: Right;  Robotic mediastinal lymph node dissection    SELECTIVE INJECTION OF ANESTHETIC AGENT AROUND LUMBAR SPINAL NERVE ROOT BY TRANSFORAMINAL APPROACH Bilateral 11/23/2020    Procedure: Bilateral L5/S1 TF DENY;  Surgeon: Jewel Walters MD;  Location: [facility] PAIN MGT;  Service: Pain Management;  Laterality: Bilateral;    TUBAL LIGATION      VATS, ROBOT-ASSISTED, OR ROBOT-ASSISTED THORACOTOMY Right 1/19/2024    Procedure: VATS, ROBOT-ASSISTED, OR ROBOT-ASSISTED THORACOTOMY;  Surgeon: Chente Cabrera MD;  Location: [facility];  Service: Cardiothoracic;  Laterality: Right;  Robot assisted right lower lobectomy and mediastinal lymph node dissection       Patient's Medications   New Prescriptions    No medications on file   Previous Medications    ALBUTEROL (PROVENTIL) 2.5 MG /3 ML (0.083 %) NEBULIZER SOLUTION    Take 3 mLs (2.5 mg total) by nebulization every 4 to 6 hours as needed for Wheezing or Shortness of Breath.    ALPRAZOLAM (XANAX) 0.25 MG TABLET     "Take 0.25 mg by mouth 2 (two) times daily as needed for Anxiety.    ASCORBIC ACID (VITAMIN C) 100 MG TABLET    Take 100 mg by mouth once daily.    ASPIRIN (ECOTRIN) 81 MG EC TABLET    Take 81 mg by mouth every evening.    ATENOLOL (TENORMIN) 25 MG TABLET    Take 1 tablet (25 mg total) by mouth every evening.    ATORVASTATIN (LIPITOR) 80 MG TABLET    Take 1 tablet (80 mg total) by mouth once daily.    AZELASTINE (ASTELIN) 137 MCG (0.1 %) NASAL SPRAY    2 sprays by Nasal route daily as needed for Rhinitis.    BD ULTRA-FINE [place] PEN NEEDLE 32 GAUGE X 5/32" NDLE    Inject 1 each into the skin 3 (three) times daily.    BUSPIRONE (BUSPAR) 30 MG TAB    Take 30 mg by mouth 2 (two) times daily.    CLOPIDOGREL (PLAVIX) 75 MG TABLET    Take 1 tablet (75 mg total) by mouth once daily.    DAPAGLIFLOZIN PROPANEDIOL (FARXIGA) 10 MG TABLET    Take 1 tablet (10 mg total) by mouth once daily.    DICYCLOMINE (BENTYL) 20 MG TABLET    TAKE 1 TABLET BY MOUTH 3 TIMES DAILY BEFORE MEALS AS NEEDED FOR ABDOMINAL PAIN    EVOLOCUMAB (REPATHA SURECLICK) 140 MG/ML PNIJ    Inject 1 mL (140 mg total) into the skin every 14 (fourteen) days.    FAMOTIDINE (PEPCID) 20 MG TABLET    TAKE 1 TABLET (20 MG TOTAL) BY MOUTH 2 (TWO) TIMES DAILY.    FAMOTIDINE (PEPCID) 20 MG TABLET    Take 1 tablet (20 mg total) by mouth 2 (two) times daily.    FLUOXETINE (PROZAC) 40 MG CAPSULE    TAKE 1 CAPSULE BY MOUTH ONCE DAILY    FLUTICASONE PROPIONATE (FLONASE) 50 MCG/ACTUATION NASAL SPRAY    2 sprays by Each Nostril route daily as needed.    GABAPENTIN (NEURONTIN) 300 MG CAPSULE    TAKE 3 CAPSULES (900 MG TOTAL) BY MOUTH 3 (THREE) TIMES DAILY.    GLIPIZIDE (GLUCOTROL) 10 MG TABLET    Take 1 tablet (10 mg total) by mouth 2 (two) times daily.    ISOSORBIDE MONONITRATE (IMDUR) 30 MG 24 HR TABLET    TAKE 1 TABLET (30 MG TOTAL) BY MOUTH ONCE DAILY.    LANTUS SOLOSTAR U-100 INSULIN GLARGINE 100 UNITS/ML SUBQ PEN    Inject 40 Units into the skin 2 (two) times a day.    " "LINACLOTIDE (LINZESS) 145 MCG CAP CAPSULE    Take 1 capsule (145 mcg total) by mouth before breakfast.    LISINOPRIL (PRINIVIL,ZESTRIL) 5 MG TABLET    Take 1 tablet (5 mg total) by mouth every evening.    MOMETASONE (ELOCON) 0.1 % SOLUTION    Apply topically.    NOVOLOG FLEXPEN U-100 INSULIN 100 UNIT/ML (3 ML) INPN PEN    Inject into the skin 3 (three) times daily with meals.    OMEPRAZOLE (PRILOSEC) 40 MG CAPSULE    Take 1 capsule (40 mg total) by mouth once daily.    OXYCODONE-ACETAMINOPHEN (PERCOCET) 5-325 MG PER TABLET    Take 1 tablet by mouth every 4 (four) hours as needed for Pain.    PEN NEEDLE 31 GAUGE X 5/16" NDLE    USE 5 TIMES DAILY WITH LANTUS AND HUMALOG    PROMETHAZINE (PHENERGAN) 25 MG TABLET        SITAGLIPTIN PHOSPHATE (JANUVIA) 50 MG TAB    Take 50 mg by mouth once daily. Added to chart 1/18/24    TIOTROPIUM (SPIRIVA) 18 MCG INHALATION CAPSULE    Inhale 1 capsule (18 mcg total) into the lungs once daily. Controller    TIZANIDINE (ZANAFLEX) 4 MG TABLET    TAKE 1 TO 1 AND 1/2 TABLETS (4 TO 6MG TOTAL) BY MOUTH NIGHTLY AS NEEDED.    TRUE METRIX GLUCOSE TEST STRIP STRP        ZINC ORAL    Take 1 tablet by mouth once daily. PT UNSURE OF DOSE   Modified Medications    No medications on file   Discontinued Medications    No medications on file       Family History   Problem Relation Name Age of Onset    Heart disease Mother      Thrombophilia Father          DVT    Hypertension Father      Stroke Maternal Aunt      Heart disease Maternal Aunt      Stroke Maternal Uncle      Heart disease Maternal Uncle      Breast cancer Neg Hx      Colon cancer Neg Hx      Ovarian cancer Neg Hx         Review of patient's allergies indicates:   Allergen Reactions    Compazine [prochlorperazine edisylate] Rash    Contrast media Anaphylaxis    Dye Anaphylaxis     Contrast dye    Iodinated contrast media Anaphylaxis     Other reaction(s): anaphylaxis    Levaquin [levofloxacin] Hives and Itching    Morphine Other (See " Comments)     Irritable  Iritability  Irritable    Prochlorperazine Rash    Metformin Other (See Comments)     Upset stomach    Ibuprofen Other (See Comments) and Nausea And Vomiting     Stomach pain  Stomach pain    Abdominal pain     Methocarbamol Nausea Only         Objective:      Physical Exam  Cervical exam. Tenderness along midline. Decreased cervical ROM. Positive Spurling's test     Physical Exam:                       RIGHT                                     LEFT     Scap Dyskinesis/Winging       (-)                                             (-)     Tenderness:                                                                              Greater Tuberosity                  +                                              (-)  Bicipital Groove                       +                                              (-)  AC joint                                   (-)                                             (-)  Other:      ROM:  Forward Elevation       100                                          180  Abduction                    90                                            120  ER (at side)                 50                                            80  IR                                 Hip                      Back pocket     Strength:   Supraspinatus             4/5                                           5/5  Infraspinatus               4/5                                           5/5  Subscap / IR               4+/5                                         5/5      Special Tests:              Neer:                                       +                                              (-)              Gordon:                                 +                                              (-)              SS Stress:                               +                                              (-)              Bear Hug:                                (-)                                             (-)               [place]'s:                                 (-)                                             (-)              Resisted Thrower's:                +                                              (-)              Cross Arm Abduction:             (-)                                             (-)    Neurovascular examination  - Motor grossly intact bilaterally to shoulder abduction, elbow flexion and extension, wrist flexion and extension, and intrinsic hand musculature  - Sensation intact to light touch bilaterally in axillary, median, radial, and ulnar distributions  - Symmetrical radial pulses    Imaging:    XR Results:  Results for orders placed during the hospital encounter of 12/13/22    X-ray Shoulder 2 or More Views Right    Narrative  EXAMINATION:  XR SHOULDER COMPLETE 2 OR MORE VIEWS RIGHT    CLINICAL HISTORY:  Pain in right shoulder    TECHNIQUE:  Two or three views of the right shoulder were performed.    COMPARISON:  01/27/2022, 07/30/2020, 08/02/2019 and 11/19/2018.    FINDINGS:  No acute fracture or dislocation.  No significant soft tissue swelling.    Humeral head normally positioned with the glenoid cavity.  Mild glenohumeral degenerative osteoarthrosis with mild joint space narrowing and small osteophyte formation.  Chronic small calcific densities are again present along the humeral head and proximal diaphysis of the humerus.    AC joint intact with mild degenerative osteoarthrosis.    Impression  Mild chronic changes of degenerative osteoarthrosis.      Electronically signed by: Mike Iqbal  Date:    12/13/2022  Time:    15:51      MRI Results:  Results for orders placed during the hospital encounter of 05/21/19    MRI Shoulder Without Contrast Right    Narrative  EXAMINATION:  MRI SHOULDER WITHOUT CONTRAST RIGHT    CLINICAL HISTORY:  Rotator cuff tear;  Pain in right shoulder    TECHNIQUE:  Multisequence, multiplanar MR imaging of the shoulder performed without  contrast.    COMPARISON:  MRI shoulder 08/09/2017    FINDINGS:  Rotator cuff:    Supraspinatus: Complete tear present with retraction to the medial humeral head.    Infraspinatus: Intact with tendinosis    Subscapularis: Intact    Teres minor: Intact    Moderate to significant supraspinatus muscle bulk loss.    Labrum: No discrete tear with overall similar appearance    Biceps: Long head biceps tendon is intact.    Bone/cartilage: No acute fracture or concerning edema signal.  Tiny inferior posterior glenoid subcortical cyst present with minimal overlying cartilage fissuring.  Remaining glenohumeral cartilage appears intact.    Acromioclavicular joint:Degenerative hypertrophy findings with undersurface spurring.    Miscellaneous: No significant joint effusion.  Trace subacromial/subdeltoid bursal fluid present.    IMPRESSION:    Complete tear of the supraspinatus tendon with muscle atrophy.  Infraspinatus tendinosis.    Acromioclavicular joint and degenerative hypertrophy changes with undersurface spurring.  Tiny posterior-inferior glenoid subcortical cyst with minimal overlying cartilage fissuring.    Possible mild subacromial/subdeltoid bursitis.      Electronically signed by: Guevara Tinajero MD  Date:    05/21/2019  Time:    14:55      CT Results:  No results found for this or any previous visit.      Physician read: I agree with the above impression.    Assessment/Plan:   Jenifer Westfall is a 49 y.o. female with right shoulder rotator cuff tear, chronic     Plan:    She has right shoulder pain and known rotator cuff tear on the right side.  Her A1C is now to a level that we could pursue operative treatment.   Since her prior MRI is from 2019, I recommend moving forward with new MRI at this time for further evaluation of the current state of her rotator cuff tear. Discussed with her that over the past 5 years she could have developed atrophy that makes the tear irreparable.    Follow up with me after MRI.             All Jain MD    I, Shahzad Ravi, acted as a scribe for All Jain MD for the duration of this office visit.

## 2024-08-21 ENCOUNTER — OFFICE VISIT (OUTPATIENT)
Dept: SPORTS MEDICINE | Facility: CLINIC | Age: 49
End: 2024-08-21
Payer: MEDICAID

## 2024-08-21 VITALS — RESPIRATION RATE: 17 BRPM | WEIGHT: 217.13 LBS | BODY MASS INDEX: 31.09 KG/M2 | HEIGHT: 70 IN

## 2024-08-21 DIAGNOSIS — G89.29 CHRONIC RIGHT SHOULDER PAIN: ICD-10-CM

## 2024-08-21 DIAGNOSIS — M12.811 ROTATOR CUFF TEAR ARTHROPATHY OF RIGHT SHOULDER: Primary | ICD-10-CM

## 2024-08-21 DIAGNOSIS — M75.101 ROTATOR CUFF TEAR ARTHROPATHY OF RIGHT SHOULDER: Primary | ICD-10-CM

## 2024-08-21 DIAGNOSIS — M25.511 CHRONIC RIGHT SHOULDER PAIN: ICD-10-CM

## 2024-08-21 PROCEDURE — 99999 PR PBB SHADOW E&M-EST. PATIENT-LVL V: CPT | Mod: PBBFAC,,, | Performed by: STUDENT IN AN ORGANIZED HEALTH CARE EDUCATION/TRAINING PROGRAM

## 2024-08-21 PROCEDURE — 3008F BODY MASS INDEX DOCD: CPT | Mod: CPTII,,, | Performed by: STUDENT IN AN ORGANIZED HEALTH CARE EDUCATION/TRAINING PROGRAM

## 2024-08-21 PROCEDURE — 99214 OFFICE O/P EST MOD 30 MIN: CPT | Mod: S$PBB,,, | Performed by: STUDENT IN AN ORGANIZED HEALTH CARE EDUCATION/TRAINING PROGRAM

## 2024-08-21 PROCEDURE — 1159F MED LIST DOCD IN RCRD: CPT | Mod: CPTII,,, | Performed by: STUDENT IN AN ORGANIZED HEALTH CARE EDUCATION/TRAINING PROGRAM

## 2024-08-21 PROCEDURE — 4010F ACE/ARB THERAPY RXD/TAKEN: CPT | Mod: CPTII,,, | Performed by: STUDENT IN AN ORGANIZED HEALTH CARE EDUCATION/TRAINING PROGRAM

## 2024-08-21 PROCEDURE — 1160F RVW MEDS BY RX/DR IN RCRD: CPT | Mod: CPTII,,, | Performed by: STUDENT IN AN ORGANIZED HEALTH CARE EDUCATION/TRAINING PROGRAM

## 2024-08-21 PROCEDURE — 3051F HG A1C>EQUAL 7.0%<8.0%: CPT | Mod: CPTII,,, | Performed by: STUDENT IN AN ORGANIZED HEALTH CARE EDUCATION/TRAINING PROGRAM

## 2024-08-21 PROCEDURE — 99215 OFFICE O/P EST HI 40 MIN: CPT | Mod: PBBFAC | Performed by: STUDENT IN AN ORGANIZED HEALTH CARE EDUCATION/TRAINING PROGRAM

## 2024-08-24 ENCOUNTER — PATIENT MESSAGE (OUTPATIENT)
Dept: ADMINISTRATIVE | Facility: OTHER | Age: 49
End: 2024-08-24
Payer: MEDICAID

## 2024-09-04 ENCOUNTER — OFFICE VISIT (OUTPATIENT)
Dept: RHEUMATOLOGY | Facility: CLINIC | Age: 49
End: 2024-09-04
Payer: MEDICAID

## 2024-09-04 ENCOUNTER — LAB VISIT (OUTPATIENT)
Dept: LAB | Facility: HOSPITAL | Age: 49
End: 2024-09-04
Attending: INTERNAL MEDICINE
Payer: MEDICAID

## 2024-09-04 VITALS
WEIGHT: 214.31 LBS | SYSTOLIC BLOOD PRESSURE: 136 MMHG | HEIGHT: 70 IN | HEART RATE: 76 BPM | BODY MASS INDEX: 30.68 KG/M2 | DIASTOLIC BLOOD PRESSURE: 74 MMHG

## 2024-09-04 DIAGNOSIS — M25.561 CHRONIC PAIN OF BOTH KNEES: ICD-10-CM

## 2024-09-04 DIAGNOSIS — E55.9 VITAMIN D INSUFFICIENCY: ICD-10-CM

## 2024-09-04 DIAGNOSIS — M54.50 CHRONIC LOW BACK PAIN, UNSPECIFIED BACK PAIN LATERALITY, UNSPECIFIED WHETHER SCIATICA PRESENT: ICD-10-CM

## 2024-09-04 DIAGNOSIS — M35.00 SICCA SYNDROME: ICD-10-CM

## 2024-09-04 DIAGNOSIS — G89.29 CHRONIC PAIN OF BOTH KNEES: ICD-10-CM

## 2024-09-04 DIAGNOSIS — M25.50 ARTHRALGIA, UNSPECIFIED JOINT: ICD-10-CM

## 2024-09-04 DIAGNOSIS — R53.82 CHRONIC FATIGUE: ICD-10-CM

## 2024-09-04 DIAGNOSIS — E11.42 DIABETIC PERIPHERAL NEUROPATHY ASSOCIATED WITH TYPE 2 DIABETES MELLITUS: ICD-10-CM

## 2024-09-04 DIAGNOSIS — G89.29 CHRONIC LOW BACK PAIN, UNSPECIFIED BACK PAIN LATERALITY, UNSPECIFIED WHETHER SCIATICA PRESENT: ICD-10-CM

## 2024-09-04 DIAGNOSIS — G89.4 CHRONIC PAIN SYNDROME: Primary | ICD-10-CM

## 2024-09-04 DIAGNOSIS — F32.0 CURRENT MILD EPISODE OF MAJOR DEPRESSIVE DISORDER WITHOUT PRIOR EPISODE: ICD-10-CM

## 2024-09-04 DIAGNOSIS — M25.562 CHRONIC PAIN OF BOTH KNEES: ICD-10-CM

## 2024-09-04 DIAGNOSIS — K74.00 LIVER FIBROSIS: ICD-10-CM

## 2024-09-04 DIAGNOSIS — G89.4 CHRONIC PAIN SYNDROME: ICD-10-CM

## 2024-09-04 DIAGNOSIS — M54.16 LUMBAR RADICULOPATHY: ICD-10-CM

## 2024-09-04 DIAGNOSIS — M54.2 NECK PAIN: ICD-10-CM

## 2024-09-04 DIAGNOSIS — M77.8 TENDINITIS OF RIGHT SHOULDER: ICD-10-CM

## 2024-09-04 LAB
25(OH)D3+25(OH)D2 SERPL-MCNC: 8 NG/ML (ref 30–96)
CCP AB SER IA-ACNC: 0.8 U/ML
CK SERPL-CCNC: 277 U/L (ref 20–180)
CRP SERPL-MCNC: 2.2 MG/L (ref 0–8.2)
ERYTHROCYTE [SEDIMENTATION RATE] IN BLOOD BY WESTERGREN METHOD: 30 MM/HR (ref 0–20)
RHEUMATOID FACT SERPL-ACNC: <13 IU/ML (ref 0–15)

## 2024-09-04 PROCEDURE — 99999 PR PBB SHADOW E&M-EST. PATIENT-LVL V: CPT | Mod: PBBFAC,,, | Performed by: INTERNAL MEDICINE

## 2024-09-04 PROCEDURE — 85651 RBC SED RATE NONAUTOMATED: CPT | Performed by: INTERNAL MEDICINE

## 2024-09-04 PROCEDURE — 86431 RHEUMATOID FACTOR QUANT: CPT | Performed by: INTERNAL MEDICINE

## 2024-09-04 PROCEDURE — 82085 ASSAY OF ALDOLASE: CPT | Performed by: INTERNAL MEDICINE

## 2024-09-04 PROCEDURE — 86038 ANTINUCLEAR ANTIBODIES: CPT | Performed by: INTERNAL MEDICINE

## 2024-09-04 PROCEDURE — 99215 OFFICE O/P EST HI 40 MIN: CPT | Mod: S$PBB,,, | Performed by: INTERNAL MEDICINE

## 2024-09-04 PROCEDURE — 3051F HG A1C>EQUAL 7.0%<8.0%: CPT | Mod: CPTII,,, | Performed by: INTERNAL MEDICINE

## 2024-09-04 PROCEDURE — 82550 ASSAY OF CK (CPK): CPT | Performed by: INTERNAL MEDICINE

## 2024-09-04 PROCEDURE — 36415 COLL VENOUS BLD VENIPUNCTURE: CPT | Performed by: INTERNAL MEDICINE

## 2024-09-04 PROCEDURE — 82306 VITAMIN D 25 HYDROXY: CPT | Performed by: INTERNAL MEDICINE

## 2024-09-04 PROCEDURE — 86200 CCP ANTIBODY: CPT | Performed by: INTERNAL MEDICINE

## 2024-09-04 PROCEDURE — 3008F BODY MASS INDEX DOCD: CPT | Mod: CPTII,,, | Performed by: INTERNAL MEDICINE

## 2024-09-04 PROCEDURE — 1159F MED LIST DOCD IN RCRD: CPT | Mod: CPTII,,, | Performed by: INTERNAL MEDICINE

## 2024-09-04 PROCEDURE — 86140 C-REACTIVE PROTEIN: CPT | Performed by: INTERNAL MEDICINE

## 2024-09-04 PROCEDURE — 4010F ACE/ARB THERAPY RXD/TAKEN: CPT | Mod: CPTII,,, | Performed by: INTERNAL MEDICINE

## 2024-09-04 PROCEDURE — 3075F SYST BP GE 130 - 139MM HG: CPT | Mod: CPTII,,, | Performed by: INTERNAL MEDICINE

## 2024-09-04 PROCEDURE — 3078F DIAST BP <80 MM HG: CPT | Mod: CPTII,,, | Performed by: INTERNAL MEDICINE

## 2024-09-04 PROCEDURE — 99215 OFFICE O/P EST HI 40 MIN: CPT | Mod: PBBFAC | Performed by: INTERNAL MEDICINE

## 2024-09-04 RX ORDER — FINASTERIDE 1 MG/1
1 TABLET, FILM COATED ORAL DAILY
COMMUNITY

## 2024-09-04 RX ORDER — TRAMADOL HYDROCHLORIDE 50 MG/1
50 TABLET ORAL EVERY 6 HOURS
COMMUNITY

## 2024-09-04 RX ORDER — BUTALBITAL, ACETAMINOPHEN AND CAFFEINE 50; 325; 40 MG/1; MG/1; MG/1
1 TABLET ORAL EVERY 4 HOURS PRN
COMMUNITY

## 2024-09-04 RX ORDER — METHOCARBAMOL 500 MG/1
500 TABLET, FILM COATED ORAL 3 TIMES DAILY PRN
Qty: 30 TABLET | Refills: 0 | Status: SHIPPED | OUTPATIENT
Start: 2024-09-04 | End: 2024-09-14

## 2024-09-04 RX ORDER — ALBUTEROL SULFATE 90 UG/1
2 INHALANT RESPIRATORY (INHALATION) EVERY 6 HOURS PRN
COMMUNITY

## 2024-09-04 NOTE — PROGRESS NOTES
RHEUMATOLOGY OUTPATIENT CLINIC NOTE    2024    Attending Rheumatologist: Andrzej Peralta  Primary Care Provider/Physician Requesting Consultation: [facility], [facility]   Chief Complaint/Reason For Consultation:  Chronic Pain Syndrome and DDD      Subjective:     Jenifer Westfall is a 49 y.o. White female with chronic pain    Chronic neck and b/l knee pain.  Not taking Lyrica.  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    Review of Systems   Constitutional:  Positive for malaise/fatigue. Negative for fever.   Eyes:  Negative for photophobia and pain.   Respiratory:  Negative for cough.    Cardiovascular:  Negative for chest pain.   Gastrointestinal:  Negative for blood in stool and melena.        No hx of IBD   Genitourinary:  Negative for dysuria, hematuria and urgency.   Musculoskeletal:  Positive for back pain and joint pain. Negative for falls.   Skin:  Negative for rash.        Denies hx of PsO   Neurological:  Negative for focal weakness.       Chronic comorbid conditions affecting medical decision making today:  Past Medical History:   Diagnosis Date    Arthritis     DM (diabetes mellitus)      2017 Insulin x 3 years     Hepatitis C antibody positive in blood 2021    RNA NEGATIVE 3/6/2017, 3/22/22    Hyperlipidemia     Hypertension     IBS (irritable bowel syndrome)     Kidney stone     Marfan syndrome     Mitral valve prolapse     Sleep apnea     moild: no cpap needed     Past Surgical History:   Procedure Laterality Date    ANGIOGRAPHY OF LOWER EXTREMITY N/A 2018    Procedure: ANGIOGRAM, LOWER EXTREMITY- LEFT LEG;  Surgeon: Roscoe Landeros MD;  Location: [facility] CATH LAB;  Service: Peripheral Vascular;  Laterality: N/A;    APPENDECTOMY      BUNIONECTOMY      both feet    BYPASS GRAFT Bilateral     cataract surgery  2019     SECTION      x 2    CHOLECYSTECTOMY      COLONOSCOPY N/A 2020    Procedure: COLONOSCOPY w/ biopsies;   Surgeon: Gio Georges MD;  Location: [facility] ENDO;  Service: Endoscopy;  Laterality: N/A;    ESOPHAGEAL MANOMETRY WITH MEASUREMENT OF IMPEDANCE N/A 04/11/2023    Procedure: MANOMETRY, ESOPHAGUS, WITH IMPEDANCE MEASUREMENT;  Surgeon: Desmond Calderon RN;  Location: [facility] ENDO;  Service: Endoscopy;  Laterality: N/A;    ESOPHAGOGASTRODUODENOSCOPY N/A 07/31/2019    Procedure: ESOPHAGOGASTRODUODENOSCOPY (EGD);  Surgeon: Jaron Sanders III, MD;  Location: [facility] ENDO;  Service: Endoscopy;  Laterality: N/A;    ESOPHAGOGASTRODUODENOSCOPY N/A 04/22/2022    Procedure: EGD (ESOPHAGOGASTRODUODENOSCOPY);  Surgeon: Keith Hardwick MD;  Location: [facility] ENDO;  Service: Gastroenterology;  Laterality: N/A;    INJECTION OF ANESTHETIC AGENT AROUND MULTIPLE INTERCOSTAL NERVES Right 1/19/2024    Procedure: BLOCK, NERVE, INTERCOSTAL, 2 OR MORE;  Surgeon: Chente Cabrera MD;  Location: [facility] OR;  Service: Cardiothoracic;  Laterality: Right;    LOBECTOMY-ROBOTIC Right 1/19/2024    Procedure: LOBECTOMY-ROBOTIC;  Surgeon: Chente Cabrera MD;  Location: [facility] OR;  Service: Cardiothoracic;  Laterality: Right;  Lower Lobe    ROBOT-ASSISTED LAPAROSCOPIC LYMPHADENECTOMY USING DA GOMEZ XI Right 1/19/2024    Procedure: XI ROBOTIC LYMPHADENECTOMY;  Surgeon: Chente Cabrera MD;  Location: [facility] OR;  Service: Cardiothoracic;  Laterality: Right;  Robotic mediastinal lymph node dissection    SELECTIVE INJECTION OF ANESTHETIC AGENT AROUND LUMBAR SPINAL NERVE ROOT BY TRANSFORAMINAL APPROACH Bilateral 11/23/2020    Procedure: Bilateral L5/S1 TF DENY;  Surgeon: Jewel Walters MD;  Location: [facility] PAIN MGT;  Service: Pain Management;  Laterality: Bilateral;    TUBAL LIGATION      VATS, ROBOT-ASSISTED, OR ROBOT-ASSISTED THORACOTOMY Right 1/19/2024    Procedure: VATS, ROBOT-ASSISTED, OR ROBOT-ASSISTED THORACOTOMY;  Surgeon: Chente Cabrera MD;  Location: [facility] OR;  Service: Cardiothoracic;  Laterality: Right;  Robot assisted right lower lobectomy and  "mediastinal lymph node dissection     Family History   Problem Relation Name Age of Onset    Heart disease Mother      Thrombophilia Father          DVT    Hypertension Father      Stroke Maternal Aunt      Heart disease Maternal Aunt      Stroke Maternal Uncle      Heart disease Maternal Uncle      Breast cancer Neg Hx      Colon cancer Neg Hx      Ovarian cancer Neg Hx       Social History     Tobacco Use   Smoking Status Former    Current packs/day: 0.00    Average packs/day: 1 pack/day for 24.0 years (24.0 ttl pk-yrs)    Types: Cigarettes    Start date: 1996    Quit date: 2020    Years since quittin.6   Smokeless Tobacco Never   Tobacco Comments    "once in a blue moon"       Current Outpatient Medications:     albuterol (PROVENTIL) 2.5 mg /3 mL (0.083 %) nebulizer solution, Take 3 mLs (2.5 mg total) by nebulization every 4 to 6 hours as needed for Wheezing or Shortness of Breath., Disp: 360 mL, Rfl: 11    albuterol (PROVENTIL/VENTOLIN HFA) 90 mcg/actuation inhaler, Inhale 2 puffs into the lungs every 6 (six) hours as needed for Wheezing. Rescue, Disp: , Rfl:     ALPRAZolam (XANAX) 0.25 MG tablet, Take 0.25 mg by mouth 2 (two) times daily as needed for Anxiety., Disp: , Rfl:     ascorbic acid (VITAMIN C) 100 MG tablet, Take 100 mg by mouth once daily., Disp: , Rfl:     aspirin (ECOTRIN) 81 MG EC tablet, Take 81 mg by mouth every evening., Disp: , Rfl:     atenoloL (TENORMIN) 25 MG tablet, Take 1 tablet (25 mg total) by mouth every evening., Disp: 90 tablet, Rfl: 3    atorvastatin (LIPITOR) 80 MG tablet, Take 1 tablet (80 mg total) by mouth once daily., Disp: 90 tablet, Rfl: 3    azelastine (ASTELIN) 137 mcg (0.1 %) nasal spray, 2 sprays by Nasal route daily as needed for Rhinitis., Disp: , Rfl:     BD ULTRA-FINE [place] PEN NEEDLE 32 gauge x 5/32" Ndle, Inject 1 each into the skin 3 (three) times daily., Disp: , Rfl:     busPIRone (BUSPAR) 30 MG Tab, Take 30 mg by mouth 2 (two) times daily., Disp: , " Rfl:     butalbital-acetaminophen-caffeine -40 mg (FIORICET, ESGIC) -40 mg per tablet, Take 1 tablet by mouth every 4 (four) hours as needed for Pain., Disp: , Rfl:     clopidogreL (PLAVIX) 75 mg tablet, Take 1 tablet (75 mg total) by mouth once daily., Disp: 90 tablet, Rfl: 3    dapagliflozin propanediol (FARXIGA) 10 mg tablet, Take 1 tablet (10 mg total) by mouth once daily., Disp: 30 tablet, Rfl: 6    dicyclomine (BENTYL) 20 mg tablet, TAKE 1 TABLET BY MOUTH 3 TIMES DAILY BEFORE MEALS AS NEEDED FOR ABDOMINAL PAIN, Disp: 100 tablet, Rfl: 2    evolocumab (REPATHA SURECLICK) 140 mg/mL PnIj, Inject 1 mL (140 mg total) into the skin every 14 (fourteen) days., Disp: 2 mL, Rfl: 11    famotidine (PEPCID) 20 MG tablet, Take 1 tablet (20 mg total) by mouth 2 (two) times daily., Disp: 60 tablet, Rfl: 11    finasteride (PROPECIA) 1 mg tablet, Take 1 mg by mouth once daily., Disp: , Rfl:     FLUoxetine (PROZAC) 40 MG capsule, TAKE 1 CAPSULE BY MOUTH ONCE DAILY (Patient taking differently: Take 40 mg by mouth every evening.), Disp: 90 capsule, Rfl: 0    fluticasone propionate (FLONASE) 50 mcg/actuation nasal spray, 2 sprays by Each Nostril route daily as needed., Disp: , Rfl:     gabapentin (NEURONTIN) 300 MG capsule, TAKE 3 CAPSULES (900 MG TOTAL) BY MOUTH 3 (THREE) TIMES DAILY., Disp: 270 capsule, Rfl: 11    glipiZIDE (GLUCOTROL) 10 MG tablet, Take 1 tablet (10 mg total) by mouth 2 (two) times daily., Disp: 60 tablet, Rfl: 0    isosorbide mononitrate (IMDUR) 30 MG 24 hr tablet, TAKE 1 TABLET (30 MG TOTAL) BY MOUTH ONCE DAILY., Disp: 30 tablet, Rfl: 11    LANTUS SOLOSTAR U-100 INSULIN glargine 100 units/mL SubQ pen, Inject 40 Units into the skin 2 (two) times a day., Disp: , Rfl:     linaCLOtide (LINZESS) 145 mcg Cap capsule, Take 1 capsule (145 mcg total) by mouth before breakfast. (Patient taking differently: Take 145 mcg by mouth daily as needed.), Disp: 30 capsule, Rfl: 2    lisinopriL (PRINIVIL,ZESTRIL) 5 MG  "tablet, Take 1 tablet (5 mg total) by mouth every evening., Disp: 90 tablet, Rfl: 3    mometasone (ELOCON) 0.1 % solution, Apply topically., Disp: , Rfl:     NOVOLOG FLEXPEN U-100 INSULIN 100 unit/mL (3 mL) InPn pen, Inject into the skin 3 (three) times daily with meals., Disp: , Rfl:     omeprazole (PRILOSEC) 40 MG capsule, Take 1 capsule (40 mg total) by mouth once daily., Disp: 30 capsule, Rfl: 11    oxyCODONE-acetaminophen (PERCOCET) 5-325 mg per tablet, Take 1 tablet by mouth every 4 (four) hours as needed for Pain., Disp: 20 tablet, Rfl: 0    PEN NEEDLE 31 gauge x 5/16" Ndle, USE 5 TIMES DAILY WITH LANTUS AND HUMALOG, Disp: 100 each, Rfl: 3    promethazine (PHENERGAN) 25 MG tablet, , Disp: , Rfl:     SITagliptin phosphate (JANUVIA) 50 MG Tab, Take 50 mg by mouth once daily. Added to chart 1/18/24, Disp: , Rfl:     tiotropium (SPIRIVA) 18 mcg inhalation capsule, Inhale 1 capsule (18 mcg total) into the lungs once daily. Controller, Disp: 30 capsule, Rfl: 11    tiZANidine (ZANAFLEX) 4 MG tablet, TAKE 1 TO 1 AND 1/2 TABLETS (4 TO 6MG TOTAL) BY MOUTH NIGHTLY AS NEEDED. (Patient taking differently: Take 4 mg by mouth before meals as needed.), Disp: 45 tablet, Rfl: 5    traMADoL (ULTRAM) 50 mg tablet, Take 50 mg by mouth every 6 (six) hours., Disp: , Rfl:     TRUE METRIX GLUCOSE TEST STRIP Strp, , Disp: , Rfl:     ZINC ORAL, Take 1 tablet by mouth once daily. PT UNSURE OF DOSE, Disp: , Rfl:     famotidine (PEPCID) 20 MG tablet, TAKE 1 TABLET (20 MG TOTAL) BY MOUTH 2 (TWO) TIMES DAILY., Disp: 60 tablet, Rfl: 11    Current Facility-Administered Medications:     DOBUtamine 1000 mg in D5W 250 mL infusion, 10 mcg/kg/min (Order-Specific), Intravenous, Continuous, Julisa Hansen, FNP-C, Last Rate: 15.4 mL/hr at 01/09/24 1113, 10 mcg/kg/min at 01/09/24 1113    sodium chloride 0.9% flush 10 mL, 10 mL, Intravenous, PRN, Brian Delgado MD    sodium chloride 0.9% flush 10 mL, 10 mL, Intravenous, PRN, Chente Cabrera MD    " sodium chloride 0.9% flush 10 mL, 10 mL, Intravenous, PRN, Chente Cabrera MD     Objective:     Vitals:    09/04/24 0920   BP: 136/74   Pulse: 76     Physical Exam   Eyes: Conjunctivae are normal.   Pulmonary/Chest: Effort normal. No respiratory distress.   Musculoskeletal:         General: Deformity present. No swelling. Normal range of motion.   Neurological: She displays no weakness.   Skin: No rash noted.       Reviewed available old and all outside pertinent medical records available.    All lab results personally reviewed and interpreted by me.       ASSESSMENT / PLAN     1. Chronic pain syndrome  Negative rheumatic w/u.  No evidence of erosive arthropathy or ankylosis reported.   Repeating w/u for prognosis.  Cyclic Citrullinated Peptide Antibody, IgG    Rheumatoid Factor    methocarbamoL (ROBAXIN) 500 MG Tab      2. Tendinitis of right shoulder  Ambulatory referral/consult to Physical/Occupational Therapy      3. Neck pain  Ambulatory referral/consult to Physical/Occupational Therapy      4. Chronic low back pain, unspecified back pain laterality, unspecified whether sciatica present  Ambulatory referral/consult to Physical/Occupational Therapy      5. Chronic pain of both knees  Ambulatory referral/consult to Physical/Occupational Therapy      6. Arthralgia, unspecified joint  No abnormal foci of FDG uptake identified on PET (CT 5/2023).  Cyclic Citrullinated Peptide Antibody, IgG    Sedimentation rate    Rheumatoid Factor    C-Reactive Protein      7. Chronic fatigue  CK    Aldolase      8. Vitamin D insufficiency  Vitamin D      9. Sicca syndrome  KODI Screen w/Reflex      10. Diabetic peripheral neuropathy associated with type 2 diabetes mellitus  Caution advised with systemic steroid use      11. Lumbar radiculopathy  DISH findings present mid spine.  Negative HLAB27  No sacroiliitis reported on imaging on file.      12. Current mild episode of major depressive disorder without prior episode   Somatization likely.  Would benefit from cognitive behavioral therapy.      13. Liver fibrosis  Monitor for liver chemistry elevation =>3X ULN                Andrzej Peralta M.D.

## 2024-09-05 DIAGNOSIS — E55.9 VITAMIN D DEFICIENCY: ICD-10-CM

## 2024-09-05 DIAGNOSIS — M54.2 CERVICALGIA: ICD-10-CM

## 2024-09-05 DIAGNOSIS — M46.96 INFLAMMATORY SPONDYLOPATHY OF LUMBAR REGION: Primary | ICD-10-CM

## 2024-09-05 LAB — ANA SER QL IF: NORMAL

## 2024-09-05 RX ORDER — ERGOCALCIFEROL 1.25 MG/1
50000 CAPSULE ORAL
Qty: 12 CAPSULE | Refills: 1 | Status: SHIPPED | OUTPATIENT
Start: 2024-09-05 | End: 2024-12-04

## 2024-09-06 ENCOUNTER — TELEPHONE (OUTPATIENT)
Dept: RHEUMATOLOGY | Facility: CLINIC | Age: 49
End: 2024-09-06
Payer: MEDICAID

## 2024-09-06 LAB — ALDOLASE SERPL-CCNC: 3.5 U/L (ref 1.2–7.6)

## 2024-09-06 NOTE — TELEPHONE ENCOUNTER
----- Message from Andrzej Peralta MD sent at 9/5/2024  1:19 PM CDT -----  Recommend high dose vitamin D supplementation.

## 2024-09-06 NOTE — TELEPHONE ENCOUNTER
Patient informed of test results and Dr. Peralta recommendations . Patient was instructed on how to take her vitamin D. Verbalized understanding .

## 2024-09-10 ENCOUNTER — PATIENT MESSAGE (OUTPATIENT)
Dept: SPORTS MEDICINE | Facility: CLINIC | Age: 49
End: 2024-09-10
Payer: MEDICAID

## 2024-09-10 ENCOUNTER — HOSPITAL ENCOUNTER (OUTPATIENT)
Dept: RADIOLOGY | Facility: HOSPITAL | Age: 49
Discharge: HOME | End: 2024-09-10
Attending: STUDENT IN AN ORGANIZED HEALTH CARE EDUCATION/TRAINING PROGRAM
Payer: MEDICAID

## 2024-09-10 DIAGNOSIS — M25.511 CHRONIC RIGHT SHOULDER PAIN: ICD-10-CM

## 2024-09-10 DIAGNOSIS — G89.29 CHRONIC RIGHT SHOULDER PAIN: ICD-10-CM

## 2024-09-10 DIAGNOSIS — M75.101 ROTATOR CUFF TEAR ARTHROPATHY OF RIGHT SHOULDER: ICD-10-CM

## 2024-09-10 DIAGNOSIS — M12.811 ROTATOR CUFF TEAR ARTHROPATHY OF RIGHT SHOULDER: ICD-10-CM

## 2024-09-10 PROCEDURE — 73221 MRI JOINT UPR EXTREM W/O DYE: CPT | Mod: 26,RT,, | Performed by: RADIOLOGY

## 2024-09-10 PROCEDURE — 73221 MRI JOINT UPR EXTREM W/O DYE: CPT | Mod: TC,RT

## 2024-09-17 NOTE — PROGRESS NOTES
Orthopaedic Follow-Up Visit    Last Appointment: 8/21/24  Diagnosis: Right shoulder rotator cuff tear, chronic   Prior Procedure: MRI    Jenifer Westfall is a 49 y.o. female who is here for f/u evaluation of her right shoulder. The patient was last seen here by me for her right shoulder on 8/21/24 at which point she has right shoulder pain and known rotator cuff tear on the right side.  Her A1C was stabilized to a level that we could pursue operative treatment. Since her prior MRI was from 2019, I recommended moving forward with new MRI at that time for further evaluation of the current state of her rotator cuff tear. Discussed with her that over the past 5 years she could have developed atrophy that makes the tear irreparable. The patient returns today to review her MRI and discuss further treatment options. Today she reports her right should is not very symptomatic but her left shoulder is bothering her more at this time. We have also seen her for the left shoulder in the past and performed CSI into the SAS. She also reports chronic neck pain that she is seeing another provider for. She was referred to aquatic therapy and is scheduled to start this tomorrow at the [facility].     To review her history, Jenifer Westfall is a 49 y.o. right-hand dominant female who presented on 12/13/22 with RIGHT shoulder and neck pain and dysfunction that began about 3 years ago. She had gradual onset of symptoms for her right shoulder and neck. Her symptoms included intermittent sharp, stabbing pain of the right shoulder which radiates to right neck. Her pain was aggravated by movement. She had tried rest, activity modification, Zanaflex, Gabapentin, and physical therapy. She had right shoulder pain and known rotator cuff tear on the right side.  She also had known peripheral neuropathy that severely affected her left foot and seemed to also be affecting her left hand. Because of her neuropathy, she used a cane in her right hand for  ambulation.  We discussed that I thought she would have a very difficult time with any kind of operative treatment because of her left lower extremity neuropathy and need to use a cane in her right hand. We discussed that I felt it would be reasonable to proceed with injection in the right shoulder for symptomatic treatment of pain as result of her cuff tear, however we were unable to perform injection due to A1c 12.0 with reviewing of her last lab from (3/28/2022) and possible upcoming spine surgery. Discussed if she has preoperative testing for her spine surgery and her A1c decreases that we can consider a right shoulder CSI in the future. She returned on 9/27/23 at which point we performed injection in the right shoulder for symptomatic treatment of pain as result of her cuff tear. However, think she would have a very difficult time with any kind of operative treatment because of her left lower extremity neuropathy and need to use a cane in her right hand. She returned on 5/22/24 noting left shoulder pain. She had left shoulder greater tuberosity  degenerative changes and exam suspicious for rotator cuff tear. We elected to proceed with left SAS CSI at that time.      Patient's medications, allergies, past medical, surgical, social and family histories were reviewed and updated as appropriate.    Review of Systems   All systems reviewed were negative.  Specifically, the patient denies fever, chills, weight loss, chest pain, shortness of breath, or dyspnea on exertion.      Past Medical History:   Diagnosis Date    Arthritis     DM (diabetes mellitus) 2009     03/01/2017 Insulin x 3 years 2013    Hepatitis C antibody positive in blood 03/04/2021    RNA NEGATIVE 3/6/2017, 3/22/22    Hyperlipidemia     Hypertension     IBS (irritable bowel syndrome)     Kidney stone     Marfan syndrome     Mitral valve prolapse     Sleep apnea     moild: no cpap needed       Past Surgical History:   Procedure Laterality Date     ANGIOGRAPHY OF LOWER EXTREMITY N/A 2018    Procedure: ANGIOGRAM, LOWER EXTREMITY- LEFT LEG;  Surgeon: Roscoe Landeros MD;  Location: [facility] CATH LAB;  Service: Peripheral Vascular;  Laterality: N/A;    APPENDECTOMY      BUNIONECTOMY      both feet    BYPASS GRAFT Bilateral     cataract surgery  2019     SECTION      x 2    CHOLECYSTECTOMY      COLONOSCOPY N/A 2020    Procedure: COLONOSCOPY w/ biopsies;  Surgeon: Gio Georges MD;  Location: [facility];  Service: Endoscopy;  Laterality: N/A;    ESOPHAGEAL MANOMETRY WITH MEASUREMENT OF IMPEDANCE N/A 2023    Procedure: MANOMETRY, ESOPHAGUS, WITH IMPEDANCE MEASUREMENT;  Surgeon: Desmond Calderon RN;  Location: [facility] ENDO;  Service: Endoscopy;  Laterality: N/A;    ESOPHAGOGASTRODUODENOSCOPY N/A 2019    Procedure: ESOPHAGOGASTRODUODENOSCOPY (EGD);  Surgeon: Jaron Sanders III, MD;  Location: [facility];  Service: Endoscopy;  Laterality: N/A;    ESOPHAGOGASTRODUODENOSCOPY N/A 2022    Procedure: EGD (ESOPHAGOGASTRODUODENOSCOPY);  Surgeon: Keith Hardwick MD;  Location: [facility];  Service: Gastroenterology;  Laterality: N/A;    INJECTION OF ANESTHETIC AGENT AROUND MULTIPLE INTERCOSTAL NERVES Right 2024    Procedure: BLOCK, NERVE, INTERCOSTAL, 2 OR MORE;  Surgeon: Chente Cabrera MD;  Location: [facility];  Service: Cardiothoracic;  Laterality: Right;    LOBECTOMY-ROBOTIC Right 2024    Procedure: LOBECTOMY-ROBOTIC;  Surgeon: Chente Cabrera MD;  Location: [facility] OR;  Service: Cardiothoracic;  Laterality: Right;  Lower Lobe    ROBOT-ASSISTED LAPAROSCOPIC LYMPHADENECTOMY USING DA GOMEZ XI Right 2024    Procedure: XI ROBOTIC LYMPHADENECTOMY;  Surgeon: Chente Cabrera MD;  Location: [facility];  Service: Cardiothoracic;  Laterality: Right;  Robotic mediastinal lymph node dissection    SELECTIVE INJECTION OF ANESTHETIC AGENT AROUND LUMBAR SPINAL NERVE ROOT BY TRANSFORAMINAL APPROACH Bilateral 2020    Procedure:  "Bilateral L5/S1 TF DENY;  Surgeon: Jewel Walters MD;  Location: [facility] MGT;  Service: Pain Management;  Laterality: Bilateral;    TUBAL LIGATION      VATS, ROBOT-ASSISTED, OR ROBOT-ASSISTED THORACOTOMY Right 1/19/2024    Procedure: VATS, ROBOT-ASSISTED, OR ROBOT-ASSISTED THORACOTOMY;  Surgeon: Chente Cabrera MD;  Location: [facility] OR;  Service: Cardiothoracic;  Laterality: Right;  Robot assisted right lower lobectomy and mediastinal lymph node dissection       Patient's Medications   New Prescriptions    No medications on file   Previous Medications    ALBUTEROL (PROVENTIL/VENTOLIN HFA) 90 MCG/ACTUATION INHALER    Inhale 2 puffs into the lungs every 6 (six) hours as needed for Wheezing. Rescue    ALPRAZOLAM (XANAX) 0.25 MG TABLET    Take 0.25 mg by mouth 2 (two) times daily as needed for Anxiety.    ASCORBIC ACID (VITAMIN C) 100 MG TABLET    Take 100 mg by mouth once daily.    ASPIRIN (ECOTRIN) 81 MG EC TABLET    Take 81 mg by mouth every evening.    ATENOLOL (TENORMIN) 25 MG TABLET    Take 1 tablet (25 mg total) by mouth every evening.    ATORVASTATIN (LIPITOR) 80 MG TABLET    Take 1 tablet (80 mg total) by mouth once daily.    AZELASTINE (ASTELIN) 137 MCG (0.1 %) NASAL SPRAY    2 sprays by Nasal route daily as needed for Rhinitis.    BD ULTRA-FINE [place] PEN NEEDLE 32 GAUGE X 5/32" NDLE    Inject 1 each into the skin 3 (three) times daily.    BUSPIRONE (BUSPAR) 30 MG TAB    Take 30 mg by mouth 2 (two) times daily.    BUTALBITAL-ACETAMINOPHEN-CAFFEINE -40 MG (FIORICET, ESGIC) -40 MG PER TABLET    Take 1 tablet by mouth every 4 (four) hours as needed for Pain.    CLOPIDOGREL (PLAVIX) 75 MG TABLET    Take 1 tablet (75 mg total) by mouth once daily.    DAPAGLIFLOZIN PROPANEDIOL (FARXIGA) 10 MG TABLET    Take 1 tablet (10 mg total) by mouth once daily.    DICYCLOMINE (BENTYL) 20 MG TABLET    TAKE 1 TABLET BY MOUTH 3 TIMES DAILY BEFORE MEALS AS NEEDED FOR ABDOMINAL PAIN    ERGOCALCIFEROL " "(ERGOCALCIFEROL) 50,000 UNIT CAP    Take 1 capsule (50,000 Units total) by mouth every 7 days.    EVOLOCUMAB (REPATHA SURECLICK) 140 MG/ML PNIJ    Inject 1 mL (140 mg total) into the skin every 14 (fourteen) days.    FAMOTIDINE (PEPCID) 20 MG TABLET    TAKE 1 TABLET (20 MG TOTAL) BY MOUTH 2 (TWO) TIMES DAILY.    FAMOTIDINE (PEPCID) 20 MG TABLET    Take 1 tablet (20 mg total) by mouth 2 (two) times daily.    FINASTERIDE (PROPECIA) 1 MG TABLET    Take 1 mg by mouth once daily.    FLUOXETINE (PROZAC) 40 MG CAPSULE    TAKE 1 CAPSULE BY MOUTH ONCE DAILY    FLUTICASONE PROPIONATE (FLONASE) 50 MCG/ACTUATION NASAL SPRAY    2 sprays by Each Nostril route daily as needed.    GABAPENTIN (NEURONTIN) 300 MG CAPSULE    TAKE 3 CAPSULES (900 MG TOTAL) BY MOUTH 3 (THREE) TIMES DAILY.    GLIPIZIDE (GLUCOTROL) 10 MG TABLET    Take 1 tablet (10 mg total) by mouth 2 (two) times daily.    ISOSORBIDE MONONITRATE (IMDUR) 30 MG 24 HR TABLET    TAKE 1 TABLET (30 MG TOTAL) BY MOUTH ONCE DAILY.    LANTUS SOLOSTAR U-100 INSULIN GLARGINE 100 UNITS/ML SUBQ PEN    Inject 40 Units into the skin 2 (two) times a day.    LINACLOTIDE (LINZESS) 145 MCG CAP CAPSULE    Take 1 capsule (145 mcg total) by mouth before breakfast.    LISINOPRIL (PRINIVIL,ZESTRIL) 5 MG TABLET    Take 1 tablet (5 mg total) by mouth every evening.    MOMETASONE (ELOCON) 0.1 % SOLUTION    Apply topically.    NOVOLOG FLEXPEN U-100 INSULIN 100 UNIT/ML (3 ML) INPN PEN    Inject into the skin 3 (three) times daily with meals.    OMEPRAZOLE (PRILOSEC) 40 MG CAPSULE    Take 1 capsule (40 mg total) by mouth once daily.    OXYCODONE-ACETAMINOPHEN (PERCOCET) 5-325 MG PER TABLET    Take 1 tablet by mouth every 4 (four) hours as needed for Pain.    PEN NEEDLE 31 GAUGE X 5/16" NDLE    USE 5 TIMES DAILY WITH LANTUS AND HUMALOG    PROMETHAZINE (PHENERGAN) 25 MG TABLET        SITAGLIPTIN PHOSPHATE (JANUVIA) 50 MG TAB    Take 50 mg by mouth once daily. Added to chart 1/18/24    TIOTROPIUM " (SPIRIVA) 18 MCG INHALATION CAPSULE    Inhale 1 capsule (18 mcg total) into the lungs once daily. Controller    TIZANIDINE (ZANAFLEX) 4 MG TABLET    TAKE 1 TO 1 AND 1/2 TABLETS (4 TO 6MG TOTAL) BY MOUTH NIGHTLY AS NEEDED.    TRAMADOL (ULTRAM) 50 MG TABLET    Take 50 mg by mouth every 6 (six) hours.    TRUE METRIX GLUCOSE TEST STRIP STRP        ZINC ORAL    Take 1 tablet by mouth once daily. PT UNSURE OF DOSE   Modified Medications    No medications on file   Discontinued Medications    No medications on file       Family History   Problem Relation Name Age of Onset    Heart disease Mother      Thrombophilia Father          DVT    Hypertension Father      Stroke Maternal Aunt      Heart disease Maternal Aunt      Stroke Maternal Uncle      Heart disease Maternal Uncle      Breast cancer Neg Hx      Colon cancer Neg Hx      Ovarian cancer Neg Hx         Review of patient's allergies indicates:   Allergen Reactions    Compazine [prochlorperazine edisylate] Rash    Contrast media Anaphylaxis    Dye Anaphylaxis     Contrast dye    Iodinated contrast media Anaphylaxis     Other reaction(s): anaphylaxis    Levaquin [levofloxacin] Hives and Itching    Morphine Other (See Comments)     Irritable  Iritability  Irritable    Prochlorperazine Rash    Metformin Other (See Comments)     Upset stomach    Ibuprofen Other (See Comments) and Nausea And Vomiting     Stomach pain  Stomach pain    Abdominal pain     Methocarbamol Nausea Only         Objective:      Physical Exam  Cervical exam. Tenderness along midline. Decreased cervical ROM. Positive Spurling's test     Physical Exam:                       RIGHT                                     LEFT     Scap Dyskinesis/Winging       (-)                                             (-)     Tenderness:                                                                              Greater Tuberosity                  (-)                                              +  Bicipital Groove                        (-)                                             +  AC joint                                   (-)                                             (-)  Other:      ROM:  Forward Elevation       140                                          90  Abduction                    90                                            70  ER (at side)                 50                                            40  IR                                 Hip                      Back pocket     Strength:   Supraspinatus             4/5                                           4/5  Infraspinatus               4/5                                           5/5  Subscap / IR               4+/5                                         5/5      Special Tests:              Neer:                                       +                                             +              Gordon:                                 +                                             +              SS Stress:                               +                                             +              Bear Hug:                                (-)                                             (-)              [place]'s:                                 (-)                                             +              Resisted Thrower's:                +                                               +              Cross Arm Abduction:             (-)                                             (-)    Neurovascular examination  - Motor grossly intact bilaterally to shoulder abduction, elbow flexion and extension, wrist flexion and extension, and intrinsic hand musculature  - Sensation intact to light touch bilaterally in axillary, median, radial, and ulnar distributions  - Symmetrical radial pulses    Imaging:    XR Results:  Results for orders placed during the hospital encounter of 12/13/22    X-ray Shoulder 2 or More Views Right    Narrative  EXAMINATION:  XR  SHOULDER COMPLETE 2 OR MORE VIEWS RIGHT    CLINICAL HISTORY:  Pain in right shoulder    TECHNIQUE:  Two or three views of the right shoulder were performed.    COMPARISON:  01/27/2022, 07/30/2020, 08/02/2019 and 11/19/2018.    FINDINGS:  No acute fracture or dislocation.  No significant soft tissue swelling.    Humeral head normally positioned with the glenoid cavity.  Mild glenohumeral degenerative osteoarthrosis with mild joint space narrowing and small osteophyte formation.  Chronic small calcific densities are again present along the humeral head and proximal diaphysis of the humerus.    AC joint intact with mild degenerative osteoarthrosis.    Impression  Mild chronic changes of degenerative osteoarthrosis.      Electronically signed by: Mike Iqbal  Date:    12/13/2022  Time:    15:51      MRI Results:    MRI Shoulder Without Contrast Right  Narrative: EXAM:  MRI SHOULDER WITHOUT CONTRAST RIGHT    CLINICAL HISTORY: Right shoulder pain. Shoulder pain, rotator cuff disorder suspected, xray done; [M75.101]-Unspecified rotator cuff tear or rupture of right shoulder, not specified as traumatic./[M12.811]-Other specific arthropathies, not elsewhere classified, right shoulder./[M25.511]-P    TECHNIQUE: Standard multiplanar, multisequence MR imaging protocol of the right shoulder was performed.    FINDINGS: Patient motion degrades image quality.    Rotator cuff tendons: Large full-thickness tear of the entire or majority of the supraspinatus tendon insertion with up to 2.2 cm of medial tendon retraction. No other discrete rotator cuff tear identified.  Calcium hydroxyapatite deposition along the bursal surface of the teres minor tendon and pectoralis major tendon insertion without significant tendinitis.    Muscles: Minor supraspinatus muscle atrophy.    Bursitis: Mild subacromial bursitis.    Long head biceps tendon: Obscured by patient motion without obvious full-thickness tear.    Labrum and glenohumeral  ligaments: No obvious tears.    Bones: No fractures, marrow edema or suspicious bone lesions.    Glenohumeral joint: Joint alignment is within normal limits.  Mild osteoarthritis with generalized low-grade chondral thinning and small marginal osteophytes.    AC joint: No significant osteoarthritis.  Normal alignment.    Acromial arch: Type II acromion without downsloping.  No os acromiale.  Impression: 1.  Large full-thickness tear of the supraspinatus tendon with minor muscle atrophy and mild subacromial bursitis.  2.  Mild glenohumeral osteoarthritis.  3.  Calcium hydroxyapatite deposition along the teres minor tendon and pectoralis major tendon insertion without significant tendinitis.    Finalized on: 9/10/2024 11:21 AM By:  Desmond Beck MD  [facility]# [id number]      2024-09-10 11:23:[id number]    [facility]         CT Results:  No results found for this or any previous visit.      Physician read: I agree with the above impression.    Assessment/Plan:   Jenifer Westfall is a 49 y.o. female with right shoulder rotator cuff tear and chronic left shoulder pain, suspected rotator cuff pathology     Plan:    Reviewed MRI with the patient today. Her MRI shows right shoulder rotator cuff tear and some evidence of mild glenohumeral arthritis.    However today she reports that her left shoulder is bothering her more at this time. She has limited ROM and strength on physical exam. She has been treated with subacromial space CSI (5/22/24) for her left shoulder with some relief but her symptoms have ultimately returned and persisted.   I recommend proceeding with MRI of the left shoulder at this time for further evaluation for suspected rotator cuff tear.   Follow up with me after MRI.         All Jain MD    I, Shahzad Ravi, acted as a scribe for All Jain MD for the duration of this office visit.

## 2024-09-18 ENCOUNTER — OFFICE VISIT (OUTPATIENT)
Dept: SPORTS MEDICINE | Facility: CLINIC | Age: 49
End: 2024-09-18
Payer: MEDICAID

## 2024-09-18 DIAGNOSIS — M75.102 NONTRAUMATIC TEAR OF LEFT ROTATOR CUFF, UNSPECIFIED TEAR EXTENT: Primary | ICD-10-CM

## 2024-09-18 DIAGNOSIS — M75.121 NONTRAUMATIC COMPLETE TEAR OF RIGHT ROTATOR CUFF: ICD-10-CM

## 2024-09-18 DIAGNOSIS — M25.512 CHRONIC LEFT SHOULDER PAIN: ICD-10-CM

## 2024-09-18 DIAGNOSIS — G89.29 CHRONIC LEFT SHOULDER PAIN: ICD-10-CM

## 2024-09-18 PROCEDURE — 99999 PR PBB SHADOW E&M-EST. PATIENT-LVL IV: CPT | Mod: PBBFAC,,, | Performed by: STUDENT IN AN ORGANIZED HEALTH CARE EDUCATION/TRAINING PROGRAM

## 2024-09-18 PROCEDURE — 99214 OFFICE O/P EST MOD 30 MIN: CPT | Mod: S$PBB,,, | Performed by: STUDENT IN AN ORGANIZED HEALTH CARE EDUCATION/TRAINING PROGRAM

## 2024-09-18 PROCEDURE — 4010F ACE/ARB THERAPY RXD/TAKEN: CPT | Mod: CPTII,,, | Performed by: STUDENT IN AN ORGANIZED HEALTH CARE EDUCATION/TRAINING PROGRAM

## 2024-09-18 PROCEDURE — 1160F RVW MEDS BY RX/DR IN RCRD: CPT | Mod: CPTII,,, | Performed by: STUDENT IN AN ORGANIZED HEALTH CARE EDUCATION/TRAINING PROGRAM

## 2024-09-18 PROCEDURE — 3051F HG A1C>EQUAL 7.0%<8.0%: CPT | Mod: CPTII,,, | Performed by: STUDENT IN AN ORGANIZED HEALTH CARE EDUCATION/TRAINING PROGRAM

## 2024-09-18 PROCEDURE — 1159F MED LIST DOCD IN RCRD: CPT | Mod: CPTII,,, | Performed by: STUDENT IN AN ORGANIZED HEALTH CARE EDUCATION/TRAINING PROGRAM

## 2024-09-18 PROCEDURE — 99214 OFFICE O/P EST MOD 30 MIN: CPT | Mod: PBBFAC | Performed by: STUDENT IN AN ORGANIZED HEALTH CARE EDUCATION/TRAINING PROGRAM

## 2024-09-18 RX ORDER — METHOCARBAMOL 500 MG/1
500 TABLET, FILM COATED ORAL 3 TIMES DAILY PRN
COMMUNITY

## 2024-09-19 ENCOUNTER — CLINICAL SUPPORT (OUTPATIENT)
Dept: REHABILITATION | Facility: HOSPITAL | Age: 49
End: 2024-09-19
Attending: INTERNAL MEDICINE
Payer: MEDICAID

## 2024-09-19 DIAGNOSIS — M77.8 TENDINITIS OF RIGHT SHOULDER: ICD-10-CM

## 2024-09-19 DIAGNOSIS — M54.2 NECK PAIN: ICD-10-CM

## 2024-09-19 DIAGNOSIS — R29.898 WEAKNESS OF BOTH LOWER EXTREMITIES: ICD-10-CM

## 2024-09-19 DIAGNOSIS — M54.50 CHRONIC LOW BACK PAIN, UNSPECIFIED BACK PAIN LATERALITY, UNSPECIFIED WHETHER SCIATICA PRESENT: ICD-10-CM

## 2024-09-19 DIAGNOSIS — M25.562 CHRONIC PAIN OF BOTH KNEES: ICD-10-CM

## 2024-09-19 DIAGNOSIS — M53.86 DECREASED RANGE OF MOTION OF LUMBAR SPINE: ICD-10-CM

## 2024-09-19 DIAGNOSIS — R68.89 DECREASED FUNCTIONAL ACTIVITY TOLERANCE: ICD-10-CM

## 2024-09-19 DIAGNOSIS — R29.3 POSTURAL INSTABILITY: ICD-10-CM

## 2024-09-19 DIAGNOSIS — M25.561 CHRONIC PAIN OF BOTH KNEES: ICD-10-CM

## 2024-09-19 DIAGNOSIS — R29.898 DECREASED STRENGTH OF UPPER EXTREMITY: ICD-10-CM

## 2024-09-19 DIAGNOSIS — G89.29 CHRONIC LOW BACK PAIN, UNSPECIFIED BACK PAIN LATERALITY, UNSPECIFIED WHETHER SCIATICA PRESENT: ICD-10-CM

## 2024-09-19 DIAGNOSIS — G89.29 CHRONIC PAIN OF BOTH KNEES: ICD-10-CM

## 2024-09-19 PROCEDURE — 97535 SELF CARE MNGMENT TRAINING: CPT | Performed by: PHYSICAL THERAPIST

## 2024-09-19 PROCEDURE — 97163 PT EVAL HIGH COMPLEX 45 MIN: CPT | Performed by: PHYSICAL THERAPIST

## 2024-09-19 NOTE — PLAN OF CARE
[facility]   Physical Therapy Initial Evaluation      Date: 9/19/2024   Name: Jenifer Westfall  Clinic Number: [id number]    Therapy Diagnosis:    Encounter Diagnoses   Name Primary?    Tendinitis of right shoulder     Neck pain     Chronic low back pain, unspecified back pain laterality, unspecified whether sciatica present     Chronic pain of both knees     Decreased range of motion of lumbar spine     Decreased strength of upper extremity     Weakness of both lower extremities     Postural instability     Decreased functional activity tolerance       Physician: Andrzej Peralta MD     Physician Orders: PT Eval and Treat  Medical Diagnosis from Referral: Tendinitis of right shoulder [M77.8]   Neck pain [M54.2]   Chronic low back pain, unspecified back pain laterality, unspecified whether sciatica present [M54.50, G89.29]   Chronic pain of both knees [M25.561, M25.562, G89.29]     Evaluation Date: 9/19/2024  Authorization Period Expiration: 9/4/2025  Plan of Care Expiration: 11/19/2024  Progress Note Due: 10/19/2024  Visit # / Visits authorized: 1/1   FOTO: 1/3 (last performed on 9/19/2024)    Precautions: Standard and Fall    Time In: 3:18 pm   Time Out: 4:00 pm   Total Billable Time (timed & untimed codes): 40 minutes    Subjective     Date of onset: chronic    History of current condition - Jenifer reports pain in both shoulders with rotator cuff damage in both shoulders, severe arthritis in the knees and the neck, 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  Uses a cane because of the left leg for ~2 years. She had 3 falls in 2023.  Patient states that the neck and the knees are the main focus.      Left shoulder has been worse than the right for the past few weeks.      POOL SCREEN  [] YES [x] NO -  Water Borne Diseases (typhoid, cholera, dysentery)  [x] YES [] NO -  Gastrointestinal Disease/ complication  [x] YES [] NO -  Incontinence of feces or  urine  [] YES [x] NO -  Menstruation without internal protection  [] YES [x] NO -  Contagious Skin Rashes (shingles, athletes foot)   [] YES [] NO -  Infectious Diseases (AIDS, MRSA, Hepatitis)   [] YES [x] NO -  Open Wounds/Sores   [] YES [x] NO -  Epilepsy/ seizures   [] YES [x] NO -  Perforated Eardrum   [] YES [x] NO -  Current or recent radiation treatment (within last 3 months)  [x] YES [] NO -  Abnormal Blood Pressure (uncontrolled hypertension or hypotension)  [] YES [x] NO -  Kidney Disease (uncontrolled fluid adjustments)   [] YES [x] NO -  Fever of more than 100 degrees (38C)   [] YES [x] NO -  Cardiac Failure (must get MD clearance)   [x] YES [] NO -  Low Vital Lunge Capacity (900-1500 me)   [] YES [x] NO -  Medications that may cause drowsiness   [] YES [x] NO -  Hydrophobia (fear of water)  [] YES [x] NO -  Extraneous Connections to Tubing (catheter, colostomy)  [] YES [x] NO -  Bandages that are not waterproof  [] YES [x] NO -  Allergies to Chlorine  [] YES [x] NO -  Intolerance to Warm Water/Humidity  [] YES [x] NO -  Presents in Wheelchair    If YES to any above (explanation): takes blood pressure medications but notes that the BP has been lower at times. Partial lobectomy right lung.  IBS but she has noticed what causes it to flare it.  Patient wears brief therefor unable to get into the pool at this time.    MOBILITY:   [x] YES [] NO -  Patient able to Ascend and Descend 5 steps unassisted  [x] YES [] NO -  Patient able to ambulate 10 feet unassisted    Imaging: [] Xray [x] MRI [] CT: Performed on: 9/10/2024 Right shoulder   Impression:     1.  Large full-thickness tear of the supraspinatus tendon with minor muscle atrophy and mild subacromial bursitis.  2.  Mild glenohumeral osteoarthritis.  3.  Calcium hydroxyapatite deposition along the teres minor tendon and pectoralis major tendon insertion without significant tendinitis.    Pain:  Current 8/10, worst 10/10, best 2/10   Location: [x] Right    [x] Left:  overall whole body pain.    Description: aching , burning, numbness, and tingling  Aggravating Factors: sitting for a long time, walking  Easing Factors: activity avoidance, rest, muscle relaxers     Prior Therapy:   [] N/A    [x] Yes: but not a lot of help for shoulders or back.    Social History: Pt lives with their family with 10-12 stairs to enter  Occupation: Pt is on disability   Prior Level of Function: Independent and pain free with all ADL, IADL, community mobility and functional activities.   Current Level of Function: Independent with all ADL, IADL, community mobility and functional activities with reports of increased pain and need for increased time and frequent breaks.      Dominant Extremity:    [x] Right    [] Left    Pts goals: Pt reported goals are to decrease overall pain levels in order to return to prior functional level.     Medical History:   Past Medical History:   Diagnosis Date    Arthritis     DM (diabetes mellitus)      2017 Insulin x 3 years     Hepatitis C antibody positive in blood 2021    RNA NEGATIVE 3/6/2017, 3/22/22    Hyperlipidemia     Hypertension     IBS (irritable bowel syndrome)     Kidney stone     Marfan syndrome     Mitral valve prolapse     Sleep apnea     moild: no cpap needed       Surgical History:   Jenifer Westfall  has a past surgical history that includes Cholecystectomy; Bunionectomy; Tubal ligation;  section; Appendectomy; Angiography of lower extremity (N/A, 2018); cataract surgery (2019); Esophagogastroduodenoscopy (N/A, 2019); Colonoscopy (N/A, 2020); Selective injection of anesthetic agent around lumbar spinal nerve root by transforaminal approach (Bilateral, 2020); Esophagogastroduodenoscopy (N/A, 2022); Bypass Graft (Bilateral); Esophageal manometry with measurement of impedance (N/A, 2023); vats, robot-assisted, or robot-assisted thoracotomy (Right, 2024); Injection of  anesthetic agent around multiple intercostal nerves (Right, 1/19/2024); Robot-assisted laparoscopic lymphadenectomy using da Lenora Xi (Right, 1/19/2024); and lobectomy-robotic (Right, 1/19/2024).    Medications:   Jenifer has a current medication list which includes the following prescription(s): albuterol, alprazolam, ascorbic acid (vitamin c), aspirin, atenolol, atorvastatin, azelastine, bd ultra-fine jose pen needle, buspirone, butalbital-acetaminophen-caffeine -40 mg, clopidogrel, farxiga, dicyclomine, ergocalciferol, evolocumab, famotidine, famotidine, finasteride, fluoxetine, fluticasone propionate, gabapentin, glipizide, isosorbide mononitrate, lantus solostar u-100 insulin, linaclotide, lisinopril, methocarbamol, mometasone, novolog flexpen u-100 insulin, omeprazole, oxycodone-acetaminophen, pen needle, promethazine, sitagliptin phosphate, tiotropium, tizanidine, tramadol, true metrix glucose test strip, and zinc, and the following Facility-Administered Medications: dobutamine, sodium chloride 0.9%, sodium chloride 0.9%, and sodium chloride 0.9%.    Allergies:   Review of patient's allergies indicates:   Allergen Reactions    Compazine [prochlorperazine edisylate] Rash    Contrast media Anaphylaxis    Dye Anaphylaxis     Contrast dye    Iodinated contrast media Anaphylaxis     Other reaction(s): anaphylaxis    Levaquin [levofloxacin] Hives and Itching    Morphine Other (See Comments)     Irritable  Iritability  Irritable    Prochlorperazine Rash    Metformin Other (See Comments)     Upset stomach    Ibuprofen Other (See Comments) and Nausea And Vomiting     Stomach pain  Stomach pain    Abdominal pain     Methocarbamol Nausea Only        Objective      Top Tier Notes:   Cervical Flexion Dysfunctional - Painful    Cervical Extension Dysfunctional - Painful    Cervical Rotation Dysfunctional - Painful    Upper Extremity One (Medial Rotation) Dysfunctional - Painful    Upper Extremity Two (External  Rotation) Dysfunctional - Painful Left ER:   Right ER    Multi-Segmental Flexion Dysfunctional - Painful Hand reach to upper thigh   Multi-Segmental Extension Dysfunctional - Painful U/A to attain neutral posture, resting in forward flexed posture.   Multi-Segmental Rotation Dysfunctional - Painful    Single Leg Stance Deferred    Arms Down Deep Squat Deferred       RANGE OF MOTION:   Shoulder AROM/PROM Right Left Pain/Dysfunction with Movement Goal   Shoulder Flexion (180º) 110 70 Trunk lean  115 Bilateral    Shoulder Abduction (180º) 90 50 Trunk and body compensations 115 Bilateral         STRENGTH: All tested in seated position   U/E MMT Right Left Pain/Dysfunction with Movement Goal   Shoulder Flexion 2+/5 2+/5  4+/5 B   Shoulder Extension 3/5 3-/5  4+/5 B   Shoulder Abduction 2+/5 2/5  4+/5 B   Middle Trapezius 3-/5 2+/5  4+/5 B   Lower Trapezius 3-/5 2+/5  4+/5 B       L/E MMT Right  (spine) Left Pain/Dysfunction with Movement Goal   Hip Flexion  4/5 3/5  4+/5 B   Hip Extension  4/5 3/5  4+/5 B   Knee Extension 4/5 3-/5  5/5 B   Knee Flexion 4/5 3-/5  5/5 B   Ankle DF 4/5 1/5  5/5 B     SENSATION  [] Intact to Light Touch   [] Impaired: decreased on the left arm and leg     POSTURE:  Pt presents with postural abnormalities which include:     [x] Forward Head                               [] Increased Lumbar Lordosis              [x] Rounded Shoulder                        [] Genu Recurvatum              [] Increased Thoracic Kyphosis        [] Genu Valgus              [] Trunk Deviated                              [] Pes Planus              [] Scapular Winging                          [] Other:     Function:     Intake Outcome Measure for FOTO NOC Survey    Therapist reviewed FOTO scores for Jenifer on 9/19/2024.   FOTO report - see Media section or FOTO account for episode details    Intake Score: 28%  Goal: 36%         Treatment     Total Treatment time (time-based codes) separate from Evaluation: (0) minutes      Jenifer received the treatments listed below:            Patient Education and Home Exercises     Education provided: (with above exercise and measurement outcomes) minutes  PURPOSE: Patient educated on the impairments noted above and the effects of physical therapy intervention to improve overall condition and QOL.   EXERCISE: Patient was educated on all the above exercise prior/during/after for proper posture, positioning, and execution for safe performance with home exercise program.   STRENGTH: Patient educated on the importance of improved core and extremity strength in order to improve alignment of the spine and extremities with static positions and dynamic movement.   POSTURE: Patient educated on postural awareness to reduce stress and maintain optimal alignment of the spine with static positions and dynamic movement   TRANSFERS & TRANSITIONS: Patient educated on proper technique for bed mobility, transitions and transfers to improve body mechanics and decrease risk of injury.     Written Home Exercises Provided: yes.  Exercises were reviewed and Jenifer was able to demonstrate them prior to the end of the session.  Jenifer demonstrated fair  understanding of the education provided. See EMR under Patient Instructions for exercises provided during therapy sessions.    Assessment     Jenifer is a 49 y.o. female referred to outpatient Physical Therapy with a medical diagnosis of Tendinitis of right shoulder [M77.8], Neck pain [M54.2], Chronic low back pain, unspecified back pain laterality, unspecified whether sciatica present [M54.50, G89.29], Chronic pain of both knees [M25.561, M25.562, G89.29] . Pt presents with impairments in the following categories: IMPAIRMENTS: ROM, strength, endurance, balance, posture, gait mechanics, core strength and stability, functional movement patterns, and coordination    Pt prognosis is Guarded  Pt will benefit from skilled outpatient Physical Therapy to address the deficits stated  "above and in the chart below, provide pt/family education, and to maximize pt's level of independence.     Plan of care discussed with patient: Yes  Pt's spiritual, cultural and educational needs considered and patient is agreeable to the plan of care and goals as stated below:     Anticipated Barriers for therapy: co-morbidities, sedentary lifestyle, chronicity of condition, lack of understanding of condition, adherence to treatment plan, insurance coverage, and transportation    Medical Necessity is demonstrated by the following  History  Co-morbidities and personal factors that may impact the plan of care [] LOW: no personal factors / co-morbidities  [] MODERATE: 1-2 personal factors / co-morbidities  [x] HIGH: 3+ personal factors / co-morbidities    Moderate / High Support Documentation:   Past Medical History:   Diagnosis Date    Arthritis     DM (diabetes mellitus) 2009     03/01/2017 Insulin x 3 years 2013    Hepatitis C antibody positive in blood 03/04/2021    RNA NEGATIVE 3/6/2017, 3/22/22    Hyperlipidemia     Hypertension     IBS (irritable bowel syndrome)     Kidney stone     Marfan syndrome     Mitral valve prolapse     Sleep apnea     moild: no cpap needed        Examination  Body Structures and Functions, activity limitations and participation restrictions that may impact the plan of care [] LOW: addressing 1-2 elements  [] MODERATE: 3+ elements  [x] HIGH: 4+ elements (please support below)    Moderate / High Support Documentation: See above in "Current Level of Function"      Clinical Presentation [] LOW: stable  [] MODERATE: Evolving  [x] HIGH: Unstable     Decision Making/ Complexity Score: high         Short Term Goals:  4 weeks Status  Date Met   PAIN: Pt will report worst pain of 7/10 in order to progress toward max functional ability and improve quality of life. [x] Progressing  [] Met  [] Not Met    MOBILITY: Patient will improve AROM to 50% of stated goals, listed in objective measures " above, in order to progress towards independence with functional activities.  [x] Progressing  [] Met  [] Not Met    STRENGTH: Patient will improve strength to 50% of stated goals, listed in objective measures above, in order to progress towards independence with functional activities. [x] Progressing  [] Met  [] Not Met    HEP: Patient will demonstrate independence with HEP in order to progress toward functional independence. [x] Progressing  [] Met  [] Not Met      Long Term Goals:  8 weeks Status Date Met   PAIN: Pt will report worst pain of 5/10 in order to progress toward max functional ability and improve quality of life [x] Progressing  [] Met  [] Not Met    FUNCTION: Patient will demonstrate improved function as indicated by a score of greater than or equal to 36 out of 100 on FOTO. [x] Progressing  [] Met  [] Not Met    MOBILITY: Patient will improve AROM to stated goals, listed in objective measures above, in order to return to maximal functional potential and improve quality of life.  [x] Progressing  [] Met  [] Not Met    STRENGTH: Patient will improve strength to stated goals, listed in objective measures above, in order to improve functional independence and quality of life.  [x] Progressing  [] Met  [] Not Met    GAIT: Patient will demonstrate normalized gait mechanics with minimal compensation in order to return to PLOF. [x] Progressing  [] Met  [] Not Met    Patient will return to normal ADL's, IADL's, community involvement, recreational activities, and work-related activities with less than or equal to 5/10 pain and maximal function.  [x] Progressing  [] Met  [] Not Met      Plan     Plan of care Certification: 9/19/2024 to 11/19/2024.    Outpatient Physical Therapy 2 times weekly for 8 weeks to include any combination of the following interventions: virtual visits, dry needling, modalities, electrical stimulation (IFC, Pre-Mod, Attended with Functional Dry Needling), Cervical/Lumbar Traction, Gait  Training, Manual Therapy, Neuromuscular Re-ed, Patient Education, Self Care, Therapeutic Exercise, and Therapeutic Activites     Grace Willis, PT, DPT

## 2024-09-24 ENCOUNTER — OFFICE VISIT (OUTPATIENT)
Dept: GASTROENTEROLOGY | Facility: CLINIC | Age: 49
End: 2024-09-24
Payer: MEDICAID

## 2024-09-24 VITALS
HEIGHT: 70 IN | SYSTOLIC BLOOD PRESSURE: 118 MMHG | BODY MASS INDEX: 31.43 KG/M2 | DIASTOLIC BLOOD PRESSURE: 56 MMHG | HEART RATE: 68 BPM | WEIGHT: 219.56 LBS

## 2024-09-24 DIAGNOSIS — K22.4 DIFFUSE ESOPHAGEAL SPASM: ICD-10-CM

## 2024-09-24 DIAGNOSIS — K21.9 GASTROESOPHAGEAL REFLUX DISEASE, UNSPECIFIED WHETHER ESOPHAGITIS PRESENT: Primary | ICD-10-CM

## 2024-09-24 PROBLEM — R29.898 DECREASED STRENGTH OF UPPER EXTREMITY: Status: ACTIVE | Noted: 2024-09-24

## 2024-09-24 PROBLEM — R68.89 DECREASED FUNCTIONAL ACTIVITY TOLERANCE: Status: ACTIVE | Noted: 2024-09-24

## 2024-09-24 PROBLEM — R29.3 POSTURAL INSTABILITY: Status: ACTIVE | Noted: 2024-09-24

## 2024-09-24 PROBLEM — M53.86 DECREASED RANGE OF MOTION OF LUMBAR SPINE: Status: ACTIVE | Noted: 2024-09-24

## 2024-09-24 PROBLEM — I27.20 PULMONARY HYPERTENSION: Chronic | Status: ACTIVE | Noted: 2020-10-09

## 2024-09-24 PROBLEM — R29.898 WEAKNESS OF BOTH LOWER EXTREMITIES: Status: ACTIVE | Noted: 2024-09-24

## 2024-09-24 PROCEDURE — 3074F SYST BP LT 130 MM HG: CPT | Mod: CPTII,,, | Performed by: INTERNAL MEDICINE

## 2024-09-24 PROCEDURE — 3051F HG A1C>EQUAL 7.0%<8.0%: CPT | Mod: CPTII,,, | Performed by: INTERNAL MEDICINE

## 2024-09-24 PROCEDURE — 99999 PR PBB SHADOW E&M-EST. PATIENT-LVL V: CPT | Mod: PBBFAC,,, | Performed by: INTERNAL MEDICINE

## 2024-09-24 PROCEDURE — 3078F DIAST BP <80 MM HG: CPT | Mod: CPTII,,, | Performed by: INTERNAL MEDICINE

## 2024-09-24 PROCEDURE — 1159F MED LIST DOCD IN RCRD: CPT | Mod: CPTII,,, | Performed by: INTERNAL MEDICINE

## 2024-09-24 PROCEDURE — 99215 OFFICE O/P EST HI 40 MIN: CPT | Mod: PBBFAC | Performed by: INTERNAL MEDICINE

## 2024-09-24 PROCEDURE — 4010F ACE/ARB THERAPY RXD/TAKEN: CPT | Mod: CPTII,,, | Performed by: INTERNAL MEDICINE

## 2024-09-24 PROCEDURE — 99214 OFFICE O/P EST MOD 30 MIN: CPT | Mod: S$PBB,,, | Performed by: INTERNAL MEDICINE

## 2024-09-24 PROCEDURE — 3008F BODY MASS INDEX DOCD: CPT | Mod: CPTII,,, | Performed by: INTERNAL MEDICINE

## 2024-09-24 PROCEDURE — 1160F RVW MEDS BY RX/DR IN RCRD: CPT | Mod: CPTII,,, | Performed by: INTERNAL MEDICINE

## 2024-09-24 RX ORDER — OMEPRAZOLE 40 MG/1
40 CAPSULE, DELAYED RELEASE ORAL
Qty: 180 CAPSULE | Refills: 0 | Status: SHIPPED | OUTPATIENT
Start: 2024-09-24 | End: 2024-12-23

## 2024-09-24 RX ORDER — DULAGLUTIDE 4.5 MG/.5ML
0.5 INJECTION, SOLUTION SUBCUTANEOUS
COMMUNITY
Start: 2024-09-09

## 2024-09-24 NOTE — ASSESSMENT & PLAN NOTE
Plan:   Continue Peppermint oil  Patient was instructed to take TWO mints under tongue prior to meals.

## 2024-09-24 NOTE — PROGRESS NOTES
Clinic Progress Note:  [facility] Progress Note    Reason for Visit:  The primary encounter diagnosis was Gastroesophageal reflux disease, unspecified whether esophagitis present. A diagnosis of Diffuse esophageal spasm was also pertinent to this visit.    PCP: Kareen Moctezuma       HPI:  This is a 49 y.o. female here for evaluation of the following:     //RIANNA  This is a 47 y.o. female here for evaluation of dysphagia and choking sensation.   This happens with solids such as rice and noodles   This has been going on for years.   Esophageal manometry revealed RIANNA.   Instructed to take to start peppermint oil at last visit. She take it occasionally     //GERD   -She is on Omeprazole and pepcid.   -She continues to experience burning sensation in her chest.   -She is also having nausea         ROS:  As above HPI      Allergies: Reviewed    Home Medications:   Medication List with Changes/Refills   New Medications    OMEPRAZOLE (PRILOSEC) 40 MG CAPSULE    Take 1 capsule (40 mg total) by mouth 2 (two) times daily before meals.   Current Medications    ALBUTEROL (PROVENTIL) 2.5 MG /3 ML (0.083 %) NEBULIZER SOLUTION    Take 3 mLs (2.5 mg total) by nebulization every 4 to 6 hours as needed for Wheezing or Shortness of Breath.    ALBUTEROL (PROVENTIL/VENTOLIN HFA) 90 MCG/ACTUATION INHALER    Inhale 2 puffs into the lungs every 6 (six) hours as needed for Wheezing. Rescue    ALPRAZOLAM (XANAX) 0.25 MG TABLET    Take 0.25 mg by mouth 2 (two) times daily as needed for Anxiety.    ASCORBIC ACID (VITAMIN C) 100 MG TABLET    Take 100 mg by mouth once daily.    ASPIRIN (ECOTRIN) 81 MG EC TABLET    Take 81 mg by mouth every evening.    ATENOLOL (TENORMIN) 25 MG TABLET    Take 1 tablet (25 mg total) by mouth every evening.    ATORVASTATIN (LIPITOR) 80 MG TABLET    Take 1 tablet (80 mg total) by mouth once daily.    AZELASTINE (ASTELIN) 137 MCG (0.1 %) NASAL SPRAY    2 sprays by Nasal route daily as needed for Rhinitis.  "   BD ULTRA-FINE [place] PEN NEEDLE 32 GAUGE X 5/32" NDLE    Inject 1 each into the skin 3 (three) times daily.    BUSPIRONE (BUSPAR) 30 MG TAB    Take 15 mg by mouth 2 (two) times daily.    BUTALBITAL-ACETAMINOPHEN-CAFFEINE -40 MG (FIORICET, ESGIC) -40 MG PER TABLET    Take 1 tablet by mouth every 4 (four) hours as needed for Pain.    CLOPIDOGREL (PLAVIX) 75 MG TABLET    Take 1 tablet (75 mg total) by mouth once daily.    DAPAGLIFLOZIN PROPANEDIOL (FARXIGA) 10 MG TABLET    Take 1 tablet (10 mg total) by mouth once daily.    DICYCLOMINE (BENTYL) 20 MG TABLET    TAKE 1 TABLET BY MOUTH 3 TIMES DAILY BEFORE MEALS AS NEEDED FOR ABDOMINAL PAIN    ERGOCALCIFEROL (ERGOCALCIFEROL) 50,000 UNIT CAP    Take 1 capsule (50,000 Units total) by mouth every 7 days.    EVOLOCUMAB (REPATHA SURECLICK) 140 MG/ML PNIJ    Inject 1 mL (140 mg total) into the skin every 14 (fourteen) days.    FAMOTIDINE (PEPCID) 20 MG TABLET    TAKE 1 TABLET (20 MG TOTAL) BY MOUTH 2 (TWO) TIMES DAILY.    FAMOTIDINE (PEPCID) 20 MG TABLET    Take 1 tablet (20 mg total) by mouth 2 (two) times daily.    FINASTERIDE (PROPECIA) 1 MG TABLET    Take 1 mg by mouth once daily.    FLUOXETINE (PROZAC) 40 MG CAPSULE    TAKE 1 CAPSULE BY MOUTH ONCE DAILY    FLUTICASONE PROPIONATE (FLONASE) 50 MCG/ACTUATION NASAL SPRAY    2 sprays by Each Nostril route daily as needed.    GABAPENTIN (NEURONTIN) 300 MG CAPSULE    TAKE 3 CAPSULES (900 MG TOTAL) BY MOUTH 3 (THREE) TIMES DAILY.    GLIPIZIDE (GLUCOTROL) 10 MG TABLET    Take 1 tablet (10 mg total) by mouth 2 (two) times daily.    ISOSORBIDE MONONITRATE (IMDUR) 30 MG 24 HR TABLET    TAKE 1 TABLET (30 MG TOTAL) BY MOUTH ONCE DAILY.    LANTUS SOLOSTAR U-100 INSULIN GLARGINE 100 UNITS/ML SUBQ PEN    Inject 40 Units into the skin 2 (two) times a day.    LINACLOTIDE (LINZESS) 145 MCG CAP CAPSULE    Take 1 capsule (145 mcg total) by mouth before breakfast.    LISINOPRIL (PRINIVIL,ZESTRIL) 5 MG TABLET    Take 1 tablet (5 mg " "total) by mouth every evening.    METHOCARBAMOL (ROBAXIN) 500 MG TAB    Take 500 mg by mouth 3 (three) times daily as needed.    MOMETASONE (ELOCON) 0.1 % SOLUTION    Apply topically.    NOVOLOG FLEXPEN U-100 INSULIN 100 UNIT/ML (3 ML) INPN PEN    Inject into the skin 3 (three) times daily with meals.    OMEPRAZOLE (PRILOSEC) 40 MG CAPSULE    Take 1 capsule (40 mg total) by mouth once daily.    OXYCODONE-ACETAMINOPHEN (PERCOCET) 5-325 MG PER TABLET    Take 1 tablet by mouth every 4 (four) hours as needed for Pain.    PEN NEEDLE 31 GAUGE X 5/16" NDLE    USE 5 TIMES DAILY WITH LANTUS AND HUMALOG    PROMETHAZINE (PHENERGAN) 25 MG TABLET        SITAGLIPTIN PHOSPHATE (JANUVIA) 50 MG TAB    Take 50 mg by mouth once daily. Added to chart 1/18/24    TIOTROPIUM (SPIRIVA) 18 MCG INHALATION CAPSULE    Inhale 1 capsule (18 mcg total) into the lungs once daily. Controller    TIZANIDINE (ZANAFLEX) 4 MG TABLET    TAKE 1 TO 1 AND 1/2 TABLETS (4 TO 6MG TOTAL) BY MOUTH NIGHTLY AS NEEDED.    TRAMADOL (ULTRAM) 50 MG TABLET    Take 50 mg by mouth every 6 (six) hours.    TRUE METRIX GLUCOSE TEST STRIP STRP        TRULICITY 4.5 MG/0.5 ML PEN INJECTOR    Inject 0.5 mLs as directed every 7 days.    ZINC ORAL    Take 1 tablet by mouth once daily. PT UNSURE OF DOSE         Physical Exam:  Vital Signs:  BP (!) 118/56   Pulse 68   Ht 5' 10" (1.778 m)   Wt 99.6 kg (219 lb 9.3 oz)   BMI 31.51 kg/m²   Body mass index is 31.51 kg/m².    Physical Exam  Constitutional:       Appearance: Normal appearance.   HENT:      Head: Normocephalic.   Eyes:      Conjunctiva/sclera: Conjunctivae normal.   Pulmonary:      Effort: Pulmonary effort is normal.   Abdominal:      General: There is no distension.      Palpations: Abdomen is soft.      Tenderness: There is no abdominal tenderness. There is no guarding.   Neurological:      Mental Status: She is alert.          Labs: Pertinent labs reviewed.          Assessment:  Problem List Items Addressed This " Visit          GI    GERD (gastroesophageal reflux disease) - Primary    Current Assessment & Plan     Plan:   -PPI PO BID   -GERD lifestyle modifications   -Continue H2 Blocker   - EGD with Bravo ordered          Diffuse esophageal spasm    Current Assessment & Plan     Plan:   Continue Peppermint oil  Patient was instructed to take TWO mints under tongue prior to meals.           Gastroesophageal reflux disease, unspecified whether esophagitis present  -     Case Request Endoscopy: EGD (ESOPHAGOGASTRODUODENOSCOPY), PH MONITORING, ESOPHAGUS, WIRELESS, (OFF REFLUX MEDS)    Diffuse esophageal spasm    Other orders  -     omeprazole (PRILOSEC) 40 MG capsule; Take 1 capsule (40 mg total) by mouth 2 (two) times daily before meals.  Dispense: 180 capsule; Refill: 0          Follow-up after above evaluation.     Order summary:  No orders of the defined types were placed in this encounter.      Thank you so much for allowing me to participate in the care of Jenifer Hardwick MD  Gastroenterology and Hepatology  [facility]

## 2024-09-24 NOTE — ASSESSMENT & PLAN NOTE
Plan:   -PPI PO BID   -GERD lifestyle modifications   -Continue H2 Blocker   - EGD with Bravo ordered

## 2024-09-25 ENCOUNTER — PATIENT MESSAGE (OUTPATIENT)
Dept: GASTROENTEROLOGY | Facility: CLINIC | Age: 49
End: 2024-09-25
Payer: MEDICAID

## 2024-09-25 ENCOUNTER — TELEPHONE (OUTPATIENT)
Dept: ENDOSCOPY | Facility: HOSPITAL | Age: 49
End: 2024-09-25
Payer: MEDICAID

## 2024-09-25 ENCOUNTER — PATIENT MESSAGE (OUTPATIENT)
Dept: ENDOSCOPY | Facility: HOSPITAL | Age: 49
End: 2024-09-25
Payer: MEDICAID

## 2024-09-25 RX ORDER — PROMETHAZINE HYDROCHLORIDE 25 MG/1
25 TABLET ORAL EVERY 6 HOURS PRN
Qty: 30 TABLET | Refills: 0 | Status: SHIPPED | OUTPATIENT
Start: 2024-09-25

## 2024-09-25 NOTE — TELEPHONE ENCOUNTER
Spoke to Ms. Berylradhika regarding scheduling EGD and Bravo study. Patient is scheduled to see Dr. Jain on 10/8 for possible rotator cuff surgery. MRI may possibly be needed. Left MsDesiree Khoi my contact information. She will call back when ready to schedule.

## 2024-09-26 NOTE — PROGRESS NOTES
Orthopaedic Follow-Up Visit    Last Appointment: 9/18/24  Diagnosis: Right shoulder rotator cuff tear and chronic left shoulder pain, suspected rotator cuff pathology   Prior Procedure: MRI of Left Shoulder    Jenifer Westfall is a 49 y.o. female who is here for f/u evaluation of her left shoulder. The patient was last seen here by me for her left shoulder on 9/18/24 at which point her MRI showed right shoulder rotator cuff tear and some evidence of mild glenohumeral arthritis.  That day her left shoulder was bothering her more. She had limited ROM and strength on physical exam. She had been treated with subacromial space CSI (5/22/24) for her left shoulder with some relief but her symptoms have ultimately returned and persisted. I recommended proceeding with MRI of the left shoulder at that time for further evaluation for suspected rotator cuff tear.  The patient returns today to review her left shoulder MRI and discuss further treatment options.     To review her history, Jenifer Westfall is a 49 y.o. right-hand dominant female who presented on 12/13/22 with RIGHT shoulder and neck pain and dysfunction that began about 3 years ago. She had gradual onset of symptoms for her right shoulder and neck. Her symptoms included intermittent sharp, stabbing pain of the right shoulder which radiates to right neck. Her pain was aggravated by movement. She had tried rest, activity modification, Zanaflex, Gabapentin, and physical therapy. She had right shoulder pain and known rotator cuff tear on the right side.  She also had known peripheral neuropathy that severely affected her left foot and seemed to also be affecting her left hand. Because of her neuropathy, she used a cane in her right hand for ambulation.  We discussed that I thought she would have a very difficult time with any kind of operative treatment because of her left lower extremity neuropathy and need to use a cane in her right hand. We discussed that I felt it  would be reasonable to proceed with injection in the right shoulder for symptomatic treatment of pain as result of her cuff tear, however we were unable to perform injection due to A1c 12.0 with reviewing of her last lab from (3/28/2022) and possible upcoming spine surgery. Discussed if she has preoperative testing for her spine surgery and her A1c decreases that we can consider a right shoulder CSI in the future. She returned on 9/27/23 at which point we performed injection in the right shoulder for symptomatic treatment of pain as result of her cuff tear. However, think she would have a very difficult time with any kind of operative treatment because of her left lower extremity neuropathy and need to use a cane in her right hand. She returned on 5/22/24 noting left shoulder pain. She had left shoulder greater tuberosity  degenerative changes and exam suspicious for rotator cuff tear. We elected to proceed with left SAS CSI at that time.  She returned on 8/21/24 at which point she has right shoulder pain and known rotator cuff tear on the right side.  Her A1C was stabilized to a level that we could pursue operative treatment. Since her prior MRI was from 2019, I recommended moving forward with new MRI at that time for further evaluation of the current state of her rotator cuff tear. Discussed with her that over the past 5 years she could have developed atrophy that makes the tear irreparable.    Patient's medications, allergies, past medical, surgical, social and family histories were reviewed and updated as appropriate.    Review of Systems   All systems reviewed were negative.  Specifically, the patient denies fever, chills, weight loss, chest pain, shortness of breath, or dyspnea on exertion.      Past Medical History:   Diagnosis Date    Arthritis     DM (diabetes mellitus) 2009     03/01/2017 Insulin x 3 years 2013    Hepatitis C antibody positive in blood 03/04/2021    RNA NEGATIVE 3/6/2017, 3/22/22     Hyperlipidemia     Hypertension     IBS (irritable bowel syndrome)     Kidney stone     Marfan syndrome     Mitral valve prolapse     Sleep apnea     moild: no cpap needed       Past Surgical History:   Procedure Laterality Date    ANGIOGRAPHY OF LOWER EXTREMITY N/A 2018    Procedure: ANGIOGRAM, LOWER EXTREMITY- LEFT LEG;  Surgeon: Roscoe Landeros MD;  Location: [facility] CATH LAB;  Service: Peripheral Vascular;  Laterality: N/A;    APPENDECTOMY      BUNIONECTOMY      both feet    BYPASS GRAFT Bilateral     cataract surgery  2019     SECTION      x 2    CHOLECYSTECTOMY      COLONOSCOPY N/A 2020    Procedure: COLONOSCOPY w/ biopsies;  Surgeon: Gio Georges MD;  Location: [facility] ENDO;  Service: Endoscopy;  Laterality: N/A;    ESOPHAGEAL MANOMETRY WITH MEASUREMENT OF IMPEDANCE N/A 2023    Procedure: MANOMETRY, ESOPHAGUS, WITH IMPEDANCE MEASUREMENT;  Surgeon: Desmond Calderon RN;  Location: [facility];  Service: Endoscopy;  Laterality: N/A;    ESOPHAGOGASTRODUODENOSCOPY N/A 2019    Procedure: ESOPHAGOGASTRODUODENOSCOPY (EGD);  Surgeon: Jaron Sanders III, MD;  Location: [facility];  Service: Endoscopy;  Laterality: N/A;    ESOPHAGOGASTRODUODENOSCOPY N/A 2022    Procedure: EGD (ESOPHAGOGASTRODUODENOSCOPY);  Surgeon: Keith Hardwick MD;  Location: [facility];  Service: Gastroenterology;  Laterality: N/A;    INJECTION OF ANESTHETIC AGENT AROUND MULTIPLE INTERCOSTAL NERVES Right 2024    Procedure: BLOCK, NERVE, INTERCOSTAL, 2 OR MORE;  Surgeon: Chente Cabrera MD;  Location: [facility] OR;  Service: Cardiothoracic;  Laterality: Right;    LOBECTOMY-ROBOTIC Right 2024    Procedure: LOBECTOMY-ROBOTIC;  Surgeon: Chente Cabrera MD;  Location: [facility] OR;  Service: Cardiothoracic;  Laterality: Right;  Lower Lobe    ROBOT-ASSISTED LAPAROSCOPIC LYMPHADENECTOMY USING DA GOMEZ XI Right 2024    Procedure: XI ROBOTIC LYMPHADENECTOMY;  Surgeon: Chente Cabrera MD;  Location: [facility]  "OR;  Service: Cardiothoracic;  Laterality: Right;  Robotic mediastinal lymph node dissection    SELECTIVE INJECTION OF ANESTHETIC AGENT AROUND LUMBAR SPINAL NERVE ROOT BY TRANSFORAMINAL APPROACH Bilateral 11/23/2020    Procedure: Bilateral L5/S1 TF DENY;  Surgeon: Jewel Walters MD;  [facility];  Service: Pain Management;  Laterality: Bilateral;    TUBAL LIGATION      VATS, ROBOT-ASSISTED, OR ROBOT-ASSISTED THORACOTOMY Right 1/19/2024    Procedure: VATS, ROBOT-ASSISTED, OR ROBOT-ASSISTED THORACOTOMY;  Surgeon: Chente Cabrera MD;  Location: [facility] OR;  Service: Cardiothoracic;  Laterality: Right;  Robot assisted right lower lobectomy and mediastinal lymph node dissection       Patient's Medications   New Prescriptions    No medications on file   Previous Medications    ALBUTEROL (PROVENTIL/VENTOLIN HFA) 90 MCG/ACTUATION INHALER    Inhale 2 puffs into the lungs every 6 (six) hours as needed for Wheezing. Rescue    ALPRAZOLAM (XANAX) 0.25 MG TABLET    Take 0.25 mg by mouth 2 (two) times daily as needed for Anxiety.    ASCORBIC ACID (VITAMIN C) 100 MG TABLET    Take 100 mg by mouth once daily.    ASPIRIN (ECOTRIN) 81 MG EC TABLET    Take 81 mg by mouth every evening.    ATENOLOL (TENORMIN) 25 MG TABLET    Take 1 tablet (25 mg total) by mouth every evening.    ATORVASTATIN (LIPITOR) 80 MG TABLET    Take 1 tablet (80 mg total) by mouth once daily.    AZELASTINE (ASTELIN) 137 MCG (0.1 %) NASAL SPRAY    2 sprays by Nasal route daily as needed for Rhinitis.    BD ULTRA-FINE [place] PEN NEEDLE 32 GAUGE X 5/32" NDLE    Inject 1 each into the skin 3 (three) times daily.    BUSPIRONE (BUSPAR) 30 MG TAB    Take 15 mg by mouth 2 (two) times daily.    BUTALBITAL-ACETAMINOPHEN-CAFFEINE -40 MG (FIORICET, ESGIC) -40 MG PER TABLET    Take 1 tablet by mouth every 4 (four) hours as needed for Pain.    CLOPIDOGREL (PLAVIX) 75 MG TABLET    Take 1 tablet (75 mg total) by mouth once daily.    DAPAGLIFLOZIN " PROPANEDIOL (FARXIGA) 10 MG TABLET    Take 1 tablet (10 mg total) by mouth once daily.    DICYCLOMINE (BENTYL) 20 MG TABLET    TAKE 1 TABLET BY MOUTH 3 TIMES DAILY BEFORE MEALS AS NEEDED FOR ABDOMINAL PAIN    ERGOCALCIFEROL (ERGOCALCIFEROL) 50,000 UNIT CAP    Take 1 capsule (50,000 Units total) by mouth every 7 days.    EVOLOCUMAB (REPATHA SURECLICK) 140 MG/ML PNIJ    Inject 1 mL (140 mg total) into the skin every 14 (fourteen) days.    FAMOTIDINE (PEPCID) 20 MG TABLET    TAKE 1 TABLET (20 MG TOTAL) BY MOUTH 2 (TWO) TIMES DAILY.    FAMOTIDINE (PEPCID) 20 MG TABLET    Take 1 tablet (20 mg total) by mouth 2 (two) times daily.    FINASTERIDE (PROPECIA) 1 MG TABLET    Take 1 mg by mouth once daily.    FLUOXETINE (PROZAC) 40 MG CAPSULE    TAKE 1 CAPSULE BY MOUTH ONCE DAILY    FLUTICASONE PROPIONATE (FLONASE) 50 MCG/ACTUATION NASAL SPRAY    2 sprays by Each Nostril route daily as needed.    GABAPENTIN (NEURONTIN) 300 MG CAPSULE    TAKE 3 CAPSULES (900 MG TOTAL) BY MOUTH 3 (THREE) TIMES DAILY.    GLIPIZIDE (GLUCOTROL) 10 MG TABLET    Take 1 tablet (10 mg total) by mouth 2 (two) times daily.    ISOSORBIDE MONONITRATE (IMDUR) 30 MG 24 HR TABLET    TAKE 1 TABLET (30 MG TOTAL) BY MOUTH ONCE DAILY.    LANTUS SOLOSTAR U-100 INSULIN GLARGINE 100 UNITS/ML SUBQ PEN    Inject 40 Units into the skin 2 (two) times a day.    LINACLOTIDE (LINZESS) 145 MCG CAP CAPSULE    Take 1 capsule (145 mcg total) by mouth before breakfast.    LISINOPRIL (PRINIVIL,ZESTRIL) 5 MG TABLET    Take 1 tablet (5 mg total) by mouth every evening.    METHOCARBAMOL (ROBAXIN) 500 MG TAB    Take 500 mg by mouth 3 (three) times daily as needed.    MOMETASONE (ELOCON) 0.1 % SOLUTION    Apply topically.    NOVOLOG FLEXPEN U-100 INSULIN 100 UNIT/ML (3 ML) INPN PEN    Inject into the skin 3 (three) times daily with meals.    OMEPRAZOLE (PRILOSEC) 40 MG CAPSULE    Take 1 capsule (40 mg total) by mouth once daily.    OMEPRAZOLE (PRILOSEC) 40 MG CAPSULE    Take 1 capsule  "(40 mg total) by mouth 2 (two) times daily before meals.    OXYCODONE-ACETAMINOPHEN (PERCOCET) 5-325 MG PER TABLET    Take 1 tablet by mouth every 4 (four) hours as needed for Pain.    PEN NEEDLE 31 GAUGE X 5/16" NDLE    USE 5 TIMES DAILY WITH LANTUS AND HUMALOG    PROMETHAZINE (PHENERGAN) 25 MG TABLET    Take 1 tablet (25 mg total) by mouth every 6 (six) hours as needed for Nausea.    SITAGLIPTIN PHOSPHATE (JANUVIA) 50 MG TAB    Take 50 mg by mouth once daily. Added to chart 1/18/24    TIOTROPIUM (SPIRIVA) 18 MCG INHALATION CAPSULE    Inhale 1 capsule (18 mcg total) into the lungs once daily. Controller    TIZANIDINE (ZANAFLEX) 4 MG TABLET    TAKE 1 TO 1 AND 1/2 TABLETS (4 TO 6MG TOTAL) BY MOUTH NIGHTLY AS NEEDED.    TRAMADOL (ULTRAM) 50 MG TABLET    Take 50 mg by mouth every 6 (six) hours.    TRUE METRIX GLUCOSE TEST STRIP STRP        TRULICITY 4.5 MG/0.5 ML PEN INJECTOR    Inject 0.5 mLs as directed every 7 days.    ZINC ORAL    Take 1 tablet by mouth once daily. PT UNSURE OF DOSE   Modified Medications    No medications on file   Discontinued Medications    No medications on file       Family History   Problem Relation Name Age of Onset    Heart disease Mother      Thrombophilia Father          DVT    Hypertension Father      Stroke Maternal Aunt      Heart disease Maternal Aunt      Stroke Maternal Uncle      Heart disease Maternal Uncle      Breast cancer Neg Hx      Colon cancer Neg Hx      Ovarian cancer Neg Hx         Review of patient's allergies indicates:   Allergen Reactions    Compazine [prochlorperazine edisylate] Rash    Contrast media Anaphylaxis    Dye Anaphylaxis     Contrast dye    Iodinated contrast media Anaphylaxis     Other reaction(s): anaphylaxis    Levaquin [levofloxacin] Hives and Itching    Morphine Other (See Comments)     Irritable  Iritability  Irritable    Prochlorperazine Rash    Metformin Other (See Comments)     Upset stomach    Ibuprofen Other (See Comments) and Nausea And " Vomiting     Stomach pain  Stomach pain    Abdominal pain     Methocarbamol Nausea Only         Objective:      Physical Exam  Cervical exam. Tenderness along midline. Decreased cervical ROM. Positive Spurling's test     Physical Exam:                       RIGHT                                     LEFT     Scap Dyskinesis/Winging       (-)                                             (-)     Tenderness:                                                                              Greater Tuberosity                  (-)                                              +  Bicipital Groove                       (-)                                             +  AC joint                                   (-)                                             (-)  Other:      ROM:  Forward Elevation       140                                          90  Abduction                    90                                            70  ER (at side)                 50                                            40  IR                                 Hip                      Back pocket     Strength:   Supraspinatus             4/5                                           4/5  Infraspinatus               4/5                                           5/5  Subscap / IR               4+/5                                         5/5      Special Tests:              Neer:                                       +                                             +              Gordon:                                 +                                             +              SS Stress:                               +                                             +              Bear Hug:                                (-)                                             (-)              Roberts's:                                 (-)                                             +              Resisted Thrower's:                +                                                +              Cross Arm Abduction:             (-)                                             (-)    Neurovascular examination  - Motor grossly intact bilaterally to shoulder abduction, elbow flexion and extension, wrist flexion and extension, and intrinsic hand musculature  - Sensation intact to light touch bilaterally in axillary, median, radial, and ulnar distributions  - Symmetrical radial pulses    Imaging:    XR Results:  Results for orders placed during the hospital encounter of 12/13/22    X-ray Shoulder 2 or More Views Right    Narrative  EXAMINATION:  XR SHOULDER COMPLETE 2 OR MORE VIEWS RIGHT    CLINICAL HISTORY:  Pain in right shoulder    TECHNIQUE:  Two or three views of the right shoulder were performed.    COMPARISON:  01/27/2022, 07/30/2020, 08/02/2019 and 11/19/2018.    FINDINGS:  No acute fracture or dislocation.  No significant soft tissue swelling.    Humeral head normally positioned with the glenoid cavity.  Mild glenohumeral degenerative osteoarthrosis with mild joint space narrowing and small osteophyte formation.  Chronic small calcific densities are again present along the humeral head and proximal diaphysis of the humerus.    AC joint intact with mild degenerative osteoarthrosis.    Impression  Mild chronic changes of degenerative osteoarthrosis.      Electronically signed by: Mike Iqbal  Date:    12/13/2022  Time:    15:51      MRI Results:    MRI Shoulder Without Contrast Right  Narrative: EXAM:  MRI SHOULDER WITHOUT CONTRAST RIGHT    CLINICAL HISTORY: Right shoulder pain. Shoulder pain, rotator cuff disorder suspected, xray done; [M75.101]-Unspecified rotator cuff tear or rupture of right shoulder, not specified as traumatic./[M12.811]-Other specific arthropathies, not elsewhere classified, right shoulder./[M25.511]-P    TECHNIQUE: Standard multiplanar, multisequence MR imaging protocol of the right shoulder was performed.    FINDINGS: Patient motion degrades image  quality.    Rotator cuff tendons: Large full-thickness tear of the entire or majority of the supraspinatus tendon insertion with up to 2.2 cm of medial tendon retraction. No other discrete rotator cuff tear identified.  Calcium hydroxyapatite deposition along the bursal surface of the teres minor tendon and pectoralis major tendon insertion without significant tendinitis.    Muscles: Minor supraspinatus muscle atrophy.    Bursitis: Mild subacromial bursitis.    Long head biceps tendon: Obscured by patient motion without obvious full-thickness tear.    Labrum and glenohumeral ligaments: No obvious tears.    Bones: No fractures, marrow edema or suspicious bone lesions.    Glenohumeral joint: Joint alignment is within normal limits.  Mild osteoarthritis with generalized low-grade chondral thinning and small marginal osteophytes.    AC joint: No significant osteoarthritis.  Normal alignment.    Acromial arch: Type II acromion without downsloping.  No os acromiale.  Impression: 1.  Large full-thickness tear of the supraspinatus tendon with minor muscle atrophy and mild subacromial bursitis.  2.  Mild glenohumeral osteoarthritis.  3.  Calcium hydroxyapatite deposition along the teres minor tendon and pectoralis major tendon insertion without significant tendinitis.    Finalized on: 9/10/2024 11:21 AM By:  Desmond Beck MD  [facility]# [id number]      2024-09-10 11:23:[id number]    BR     MRI Shoulder Without Contrast Left  Narrative: EXAM: MRI SHOULDER WITHOUT CONTRAST LEFT    CLINICAL HISTORY: Left shoulder pain    TECHNIQUE: Multiplanar multisequence imaging of left shoulder is performed without intravenous or intra-articular contrast.    FINDINGS:  The exam is limited due to patient motion artifact.    There is a full-thickness tear of the entirety of the supraspinatus tendon with the torn tendon retracted to the level of the glenohumeral joint line.  There is no supraspinatus muscle atrophy.  The infraspinatus tendon, teres minor  tendon, and subscapularis tendon are intact.  The long head of the biceps tendon is located in its groove.  The biceps labral anchor appears intact.  No definite labral tears are seen.  Glenohumeral ligaments appear intact.    Large glenohumeral joint effusion which spills freely through the rotator cuff defect into the subacromial subdeltoid bursa.  No fracture or bone marrow edema or avascular necrosis.  Normal horizontal orientation of a type II acromion and no os acromiale.  Impression: 1.  Full-thickness tear of the entirety of the supraspinatus tendon.  2.  Large glenohumeral joint effusion which spills freely through the rotator cuff defect into the subacromial subdeltoid bursa.    Finalized on: 10/4/2024 2:07 PM By:  Sean Rosario MD  [facility]# [id number]      2024-10-04 14:09:[id number]    [facility]            Physician read: I agree with the above impression.    Assessment/Plan:   Jenifer Westfall is a 49 y.o. female with right shoulder rotator cuff tear and left shoulder rotator cuff tear    Plan:    Reviewed MRI with the patient today.  She has a full-thickness left shoulder rotator cuff tear.  No significant atrophy of the cuff musculature.  Her history and physical exam are also consistent with left shoulder rotator cuff tear.  It is quite painful for her.  She has tried rest, activity modification, oral medicines, corticosteroid injection, and physical therapy.  Despite these interventions she continues to be symptomatic.  This limit her activities and diminished her quality of life.  She would like to proceed with rotator cuff repair.  Discussed with her my concerns for repairing her rotator cuff.  She has multiple medical comorbidities including known collagen disorder with Marfan syndrome which put her at increased risk of failure of repair.  Additionally, she has difficulty ambulating long distances, using a wheelchair as well as a cane.  We went over that she would be in the sling for 6 weeks.  During that time I  would not want her using her arm to push out of her chair or to weightbear through it using a cane or walker.  In fact, she really can not use her arm for weight-bearing for at least 3-4 months.  She voiced understanding would still like to proceed with repair.  I recommend left shoulder arthroscopy with rotator cuff repair, subacromial decompression, and possible biceps tenodesis.  We discussed the risks, benefits, limitations, alternatives of surgery.  We went over the postoperative rehab and recovery protocol in detail.  Despite the risks, she elected proceed for with the surgery.  The consent was freely signed.  Follow up with me 10-14 days after surgery        All Jain MD    I, Shahzad Ravi, acted as a scribe for All Jain MD for the duration of this office visit.

## 2024-10-02 ENCOUNTER — CLINICAL SUPPORT (OUTPATIENT)
Dept: REHABILITATION | Facility: HOSPITAL | Age: 49
End: 2024-10-02
Attending: INTERNAL MEDICINE
Payer: MEDICAID

## 2024-10-02 DIAGNOSIS — R29.3 POSTURAL INSTABILITY: ICD-10-CM

## 2024-10-02 DIAGNOSIS — R29.898 DECREASED STRENGTH OF UPPER EXTREMITY: ICD-10-CM

## 2024-10-02 DIAGNOSIS — R29.898 WEAKNESS OF BOTH LOWER EXTREMITIES: ICD-10-CM

## 2024-10-02 DIAGNOSIS — R68.89 DECREASED FUNCTIONAL ACTIVITY TOLERANCE: Primary | ICD-10-CM

## 2024-10-02 DIAGNOSIS — M53.86 DECREASED RANGE OF MOTION OF LUMBAR SPINE: ICD-10-CM

## 2024-10-02 PROCEDURE — 97110 THERAPEUTIC EXERCISES: CPT | Mod: PN

## 2024-10-02 PROCEDURE — 97112 NEUROMUSCULAR REEDUCATION: CPT | Mod: PN

## 2024-10-04 ENCOUNTER — HOSPITAL ENCOUNTER (OUTPATIENT)
Dept: RADIOLOGY | Facility: HOSPITAL | Age: 49
Discharge: HOME | End: 2024-10-04
Attending: STUDENT IN AN ORGANIZED HEALTH CARE EDUCATION/TRAINING PROGRAM
Payer: MEDICAID

## 2024-10-04 DIAGNOSIS — M75.102 NONTRAUMATIC TEAR OF LEFT ROTATOR CUFF, UNSPECIFIED TEAR EXTENT: ICD-10-CM

## 2024-10-04 DIAGNOSIS — M25.512 CHRONIC LEFT SHOULDER PAIN: ICD-10-CM

## 2024-10-04 DIAGNOSIS — G89.29 CHRONIC LEFT SHOULDER PAIN: ICD-10-CM

## 2024-10-04 PROCEDURE — 73221 MRI JOINT UPR EXTREM W/O DYE: CPT | Mod: TC,LT

## 2024-10-04 PROCEDURE — 73221 MRI JOINT UPR EXTREM W/O DYE: CPT | Mod: 26,LT,, | Performed by: RADIOLOGY

## 2024-10-07 ENCOUNTER — OFFICE VISIT (OUTPATIENT)
Dept: NEUROLOGY | Facility: CLINIC | Age: 49
End: 2024-10-07
Payer: MEDICAID

## 2024-10-07 VITALS
BODY MASS INDEX: 30.77 KG/M2 | HEART RATE: 89 BPM | WEIGHT: 214.94 LBS | RESPIRATION RATE: 16 BRPM | HEIGHT: 70 IN | DIASTOLIC BLOOD PRESSURE: 63 MMHG | SYSTOLIC BLOOD PRESSURE: 101 MMHG

## 2024-10-07 DIAGNOSIS — M50.30 DDD (DEGENERATIVE DISC DISEASE), CERVICAL: Chronic | ICD-10-CM

## 2024-10-07 DIAGNOSIS — M54.16 LUMBAR RADICULOPATHY: ICD-10-CM

## 2024-10-07 DIAGNOSIS — E11.40 TYPE 2 DIABETES MELLITUS WITH DIABETIC NEUROPATHY, WITH LONG-TERM CURRENT USE OF INSULIN: ICD-10-CM

## 2024-10-07 DIAGNOSIS — I70.8 OCCLUSION OF RIGHT SUBCLAVIAN ARTERY: ICD-10-CM

## 2024-10-07 DIAGNOSIS — R20.0 NUMBNESS AND TINGLING: ICD-10-CM

## 2024-10-07 DIAGNOSIS — R26.81 UNSTEADY GAIT: ICD-10-CM

## 2024-10-07 DIAGNOSIS — G43.009 MIGRAINE WITHOUT AURA AND WITHOUT STATUS MIGRAINOSUS, NOT INTRACTABLE: ICD-10-CM

## 2024-10-07 DIAGNOSIS — G89.29 CHRONIC MIDLINE LOW BACK PAIN WITHOUT SCIATICA: ICD-10-CM

## 2024-10-07 DIAGNOSIS — I73.9 PAD (PERIPHERAL ARTERY DISEASE): ICD-10-CM

## 2024-10-07 DIAGNOSIS — G62.9 NEUROPATHY: ICD-10-CM

## 2024-10-07 DIAGNOSIS — M54.50 CHRONIC MIDLINE LOW BACK PAIN WITHOUT SCIATICA: ICD-10-CM

## 2024-10-07 DIAGNOSIS — R20.2 NUMBNESS AND TINGLING: ICD-10-CM

## 2024-10-07 DIAGNOSIS — R29.898 WEAKNESS OF LEFT FOOT: ICD-10-CM

## 2024-10-07 DIAGNOSIS — Z79.4 TYPE 2 DIABETES MELLITUS WITH DIABETIC NEUROPATHY, WITH LONG-TERM CURRENT USE OF INSULIN: ICD-10-CM

## 2024-10-07 DIAGNOSIS — E11.42 DIABETIC PERIPHERAL NEUROPATHY ASSOCIATED WITH TYPE 2 DIABETES MELLITUS: Primary | ICD-10-CM

## 2024-10-07 DIAGNOSIS — F32.A DEPRESSION, UNSPECIFIED DEPRESSION TYPE: ICD-10-CM

## 2024-10-07 PROCEDURE — 4010F ACE/ARB THERAPY RXD/TAKEN: CPT | Mod: CPTII,,, | Performed by: NURSE PRACTITIONER

## 2024-10-07 PROCEDURE — 1159F MED LIST DOCD IN RCRD: CPT | Mod: CPTII,,, | Performed by: NURSE PRACTITIONER

## 2024-10-07 PROCEDURE — 3074F SYST BP LT 130 MM HG: CPT | Mod: CPTII,,, | Performed by: NURSE PRACTITIONER

## 2024-10-07 PROCEDURE — 3008F BODY MASS INDEX DOCD: CPT | Mod: CPTII,,, | Performed by: NURSE PRACTITIONER

## 2024-10-07 PROCEDURE — 99215 OFFICE O/P EST HI 40 MIN: CPT | Mod: PBBFAC | Performed by: NURSE PRACTITIONER

## 2024-10-07 PROCEDURE — 3078F DIAST BP <80 MM HG: CPT | Mod: CPTII,,, | Performed by: NURSE PRACTITIONER

## 2024-10-07 PROCEDURE — 1160F RVW MEDS BY RX/DR IN RCRD: CPT | Mod: CPTII,,, | Performed by: NURSE PRACTITIONER

## 2024-10-07 PROCEDURE — 3051F HG A1C>EQUAL 7.0%<8.0%: CPT | Mod: CPTII,,, | Performed by: NURSE PRACTITIONER

## 2024-10-07 PROCEDURE — 99999 PR PBB SHADOW E&M-EST. PATIENT-LVL V: CPT | Mod: PBBFAC,,, | Performed by: NURSE PRACTITIONER

## 2024-10-07 PROCEDURE — 99215 OFFICE O/P EST HI 40 MIN: CPT | Mod: S$PBB,,, | Performed by: NURSE PRACTITIONER

## 2024-10-07 RX ORDER — GABAPENTIN 300 MG/1
300 CAPSULE ORAL NIGHTLY
Qty: 30 CAPSULE | Refills: 11 | Status: SHIPPED | OUTPATIENT
Start: 2024-10-07 | End: 2025-10-07
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
## 2024-10-07 NOTE — PATIENT INSTRUCTIONS
For Neuropathy pain start taking  mg/900 mg/ 1200 mg HS  TID          Adjust BP medications       ORTHOSTATIC MEASURES:    Avoid standing for a long time    Slow transition from lying down to sitting to standing    Slow transition to stooping and bending over    Cross legs while standing    Increase fluid intake    Balanced salt intake     Adjust BP medications    Wearing thigh high stockings     PT to strengthen BLE muscles

## 2024-10-07 NOTE — PROGRESS NOTES
"[facility]   Physical Therapy Treatment Note      Name: Jenifer Westfall  Clinic Number: [id number]    Therapy Diagnosis:   Encounter Diagnoses   Name Primary?    Decreased functional activity tolerance Yes    Postural instability     Weakness of both lower extremities     Decreased range of motion of lumbar spine     Decreased strength of upper extremity      Physician: Andrzej Peralta MD    Visit Date: 10/2/2024    Physician Orders: PT Eval and Treat  Medical Diagnosis from Referral:   E11.42 (ICD-10-CM) - Diabetic peripheral neuropathy associated with type 2 diabetes mellitus   M54.50,G89.29 (ICD-10-CM) - Chronic midline low back pain without sciatica   R26.81 (ICD-10-CM) - Unsteady gait      Evaluation Date: 9/18/2023  Authorization Period Expiration: 09/05/2025  Plan of Care Expiration: 11/17/2023                          Progress Update: 10/18/2023            Visit # / Visits authorized: 1 / 1 ;  1/14         FOTO: Visit #1 9/18/2023 - Scored: 1 / 3      PRECAUTIONS: Standard Precautions, Safety/fall precautions, and Diabetes:       Time In: 1250  Time Out: 1338  Total Appointment Time (timed & untimed codes): 53 minutes    PTA Visit #: 0/5       Subjective     Patient reports: she has assist from her son.  She was compliant with home exercise program.  Response to previous treatment: N/A  Functional change: N/A    Pain: 7/10  Location: low back and left leg    Objective      Objective Measures updated at progress report unless specified.     Treatment     Jenifer received the treatments listed below:        CPT Intervention  Joint:   Focus Duration / Intensity     X  TE NuStep 5 minutes   X  NMR Bridges 3x10   X  NMR Supine clamshells 3x10   X  NMR Supine hip adduction (iso)  3x10   X  TE Heel-slides 30   X  NMR Straight leg raise 2x10   X  NMR Short arc quad (SAQ) red bolster 3x10   X  TE Hamstring stretch with strap 3x20 seconds   X  NMR Seated LAQ   3x10   X  NMR Sit to stand 22" 3x10 "                                                                                               PLAN             CPT Codes available for Billing:   (00) minutes of Manual therapy (MT) to improve pain and ROM.  (15) minutes of Therapeutic Exercise (TE) to develop strength, endurance, range of motion, and flexibility.  (40) minutes of Neuromuscular Re-Education (NMR)  to improve: Balance, Coordination, Kinesthetic, Sense, Proprioception, and Posture.  (00) minutes of Therapeutic Activities (TA) to improve functional performance.  Unattended Electrical Stimulation (ES) for muscle performance or pain modulation.  Vasopneumatic Device Therapy () for management of swelling/edema. (00912)  BFR: Blood flow restriction applied during exercise  NP or (-): Not Performed    Patient Education and Home Exercises       Education provided:   - Home program    Written Home Exercises Provided: Patient instructed to cont prior HEP. Exercises were reviewed and Jenifer was able to demonstrate them prior to the end of the session.  Jenifer demonstrated good  understanding of the education provided. See Electronic Medical Record under Patient Instructions for exercises provided during therapy sessions    Assessment     The patient demonstrates weakness and limitations in range of motion. She requires extra rest and cues with demonstration to complete motion    Jenifer Is progressing well towards her goals.   Patient prognosis is Good.     Patient will continue to benefit from skilled outpatient physical therapy to address the deficits listed in the problem list box on initial evaluation, provide pt/family education and to maximize pt's level of independence in the home and community environment.     Patient's spiritual, cultural and educational needs considered and pt agreeable to plan of care and goals.     Anticipated barriers to physical therapy: None    SHORT TERM GOALS:  4 weeks  Progress Date Met   Recent signs and systems trend is  improving in order to progress towards Long term goals.  [] Met  [] Not Met  [] Progressing     Patient will be independent with Home Exercise Program  in order to further progress and return to maximal function. [] Met  [] Not Met  [] Progressing     Pain rating at Worst: 5 /10 in order to progress towards increased independence with activity. [] Met  [] Not Met  [] Progressing     Patient will be able to correct postural deviations in sitting and standing, to decrease pain and promote postural awareness for injury prevention.  [] Met  [] Not Met  [] Progressing     Patient will improve functional outcome (FOTO) score: by 5% to increase self-worth & perceived functional ability towards long term goals [] Met  [] Not Met  [] Progressing        LONG TERM GOALS: 8 weeks     Progress Date Met   Patient will return to normal activites of daily living, recreational, and work related activities with less pain and limitation.  [] Met  [] Not Met  [] Progressing     Patient will improve range of motion  to stated goals in order to return to maximal functional potential.  [] Met  [] Not Met  [] Progressing     Patient will improve Strength to stated goals of appropriate musculature in order to improve functional independence.  [] Met  [] Not Met  [] Progressing     Pain Rating at Best: 1/10 to improve Quality of Life.  [] Met  [] Not Met  [] Progressing     Patient will meet predicted functional outcome (FOTO) score: 50% to increase self-worth & perceived functional ability. [] Met  [] Not Met  [] Progressing     Patient will have met/partially met personal goal of: improve pain and function in transfers and gait [] Met  [] Not Met  [] Progressing              PLAN   Plan of care Certification: 9/18/2023 to 11/17/2023     Outpatient Physical Therapy 2 times weekly for 8 weeks to include any combination of the following interventions: virtual visits, dry needling, modalities, electrical stimulation (IFC, Pre-Mod, Attended  with Functional Dry Needling), Gait Training, Manual Therapy, Moist Heat/ Ice, Neuromuscular Re-ed, Patient Education, Self Care, Therapeutic Exercise, Functional Training, and Therapeutic Activites        William Uribe, PT

## 2024-10-08 ENCOUNTER — PATIENT MESSAGE (OUTPATIENT)
Dept: SPORTS MEDICINE | Facility: CLINIC | Age: 49
End: 2024-10-08

## 2024-10-08 ENCOUNTER — TELEPHONE (OUTPATIENT)
Dept: SPORTS MEDICINE | Facility: CLINIC | Age: 49
End: 2024-10-08
Payer: MEDICAID

## 2024-10-08 ENCOUNTER — OFFICE VISIT (OUTPATIENT)
Dept: SPORTS MEDICINE | Facility: CLINIC | Age: 49
End: 2024-10-08
Payer: MEDICAID

## 2024-10-08 VITALS — BODY MASS INDEX: 30.77 KG/M2 | WEIGHT: 214.94 LBS | HEIGHT: 70 IN

## 2024-10-08 DIAGNOSIS — M75.122 NONTRAUMATIC COMPLETE TEAR OF LEFT ROTATOR CUFF: Primary | ICD-10-CM

## 2024-10-08 DIAGNOSIS — M75.121 NONTRAUMATIC COMPLETE TEAR OF RIGHT ROTATOR CUFF: ICD-10-CM

## 2024-10-08 DIAGNOSIS — M75.42 SUBACROMIAL IMPINGEMENT OF LEFT SHOULDER: ICD-10-CM

## 2024-10-08 PROCEDURE — 4010F ACE/ARB THERAPY RXD/TAKEN: CPT | Mod: CPTII,,, | Performed by: STUDENT IN AN ORGANIZED HEALTH CARE EDUCATION/TRAINING PROGRAM

## 2024-10-08 PROCEDURE — 99215 OFFICE O/P EST HI 40 MIN: CPT | Mod: PBBFAC | Performed by: STUDENT IN AN ORGANIZED HEALTH CARE EDUCATION/TRAINING PROGRAM

## 2024-10-08 PROCEDURE — 3051F HG A1C>EQUAL 7.0%<8.0%: CPT | Mod: CPTII,,, | Performed by: STUDENT IN AN ORGANIZED HEALTH CARE EDUCATION/TRAINING PROGRAM

## 2024-10-08 PROCEDURE — 3008F BODY MASS INDEX DOCD: CPT | Mod: CPTII,,, | Performed by: STUDENT IN AN ORGANIZED HEALTH CARE EDUCATION/TRAINING PROGRAM

## 2024-10-08 PROCEDURE — 1159F MED LIST DOCD IN RCRD: CPT | Mod: CPTII,,, | Performed by: STUDENT IN AN ORGANIZED HEALTH CARE EDUCATION/TRAINING PROGRAM

## 2024-10-08 PROCEDURE — 99214 OFFICE O/P EST MOD 30 MIN: CPT | Mod: S$PBB,,, | Performed by: STUDENT IN AN ORGANIZED HEALTH CARE EDUCATION/TRAINING PROGRAM

## 2024-10-08 PROCEDURE — 99999 PR PBB SHADOW E&M-EST. PATIENT-LVL V: CPT | Mod: PBBFAC,,, | Performed by: STUDENT IN AN ORGANIZED HEALTH CARE EDUCATION/TRAINING PROGRAM

## 2024-10-08 PROCEDURE — 1160F RVW MEDS BY RX/DR IN RCRD: CPT | Mod: CPTII,,, | Performed by: STUDENT IN AN ORGANIZED HEALTH CARE EDUCATION/TRAINING PROGRAM

## 2024-10-09 DIAGNOSIS — M75.121 COMPLETE TEAR OF RIGHT ROTATOR CUFF, UNSPECIFIED WHETHER TRAUMATIC: Primary | ICD-10-CM

## 2024-10-09 DIAGNOSIS — M75.122 NONTRAUMATIC COMPLETE TEAR OF LEFT ROTATOR CUFF: ICD-10-CM

## 2024-10-10 ENCOUNTER — PATIENT MESSAGE (OUTPATIENT)
Dept: ENDOSCOPY | Facility: HOSPITAL | Age: 49
End: 2024-10-10
Payer: MEDICAID

## 2024-10-15 DIAGNOSIS — Z01.810 PRE-OPERATIVE CARDIOVASCULAR EXAMINATION: Primary | ICD-10-CM

## 2024-10-15 DIAGNOSIS — Z76.89 ENCOUNTER TO ESTABLISH CARE: ICD-10-CM

## 2024-10-15 DIAGNOSIS — Z00.00 ROUTINE ADULT HEALTH MAINTENANCE: ICD-10-CM

## 2024-10-17 DIAGNOSIS — I73.9 PAD (PERIPHERAL ARTERY DISEASE): ICD-10-CM

## 2024-10-17 DIAGNOSIS — I10 ESSENTIAL HYPERTENSION: ICD-10-CM

## 2024-10-17 RX ORDER — CLOPIDOGREL BISULFATE 75 MG/1
75 TABLET ORAL DAILY
Qty: 90 TABLET | Refills: 3 | Status: SHIPPED | OUTPATIENT
Start: 2024-10-17

## 2024-10-17 RX ORDER — PEN NEEDLE, DIABETIC 30 GX3/16"
NEEDLE, DISPOSABLE MISCELLANEOUS
Qty: 100 EACH | Refills: 3 | OUTPATIENT
Start: 2024-10-17

## 2024-10-17 RX ORDER — LISINOPRIL 5 MG/1
5 TABLET ORAL NIGHTLY
Qty: 90 TABLET | Refills: 3 | Status: SHIPPED | OUTPATIENT
Start: 2024-10-17

## 2024-10-18 NOTE — PROGRESS NOTES
Orthopaedic Follow-Up Visit    Last Appointment: 10/8/24  Diagnosis: right shoulder rotator cuff tear and left shoulder rotator cuff tear  Prior Procedure: None    Jenifer Westfall is a 49 y.o. female who is here for f/u evaluation of her left shoulder. The patient was last seen here by me for her left shoulder on 10/8/24 at which point we discussed plan to move forward with left shoulder rotator cuff repair. However, surgery is currently on hold due to supply chain issues and patient returns today requesting CSI to help manage her symptoms in the interim.      To review her history, Jenifer Westfall is a 49 y.o. right-hand dominant female who presented on 12/13/22 with RIGHT shoulder and neck pain and dysfunction that began about 3 years ago. She had gradual onset of symptoms for her right shoulder and neck. Her symptoms included intermittent sharp, stabbing pain of the right shoulder which radiates to right neck. Her pain was aggravated by movement. She had tried rest, activity modification, Zanaflex, Gabapentin, and physical therapy. She had right shoulder pain and known rotator cuff tear on the right side.  She also had known peripheral neuropathy that severely affected her left foot and seemed to also be affecting her left hand. Because of her neuropathy, she used a cane in her right hand for ambulation.  We discussed that I thought she would have a very difficult time with any kind of operative treatment because of her left lower extremity neuropathy and need to use a cane in her right hand. We discussed that I felt it would be reasonable to proceed with injection in the right shoulder for symptomatic treatment of pain as result of her cuff tear, however we were unable to perform injection due to A1c 12.0 with reviewing of her last lab from (3/28/2022) and possible upcoming spine surgery. Discussed if she has preoperative testing for her spine surgery and her A1c decreases that we can consider a right shoulder CSI  in the future. She returned on 9/27/23 at which point we performed injection in the right shoulder for symptomatic treatment of pain as result of her cuff tear. However, think she would have a very difficult time with any kind of operative treatment because of her left lower extremity neuropathy and need to use a cane in her right hand. She returned on 5/22/24 noting left shoulder pain. She had left shoulder greater tuberosity  degenerative changes and exam suspicious for rotator cuff tear. We elected to proceed with left SAS CSI at that time.  She returned on 8/21/24 at which point she has right shoulder pain and known rotator cuff tear on the right side.  Her A1C was stabilized to a level that we could pursue operative treatment. Since her prior MRI was from 2019, I recommended moving forward with new MRI at that time for further evaluation of the current state of her rotator cuff tear. Discussed with her that over the past 5 years she could have developed atrophy that makes the tear irreparable. She returned on 9/18.24 at which point her MRI showed right shoulder rotator cuff tear and some evidence of mild glenohumeral arthritis.  That day her left shoulder was bothering her more. She had limited ROM and strength on physical exam. She had been treated with subacromial space CSI (5/22/24) for her left shoulder with some relief but her symptoms have ultimately returned and persisted. I recommended proceeding with MRI of the left shoulder at that time for further evaluation for suspected rotator cuff tear.     Patient's medications, allergies, past medical, surgical, social and family histories were reviewed and updated as appropriate.    Review of Systems   All systems reviewed were negative.  Specifically, the patient denies fever, chills, weight loss, chest pain, shortness of breath, or dyspnea on exertion.      Past Medical History:   Diagnosis Date    Arthritis     DM (diabetes mellitus) 2009      2017 Insulin x 3 years     Hepatitis C antibody positive in blood 2021    RNA NEGATIVE 3/6/2017, 3/22/22    Hyperlipidemia     Hypertension     IBS (irritable bowel syndrome)     Kidney stone     Marfan syndrome     Mitral valve prolapse     Sleep apnea     moild: no cpap needed       Past Surgical History:   Procedure Laterality Date    ANGIOGRAPHY OF LOWER EXTREMITY N/A 2018    Procedure: ANGIOGRAM, LOWER EXTREMITY- LEFT LEG;  Surgeon: Roscoe Landeros MD;  Location: [facility];  Service: Peripheral Vascular;  Laterality: N/A;    APPENDECTOMY      BUNIONECTOMY      both feet    BYPASS GRAFT Bilateral     cataract surgery  2019     SECTION      x 2    CHOLECYSTECTOMY      COLONOSCOPY N/A 2020    Procedure: COLONOSCOPY w/ biopsies;  Surgeon: Gio Georges MD;  Location: [facility];  Service: Endoscopy;  Laterality: N/A;    ESOPHAGEAL MANOMETRY WITH MEASUREMENT OF IMPEDANCE N/A 2023    Procedure: MANOMETRY, ESOPHAGUS, WITH IMPEDANCE MEASUREMENT;  Surgeon: Desmond Calderon RN;  Location: [facility];  Service: Endoscopy;  Laterality: N/A;    ESOPHAGOGASTRODUODENOSCOPY N/A 2019    Procedure: ESOPHAGOGASTRODUODENOSCOPY (EGD);  Surgeon: Jaron Sanders III, MD;  Location: [facility];  Service: Endoscopy;  Laterality: N/A;    ESOPHAGOGASTRODUODENOSCOPY N/A 2022    Procedure: EGD (ESOPHAGOGASTRODUODENOSCOPY);  Surgeon: Keith Hardwick MD;  Location: [facility];  Service: Gastroenterology;  Laterality: N/A;    INJECTION OF ANESTHETIC AGENT AROUND MULTIPLE INTERCOSTAL NERVES Right 2024    Procedure: BLOCK, NERVE, INTERCOSTAL, 2 OR MORE;  Surgeon: Chente Cabrera MD;  Location: [facility];  Service: Cardiothoracic;  Laterality: Right;    LOBECTOMY-ROBOTIC Right 2024    Procedure: LOBECTOMY-ROBOTIC;  Surgeon: Chente Cabrera MD;  Location: [facility];  Service: Cardiothoracic;  Laterality: Right;  Lower Lobe    ROBOT-ASSISTED LAPAROSCOPIC LYMPHADENECTOMY  "USING DA GOMEZ XI Right 1/19/2024    Procedure: XI ROBOTIC LYMPHADENECTOMY;  Surgeon: Chente Cabrera MD;  Location: [facility] OR;  Service: Cardiothoracic;  Laterality: Right;  Robotic mediastinal lymph node dissection    SELECTIVE INJECTION OF ANESTHETIC AGENT AROUND LUMBAR SPINAL NERVE ROOT BY TRANSFORAMINAL APPROACH Bilateral 11/23/2020    Procedure: Bilateral L5/S1 TF DENY;  Surgeon: Jewel Walters MD;  Location: [facility];  Service: Pain Management;  Laterality: Bilateral;    TUBAL LIGATION      VATS, ROBOT-ASSISTED, OR ROBOT-ASSISTED THORACOTOMY Right 1/19/2024    Procedure: VATS, ROBOT-ASSISTED, OR ROBOT-ASSISTED THORACOTOMY;  Surgeon: Chente Cabrera MD;  Location: [facility] OR;  Service: Cardiothoracic;  Laterality: Right;  Robot assisted right lower lobectomy and mediastinal lymph node dissection       Patient's Medications   New Prescriptions    No medications on file   Previous Medications    ALBUTEROL (PROVENTIL/VENTOLIN HFA) 90 MCG/ACTUATION INHALER    Inhale 2 puffs into the lungs every 6 (six) hours as needed for Wheezing. Rescue    ALPRAZOLAM (XANAX) 0.25 MG TABLET    Take 0.25 mg by mouth 2 (two) times daily as needed for Anxiety.    ASCORBIC ACID (VITAMIN C) 100 MG TABLET    Take 100 mg by mouth once daily.    ASPIRIN (ECOTRIN) 81 MG EC TABLET    Take 81 mg by mouth every evening.    ATENOLOL (TENORMIN) 25 MG TABLET    Take 1 tablet (25 mg total) by mouth every evening.    ATORVASTATIN (LIPITOR) 80 MG TABLET    Take 1 tablet (80 mg total) by mouth once daily.    AZELASTINE (ASTELIN) 137 MCG (0.1 %) NASAL SPRAY    2 sprays by Nasal route daily as needed for Rhinitis.    BD ULTRA-FINE [place] PEN NEEDLE 32 GAUGE X 5/32" NDLE    Inject 1 each into the skin 3 (three) times daily.    BUSPIRONE (BUSPAR) 30 MG TAB    Take 15 mg by mouth 2 (two) times daily.    BUTALBITAL-ACETAMINOPHEN-CAFFEINE -40 MG (FIORICET, ESGIC) -40 MG PER TABLET    Take 1 tablet by mouth every 4 (four) hours as needed " for Pain.    CLOPIDOGREL (PLAVIX) 75 MG TABLET    Take 1 tablet (75 mg total) by mouth once daily.    DAPAGLIFLOZIN PROPANEDIOL (FARXIGA) 10 MG TABLET    Take 1 tablet (10 mg total) by mouth once daily.    DICYCLOMINE (BENTYL) 20 MG TABLET    TAKE 1 TABLET BY MOUTH 3 TIMES DAILY BEFORE MEALS AS NEEDED FOR ABDOMINAL PAIN    ERGOCALCIFEROL (ERGOCALCIFEROL) 50,000 UNIT CAP    Take 1 capsule (50,000 Units total) by mouth every 7 days.    EVOLOCUMAB (REPATHA SURECLICK) 140 MG/ML PNIJ    Inject 1 mL (140 mg total) into the skin every 14 (fourteen) days.    FAMOTIDINE (PEPCID) 20 MG TABLET    TAKE 1 TABLET (20 MG TOTAL) BY MOUTH 2 (TWO) TIMES DAILY.    FAMOTIDINE (PEPCID) 20 MG TABLET    Take 1 tablet (20 mg total) by mouth 2 (two) times daily.    FINASTERIDE (PROPECIA) 1 MG TABLET    Take 1 mg by mouth once daily.    FLUOXETINE (PROZAC) 40 MG CAPSULE    TAKE 1 CAPSULE BY MOUTH ONCE DAILY    FLUTICASONE PROPIONATE (FLONASE) 50 MCG/ACTUATION NASAL SPRAY    2 sprays by Each Nostril route daily as needed.    GABAPENTIN (NEURONTIN) 300 MG CAPSULE    TAKE 3 CAPSULES (900 MG TOTAL) BY MOUTH 3 (THREE) TIMES DAILY.    GABAPENTIN (NEURONTIN) 300 MG CAPSULE    Take 1 capsule (300 mg total) by mouth every evening.    GLIPIZIDE (GLUCOTROL) 10 MG TABLET    Take 1 tablet (10 mg total) by mouth 2 (two) times daily.    ISOSORBIDE MONONITRATE (IMDUR) 30 MG 24 HR TABLET    TAKE 1 TABLET (30 MG TOTAL) BY MOUTH ONCE DAILY.    LANTUS SOLOSTAR U-100 INSULIN GLARGINE 100 UNITS/ML SUBQ PEN    Inject 40 Units into the skin 2 (two) times a day.    LINACLOTIDE (LINZESS) 145 MCG CAP CAPSULE    Take 1 capsule (145 mcg total) by mouth before breakfast.    LISINOPRIL (PRINIVIL,ZESTRIL) 5 MG TABLET    Take 1 tablet (5 mg total) by mouth every evening.    METHOCARBAMOL (ROBAXIN) 500 MG TAB    Take 500 mg by mouth 3 (three) times daily as needed.    MOMETASONE (ELOCON) 0.1 % SOLUTION    Apply topically.    NOVOLOG FLEXPEN U-100 INSULIN 100 UNIT/ML (3 ML)  "INPN PEN    Inject into the skin 3 (three) times daily with meals.    OMEPRAZOLE (PRILOSEC) 40 MG CAPSULE    Take 1 capsule (40 mg total) by mouth once daily.    OMEPRAZOLE (PRILOSEC) 40 MG CAPSULE    Take 1 capsule (40 mg total) by mouth 2 (two) times daily before meals.    OXYCODONE-ACETAMINOPHEN (PERCOCET) 5-325 MG PER TABLET    Take 1 tablet by mouth every 4 (four) hours as needed for Pain.    PEN NEEDLE 31 GAUGE X 5/16" NDLE    USE 5 TIMES DAILY WITH LANTUS AND HUMALOG    PROMETHAZINE (PHENERGAN) 25 MG TABLET    Take 1 tablet (25 mg total) by mouth every 6 (six) hours as needed for Nausea.    SITAGLIPTIN PHOSPHATE (JANUVIA) 50 MG TAB    Take 50 mg by mouth once daily. Added to chart 1/18/24    TIOTROPIUM (SPIRIVA) 18 MCG INHALATION CAPSULE    Inhale 1 capsule (18 mcg total) into the lungs once daily. Controller    TIZANIDINE (ZANAFLEX) 4 MG TABLET    TAKE 1 TO 1 AND 1/2 TABLETS (4 TO 6MG TOTAL) BY MOUTH NIGHTLY AS NEEDED.    TRAMADOL (ULTRAM) 50 MG TABLET    Take 50 mg by mouth every 6 (six) hours.    TRUE METRIX GLUCOSE TEST STRIP STRP        TRULICITY 4.5 MG/0.5 ML PEN INJECTOR    Inject 0.5 mLs as directed every 7 days.    ZINC ORAL    Take 1 tablet by mouth once daily. PT UNSURE OF DOSE   Modified Medications    No medications on file   Discontinued Medications    No medications on file       Family History   Problem Relation Name Age of Onset    Heart disease Mother      Thrombophilia Father          DVT    Hypertension Father      Stroke Maternal Aunt      Heart disease Maternal Aunt      Stroke Maternal Uncle      Heart disease Maternal Uncle      Breast cancer Neg Hx      Colon cancer Neg Hx      Ovarian cancer Neg Hx         Review of patient's allergies indicates:   Allergen Reactions    Compazine [prochlorperazine edisylate] Rash    Contrast media Anaphylaxis    Dye Anaphylaxis     Contrast dye    Iodinated contrast media Anaphylaxis     Other reaction(s): anaphylaxis    Levaquin [levofloxacin] Hives " and Itching    Prochlorperazine Rash    Metformin Other (See Comments)     Upset stomach    Ibuprofen Other (See Comments) and Nausea And Vomiting     Stomach pain  Stomach pain    Abdominal pain     Methocarbamol Nausea Only         Objective:      Physical Exam  Cervical exam. Tenderness along midline. Decreased cervical ROM. Positive Spurling's test     Physical Exam:                       RIGHT                                     LEFT     Scap Dyskinesis/Winging       (-)                                             (-)     Tenderness:                                                                              Greater Tuberosity                  (-)                                              +  Bicipital Groove                       (-)                                             +  AC joint                                   (-)                                             (-)  Other:      ROM:  Forward Elevation       140                                          90  Abduction                    90                                            70  ER (at side)                 50                                            40  IR                                 Hip                      Back pocket     Strength:   Supraspinatus             4/5                                           4/5  Infraspinatus               4/5                                           5/5  Subscap / IR               4+/5                                         5/5      Special Tests:              Neer:                                       +                                             +              Gordon:                                 +                                             +              SS Stress:                               +                                             +              Bear Hug:                                (-)                                             (-)              [place]'s:                                  (-)                                             +              Resisted Thrower's:                +                                               +              Cross Arm Abduction:             (-)                                             (-)    Neurovascular examination  - Motor grossly intact bilaterally to shoulder abduction, elbow flexion and extension, wrist flexion and extension, and intrinsic hand musculature  - Sensation intact to light touch bilaterally in axillary, median, radial, and ulnar distributions  - Symmetrical radial pulses    Imaging:    XR Results:  Results for orders placed during the hospital encounter of 12/13/22    X-ray Shoulder 2 or More Views Right    Narrative  EXAMINATION:  XR SHOULDER COMPLETE 2 OR MORE VIEWS RIGHT    CLINICAL HISTORY:  Pain in right shoulder    TECHNIQUE:  Two or three views of the right shoulder were performed.    COMPARISON:  01/27/2022, 07/30/2020, 08/02/2019 and 11/19/2018.    FINDINGS:  No acute fracture or dislocation.  No significant soft tissue swelling.    Humeral head normally positioned with the glenoid cavity.  Mild glenohumeral degenerative osteoarthrosis with mild joint space narrowing and small osteophyte formation.  Chronic small calcific densities are again present along the humeral head and proximal diaphysis of the humerus.    AC joint intact with mild degenerative osteoarthrosis.    Impression  Mild chronic changes of degenerative osteoarthrosis.      Electronically signed by: Mike Iqbal  Date:    12/13/2022  Time:    15:51      MRI Results:    MRI Shoulder Without Contrast Left  Narrative: EXAM: MRI SHOULDER WITHOUT CONTRAST LEFT    CLINICAL HISTORY: Left shoulder pain    TECHNIQUE: Multiplanar multisequence imaging of left shoulder is performed without intravenous or intra-articular contrast.    FINDINGS:  The exam is limited due to patient motion artifact.    There is a full-thickness tear of the entirety of the supraspinatus tendon  with the torn tendon retracted to the level of the glenohumeral joint line.  There is no supraspinatus muscle atrophy.  The infraspinatus tendon, teres minor tendon, and subscapularis tendon are intact.  The long head of the biceps tendon is located in its groove.  The biceps labral anchor appears intact.  No definite labral tears are seen.  Glenohumeral ligaments appear intact.    Large glenohumeral joint effusion which spills freely through the rotator cuff defect into the subacromial subdeltoid bursa.  No fracture or bone marrow edema or avascular necrosis.  Normal horizontal orientation of a type II acromion and no os acromiale.  Impression: 1.  Full-thickness tear of the entirety of the supraspinatus tendon.  2.  Large glenohumeral joint effusion which spills freely through the rotator cuff defect into the subacromial subdeltoid bursa.    Finalized on: 10/4/2024 2:07 PM By:  Sean Rosario MD  [facility]# [id number]      2024-10-04 14:09:[id number]    [facility]     MRI Shoulder Without Contrast Left  Narrative: EXAM: MRI SHOULDER WITHOUT CONTRAST LEFT    CLINICAL HISTORY: Left shoulder pain    TECHNIQUE: Multiplanar multisequence imaging of left shoulder is performed without intravenous or intra-articular contrast.    FINDINGS:  The exam is limited due to patient motion artifact.    There is a full-thickness tear of the entirety of the supraspinatus tendon with the torn tendon retracted to the level of the glenohumeral joint line.  There is no supraspinatus muscle atrophy.  The infraspinatus tendon, teres minor tendon, and subscapularis tendon are intact.  The long head of the biceps tendon is located in its groove.  The biceps labral anchor appears intact.  No definite labral tears are seen.  Glenohumeral ligaments appear intact.    Large glenohumeral joint effusion which spills freely through the rotator cuff defect into the subacromial subdeltoid bursa.  No fracture or bone marrow edema or avascular necrosis.  Normal horizontal  orientation of a type II acromion and no os acromiale.  Impression: 1.  Full-thickness tear of the entirety of the supraspinatus tendon.  2.  Large glenohumeral joint effusion which spills freely through the rotator cuff defect into the subacromial subdeltoid bursa.    Finalized on: 10/4/2024 2:07 PM By:  Sean Rosario MD  [facility]# [id number]      2024-10-04 14:09:21.373    [facility]            Physician read: I agree with the above impression.    Assessment/Plan:   Jenifer Westfall is a 49 y.o. female with right shoulder rotator cuff tear and left shoulder rotator cuff tear    Plan:    She has known full-thickness left shoulder rotator cuff tear as seen on prior MRI. We had discussed plan to move forward with operative treatment, however her surgery is currently on hold due to supply chain issues.   She is having daily pain that is affecting her ADLs and ability to sleep. She is requesting CSI at this time. Discussed that this would definitely postpone any operative treatment for at least 90 days. Patient voices understanding and would like to proceed with injection.   Corticosteroid injection into the  left subacromial spaceperformed today and patient tolerated the procedure well with no immediate complications.   Follow-up with me as needed.           All Jain MD    I, Shahzad Ravi, acted as a scribe for All Jain MD for the duration of this office visit.

## 2024-10-21 ENCOUNTER — E-CONSULT (OUTPATIENT)
Dept: CARDIOLOGY | Facility: CLINIC | Age: 49
End: 2024-10-21
Payer: MEDICAID

## 2024-10-21 DIAGNOSIS — Z01.810 PREOP CARDIOVASCULAR EXAM: Primary | ICD-10-CM

## 2024-10-21 NOTE — CONSULTS
[facility] - Cardiology  Response for E-Consult     Patient Name: Jenifer Westfall  MRN: [id number]  Primary Care Provider: [facility]   Requesting Provider: Chen Winchester NP  E-Consult to General Cardiology  Consult performed by: Daniel Hsu MD  Consult ordered by: Chen Winchester, NP            48 yo female, E consult for preop clearance of endoscopy  The chart reviewed.  PMH PAD (right SUBL occluded by Cath in ), s/p bilateral STEPHEN PTCA by Dr. Landeros in , Marsanty's Dz, DM, HTN, HLD.   07/24 EKG NSR    Plan  Elevated periop risk of CV events for non-high risk procedure.  Ok to proceed the scheduled procedure without further cardiac study.  OK to hold Plavix 5 days before the procedure and resume postop once hemodynamically stable      Total time of Consultation: 10 minute    I did not speak to the requesting provider verbally about this.     *This eConsult is based on the clinical data available to me and is furnished without benefit of a physical examination. The eConsult will need to be interpreted in light of any clinical issues or changes in patient status not available to me at the time of filing this eConsults. Significant changes in patient condition or level of acuity should result in immediate formal consultation and reevaluation. Please alert me if you have further questions.    Thank you for this eConsult referral.     Daniel Hsu MD  [facility] - Cardiology

## 2024-10-23 ENCOUNTER — OFFICE VISIT (OUTPATIENT)
Dept: SPORTS MEDICINE | Facility: CLINIC | Age: 49
End: 2024-10-23
Payer: MEDICAID

## 2024-10-23 VITALS — RESPIRATION RATE: 17 BRPM | WEIGHT: 214.94 LBS | BODY MASS INDEX: 30.77 KG/M2 | HEIGHT: 70 IN

## 2024-10-23 DIAGNOSIS — M75.42 SUBACROMIAL IMPINGEMENT OF LEFT SHOULDER: ICD-10-CM

## 2024-10-23 DIAGNOSIS — Z01.818 PRE-OP EXAM: Primary | ICD-10-CM

## 2024-10-23 DIAGNOSIS — M75.121 NONTRAUMATIC COMPLETE TEAR OF RIGHT ROTATOR CUFF: ICD-10-CM

## 2024-10-23 DIAGNOSIS — M75.122 NONTRAUMATIC COMPLETE TEAR OF LEFT ROTATOR CUFF: Primary | ICD-10-CM

## 2024-10-23 PROCEDURE — 20610 DRAIN/INJ JOINT/BURSA W/O US: CPT | Mod: PBBFAC | Performed by: STUDENT IN AN ORGANIZED HEALTH CARE EDUCATION/TRAINING PROGRAM

## 2024-10-23 PROCEDURE — 99214 OFFICE O/P EST MOD 30 MIN: CPT | Mod: PBBFAC | Performed by: STUDENT IN AN ORGANIZED HEALTH CARE EDUCATION/TRAINING PROGRAM

## 2024-10-23 PROCEDURE — 99999 PR PBB SHADOW E&M-EST. PATIENT-LVL IV: CPT | Mod: PBBFAC,,, | Performed by: STUDENT IN AN ORGANIZED HEALTH CARE EDUCATION/TRAINING PROGRAM

## 2024-10-23 PROCEDURE — 99999PBSHW PR PBB SHADOW TECHNICAL ONLY FILED TO HB: Mod: PBBFAC,,,

## 2024-10-23 RX ADMIN — TRIAMCINOLONE ACETONIDE 40 MG: 200 INJECTION, SUSPENSION INTRA-ARTICULAR; INTRAMUSCULAR at 02:10

## 2024-10-24 RX ORDER — TRIAMCINOLONE ACETONIDE 40 MG/ML
40 INJECTION, SUSPENSION INTRA-ARTICULAR; INTRAMUSCULAR
Status: DISCONTINUED | OUTPATIENT
Start: 2024-10-23 | End: 2024-10-24 | Stop reason: HOSPADM

## 2024-10-24 NOTE — PROCEDURES
Large Joint Aspiration/Injection: L subacromial bursa    Date/Time: 10/23/2024 2:30 PM    Performed by: All Jain MD  Authorized by: All Jain MD    Consent Done?:  Yes (Verbal)  Indications:  Pain  Site marked: the procedure site was marked    Timeout: prior to procedure the correct patient, procedure, and site was verified    Prep: patient was prepped and draped in usual sterile fashion      Local anesthesia used?: Yes    Anesthesia:  Local infiltration  Local anesthetic:  Lidocaine 1% without epinephrine    Details:  Needle Size:  22 G  Ultrasonic Guidance for needle placement?: No    Approach:  Posterior  Location:  Shoulder  Site:  L subacromial bursa  Medications:  40 mg triamcinolone acetonide 40 mg/mL  Patient tolerance:  Patient tolerated the procedure well with no immediate complications

## 2024-11-03 ENCOUNTER — PATIENT MESSAGE (OUTPATIENT)
Dept: CARDIOLOGY | Facility: CLINIC | Age: 49
End: 2024-11-03
Payer: MEDICAID

## 2024-11-14 ENCOUNTER — PATIENT MESSAGE (OUTPATIENT)
Dept: ENDOSCOPY | Facility: HOSPITAL | Age: 49
End: 2024-11-14
Payer: MEDICAID

## 2024-11-19 ENCOUNTER — ANESTHESIA EVENT (OUTPATIENT)
Dept: ENDOSCOPY | Facility: HOSPITAL | Age: 49
End: 2024-11-19
Payer: MEDICAID

## 2024-11-19 NOTE — ANESTHESIA PREPROCEDURE EVALUATION
11/19/2024  Jenifer Westfall is a 49 y.o., female.  Patient Active Problem List   Diagnosis    Diarrhea    Vitamin D deficiency    Type 2 diabetes mellitus with circulatory disorder, with long-term current use of insulin    Chronic midline low back pain without sciatica    IBS (irritable bowel syndrome)    Depression    Marfan's syndrome    HTN (hypertension)    HLD (hyperlipidemia)    MVP (mitral valve prolapse)    Bursitis, hip    Innominate artery stenosis    PAD (peripheral artery disease)    Occlusion of right subclavian artery    Sciatica of left side    Dysmenorrhea    Type 2 diabetes mellitus with diabetic neuropathy, with long-term current use of insulin    Neuropathy    Abnormal lung function test    Preop cardiovascular exam    Liver fibrosis    Acute pain of right knee    Primary osteoarthritis of both knees    DDD (degenerative disc disease), cervical    Chronic bursitis of right shoulder    Left ankle swelling    Iliac artery stenosis, left    Claudication in peripheral vascular disease, left leg    Rotator cuff impingement syndrome of right shoulder    Rotator cuff tendonitis, right    Diabetic peripheral neuropathy associated with type 2 diabetes mellitus    GERD (gastroesophageal reflux disease)    Complete tear of right rotator cuff    Myofascial pain    Colitis    Obesity (BMI 30-39.9)    Colon polyps    Chronic hip pain, left    Pulmonary hypertension    Lumbar radiculopathy    Nausea and vomiting    Other dysphagia    Numbness and tingling    KAT (dyspnea on exertion)    Atypical chest pain    Former smoker    Solitary pulmonary nodule    Diffuse esophageal spasm    Orthostatic hypotension    Severe obesity (BMI 35.0-35.9 with comorbidity)    Unsteady gait    Migraine without aura and without status migrainosus, not intractable    Malignant neoplasm of lung    Simple chronic bronchitis     Decreased functional activity tolerance    Postural instability    Weakness of both lower extremities    Decreased range of motion of lumbar spine    Decreased strength of upper extremity       7/2024 EKG    Vent. Rate : 082 BPM     Atrial Rate : 082 BPM      P-R Int : 136 ms          QRS Dur : 086 ms       QT Int : 420 ms       P-R-T Axes : 035 041 046 degrees      QTc Int : 490 ms     Normal sinus rhythm   Prolonged QT   Abnormal ECG   When compared with ECG of 09-JAN-2024 10:40,   ST no longer depressed in Inferior leads   Confirmed by MD TERRI, ANDRÉS ([id number]) on 7/24 1/2024 Stress Echo    Summary         Left Ventricle: The left ventricle is normal in size. Normal wall thickness. There is concentric remodeling. Normal wall motion. There is normal systolic function with a visually estimated ejection fraction of 60 - 65%. There is normal diastolic function.    Right Ventricle: Normal right ventricular cavity size. Wall thickness is normal. Right ventricle wall motion  is normal. Systolic function is normal.    IVC/SVC: Normal venous pressure at 3 mmHg.    Stress Protocol: The test was stopped because the end of the protocol was reached.    Baseline ECG: The Baseline ECG reveals sinus rhythm. The axis is normal. The ST segments are normal.    Stress ECG: There are no ST segment deviation identified during the protocol. There are no arrhythmias during stress. There is normal blood pressure response with stress.    ECG Conclusion: The ECG portion of the study is negative for ischemia.    Post-stress Impression: The study is negative with no echocardiographic evidence of stress induced ischemia.        Pre-op Assessment    I have reviewed the Patient Summary Reports.     I have reviewed the Nursing Notes. I have reviewed the NPO Status.   I have reviewed the Medications.     Review of Systems  Anesthesia Hx:  No problems with previous Anesthesia             Denies Family Hx of Anesthesia complications.    Denies  Personal Hx of Anesthesia complications.                    Social:  No Alcohol Use, Former Smoker       Hematology/Oncology:                        --  Cancer in past history:                     Cardiovascular:     Hypertension Valvular problems/Murmurs, MVP         PVD hyperlipidemia KAT   Pulm HTN, inominate artery stenosis/occlusion of Rt subclavian artery                           Pulmonary:      Shortness of breath  Sleep Apnea Chronic bronchitis, lung CA, s/p Rt Lower lobectomy Jan 2024               Renal/:  Chronic Renal Disease renal calculi               Hepatic/GI:     GERD Liver Disease, Hepatitis, C              Musculoskeletal:  Arthritis          Spine Disorders: cervical Disc disease and Degenerative disease           Neurological:    Neuromuscular Disease,  Headaches           Peripheral Neuropathy                          Endocrine:  Diabetes, type 2         Obesity / BMI > 30  Psych:  Psychiatric History  depression                Physical Exam  General: Well nourished, Cooperative, Alert and Oriented    Airway:  Mallampati: II   Mouth Opening: Normal  TM Distance: Normal  Tongue: Normal  Neck ROM: Normal ROM    Dental:  Edentulous        Anesthesia Plan  Type of Anesthesia, risks & benefits discussed:    Anesthesia Type: Gen Natural Airway  Intra-op Monitoring Plan: Standard ASA Monitors  Post Op Pain Control Plan: multimodal analgesia  Induction:  IV  Informed Consent: Informed consent signed with the Patient and all parties understand the risks and agree with anesthesia plan.  All questions answered. Patient consented to blood products? No  ASA Score: 3  Day of Surgery Review of History & Physical: H&P Update referred to the surgeon/provider.    Ready For Surgery From Anesthesia Perspective.     .

## 2024-11-22 ENCOUNTER — HOSPITAL ENCOUNTER (OUTPATIENT)
Facility: HOSPITAL | Age: 49
Discharge: HOME | End: 2024-11-22
Attending: INTERNAL MEDICINE | Admitting: INTERNAL MEDICINE
Payer: MEDICAID

## 2024-11-22 ENCOUNTER — ANESTHESIA (OUTPATIENT)
Dept: ENDOSCOPY | Facility: HOSPITAL | Age: 49
End: 2024-11-22
Payer: MEDICAID

## 2024-11-22 VITALS
HEIGHT: 70 IN | HEART RATE: 77 BPM | DIASTOLIC BLOOD PRESSURE: 66 MMHG | SYSTOLIC BLOOD PRESSURE: 129 MMHG | WEIGHT: 216.19 LBS | TEMPERATURE: 98 F | RESPIRATION RATE: 20 BRPM | BODY MASS INDEX: 30.95 KG/M2 | OXYGEN SATURATION: 97 %

## 2024-11-22 DIAGNOSIS — K21.9 GERD (GASTROESOPHAGEAL REFLUX DISEASE): ICD-10-CM

## 2024-11-22 DIAGNOSIS — K21.9 GASTROESOPHAGEAL REFLUX DISEASE, UNSPECIFIED WHETHER ESOPHAGITIS PRESENT: Primary | ICD-10-CM

## 2024-11-22 LAB — POCT GLUCOSE: 251 MG/DL (ref 70–110)

## 2024-11-22 PROCEDURE — 91035 G-ESOPH REFLX TST W/ELECTROD: CPT | Mod: TC | Performed by: INTERNAL MEDICINE

## 2024-11-22 PROCEDURE — 27201012 HC FORCEPS, HOT/COLD, DISP: Performed by: INTERNAL MEDICINE

## 2024-11-22 PROCEDURE — 63600175 PHARM REV CODE 636 W HCPCS: Performed by: ANESTHESIOLOGY

## 2024-11-22 PROCEDURE — 27200942: Performed by: INTERNAL MEDICINE

## 2024-11-22 PROCEDURE — 43239 EGD BIOPSY SINGLE/MULTIPLE: CPT | Performed by: INTERNAL MEDICINE

## 2024-11-22 PROCEDURE — 37000009 HC ANESTHESIA EA ADD 15 MINS: Performed by: INTERNAL MEDICINE

## 2024-11-22 PROCEDURE — 37000008 HC ANESTHESIA 1ST 15 MINUTES: Performed by: INTERNAL MEDICINE

## 2024-11-22 PROCEDURE — 88305 TISSUE EXAM BY PATHOLOGIST: CPT | Mod: 59 | Performed by: PATHOLOGY

## 2024-11-22 PROCEDURE — 43239 EGD BIOPSY SINGLE/MULTIPLE: CPT | Mod: ,,, | Performed by: INTERNAL MEDICINE

## 2024-11-22 RX ORDER — PROPOFOL 10 MG/ML
INJECTION, EMULSION INTRAVENOUS
Status: DISCONTINUED | OUTPATIENT
Start: 2024-11-22 | End: 2024-11-22

## 2024-11-22 RX ORDER — LIDOCAINE HYDROCHLORIDE 20 MG/ML
INJECTION INTRAVENOUS
Status: DISCONTINUED | OUTPATIENT
Start: 2024-11-22 | End: 2024-11-22

## 2024-11-22 RX ADMIN — PROPOFOL 250 MG: 10 INJECTION, EMULSION INTRAVENOUS at 09:11

## 2024-11-22 RX ADMIN — GLYCOPYRROLATE 0.2 MG: 0.2 INJECTION, SOLUTION INTRAMUSCULAR; INTRAVITREAL at 09:11

## 2024-11-22 RX ADMIN — LIDOCAINE HYDROCHLORIDE 180 MG: 20 INJECTION INTRAVENOUS at 09:11

## 2024-11-22 NOTE — H&P
Short Stay Endoscopy History and Physical    PCP - [facility]    Procedure - EGD with Bravo  ASA - per anesthesia  Mallampati - per anesthesia  History of Anesthesia problems - no  Family history Anesthesia problems -  no     HPI:  This is a 49 y.o. female here for evaluation of :   Active Hospital Problems    Diagnosis  POA    *GERD (gastroesophageal reflux disease) [K21.9]  Yes      Resolved Hospital Problems   No resolved problems to display.         ROS:  CONSTITUTIONAL: Denies weight change,  fatigue, fevers, chills, night sweats.  CARDIOVASCULAR: Denies chest pain, shortness of breath, orthopnea and edema.  RESPIRATORY: Denies cough, hemoptysis, dyspnea, and wheezing.  GI: See HPI.    Medical History:   Past Medical History:   Diagnosis Date    Arthritis     DM (diabetes mellitus)      2017 Insulin x 3 years     Hepatitis C antibody positive in blood 2021    RNA NEGATIVE 3/6/2017, 3/22/22    Hyperlipidemia     Hypertension     IBS (irritable bowel syndrome)     Kidney stone     Marfan syndrome     Mitral valve prolapse     Sleep apnea     moild: no cpap needed       Surgical History:   Past Surgical History:   Procedure Laterality Date    ANGIOGRAPHY OF LOWER EXTREMITY N/A 2018    Procedure: ANGIOGRAM, LOWER EXTREMITY- LEFT LEG;  Surgeon: Roscoe Landeros MD;  Location: [facility] CATH LAB;  Service: Peripheral Vascular;  Laterality: N/A;    APPENDECTOMY      BUNIONECTOMY      both feet    BYPASS GRAFT Bilateral     cataract surgery  2019     SECTION      x 2    CHOLECYSTECTOMY      COLONOSCOPY N/A 2020    Procedure: COLONOSCOPY w/ biopsies;  Surgeon: Gio Georges MD;  Location: [facility];  Service: Endoscopy;  Laterality: N/A;    ESOPHAGEAL MANOMETRY WITH MEASUREMENT OF IMPEDANCE N/A 2023    Procedure: MANOMETRY, ESOPHAGUS, WITH IMPEDANCE MEASUREMENT;  Surgeon: Desmond Calderon RN;  Location: [facility];  Service: Endoscopy;  Laterality: N/A;     ESOPHAGOGASTRODUODENOSCOPY N/A 07/31/2019    Procedure: ESOPHAGOGASTRODUODENOSCOPY (EGD);  Surgeon: Jaron Sanders III, MD;  Location: [facility] ENDO;  Service: Endoscopy;  Laterality: N/A;    ESOPHAGOGASTRODUODENOSCOPY N/A 04/22/2022    Procedure: EGD (ESOPHAGOGASTRODUODENOSCOPY);  Surgeon: Keith Hardwick MD;  Location: [facility] ENDO;  Service: Gastroenterology;  Laterality: N/A;    INJECTION OF ANESTHETIC AGENT AROUND MULTIPLE INTERCOSTAL NERVES Right 1/19/2024    Procedure: BLOCK, NERVE, INTERCOSTAL, 2 OR MORE;  Surgeon: Chente Cabrera MD;  Location: [facility] OR;  Service: Cardiothoracic;  Laterality: Right;    LOBECTOMY-ROBOTIC Right 1/19/2024    Procedure: LOBECTOMY-ROBOTIC;  Surgeon: Chente Cabrera MD;  Location: [facility] OR;  Service: Cardiothoracic;  Laterality: Right;  Lower Lobe    ROBOT-ASSISTED LAPAROSCOPIC LYMPHADENECTOMY USING DA GOMEZ XI Right 1/19/2024    Procedure: XI ROBOTIC LYMPHADENECTOMY;  Surgeon: Chente Cabrera MD;  Location: [facility] OR;  Service: Cardiothoracic;  Laterality: Right;  Robotic mediastinal lymph node dissection    SELECTIVE INJECTION OF ANESTHETIC AGENT AROUND LUMBAR SPINAL NERVE ROOT BY TRANSFORAMINAL APPROACH Bilateral 11/23/2020    Procedure: Bilateral L5/S1 TF DENY;  Surgeon: Jewel Walters MD;  Location: [facility] PAIN MGT;  Service: Pain Management;  Laterality: Bilateral;    TUBAL LIGATION      VATS, ROBOT-ASSISTED, OR ROBOT-ASSISTED THORACOTOMY Right 1/19/2024    Procedure: VATS, ROBOT-ASSISTED, OR ROBOT-ASSISTED THORACOTOMY;  Surgeon: Chente Cabrera MD;  Location: [facility];  Service: Cardiothoracic;  Laterality: Right;  Robot assisted right lower lobectomy and mediastinal lymph node dissection       Family History:  Family History   Problem Relation Name Age of Onset    Heart disease Mother      Thrombophilia Father          DVT    Hypertension Father      Stroke Maternal Aunt      Heart disease Maternal Aunt      Stroke Maternal Uncle      Heart disease Maternal Uncle    "   Breast cancer Neg Hx      Colon cancer Neg Hx      Ovarian cancer Neg Hx         Social History:   Social History     Tobacco Use    Smoking status: Former     Current packs/day: 0.00     Average packs/day: 1 pack/day for 24.0 years (24.0 ttl pk-yrs)     Types: Cigarettes     Start date: 1996     Quit date: 2020     Years since quittin.8     Passive exposure: Past    Smokeless tobacco: Never    Tobacco comments:     "once in a blue moon"   Substance Use Topics    Alcohol use: Never    Drug use: No       Allergy  Review of patient's allergies indicates:   Allergen Reactions    Compazine [prochlorperazine edisylate] Rash    Contrast media Anaphylaxis    Dye Anaphylaxis     Contrast dye    Iodinated contrast media Anaphylaxis     Other reaction(s): anaphylaxis    Levaquin [levofloxacin] Hives and Itching    Prochlorperazine Rash    Metformin Other (See Comments)     Upset stomach    Ibuprofen Other (See Comments) and Nausea And Vomiting     Stomach pain  Stomach pain    Abdominal pain     Methocarbamol Nausea Only       Medications:   No current facility-administered medications on file prior to encounter.     Current Outpatient Medications on File Prior to Encounter   Medication Sig Dispense Refill    albuterol (PROVENTIL/VENTOLIN HFA) 90 mcg/actuation inhaler Inhale 2 puffs into the lungs every 6 (six) hours as needed for Wheezing. Rescue      ascorbic acid (VITAMIN C) 100 MG tablet Take 100 mg by mouth once daily.      aspirin (ECOTRIN) 81 MG EC tablet Take 81 mg by mouth every evening.      atenoloL (TENORMIN) 25 MG tablet Take 1 tablet (25 mg total) by mouth every evening. 90 tablet 3    atorvastatin (LIPITOR) 80 MG tablet Take 1 tablet (80 mg total) by mouth once daily. 90 tablet 3    busPIRone (BUSPAR) 30 MG Tab Take 15 mg by mouth 2 (two) times daily.      butalbital-acetaminophen-caffeine -40 mg (FIORICET, ESGIC) -40 mg per tablet Take 1 tablet by mouth every 4 (four) hours as needed " for Pain.      dicyclomine (BENTYL) 20 mg tablet TAKE 1 TABLET BY MOUTH 3 TIMES DAILY BEFORE MEALS AS NEEDED FOR ABDOMINAL PAIN 100 tablet 2    ergocalciferol (ERGOCALCIFEROL) 50,000 unit Cap Take 1 capsule (50,000 Units total) by mouth every 7 days. 12 capsule 1    famotidine (PEPCID) 20 MG tablet Take 1 tablet (20 mg total) by mouth 2 (two) times daily. 60 tablet 11    finasteride (PROPECIA) 1 mg tablet Take 1 mg by mouth once daily.      FLUoxetine (PROZAC) 40 MG capsule TAKE 1 CAPSULE BY MOUTH ONCE DAILY (Patient taking differently: Take 40 mg by mouth every evening.) 90 capsule 0    gabapentin (NEURONTIN) 300 MG capsule TAKE 3 CAPSULES (900 MG TOTAL) BY MOUTH 3 (THREE) TIMES DAILY. 270 capsule 11    glipiZIDE (GLUCOTROL) 10 MG tablet Take 1 tablet (10 mg total) by mouth 2 (two) times daily. 60 tablet 0    isosorbide mononitrate (IMDUR) 30 MG 24 hr tablet TAKE 1 TABLET (30 MG TOTAL) BY MOUTH ONCE DAILY. 30 tablet 11    LANTUS SOLOSTAR U-100 INSULIN glargine 100 units/mL SubQ pen Inject 40 Units into the skin 2 (two) times a day.      linaCLOtide (LINZESS) 145 mcg Cap capsule Take 1 capsule (145 mcg total) by mouth before breakfast. (Patient taking differently: Take 145 mcg by mouth daily as needed.) 30 capsule 2    NOVOLOG FLEXPEN U-100 INSULIN 100 unit/mL (3 mL) InPn pen Inject into the skin 3 (three) times daily with meals.      omeprazole (PRILOSEC) 40 MG capsule Take 1 capsule (40 mg total) by mouth once daily. 30 capsule 11    promethazine (PHENERGAN) 25 MG tablet Take 1 tablet (25 mg total) by mouth every 6 (six) hours as needed for Nausea. 30 tablet 0    tiotropium (SPIRIVA) 18 mcg inhalation capsule Inhale 1 capsule (18 mcg total) into the lungs once daily. Controller 30 capsule 11    tiZANidine (ZANAFLEX) 4 MG tablet TAKE 1 TO 1 AND 1/2 TABLETS (4 TO 6MG TOTAL) BY MOUTH NIGHTLY AS NEEDED. (Patient taking differently: Take 4 mg by mouth before meals as needed.) 45 tablet 5    ZINC ORAL Take 1 tablet by  "mouth once daily. PT UNSURE OF DOSE      ALPRAZolam (XANAX) 0.25 MG tablet Take 0.25 mg by mouth 2 (two) times daily as needed for Anxiety.      azelastine (ASTELIN) 137 mcg (0.1 %) nasal spray 2 sprays by Nasal route daily as needed for Rhinitis.      BD ULTRA-FINE AMERICA PEN NEEDLE 32 gauge x 5/32" Ndle Inject 1 each into the skin 3 (three) times daily.      dapagliflozin propanediol (FARXIGA) 10 mg tablet Take 1 tablet (10 mg total) by mouth once daily. 30 tablet 6    evolocumab (REPATHA SURECLICK) 140 mg/mL PnIj Inject 1 mL (140 mg total) into the skin every 14 (fourteen) days. 2 mL 11    famotidine (PEPCID) 20 MG tablet TAKE 1 TABLET (20 MG TOTAL) BY MOUTH 2 (TWO) TIMES DAILY. 60 tablet 11    fluticasone propionate (FLONASE) 50 mcg/actuation nasal spray 2 sprays by Each Nostril route daily as needed.      methocarbamoL (ROBAXIN) 500 MG Tab Take 500 mg by mouth 3 (three) times daily as needed.      mometasone (ELOCON) 0.1 % solution Apply topically.      omeprazole (PRILOSEC) 40 MG capsule Take 1 capsule (40 mg total) by mouth 2 (two) times daily before meals. 180 capsule 0    oxyCODONE-acetaminophen (PERCOCET) 5-325 mg per tablet Take 1 tablet by mouth every 4 (four) hours as needed for Pain. 20 tablet 0    PEN NEEDLE 31 gauge x 5/16" Ndle USE 5 TIMES DAILY WITH LANTUS AND HUMALOG 100 each 3    SITagliptin phosphate (JANUVIA) 50 MG Tab Take 50 mg by mouth once daily. Added to chart 1/18/24      traMADoL (ULTRAM) 50 mg tablet Take 50 mg by mouth every 6 (six) hours.      TRUE METRIX GLUCOSE TEST STRIP Strp       TRULICITY 4.5 mg/0.5 mL pen injector Inject 0.5 mLs as directed every 7 days.         Physical Exam:  Vital Signs:   Vitals:    11/22/24 0859   BP: (!) 124/58   Pulse: 82   Resp: 18   Temp: 97.7 °F (36.5 °C)     General Appearance: Well appearing in no acute distress  ENT: OP clear  Chest: CTA B  CV: RRR, no m/r/g  Abd: s/nt/nd/nabs  Ext: no edema    Labs:  Reviewed    IMP:  Active Hospital Problems    " Diagnosis  POA    *GERD (gastroesophageal reflux disease) [K21.9]  Yes      Resolved Hospital Problems   No resolved problems to display.         Plan:  I have explained the risks and benefits of endoscopy procedures to the patient including but not limited to bleeding, perforation, infection, and death. The patient wishes to proceed.

## 2024-11-22 NOTE — DISCHARGE SUMMARY
[facility] - Endoscopy 1st Fl  Discharge Note  Short Stay    Procedure(s) (LRB):  EGD (ESOPHAGOGASTRODUODENOSCOPY) 10/21-cc rec'd clr may hold plavix 5 days (N/A)  PH MONITORING, ESOPHAGUS, WIRELESS, (OFF REFLUX MEDS) (N/A)      OUTCOME: Patient tolerated treatment/procedure well without complication and is now ready for discharge.    DISPOSITION: Home or Self Care    FINAL DIAGNOSIS:  GERD (gastroesophageal reflux disease)    FOLLOWUP: With primary care provider    DISCHARGE INSTRUCTIONS:  No discharge procedures on file.      Clinical Reference Documents Added to Patient Instructions         Document    GASTRITIS (ENGLISH)

## 2024-11-22 NOTE — TRANSFER OF CARE
"Anesthesia Transfer of Care Note    Patient: Jenifer Westfall    Procedure(s) Performed: Procedure(s) (LRB):  EGD (ESOPHAGOGASTRODUODENOSCOPY) 10/21-cc rec'd clr may hold plavix 5 days (N/A)  PH MONITORING, ESOPHAGUS, WIRELESS, (OFF REFLUX MEDS) (N/A)    Patient location: PACU    Anesthesia Type: general    Transport from OR: Transported from OR on room air with adequate spontaneous ventilation    Post pain: adequate analgesia    Post assessment: no apparent anesthetic complications and tolerated procedure well    Post vital signs: stable    Level of consciousness: awake    Nausea/Vomiting: no nausea/vomiting    Complications: none    Transfer of care protocol was followed      Last vitals: Visit Vitals  BP (!) 124/58 (BP Location: Right arm)   Pulse 82   Temp 36.5 °C (97.7 °F) (Temporal)   Resp 18   Ht 5' 10" (1.778 m)   Wt 98.1 kg (216 lb 2.6 oz)   SpO2 (!) 94%   Breastfeeding No   BMI 31.02 kg/m²     "

## 2024-11-22 NOTE — ANESTHESIA POSTPROCEDURE EVALUATION
Anesthesia Post Evaluation    Patient: Jenifer Westfall    Procedure(s) Performed: Procedure(s) (LRB):  EGD (ESOPHAGOGASTRODUODENOSCOPY) 10/21-cc rec'd clr may hold plavix 5 days (N/A)  PH MONITORING, ESOPHAGUS, WIRELESS, (OFF REFLUX MEDS) (N/A)    Final Anesthesia Type: general      Patient location during evaluation: PACU  Patient participation: Yes- Able to Participate  Level of consciousness: awake  Post-procedure vital signs: reviewed and stable  Pain management: adequate  Airway patency: patent    PONV status at discharge: No PONV  Anesthetic complications: no      Cardiovascular status: stable  Respiratory status: unassisted  Hydration status: euvolemic  Follow-up not needed.              Vitals Value Taken Time   /60 11/22/24 1008   Temp 36.6 °C (97.8 °F) 11/22/24 0953   Pulse 77 11/22/24 1009   Resp 23 11/22/24 1009   SpO2 96 % 11/22/24 1009   Vitals shown include unfiled device data.      Event Time   Out of Recovery 10:23:00         Pain/Abdullahi Score: Abdullahi Score: 10 (11/22/2024  9:54 AM)

## 2024-11-22 NOTE — [employer] PATIENT INSTRUCTIONS
Discharge Summary/Instructions after an Endoscopic Procedure  Patient Name: Jenifer Westfall  Patient MRN: [id number]  Patient YOB: 1975 Friday, November 22, 2024  Jadyn Santana MD  Dear patient,  As a result of recent federal legislation (The Federal Cures Act), you may   receive lab or pathology results from your procedure in your [facility]   account before your physician is able to contact you. Your physician or   their representative will relay the results to you with their   recommendations at their soonest availability.  Thank you,  RESTRICTIONS:  During your procedure today, you received medications for sedation.  These   medications may affect your judgment, balance and coordination.  Therefore,   for 24 hours, you have the following restrictions:   - DO NOT drive a car, operate machinery, make legal/financial decisions,   sign important papers or drink alcohol.    ACTIVITY:  Today: no heavy lifting, straining or running due to procedural   sedation/anesthesia.  The following day: return to full activity including work.  DIET:  Eat and drink normally unless instructed otherwise.     TREATMENT FOR COMMON SIDE EFFECTS:  - Mild abdominal pain, nausea, belching, bloating or excessive gas:  rest,   eat lightly and use a heating pad.  - Sore Throat: treat with throat lozenges and/or gargle with warm salt   water.  - Because air was used during the procedure, expelling large amounts of air   from your rectum or belching is normal.  - If a bowel prep was taken, you may not have a bowel movement for 1-3 days.    This is normal.  SYMPTOMS TO WATCH FOR AND REPORT TO YOUR PHYSICIAN:  1. Abdominal pain or bloating, other than gas cramps.  2. Chest pain.  3. Back pain.  4. Signs of infection such as: chills or fever occurring within 24 hours   after the procedure.  5. Rectal bleeding, which would show as bright red, maroon, or black stools.   (A tablespoon of blood from the rectum is not serious, especially  if   hemorrhoids are present.)  6. Vomiting.  7. Weakness or dizziness.  GO DIRECTLY TO THE NEAREST EMERGENCY ROOM IF YOU HAVE ANY OF THE FOLLOWING:      Difficulty breathing              Chills and/or fever over 101 F   Persistent vomiting and/or vomiting blood   Severe abdominal pain   Severe chest pain   Black, tarry stools   Bleeding- more than one tablespoon   Any other symptom or condition that you feel may need urgent attention  Your doctor recommends these additional instructions:  If any biopsies were taken, your doctors clinic will contact you in 1 to 2   weeks with any results.  - Discharge patient to home (via wheelchair).   - Resume previous diet.   - Continue present medications.   - Await pathology results.   - Patient has a contact number available for emergencies.  The signs and   symptoms of potential delayed complications were discussed with the   patient.  Return to normal activities tomorrow.  Written discharge   instructions were provided to the patient.   - Resume anticoagulant at prior dose.  For questions, problems or results please call your physician Jadyn Santana MD at Work:  [phone]  If you have any questions about the above instructions, call the GI   department at [phone] or call the endoscopy unit at [phone]   from 7am until 3 pm.  [facility] [place] [facility], EMERGENCY ROOM PHONE NUMBER:   [phone]  IF A COMPLICATION OR EMERGENCY SITUATION ARISES AND YOU ARE UNABLE TO REACH   YOUR PHYSICIAN - GO DIRECTLY TO THE EMERGENCY ROOM.  I have read or have had read to me these discharge instructions for my   procedure and have received a written copy.  I understand these   instructions and will follow-up with my physician if I have any questions.     __________________________________       _____________________________________  Nurse Signature                                          Patient/Designated   Responsible Party Signature  Jadyn Santana  MD Jadyn Santana MD  11/22/2024 9:48:17 AM  [employer]

## 2024-11-22 NOTE — PLAN OF CARE
Patient met criteria for discharge. No complaints of pain at this time. Pt recovered to baseline. Discharge instructions reviewed, and patient verbalizes understanding. Pt dressed and wheeled to lobby by RN. Pt with family member awaiting medical transport.

## 2024-11-25 DIAGNOSIS — R10.9 ABDOMINAL DISCOMFORT: ICD-10-CM

## 2024-11-25 LAB
FINAL PATHOLOGIC DIAGNOSIS: NORMAL
GROSS: NORMAL
Lab: NORMAL

## 2024-11-25 RX ORDER — DICYCLOMINE HYDROCHLORIDE 20 MG/1
TABLET ORAL
Qty: 100 TABLET | Refills: 2 | Status: SHIPPED | OUTPATIENT
Start: 2024-11-25

## 2024-11-26 ENCOUNTER — PATIENT MESSAGE (OUTPATIENT)
Dept: GASTROENTEROLOGY | Facility: CLINIC | Age: 49
End: 2024-11-26
Payer: MEDICAID

## 2024-11-26 DIAGNOSIS — M54.32 SCIATICA OF LEFT SIDE: ICD-10-CM

## 2024-11-26 DIAGNOSIS — G89.4 CHRONIC PAIN DISORDER: ICD-10-CM

## 2024-11-26 RX ORDER — TIZANIDINE 4 MG/1
4 TABLET ORAL 3 TIMES DAILY PRN
Qty: 90 TABLET | Refills: 1 | Status: SHIPPED | OUTPATIENT
Start: 2024-11-26

## 2024-12-03 RX ORDER — PROMETHAZINE HYDROCHLORIDE 25 MG/1
25 TABLET ORAL EVERY 6 HOURS PRN
Qty: 30 TABLET | Refills: 0 | Status: SHIPPED | OUTPATIENT
Start: 2024-12-03

## 2024-12-10 ENCOUNTER — PATIENT MESSAGE (OUTPATIENT)
Dept: ADMINISTRATIVE | Facility: OTHER | Age: 49
End: 2024-12-10
Payer: MEDICAID

## 2024-12-17 DIAGNOSIS — J44.1 COPD EXACERBATION: ICD-10-CM

## 2024-12-18 RX ORDER — ALBUTEROL SULFATE 0.83 MG/ML
2.5 SOLUTION RESPIRATORY (INHALATION)
Qty: 360 ML | Refills: 11 | Status: SHIPPED | OUTPATIENT
Start: 2024-12-18 | End: 2025-12-18

## 2024-12-20 ENCOUNTER — PATIENT MESSAGE (OUTPATIENT)
Dept: GASTROENTEROLOGY | Facility: CLINIC | Age: 49
End: 2024-12-20
Payer: MEDICAID

## 2024-12-20 ENCOUNTER — TELEPHONE (OUTPATIENT)
Dept: PULMONOLOGY | Facility: CLINIC | Age: 49
End: 2024-12-20
Payer: MEDICAID

## 2024-12-20 NOTE — TELEPHONE ENCOUNTER
----- Message from Felicitas sent at 12/20/2024 10:26 AM CST -----  Contact: pt  Type:  Sooner Apoointment Request    Caller is requesting a sooner appointment.  Caller declined first available appointment listed below.  Caller will not accept being placed on the waitlist and is requesting a message be sent to doctor.  Name of Caller: pt  When is the first available appointment?   Symptoms: f/u from ER  Would the patient rather a call back or a response via [employer]?  phone  Best Call Back Number: [phone]  Additional Information:

## 2025-01-01 ENCOUNTER — PATIENT MESSAGE (OUTPATIENT)
Dept: SPORTS MEDICINE | Facility: CLINIC | Age: 50
End: 2025-01-01
Payer: MEDICAID

## 2025-01-01 ENCOUNTER — ANESTHESIA (OUTPATIENT)
Dept: SURGERY | Facility: HOSPITAL | Age: 50
DRG: 981 | End: 2025-01-01
Payer: MEDICAID

## 2025-01-01 ENCOUNTER — PATIENT MESSAGE (OUTPATIENT)
Dept: PREADMISSION TESTING | Facility: HOSPITAL | Age: 50
End: 2025-01-01
Payer: MEDICAID

## 2025-01-01 ENCOUNTER — TELEPHONE (OUTPATIENT)
Dept: PREADMISSION TESTING | Facility: HOSPITAL | Age: 50
End: 2025-01-01
Payer: MEDICAID

## 2025-01-01 ENCOUNTER — OFFICE VISIT (OUTPATIENT)
Dept: CARDIOLOGY | Facility: CLINIC | Age: 50
End: 2025-01-01
Payer: MEDICAID

## 2025-01-01 ENCOUNTER — HOSPITAL ENCOUNTER (INPATIENT)
Facility: HOSPITAL | Age: 50
LOS: 1 days | DRG: 981 | End: 2025-02-18
Attending: STUDENT IN AN ORGANIZED HEALTH CARE EDUCATION/TRAINING PROGRAM | Admitting: STUDENT IN AN ORGANIZED HEALTH CARE EDUCATION/TRAINING PROGRAM
Payer: MEDICAID

## 2025-01-01 ENCOUNTER — ANESTHESIA EVENT (OUTPATIENT)
Dept: SURGERY | Facility: HOSPITAL | Age: 50
DRG: 981 | End: 2025-01-01
Payer: MEDICAID

## 2025-01-01 VITALS
DIASTOLIC BLOOD PRESSURE: 60 MMHG | WEIGHT: 223.75 LBS | HEART RATE: 77 BPM | OXYGEN SATURATION: 95 % | BODY MASS INDEX: 32.11 KG/M2 | SYSTOLIC BLOOD PRESSURE: 110 MMHG

## 2025-01-01 VITALS
DIASTOLIC BLOOD PRESSURE: 50 MMHG | SYSTOLIC BLOOD PRESSURE: 96 MMHG | WEIGHT: 224 LBS | OXYGEN SATURATION: 86 % | HEIGHT: 70 IN | BODY MASS INDEX: 32.07 KG/M2 | TEMPERATURE: 105 F | RESPIRATION RATE: 4 BRPM

## 2025-01-01 DIAGNOSIS — I10 ESSENTIAL HYPERTENSION: ICD-10-CM

## 2025-01-01 DIAGNOSIS — E78.2 MIXED HYPERLIPIDEMIA: ICD-10-CM

## 2025-01-01 DIAGNOSIS — E11.40 TYPE 2 DIABETES MELLITUS WITH DIABETIC NEUROPATHY, WITH LONG-TERM CURRENT USE OF INSULIN: ICD-10-CM

## 2025-01-01 DIAGNOSIS — M75.122 NONTRAUMATIC COMPLETE TEAR OF LEFT ROTATOR CUFF: Primary | ICD-10-CM

## 2025-01-01 DIAGNOSIS — R06.09 DOE (DYSPNEA ON EXERTION): ICD-10-CM

## 2025-01-01 DIAGNOSIS — Z01.810 PREOP CARDIOVASCULAR EXAM: Primary | ICD-10-CM

## 2025-01-01 DIAGNOSIS — Z98.890 STATUS POST LEFT ROTATOR CUFF REPAIR: Primary | ICD-10-CM

## 2025-01-01 DIAGNOSIS — R57.9 SHOCK: ICD-10-CM

## 2025-01-01 DIAGNOSIS — I34.1 MVP (MITRAL VALVE PROLAPSE): ICD-10-CM

## 2025-01-01 DIAGNOSIS — I73.9 PAD (PERIPHERAL ARTERY DISEASE): ICD-10-CM

## 2025-01-01 DIAGNOSIS — J96.90 RESPIRATORY FAILURE: ICD-10-CM

## 2025-01-01 DIAGNOSIS — E66.9 OBESITY (BMI 30-39.9): ICD-10-CM

## 2025-01-01 DIAGNOSIS — Z79.4 TYPE 2 DIABETES MELLITUS WITH DIABETIC NEUROPATHY, WITH LONG-TERM CURRENT USE OF INSULIN: ICD-10-CM

## 2025-01-01 DIAGNOSIS — I27.20 PULMONARY HYPERTENSION: ICD-10-CM

## 2025-01-01 DIAGNOSIS — I10 PRIMARY HYPERTENSION: ICD-10-CM

## 2025-01-01 LAB
ALBUMIN SERPL BCP-MCNC: 2.6 G/DL (ref 3.5–5.2)
ALBUMIN SERPL BCP-MCNC: 2.9 G/DL (ref 3.5–5.2)
ALLENS TEST: ABNORMAL
ALP SERPL-CCNC: 153 U/L (ref 40–150)
ALP SERPL-CCNC: 198 U/L (ref 40–150)
ALT SERPL W/O P-5'-P-CCNC: 57 U/L (ref 10–44)
ALT SERPL W/O P-5'-P-CCNC: 982 U/L (ref 10–44)
ANION GAP SERPL CALC-SCNC: 16 MMOL/L (ref 8–16)
ANION GAP SERPL CALC-SCNC: 23 MMOL/L (ref 8–16)
ANION GAP SERPL CALC-SCNC: 23 MMOL/L (ref 8–16)
ANION GAP SERPL CALC-SCNC: 24 MMOL/L (ref 8–16)
APICAL FOUR CHAMBER EJECTION FRACTION: 38 %
ASCENDING AORTA: 2.63 CM
AST SERPL-CCNC: 1186 U/L (ref 10–40)
AST SERPL-CCNC: 71 U/L (ref 10–40)
AV INDEX (PROSTH): 0.54
AV MEAN GRADIENT: 4 MMHG
AV PEAK GRADIENT: 7 MMHG
AV VALVE AREA BY VELOCITY RATIO: 2.3 CM²
AV VALVE AREA: 2 CM²
AV VELOCITY RATIO: 0.62
BASOPHILS # BLD AUTO: 0.05 K/UL (ref 0–0.2)
BASOPHILS NFR BLD: 0.2 % (ref 0–1.9)
BILIRUB DIRECT SERPL-MCNC: 0.5 MG/DL (ref 0.1–0.3)
BILIRUB SERPL-MCNC: 0.5 MG/DL (ref 0.1–1)
BILIRUB SERPL-MCNC: 0.6 MG/DL (ref 0.1–1)
BNP SERPL-MCNC: 142 PG/ML (ref 0–99)
BSA FOR ECHO PROCEDURE: 2.24 M2
BUN SERPL-MCNC: 13 MG/DL (ref 6–20)
BUN SERPL-MCNC: 15 MG/DL (ref 6–20)
BUN SERPL-MCNC: 20 MG/DL (ref 6–20)
BUN SERPL-MCNC: 24 MG/DL (ref 6–20)
CALCIUM SERPL-MCNC: 7.2 MG/DL (ref 8.7–10.5)
CALCIUM SERPL-MCNC: 7.6 MG/DL (ref 8.7–10.5)
CALCIUM SERPL-MCNC: 7.9 MG/DL (ref 8.7–10.5)
CALCIUM SERPL-MCNC: 8.3 MG/DL (ref 8.7–10.5)
CHLORIDE SERPL-SCNC: 105 MMOL/L (ref 95–110)
CHLORIDE SERPL-SCNC: 106 MMOL/L (ref 95–110)
CHLORIDE SERPL-SCNC: 106 MMOL/L (ref 95–110)
CHLORIDE SERPL-SCNC: 108 MMOL/L (ref 95–110)
CO2 SERPL-SCNC: 11 MMOL/L (ref 23–29)
CO2 SERPL-SCNC: 13 MMOL/L (ref 23–29)
CO2 SERPL-SCNC: 14 MMOL/L (ref 23–29)
CO2 SERPL-SCNC: 19 MMOL/L (ref 23–29)
CREAT SERPL-MCNC: 1 MG/DL (ref 0.5–1.4)
CREAT SERPL-MCNC: 1.4 MG/DL (ref 0.5–1.4)
CREAT SERPL-MCNC: 1.8 MG/DL (ref 0.5–1.4)
CREAT SERPL-MCNC: 2.5 MG/DL (ref 0.5–1.4)
CV ECHO LV RWT: 0.52 CM
DELSYS: ABNORMAL
DIFFERENTIAL METHOD BLD: ABNORMAL
DOP CALC AO PEAK VEL: 1.3 M/S
DOP CALC AO VTI: 13.8 CM
DOP CALC LVOT AREA: 3.8 CM2
DOP CALC LVOT DIAMETER: 2.2 CM
DOP CALC LVOT PEAK VEL: 0.8 M/S
DOP CALC LVOT STROKE VOLUME: 28.1 CM3
DOP CALC RVOT PEAK VEL: 0.53 M/S
DOP CALC RVOT VTI: 9.4 CM
DOP CALCLVOT PEAK VEL VTI: 7.4 CM
E WAVE DECELERATION TIME: 144 MSEC
E/A RATIO: 0.92
E/E' RATIO: 9 M/S
ECHO LV POSTERIOR WALL: 1.1 CM (ref 0.6–1.1)
EJECTION FRACTION: 25 %
EOSINOPHIL # BLD AUTO: 0 K/UL (ref 0–0.5)
EOSINOPHIL NFR BLD: 0 % (ref 0–8)
EP: 8
ERYTHROCYTE [DISTWIDTH] IN BLOOD BY AUTOMATED COUNT: 13.6 % (ref 11.5–14.5)
ERYTHROCYTE [SEDIMENTATION RATE] IN BLOOD BY WESTERGREN METHOD: 22 MM/H
ERYTHROCYTE [SEDIMENTATION RATE] IN BLOOD BY WESTERGREN METHOD: 22 MM/H
EST. GFR  (NO RACE VARIABLE): 23 ML/MIN/1.73 M^2
EST. GFR  (NO RACE VARIABLE): 34 ML/MIN/1.73 M^2
EST. GFR  (NO RACE VARIABLE): 46 ML/MIN/1.73 M^2
EST. GFR  (NO RACE VARIABLE): >60 ML/MIN/1.73 M^2
FIO2: 100
FLOW: 15
FRACTIONAL SHORTENING: 9.5 % (ref 28–44)
GLUCOSE SERPL-MCNC: 276 MG/DL (ref 70–110)
GLUCOSE SERPL-MCNC: 398 MG/DL (ref 70–110)
GLUCOSE SERPL-MCNC: 428 MG/DL (ref 70–110)
GLUCOSE SERPL-MCNC: 543 MG/DL (ref 70–110)
HCO3 UR-SCNC: 16.3 MMOL/L (ref 24–28)
HCO3 UR-SCNC: 17.8 MMOL/L (ref 24–28)
HCO3 UR-SCNC: 20.6 MMOL/L (ref 24–28)
HCO3 UR-SCNC: 20.8 MMOL/L (ref 24–28)
HCT VFR BLD AUTO: 48.1 % (ref 37–48.5)
HGB BLD-MCNC: 14.4 G/DL (ref 12–16)
IMM GRANULOCYTES # BLD AUTO: 0.24 K/UL (ref 0–0.04)
IMM GRANULOCYTES NFR BLD AUTO: 1.1 % (ref 0–0.5)
INTERVENTRICULAR SEPTUM: 1 CM (ref 0.6–1.1)
IP: 14
IVC DIAMETER: 1.61 CM
IVRT: 59 MSEC
LA MAJOR: 5.1 CM
LA MINOR: 5.1 CM
LA WIDTH: 2.9 CM
LACTATE SERPL-SCNC: 11.9 MMOL/L (ref 0.5–2.2)
LEFT ATRIUM SIZE: 2.6 CM
LEFT ATRIUM VOLUME INDEX: 15 ML/M2
LEFT ATRIUM VOLUME: 33 CM3
LEFT INTERNAL DIMENSION IN SYSTOLE: 3.8 CM (ref 2.1–4)
LEFT VENTRICLE DIASTOLIC VOLUME INDEX: 35.54 ML/M2
LEFT VENTRICLE DIASTOLIC VOLUME: 77.84 ML
LEFT VENTRICLE END DIASTOLIC VOLUME APICAL 4 CHAMBER: 52.37 ML
LEFT VENTRICLE MASS INDEX: 67.1 G/M2
LEFT VENTRICLE SYSTOLIC VOLUME INDEX: 28.4 ML/M2
LEFT VENTRICLE SYSTOLIC VOLUME: 62.09 ML
LEFT VENTRICULAR INTERNAL DIMENSION IN DIASTOLE: 4.2 CM (ref 3.5–6)
LEFT VENTRICULAR MASS: 147 G
LV LATERAL E/E' RATIO: 8.1 M/S
LV SEPTAL E/E' RATIO: 10.8 M/S
LVED V (TEICH): 77.84 ML
LVES V (TEICH): 62.09 ML
LVOT MG: 1.26 MMHG
LVOT MV: 0.51 CM/S
LYMPHOCYTES # BLD AUTO: 0.8 K/UL (ref 1–4.8)
LYMPHOCYTES NFR BLD: 3.7 % (ref 18–48)
MAGNESIUM SERPL-MCNC: 1.9 MG/DL (ref 1.6–2.6)
MCH RBC QN AUTO: 29.1 PG (ref 27–31)
MCHC RBC AUTO-ENTMCNC: 29.9 G/DL (ref 32–36)
MCV RBC AUTO: 97 FL (ref 82–98)
MODE: ABNORMAL
MONOCYTES # BLD AUTO: 1.6 K/UL (ref 0.3–1)
MONOCYTES NFR BLD: 7.2 % (ref 4–15)
MV PEAK A VEL: 0.71 M/S
MV PEAK E VEL: 0.65 M/S
MV STENOSIS PRESSURE HALF TIME: 41.89 MS
MV VALVE AREA P 1/2 METHOD: 5.25 CM2
NEUTROPHILS # BLD AUTO: 19.9 K/UL (ref 1.8–7.7)
NEUTROPHILS NFR BLD: 87.8 % (ref 38–73)
NRBC BLD-RTO: 0 /100 WBC
OHS QRS DURATION: 132 MS
OHS QTC CALCULATION: 527 MS
PCO2 BLDA: 42.2 MMHG (ref 35–45)
PCO2 BLDA: 47.3 MMHG (ref 35–45)
PCO2 BLDA: 51.1 MMHG (ref 35–45)
PCO2 BLDA: 58.5 MMHG (ref 35–45)
PEEP: 5
PEEP: 8
PH SMN: 7.11 [PH] (ref 7.35–7.45)
PH SMN: 7.16 [PH] (ref 7.35–7.45)
PH SMN: 7.23 [PH] (ref 7.35–7.45)
PH SMN: 7.25 [PH] (ref 7.35–7.45)
PHOSPHATE SERPL-MCNC: 6.9 MG/DL (ref 2.7–4.5)
PHOSPHATE SERPL-MCNC: 8.3 MG/DL (ref 2.7–4.5)
PLATELET # BLD AUTO: 228 K/UL (ref 150–450)
PMV BLD AUTO: 9.6 FL (ref 9.2–12.9)
PO2 BLDA: 124 MMHG (ref 80–100)
PO2 BLDA: 220 MMHG (ref 80–100)
PO2 BLDA: 65 MMHG (ref 80–100)
PO2 BLDA: 99 MMHG (ref 80–100)
POC BE: -10 MMOL/L
POC BE: -13 MMOL/L
POC BE: -7 MMOL/L
POC BE: -8 MMOL/L
POC SATURATED O2: 100 % (ref 95–100)
POC SATURATED O2: 85 % (ref 95–100)
POC SATURATED O2: 95 % (ref 95–100)
POC SATURATED O2: 98 % (ref 95–100)
POCT GLUCOSE: 210 MG/DL (ref 70–110)
POCT GLUCOSE: 317 MG/DL (ref 70–110)
POCT GLUCOSE: 357 MG/DL (ref 70–110)
POCT GLUCOSE: 396 MG/DL (ref 70–110)
POCT GLUCOSE: 399 MG/DL (ref 70–110)
POCT GLUCOSE: 417 MG/DL (ref 70–110)
POCT GLUCOSE: 449 MG/DL (ref 70–110)
POCT GLUCOSE: 480 MG/DL (ref 70–110)
POCT GLUCOSE: 483 MG/DL (ref 70–110)
POCT GLUCOSE: >500 MG/DL (ref 70–110)
POTASSIUM SERPL-SCNC: 4.8 MMOL/L (ref 3.5–5.1)
POTASSIUM SERPL-SCNC: 5.6 MMOL/L (ref 3.5–5.1)
POTASSIUM SERPL-SCNC: 5.9 MMOL/L (ref 3.5–5.1)
POTASSIUM SERPL-SCNC: 6 MMOL/L (ref 3.5–5.1)
PROT SERPL-MCNC: 5.9 G/DL (ref 6–8.4)
PROT SERPL-MCNC: 6.1 G/DL (ref 6–8.4)
PV MEAN GRADIENT: 1 MMHG
RA MAJOR: 3.56 CM
RA PRESSURE ESTIMATED: 3 MMHG
RA WIDTH: 2.1 CM
RBC # BLD AUTO: 4.95 M/UL (ref 4–5.4)
SAMPLE: ABNORMAL
SITE: ABNORMAL
SODIUM SERPL-SCNC: 141 MMOL/L (ref 136–145)
SODIUM SERPL-SCNC: 142 MMOL/L (ref 136–145)
SODIUM SERPL-SCNC: 142 MMOL/L (ref 136–145)
SODIUM SERPL-SCNC: 143 MMOL/L (ref 136–145)
STJ: 2.62 CM
TDI LATERAL: 0.08 M/S
TDI SEPTAL: 0.06 M/S
TDI: 0.07 M/S
TRICUSPID ANNULAR PLANE SYSTOLIC EXCURSION: 1.16 CM
TROPONIN I SERPL DL<=0.01 NG/ML-MCNC: 12.92 NG/ML (ref 0–0.03)
VT: 350
VT: 450
WBC # BLD AUTO: 22.68 K/UL (ref 3.9–12.7)
Z-SCORE OF LEFT VENTRICULAR DIMENSION IN END DIASTOLE: -5.66
Z-SCORE OF LEFT VENTRICULAR DIMENSION IN END SYSTOLE: -1.33

## 2025-01-01 PROCEDURE — 92950 HEART/LUNG RESUSCITATION CPR: CPT

## 2025-01-01 PROCEDURE — 63600175 PHARM REV CODE 636 W HCPCS

## 2025-01-01 PROCEDURE — 27100171 HC OXYGEN HIGH FLOW UP TO 24 HOURS

## 2025-01-01 PROCEDURE — 83735 ASSAY OF MAGNESIUM: CPT | Performed by: NURSE PRACTITIONER

## 2025-01-01 PROCEDURE — 99214 OFFICE O/P EST MOD 30 MIN: CPT | Mod: S$PBB,,, | Performed by: INTERNAL MEDICINE

## 2025-01-01 PROCEDURE — 80053 COMPREHEN METABOLIC PANEL: CPT | Performed by: SPECIALIST

## 2025-01-01 PROCEDURE — 63600175 PHARM REV CODE 636 W HCPCS: Performed by: NURSE ANESTHETIST, CERTIFIED REGISTERED

## 2025-01-01 PROCEDURE — 0LM24ZZ REATTACHMENT OF LEFT SHOULDER TENDON, PERCUTANEOUS ENDOSCOPIC APPROACH: ICD-10-PCS | Performed by: STUDENT IN AN ORGANIZED HEALTH CARE EDUCATION/TRAINING PROGRAM

## 2025-01-01 PROCEDURE — 25000003 PHARM REV CODE 250: Performed by: NURSE PRACTITIONER

## 2025-01-01 PROCEDURE — 0RNK4ZZ RELEASE LEFT SHOULDER JOINT, PERCUTANEOUS ENDOSCOPIC APPROACH: ICD-10-PCS | Performed by: STUDENT IN AN ORGANIZED HEALTH CARE EDUCATION/TRAINING PROGRAM

## 2025-01-01 PROCEDURE — 63600175 PHARM REV CODE 636 W HCPCS: Performed by: NURSE PRACTITIONER

## 2025-01-01 PROCEDURE — 99900035 HC TECH TIME PER 15 MIN (STAT)

## 2025-01-01 PROCEDURE — 63600175 PHARM REV CODE 636 W HCPCS: Performed by: SPECIALIST

## 2025-01-01 PROCEDURE — 63600175 PHARM REV CODE 636 W HCPCS: Performed by: STUDENT IN AN ORGANIZED HEALTH CARE EDUCATION/TRAINING PROGRAM

## 2025-01-01 PROCEDURE — 94761 N-INVAS EAR/PLS OXIMETRY MLT: CPT

## 2025-01-01 PROCEDURE — 5A09357 ASSISTANCE WITH RESPIRATORY VENTILATION, LESS THAN 24 CONSECUTIVE HOURS, CONTINUOUS POSITIVE AIRWAY PRESSURE: ICD-10-PCS | Performed by: SPECIALIST

## 2025-01-01 PROCEDURE — 94761 N-INVAS EAR/PLS OXIMETRY MLT: CPT | Mod: XB

## 2025-01-01 PROCEDURE — 25000003 PHARM REV CODE 250: Performed by: SPECIALIST

## 2025-01-01 PROCEDURE — C1751 CATH, INF, PER/CENT/MIDLINE: HCPCS

## 2025-01-01 PROCEDURE — 93005 ELECTROCARDIOGRAM TRACING: CPT

## 2025-01-01 PROCEDURE — A4217 STERILE WATER/SALINE, 500 ML: HCPCS | Performed by: NURSE PRACTITIONER

## 2025-01-01 PROCEDURE — 82803 BLOOD GASES ANY COMBINATION: CPT

## 2025-01-01 PROCEDURE — 37000008 HC ANESTHESIA 1ST 15 MINUTES: Performed by: STUDENT IN AN ORGANIZED HEALTH CARE EDUCATION/TRAINING PROGRAM

## 2025-01-01 PROCEDURE — 1160F RVW MEDS BY RX/DR IN RCRD: CPT | Mod: CPTII,,, | Performed by: INTERNAL MEDICINE

## 2025-01-01 PROCEDURE — 63600175 PHARM REV CODE 636 W HCPCS: Performed by: ANESTHESIOLOGY

## 2025-01-01 PROCEDURE — 85025 COMPLETE CBC W/AUTO DIFF WBC: CPT | Performed by: NURSE PRACTITIONER

## 2025-01-01 PROCEDURE — 99900026 HC AIRWAY MAINTENANCE (STAT)

## 2025-01-01 PROCEDURE — G2211 COMPLEX E/M VISIT ADD ON: HCPCS | Mod: S$PBB,,, | Performed by: INTERNAL MEDICINE

## 2025-01-01 PROCEDURE — 25000003 PHARM REV CODE 250: Performed by: STUDENT IN AN ORGANIZED HEALTH CARE EDUCATION/TRAINING PROGRAM

## 2025-01-01 PROCEDURE — 83880 ASSAY OF NATRIURETIC PEPTIDE: CPT | Performed by: SPECIALIST

## 2025-01-01 PROCEDURE — 25000003 PHARM REV CODE 250: Performed by: NURSE ANESTHETIST, CERTIFIED REGISTERED

## 2025-01-01 PROCEDURE — 25000003 PHARM REV CODE 250

## 2025-01-01 PROCEDURE — 3052F HG A1C>EQUAL 8.0%<EQUAL 9.0%: CPT | Mod: CPTII,,, | Performed by: INTERNAL MEDICINE

## 2025-01-01 PROCEDURE — 27000190 HC CPAP FULL FACE MASK W/VALVE

## 2025-01-01 PROCEDURE — 1159F MED LIST DOCD IN RCRD: CPT | Mod: CPTII,,, | Performed by: INTERNAL MEDICINE

## 2025-01-01 PROCEDURE — 36000711: Performed by: STUDENT IN AN ORGANIZED HEALTH CARE EDUCATION/TRAINING PROGRAM

## 2025-01-01 PROCEDURE — 3078F DIAST BP <80 MM HG: CPT | Mod: CPTII,,, | Performed by: INTERNAL MEDICINE

## 2025-01-01 PROCEDURE — 80048 BASIC METABOLIC PNL TOTAL CA: CPT | Mod: 91 | Performed by: SPECIALIST

## 2025-01-01 PROCEDURE — 80076 HEPATIC FUNCTION PANEL: CPT | Performed by: NURSE PRACTITIONER

## 2025-01-01 PROCEDURE — 94002 VENT MGMT INPAT INIT DAY: CPT

## 2025-01-01 PROCEDURE — 36000710: Performed by: STUDENT IN AN ORGANIZED HEALTH CARE EDUCATION/TRAINING PROGRAM

## 2025-01-01 PROCEDURE — 71000039 HC RECOVERY, EACH ADD'L HOUR: Performed by: STUDENT IN AN ORGANIZED HEALTH CARE EDUCATION/TRAINING PROGRAM

## 2025-01-01 PROCEDURE — 36600 WITHDRAWAL OF ARTERIAL BLOOD: CPT

## 2025-01-01 PROCEDURE — 83605 ASSAY OF LACTIC ACID: CPT | Performed by: NURSE PRACTITIONER

## 2025-01-01 PROCEDURE — 94660 CPAP INITIATION&MGMT: CPT | Mod: XB

## 2025-01-01 PROCEDURE — 5A1935Z RESPIRATORY VENTILATION, LESS THAN 24 CONSECUTIVE HOURS: ICD-10-PCS | Performed by: SPECIALIST

## 2025-01-01 PROCEDURE — 3074F SYST BP LT 130 MM HG: CPT | Mod: CPTII,,, | Performed by: INTERNAL MEDICINE

## 2025-01-01 PROCEDURE — 36556 INSERT NON-TUNNEL CV CATH: CPT | Performed by: STUDENT IN AN ORGANIZED HEALTH CARE EDUCATION/TRAINING PROGRAM

## 2025-01-01 PROCEDURE — 84100 ASSAY OF PHOSPHORUS: CPT | Performed by: NURSE PRACTITIONER

## 2025-01-01 PROCEDURE — 20000000 HC ICU ROOM

## 2025-01-01 PROCEDURE — 99999 PR PBB SHADOW E&M-EST. PATIENT-LVL IV: CPT | Mod: PBBFAC,,, | Performed by: INTERNAL MEDICINE

## 2025-01-01 PROCEDURE — 37000009 HC ANESTHESIA EA ADD 15 MINS: Performed by: STUDENT IN AN ORGANIZED HEALTH CARE EDUCATION/TRAINING PROGRAM

## 2025-01-01 PROCEDURE — C1713 ANCHOR/SCREW BN/BN,TIS/BN: HCPCS | Performed by: STUDENT IN AN ORGANIZED HEALTH CARE EDUCATION/TRAINING PROGRAM

## 2025-01-01 PROCEDURE — 84484 ASSAY OF TROPONIN QUANT: CPT | Performed by: PHYSICIAN ASSISTANT

## 2025-01-01 PROCEDURE — 80048 BASIC METABOLIC PNL TOTAL CA: CPT | Mod: 91 | Performed by: NURSE PRACTITIONER

## 2025-01-01 PROCEDURE — 36415 COLL VENOUS BLD VENIPUNCTURE: CPT | Performed by: SPECIALIST

## 2025-01-01 PROCEDURE — 27201423 OPTIME MED/SURG SUP & DEVICES STERILE SUPPLY: Performed by: STUDENT IN AN ORGANIZED HEALTH CARE EDUCATION/TRAINING PROGRAM

## 2025-01-01 PROCEDURE — 3008F BODY MASS INDEX DOCD: CPT | Mod: CPTII,,, | Performed by: INTERNAL MEDICINE

## 2025-01-01 PROCEDURE — 71000033 HC RECOVERY, INTIAL HOUR: Performed by: STUDENT IN AN ORGANIZED HEALTH CARE EDUCATION/TRAINING PROGRAM

## 2025-01-01 PROCEDURE — 27100108

## 2025-01-01 PROCEDURE — 99214 OFFICE O/P EST MOD 30 MIN: CPT | Mod: PBBFAC,PO | Performed by: INTERNAL MEDICINE

## 2025-01-01 PROCEDURE — 27200966 HC CLOSED SUCTION SYSTEM

## 2025-01-01 PROCEDURE — 64415 NJX AA&/STRD BRCH PLXS IMG: CPT | Performed by: ANESTHESIOLOGY

## 2025-01-01 PROCEDURE — 0LS44ZZ REPOSITION LEFT UPPER ARM TENDON, PERCUTANEOUS ENDOSCOPIC APPROACH: ICD-10-PCS | Performed by: STUDENT IN AN ORGANIZED HEALTH CARE EDUCATION/TRAINING PROGRAM

## 2025-01-01 DEVICE — ANCHOR FIBERTAK SOFT BLUE #2: Type: IMPLANTABLE DEVICE | Site: SHOULDER | Status: FUNCTIONAL

## 2025-01-01 DEVICE — SWIVELOCK, SP BC KL 4.75MM
Type: IMPLANTABLE DEVICE | Site: SHOULDER | Status: FUNCTIONAL
Brand: ARTHREX®

## 2025-01-01 DEVICE — LNT IMPLANT SYSTEM, 4.75 BC SWIVELOCK
Type: IMPLANTABLE DEVICE | Site: SHOULDER | Status: FUNCTIONAL
Brand: ARTHREX®

## 2025-01-01 RX ORDER — ETOMIDATE 2 MG/ML
INJECTION INTRAVENOUS
Status: DISCONTINUED | OUTPATIENT
Start: 2025-01-01 | End: 2025-01-01

## 2025-01-01 RX ORDER — NALOXONE HCL 0.4 MG/ML
VIAL (ML) INJECTION
Status: COMPLETED
Start: 2025-01-01 | End: 2025-01-01

## 2025-01-01 RX ORDER — ROCURONIUM BROMIDE 10 MG/ML
INJECTION, SOLUTION INTRAVENOUS
Status: DISPENSED
Start: 2025-01-01 | End: 2025-01-01

## 2025-01-01 RX ORDER — PROPOFOL 10 MG/ML
0-50 INJECTION, EMULSION INTRAVENOUS ONCE
Status: DISCONTINUED | OUTPATIENT
Start: 2025-01-01 | End: 2025-01-01

## 2025-01-01 RX ORDER — VASOPRESSIN IN DEXTROSE 5 % 25/250 ML
0.04 PLASTIC BAG, INJECTION (ML) INTRAVENOUS CONTINUOUS
Status: DISCONTINUED | OUTPATIENT
Start: 2025-01-01 | End: 2025-01-01 | Stop reason: HOSPADM

## 2025-01-01 RX ORDER — HYDROMORPHONE HYDROCHLORIDE 2 MG/ML
0.2 INJECTION, SOLUTION INTRAMUSCULAR; INTRAVENOUS; SUBCUTANEOUS EVERY 5 MIN PRN
Status: DISCONTINUED | OUTPATIENT
Start: 2025-01-01 | End: 2025-01-01 | Stop reason: HOSPADM

## 2025-01-01 RX ORDER — SODIUM BICARBONATE 1 MEQ/ML
SYRINGE (ML) INTRAVENOUS CODE/TRAUMA/SEDATION MEDICATION
Status: COMPLETED | OUTPATIENT
Start: 2025-01-01 | End: 2025-01-01

## 2025-01-01 RX ORDER — EPINEPHRINE 1 MG/ML
INJECTION, SOLUTION, CONCENTRATE INTRAVENOUS
Status: DISCONTINUED | OUTPATIENT
Start: 2025-01-01 | End: 2025-01-01 | Stop reason: HOSPADM

## 2025-01-01 RX ORDER — LIDOCAINE HYDROCHLORIDE 20 MG/ML
INJECTION, SOLUTION EPIDURAL; INFILTRATION; INTRACAUDAL; PERINEURAL
Status: DISCONTINUED | OUTPATIENT
Start: 2025-01-01 | End: 2025-01-01

## 2025-01-01 RX ORDER — ONDANSETRON HYDROCHLORIDE 2 MG/ML
INJECTION, SOLUTION INTRAVENOUS
Status: DISCONTINUED | OUTPATIENT
Start: 2025-01-01 | End: 2025-01-01

## 2025-01-01 RX ORDER — BUMETANIDE 0.25 MG/ML
2 INJECTION, SOLUTION INTRAMUSCULAR; INTRAVENOUS ONCE
Status: COMPLETED | OUTPATIENT
Start: 2025-01-01 | End: 2025-01-01

## 2025-01-01 RX ORDER — ACETAMINOPHEN 500 MG
1000 TABLET ORAL EVERY 8 HOURS PRN
Qty: 60 TABLET | Refills: 0 | Status: SHIPPED | OUTPATIENT
Start: 2025-01-01 | End: 2025-01-01

## 2025-01-01 RX ORDER — OXYCODONE AND ACETAMINOPHEN 5; 325 MG/1; MG/1
1 TABLET ORAL
Status: DISCONTINUED | OUTPATIENT
Start: 2025-01-01 | End: 2025-01-01 | Stop reason: HOSPADM

## 2025-01-01 RX ORDER — NOREPINEPHRINE BITARTRATE/D5W 4MG/250ML
0-1 PLASTIC BAG, INJECTION (ML) INTRAVENOUS CONTINUOUS
Status: DISCONTINUED | OUTPATIENT
Start: 2025-01-01 | End: 2025-01-01

## 2025-01-01 RX ORDER — FENTANYL CITRATE 50 UG/ML
25 INJECTION, SOLUTION INTRAMUSCULAR; INTRAVENOUS EVERY 5 MIN PRN
Status: DISCONTINUED | OUTPATIENT
Start: 2025-01-01 | End: 2025-01-01 | Stop reason: HOSPADM

## 2025-01-01 RX ORDER — SODIUM CHLORIDE 9 MG/ML
INJECTION, SOLUTION INTRAVENOUS CONTINUOUS
Status: DISCONTINUED | OUTPATIENT
Start: 2025-01-01 | End: 2025-01-01

## 2025-01-01 RX ORDER — CALCIUM CHLORIDE INJECTION 100 MG/ML
INJECTION, SOLUTION INTRAVENOUS CODE/TRAUMA/SEDATION MEDICATION
Status: COMPLETED | OUTPATIENT
Start: 2025-01-01 | End: 2025-01-01

## 2025-01-01 RX ORDER — NOREPINEPHRINE BITARTRATE 0.02 MG/ML
0-1 INJECTION, SOLUTION INTRAVENOUS CONTINUOUS
Status: DISCONTINUED | OUTPATIENT
Start: 2025-01-01 | End: 2025-01-01

## 2025-01-01 RX ORDER — OXYCODONE HYDROCHLORIDE 5 MG/1
5 TABLET ORAL EVERY 4 HOURS PRN
Qty: 36 TABLET | Refills: 0 | Status: SHIPPED | OUTPATIENT
Start: 2025-01-01 | End: 2025-01-01

## 2025-01-01 RX ORDER — ONDANSETRON HYDROCHLORIDE 2 MG/ML
4 INJECTION, SOLUTION INTRAVENOUS DAILY PRN
Status: DISCONTINUED | OUTPATIENT
Start: 2025-01-01 | End: 2025-01-01 | Stop reason: HOSPADM

## 2025-01-01 RX ORDER — ROCURONIUM BROMIDE 10 MG/ML
INJECTION, SOLUTION INTRAVENOUS
Status: DISCONTINUED | OUTPATIENT
Start: 2025-01-01 | End: 2025-01-01

## 2025-01-01 RX ORDER — INDOMETHACIN 25 MG/1
100 CAPSULE ORAL ONCE
Status: COMPLETED | OUTPATIENT
Start: 2025-01-01 | End: 2025-01-01

## 2025-01-01 RX ORDER — INSULIN ASPART 100 [IU]/ML
0-10 INJECTION, SOLUTION INTRAVENOUS; SUBCUTANEOUS EVERY 6 HOURS PRN
Status: DISCONTINUED | OUTPATIENT
Start: 2025-01-01 | End: 2025-01-01 | Stop reason: HOSPADM

## 2025-01-01 RX ORDER — SUCCINYLCHOLINE CHLORIDE 20 MG/ML
INJECTION INTRAMUSCULAR; INTRAVENOUS
Status: COMPLETED
Start: 2025-01-01 | End: 2025-01-01

## 2025-01-01 RX ORDER — FENTANYL CITRATE 50 UG/ML
INJECTION, SOLUTION INTRAMUSCULAR; INTRAVENOUS
Status: DISCONTINUED | OUTPATIENT
Start: 2025-01-01 | End: 2025-01-01

## 2025-01-01 RX ORDER — PHENYLEPHRINE HYDROCHLORIDE 10 MG/ML
INJECTION INTRAVENOUS
Status: DISCONTINUED | OUTPATIENT
Start: 2025-01-01 | End: 2025-01-01

## 2025-01-01 RX ORDER — FAMOTIDINE 20 MG/1
20 TABLET, FILM COATED ORAL 2 TIMES DAILY
Status: DISCONTINUED | OUTPATIENT
Start: 2025-01-01 | End: 2025-01-01 | Stop reason: HOSPADM

## 2025-01-01 RX ORDER — TRAMADOL HYDROCHLORIDE 50 MG/1
50 TABLET ORAL
Qty: 36 TABLET | Refills: 0 | Status: SHIPPED | OUTPATIENT
Start: 2025-01-01

## 2025-01-01 RX ORDER — CHLORHEXIDINE GLUCONATE ORAL RINSE 1.2 MG/ML
15 SOLUTION DENTAL 2 TIMES DAILY
Status: DISCONTINUED | OUTPATIENT
Start: 2025-01-01 | End: 2025-01-01 | Stop reason: HOSPADM

## 2025-01-01 RX ORDER — SODIUM CHLORIDE 0.9 % (FLUSH) 0.9 %
10 SYRINGE (ML) INJECTION
Status: DISCONTINUED | OUTPATIENT
Start: 2025-01-01 | End: 2025-01-01 | Stop reason: HOSPADM

## 2025-01-01 RX ORDER — SODIUM CHLORIDE 0.9 G/100ML
3000 INJECTION, SOLUTION IRRIGATION CONTINUOUS
Status: DISCONTINUED | OUTPATIENT
Start: 2025-01-01 | End: 2025-01-01

## 2025-01-01 RX ORDER — ONDANSETRON 8 MG/1
8 TABLET, ORALLY DISINTEGRATING ORAL EVERY 8 HOURS PRN
Status: DISCONTINUED | OUTPATIENT
Start: 2025-01-01 | End: 2025-01-01 | Stop reason: HOSPADM

## 2025-01-01 RX ORDER — SODIUM CHLORIDE 9 MG/ML
1000 INJECTION, SOLUTION INTRAVENOUS CONTINUOUS
Status: DISCONTINUED | OUTPATIENT
Start: 2025-01-01 | End: 2025-01-01 | Stop reason: HOSPADM

## 2025-01-01 RX ORDER — EPINEPHRINE 0.1 MG/ML
INJECTION INTRAVENOUS CODE/TRAUMA/SEDATION MEDICATION
Status: COMPLETED | OUTPATIENT
Start: 2025-01-01 | End: 2025-01-01

## 2025-01-01 RX ORDER — MUPIROCIN 20 MG/G
OINTMENT TOPICAL 2 TIMES DAILY
Status: DISCONTINUED | OUTPATIENT
Start: 2025-01-01 | End: 2025-01-01 | Stop reason: HOSPADM

## 2025-01-01 RX ORDER — DEXTROSE MONOHYDRATE AND SODIUM CHLORIDE 5; .45 G/100ML; G/100ML
INJECTION, SOLUTION INTRAVENOUS CONTINUOUS PRN
Status: DISCONTINUED | OUTPATIENT
Start: 2025-01-01 | End: 2025-01-01 | Stop reason: HOSPADM

## 2025-01-01 RX ORDER — FLUMAZENIL 0.1 MG/ML
INJECTION INTRAVENOUS
Status: COMPLETED
Start: 2025-01-01 | End: 2025-01-01

## 2025-01-01 RX ORDER — PROPOFOL 10 MG/ML
VIAL (ML) INTRAVENOUS
Status: DISCONTINUED | OUTPATIENT
Start: 2025-01-01 | End: 2025-01-01

## 2025-01-01 RX ORDER — MIDAZOLAM HYDROCHLORIDE 1 MG/ML
INJECTION INTRAMUSCULAR; INTRAVENOUS
Status: DISCONTINUED | OUTPATIENT
Start: 2025-01-01 | End: 2025-01-01

## 2025-01-01 RX ORDER — ONDANSETRON HYDROCHLORIDE 2 MG/ML
4 INJECTION, SOLUTION INTRAVENOUS ONCE AS NEEDED
Status: COMPLETED | OUTPATIENT
Start: 2025-01-01 | End: 2025-01-01

## 2025-01-01 RX ORDER — CEFAZOLIN SODIUM 1 G/3ML
INJECTION, POWDER, FOR SOLUTION INTRAMUSCULAR; INTRAVENOUS
Status: DISCONTINUED | OUTPATIENT
Start: 2025-01-01 | End: 2025-01-01

## 2025-01-01 RX ORDER — HEPARIN SODIUM 5000 [USP'U]/ML
5000 INJECTION, SOLUTION INTRAVENOUS; SUBCUTANEOUS EVERY 8 HOURS
Status: DISCONTINUED | OUTPATIENT
Start: 2025-01-01 | End: 2025-01-01 | Stop reason: HOSPADM

## 2025-01-01 RX ORDER — ATROPINE SULFATE 0.1 MG/ML
INJECTION INTRAVENOUS CODE/TRAUMA/SEDATION MEDICATION
Status: COMPLETED | OUTPATIENT
Start: 2025-01-01 | End: 2025-01-01

## 2025-01-01 RX ORDER — PROPOFOL 10 MG/ML
INJECTION, EMULSION INTRAVENOUS
Status: COMPLETED
Start: 2025-01-01 | End: 2025-01-01

## 2025-01-01 RX ORDER — ACETAMINOPHEN 500 MG
1000 TABLET ORAL EVERY 8 HOURS PRN
Qty: 60 TABLET | Refills: 0 | Status: SHIPPED | OUTPATIENT
Start: 2025-01-01

## 2025-01-01 RX ORDER — GLUCAGON 1 MG
1 KIT INJECTION
Status: DISCONTINUED | OUTPATIENT
Start: 2025-01-01 | End: 2025-01-01 | Stop reason: HOSPADM

## 2025-01-01 RX ORDER — ETOMIDATE 2 MG/ML
INJECTION INTRAVENOUS
Status: COMPLETED
Start: 2025-01-01 | End: 2025-01-01

## 2025-01-01 RX ORDER — TRAMADOL HYDROCHLORIDE 50 MG/1
50 TABLET ORAL
Qty: 36 TABLET | Refills: 0 | Status: SHIPPED | OUTPATIENT
Start: 2025-01-01 | End: 2025-01-01

## 2025-01-01 RX ORDER — SUCCINYLCHOLINE CHLORIDE 20 MG/ML
INJECTION INTRAMUSCULAR; INTRAVENOUS
Status: DISCONTINUED | OUTPATIENT
Start: 2025-01-01 | End: 2025-01-01

## 2025-01-01 RX ORDER — OXYCODONE HYDROCHLORIDE 5 MG/1
5 TABLET ORAL EVERY 4 HOURS PRN
Qty: 36 TABLET | Refills: 0 | Status: SHIPPED | OUTPATIENT
Start: 2025-01-01

## 2025-01-01 RX ORDER — PROPOFOL 10 MG/ML
0-50 INJECTION, EMULSION INTRAVENOUS CONTINUOUS
Status: DISCONTINUED | OUTPATIENT
Start: 2025-01-01 | End: 2025-01-01 | Stop reason: HOSPADM

## 2025-01-01 RX ADMIN — HYDROCORTISONE SODIUM SUCCINATE 100 MG: 100 INJECTION, POWDER, FOR SOLUTION INTRAMUSCULAR; INTRAVENOUS at 09:02

## 2025-01-01 RX ADMIN — HEPARIN SODIUM 5000 UNITS: 5000 INJECTION INTRAVENOUS; SUBCUTANEOUS at 05:02

## 2025-01-01 RX ADMIN — SODIUM BICARBONATE: 84 INJECTION, SOLUTION INTRAVENOUS at 09:02

## 2025-01-01 RX ADMIN — SODIUM CHLORIDE 0.1 UNITS/KG/HR: 9 INJECTION, SOLUTION INTRAVENOUS at 06:02

## 2025-01-01 RX ADMIN — EPINEPHRINE 1 MG: 0.1 INJECTION INTRACARDIAC; INTRAVENOUS at 09:02

## 2025-01-01 RX ADMIN — SODIUM BICARBONATE 50 MEQ: 84 INJECTION INTRAVENOUS at 09:02

## 2025-01-01 RX ADMIN — PHENYLEPHRINE HYDROCHLORIDE 400 MCG: 10 INJECTION INTRAVENOUS at 10:02

## 2025-01-01 RX ADMIN — SUCCINYLCHOLINE CHLORIDE 140 MG: 20 INJECTION, SOLUTION INTRAMUSCULAR; INTRAVENOUS; PARENTERAL at 02:02

## 2025-01-01 RX ADMIN — HEPARIN SODIUM 5000 UNITS: 5000 INJECTION INTRAVENOUS; SUBCUTANEOUS at 09:02

## 2025-01-01 RX ADMIN — MIDAZOLAM HYDROCHLORIDE 2 MG: 1 INJECTION, SOLUTION INTRAMUSCULAR; INTRAVENOUS at 10:02

## 2025-01-01 RX ADMIN — BUMETANIDE 2 MG: 0.25 INJECTION INTRAMUSCULAR; INTRAVENOUS at 07:02

## 2025-01-01 RX ADMIN — NOREPINEPHRINE BITARTRATE 0.05 MCG/KG/MIN: 1 INJECTION, SOLUTION, CONCENTRATE INTRAVENOUS at 11:02

## 2025-01-01 RX ADMIN — PROPOFOL 20 MG: 10 INJECTION, EMULSION INTRAVENOUS at 03:02

## 2025-01-01 RX ADMIN — MUPIROCIN: 20 OINTMENT TOPICAL at 09:02

## 2025-01-01 RX ADMIN — FENTANYL CITRATE 25 MCG: 50 INJECTION INTRAMUSCULAR; INTRAVENOUS at 03:02

## 2025-01-01 RX ADMIN — INSULIN ASPART 10 UNITS: 100 INJECTION, SOLUTION INTRAVENOUS; SUBCUTANEOUS at 01:02

## 2025-01-01 RX ADMIN — PHENYLEPHRINE HYDROCHLORIDE 100 MCG: 10 INJECTION INTRAVENOUS at 12:02

## 2025-01-01 RX ADMIN — HUMAN INSULIN 10 UNITS: 100 INJECTION, SOLUTION SUBCUTANEOUS at 05:02

## 2025-01-01 RX ADMIN — CEFAZOLIN 2 G: 330 INJECTION, POWDER, FOR SOLUTION INTRAMUSCULAR; INTRAVENOUS at 11:02

## 2025-01-01 RX ADMIN — VASOPRESSIN 0.04 UNITS/MIN: 0.2 INJECTION INTRAVENOUS at 09:02

## 2025-01-01 RX ADMIN — CHLORHEXIDINE GLUCONATE 0.12% ORAL RINSE 15 ML: 1.2 LIQUID ORAL at 09:02

## 2025-01-01 RX ADMIN — SODIUM ZIRCONIUM CYCLOSILICATE 10 G: 5 POWDER, FOR SUSPENSION ORAL at 01:02

## 2025-01-01 RX ADMIN — SUCCINYLCHOLINE CHLORIDE 140 MG: 20 INJECTION, SOLUTION INTRAMUSCULAR; INTRAVENOUS; PARENTERAL at 03:02

## 2025-01-01 RX ADMIN — PROPOFOL 100 MG: 10 INJECTION, EMULSION INTRAVENOUS at 10:02

## 2025-01-01 RX ADMIN — ETOMIDATE 6 MG: 2 INJECTION INTRAVENOUS at 03:02

## 2025-01-01 RX ADMIN — INSULIN ASPART 10 UNITS: 100 INJECTION, SOLUTION INTRAVENOUS; SUBCUTANEOUS at 07:02

## 2025-01-01 RX ADMIN — NOREPINEPHRINE BITARTRATE 0.5 MCG/KG/MIN: 0.02 INJECTION, SOLUTION INTRAVENOUS at 05:02

## 2025-01-01 RX ADMIN — CALCIUM CHLORIDE 1 G: 100 INJECTION, SOLUTION INTRAVENOUS at 10:02

## 2025-01-01 RX ADMIN — EPINEPHRINE 1 MG: 0.1 INJECTION INTRACARDIAC; INTRAVENOUS at 10:02

## 2025-01-01 RX ADMIN — ETOMIDATE 6 MG: 2 INJECTION INTRAVENOUS at 02:02

## 2025-01-01 RX ADMIN — ATROPINE SULFATE 1 MG: 0.1 INJECTION INTRAVENOUS at 09:02

## 2025-01-01 RX ADMIN — SODIUM CHLORIDE 1000 ML: 9 INJECTION, SOLUTION INTRAVENOUS at 10:02

## 2025-01-01 RX ADMIN — FAMOTIDINE 20 MG: 20 TABLET, FILM COATED ORAL at 09:02

## 2025-01-01 RX ADMIN — SUCCINYLCHOLINE CHLORIDE 160 MG: 20 INJECTION, SOLUTION INTRAMUSCULAR; INTRAVENOUS; PARENTERAL at 10:02

## 2025-01-01 RX ADMIN — ROCURONIUM BROMIDE 30 MG: 10 SOLUTION INTRAVENOUS at 12:02

## 2025-01-01 RX ADMIN — PHENYLEPHRINE HYDROCHLORIDE 100 MCG: 10 INJECTION INTRAVENOUS at 11:02

## 2025-01-01 RX ADMIN — ONDANSETRON 4 MG: 2 INJECTION INTRAMUSCULAR; INTRAVENOUS at 01:02

## 2025-01-01 RX ADMIN — ONDANSETRON 4 MG: 2 INJECTION INTRAMUSCULAR; INTRAVENOUS at 12:02

## 2025-01-01 RX ADMIN — INSULIN ASPART 10 UNITS: 100 INJECTION, SOLUTION INTRAVENOUS; SUBCUTANEOUS at 11:02

## 2025-01-01 RX ADMIN — NOREPINEPHRINE BITARTRATE 1.25 MCG/KG/MIN: 1 INJECTION, SOLUTION, CONCENTRATE INTRAVENOUS at 07:02

## 2025-01-01 RX ADMIN — PROPOFOL 30 MCG/KG/MIN: 10 INJECTION, EMULSION INTRAVENOUS at 06:02

## 2025-01-01 RX ADMIN — NALXONE HYDROCHLORIDE 0.4 MG: 0.4 INJECTION INTRAMUSCULAR; INTRAVENOUS; SUBCUTANEOUS at 01:02

## 2025-01-01 RX ADMIN — HUMAN INSULIN 12 UNITS: 100 INJECTION, SOLUTION SUBCUTANEOUS at 12:02

## 2025-01-01 RX ADMIN — HYDROCORTISONE SODIUM SUCCINATE 100 MG: 100 INJECTION, POWDER, FOR SOLUTION INTRAMUSCULAR; INTRAVENOUS at 05:02

## 2025-01-01 RX ADMIN — EPINEPHRINE 1 MCG/KG/MIN: 1 INJECTION INTRAMUSCULAR; INTRAVENOUS; SUBCUTANEOUS at 10:02

## 2025-01-01 RX ADMIN — SODIUM CHLORIDE: 9 INJECTION, SOLUTION INTRAVENOUS at 06:02

## 2025-01-01 RX ADMIN — SODIUM CHLORIDE, SODIUM LACTATE, POTASSIUM CHLORIDE, AND CALCIUM CHLORIDE: .6; .31; .03; .02 INJECTION, SOLUTION INTRAVENOUS at 10:02

## 2025-01-01 RX ADMIN — NOREPINEPHRINE BITARTRATE 0.96 MCG/KG/MIN: 1 INJECTION, SOLUTION, CONCENTRATE INTRAVENOUS at 03:02

## 2025-01-01 RX ADMIN — FENTANYL CITRATE 50 MCG: 50 INJECTION, SOLUTION INTRAMUSCULAR; INTRAVENOUS at 12:02

## 2025-01-01 RX ADMIN — PROPOFOL 1000 MG: 10 INJECTION, EMULSION INTRAVENOUS at 03:02

## 2025-01-01 RX ADMIN — SUGAMMADEX 200 MG: 100 INJECTION, SOLUTION INTRAVENOUS at 12:02

## 2025-01-01 RX ADMIN — PIPERACILLIN SODIUM AND TAZOBACTAM SODIUM 4.5 G: 4; .5 INJECTION, POWDER, FOR SOLUTION INTRAVENOUS at 07:02

## 2025-01-01 RX ADMIN — PIPERACILLIN SODIUM AND TAZOBACTAM SODIUM 4.5 G: 4; .5 INJECTION, POWDER, FOR SOLUTION INTRAVENOUS at 04:02

## 2025-01-01 RX ADMIN — PROPOFOL 30 MCG/KG/MIN: 10 INJECTION, EMULSION INTRAVENOUS at 10:02

## 2025-01-01 RX ADMIN — SODIUM BICARBONATE 100 MEQ: 84 INJECTION, SOLUTION INTRAVENOUS at 01:02

## 2025-01-01 RX ADMIN — LIDOCAINE HYDROCHLORIDE 40 MG: 20 INJECTION, SOLUTION EPIDURAL; INFILTRATION; INTRACAUDAL; PERINEURAL at 10:02

## 2025-01-01 RX ADMIN — SODIUM CHLORIDE 200 MG: 9 INJECTION, SOLUTION INTRAVENOUS at 04:02

## 2025-01-01 RX ADMIN — NOREPINEPHRINE BITARTRATE 0.4 MCG/KG/MIN: 0.02 INJECTION, SOLUTION INTRAVENOUS at 02:02

## 2025-01-01 RX ADMIN — HUMAN INSULIN 10 UNITS: 100 INJECTION, SOLUTION SUBCUTANEOUS at 04:02

## 2025-01-01 RX ADMIN — FLUMAZENIL 0.5 MG: 0.1 INJECTION, SOLUTION INTRAVENOUS at 01:02

## 2025-01-01 RX ADMIN — SODIUM CHLORIDE 1000 ML: 9 INJECTION, SOLUTION INTRAVENOUS at 06:02

## 2025-01-01 RX ADMIN — FENTANYL CITRATE 50 MCG: 50 INJECTION, SOLUTION INTRAMUSCULAR; INTRAVENOUS at 11:02

## 2025-01-01 RX ADMIN — AMIODARONE HYDROCHLORIDE 150 MG: 1.5 INJECTION, SOLUTION INTRAVENOUS at 09:02

## 2025-01-01 RX ADMIN — NOREPINEPHRINE BITARTRATE 0.4 MCG/KG/MIN: 1 INJECTION, SOLUTION, CONCENTRATE INTRAVENOUS at 07:02

## 2025-01-01 RX ADMIN — ROCURONIUM BROMIDE 50 MG: 10 SOLUTION INTRAVENOUS at 10:02

## 2025-01-01 RX ADMIN — NOREPINEPHRINE BITARTRATE 0.08 MCG/KG/MIN: 4 INJECTION, SOLUTION INTRAVENOUS at 01:02

## 2025-01-02 ENCOUNTER — HOSPITAL ENCOUNTER (OUTPATIENT)
Dept: RADIOLOGY | Facility: HOSPITAL | Age: 50
Discharge: HOME | End: 2025-01-02
Attending: THORACIC SURGERY (CARDIOTHORACIC VASCULAR SURGERY)
Payer: MEDICAID

## 2025-01-02 ENCOUNTER — OFFICE VISIT (OUTPATIENT)
Dept: PULMONOLOGY | Facility: CLINIC | Age: 50
End: 2025-01-02
Payer: MEDICAID

## 2025-01-02 VITALS
DIASTOLIC BLOOD PRESSURE: 68 MMHG | RESPIRATION RATE: 18 BRPM | WEIGHT: 229.94 LBS | OXYGEN SATURATION: 96 % | HEIGHT: 70 IN | BODY MASS INDEX: 32.92 KG/M2 | SYSTOLIC BLOOD PRESSURE: 128 MMHG | HEART RATE: 67 BPM

## 2025-01-02 DIAGNOSIS — C34.90 MALIGNANT NEOPLASM OF LUNG, UNSPECIFIED LATERALITY, UNSPECIFIED PART OF LUNG: ICD-10-CM

## 2025-01-02 DIAGNOSIS — J41.0 SIMPLE CHRONIC BRONCHITIS: Primary | ICD-10-CM

## 2025-01-02 DIAGNOSIS — C34.91 MALIGNANT NEOPLASM OF RIGHT LUNG, UNSPECIFIED PART OF LUNG: ICD-10-CM

## 2025-01-02 PROCEDURE — 99215 OFFICE O/P EST HI 40 MIN: CPT | Mod: PBBFAC,25 | Performed by: PHYSICIAN ASSISTANT

## 2025-01-02 PROCEDURE — 99999 PR PBB SHADOW E&M-EST. PATIENT-LVL V: CPT | Mod: PBBFAC,,, | Performed by: PHYSICIAN ASSISTANT

## 2025-01-02 PROCEDURE — 71046 X-RAY EXAM CHEST 2 VIEWS: CPT | Mod: 26,,, | Performed by: RADIOLOGY

## 2025-01-02 PROCEDURE — 71046 X-RAY EXAM CHEST 2 VIEWS: CPT | Mod: TC

## 2025-01-02 RX ORDER — TIOTROPIUM BROMIDE 18 UG/1
18 CAPSULE ORAL; RESPIRATORY (INHALATION) DAILY
Qty: 30 CAPSULE | Refills: 11 | Status: SHIPPED | OUTPATIENT
Start: 2025-01-02 | End: 2026-01-02

## 2025-01-02 NOTE — PROGRESS NOTES
Subjective:       Patient ID: Jenifer Westfall is a 49 y.o. female.    Chief Complaint: SOB      1/3/2025  Established patient here for ED follow up  Seen at [facility] ED 12/18 for COPD exacerbation  Given doxycyline and albuterol nebulizer  She is doing better today  Coughs daily but denies sputum production or SOB  She is on spiriva daily  Stopped daily albuterol and doing well    12/27/24  Here for SOB  Established patient, last seen by Dr Delgado for biopsy of lung  Found to be cancer; she is now s/p right lobectomy 1/2024, following with cardiothoracic surgery  Here today to see if she qualifies for oxygen  Has significant KAT  She does use incentive spirometry daily  Has albuterol but does not use daily  No other inhalers  Does have a nebulizer machine  Completed 6mwd today without significant desaturations requiring oxygen  Does have a have a smoking history; darrick 10/2023 with restriction  No cough or fever  Chest X Ray today stable    Immunization History   Administered Date(s) Administered    COVID-19, MRNA, LN-S, PF (MODERNA FULL 0.5 ML DOSE) 08/03/2021    Hepatitis B, Adult 12/22/2014, 04/02/2015    Hepatitis B, Pediatric/Adolescent 06/14/2016    Influenza 1974, 11/20/2009, 11/26/2012, 10/09/2013, 10/30/2014, 12/03/2015    Influenza - Quadrivalent 10/18/2022    Influenza - Quadrivalent - PF *Preferred* (6 months and older) 09/30/2010    Influenza - Trivalent - Afluria, Fluzone MDV 11/20/2009, 11/26/2012, 10/09/2013, 10/30/2014, 12/03/2015    Influenza - Trivalent - Fluarix, Flulaval, Fluzone, Afluria - PF 09/30/2010    Influenza A (H1N1) 2009 Monovalent - IM 11/20/2009    Influenza Split 11/20/2009, 11/26/2012, 10/09/2013    Pneumococcal Conjugate - 20 Valent 05/22/2023    Pneumococcal Polysaccharide - 23 Valent 07/02/2012    Tdap 04/02/2015      Tobacco Use: Medium Risk (1/2/2025)    Patient History     Smoking Tobacco Use: Former     Smokeless Tobacco Use: Never     Passive Exposure: Past      Past  "Medical History:   Diagnosis Date    Arthritis     DM (diabetes mellitus) 2009     03/01/2017 Insulin x 3 years 2013    Hepatitis C antibody positive in blood 03/04/2021    RNA NEGATIVE 3/6/2017, 3/22/22    Hyperlipidemia     Hypertension     IBS (irritable bowel syndrome)     Kidney stone     Marfan syndrome     Mitral valve prolapse     Sleep apnea     moild: no cpap needed      Current Outpatient Medications on File Prior to Visit   Medication Sig Dispense Refill    albuterol (PROVENTIL) 2.5 mg /3 mL (0.083 %) nebulizer solution TAKE 3 MLS (2.5 MG TOTAL) BY NEBULIZATION EVERY 4 TO 6 HOURS AS NEEDED FOR WHEEZING OR SHORTNESS OF BREATH. 360 mL 11    albuterol (PROVENTIL/VENTOLIN HFA) 90 mcg/actuation inhaler Inhale 2 puffs into the lungs every 6 (six) hours as needed for Wheezing. Rescue      ascorbic acid (VITAMIN C) 100 MG tablet Take 100 mg by mouth once daily.      aspirin (ECOTRIN) 81 MG EC tablet Take 81 mg by mouth every evening.      atenoloL (TENORMIN) 25 MG tablet Take 1 tablet (25 mg total) by mouth every evening. 90 tablet 3    atorvastatin (LIPITOR) 80 MG tablet Take 1 tablet (80 mg total) by mouth once daily. 90 tablet 3    azelastine (ASTELIN) 137 mcg (0.1 %) nasal spray 2 sprays by Nasal route daily as needed for Rhinitis.      BD ULTRA-FINE AMERICA PEN NEEDLE 32 gauge x 5/32" Ndle Inject 1 each into the skin 3 (three) times daily.      busPIRone (BUSPAR) 30 MG Tab Take 15 mg by mouth 2 (two) times daily.      butalbital-acetaminophen-caffeine -40 mg (FIORICET, ESGIC) -40 mg per tablet Take 1 tablet by mouth every 4 (four) hours as needed for Pain.      clopidogreL (PLAVIX) 75 mg tablet Take 1 tablet (75 mg total) by mouth once daily. 90 tablet 3    dapagliflozin propanediol (FARXIGA) 10 mg tablet Take 1 tablet (10 mg total) by mouth once daily. 30 tablet 6    dicyclomine (BENTYL) 20 mg tablet TAKE 1 TABLET BY MOUTH 3 TIMES DAILY BEFORE MEALS AS NEEDED FOR ABDOMINAL PAIN 100 tablet 2 " "   evolocumab (REPATHA SURECLICK) 140 mg/mL PnIj Inject 1 mL (140 mg total) into the skin every 14 (fourteen) days. 2 mL 11    famotidine (PEPCID) 20 MG tablet TAKE 1 TABLET (20 MG TOTAL) BY MOUTH 2 (TWO) TIMES DAILY. 60 tablet 11    famotidine (PEPCID) 20 MG tablet Take 1 tablet (20 mg total) by mouth 2 (two) times daily. 60 tablet 11    finasteride (PROPECIA) 1 mg tablet Take 1 mg by mouth once daily.      FLUoxetine (PROZAC) 40 MG capsule TAKE 1 CAPSULE BY MOUTH ONCE DAILY (Patient taking differently: Take 40 mg by mouth every evening.) 90 capsule 0    fluticasone propionate (FLONASE) 50 mcg/actuation nasal spray 2 sprays by Each Nostril route daily as needed.      gabapentin (NEURONTIN) 300 MG capsule TAKE 3 CAPSULES (900 MG TOTAL) BY MOUTH 3 (THREE) TIMES DAILY. 270 capsule 11    gabapentin (NEURONTIN) 300 MG capsule Take 1 capsule (300 mg total) by mouth every evening. 30 capsule 11    glipiZIDE (GLUCOTROL) 10 MG tablet Take 1 tablet (10 mg total) by mouth 2 (two) times daily. 60 tablet 0    isosorbide mononitrate (IMDUR) 30 MG 24 hr tablet TAKE 1 TABLET (30 MG TOTAL) BY MOUTH ONCE DAILY. 30 tablet 11    LANTUS SOLOSTAR U-100 INSULIN glargine 100 units/mL SubQ pen Inject 40 Units into the skin 2 (two) times a day.      linaCLOtide (LINZESS) 145 mcg Cap capsule Take 1 capsule (145 mcg total) by mouth before breakfast. (Patient taking differently: Take 145 mcg by mouth daily as needed.) 30 capsule 2    lisinopriL (PRINIVIL,ZESTRIL) 5 MG tablet Take 1 tablet (5 mg total) by mouth every evening. 90 tablet 3    methocarbamoL (ROBAXIN) 500 MG Tab Take 500 mg by mouth 3 (three) times daily as needed.      mometasone (ELOCON) 0.1 % solution Apply topically.      NOVOLOG FLEXPEN U-100 INSULIN 100 unit/mL (3 mL) InPn pen Inject into the skin 3 (three) times daily with meals.      omeprazole (PRILOSEC) 40 MG capsule Take 1 capsule (40 mg total) by mouth once daily. 30 capsule 11    PEN NEEDLE 31 gauge x 5/16" Ndle USE 5 " TIMES DAILY WITH LANTUS AND HUMALOG 100 each 3    promethazine (PHENERGAN) 25 MG tablet Take 1 tablet (25 mg total) by mouth every 6 (six) hours as needed for Nausea. 30 tablet 0    SITagliptin phosphate (JANUVIA) 50 MG Tab Take 50 mg by mouth once daily. Added to chart 1/18/24      tiZANidine (ZANAFLEX) 4 MG tablet Take 1 tablet (4 mg total) by mouth 3 (three) times daily as needed (spasms). 90 tablet 1    TRUE METRIX GLUCOSE TEST STRIP Strp       TRULICITY 4.5 mg/0.5 mL pen injector Inject 0.5 mLs as directed every 7 days.      ZINC ORAL Take 1 tablet by mouth once daily. PT UNSURE OF DOSE      ALPRAZolam (XANAX) 0.25 MG tablet Take 0.25 mg by mouth 2 (two) times daily as needed for Anxiety.      oxyCODONE-acetaminophen (PERCOCET) 5-325 mg per tablet Take 1 tablet by mouth every 4 (four) hours as needed for Pain. 20 tablet 0    traMADoL (ULTRAM) 50 mg tablet Take 50 mg by mouth every 6 (six) hours.       Current Facility-Administered Medications on File Prior to Visit   Medication Dose Route Frequency Provider Last Rate Last Admin    DOBUtamine 1000 mg in D5W 250 mL infusion  10 mcg/kg/min (Order-Specific) Intravenous Continuous Julisa Hansen FNP-C 15.4 mL/hr at 01/09/24 1113 10 mcg/kg/min at 01/09/24 1113    sodium chloride 0.9% flush 10 mL  10 mL Intravenous PRN Brian Delgado MD        sodium chloride 0.9% flush 10 mL  10 mL Intravenous PRN Chente Cabrera MD        sodium chloride 0.9% flush 10 mL  10 mL Intravenous PRN hCente Cabrera MD            Review of Systems   Constitutional:  Positive for weakness. Negative for fever and fatigue.   HENT:  Negative for congestion.    Respiratory:  Positive for previous hospitalization due to pulmonary problems and dyspnea on extertion. Negative for cough, hemoptysis, sputum production and wheezing.    Cardiovascular:  Negative for chest pain and leg swelling.   Musculoskeletal:  Positive for gait problem.       Objective:       Vitals:    01/02/25 1445   BP:  "128/68   Patient Position: Sitting   Pulse: 67   Resp: 18   SpO2: 96%   Weight: 104.3 kg (229 lb 15 oz)   Height: 5' 10" (1.778 m)       Physical Exam   Constitutional: She is oriented to person, place, and time. She appears well-developed and well-nourished. No distress.   HENT:   Head: Normocephalic.   Nose: Nose normal.   Mouth/Throat: Oropharynx is clear and moist.   Cardiovascular: Normal rate and regular rhythm.   Pulmonary/Chest: Effort normal. No respiratory distress. She has no wheezes. She has no rales.   Musculoskeletal:         General: No edema.      Cervical back: Normal range of motion and neck supple.   Neurological: She is alert and oriented to person, place, and time. Gait abnormal.   Skin: Skin is warm and dry.   Psychiatric: She has a normal mood and affect.   Vitals reviewed.    Personal Diagnostic Review    X-Ray Chest PA And Lateral  Narrative: EXAM: XR CHEST PA AND LATERAL    CLINICAL HISTORY: Lung cancer    FINDINGS:  Comparison made to prior chest x-ray dated 02/27/2024.  The patient's had a right lower lobectomy.  Lungs are clear.  No pleural effusion or pneumothorax or pulmonary edema.  Intact thoracic osseous structures.  Impression:  Stable appearance of the chest.    Finalized on: 1/2/2025 10:54 PM By:  Sean Rosario MD  [facility]# [id number]      2025-01-02 22:56:14.972    [facility]      Phase Oxygen Assessment Supplemental O2 Heart   Rate Blood Pressure Clifford Dyspnea Scale Rating   Resting 95 % Room Air 86 bpm 123/58 2   Exercise             Minute             1 95 % Room Air 89 bpm       2 95 % Room Air 90 bpm       3 96 % Room Air 91 bpm       4 92 % Room Air 91 bpm       5 96 % Room Air 90 bpm       6  96 % Room Air 88 bpm 111/70 5-6   Recovery             Minute             1 97 % Room Air 89 bpm       2 96 % Room Air 85 bpm       3 95 % Room Air 84 bpm       4 96 % Room Air 84 bpm 110/64 4      Six Minute Walk Summary  6MWT Status: not completed  Patient Reported: Chest pain, Dyspnea, " Dizziness, Lightheadedness              Interpretation:  Did the patient stop or pause?: Yes  How many times did the patient stop or pause?: 3  Stop Time 1: 60  Restart Time 1: 105  Pause Time 1: 45 seconds  Stop Time 2: 150  Restart Time 2: 180  Pause Time 2: 30 seconds  Stop Time 3: 210 seconds  Restart Time 3: 240 seconds  Pause Time 3: 30 seconds  Total Time Walked (Calculated): 255 seconds  Final Partial Lap Distance (feet): 100 feet  Total Distance Meters (Calculated): 30.48 meters  Predicted Distance Meters (Calculated): 502.46 meters  Percentage of Predicted (Calculated): 6.07  Peak VO2 (Calculated): 4.89  Mets: 1.4  Has The Patient Had a Previous Six Minute Walk Test?: Yes     Previous 6MWT Results  Has The Patient Had a Previous Six Minute Walk Test?: Yes  Date of Previous Test: 11/16/22  Total Time Walked: 251 seconds  Total Distance (meters): 129  Predicted Distance (meters): 519 meters  Percentage of Predicted: 24.9  Percent Change (Calculated): 0.76       X-Ray Chest PA And Lateral  Narrative: EXAM: XR CHEST PA AND LATERAL    CLINICAL HISTORY: Lung cancer    FINDINGS:  Comparison made to prior chest x-ray dated 02/27/2024.  The patient's had a right lower lobectomy.  Lungs are clear.  No pleural effusion or pneumothorax or pulmonary edema.  Intact thoracic osseous structures.  Impression:  Stable appearance of the chest.    Finalized on: 1/2/2025 10:54 PM By:  Sean Rosario MD  [facility]# [id number]      2025-01-02 22:56:14.972    [facility]       Assessment/Plan:       Problem List Items Addressed This Visit          Pulmonary    Simple chronic bronchitis - Primary     Continue spiriva daily  Albuterol neb prn  Cxr today stable  Follow up 3 months with darrick and walk         Relevant Medications    tiotropium (SPIRIVA) 18 mcg inhalation capsule       Oncology    Malignant neoplasm of lung     S/p right lobectomy  Follows with cardiothoracic surgeon             Follow up in about 3 months (around 4/2/2025) for pft,  lyn, f/u dr oleary.    Discussed diagnosis, its evaluation, treatment and usual course. All questions answered.    Patient verbalized understanding of plan and left in no acute distress    Thank you for the courtesy of participating in the care of this patient    Rebecca Harris PA-C  [facility]

## 2025-01-03 RX ORDER — PROMETHAZINE HYDROCHLORIDE 25 MG/1
25 TABLET ORAL EVERY 6 HOURS PRN
Qty: 30 TABLET | Refills: 0 | Status: SHIPPED | OUTPATIENT
Start: 2025-01-03

## 2025-01-03 NOTE — ASSESSMENT & PLAN NOTE
Continue spiriva daily  Albuterol neb prn  Cxr today stable  Follow up 3 months with darrick and walk

## 2025-01-08 ENCOUNTER — PATIENT MESSAGE (OUTPATIENT)
Dept: SPORTS MEDICINE | Facility: CLINIC | Age: 50
End: 2025-01-08
Payer: MEDICAID

## 2025-01-10 RX ORDER — OMEPRAZOLE 40 MG/1
40 CAPSULE, DELAYED RELEASE ORAL
Qty: 180 CAPSULE | Refills: 0 | Status: SHIPPED | OUTPATIENT
Start: 2025-01-10

## 2025-01-11 NOTE — PROGRESS NOTES
[id number] FOLLOW-UP     CHIEF COMPLAINT:   Left hip pain, 4/10 with radiation.  Left leg burning.    PERTINENT HISTORY:   Jenifer has been followed in Rheumatology with chronic   degenerative arthritis as well as intermittent bursitis involving spine and hips   and knees and shoulders particularly.  She has underlying Marfan's syndrome.    She has not had any recent surgical treatments.  She was last seen in July by Adriana.    At that time, she had a left hip bursal injection.  It seemed to help a little   while, but wore off.  She did not go to physical therapy for her right shoulder   at that time.  Subsequent to that, she has been told she probably has a rotator   cuff tear as well as chronic bursitis.  She is going to have surgery, but has   never gotten scheduled yet.  She is supposed to get back to Ortho on that.  She   has this pain in her left hip and back daily and radiates down the left leg   towards the knee with burning quality.  She used to take gabapentin, but has   quit it.  She does use tramadol once or twice daily and it does help as well.    She takes Zanaflex at night 4 mg that does help her back.  She was seeing Dr. Stein in the past, but he is not here any longer.  She did not get the DENY's   done, they were denied.  She is having difficulty with her daily activities,   mostly because of his left hip and back area features and secondarily obviously   trouble with her right shoulder.  She did not have any much in the way of hand   issues.  Feet are doing okay.    PAST HISTORY:  Reviewed.    MEDICATIONS AND ALLERGIES:  Allergies include Compazine, and her Med Card and   her allergy list reviewed.    SOCIAL HISTORY:  Negative for smoking.  No alcohol use.    REVIEW OF SYSTEMS:  GENERAL:  With no weight loss.  No fevers.  HEENT:  She does not have any HEENT symptoms of dryness or stomatitis.  LUNGS:  She has had abnormal lung studies in the past and thought she might have   COPD.  CARDIAC:   She has had mitral prolapse, hypertension as well as hyperlipidemia,   followed by Dr. Aquino, is on statins as well as other cardiac meds.  See the   medication list, which is extensive.  ENDOCRINE:  She does have diabetes, on insulin.  HEREDITARY:  Marfan's syndrome appreciated.  No serious consequences to date.    Note, she does see Cardiology.  GI:  GI tract, she has got a history of various problems including irritable   bowel.  ORTHO:  With degenerative arthritis in her spine with sciatica.  Known disk   disease.  OA of both knees have been present.  No surgery.  Bursitis of the hip   and shoulder has been noted above.  NEURO:  She does have anxiety and depression.  Is on meds.  See Med Card.    SOCIAL HISTORY:  Note, she did quit smoking about 9 years ago.    PHYSICAL EXAMINATION:  VITAL SIGNS:  Weight 103 kg, blood pressure 168/84, pulse in the 80s, pain score   4.  HEENT:  Normocephalic, atraumatic, alert and oriented x3.  PERRLA.  Conjunctiva   clear, sclera  non icteric.  No tonsillar enlargement.  No pharyngeal erythema   or exudate.  No ulcers or lesions noted.  Mucous membranes moist and pink.  Oral   pharynx clear.  Neck supple, no JVD.  Normal ROM.  Thyroid normal.  No masses   or tracheal deviation.  No cervical, axillary or inguinal lymph node   enlargement.  With no malar rashes.  No stomatitis.  No adenopathy.  Thyroid not   tender.  No hair loss.  LUNGS:  Slight decreased breath sounds.  No rales or wheezing.  CARDIOVASCULAR:  Normal S1, S2.  No rubs, murmurs or gallops.  No peripheral   edema.  Note the increased blood pressure.  ABDOMEN:  Obese, but not tender.  JOINT EXAM:  Full motion.  Slightly increased range of motion of her hands.    Note, has Marfans.  Elbows normal.  Right shoulder has restricted motion about   90 degrees with tenderness and she cannot lift it up by herself, consistent with   a rotator cuff tear.  Left shoulder has good motion.  C-rotation is mildly   limited, noted  degenerative disease by history.  She has mild tenderness in the   lower back.  Hip motion is normal, but she has marked tenderness in her left   troch bursa.  The sciatic notch and the SI joints are not that tender.  Her   knees have mild crepitus bilaterally, but good motion.  No fluid, but the toes   are not tender.  She has slight decreased arches.  Reflexes are good.  Strength   is still normal.   SKIN:  No skin lesions or nodules.    LABORATORY DATA:  No recent labs.    IMPRESSION:  1.  Recurring hip pain, which is likely probably from hip bursitis - can't   exclude  component from referred sciatic pain and noted the burning quality.  2.  Chronic right shoulder pain with rotator cuff tear now with chronic   bursitis.  Failed conservative therapy including therapy and shots, pending   Ortho surg..  3.  Degenerative disease of her spine, both cervical and lumbosacral, noted with  the left mid sciatica.  4.  Marfan syndrome - No vascular complications today.  5.  Diabetes mellitus type 2 - on insulin as well as oral agents.  6.  Mild obesity.  BMI of 32.    PLAN:  1.  We will re-inject the left hip bursa.  Use 6 mg of Celestone and 80 mg of   Medrol along with 3 mL of lidocaine.  She may need to increase her insulin dose   for a few days if her sugar goes up following this.  2.  Follow with ice packs.  3.  Send to PT and use ultrasound and other maneuvers to the left hip.  4.  See if we can get better results this time.  5.  I would ask PT to look at the leg lengths and other therapy manuevers she can do   to prevent recurrent bursitis.  6.  Get Orthopedics to follow up on her rotator cuff issues.  7.  Encourage a healthy diet.  8.  She needs to follow her blood pressure at home if this stays up.  9.  Check with her family doctor.  10.  Refill gabapentin 400 mg 3 times daily, get back on this if she had quit   and this should help her sciatic neuropathy symptoms.  11.  Refill Zanaflex 4 mg at night.  12.  We will  give her tramadol to use one to two daily - her opioid risk is low.    See back in 6 months with Adriana.        MALLORY/STARR  dd: 02/27/2018 17:31:58 (CST)  td: 02/28/2018 10:24:18 (CST)  Doc ID   #[id number]  Job ID #[id number]    CC:        no

## 2025-01-13 ENCOUNTER — PATIENT MESSAGE (OUTPATIENT)
Dept: GASTROENTEROLOGY | Facility: CLINIC | Age: 50
End: 2025-01-13
Payer: MEDICAID

## 2025-01-20 DIAGNOSIS — M54.32 SCIATICA OF LEFT SIDE: ICD-10-CM

## 2025-01-20 DIAGNOSIS — G89.4 CHRONIC PAIN DISORDER: ICD-10-CM

## 2025-01-21 ENCOUNTER — PATIENT MESSAGE (OUTPATIENT)
Dept: SPORTS MEDICINE | Facility: CLINIC | Age: 50
End: 2025-01-21
Payer: MEDICAID

## 2025-01-21 NOTE — PROGRESS NOTES
Virtual Orthopaedic Follow-Up Visit    Last Appointment: 10/23/24  Diagnosis: right shoulder rotator cuff tear and left shoulder rotator cuff tear  Prior Procedure: Left SAS CSI    Jenifer Westfall is a 49 y.o. female who is here for f/u evaluation of her left shoulder. The patient was last seen here by me for her left shoulder on 10/23/2 at which point we proceeded with subacromial space CSI for the left shoulder. The patient returns today to discuss moving forward with operative treatment at this time.  She reports that left shoulder continues to be painful.  She is limited in terms of range of motion, strength, and activities of daily living.  It diminishes her quality of life.    To review her history, Jenifer Westfall is a 49 y.o. right-hand dominant female who presented on 12/13/22 with RIGHT shoulder and neck pain and dysfunction that began about 3 years ago. She had gradual onset of symptoms for her right shoulder and neck. Her symptoms included intermittent sharp, stabbing pain of the right shoulder which radiates to right neck. Her pain was aggravated by movement. She had tried rest, activity modification, Zanaflex, Gabapentin, and physical therapy. She had right shoulder pain and known rotator cuff tear on the right side.  She also had known peripheral neuropathy that severely affected her left foot and seemed to also be affecting her left hand. Because of her neuropathy, she used a cane in her right hand for ambulation.  We discussed that I thought she would have a very difficult time with any kind of operative treatment because of her left lower extremity neuropathy and need to use a cane in her right hand. We discussed that I felt it would be reasonable to proceed with injection in the right shoulder for symptomatic treatment of pain as result of her cuff tear, however we were unable to perform injection due to A1c 12.0 with reviewing of her last lab from (3/28/2022) and possible upcoming spine surgery.  Discussed if she has preoperative testing for her spine surgery and her A1c decreases that we can consider a right shoulder CSI in the future. She returned on 9/27/23 at which point we performed injection in the right shoulder for symptomatic treatment of pain as result of her cuff tear. However, think she would have a very difficult time with any kind of operative treatment because of her left lower extremity neuropathy and need to use a cane in her right hand. She returned on 5/22/24 noting left shoulder pain. She had left shoulder greater tuberosity  degenerative changes and exam suspicious for rotator cuff tear. We elected to proceed with left SAS CSI at that time.  She returned on 8/21/24 at which point she has right shoulder pain and known rotator cuff tear on the right side.  Her A1C was stabilized to a level that we could pursue operative treatment. Since her prior MRI was from 2019, I recommended moving forward with new MRI at that time for further evaluation of the current state of her rotator cuff tear. Discussed with her that over the past 5 years she could have developed atrophy that makes the tear irreparable. She returned on 9/18.24 at which point her MRI showed right shoulder rotator cuff tear and some evidence of mild glenohumeral arthritis.  That day her left shoulder was bothering her more. She had limited ROM and strength on physical exam. She had been treated with subacromial space CSI (5/22/24) for her left shoulder with some relief but her symptoms have ultimately returned and persisted. I recommended proceeding with MRI of the left shoulder at that time for further evaluation for suspected rotator cuff tear. At follow-up on 10/8/24 at which point we discussed plan to move forward with left shoulder rotator cuff repair. However, surgery is currently on hold due to supply chain issues and patient ultimately received left shoulder subacromial space CSI on 10/23/24 to manager her symptoms in the  interim.     Patient's medications, allergies, past medical, surgical, social and family histories were reviewed and updated as appropriate.    Review of Systems   All systems reviewed were negative.  Specifically, the patient denies fever, chills, weight loss, chest pain, shortness of breath, or dyspnea on exertion.      Past Medical History:   Diagnosis Date    Arthritis     DM (diabetes mellitus)      2017 Insulin x 3 years     Hepatitis C antibody positive in blood 2021    RNA NEGATIVE 3/6/2017, 3/22/22    Hyperlipidemia     Hypertension     IBS (irritable bowel syndrome)     Kidney stone     Marfan syndrome     Mitral valve prolapse     Sleep apnea     moild: no cpap needed       Past Surgical History:   Procedure Laterality Date    ANGIOGRAPHY OF LOWER EXTREMITY N/A 2018    Procedure: ANGIOGRAM, LOWER EXTREMITY- LEFT LEG;  Surgeon: Roscoe Landeros MD;  Location: [facility] CATH LAB;  Service: Peripheral Vascular;  Laterality: N/A;    APPENDECTOMY      BUNIONECTOMY      both feet    BYPASS GRAFT Bilateral     cataract surgery  2019     SECTION      x 2    CHOLECYSTECTOMY      COLONOSCOPY N/A 2020    Procedure: COLONOSCOPY w/ biopsies;  Surgeon: Gio Georges MD;  Location: [facility];  Service: Endoscopy;  Laterality: N/A;    ESOPHAGEAL MANOMETRY WITH MEASUREMENT OF IMPEDANCE N/A 2023    Procedure: MANOMETRY, ESOPHAGUS, WITH IMPEDANCE MEASUREMENT;  Surgeon: Desmond Calderon RN;  Location: [facility];  Service: Endoscopy;  Laterality: N/A;    ESOPHAGOGASTRODUODENOSCOPY N/A 2019    Procedure: ESOPHAGOGASTRODUODENOSCOPY (EGD);  Surgeon: Jaron Sanders III, MD;  Location: [facility];  Service: Endoscopy;  Laterality: N/A;    ESOPHAGOGASTRODUODENOSCOPY N/A 2022    Procedure: EGD (ESOPHAGOGASTRODUODENOSCOPY);  Surgeon: Keith Hardwick MD;  Location: [facility];  Service: Gastroenterology;  Laterality: N/A;    ESOPHAGOGASTRODUODENOSCOPY N/A 2024     Procedure: EGD (ESOPHAGOGASTRODUODENOSCOPY) 10/21-cc rec'd clr may hold plavix 5 days;  Surgeon: Jadyn Santana MD;  Location: [facility] ENDO;  Service: Endoscopy;  Laterality: N/A;    INJECTION OF ANESTHETIC AGENT AROUND MULTIPLE INTERCOSTAL NERVES Right 1/19/2024    Procedure: BLOCK, NERVE, INTERCOSTAL, 2 OR MORE;  Surgeon: Chente Cabrera MD;  Location: [facility] OR;  Service: Cardiothoracic;  Laterality: Right;    LOBECTOMY-ROBOTIC Right 1/19/2024    Procedure: LOBECTOMY-ROBOTIC;  Surgeon: Chente Cabrera MD;  Location: [facility] OR;  Service: Cardiothoracic;  Laterality: Right;  Lower Lobe    PH MONITORING, ESOPHAGUS, WIRELESS, (OFF REFLUX MEDS) N/A 11/22/2024    Procedure: PH MONITORING, ESOPHAGUS, WIRELESS, (OFF REFLUX MEDS);  Surgeon: Jadyn Santana MD;  Location: [facility];  Service: Endoscopy;  Laterality: N/A;    ROBOT-ASSISTED LAPAROSCOPIC LYMPHADENECTOMY USING DA GOMEZ XI Right 1/19/2024    Procedure: XI ROBOTIC LYMPHADENECTOMY;  Surgeon: Chente Cabrera MD;  Location: [facility] OR;  Service: Cardiothoracic;  Laterality: Right;  Robotic mediastinal lymph node dissection    SELECTIVE INJECTION OF ANESTHETIC AGENT AROUND LUMBAR SPINAL NERVE ROOT BY TRANSFORAMINAL APPROACH Bilateral 11/23/2020    Procedure: Bilateral L5/S1 TF DENY;  Surgeon: Jewel Walters MD;  Location: [facility] PAIN MGT;  Service: Pain Management;  Laterality: Bilateral;    TUBAL LIGATION      VATS, ROBOT-ASSISTED, OR ROBOT-ASSISTED THORACOTOMY Right 1/19/2024    Procedure: VATS, ROBOT-ASSISTED, OR ROBOT-ASSISTED THORACOTOMY;  Surgeon: Chente Cabrera MD;  Location: [facility] OR;  Service: Cardiothoracic;  Laterality: Right;  Robot assisted right lower lobectomy and mediastinal lymph node dissection       Patient's Medications   New Prescriptions    No medications on file   Previous Medications    ALBUTEROL (PROVENTIL) 2.5 MG /3 ML (0.083 %) NEBULIZER SOLUTION    TAKE 3 MLS (2.5 MG TOTAL) BY NEBULIZATION EVERY 4 TO 6 HOURS AS NEEDED FOR  "WHEEZING OR SHORTNESS OF BREATH.    ALBUTEROL (PROVENTIL/VENTOLIN HFA) 90 MCG/ACTUATION INHALER    Inhale 2 puffs into the lungs every 6 (six) hours as needed for Wheezing. Rescue    ALPRAZOLAM (XANAX) 0.25 MG TABLET    Take 0.25 mg by mouth 2 (two) times daily as needed for Anxiety.    ASCORBIC ACID (VITAMIN C) 100 MG TABLET    Take 100 mg by mouth once daily.    ASPIRIN (ECOTRIN) 81 MG EC TABLET    Take 81 mg by mouth every evening.    ATENOLOL (TENORMIN) 25 MG TABLET    Take 1 tablet (25 mg total) by mouth every evening.    ATORVASTATIN (LIPITOR) 80 MG TABLET    Take 1 tablet (80 mg total) by mouth once daily.    AZELASTINE (ASTELIN) 137 MCG (0.1 %) NASAL SPRAY    2 sprays by Nasal route daily as needed for Rhinitis.    BD ULTRA-FINE AMERICA PEN NEEDLE 32 GAUGE X 5/32" NDLE    Inject 1 each into the skin 3 (three) times daily.    BUSPIRONE (BUSPAR) 30 MG TAB    Take 15 mg by mouth 2 (two) times daily.    BUTALBITAL-ACETAMINOPHEN-CAFFEINE -40 MG (FIORICET, ESGIC) -40 MG PER TABLET    Take 1 tablet by mouth every 4 (four) hours as needed for Pain.    CLOPIDOGREL (PLAVIX) 75 MG TABLET    Take 1 tablet (75 mg total) by mouth once daily.    DAPAGLIFLOZIN PROPANEDIOL (FARXIGA) 10 MG TABLET    Take 1 tablet (10 mg total) by mouth once daily.    DICYCLOMINE (BENTYL) 20 MG TABLET    TAKE 1 TABLET BY MOUTH 3 TIMES DAILY BEFORE MEALS AS NEEDED FOR ABDOMINAL PAIN    EVOLOCUMAB (REPATHA SURECLICK) 140 MG/ML PNIJ    Inject 1 mL (140 mg total) into the skin every 14 (fourteen) days.    FAMOTIDINE (PEPCID) 20 MG TABLET    TAKE 1 TABLET (20 MG TOTAL) BY MOUTH 2 (TWO) TIMES DAILY.    FAMOTIDINE (PEPCID) 20 MG TABLET    Take 1 tablet (20 mg total) by mouth 2 (two) times daily.    FINASTERIDE (PROPECIA) 1 MG TABLET    Take 1 mg by mouth once daily.    FLUOXETINE (PROZAC) 40 MG CAPSULE    TAKE 1 CAPSULE BY MOUTH ONCE DAILY    FLUTICASONE PROPIONATE (FLONASE) 50 MCG/ACTUATION NASAL SPRAY    2 sprays by Each Nostril route " "daily as needed.    GABAPENTIN (NEURONTIN) 300 MG CAPSULE    TAKE 3 CAPSULES (900 MG TOTAL) BY MOUTH 3 (THREE) TIMES DAILY.    GABAPENTIN (NEURONTIN) 300 MG CAPSULE    Take 1 capsule (300 mg total) by mouth every evening.    GLIPIZIDE (GLUCOTROL) 10 MG TABLET    Take 1 tablet (10 mg total) by mouth 2 (two) times daily.    ISOSORBIDE MONONITRATE (IMDUR) 30 MG 24 HR TABLET    TAKE 1 TABLET (30 MG TOTAL) BY MOUTH ONCE DAILY.    LANTUS SOLOSTAR U-100 INSULIN GLARGINE 100 UNITS/ML SUBQ PEN    Inject 40 Units into the skin 2 (two) times a day.    LINACLOTIDE (LINZESS) 145 MCG CAP CAPSULE    Take 1 capsule (145 mcg total) by mouth before breakfast.    LISINOPRIL (PRINIVIL,ZESTRIL) 5 MG TABLET    Take 1 tablet (5 mg total) by mouth every evening.    METHOCARBAMOL (ROBAXIN) 500 MG TAB    Take 500 mg by mouth 3 (three) times daily as needed.    MOMETASONE (ELOCON) 0.1 % SOLUTION    Apply topically.    NOVOLOG FLEXPEN U-100 INSULIN 100 UNIT/ML (3 ML) INPN PEN    Inject into the skin 3 (three) times daily with meals.    OMEPRAZOLE (PRILOSEC) 40 MG CAPSULE    TAKE 1 CAPSULE (40 MG TOTAL) BY MOUTH 2 (TWO) TIMES DAILY BEFORE MEALS.    OXYCODONE-ACETAMINOPHEN (PERCOCET) 5-325 MG PER TABLET    Take 1 tablet by mouth every 4 (four) hours as needed for Pain.    PEN NEEDLE 31 GAUGE X 5/16" NDLE    USE 5 TIMES DAILY WITH LANTUS AND HUMALOG    PROMETHAZINE (PHENERGAN) 25 MG TABLET    Take 1 tablet (25 mg total) by mouth every 6 (six) hours as needed for Nausea.    SITAGLIPTIN PHOSPHATE (JANUVIA) 50 MG TAB    Take 50 mg by mouth once daily. Added to chart 1/18/24    TIOTROPIUM (SPIRIVA) 18 MCG INHALATION CAPSULE    Inhale 1 capsule (18 mcg total) into the lungs once daily. Controller    TIZANIDINE (ZANAFLEX) 4 MG TABLET    Take 1 tablet (4 mg total) by mouth 3 (three) times daily as needed (spasms).    TRAMADOL (ULTRAM) 50 MG TABLET    Take 50 mg by mouth every 6 (six) hours.    TRUE METRIX GLUCOSE TEST STRIP STRP        TRULICITY 4.5 " MG/0.5 ML PEN INJECTOR    Inject 0.5 mLs as directed every 7 days.    ZINC ORAL    Take 1 tablet by mouth once daily. PT UNSURE OF DOSE   Modified Medications    No medications on file   Discontinued Medications    No medications on file       Family History   Problem Relation Name Age of Onset    Heart disease Mother      Thrombophilia Father          DVT    Hypertension Father      Stroke Maternal Aunt      Heart disease Maternal Aunt      Stroke Maternal Uncle      Heart disease Maternal Uncle      Breast cancer Neg Hx      Colon cancer Neg Hx      Ovarian cancer Neg Hx         Review of patient's allergies indicates:   Allergen Reactions    Compazine [prochlorperazine edisylate] Rash    Contrast media Anaphylaxis    Dye Anaphylaxis     Contrast dye    Iodinated contrast media Anaphylaxis     Other reaction(s): anaphylaxis    Levaquin [levofloxacin] Hives and Itching    Prochlorperazine Rash    Metformin Other (See Comments)     Upset stomach    Ibuprofen Other (See Comments) and Nausea And Vomiting     Stomach pain  Stomach pain    Abdominal pain     Methocarbamol Nausea Only         Objective:        Imaging:    XR Results:  Results for orders placed during the hospital encounter of 05/22/24    X-Ray Shoulder 2 or More Views Left    Narrative  EXAM:  XR SHOULDER COMPLETE 2 OR MORE VIEWS LEFT    CLINICAL HISTORY:  Pain left shoulder.    4 views left shoulder.    FINDINGS: Minimal spurring of the glenoid and inferior humeral head.  Acromioclavicular joint space is normal.  No fracture or dislocation.  Visualized left lung is clear.    Impression  Minimal arthritic change of the left glenohumeral joint space.  No acute findings.    Finalized on: 5/22/2024 2:41 PM By:  William Ohara MD  [facility]# [id number]      2024-05-22 14:43:52.021    [facility]      MRI Results:  Results for orders placed during the hospital encounter of 10/04/24    MRI Shoulder Without Contrast Left    Narrative  EXAM: MRI SHOULDER WITHOUT CONTRAST  LEFT    CLINICAL HISTORY: Left shoulder pain    TECHNIQUE: Multiplanar multisequence imaging of left shoulder is performed without intravenous or intra-articular contrast.    FINDINGS:  The exam is limited due to patient motion artifact.    There is a full-thickness tear of the entirety of the supraspinatus tendon with the torn tendon retracted to the level of the glenohumeral joint line.  There is no supraspinatus muscle atrophy.  The infraspinatus tendon, teres minor tendon, and subscapularis tendon are intact.  The long head of the biceps tendon is located in its groove.  The biceps labral anchor appears intact.  No definite labral tears are seen.  Glenohumeral ligaments appear intact.    Large glenohumeral joint effusion which spills freely through the rotator cuff defect into the subacromial subdeltoid bursa.  No fracture or bone marrow edema or avascular necrosis.  Normal horizontal orientation of a type II acromion and no os acromiale.    Impression  1.  Full-thickness tear of the entirety of the supraspinatus tendon.  2.  Large glenohumeral joint effusion which spills freely through the rotator cuff defect into the subacromial subdeltoid bursa.    Finalized on: 10/4/2024 2:07 PM By:  Sean Rosario MD  [facility]# [id number]      2024-10-04 14:09:21.373    [facility]      CT Results:  No results found for this or any previous visit.        Physician read: I agree with the above impression.    Assessment/Plan:   Jenifer Westfall is a 49 y.o. female with left shoulder rotator cuff tear, subacromial impingement, biceps tendinosis     Plan:    She has a full-thickness left shoulder rotator cuff tear.  No significant atrophy of the cuff musculature.  She has tried rest, activity modification, oral medicines, corticosteroid injection, and physical therapy.  Despite these interventions she continues to be symptomatic.  This limit her activities and diminished her quality of life.  She would like to proceed with rotator cuff repair.   Discussed with her my concerns for repairing her rotator cuff.  She has multiple medical comorbidities including known collagen disorder with Marfan syndrome which put her at increased risk of failure of repair.  Additionally, she has difficulty ambulating long distances, using a wheelchair as well as a cane.  We went over that she would be in the sling for 6 weeks.  During that time I would not want her using her arm to push out of her chair or to weightbear through it using a cane or walker.  In fact, she really can not use her arm for weight-bearing for at least 3-4 months.  She voiced understanding would still like to proceed with repair.  I recommend left shoulder arthroscopy with rotator cuff repair, possible use of allograft augmentation, subacromial decompression, and possible biceps tenodesis.  We discussed the risks, benefits, limitations, alternatives of surgery.  We went over the postoperative rehab and recovery protocol in detail.  Despite the risks, she elected proceed for with the surgery.  The consent was freely signed.  At least 10 minutes were spent instructing the patient in home care following surgery including, but not limited to: sling use, sleeping, hygiene, post-operative exercises, preventing post-operative complications, etc.  All questions were answered.  This service was performed under the direction of All Jain MD.  CPT 61500-LD.  Follow up with me 10-14 days after surgery          All Jain MD

## 2025-01-21 NOTE — H&P (VIEW-ONLY)
Virtual Orthopaedic Follow-Up Visit    Last Appointment: 10/23/24  Diagnosis: right shoulder rotator cuff tear and left shoulder rotator cuff tear  Prior Procedure: Left SAS CSI    Jenifer Westfall is a 49 y.o. female who is here for f/u evaluation of her left shoulder. The patient was last seen here by me for her left shoulder on 10/23/2 at which point we proceeded with subacromial space CSI for the left shoulder. The patient returns today to discuss moving forward with operative treatment at this time.  She reports that left shoulder continues to be painful.  She is limited in terms of range of motion, strength, and activities of daily living.  It diminishes her quality of life.    To review her history, Jenifer Westfall is a 49 y.o. right-hand dominant female who presented on 12/13/22 with RIGHT shoulder and neck pain and dysfunction that began about 3 years ago. She had gradual onset of symptoms for her right shoulder and neck. Her symptoms included intermittent sharp, stabbing pain of the right shoulder which radiates to right neck. Her pain was aggravated by movement. She had tried rest, activity modification, Zanaflex, Gabapentin, and physical therapy. She had right shoulder pain and known rotator cuff tear on the right side.  She also had known peripheral neuropathy that severely affected her left foot and seemed to also be affecting her left hand. Because of her neuropathy, she used a cane in her right hand for ambulation.  We discussed that I thought she would have a very difficult time with any kind of operative treatment because of her left lower extremity neuropathy and need to use a cane in her right hand. We discussed that I felt it would be reasonable to proceed with injection in the right shoulder for symptomatic treatment of pain as result of her cuff tear, however we were unable to perform injection due to A1c 12.0 with reviewing of her last lab from (3/28/2022) and possible upcoming spine surgery.  Discussed if she has preoperative testing for her spine surgery and her A1c decreases that we can consider a right shoulder CSI in the future. She returned on 9/27/23 at which point we performed injection in the right shoulder for symptomatic treatment of pain as result of her cuff tear. However, think she would have a very difficult time with any kind of operative treatment because of her left lower extremity neuropathy and need to use a cane in her right hand. She returned on 5/22/24 noting left shoulder pain. She had left shoulder greater tuberosity  degenerative changes and exam suspicious for rotator cuff tear. We elected to proceed with left SAS CSI at that time.  She returned on 8/21/24 at which point she has right shoulder pain and known rotator cuff tear on the right side.  Her A1C was stabilized to a level that we could pursue operative treatment. Since her prior MRI was from 2019, I recommended moving forward with new MRI at that time for further evaluation of the current state of her rotator cuff tear. Discussed with her that over the past 5 years she could have developed atrophy that makes the tear irreparable. She returned on 9/18.24 at which point her MRI showed right shoulder rotator cuff tear and some evidence of mild glenohumeral arthritis.  That day her left shoulder was bothering her more. She had limited ROM and strength on physical exam. She had been treated with subacromial space CSI (5/22/24) for her left shoulder with some relief but her symptoms have ultimately returned and persisted. I recommended proceeding with MRI of the left shoulder at that time for further evaluation for suspected rotator cuff tear. At follow-up on 10/8/24 at which point we discussed plan to move forward with left shoulder rotator cuff repair. However, surgery is currently on hold due to supply chain issues and patient ultimately received left shoulder subacromial space CSI on 10/23/24 to manager her symptoms in the  interim.     Patient's medications, allergies, past medical, surgical, social and family histories were reviewed and updated as appropriate.    Review of Systems   All systems reviewed were negative.  Specifically, the patient denies fever, chills, weight loss, chest pain, shortness of breath, or dyspnea on exertion.      Past Medical History:   Diagnosis Date    Arthritis     DM (diabetes mellitus)      2017 Insulin x 3 years     Hepatitis C antibody positive in blood 2021    RNA NEGATIVE 3/6/2017, 3/22/22    Hyperlipidemia     Hypertension     IBS (irritable bowel syndrome)     Kidney stone     Marfan syndrome     Mitral valve prolapse     Sleep apnea     moild: no cpap needed       Past Surgical History:   Procedure Laterality Date    ANGIOGRAPHY OF LOWER EXTREMITY N/A 2018    Procedure: ANGIOGRAM, LOWER EXTREMITY- LEFT LEG;  Surgeon: Roscoe Landeros MD;  Location: [facility] CATH LAB;  Service: Peripheral Vascular;  Laterality: N/A;    APPENDECTOMY      BUNIONECTOMY      both feet    BYPASS GRAFT Bilateral     cataract surgery  2019     SECTION      x 2    CHOLECYSTECTOMY      COLONOSCOPY N/A 2020    Procedure: COLONOSCOPY w/ biopsies;  Surgeon: Gio Georges MD;  Location: [facility];  Service: Endoscopy;  Laterality: N/A;    ESOPHAGEAL MANOMETRY WITH MEASUREMENT OF IMPEDANCE N/A 2023    Procedure: MANOMETRY, ESOPHAGUS, WITH IMPEDANCE MEASUREMENT;  Surgeon: Desmond Calderon RN;  Location: [facility];  Service: Endoscopy;  Laterality: N/A;    ESOPHAGOGASTRODUODENOSCOPY N/A 2019    Procedure: ESOPHAGOGASTRODUODENOSCOPY (EGD);  Surgeon: Jaron Sanders III, MD;  Location: [facility];  Service: Endoscopy;  Laterality: N/A;    ESOPHAGOGASTRODUODENOSCOPY N/A 2022    Procedure: EGD (ESOPHAGOGASTRODUODENOSCOPY);  Surgeon: Keith Hardwick MD;  Location: [facility];  Service: Gastroenterology;  Laterality: N/A;    ESOPHAGOGASTRODUODENOSCOPY N/A 2024     Procedure: EGD (ESOPHAGOGASTRODUODENOSCOPY) 10/21-cc rec'd clr may hold plavix 5 days;  Surgeon: Jadyn Santana MD;  Location: [facility] ENDO;  Service: Endoscopy;  Laterality: N/A;    INJECTION OF ANESTHETIC AGENT AROUND MULTIPLE INTERCOSTAL NERVES Right 1/19/2024    Procedure: BLOCK, NERVE, INTERCOSTAL, 2 OR MORE;  Surgeon: Chente Cabrera MD;  Location: [facility] OR;  Service: Cardiothoracic;  Laterality: Right;    LOBECTOMY-ROBOTIC Right 1/19/2024    Procedure: LOBECTOMY-ROBOTIC;  Surgeon: Chente Cabrera MD;  Location: [facility] OR;  Service: Cardiothoracic;  Laterality: Right;  Lower Lobe    PH MONITORING, ESOPHAGUS, WIRELESS, (OFF REFLUX MEDS) N/A 11/22/2024    Procedure: PH MONITORING, ESOPHAGUS, WIRELESS, (OFF REFLUX MEDS);  Surgeon: Jadyn Santana MD;  Location: [facility];  Service: Endoscopy;  Laterality: N/A;    ROBOT-ASSISTED LAPAROSCOPIC LYMPHADENECTOMY USING DA GOMEZ XI Right 1/19/2024    Procedure: XI ROBOTIC LYMPHADENECTOMY;  Surgeon: Chente Cabrera MD;  Location: [facility] OR;  Service: Cardiothoracic;  Laterality: Right;  Robotic mediastinal lymph node dissection    SELECTIVE INJECTION OF ANESTHETIC AGENT AROUND LUMBAR SPINAL NERVE ROOT BY TRANSFORAMINAL APPROACH Bilateral 11/23/2020    Procedure: Bilateral L5/S1 TF DENY;  Surgeon: Jewel Walters MD;  Location: [facility] PAIN MGT;  Service: Pain Management;  Laterality: Bilateral;    TUBAL LIGATION      VATS, ROBOT-ASSISTED, OR ROBOT-ASSISTED THORACOTOMY Right 1/19/2024    Procedure: VATS, ROBOT-ASSISTED, OR ROBOT-ASSISTED THORACOTOMY;  Surgeon: Chente Cabrera MD;  Location: [facility] OR;  Service: Cardiothoracic;  Laterality: Right;  Robot assisted right lower lobectomy and mediastinal lymph node dissection       Patient's Medications   New Prescriptions    No medications on file   Previous Medications    ALBUTEROL (PROVENTIL) 2.5 MG /3 ML (0.083 %) NEBULIZER SOLUTION    TAKE 3 MLS (2.5 MG TOTAL) BY NEBULIZATION EVERY 4 TO 6 HOURS AS NEEDED FOR  "WHEEZING OR SHORTNESS OF BREATH.    ALBUTEROL (PROVENTIL/VENTOLIN HFA) 90 MCG/ACTUATION INHALER    Inhale 2 puffs into the lungs every 6 (six) hours as needed for Wheezing. Rescue    ALPRAZOLAM (XANAX) 0.25 MG TABLET    Take 0.25 mg by mouth 2 (two) times daily as needed for Anxiety.    ASCORBIC ACID (VITAMIN C) 100 MG TABLET    Take 100 mg by mouth once daily.    ASPIRIN (ECOTRIN) 81 MG EC TABLET    Take 81 mg by mouth every evening.    ATENOLOL (TENORMIN) 25 MG TABLET    Take 1 tablet (25 mg total) by mouth every evening.    ATORVASTATIN (LIPITOR) 80 MG TABLET    Take 1 tablet (80 mg total) by mouth once daily.    AZELASTINE (ASTELIN) 137 MCG (0.1 %) NASAL SPRAY    2 sprays by Nasal route daily as needed for Rhinitis.    BD ULTRA-FINE [place] PEN NEEDLE 32 GAUGE X 5/32" NDLE    Inject 1 each into the skin 3 (three) times daily.    BUSPIRONE (BUSPAR) 30 MG TAB    Take 15 mg by mouth 2 (two) times daily.    BUTALBITAL-ACETAMINOPHEN-CAFFEINE -40 MG (FIORICET, ESGIC) -40 MG PER TABLET    Take 1 tablet by mouth every 4 (four) hours as needed for Pain.    CLOPIDOGREL (PLAVIX) 75 MG TABLET    Take 1 tablet (75 mg total) by mouth once daily.    DAPAGLIFLOZIN PROPANEDIOL (FARXIGA) 10 MG TABLET    Take 1 tablet (10 mg total) by mouth once daily.    DICYCLOMINE (BENTYL) 20 MG TABLET    TAKE 1 TABLET BY MOUTH 3 TIMES DAILY BEFORE MEALS AS NEEDED FOR ABDOMINAL PAIN    EVOLOCUMAB (REPATHA SURECLICK) 140 MG/ML PNIJ    Inject 1 mL (140 mg total) into the skin every 14 (fourteen) days.    FAMOTIDINE (PEPCID) 20 MG TABLET    TAKE 1 TABLET (20 MG TOTAL) BY MOUTH 2 (TWO) TIMES DAILY.    FAMOTIDINE (PEPCID) 20 MG TABLET    Take 1 tablet (20 mg total) by mouth 2 (two) times daily.    FINASTERIDE (PROPECIA) 1 MG TABLET    Take 1 mg by mouth once daily.    FLUOXETINE (PROZAC) 40 MG CAPSULE    TAKE 1 CAPSULE BY MOUTH ONCE DAILY    FLUTICASONE PROPIONATE (FLONASE) 50 MCG/ACTUATION NASAL SPRAY    2 sprays by Each Nostril route " "daily as needed.    GABAPENTIN (NEURONTIN) 300 MG CAPSULE    TAKE 3 CAPSULES (900 MG TOTAL) BY MOUTH 3 (THREE) TIMES DAILY.    GABAPENTIN (NEURONTIN) 300 MG CAPSULE    Take 1 capsule (300 mg total) by mouth every evening.    GLIPIZIDE (GLUCOTROL) 10 MG TABLET    Take 1 tablet (10 mg total) by mouth 2 (two) times daily.    ISOSORBIDE MONONITRATE (IMDUR) 30 MG 24 HR TABLET    TAKE 1 TABLET (30 MG TOTAL) BY MOUTH ONCE DAILY.    LANTUS SOLOSTAR U-100 INSULIN GLARGINE 100 UNITS/ML SUBQ PEN    Inject 40 Units into the skin 2 (two) times a day.    LINACLOTIDE (LINZESS) 145 MCG CAP CAPSULE    Take 1 capsule (145 mcg total) by mouth before breakfast.    LISINOPRIL (PRINIVIL,ZESTRIL) 5 MG TABLET    Take 1 tablet (5 mg total) by mouth every evening.    METHOCARBAMOL (ROBAXIN) 500 MG TAB    Take 500 mg by mouth 3 (three) times daily as needed.    MOMETASONE (ELOCON) 0.1 % SOLUTION    Apply topically.    NOVOLOG FLEXPEN U-100 INSULIN 100 UNIT/ML (3 ML) INPN PEN    Inject into the skin 3 (three) times daily with meals.    OMEPRAZOLE (PRILOSEC) 40 MG CAPSULE    TAKE 1 CAPSULE (40 MG TOTAL) BY MOUTH 2 (TWO) TIMES DAILY BEFORE MEALS.    OXYCODONE-ACETAMINOPHEN (PERCOCET) 5-325 MG PER TABLET    Take 1 tablet by mouth every 4 (four) hours as needed for Pain.    PEN NEEDLE 31 GAUGE X 5/16" NDLE    USE 5 TIMES DAILY WITH LANTUS AND HUMALOG    PROMETHAZINE (PHENERGAN) 25 MG TABLET    Take 1 tablet (25 mg total) by mouth every 6 (six) hours as needed for Nausea.    SITAGLIPTIN PHOSPHATE (JANUVIA) 50 MG TAB    Take 50 mg by mouth once daily. Added to chart 1/18/24    TIOTROPIUM (SPIRIVA) 18 MCG INHALATION CAPSULE    Inhale 1 capsule (18 mcg total) into the lungs once daily. Controller    TIZANIDINE (ZANAFLEX) 4 MG TABLET    Take 1 tablet (4 mg total) by mouth 3 (three) times daily as needed (spasms).    TRAMADOL (ULTRAM) 50 MG TABLET    Take 50 mg by mouth every 6 (six) hours.    TRUE METRIX GLUCOSE TEST STRIP STRP        TRULICITY 4.5 " MG/0.5 ML PEN INJECTOR    Inject 0.5 mLs as directed every 7 days.    ZINC ORAL    Take 1 tablet by mouth once daily. PT UNSURE OF DOSE   Modified Medications    No medications on file   Discontinued Medications    No medications on file       Family History   Problem Relation Name Age of Onset    Heart disease Mother      Thrombophilia Father          DVT    Hypertension Father      Stroke Maternal Aunt      Heart disease Maternal Aunt      Stroke Maternal Uncle      Heart disease Maternal Uncle      Breast cancer Neg Hx      Colon cancer Neg Hx      Ovarian cancer Neg Hx         Review of patient's allergies indicates:   Allergen Reactions    Compazine [prochlorperazine edisylate] Rash    Contrast media Anaphylaxis    Dye Anaphylaxis     Contrast dye    Iodinated contrast media Anaphylaxis     Other reaction(s): anaphylaxis    Levaquin [levofloxacin] Hives and Itching    Prochlorperazine Rash    Metformin Other (See Comments)     Upset stomach    Ibuprofen Other (See Comments) and Nausea And Vomiting     Stomach pain  Stomach pain    Abdominal pain     Methocarbamol Nausea Only         Objective:        Imaging:    XR Results:  Results for orders placed during the hospital encounter of 05/22/24    X-Ray Shoulder 2 or More Views Left    Narrative  EXAM:  XR SHOULDER COMPLETE 2 OR MORE VIEWS LEFT    CLINICAL HISTORY:  Pain left shoulder.    4 views left shoulder.    FINDINGS: Minimal spurring of the glenoid and inferior humeral head.  Acromioclavicular joint space is normal.  No fracture or dislocation.  Visualized left lung is clear.    Impression  Minimal arthritic change of the left glenohumeral joint space.  No acute findings.    Finalized on: 5/22/2024 2:41 PM By:  William Ohara MD  [facility]# [id number]      2024-05-22 14:43:52.021    BR[facility]GE[facility]      MRI Results:  Results for orders placed during the hospital encounter of 10/04/24    MRI Shoulder Without Contrast Left    Narrative  EXAM: MRI SHOULDER WITHOUT CONTRAST  LEFT    CLINICAL HISTORY: Left shoulder pain    TECHNIQUE: Multiplanar multisequence imaging of left shoulder is performed without intravenous or intra-articular contrast.    FINDINGS:  The exam is limited due to patient motion artifact.    There is a full-thickness tear of the entirety of the supraspinatus tendon with the torn tendon retracted to the level of the glenohumeral joint line.  There is no supraspinatus muscle atrophy.  The infraspinatus tendon, teres minor tendon, and subscapularis tendon are intact.  The long head of the biceps tendon is located in its groove.  The biceps labral anchor appears intact.  No definite labral tears are seen.  Glenohumeral ligaments appear intact.    Large glenohumeral joint effusion which spills freely through the rotator cuff defect into the subacromial subdeltoid bursa.  No fracture or bone marrow edema or avascular necrosis.  Normal horizontal orientation of a type II acromion and no os acromiale.    Impression  1.  Full-thickness tear of the entirety of the supraspinatus tendon.  2.  Large glenohumeral joint effusion which spills freely through the rotator cuff defect into the subacromial subdeltoid bursa.    Finalized on: 10/4/2024 2:07 PM By:  Sean Rosario MD  [facility]# [id number]      2024-10-04 14:09:21.373    [facility]      CT Results:  No results found for this or any previous visit.        Physician read: I agree with the above impression.    Assessment/Plan:   Jenifer Westfall is a 49 y.o. female with left shoulder rotator cuff tear, subacromial impingement, biceps tendinosis     Plan:    She has a full-thickness left shoulder rotator cuff tear.  No significant atrophy of the cuff musculature.  She has tried rest, activity modification, oral medicines, corticosteroid injection, and physical therapy.  Despite these interventions she continues to be symptomatic.  This limit her activities and diminished her quality of life.  She would like to proceed with rotator cuff repair.   Discussed with her my concerns for repairing her rotator cuff.  She has multiple medical comorbidities including known collagen disorder with Marfan syndrome which put her at increased risk of failure of repair.  Additionally, she has difficulty ambulating long distances, using a wheelchair as well as a cane.  We went over that she would be in the sling for 6 weeks.  During that time I would not want her using her arm to push out of her chair or to weightbear through it using a cane or walker.  In fact, she really can not use her arm for weight-bearing for at least 3-4 months.  She voiced understanding would still like to proceed with repair.  I recommend left shoulder arthroscopy with rotator cuff repair, possible use of allograft augmentation, subacromial decompression, and possible biceps tenodesis.  We discussed the risks, benefits, limitations, alternatives of surgery.  We went over the postoperative rehab and recovery protocol in detail.  Despite the risks, she elected proceed for with the surgery.  The consent was freely signed.  At least 10 minutes were spent instructing the patient in home care following surgery including, but not limited to: sling use, sleeping, hygiene, post-operative exercises, preventing post-operative complications, etc.  All questions were answered.  This service was performed under the direction of All Jain MD.  CPT [id number]-[id number].  Follow up with me 10-14 days after surgery          All Jain MD

## 2025-01-22 ENCOUNTER — OFFICE VISIT (OUTPATIENT)
Dept: SPORTS MEDICINE | Facility: CLINIC | Age: 50
End: 2025-01-22
Payer: MEDICAID

## 2025-01-22 DIAGNOSIS — Z01.818 PREOPERATIVE CLEARANCE: ICD-10-CM

## 2025-01-22 DIAGNOSIS — M75.122 NONTRAUMATIC COMPLETE TEAR OF LEFT ROTATOR CUFF: Primary | ICD-10-CM

## 2025-01-22 DIAGNOSIS — M75.42 SUBACROMIAL IMPINGEMENT OF LEFT SHOULDER: ICD-10-CM

## 2025-01-24 ENCOUNTER — PATIENT MESSAGE (OUTPATIENT)
Dept: SPORTS MEDICINE | Facility: CLINIC | Age: 50
End: 2025-01-24
Payer: MEDICAID

## 2025-01-24 ENCOUNTER — TELEPHONE (OUTPATIENT)
Dept: CARDIOLOGY | Facility: CLINIC | Age: 50
End: 2025-01-24
Payer: MEDICAID

## 2025-01-24 RX ORDER — TIZANIDINE 4 MG/1
4 TABLET ORAL 3 TIMES DAILY PRN
Qty: 90 TABLET | Refills: 1 | Status: SHIPPED | OUTPATIENT
Start: 2025-01-24

## 2025-01-27 ENCOUNTER — TELEPHONE (OUTPATIENT)
Dept: CARDIOLOGY | Facility: CLINIC | Age: 50
End: 2025-01-27
Payer: MEDICAID

## 2025-01-27 NOTE — TELEPHONE ENCOUNTER
----- Message from Ronnie sent at 1/27/2025 12:35 PM CST -----  Contact: Jenifer Burgess is needing a call back in regards to scheduling an appt. Please give her a call back at [phone]

## 2025-01-27 NOTE — TELEPHONE ENCOUNTER
Contacted PT and informed her that information was sent to  to get scheduled and she would receive a confirmation phone call. PT stated verbal understanding

## 2025-01-29 ENCOUNTER — OFFICE VISIT (OUTPATIENT)
Dept: INTERNAL MEDICINE | Facility: CLINIC | Age: 50
End: 2025-01-29
Payer: MEDICAID

## 2025-01-29 ENCOUNTER — HOSPITAL ENCOUNTER (OUTPATIENT)
Dept: CARDIOLOGY | Facility: HOSPITAL | Age: 50
Discharge: HOME | End: 2025-01-29
Attending: STUDENT IN AN ORGANIZED HEALTH CARE EDUCATION/TRAINING PROGRAM
Payer: MEDICAID

## 2025-01-29 VITALS
SYSTOLIC BLOOD PRESSURE: 121 MMHG | OXYGEN SATURATION: 95 % | RESPIRATION RATE: 16 BRPM | DIASTOLIC BLOOD PRESSURE: 56 MMHG | HEART RATE: 73 BPM | TEMPERATURE: 98 F

## 2025-01-29 DIAGNOSIS — K21.9 GASTROESOPHAGEAL REFLUX DISEASE, UNSPECIFIED WHETHER ESOPHAGITIS PRESENT: ICD-10-CM

## 2025-01-29 DIAGNOSIS — M75.102 TEAR OF LEFT ROTATOR CUFF, UNSPECIFIED TEAR EXTENT, UNSPECIFIED WHETHER TRAUMATIC: ICD-10-CM

## 2025-01-29 DIAGNOSIS — Z01.818 PREOPERATIVE CLEARANCE: ICD-10-CM

## 2025-01-29 DIAGNOSIS — Q87.40 MARFAN'S SYNDROME: ICD-10-CM

## 2025-01-29 DIAGNOSIS — E11.51 TYPE 2 DIABETES MELLITUS WITH DIABETIC PERIPHERAL ANGIOPATHY WITHOUT GANGRENE, WITH LONG-TERM CURRENT USE OF INSULIN: ICD-10-CM

## 2025-01-29 DIAGNOSIS — I10 PRIMARY HYPERTENSION: ICD-10-CM

## 2025-01-29 DIAGNOSIS — Z01.818 PREOP EXAMINATION: Primary | ICD-10-CM

## 2025-01-29 DIAGNOSIS — J44.9 CHRONIC OBSTRUCTIVE PULMONARY DISEASE, UNSPECIFIED COPD TYPE: ICD-10-CM

## 2025-01-29 DIAGNOSIS — I73.9 PAD (PERIPHERAL ARTERY DISEASE): ICD-10-CM

## 2025-01-29 DIAGNOSIS — C34.91 MALIGNANT NEOPLASM OF RIGHT LUNG, UNSPECIFIED PART OF LUNG: ICD-10-CM

## 2025-01-29 DIAGNOSIS — Z79.4 TYPE 2 DIABETES MELLITUS WITH DIABETIC PERIPHERAL ANGIOPATHY WITHOUT GANGRENE, WITH LONG-TERM CURRENT USE OF INSULIN: ICD-10-CM

## 2025-01-29 DIAGNOSIS — E78.2 MIXED HYPERLIPIDEMIA: ICD-10-CM

## 2025-01-29 DIAGNOSIS — F32.0 CURRENT MILD EPISODE OF MAJOR DEPRESSIVE DISORDER WITHOUT PRIOR EPISODE: ICD-10-CM

## 2025-01-29 LAB
OHS QRS DURATION: 96 MS
OHS QTC CALCULATION: 472 MS

## 2025-01-29 PROCEDURE — 93010 ELECTROCARDIOGRAM REPORT: CPT | Mod: ,,, | Performed by: INTERNAL MEDICINE

## 2025-01-29 PROCEDURE — 93005 ELECTROCARDIOGRAM TRACING: CPT

## 2025-01-29 PROCEDURE — 99499 UNLISTED E&M SERVICE: CPT | Mod: S$PBB,,, | Performed by: ANESTHESIOLOGY

## 2025-01-29 PROCEDURE — 99215 OFFICE O/P EST HI 40 MIN: CPT | Mod: PBBFAC,25

## 2025-01-29 PROCEDURE — 99999 PR PBB SHADOW E&M-EST. PATIENT-LVL V: CPT | Mod: PBBFAC,,,

## 2025-01-29 RX ORDER — PROMETHAZINE HYDROCHLORIDE 25 MG/1
25 TABLET ORAL EVERY 6 HOURS PRN
Qty: 30 TABLET | Refills: 0 | Status: SHIPPED | OUTPATIENT
Start: 2025-01-29

## 2025-01-29 NOTE — ASSESSMENT & PLAN NOTE
- Currently on ASA and Plavix.  - Will need recommendations from Dr. Cruz on holding these medications for upcoming surgery.

## 2025-01-29 NOTE — DISCHARGE INSTRUCTIONS
To confirm, your doctor has instructed you: Surgery is scheduled for 2/13/25.   Pre admit office will call the afternoon prior to surgery between 1PM and 3PM with arrival time.    Surgery will be at [facility],  The address is [address]. [place] [address].      IMPORTANT INSTRUCTIONS!    Do not eat or drink after 12 midnight, including water. Do not smoke or use chewing tobacco after 12 midnight!  OK to brush teeth, but no gum, candy, or mints!      *Take only these medicines with a small swallow of water-morning of surgery*     Inhalers: Albuterol, Spiriva,   Imdur, Buspar       ____ Stop taking all vitamins, herbal supplements, Aspirin, & NSAIDS (Ibuprofen, Advil, Aleve) 7 days prior to surgery, as these can thin your blood.    Plavix hold will be instructed by cardiologist.       *Diabetic Patients: If you take diabetic or weight loss medication, do NOT take morning of surgery unless instructed by Doctor.    Hold Glipizide and Farxiga for 24 hrs prior to procedure       DO NOT take Novolog or Lantus the day of surgery.     Only take HALF of your regular dose of Lantus and Novolog the night before surgery, unless instructed otherwise. Blood sugars will be checked in pre-op.     ~Trulicity injections must be stopped 7 days prior to surgery.     Please notify MD office if you develop an active infection, are prescribed antibiotics by someone other than the surgeon doing your surgery, or visit urgent care/ER.    Bathing Instructions:   The night before surgery and the morning prior to coming to the hospital:    - Shower & rinse your body as usual with anti-bacterial Soap (Dial or Lever 2000)   -Hibiclens (if indicated) use AFTER anti-bacterial soap; 1 packet PM/1 packet in AM on surgical site only   -Do not use hibiclens on your head, face, or genitals.    -Do not wash with anti-bacterial soap after you use the hibiclens.    -Do not shave surgical site 5-7 days prior to surgery.    -Pubic  hair 7 days prior to surgery (OBGYN/Urology only).       Additional Instructions:   __ No makeup, powder, lotions, creams, or body spray to skin   __ No deodorant if you are having: breast procedure, PORT insertion, or shoulder surgery!   __ No nail polish or artificial nails due to risk of infection.             **SURGERY MAY BE CANCELLED AT SURGEON'S DISCRETION IF ARTIFICIAL/NAIL POLISH IS PRESENT!!!**  __ Please remove all piercings and leave all jewelry at home.    **SURGERY WILL BE CANCELLED IF PIERCINGS ARE PRESENT!!!**    __ Dentures, Hearing Aids and Contact Lens need to be removed prior to the start of surgery.    __ Avoid Alcoholic beverages 3 days prior to surgery, as it can thin the blood, unless told otherwise by pre-admit department.  __ Females: may need to give a urine sample the morning of surgery;   **Please ask  for a specimen cup if you need to use the restroom prior to being called into pre-op.**  __ You must have transportation, and they MUST stay the entire time.   __  Bring photo ID and insurance information to hospital    What to Wear:  Clean, loose-fitting clothing. Please allow for dressings/bandages/surgical equipment/drains.       ~Shoulder Patients: We recommend a button up shirt. If unavailable, an oversized t-shirt (2 sizes bigger than your normal size) or a stretchy dress will also work.   [employer] Link for hospital type button sleeve t-shirt: [email or web address]      [facility] Visitor/Ride Policy:    Only 1 adult allowed (18 or older) to accompany you and MUST STAY through the entire admission length.      -Must have a ride home from a responsible adult that you know and trust.     -Medical Transport, Uber or Lyft can only be used if patient has a responsible adult to   accompany them during ride home.      ~Your ride MUST STAY the entire time until you are discharged~   Please notify the pre-admit department prior to surgery if you use medication transportation so we can  verify your arrival/pickup time!   -The patient is responsible for setting up their own transportation!    Discharge Prescriptions:  Your discharge prescriptions will be sent next door to [facility] pharmacy, unless otherwise discussed with your surgeon. Your  will be responsible for calling the pharmacy ([phone]) to begin the prescription filling process. They will receive a text message with instructions once you are in recovery. Please make sure we have your insurance information and you bring a payment method (cash or card) if needed for prescriptions. If you have  insurance, please let the pre-op nurse know, as the pharmacy does not take this insurance.     *If you are running late or have questions the morning of surgery, please call the Pre-OP Department @ [phone].       *Please Call [facility] Pre-Admit Department for surgery instruction questions:   [phone]     Payment Questions:                Billing Question Numbers:         [phone]                     [phone]

## 2025-01-29 NOTE — ASSESSMENT & PLAN NOTE
- Currently appears compensated with no complaints.  - Follows outpatient with Pulmonary.  - Continue Spiriva and Albuterol.

## 2025-01-29 NOTE — ASSESSMENT & PLAN NOTE
- Patient presents today at the request of Dr. Jain who plans on performing a Rotator cuff repair on 2/17.    Known risk factors for perioperative complications: Chronic pulmonary disease  Diabetes mellitus  HTN  Morbid Obesity  Marfan's syndrome  Right lower lobe adenocarcinoma  PAD    Difficulty with intubation is not anticipated.    Cardiac Risk Estimation: Based on the Revised Cardiac Risk index, patient is a Class 1 risk with a 6.0% risk of a major cardiac event in a low risk procedure.    1.) Preoperative workup as follows: chest x-ray, ECG, hemoglobin, hematocrit, electrolytes, creatinine, glucose, liver function studies.  2.) Change in medication regimen before surgery: discontinue NSAIDs 5 days before surgery, hold Glipizide and Farxiga 24 hours prior to surgery. Patient was instructed to only take 1/2 her usual dose of nighttime Lantus and Novolog, with NO Lantus or Novolog the morning of surgery. Patient was instructed to hold Trulicity for 10 days prior to surgery (states she has not been taking it as insurance would not fill it:. She will need clearance and instructions from cardiology on holding ASA and Plavix.  3.) Prophylaxis for cardiac events with perioperative beta-blockers: Continue Atenolol.  4.) Invasive hemodynamic monitoring perioperatively: not indicated.  5.) Deep vein thrombosis prophylaxis postoperatively: intermittent pneumatic compression boots and regimen to be chosen by surgical team.  6.) Surveillance for postoperative MI with ECG immediately postoperatively and on postoperati ve days 1 and 2 AND troponin levels 24 hours postoperatively and on day 4 or hospital discharge (whichever comes first): not indicated.  7.) Current medications which may produce withdrawal symptoms if withheld perioperatively: None.  8.) Other measures:  Will need recommendations from Cardiology on holding ASA and Plavix.  Given fragile state, with extensive medical history, would recommend patient's  surgery be done at [place], which patient actually prefers.  D/W Dr. Jain who is aware of request to move surgery.  Patient will await new surgery date TBD by Ortho.

## 2025-01-29 NOTE — PROGRESS NOTES
"                                               Preoperative History and Physical  [facility]                                                                   Chief Complaint: Preoperative evaluation     History of Present Illness:      Jenifer Westfall is a 49 y.o. female with and extensive PMHx who presents to the office today for a preoperative consultation at the request of Dr. Jain who plans on performing a Left rotator cuff repair on February 17.     Functional Status:      The patient is not able to climb a flight of stairs due to KAT. The patient is able to ambulate without difficulty with use of a cane. The patient's functional status is affected by the surgical problem. The patient's functional status is not affected by shortness of breath, chest pain, dyspnea on exertion and fatigue.      Patient is independent in ADLs, she can shower/dress self.  No longer able to drive.  She attempts to cook/clean "if my sciatica is not acting up". She is able to transfer from chair to bed independently.     Patient Anesthesia History:      History of Malignant Hyperthermia: no  History of Pseudocholinesterase Deficiency: no  History PONV: no  History of difficult intubation: no  History of delayed emergence: no  History of high fever after anesthesia: no    Family Anesthesia History:      History of Malignant Hyperthermia: no  History of Pseudocholinesterase Deficiency: no    Past Medical History:      Past Medical History:   Diagnosis Date    Arthritis     Cancer     Right lower lobe Adenocarcinoma    COPD (chronic obstructive pulmonary disease)     Diabetes mellitus, type 2     DM (diabetes mellitus) 2009     03/01/2017 Insulin x 3 years 2013    GERD (gastroesophageal reflux disease)     Hepatitis C antibody positive in blood 03/04/2021    RNA NEGATIVE 3/6/2017, 3/22/22    Hyperlipidemia     Hypertension     IBS (irritable bowel syndrome)     Kidney stone     Marfan syndrome     Mitral valve prolapse  "    Sleep apnea     moild: no cpap needed        Past Surgical History:      Past Surgical History:   Procedure Laterality Date    ANGIOGRAPHY OF LOWER EXTREMITY N/A 2018    Procedure: ANGIOGRAM, LOWER EXTREMITY- LEFT LEG;  Surgeon: Roscoe Landeros MD;  Location: [facility] CATH LAB;  Service: Peripheral Vascular;  Laterality: N/A;    APPENDECTOMY      BUNIONECTOMY      both feet    BYPASS GRAFT Bilateral     cataract surgery  2019     SECTION      x 2    CHOLECYSTECTOMY      COLONOSCOPY N/A 2020    Procedure: COLONOSCOPY w/ biopsies;  Surgeon: Gio Georges MD;  Location: [facility];  Service: Endoscopy;  Laterality: N/A;    ESOPHAGEAL MANOMETRY WITH MEASUREMENT OF IMPEDANCE N/A 2023    Procedure: MANOMETRY, ESOPHAGUS, WITH IMPEDANCE MEASUREMENT;  Surgeon: Desmond Calderon RN;  Location: [facility];  Service: Endoscopy;  Laterality: N/A;    ESOPHAGOGASTRODUODENOSCOPY N/A 2019    Procedure: ESOPHAGOGASTRODUODENOSCOPY (EGD);  Surgeon: Jaron Sanders III, MD;  Location: [facility];  Service: Endoscopy;  Laterality: N/A;    ESOPHAGOGASTRODUODENOSCOPY N/A 2022    Procedure: EGD (ESOPHAGOGASTRODUODENOSCOPY);  Surgeon: Keith Hardwick MD;  Location: [facility];  Service: Gastroenterology;  Laterality: N/A;    ESOPHAGOGASTRODUODENOSCOPY N/A 2024    Procedure: EGD (ESOPHAGOGASTRODUODENOSCOPY) 10/21-cc rec'd clr may hold plavix 5 days;  Surgeon: Jadyn Santana MD;  Location: [facility];  Service: Endoscopy;  Laterality: N/A;    INJECTION OF ANESTHETIC AGENT AROUND MULTIPLE INTERCOSTAL NERVES Right 2024    Procedure: BLOCK, NERVE, INTERCOSTAL, 2 OR MORE;  Surgeon: Chente Cabrera MD;  Location: [facility];  Service: Cardiothoracic;  Laterality: Right;    LOBECTOMY-ROBOTIC Right 2024    Procedure: LOBECTOMY-ROBOTIC;  Surgeon: Chente Cabrera MD;  Location: [facility] OR;  Service: Cardiothoracic;  Laterality: Right;  Lower Lobe    PH MONITORING, ESOPHAGUS, WIRELESS, (OFF  "REFLUX MEDS) N/A 2024    Procedure: PH MONITORING, ESOPHAGUS, WIRELESS, (OFF REFLUX MEDS);  Surgeon: Jadyn Santana MD;  Location: [facility] ENDO;  Service: Endoscopy;  Laterality: N/A;    ROBOT-ASSISTED LAPAROSCOPIC LYMPHADENECTOMY USING DA GOMEZ XI Right 2024    Procedure: XI ROBOTIC LYMPHADENECTOMY;  Surgeon: Chente Cabrera MD;  Location: [facility] OR;  Service: Cardiothoracic;  Laterality: Right;  Robotic mediastinal lymph node dissection    SELECTIVE INJECTION OF ANESTHETIC AGENT AROUND LUMBAR SPINAL NERVE ROOT BY TRANSFORAMINAL APPROACH Bilateral 2020    Procedure: Bilateral L5/S1 TF DENY;  Surgeon: Jewel Walters MD;  Location: [facility] PAIN MGT;  Service: Pain Management;  Laterality: Bilateral;    TUBAL LIGATION      VATS, ROBOT-ASSISTED, OR ROBOT-ASSISTED THORACOTOMY Right 2024    Procedure: VATS, ROBOT-ASSISTED, OR ROBOT-ASSISTED THORACOTOMY;  Surgeon: Chente Cabrera MD;  Location: [facility] OR;  Service: Cardiothoracic;  Laterality: Right;  Robot assisted right lower lobectomy and mediastinal lymph node dissection        Social History:      Social History     Socioeconomic History    Marital status: Single   Tobacco Use    Smoking status: Former     Current packs/day: 0.00     Average packs/day: 1 pack/day for 24.0 years (24.0 ttl pk-yrs)     Types: Cigarettes     Start date: 1996     Quit date: 2020     Years since quittin.0     Passive exposure: Past    Smokeless tobacco: Never    Tobacco comments:     "once in a blue moon"   Substance and Sexual Activity    Alcohol use: Never    Drug use: No    Sexual activity: Not Currently     Social Drivers of Health     Financial Resource Strain: Medium Risk (2025)    Overall Financial Resource Strain (CARDIA)     Difficulty of Paying Living Expenses: Somewhat hard   Food Insecurity: Food Insecurity Present (2025)    Hunger Vital Sign     Worried About Running Out of Food in the Last Year: Sometimes true     Ran Out of " Food in the Last Year: Sometimes true   Transportation Needs: No Transportation Needs (11/24/2023)    PRAPARE - Transportation     Lack of Transportation (Medical): No     Lack of Transportation (Non-Medical): No   Physical Activity: Unknown (1/26/2025)    Exercise Vital Sign     Days of Exercise per Week: 0 days   Stress: Stress Concern Present (1/26/2025)    [employer] [place] [employer] - Occupational Stress Questionnaire     Feeling of Stress : To some extent   Housing Stability: Low Risk  (11/24/2023)    Housing Stability Vital Sign     Unable to Pay for Housing in the Last Year: No     Number of Places Lived in the Last Year: 1     Unstable Housing in the Last Year: No        Family History:      Family History   Problem Relation Name Age of Onset    Heart disease Mother      Thrombophilia Father          DVT    Hypertension Father      No Known Problems Brother      Marfan syndrome Son      Stroke Maternal Aunt      Heart disease Maternal Aunt      Stroke Maternal Uncle      Heart disease Maternal Uncle      Lung cancer Maternal Grandmother      No Known Problems Maternal Grandfather      No Known Problems Paternal Grandmother      No Known Problems Paternal Grandfather      Breast cancer Neg Hx      Colon cancer Neg Hx      Ovarian cancer Neg Hx         Allergies:      Review of patient's allergies indicates:   Allergen Reactions    Compazine [prochlorperazine edisylate] Rash    Contrast media Anaphylaxis    Dye Anaphylaxis     Contrast dye    Iodinated contrast media Anaphylaxis     Other reaction(s): anaphylaxis    Levaquin [levofloxacin] Hives and Itching    Prochlorperazine Rash    Metformin Other (See Comments)     Upset stomach    Ibuprofen Other (See Comments) and Nausea And Vomiting     Stomach pain  Stomach pain    Abdominal pain     Methocarbamol Nausea Only       Medications:      Current Outpatient Medications   Medication Sig    albuterol (PROVENTIL) 2.5 mg /3 mL (0.083 %) nebulizer  solution TAKE 3 MLS (2.5 MG TOTAL) BY NEBULIZATION EVERY 4 TO 6 HOURS AS NEEDED FOR WHEEZING OR SHORTNESS OF BREATH.    albuterol (PROVENTIL/VENTOLIN HFA) 90 mcg/actuation inhaler Inhale 2 puffs into the lungs every 6 (six) hours as needed for Wheezing. Rescue    ascorbic acid (VITAMIN C) 100 MG tablet Take 100 mg by mouth once daily.    aspirin (ECOTRIN) 81 MG EC tablet Take 81 mg by mouth every evening.    atenoloL (TENORMIN) 25 MG tablet Take 1 tablet (25 mg total) by mouth every evening.    atorvastatin (LIPITOR) 80 MG tablet Take 1 tablet (80 mg total) by mouth once daily.    busPIRone (BUSPAR) 30 MG Tab Take 15 mg by mouth 2 (two) times daily.    butalbital-acetaminophen-caffeine -40 mg (FIORICET, ESGIC) -40 mg per tablet Take 1 tablet by mouth every 4 (four) hours as needed for Pain.    clopidogreL (PLAVIX) 75 mg tablet Take 1 tablet (75 mg total) by mouth once daily.    dapagliflozin propanediol (FARXIGA) 10 mg tablet Take 1 tablet (10 mg total) by mouth once daily.    dicyclomine (BENTYL) 20 mg tablet TAKE 1 TABLET BY MOUTH 3 TIMES DAILY BEFORE MEALS AS NEEDED FOR ABDOMINAL PAIN    evolocumab (REPATHA SURECLICK) 140 mg/mL PnIj Inject 1 mL (140 mg total) into the skin every 14 (fourteen) days.    famotidine (PEPCID) 20 MG tablet TAKE 1 TABLET (20 MG TOTAL) BY MOUTH 2 (TWO) TIMES DAILY.    finasteride (PROPECIA) 1 mg tablet Take 1 mg by mouth once daily.    FLUoxetine (PROZAC) 40 MG capsule TAKE 1 CAPSULE BY MOUTH ONCE DAILY    fluticasone propionate (FLONASE) 50 mcg/actuation nasal spray 2 sprays by Each Nostril route daily as needed.    gabapentin (NEURONTIN) 300 MG capsule TAKE 3 CAPSULES (900 MG TOTAL) BY MOUTH 3 (THREE) TIMES DAILY.    gabapentin (NEURONTIN) 300 MG capsule Take 1 capsule (300 mg total) by mouth every evening.    glipiZIDE (GLUCOTROL) 10 MG tablet Take 1 tablet (10 mg total) by mouth 2 (two) times daily.    isosorbide mononitrate (IMDUR) 30 MG 24 hr tablet TAKE 1 TABLET (30 MG  "TOTAL) BY MOUTH ONCE DAILY.    LANTUS SOLOSTAR U-100 INSULIN glargine 100 units/mL SubQ pen Inject 40 Units into the skin 2 (two) times a day.    linaCLOtide (LINZESS) 145 mcg Cap capsule Take 1 capsule (145 mcg total) by mouth before breakfast.    lisinopriL (PRINIVIL,ZESTRIL) 5 MG tablet Take 1 tablet (5 mg total) by mouth every evening.    NOVOLOG FLEXPEN U-100 INSULIN 100 unit/mL (3 mL) InPn pen Inject into the skin 3 (three) times daily with meals.    tiotropium (SPIRIVA) 18 mcg inhalation capsule Inhale 1 capsule (18 mcg total) into the lungs once daily. Controller    tiZANidine (ZANAFLEX) 4 MG tablet TAKE 1 TABLET (4 MG TOTAL) BY MOUTH 3 (THREE) TIMES DAILY AS NEEDED (SPASMS).    TRULICITY 4.5 mg/0.5 mL pen injector Inject 0.5 mLs as directed every 7 days.    ALPRAZolam (XANAX) 0.25 MG tablet Take 0.25 mg by mouth 2 (two) times daily as needed for Anxiety.    azelastine (ASTELIN) 137 mcg (0.1 %) nasal spray 2 sprays by Nasal route daily as needed for Rhinitis.    BD ULTRA-FINE [place] PEN NEEDLE 32 gauge x 5/32" Ndle Inject 1 each into the skin 3 (three) times daily.    famotidine (PEPCID) 20 MG tablet Take 1 tablet (20 mg total) by mouth 2 (two) times daily.    methocarbamoL (ROBAXIN) 500 MG Tab Take 500 mg by mouth 3 (three) times daily as needed.    mometasone (ELOCON) 0.1 % solution Apply topically.    omeprazole (PRILOSEC) 40 MG capsule TAKE 1 CAPSULE (40 MG TOTAL) BY MOUTH 2 (TWO) TIMES DAILY BEFORE MEALS.    oxyCODONE-acetaminophen (PERCOCET) 5-325 mg per tablet Take 1 tablet by mouth every 4 (four) hours as needed for Pain.    PEN NEEDLE 31 gauge x 5/16" Ndle USE 5 TIMES DAILY WITH LANTUS AND HUMALOG    promethazine (PHENERGAN) 25 MG tablet Take 1 tablet (25 mg total) by mouth every 6 (six) hours as needed for Nausea.    SITagliptin phosphate (JANUVIA) 50 MG Tab Take 50 mg by mouth once daily. Added to chart 1/18/24    traMADoL (ULTRAM) 50 mg tablet Take 50 mg by mouth every 6 (six) hours.    TRUE METRIX " "GLUCOSE TEST STRIP Strp     ZINC ORAL Take 1 tablet by mouth once daily. PT UNSURE OF DOSE     Current Facility-Administered Medications   Medication    DOBUtamine 1000 mg in D5W 250 mL infusion    sodium chloride 0.9% flush 10 mL    sodium chloride 0.9% flush 10 mL    sodium chloride 0.9% flush 10 mL       Vitals:      Vitals:    01/29/25 1417   BP: (!) 121/56   Pulse: 73   Resp: 16   Temp: 97.5 °F (36.4 °C)       Review of Systems:        Constitutional: Negative for fever, chills, weight loss, and diaphoresis. Positive for chronic malaise/fatigue.  HENT: Negative for hearing loss, ear pain, nosebleeds, congestion, sore throat, neck pain, tinnitus and ear discharge.    Eyes: Negative for blurred vision, double vision, photophobia, pain, discharge and redness.   Respiratory: Negative for cough, hemoptysis, sputum production, shortness of breath, wheezing and stridor.    Cardiovascular: Negative for chest pain, palpitations, orthopnea, claudication, leg swelling and PND.   Gastrointestinal: Negative for heartburn, vomiting, abdominal pain, diarrhea, constipation, blood in stool and melena. Positive for chronic nausea.  Genitourinary: Negative for dysuria, urgency, frequency, hematuria and flank pain.   Musculoskeletal: Negative for falls. Positive for left shoulder pain, rates 10/10 at worst associated with limited ROM. Positive for chronic low back pain, "neuropathy in my left leg".   Skin: Negative for itching and rash.   Neurological: Negative for tingling, tremors, sensory change, speech change, focal weakness, seizures, loss of consciousness, weakness and headaches. Positive for intermittent dizziness.  Endo/Heme/Allergies: Negative for environmental allergies and polydipsia. Does not bruise/bleed easily.   Psychiatric/Behavioral: Negative for depression, suicidal ideas, hallucinations, memory loss and substance abuse. The patient is not nervous/anxious and does not have insomnia.    All 14 systems reviewed and " negative except as noted above.    Physical Exam:      Constitutional: Appears well-developed, well-nourished and in no acute distress.  Patient is oriented to person, place, and time. Currently utilizing WC in clinic.  Head: Normocephalic and atraumatic. Mucous membranes moist.  Neck: Neck supple no mass.   Cardiovascular: Normal rate and regular rhythm.  S1 S2 appreciated by ascultation.  Pulmonary/Chest: Effort normal and clear to auscultation bilaterally. No respiratory distress.   Abdomen: Soft. Non-tender and non-distended. Bowel sounds are normal.   Neurological: Patient is alert and oriented to person, place and time. Moves all extremities.  Skin: Warm and dry. No lesions.  Extremities: No clubbing, cyanosis or edema.    Laboratory data:      Reviewed and noted in plan where applicable. Please see chart for full laboratory data.    @[id number](cpk,cpkmb,troponini,mb)@ @IKDQZGNWB45(poctglucose)@     Lab Results   Component Value Date    INR 0.9 06/01/2022    INR 0.9 01/21/2022    INR 1.0 12/12/2021       Lab Results   Component Value Date    WBC 7.24 01/23/2024    HGB 11.4 (L) 01/23/2024    HCT 35.8 (L) 01/23/2024    MCV 93 01/23/2024     01/23/2024       @[id number](GLU,NA,K,Cl,CO2,BUN,Creatinine,Calcium,MG)@    Predictors of intubation difficulty:       Obesity? yes   Anatomically abnormal facies? no   Prominent incisors? no   Receding mandible? no   Short, thick neck? yes    Neck range of motion: normal   Dentition:  No natural teeth.   Based on the Modified Mallampati, patient is a mallampati score: I (soft palate, uvula, fauces, and tonsillar pillars visible)    Cardiographics:      ECG:  Pending.    Echocardiogram stress test in January of 2024 showed:  Left Ventricle: The left ventricle is normal in size. Normal wall thickness. There is concentric remodeling. Normal wall motion. There is normal systolic function with a visually estimated ejection fraction of 60 - 65%. There is normal diastolic  function.    Right Ventricle: Normal right ventricular cavity size. Wall thickness is normal. Right ventricle wall motion  is normal. Systolic function is normal.    IVC/SVC: Normal venous pressure at 3 mmHg.    Stress Protocol: The test was stopped because the end of the protocol was reached.    Baseline ECG: The Baseline ECG reveals sinus rhythm. The axis is normal. The ST segments are normal.    Stress ECG: There are no ST segment deviation identified during the protocol. There are no arrhythmias during stress. There is normal blood pressure response with stress.    ECG Conclusion: The ECG portion of the study is negative for ischemia.    Post-stress Impression: The study is negative with no echocardiographic evidence of stress induced ischemia.    Imaging:      X-Ray Chest PA And Lateral  Narrative: EXAM: XR CHEST PA AND LATERAL     CLINICAL HISTORY: Lung cancer     FINDINGS:  Comparison made to prior chest x-ray dated 02/27/2024.  The patient's had a right lower lobectomy.  Lungs are clear.  No pleural effusion or pneumothorax or pulmonary edema.  Intact thoracic osseous structures.  Impression:  Stable appearance of the chest.     Finalized on: 1/2/2025 10:54 PM By:  Sean Rosario MD  [facility]# [id number]      2025-01-02 22:56:[id number]    [facility]    Assessment and Plan:      Preop examination  - Patient presents today at the request of Dr. Jain who plans on performing a Rotator cuff repair on 2/17.    Known risk factors for perioperative complications: Chronic pulmonary disease  Diabetes mellitus  HTN  Morbid Obesity  Marfan's syndrome  Right lower lobe adenocarcinoma  PAD    Difficulty with intubation is not anticipated.    Cardiac Risk Estimation: Based on the Revised Cardiac Risk index, patient is a Class 1 risk with a 6.0% risk of a major cardiac event in a low risk procedure.    1.) Preoperative workup as follows: chest x-ray, ECG, hemoglobin, hematocrit, electrolytes, creatinine, glucose, liver function  studies.  2.) Change in medication regimen before surgery: discontinue NSAIDs 5 days before surgery, hold Glipizide and Farxiga 24 hours prior to surgery. Patient was instructed to only take 1/2 her usual dose of nighttime Lantus and Novolog, with NO Lantus or Novolog the morning of surgery. Patient was instructed to hold Trulicity for 10 days prior to surgery (states she has not been taking it as insurance would not fill it:. She will need clearance and instructions from cardiology on holding ASA and Plavix.  3.) Prophylaxis for cardiac events with perioperative beta-blockers: Continue Atenolol.  4.) Invasive hemodynamic monitoring perioperatively: not indicated.  5.) Deep vein thrombosis prophylaxis postoperatively: intermittent pneumatic compression boots and regimen to be chosen by surgical team.  6.) Surveillance for postoperative MI with ECG immediately postoperatively and on postoperati ve days 1 and 2 AND troponin levels 24 hours postoperatively and on day 4 or hospital discharge (whichever comes first): not indicated.  7.) Current medications which may produce withdrawal symptoms if withheld perioperatively: None.  8.) Other measures:  Will need recommendations from Cardiology on holding ASA and Plavix.  Given fragile state, with extensive medical history, [facility]  D/W Dr. Jain who is aware of request to move surgery.  Patient will await new surgery date TBD by Ortho.      Left rotator cuff tear  - To undergo surgery.  - see plan as per above.    Type 2 diabetes mellitus with circulatory disorder, with long-term current use of insulin  - A1c in December 8.6, repeat pending.  - Patient was instructed to hold Glipizide and Farxiga for 24 hours prior to surgery and resume postoperatively as directed.  - Patient was instructed to only take 1/2 her usual dose of nighttime Lantus and Novolog the night before surgery, with NO Lantus  or Novolog the morning of surgery.  - She is not currently taking Trulicity due to insurance purposes, she was instructed to resume postoperatively as directed once filled.   - ADA diet.        HTN (hypertension)  - BP well controlled.  - Continue Atenolol and Imdur.  - Continue Lisinopril qhs, reports she only takes as needed based on her BP.    GERD (gastroesophageal reflux disease)  - Continue Pepcid and Prilosec.    Malignant neoplasm of lung  - s/p right lobectomy 1/2024, following with cardiothoracic surgery     COPD (chronic obstructive pulmonary disease)  - Currently appears compensated with no complaints.  - Follows outpatient with Pulmonary.  - Continue Spiriva and Albuterol.    Marfan's syndrome  - Followed outpatient by Dr. Delgado.    PAD (peripheral artery disease)  - Currently on ASA and Plavix.  - Will need recommendations from Dr. Cruz on holding these medications for upcoming surgery.    Depression  - Continue home medications.    HLD (hyperlipidemia)  - Continue home medications.        Electronically signed by Migdalia Askew DNP, ACNP on 1/29/2025 at 2:05 PM.

## 2025-01-29 NOTE — ASSESSMENT & PLAN NOTE
- BP well controlled.  - Continue Atenolol and Imdur.  - Continue Lisinopril qhs, reports she only takes as needed based on her BP.

## 2025-01-29 NOTE — ASSESSMENT & PLAN NOTE
- A1c in December 8.6, repeat pending.  - Patient was instructed to hold Glipizide and Farxiga for 24 hours prior to surgery and resume postoperatively as directed.  - Patient was instructed to only take 1/2 her usual dose of nighttime Lantus and Novolog the night before surgery, with NO Lantus or Novolog the morning of surgery.  - She is not currently taking Trulicity due to insurance purposes, she was instructed to resume postoperatively as directed once filled.   - ADA diet.

## 2025-02-10 NOTE — Clinical Note
1. Preop CV eval -rotator cuff surgery with Dr. Jain 2/17/25 Moderately elevated periop risk of CV events for moderate risk procedure. Ok to proceed to the scheduled surgery without further cardiac study. Neg ECG, Stress ECHO neg 1/2024.  OK to hold Aspirin 7 days and Plavix 5 days before the procedure and resume ASAP postop. Continue BB and Statin periop.

## 2025-02-10 NOTE — PROGRESS NOTES
Subjective:   Patient ID:  Jenifer Westfall is a 49 y.o. female who presents for cardiac consult of No chief complaint on file.      Referral by: No referring provider defined for this encounter.     Reason for consult:       HPI  The patient came in today for cardiac consult of No chief complaint on file.      Jenifer Westfall is a 49 y.o. female pt with Marfans, PAD, HTN, DM, MVP, PHTN, hyperlipidemia here for CV follow up.       HPI pt presents for eval. - Dr. Delgado visit 4/2022  Her current med conditions include Marfans, PAD, HTN, DM, MVP, PHTN, hyperlipidemia.   Former smoker.  Past hx pertinent for following:  She has Marfan's Dz dx'ed at the age of 9, with ectopia lentis.   She is the first one in the family having Marfan's and her younger son has Dx.   S/p aortic arch angio 2015 w Dr. Yao and  R subclavian artery.  S/p B STEPHEN stenting w Dr. Landeros, [facility] surgery, June 2018 (R STEPHEN 9 X 39 mm iCast Atrium stent, L STEPHEN:  9 x 59 mm Icast Atrium and 8 x 18 balloon exp stent).  F/u by Dr. Tian, [facility] surgery, and has had iliac interventions.  Now here.  I saw pt once before 10/20.  Sees Dr. Hsu in past.  Holter 8/21 normal findings.  Stress MPI 10/20 no ischemia.  Echo 10/21 normal LV function, normal MV.  She was seen [facility] ER for CP 3/22 and tx to [facility].  V/Q scan -.  No ischemic changes on ecgs.  Stress MPI 3/30/22 no ischemia, normal EF.  CP was heaviness but was continuous for few days.  No CP w exertion.  Not really related to po intake.  ntg did not help per pt.  Negative troponin levels.  Possible GI origin.  Has EGD this week.  HGAIC poorly controlled.  TG has improved last labs.  Weight stable.  She does have KAT, not acute.  No pnd/orthopnea.  Has chronic leg pains, claudication, neuropathy sxs.    7/24/2024 update - Julisa May visit  Jenifer Westfall returns for follow up.   EKG- NSR, Prolonged  ms (stable today in office), Silvia 136 ms.  Needs refills on her HTN and cholesterol lowering  medications.   Needs further lipid optimization given  despite high intensity statin compliance. Will start Repatha injections for further lipid lowering.   Reports compliance with her medications and dietary restrictions. She cannot walk far distances which limits her ability to do much activity.       2/10/25  Pt seen by Dr. Delgado in past and Julisa Hansen here for preop eval.     She has upcoming rotator cuff surgery with Dr. Jain 2/17/25  Dobutamine Stress ECHO 2/2024 with normal bi V function, neg for ischemia.   Recent ECG 1/2025 NSR.     BP and HR stable. BMI 32 - 223 lbs  She does go to vascular at [facility] and gets ultrasounds.  She has occ atypical CP, unsure if GERD causing symptoms.       Normal sinus rhythm   Normal ECG   No previous ECGs available   Confirmed by Juan Yao ([id number]) on 1/29/2025 9:21:18 PM     Summary Stress ECHO 1/2024  Show Result Comparison     Left Ventricle: The left ventricle is normal in size. Normal wall thickness. There is concentric remodeling. Normal wall motion. There is normal systolic function with a visually estimated ejection fraction of 60 - 65%. There is normal diastolic function.    Right Ventricle: Normal right ventricular cavity size. Wall thickness is normal. Right ventricle wall motion  is normal. Systolic function is normal.    IVC/SVC: Normal venous pressure at 3 mmHg.    Stress Protocol: The test was stopped because the end of the protocol was reached.    Baseline ECG: The Baseline ECG reveals sinus rhythm. The axis is normal. The ST segments are normal.    Stress ECG: There are no ST segment deviation identified during the protocol. There are no arrhythmias during stress. There is normal blood pressure response with stress.    ECG Conclusion: The ECG portion of the study is negative for ischemia.    Post-stress Impression: The study is negative with no echocardiographic evidence of stress induced ischemia.    Results for orders placed during the hospital  encounter of 22    Echo    Interpretation Summary  · The left ventricle is normal in size with concentric remodeling and normal systolic function.  · The estimated ejection fraction is 55%.  · Normal left ventricular diastolic function.  · Normal right ventricular size with normal right ventricular systolic function.  · Normal central venous pressure (3 mmHg).      No results found for this or any previous visit.      No results found for this or any previous visit.      No cardiac monitor results found for the past 12 months         Past Medical History:   Diagnosis Date    Arthritis     Cancer     Right lower lobe Adenocarcinoma    COPD (chronic obstructive pulmonary disease)     Diabetes mellitus, type 2     DM (diabetes mellitus)      2017 Insulin x 3 years     GERD (gastroesophageal reflux disease)     Hepatitis C antibody positive in blood 2021    RNA NEGATIVE 3/6/2017, 3/22/22    Hyperlipidemia     Hypertension     IBS (irritable bowel syndrome)     Kidney stone     Marfan syndrome     Mitral valve prolapse     Sleep apnea     moild: no cpap needed       Past Surgical History:   Procedure Laterality Date    ANGIOGRAPHY OF LOWER EXTREMITY N/A 2018    Procedure: ANGIOGRAM, LOWER EXTREMITY- LEFT LEG;  Surgeon: Roscoe Landeros MD;  Location: [facility] CATH LAB;  Service: Peripheral Vascular;  Laterality: N/A;    APPENDECTOMY      BUNIONECTOMY      both feet    BYPASS GRAFT Bilateral     cataract surgery  2019     SECTION      x 2    CHOLECYSTECTOMY      COLONOSCOPY N/A 2020    Procedure: COLONOSCOPY w/ biopsies;  Surgeon: Gio Georges MD;  Location: [facility];  Service: Endoscopy;  Laterality: N/A;    ESOPHAGEAL MANOMETRY WITH MEASUREMENT OF IMPEDANCE N/A 2023    Procedure: MANOMETRY, ESOPHAGUS, WITH IMPEDANCE MEASUREMENT;  Surgeon: Desmond Calderon RN;  Location: [facility];  Service: Endoscopy;  Laterality: N/A;    ESOPHAGOGASTRODUODENOSCOPY N/A  07/31/2019    Procedure: ESOPHAGOGASTRODUODENOSCOPY (EGD);  Surgeon: Jaron Sanders III, MD;  Location: [facility] ENDO;  Service: Endoscopy;  Laterality: N/A;    ESOPHAGOGASTRODUODENOSCOPY N/A 04/22/2022    Procedure: EGD (ESOPHAGOGASTRODUODENOSCOPY);  Surgeon: Keith Hardwick MD;  Location: [facility] ENDO;  Service: Gastroenterology;  Laterality: N/A;    ESOPHAGOGASTRODUODENOSCOPY N/A 11/22/2024    Procedure: EGD (ESOPHAGOGASTRODUODENOSCOPY) 10/21-cc rec'd clr may hold plavix 5 days;  Surgeon: Jadyn Santana MD;  Location: [facility];  Service: Endoscopy;  Laterality: N/A;    INJECTION OF ANESTHETIC AGENT AROUND MULTIPLE INTERCOSTAL NERVES Right 1/19/2024    Procedure: BLOCK, NERVE, INTERCOSTAL, 2 OR MORE;  Surgeon: Chente Cabrera MD;  Location: [facility] OR;  Service: Cardiothoracic;  Laterality: Right;    LOBECTOMY-ROBOTIC Right 1/19/2024    Procedure: LOBECTOMY-ROBOTIC;  Surgeon: Chente Cabrera MD;  Location: [facility] OR;  Service: Cardiothoracic;  Laterality: Right;  Lower Lobe    PH MONITORING, ESOPHAGUS, WIRELESS, (OFF REFLUX MEDS) N/A 11/22/2024    Procedure: PH MONITORING, ESOPHAGUS, WIRELESS, (OFF REFLUX MEDS);  Surgeon: Jadyn Santana MD;  Location: [facility] ENDO;  Service: Endoscopy;  Laterality: N/A;    ROBOT-ASSISTED LAPAROSCOPIC LYMPHADENECTOMY USING DA GOMEZ XI Right 1/19/2024    Procedure: XI ROBOTIC LYMPHADENECTOMY;  Surgeon: Chente Cabrera MD;  Location: [facility] OR;  Service: Cardiothoracic;  Laterality: Right;  Robotic mediastinal lymph node dissection    SELECTIVE INJECTION OF ANESTHETIC AGENT AROUND LUMBAR SPINAL NERVE ROOT BY TRANSFORAMINAL APPROACH Bilateral 11/23/2020    Procedure: Bilateral L5/S1 TF DENY;  Surgeon: Jewel Walters MD;  Location: [facility] PAIN MGT;  Service: Pain Management;  Laterality: Bilateral;    TUBAL LIGATION      VATS, ROBOT-ASSISTED, OR ROBOT-ASSISTED THORACOTOMY Right 1/19/2024    Procedure: VATS, ROBOT-ASSISTED, OR ROBOT-ASSISTED THORACOTOMY;  Surgeon: Rick  "Chente LEON MD;  Location[facility] OR;  Service: Cardiothoracic;  Laterality: Right;  Robot assisted right lower lobectomy and mediastinal lymph node dissection       Social History     Tobacco Use    Smoking status: Former     Current packs/day: 0.00     Average packs/day: 1 pack/day for 24.0 years (24.0 ttl pk-yrs)     Types: Cigarettes     Start date: 1996     Quit date: 2020     Years since quittin.1     Passive exposure: Past    Smokeless tobacco: Never    Tobacco comments:     "once in a blue moon"   Substance Use Topics    Alcohol use: Never    Drug use: No       Family History   Problem Relation Name Age of Onset    Heart disease Mother      Thrombophilia Father          DVT    Hypertension Father      No Known Problems Brother      Marfan syndrome Son      Stroke Maternal Aunt      Heart disease Maternal Aunt      Stroke Maternal Uncle      Heart disease Maternal Uncle      Lung cancer Maternal Grandmother      No Known Problems Maternal Grandfather      No Known Problems Paternal Grandmother      No Known Problems Paternal Grandfather      Breast cancer Neg Hx      Colon cancer Neg Hx      Ovarian cancer Neg Hx         Patient's Medications   New Prescriptions    No medications on file   Previous Medications    ALBUTEROL (PROVENTIL) 2.5 MG /3 ML (0.083 %) NEBULIZER SOLUTION    TAKE 3 MLS (2.5 MG TOTAL) BY NEBULIZATION EVERY 4 TO 6 HOURS AS NEEDED FOR WHEEZING OR SHORTNESS OF BREATH.    ALBUTEROL (PROVENTIL/VENTOLIN HFA) 90 MCG/ACTUATION INHALER    Inhale 2 puffs into the lungs every 6 (six) hours as needed for Wheezing. Rescue    ALPRAZOLAM (XANAX) 0.25 MG TABLET    Take 0.25 mg by mouth 2 (two) times daily as needed for Anxiety.    ASCORBIC ACID (VITAMIN C) 100 MG TABLET    Take 100 mg by mouth once daily.    ASPIRIN (ECOTRIN) 81 MG EC TABLET    Take 81 mg by mouth every evening.    ATENOLOL (TENORMIN) 25 MG TABLET    Take 1 tablet (25 mg total) by mouth every evening.    ATORVASTATIN (LIPITOR) 80 " "MG TABLET    Take 1 tablet (80 mg total) by mouth once daily.    AZELASTINE (ASTELIN) 137 MCG (0.1 %) NASAL SPRAY    2 sprays by Nasal route daily as needed for Rhinitis.    BD ULTRA-FINE [place] PEN NEEDLE 32 GAUGE X 5/32" NDLE    Inject 1 each into the skin 3 (three) times daily.    BUSPIRONE (BUSPAR) 30 MG TAB    Take 15 mg by mouth 2 (two) times daily.    BUTALBITAL-ACETAMINOPHEN-CAFFEINE -40 MG (FIORICET, ESGIC) -40 MG PER TABLET    Take 1 tablet by mouth every 4 (four) hours as needed for Pain.    CLOPIDOGREL (PLAVIX) 75 MG TABLET    Take 1 tablet (75 mg total) by mouth once daily.    DAPAGLIFLOZIN PROPANEDIOL (FARXIGA) 10 MG TABLET    Take 1 tablet (10 mg total) by mouth once daily.    DICYCLOMINE (BENTYL) 20 MG TABLET    TAKE 1 TABLET BY MOUTH 3 TIMES DAILY BEFORE MEALS AS NEEDED FOR ABDOMINAL PAIN    EVOLOCUMAB (REPATHA SURECLICK) 140 MG/ML PNIJ    Inject 1 mL (140 mg total) into the skin every 14 (fourteen) days.    FAMOTIDINE (PEPCID) 20 MG TABLET    TAKE 1 TABLET (20 MG TOTAL) BY MOUTH 2 (TWO) TIMES DAILY.    FAMOTIDINE (PEPCID) 20 MG TABLET    Take 1 tablet (20 mg total) by mouth 2 (two) times daily.    FINASTERIDE (PROPECIA) 1 MG TABLET    Take 1 mg by mouth once daily.    FLUOXETINE (PROZAC) 40 MG CAPSULE    TAKE 1 CAPSULE BY MOUTH ONCE DAILY    FLUTICASONE PROPIONATE (FLONASE) 50 MCG/ACTUATION NASAL SPRAY    2 sprays by Each Nostril route daily as needed.    GABAPENTIN (NEURONTIN) 300 MG CAPSULE    TAKE 3 CAPSULES (900 MG TOTAL) BY MOUTH 3 (THREE) TIMES DAILY.    GABAPENTIN (NEURONTIN) 300 MG CAPSULE    Take 1 capsule (300 mg total) by mouth every evening.    GLIPIZIDE (GLUCOTROL) 10 MG TABLET    Take 1 tablet (10 mg total) by mouth 2 (two) times daily.    ISOSORBIDE MONONITRATE (IMDUR) 30 MG 24 HR TABLET    TAKE 1 TABLET (30 MG TOTAL) BY MOUTH ONCE DAILY.    LANTUS SOLOSTAR U-100 INSULIN GLARGINE 100 UNITS/ML SUBQ PEN    Inject 40 Units into the skin 2 (two) times a day.    LINACLOTIDE " "(LINZESS) 145 MCG CAP CAPSULE    Take 1 capsule (145 mcg total) by mouth before breakfast.    LISINOPRIL (PRINIVIL,ZESTRIL) 5 MG TABLET    Take 1 tablet (5 mg total) by mouth every evening.    METHOCARBAMOL (ROBAXIN) 500 MG TAB    Take 500 mg by mouth 3 (three) times daily as needed.    MOMETASONE (ELOCON) 0.1 % SOLUTION    Apply topically.    NOVOLOG FLEXPEN U-100 INSULIN 100 UNIT/ML (3 ML) INPN PEN    Inject into the skin 3 (three) times daily with meals.    OMEPRAZOLE (PRILOSEC) 40 MG CAPSULE    TAKE 1 CAPSULE (40 MG TOTAL) BY MOUTH 2 (TWO) TIMES DAILY BEFORE MEALS.    OXYCODONE-ACETAMINOPHEN (PERCOCET) 5-325 MG PER TABLET    Take 1 tablet by mouth every 4 (four) hours as needed for Pain.    PEN NEEDLE 31 GAUGE X 5/16" NDLE    USE 5 TIMES DAILY WITH LANTUS AND HUMALOG    PROMETHAZINE (PHENERGAN) 25 MG TABLET    Take 1 tablet (25 mg total) by mouth every 6 (six) hours as needed for Nausea.    SITAGLIPTIN PHOSPHATE (JANUVIA) 50 MG TAB    Take 50 mg by mouth once daily. Added to chart 1/18/24    TIOTROPIUM (SPIRIVA) 18 MCG INHALATION CAPSULE    Inhale 1 capsule (18 mcg total) into the lungs once daily. Controller    TIZANIDINE (ZANAFLEX) 4 MG TABLET    TAKE 1 TABLET (4 MG TOTAL) BY MOUTH 3 (THREE) TIMES DAILY AS NEEDED (SPASMS).    TRAMADOL (ULTRAM) 50 MG TABLET    Take 50 mg by mouth every 6 (six) hours.    TRUE METRIX GLUCOSE TEST STRIP STRP        TRULICITY 4.5 MG/0.5 ML PEN INJECTOR    Inject 0.5 mLs as directed every 7 days.    ZINC ORAL    Take 1 tablet by mouth once daily. PT UNSURE OF DOSE   Modified Medications    No medications on file   Discontinued Medications    No medications on file       Review of Systems   Constitutional: Negative.    HENT: Negative.     Eyes: Negative.    Respiratory: Negative.     Cardiovascular: Negative.    Gastrointestinal: Negative.    Genitourinary: Negative.    Musculoskeletal:  Positive for back pain and joint pain.   Skin: Negative.    Neurological: Negative.  "   Endo/Heme/Allergies: Negative.    Psychiatric/Behavioral: Negative.     All 12 systems otherwise negative.      Wt Readings from Last 3 Encounters:   02/10/25 101.5 kg (223 lb 12.3 oz)   01/02/25 104.3 kg (229 lb 15 oz)   11/22/24 98.1 kg (216 lb 2.6 oz)     Temp Readings from Last 3 Encounters:   01/29/25 97.5 °F (36.4 °C) (Temporal)   11/22/24 97.8 °F (36.6 °C) (Temporal)   01/23/24 98.4 °F (36.9 °C) (Oral)     BP Readings from Last 3 Encounters:   02/10/25 110/60   01/29/25 (!) 121/56   01/02/25 128/68     Pulse Readings from Last 3 Encounters:   02/10/25 77   01/29/25 73   01/02/25 67       /60 (BP Location: Right arm, Patient Position: Sitting)   Pulse 77   Wt 101.5 kg (223 lb 12.3 oz)   SpO2 95%   BMI 32.11 kg/m²     Objective:   Physical Exam  Vitals and nursing note reviewed.   Constitutional:       General: She is not in acute distress.     Appearance: She is well-developed. She is not diaphoretic.   HENT:      Head: Normocephalic and atraumatic.      Nose: Nose normal.   Eyes:      General: No scleral icterus.     Conjunctiva/sclera: Conjunctivae normal.   Neck:      Thyroid: No thyromegaly.      Vascular: No JVD.   Cardiovascular:      Rate and Rhythm: Normal rate and regular rhythm.      Heart sounds: S1 normal and S2 normal. Murmur heard.      No friction rub. No gallop. No S3 or S4 sounds.   Pulmonary:      Effort: Pulmonary effort is normal. No respiratory distress.      Breath sounds: Normal breath sounds. No stridor. No wheezing or rales.   Chest:      Chest wall: No tenderness.   Abdominal:      General: Bowel sounds are normal. There is no distension.      Palpations: Abdomen is soft. There is no mass.      Tenderness: There is no abdominal tenderness. There is no rebound.   Genitourinary:     Comments: Deferred  Musculoskeletal:         General: No tenderness or deformity. Normal range of motion.      Cervical back: Normal range of motion and neck supple.   Lymphadenopathy:       Cervical: No cervical adenopathy.   Skin:     General: Skin is warm and dry.      Coloration: Skin is not pale.      Findings: No erythema or rash.   Neurological:      Mental Status: She is alert and oriented to person, place, and time.      Motor: No abnormal muscle tone.      Coordination: Coordination normal.   Psychiatric:         Behavior: Behavior normal.         Thought Content: Thought content normal.         Judgment: Judgment normal.         Lab Results   Component Value Date     01/29/2025    K 4.4 01/29/2025     01/29/2025    CO2 22 (L) 01/29/2025    BUN 12 01/29/2025    CREATININE 1.0 01/29/2025     (H) 01/29/2025    HGBA1C 9.0 (H) 01/29/2025    MG 2.3 10/16/2016    AST 39 01/29/2025    ALT 38 01/29/2025    ALBUMIN 3.2 (L) 01/29/2025    PROT 7.2 01/29/2025    BILITOT 0.3 01/29/2025    WBC 9.66 01/29/2025    HGB 13.8 01/29/2025    HCT 44.7 01/29/2025    MCV 92 01/29/2025     01/29/2025    INR 0.9 06/01/2022    TSH 0.849 10/07/2015    CHOL 234 (H) 11/22/2024    HDL 49 11/22/2024    LDLCALC 145.0 11/22/2024    TRIG 200 (H) 11/22/2024    BNP 48 12/18/2024    BNP <10 08/29/2023         BNP   Date Value   12/18/2024 48 PG/ML   01/01/2024 <10 PG/ML (L)   11/11/2023 48 PG/ML   08/29/2023 <10 pg/mL   08/22/2023 11 PG/ML (L)   04/24/2022 22 pg/mL   04/19/2022 <10 pg/mL   06/07/2018 <10 pg/mL   10/16/2016 42 pg/mL     INR (no units)   Date Value   06/01/2022 0.9   01/21/2022 0.9   12/12/2021 1.0   06/28/2021 1.0   03/06/2017 0.9   10/16/2016 0.9   09/05/2016 0.9          Assessment:      1. Preop cardiovascular exam    2. PAD (peripheral artery disease)    3. Essential hypertension    4. Mixed hyperlipidemia    5. Obesity (BMI 30-39.9)    6. KAT (dyspnea on exertion)    7. Type 2 diabetes mellitus with diabetic neuropathy, with long-term current use of insulin    8. Primary hypertension    9. Pulmonary hypertension    10. MVP (mitral valve prolapse)        Plan:       Preop CV eval  -rotator cuff surgery with Dr. Jain 2/17/25  Moderately elevated periop risk of CV events for moderate risk procedure.  Ok to proceed to the scheduled surgery without further cardiac study. Neg ECG, Stress ECHO neg 1/2024.   OK to hold Aspirin 7 days and Plavix 5 days before the procedure and resume ASAP postop.  Continue BB and Statin periop.      2. PAD, subclavian artery occlusion, occ CP/KAT - atypical   - cont asa, statin, plavix  - Dobutamine Stress ECHO 2/2024 with normal bi V function, neg for ischemia.   - Recent ECG 1/2025 NSR.   - needs repeat leg arterial studies     3. HLD  - cont statin  - cont Repatha      4. HTN  - titrate meds    5. GERD  - cont PPI    6. DM2 A1c 9.0  - cont tx - insulin, januvia, trulicity    Visit today included increased complexity associated with the care of the episodic problem dyspnea addressed and managing the longitudinal care of the patient due to the serious and/or complex managed problem(s) .      Thank you for allowing me to participate in this patient's care. Please do not hesitate to contact me with any questions or concerns. Consult note has been forwarded to the referral physician.

## 2025-02-14 NOTE — TELEPHONE ENCOUNTER
Called and spoke with patient about the following:     Please arrive to [facility] at 8:00AM on 2/17/25 for your scheduled procedure.  Address: [address] [place] [address] [place] [address] ([address] Building on left, 1st Floor Lobby)    !!!NO FOOD after midnight! You may have clear liquids up to 3 hrs before your arrival to the Hospital!!!  Clear liquids include Gatorade, water, soda, black coffee or tea (no milk or creamer), and clear juices.  Clear liquids do NOT include anything with pulp or food particles (Chicken broth, ice cream, yogurt, Jello, etc.)    Thank you,  -[facility] Pre Admit Dept.  Mon-Fri 7:30 am - 4 pm [phone]

## 2025-02-17 PROBLEM — J96.01 ACUTE RESPIRATORY FAILURE WITH HYPOXIA AND HYPERCARBIA: Status: ACTIVE | Noted: 2025-01-01

## 2025-02-17 PROBLEM — R57.9 SHOCK: Status: ACTIVE | Noted: 2025-01-01

## 2025-02-17 PROBLEM — M19.112 OSTEOARTHRITIS OF LEFT SHOULDER DUE TO ROTATOR CUFF INJURY: Status: ACTIVE | Noted: 2025-01-01

## 2025-02-17 PROBLEM — J96.02 ACUTE RESPIRATORY FAILURE WITH HYPOXIA AND HYPERCARBIA: Status: ACTIVE | Noted: 2025-01-01

## 2025-02-17 PROBLEM — S46.002S OSTEOARTHRITIS OF LEFT SHOULDER DUE TO ROTATOR CUFF INJURY: Status: ACTIVE | Noted: 2025-01-01

## 2025-02-17 NOTE — ANESTHESIA POSTPROCEDURE EVALUATION
Anesthesia Post Evaluation    Patient: Jenifer Westfall    Procedure(s) Performed: Procedure(s) (LRB):  REPAIR, ROTATOR CUFF, ARTHROSCOPIC (Left)  ARTHROSCOPY, SHOULDER, WITH SUBACROMIAL SPACE DECOMPRESSION (Left)  ARTHROSCOPY,SHOULDER,WITH BICEPS TENODESIS (Left)    Final Anesthesia Type: general (+ Regional)      Patient location during evaluation: ICU  Patient participation: No - Unable to Participate, Intubation  Level of consciousness: sedated and responds to stimulation  Post-procedure vital signs: reviewed and stable (Hypotensive)  Pain management: adequate  Airway patency: patent  WENDI mitigation strategies: Multimodal analgesia  PONV status at discharge: No PONV  Anesthetic complications: yes  Perioperative Events: hypotension requiring unaticipated pressor infusion, use of sedation/narcotic reversal agents, unplanned ICU admission and reintubation      Hermelinda-operative Events Comments: Tachypnea, hypoxia, and hypotension in PACU requiring High-dose Levophed infusion, BiPap, and eventually reintubation   Cardiovascular status: hemodynamically stable and hypotensive (0.5mcg/kg/min Levophed gtt)  Respiratory status: ETT, intubated and ventilator  Hydration status: euvolemic  Follow-up needed               Vitals Value Taken Time   /55 02/17/25 17:31   Temp 36.2 °C (97.1 °F) 02/17/25 14:15   Pulse 100 02/17/25 17:33   Resp 25 02/17/25 17:33   SpO2 99 % 02/17/25 17:33   Vitals shown include unfiled device data.      No case tracking events are documented in the log.      Pain/Abdullahi Score: Pain Rating Prior to Med Admin: 6 (2/17/2025  3:50 PM)  Abdullahi Score: 6 (2/17/2025  5:15 PM)

## 2025-02-17 NOTE — ANESTHESIA PREPROCEDURE EVALUATION
02/17/2025  Jenifer Westfall is a 49 y.o., female.      Pre-op Assessment    I have reviewed the Patient Summary Reports.     I have reviewed the Nursing Notes. I have reviewed the NPO Status.      Review of Systems  Anesthesia Hx:  No problems with previous Anesthesia                Hematology/Oncology:  Hematology Normal   Oncology Normal                                   Cardiovascular:     Hypertension            KAT                              Pulmonary:   COPD   Shortness of breath  Sleep Apnea                Renal/:  Chronic Renal Disease                Hepatic/GI:     GERD Liver Disease,               Musculoskeletal:  Arthritis               Neurological:    Neuromuscular Disease,  Headaches                                 Endocrine:  Diabetes           Dermatological:  Skin Normal    Psych:  Psychiatric History                  Physical Exam  General: Well nourished, Cooperative, Alert and Oriented    Airway:  Mallampati: II / II  Mouth Opening: Normal  TM Distance: Normal  Tongue: Normal  Neck ROM: Normal ROM    Dental:  Intact      Patient Active Problem List   Diagnosis    Diarrhea    Vitamin D deficiency    Type 2 diabetes mellitus with circulatory disorder, with long-term current use of insulin    Chronic midline low back pain without sciatica    IBS (irritable bowel syndrome)    Depression    Marfan's syndrome    HTN (hypertension)    HLD (hyperlipidemia)    MVP (mitral valve prolapse)    Bursitis, hip    Innominate artery stenosis    PAD (peripheral artery disease)    Occlusion of right subclavian artery    Sciatica of left side    Dysmenorrhea    Type 2 diabetes mellitus with diabetic neuropathy, with long-term current use of insulin    Neuropathy    Abnormal lung function test    Preop cardiovascular exam    Liver fibrosis    Acute pain of right knee    Primary osteoarthritis of both knees     DDD (degenerative disc disease), cervical    Chronic bursitis of right shoulder    Left ankle swelling    Iliac artery stenosis, left    Claudication in peripheral vascular disease, left leg    Rotator cuff impingement syndrome of right shoulder    Rotator cuff tendonitis, right    Diabetic peripheral neuropathy associated with type 2 diabetes mellitus    GERD (gastroesophageal reflux disease)    Complete tear of right rotator cuff    Myofascial pain    Colitis    Obesity (BMI 30-39.9)    Colon polyps    Chronic hip pain, left    Pulmonary hypertension    Lumbar radiculopathy    Nausea and vomiting    Other dysphagia    Numbness and tingling    KAT (dyspnea on exertion)    Atypical chest pain    Former smoker    Solitary pulmonary nodule    Diffuse esophageal spasm    Orthostatic hypotension    Severe obesity (BMI 35.0-35.9 with comorbidity)    Unsteady gait    Migraine without aura and without status migrainosus, not intractable    Malignant neoplasm of lung    Simple chronic bronchitis    Decreased functional activity tolerance    Postural instability    Weakness of both lower extremities    Decreased range of motion of lumbar spine    Decreased strength of upper extremity    Preop examination    Left rotator cuff tear    COPD (chronic obstructive pulmonary disease)         Anesthesia Plan  Type of Anesthesia, risks & benefits discussed:    Anesthesia Type: Gen ETT  Intra-op Monitoring Plan: Standard ASA Monitors  Post Op Pain Control Plan: multimodal analgesia  Induction:  IV  Airway Plan: Direct  Informed Consent: Informed consent signed with the Patient and all parties understand the risks and agree with anesthesia plan.  All questions answered.   ASA Score: 3  Day of Surgery Review of History & Physical: H&P Update referred to the surgeon/provider.I have interviewed and examined the patient. I have reviewed the patient's H&P dated: There are no significant changes. H&P completed by  Anesthesiologist.    Ready For Surgery From Anesthesia Perspective.     .

## 2025-02-17 NOTE — ASSESSMENT & PLAN NOTE
This patient has shock. The type of shock is distributive due to ? adrenal . The patient has the following evidence of shock: persistent hypotension. The patient will be admitted to an intensive care unit, they will be treated with   CVP  Continue fluid resuscitation  Echo and BNP  Empiric Abx.

## 2025-02-17 NOTE — CONSULTS
[facility] - Intensive Care ([facility])  Critical Care Medicine  Consult Note    Patient Name: Jenifer Westfall  MRN: [id number]  Admission Date: 2/17/2025  Hospital Length of Stay: 0 days  Code Status: Prior  Attending Physician: Deidre Alberts MD   Primary Care Provider: Jose Elias [facility]   Principal Problem: <principal problem not specified>    Consults  Subjective:     HPI:  Patient scheduled for lt shoulder arthrocopic shoulder repair, biceps tenodesis and subacromal decompression.   In OR required fluid resuscitation and inotropes  Was on RA with normal vitals prior to surgery.  Hx of Pul HTN but normal EF on echo in 2022.  Extubated in PACU but required NIPPV and continued to have labored respiration and was reintubated.   Continue higher pressor requirements.   Hx of pul nodule and VATS, Smoking and COPD.  Inaccurate urine output sec to leak but urine looks dilute. Off sedation she wakes up and follow commands.   Hx of innominate occlusion and PAD.   BP seems to be correlating to cuff.  CVL is being placed at this time.       Hospital/ICU Course:  No notes on file    Past Medical History:   Diagnosis Date    Arthritis     Cancer     Right lower lobe Adenocarcinoma    COPD (chronic obstructive pulmonary disease)     Diabetes mellitus, type 2     DM (diabetes mellitus) 2009     03/01/2017 Insulin x 3 years 2013    GERD (gastroesophageal reflux disease)     Hepatitis C antibody positive in blood 03/04/2021    RNA NEGATIVE 3/6/2017, 3/22/22    Hyperlipidemia     Hypertension     IBS (irritable bowel syndrome)     Kidney stone     Marfan syndrome     Mitral valve prolapse     Sleep apnea     moild: no cpap needed       Past Surgical History:   Procedure Laterality Date    ANGIOGRAPHY OF LOWER EXTREMITY N/A 06/26/2018    Procedure: ANGIOGRAM, LOWER EXTREMITY- LEFT LEG;  Surgeon: Roscoe Landeros MD;  Location: [facility] CATH LAB;  Service: Peripheral Vascular;  Laterality: N/A;    APPENDECTOMY      BUNIONECTOMY       both feet    BYPASS GRAFT Bilateral     cataract surgery  2019     SECTION      x 2    CHOLECYSTECTOMY      COLONOSCOPY N/A 2020    Procedure: COLONOSCOPY w/ biopsies;  Surgeon: Gio Georges MD;  Location: [facility];  Service: Endoscopy;  Laterality: N/A;    ESOPHAGEAL MANOMETRY WITH MEASUREMENT OF IMPEDANCE N/A 2023    Procedure: MANOMETRY, ESOPHAGUS, WITH IMPEDANCE MEASUREMENT;  Surgeon: Desmond Calderon RN;  Location: [facility];  Service: Endoscopy;  Laterality: N/A;    ESOPHAGOGASTRODUODENOSCOPY N/A 2019    Procedure: ESOPHAGOGASTRODUODENOSCOPY (EGD);  Surgeon: Jaron Sanders III, MD;  Location: [facility];  Service: Endoscopy;  Laterality: N/A;    ESOPHAGOGASTRODUODENOSCOPY N/A 2022    Procedure: EGD (ESOPHAGOGASTRODUODENOSCOPY);  Surgeon: Keith Hardwick MD;  Location: [facility];  Service: Gastroenterology;  Laterality: N/A;    ESOPHAGOGASTRODUODENOSCOPY N/A 2024    Procedure: EGD (ESOPHAGOGASTRODUODENOSCOPY) 10/21-cc rec'd clr may hold plavix 5 days;  Surgeon: Jadyn Santana MD;  Location: [facility];  Service: Endoscopy;  Laterality: N/A;    INJECTION OF ANESTHETIC AGENT AROUND MULTIPLE INTERCOSTAL NERVES Right 2024    Procedure: BLOCK, NERVE, INTERCOSTAL, 2 OR MORE;  Surgeon: Chente Cabrera MD;  Location: [facility];  Service: Cardiothoracic;  Laterality: Right;    LOBECTOMY-ROBOTIC Right 2024    Procedure: LOBECTOMY-ROBOTIC;  Surgeon: Chente Cabrera MD;  Location: [facility] OR;  Service: Cardiothoracic;  Laterality: Right;  Lower Lobe    PH MONITORING, ESOPHAGUS, WIRELESS, (OFF REFLUX MEDS) N/A 2024    Procedure: PH MONITORING, ESOPHAGUS, WIRELESS, (OFF REFLUX MEDS);  Surgeon: Jadyn Santana MD;  Location: [facility];  Service: Endoscopy;  Laterality: N/A;    ROBOT-ASSISTED LAPAROSCOPIC LYMPHADENECTOMY USING DA GOMEZ XI Right 2024    Procedure: XI ROBOTIC LYMPHADENECTOMY;  Surgeon: Chente Cabrera MD;  Location: [facility] OR;   "Service: Cardiothoracic;  Laterality: Right;  Robotic mediastinal lymph node dissection    SELECTIVE INJECTION OF ANESTHETIC AGENT AROUND LUMBAR SPINAL NERVE ROOT BY TRANSFORAMINAL APPROACH Bilateral 2020    Procedure: Bilateral L5/S1 TF DENY;  Surgeon: Jewel Walters MD;  Location: [facility];  Service: Pain Management;  Laterality: Bilateral;    TUBAL LIGATION      VATS, ROBOT-ASSISTED, OR ROBOT-ASSISTED THORACOTOMY Right 2024    Procedure: VATS, ROBOT-ASSISTED, OR ROBOT-ASSISTED THORACOTOMY;  Surgeon: Chente Cabrera MD;  Location: [facility] OR;  Service: Cardiothoracic;  Laterality: Right;  Robot assisted right lower lobectomy and mediastinal lymph node dissection       Review of patient's allergies indicates:   Allergen Reactions    Compazine [prochlorperazine edisylate] Rash    Contrast media Anaphylaxis    Dye Anaphylaxis     Contrast dye    Iodinated contrast media Anaphylaxis     Other reaction(s): anaphylaxis    Levaquin [levofloxacin] Hives and Itching    Prochlorperazine Rash    Metformin Other (See Comments)     Upset stomach    Ibuprofen Other (See Comments) and Nausea And Vomiting     Stomach pain  Stomach pain    Abdominal pain     Methocarbamol Nausea Only       Family History       Problem Relation (Age of Onset)    Heart disease Mother, Maternal Aunt, Maternal Uncle    Hypertension Father    Lung cancer Maternal Grandmother    Marfan syndrome Son    No Known Problems Brother, Maternal Grandfather, Paternal Grandmother, Paternal Grandfather    Stroke Maternal Aunt, Maternal Uncle    Thrombophilia Father          Tobacco Use    Smoking status: Former     Current packs/day: 0.00     Average packs/day: 1 pack/day for 24.0 years (24.0 ttl pk-yrs)     Types: Cigarettes     Start date: 1996     Quit date: 2020     Years since quittin.1     Passive exposure: Past    Smokeless tobacco: Never    Tobacco comments:     "once in a blue moon"   Substance and Sexual Activity    Alcohol " use: Never    Drug use: No    Sexual activity: Not Currently         Review of Systems   Unable to perform ROS: Acuity of condition     Objective:     Vital Signs (Most Recent):  Temp: 97.1 °F (36.2 °C) (02/17/25 1415)  Pulse: 99 (02/17/25 1715)  Resp: 15 (02/17/25 1715)  BP: (!) 93/56 (02/17/25 1715)  SpO2: 100 % (02/17/25 1715) Vital Signs (24h Range):  Temp:  [97.1 °F (36.2 °C)-97.9 °F (36.6 °C)] 97.1 °F (36.2 °C)  Pulse:  [] 99  Resp:  [6-42] 15  SpO2:  [75 %-100 %] 100 %  BP: ()/() 93/56  Arterial Line BP: ()/() 75/68     Weight: 102 kg (224 lb 13.9 oz)  Body mass index is 32.27 kg/m².      Intake/Output Summary (Last 24 hours) at 2/17/2025 1735  Last data filed at 2/17/2025 1712  Gross per 24 hour   Intake 2000 ml   Output 252 ml   Net 1748 ml        Physical Exam  Vitals and nursing note reviewed.   Constitutional:       General: She is in acute distress.      Appearance: She is ill-appearing. She is not toxic-appearing.   HENT:      Head: Normocephalic.   Eyes:      Pupils: Pupils are equal, round, and reactive to light.   Cardiovascular:      Rate and Rhythm: Tachycardia present.      Pulses: Normal pulses.   Pulmonary:      Effort: Pulmonary effort is normal.   Abdominal:      Palpations: Abdomen is soft.   Neurological:      General: No focal deficit present.          Vents:  Vent Mode: A/C (02/17/25 1450)  Set Rate: 22 BPM (02/17/25 1450)  Vt Set: 450 mL (02/17/25 1450)  PEEP/CPAP: 8 cmH20 (02/17/25 1450)  Oxygen Concentration (%): 100 (02/17/25 1715)  Peak Airway Pressure: 27 cmH20 (02/17/25 1450)  Plateau Pressure: 0 cmH20 (02/17/25 1450)  Total Ve: 10.2 L/m (02/17/25 1450)  F/VT Ratio<105 (RSBI): (!) 45.06 (02/17/25 1450)    Lines/Drains/Airways       Central Venous Catheter Line  Duration             Percutaneous Central Line - Triple Lumen  02/17/25 1724 Internal Jugular Left <1 day    Percutaneous Central Line - single lumen  02/17/25 1040 Femoral Right <1 day          "     Drain  Duration                  Urethral Catheter 02/17/25 1621 <1 day              Airway  Duration                  Airway - Non-Surgical 02/17/25 1449 Endotracheal Tube <1 day              Arterial Line  Duration             Arterial Line 02/17/25 1040 Left Femoral <1 day              Peripheral Intravenous Line  Duration                  Peripheral IV - Single Lumen 02/17/25 1545 20 G Anterior;Right Forearm <1 day                    Significant Labs:    CBC/Anemia Profile:  No results for input(s): "WBC", "HGB", "HCT", "PLT", "MCV", "RDW", "IRON", "FERRITIN", "RETIC", "FOLATE", "OVRUYMEP89", "OCCULTBLOOD" in the last 48 hours.     Chemistries:  No results for input(s): "NA", "K", "CL", "CO2", "BUN", "CREATININE", "CALCIUM", "ALBUMIN", "PROT", "BILITOT", "ALKPHOS", "ALT", "AST", "GLUCOSE", "MG", "PHOS" in the last 48 hours.    All pertinent labs within the past 24 hours have been reviewed.    Significant Imaging:   I have reviewed all pertinent imaging results/findings within the past 24 hours.    ABG  Recent Labs   Lab 02/17/25  1641   PH 7.232*   PO2 220*   PCO2 42.2   HCO3 17.8*   BE -10*     Assessment/Plan:     Pulmonary  Acute respiratory failure with hypoxia and hypercarbia  Patient with Hypercapnic and Hypoxic Respiratory failure which is Acute.  she is not on home oxygen. Supplemental oxygen was provided and noted- Vent Mode: A/C  Oxygen Concentration (%):  [] 100  Resp Rate Total:  [22 br/min-31 br/min] 30 br/min  Vt Set:  [450 mL] 450 mL  PEEP/CPAP:  [8 cmH20] 8 cmH20  Mean Airway Pressure:  [15 cmH20] 15 cmH20    .   Signs/symptoms of respiratory failure include- tachypnea, increased work of breathing, and respiratory distress. Contributing diagnoses includes - COPD Labs and images were reviewed. Patient Has recent ABG, which has been reviewed. Will treat underlying causes and adjust management of respiratory failure as follows- CxR and follow up ABG  Vent settings reviewed  S/p 3 amp of " bicarbonate per AG  ARDS net protocol  Nebs / inhaled steroids  IV Hydrocortisone in PACU sec to worsening levo requirements.       Cardiac/Vascular  Shock  This patient has shock. The type of shock is distributive due to ? adrenal . The patient has the following evidence of shock: persistent hypotension. The patient will be admitted to an intensive care unit, they will be treated with   CVP  Continue fluid resuscitation  Echo and BNP  Empiric Abx.    Pulmonary hypertension  Repeat Echo  CVP    PAD (peripheral artery disease)  Complicating her illness    Innominate artery stenosis  Complicating her illness      Immunology/Multi System  Marfan's syndrome  Complicating her illness    Endocrine  Type 2 diabetes mellitus with circulatory disorder, with long-term current use of insulin  Accucheck and SSI        Critical Care Daily Checklist:    A: Awake: RASS Goal/Actual Goal:    Actual:     B: Spontaneous Breathing Trial Performed?     C: SAT & SBT Coordinated?  yes                      D: Delirium: CAM-ICU     E: Early Mobility Performed? yes   F: Feeding Goal:    Status:     Current Diet Order   No orders of the defined types were placed in this encounter.      AS: Analgesia/Sedation yes   T: Thromboembolic Prophylaxis    H: HOB > 300 Yes   U: Stress Ulcer Prophylaxis (if needed) yes   G: Glucose Control yes   B: Bowel Function     I: Indwelling Catheter (Lines & Mathias) Necessity yes   D: De-escalation of Antimicrobials/Pharmacotherapies yes    Plan for the day/ETD yes    Code Status:  Family/Goals of Care: Prior  yes     Critical Care Time: 38 minutes  Critical secondary to Patient has a condition that poses threat to life and bodily function: Severe Respiratory Distress     Critical care was time spent personally by me on the following activities: development of treatment plan with patient or surrogate and bedside caregivers, discussions with consultants, evaluation of patient's response to treatment, examination of  patient, ordering and performing treatments and interventions, ordering and review of laboratory studies, ordering and review of radiographic studies, pulse oximetry, re-evaluation of patient's condition. This critical care time did not overlap with that of any other provider or involve time for any procedures.    Thank you for your consult. I will follow-up with patient. Please contact us if you have any additional questions.     Deidre Alberts MD  Critical Care Medicine  [facility] - Intensive Care [facility])

## 2025-02-17 NOTE — PROGRESS NOTES
Pt arrived to unit intubated sedated with propofol at 30. BP supported with levo at 0.5 for MAP >70. Temp 98.1. +cough/gag/corneals. Central line to R groin &  LIJ. OG & burt intact. L groin arterial line has a good waveform, flushes without difficulty but does not draw back blood. CXR ordered. Foam applied to sacrum. No wounds found. Incision to L shoulder covered with gauze & foam tape. Immobilizer in place. R wrist restraint in place for safety.

## 2025-02-17 NOTE — ANESTHESIA PROCEDURE NOTES
Central Line    Diagnosis: marfan  Patient location during procedure: done in OR  Procedure Urgency: Routine  Procedure start time: 2/17/2025 11:10 AM  Timeout: 2/17/2025 11:10 AM  Procedure end time: 2/17/2025 11:15 AM      Staffing  Authorizing Provider: Jenny Garcia MD  Performing Provider: Jenny Garcia MD    Staffing  Performed by: Jenny Garcia MD  Authorized by: Jenny Garcia MD    Anesthesiologist was present at the time of the procedure.  Preanesthetic Checklist  Completed: patient identified, IV checked, site marked, risks and benefits discussed, surgical consent, monitors and equipment checked, pre-op evaluation, timeout performed and anesthesia consent given  Indication   Indication: hemodynamic monitoring, vascular access, med administration     Anesthesia   general anesthesia    Central Line   Skin Prep: skin prepped with ChloraPrep, skin prep agent completely dried prior to procedure  Sterile Barriers Followed: Yes    All five maximal barriers used- gloves, gown, cap, mask, and large sterile sheet    hand hygiene performed prior to central venous catheter insertion  Location: right femoral vein.   Catheter type: single lumen  Catheter Size: 5 Fr  Ultrasound: vascular probe with ultrasound   Vessel Caliber: medium, patent, compressibility normal  Needle advanced into vessel with real time Ultrasound guidance.  Guidewire confirmed in vessel.  Image recorded and saved.  sterile gel and probe cover used in ultrasound-guided central venous catheter insertion   Manometry: Venous cannualation confirmed by visual estimation of blood vessel pressure using manometry.  Insertion Attempts: 1   Securement:sterile dressing applied and blood return through all ports    Post-Procedure    Adverse Events:none      Guidewire Guidewire removed intact. Guidewire removed intact, verified with nurse.

## 2025-02-17 NOTE — ANESTHESIA PROCEDURE NOTES
Arterial    Diagnosis: marfan    Patient location during procedure: done in OR  Timeout: 2/17/2025 11:09 AM  Procedure end time: 2/17/2025 11:15 AM    Staffing  Authorizing Provider: Jenny Garcia MD  Performing Provider: Jenny Garcia MD    Staffing  Performed by: Jenny Garcia MD  Authorized by: Jenny Garcia MD    Anesthesiologist was present at the time of the procedure.    Preanesthetic Checklist  Completed: patient identified, IV checked, site marked, risks and benefits discussed, surgical consent, monitors and equipment checked, pre-op evaluation, timeout performed and anesthesia consent givenArterial  Skin Prep: chlorhexidine gluconate  Local Infiltration: none  Orientation: left  Location: femoral    Catheter placement by Ultrasound guidance. Heme positive aspiration all ports.   Vessel Caliber: small, patent, compressibility normal  Vascular Doppler:  not done  Needle advanced into vessel with real time Ultrasound guidance.  Guidewire confirmed in vessel.  Sterile sheath used.  Image recorded and saved.Insertion Attempts: 1  Assessment  Dressing: secured with tape and tegaderm

## 2025-02-17 NOTE — DISCHARGE SUMMARY
[facility] - Surgery (Hospital)  Discharge Note  Short Stay    Procedure(s) (LRB):  REPAIR, ROTATOR CUFF, ARTHROSCOPIC (Left)  ARTHROSCOPY, SHOULDER, WITH SUBACROMIAL SPACE DECOMPRESSION (Left)  ARTHROSCOPY,SHOULDER,WITH BICEPS TENODESIS (Left)      OUTCOME: Patient tolerated treatment/procedure well without complication and is now ready for discharge.    DISPOSITION: Home or Self Care    FINAL DIAGNOSIS:  left shoulder rotator cuff repair    FOLLOWUP: In clinic    DISCHARGE INSTRUCTIONS:  No discharge procedures on file.     TIME SPENT ON DISCHARGE: 10 minutes

## 2025-02-17 NOTE — PLAN OF CARE
The following message was sent to Dr. Jain: I just wanted to give you an update on Mrs. Westfall. Patient came to PACU and her blood pressure was low. At the time of arrival to PACU, she was responsive. We started her on a norepinephrine drip for pressure support. She began to have some labored breathing. Reversal agents for the versed and fentanyl were given and an ABGs were completed. Patient was placed on a CPAP machine. She became less responsive. Patient was just reintubated by Dr. Fenton. Dr. Fenton is updated the family now. ICU has been notified of needing a bed.

## 2025-02-17 NOTE — TRANSFER OF CARE
"Anesthesia Transfer of Care Note    Patient: Jenifer Westfall    Procedure(s) Performed: Procedure(s) (LRB):  REPAIR, ROTATOR CUFF, ARTHROSCOPIC (Left)  ARTHROSCOPY, SHOULDER, WITH SUBACROMIAL SPACE DECOMPRESSION (Left)  ARTHROSCOPY,SHOULDER,WITH BICEPS TENODESIS (Left)    Patient location: PACU    Anesthesia Type: general    Transport from OR: Transported from OR on room air with adequate spontaneous ventilation    Post pain: adequate analgesia    Post assessment: no apparent anesthetic complications    Post vital signs: stable    Level of consciousness: awake    Nausea/Vomiting: no nausea/vomiting    Complications: none    Transfer of care protocol was followed      Last vitals: Visit Vitals  BP (!) 102/53 (BP Location: Right leg, Patient Position: Lying)   Pulse 80   Temp 36.6 °C (97.9 °F) (Temporal)   Resp 17   Ht 5' 10" (1.778 m)   Wt 102 kg (224 lb 13.9 oz)   SpO2 99%   Breastfeeding No   BMI 32.27 kg/m²     "

## 2025-02-17 NOTE — ANESTHESIA PROCEDURE NOTES
Peripheral Block    Patient location during procedure: pre-op   Block not for primary anesthetic.  Reason for block: at surgeon's request and post-op pain management   Post-op Pain Location: left shoulder   Start time: 2/17/2025 10:05 AM  Timeout: 2/17/2025 10:05 AM   End time: 2/17/2025 10:15 AM    Staffing  Authorizing Provider: Jenny Garcia MD  Performing Provider: Jenny Garcia MD    Staffing  Performed by: Jenny Garcia MD  Authorized by: Jenny Garcia MD    Preanesthetic Checklist  Completed: patient identified, IV checked, site marked, risks and benefits discussed, surgical consent, monitors and equipment checked, pre-op evaluation and timeout performed  Peripheral Block  Patient position: sitting  Prep: ChloraPrep  Patient monitoring: heart rate, cardiac monitor, continuous pulse ox, continuous capnometry and frequent blood pressure checks  Block type: interscalene  Laterality: left  Injection technique: single shot  Needle  Needle type: Tuohy   Needle gauge: 20 G  Needle localization: anatomical landmarks and ultrasound guidance  Needle insertion depth: 3 cm  Catheter type: stimulating  Test dose: negative and lidocaine 1.5% with Epi 1-to-200,000   -ultrasound image captured on disc.  Assessment  Injection assessment: negative aspiration, negative parasthesia and local visualized surrounding nerve  Paresthesia pain: none  Heart rate change: no  Slow fractionated injection: yes  Pain Tolerance: comfortable throughout block      Additional Notes  Experel 20ml given

## 2025-02-17 NOTE — HPI
Patient scheduled for lt shoulder arthrocopic shoulder repair, biceps tenodesis and subacromal decompression.   In OR required fluid resuscitation and inotropes  Was on RA with normal vitals prior to surgery.  Hx of Pul HTN but normal EF on echo in 2022.  Extubated in PACU but required NIPPV and continued to have labored respiration and was reintubated.   Continue higher pressor requirements.   Hx of pul nodule and VATS, Smoking and COPD.  Inaccurate urine output sec to leak but urine looks dilute. Off sedation she wakes up and follow commands.   Hx of innominate occlusion and PAD.   BP seems to be correlating to cuff.  CVL is being placed at this time.

## 2025-02-17 NOTE — PLAN OF CARE
I was concerned because patient's vitals signs are much better; normal sinus rhythm, blood pressure is normal, she is on a CPAP, but she is now not responding. MD came to bedside. Called ICU to see what doctor was on and to inform them patient will be coming to ICU.

## 2025-02-17 NOTE — ANESTHESIA PROCEDURE NOTES
Intubation    Date/Time: 2/17/2025 10:38 AM    Performed by: Cata Retana CRNA  Authorized by: Jenny Garcia MD    Intubation:     Induction:  Rapid sequence induction    Intubated:  Postinduction    Mask Ventilation:  Not attempted    Attempts:  1    Attempted By:  CRNA    Method of Intubation:  Video laryngoscopy    Blade:  Rebollar 3    Laryngeal View Grade: Grade I - full view of cords      Difficult Airway Encountered?: No      Complications:  None    Airway Device:  Oral endotracheal tube    Airway Device Size:  7.5    Style/Cuff Inflation:  Cuffed (inflated to minimal occlusive pressure)    Tube secured:  21    Secured at:  The lips    Placement Verified By:  Capnometry    Complicating Factors:  None    Findings Post-Intubation:  BS equal bilateral and atraumatic/condition of teeth unchanged

## 2025-02-17 NOTE — DISCHARGE INSTRUCTIONS
DISCHARGE INSTRUCTIONS FOR ROTATOR CUFF REPAIR     Contact the [facility] at [phone] if you have questions about your instructions or follow-up appointment.     DIET:   Start with clear liquids and light foods to minimize nausea. Once these are tolerated, advance to a regular diet.     DRESSING AND WOUND CARE:   Keep the dressing clean and dry. It is normal for there to be some drainage after surgery since the shoulder was irrigated with large amounts of fluid. Reinforce with additional gauze as necessary.   Remove the dressing the 2nd day after surgery and begin changing daily with clean gauze or Band-Aids®. Keep your incisions covered until you follow up in clinic.   If you have Steri-Strips in place of stitches, allow them to stay in place as long as possible. Steri-Strips are made of a fabric material that can get wet in the shower and pat dry with a towel. They usually fall off on their own within 7 to 10 days. You may trim the edges as they begin to curl.     BATHING:   You may bathe or shower on the 2nd day after surgery, but do not scrub or soak the incisions. Dry the area by gently blotting it with a gauze or towel. After it is completely dry, cover the wound with clean gauze or Band-Aids®. Do NOT submerge the incisions (bath/swim) until after the sutures are removed and the wound has completely healed.     ACTIVITY:    Ice should be applied to the shoulder for 20-30 minutes, 5-6 times a day, to help control pain and swelling. Apply additional times as needed, especially after exercise, for the first 3-4 weeks. Do not apply ice directly to the skin; use a thin barrier in between. Also, do not use heat.    Elevate the shoulder by sleeping as upright as possible using extra pillows or a recliner. Do this for the first few days to help decrease pain and swelling.    Wear the sling at all times for 6 weeks including while sleeping. The only time you may remove the sling is for bathing and  exercises. Do not lean or put your body weight on your arm.    When your block wears off, start the following exercises:  Remove the sling for 5-10 minutes, 3 times a day, to do the following exercises:   Fully bend & straighten your fingers, your wrist, and your elbow several times.   Lean forward, bracing yourself on a table/counter with your normal arm. Let your surgical arm relax and hang straight down. Shift your weight so that your arm moves side to side, front to back, and in gentle circles like a pendulum or elephant's trunk. Use your body to generate the movement for this, NOT your surgical shoulder's muscles. (see drawing below)      Physical therapy will be started after your 1st follow-up visit. At that time, you will be given a prescription & rehabilitation protocol to take to the PT clinic of your choice. Plan to visit with a therapist within 3 days after your follow-up visit.     PAIN CONTROL:   It is important to stay ahead of pain as it becomes challenging to get under control if you fall behind. Ice and elevation can help and should be used as much as possible in the first few days.    Narcotic pain medications, such as tramadol and oxycodone, should be taken as prescribed. The Tramadol is intended to be taken first as the primary medicine and then oxycodone taken for breakthrough pain. Wean off as soon as possible. Take these with food to decrease the chances of nausea and vomiting. Do not drink alcohol, drive a vehicle, or use heavy machinery while taking narcotic pain medications.    NSAID medications are used for pain control and to decrease inflammation. You may be prescribed an NSAID such as celebrex. Take as instructed. Other NSAID medications such as ibuprofen, Motrin, Advil, naproxen, or Aleve can be used once you have finished the celebrex, or if a prescription for celebrex was not provided.    Acetaminophen (Tylenol) is an effective over-the-counter pain medication that can be used with  NSAID medications and non-acetaminophen containing narcotics such as plain oxycodone.     ASPIRIN FOR PREVENTION OF BLOOD CLOTS:   You should take one 81 mg baby aspirin twice daily for two weeks starting the evening of the day you have surgery unless instructed otherwise or taking a different blood thinner such as enoxaparin or warfarin. If you are aware that you are at high risk for a blood clot, notify your physician as soon as possible.   Take aspirin at least 30 minutes before taking ibuprofen or Toradol.    CONSTIPATION PREVENTION:   Anesthesia and pain medications, changes in eating and drinking, and less activity can all lead to constipation after surgery. To prevent or reduce constipation, take an over-the-counter stool softener (brands include Colace and Miralax). Follow the directions on the bottle. Drink plenty of water and eat high fiber foods including whole grains, fresh fruits, vegetables, beans, prunes or prune juice.     PROBLEMS TO REPORT:   Persistent bloody drainage that soaks through reinforced dressings.   Fever greater than 101F or 38C.   Incision that is very red, swollen, draining pus, shows red streaks, or feels hot.   Inability to urinate within 8 hours of surgery (a rare effect of the anesthesia).   If you develop a rash, generalized itching or swelling from the medications, STOP the medication and call the clinic or the orthopedic surgery resident on call.   Daytime calls should be directed to the [facility] at [phone].   Night-time and weekend calls should be directed to the after hours nurse line at [phone]    FREQUENTLY ASKED QUESTIONS     WHAT DAILY ACTIVITIES CAN I DO?   After shoulder surgery, you may do what you feel comfortable doing in the sling. Do not lift anything with your operative arm or put yourself at risk of falling.     CAN I DRIVE OR RIDE BY CAR/ TRAIN/ PLANE?   You should not drive while using a sling. There are no forced restrictions  regarding operating a motor vehicle, however you must always be the  of whether you are able to operate it safely. You should not drive while taking narcotic pain medications. You may ride in a car after surgery as needed. You may take a train or even fly the day after your surgery as long as you feel secure and comfortable.     WHAT ABOUT WORK?   You may return to an office-type job or to school whenever comfortable. For most patients this occurs 1-2 weeks after surgery. For more active jobs that require some lifting, you can wait until after your follow-up appointment. Any other unusual types of jobs should be discussed to determine a date for return to work.     WHAT ABOUT SWELLING?   Expect swelling as a normal process after surgery. Ice, elevation, and other treatments provided at physical therapy will allow this to improve in time. Some swelling may remain for up to 8 weeks, and this is normal.     WHAT IF IT REALLY HURTS TOO MUCH?   Surgery hurts and you cannot expect to be pain free, but our goal is for it to be tolerable. Try to use all available pain therapies such as narcotics, NSAIDS, and acetaminophen. Always try more ice and elevation. If the pain is not tolerable, call the clinic or the after hours nurse line.

## 2025-02-17 NOTE — SUBJECTIVE & OBJECTIVE
Past Medical History:   Diagnosis Date    Arthritis     Cancer     Right lower lobe Adenocarcinoma    COPD (chronic obstructive pulmonary disease)     Diabetes mellitus, type 2     DM (diabetes mellitus)      2017 Insulin x 3 years     GERD (gastroesophageal reflux disease)     Hepatitis C antibody positive in blood 2021    RNA NEGATIVE 3/6/2017, 3/22/22    Hyperlipidemia     Hypertension     IBS (irritable bowel syndrome)     Kidney stone     Marfan syndrome     Mitral valve prolapse     Sleep apnea     moild: no cpap needed       Past Surgical History:   Procedure Laterality Date    ANGIOGRAPHY OF LOWER EXTREMITY N/A 2018    Procedure: ANGIOGRAM, LOWER EXTREMITY- LEFT LEG;  Surgeon: Roscoe Landeros MD;  Location: [facility] CATH LAB;  Service: Peripheral Vascular;  Laterality: N/A;    APPENDECTOMY      BUNIONECTOMY      both feet    BYPASS GRAFT Bilateral     cataract surgery  2019     SECTION      x 2    CHOLECYSTECTOMY      COLONOSCOPY N/A 2020    Procedure: COLONOSCOPY w/ biopsies;  Surgeon: Gio Georges MD;  Location: [facility];  Service: Endoscopy;  Laterality: N/A;    ESOPHAGEAL MANOMETRY WITH MEASUREMENT OF IMPEDANCE N/A 2023    Procedure: MANOMETRY, ESOPHAGUS, WITH IMPEDANCE MEASUREMENT;  Surgeon: Desmond Calderon RN;  Location: [facility];  Service: Endoscopy;  Laterality: N/A;    ESOPHAGOGASTRODUODENOSCOPY N/A 2019    Procedure: ESOPHAGOGASTRODUODENOSCOPY (EGD);  Surgeon: Jaron Sanders III, MD;  Location: [facility];  Service: Endoscopy;  Laterality: N/A;    ESOPHAGOGASTRODUODENOSCOPY N/A 2022    Procedure: EGD (ESOPHAGOGASTRODUODENOSCOPY);  Surgeon: Keith Hardwick MD;  Location: [facility];  Service: Gastroenterology;  Laterality: N/A;    ESOPHAGOGASTRODUODENOSCOPY N/A 2024    Procedure: EGD (ESOPHAGOGASTRODUODENOSCOPY) 10/21-cc rec'd clr may hold plavix 5 days;  Surgeon: Jadyn Santana MD;  Location: [facility];  Service:  Endoscopy;  Laterality: N/A;    INJECTION OF ANESTHETIC AGENT AROUND MULTIPLE INTERCOSTAL NERVES Right 1/19/2024    Procedure: BLOCK, NERVE, INTERCOSTAL, 2 OR MORE;  Surgeon: Chente Cabrera MD;  Location: [facility] OR;  Service: Cardiothoracic;  Laterality: Right;    LOBECTOMY-ROBOTIC Right 1/19/2024    Procedure: LOBECTOMY-ROBOTIC;  Surgeon: Chente Cabrera MD;  Location: [facility] OR;  Service: Cardiothoracic;  Laterality: Right;  Lower Lobe    PH MONITORING, ESOPHAGUS, WIRELESS, (OFF REFLUX MEDS) N/A 11/22/2024    Procedure: PH MONITORING, ESOPHAGUS, WIRELESS, (OFF REFLUX MEDS);  Surgeon: Jadyn Santana MD;  Location: [facility] ENDO;  Service: Endoscopy;  Laterality: N/A;    ROBOT-ASSISTED LAPAROSCOPIC LYMPHADENECTOMY USING DA GOMEZ XI Right 1/19/2024    Procedure: XI ROBOTIC LYMPHADENECTOMY;  Surgeon: Chente Cabrera MD;  Location: [facility] OR;  Service: Cardiothoracic;  Laterality: Right;  Robotic mediastinal lymph node dissection    SELECTIVE INJECTION OF ANESTHETIC AGENT AROUND LUMBAR SPINAL NERVE ROOT BY TRANSFORAMINAL APPROACH Bilateral 11/23/2020    Procedure: Bilateral L5/S1 TF DENY;  Surgeon: Jewel Walters MD;  Location: [facility] PAIN MGT;  Service: Pain Management;  Laterality: Bilateral;    TUBAL LIGATION      VATS, ROBOT-ASSISTED, OR ROBOT-ASSISTED THORACOTOMY Right 1/19/2024    Procedure: VATS, ROBOT-ASSISTED, OR ROBOT-ASSISTED THORACOTOMY;  Surgeon: Chente Cabrera MD;  Location: [facility] OR;  Service: Cardiothoracic;  Laterality: Right;  Robot assisted right lower lobectomy and mediastinal lymph node dissection       Review of patient's allergies indicates:   Allergen Reactions    Compazine [prochlorperazine edisylate] Rash    Contrast media Anaphylaxis    Dye Anaphylaxis     Contrast dye    Iodinated contrast media Anaphylaxis     Other reaction(s): anaphylaxis    Levaquin [levofloxacin] Hives and Itching    Prochlorperazine Rash    Metformin Other (See Comments)     Upset stomach    Ibuprofen Other  "(See Comments) and Nausea And Vomiting     Stomach pain  Stomach pain    Abdominal pain     Methocarbamol Nausea Only       Family History       Problem Relation (Age of Onset)    Heart disease Mother, Maternal Aunt, Maternal Uncle    Hypertension Father    Lung cancer Maternal Grandmother    Marfan syndrome Son    No Known Problems Brother, Maternal Grandfather, Paternal Grandmother, Paternal Grandfather    Stroke Maternal Aunt, Maternal Uncle    Thrombophilia Father          Tobacco Use    Smoking status: Former     Current packs/day: 0.00     Average packs/day: 1 pack/day for 24.0 years (24.0 ttl pk-yrs)     Types: Cigarettes     Start date: 1996     Quit date: 2020     Years since quittin.1     Passive exposure: Past    Smokeless tobacco: Never    Tobacco comments:     "once in a blue moon"   Substance and Sexual Activity    Alcohol use: Never    Drug use: No    Sexual activity: Not Currently         Review of Systems   Unable to perform ROS: Acuity of condition     Objective:     Vital Signs (Most Recent):  Temp: 97.1 °F (36.2 °C) (25 1415)  Pulse: 99 (25)  Resp: 15 (25)  BP: (!) 93/56 (25)  SpO2: 100 % (25) Vital Signs (24h Range):  Temp:  [97.1 °F (36.2 °C)-97.9 °F (36.6 °C)] 97.1 °F (36.2 °C)  Pulse:  [] 99  Resp:  [6-42] 15  SpO2:  [75 %-100 %] 100 %  BP: ()/() 93/56  Arterial Line BP: ()/() 75/68     Weight: 102 kg (224 lb 13.9 oz)  Body mass index is 32.27 kg/m².      Intake/Output Summary (Last 24 hours) at 2025 1735  Last data filed at 2025 1712  Gross per 24 hour   Intake 2000 ml   Output 252 ml   Net 1748 ml        Physical Exam  Vitals and nursing note reviewed.   Constitutional:       General: She is in acute distress.      Appearance: She is ill-appearing. She is not toxic-appearing.   HENT:      Head: Normocephalic.   Eyes:      Pupils: Pupils are equal, round, and reactive to light. " "  Cardiovascular:      Rate and Rhythm: Tachycardia present.      Pulses: Normal pulses.   Pulmonary:      Effort: Pulmonary effort is normal.   Abdominal:      Palpations: Abdomen is soft.   Neurological:      General: No focal deficit present.          Vents:  Vent Mode: A/C (02/17/25 1450)  Set Rate: 22 BPM (02/17/25 1450)  Vt Set: 450 mL (02/17/25 1450)  PEEP/CPAP: 8 cmH20 (02/17/25 1450)  Oxygen Concentration (%): 100 (02/17/25 1715)  Peak Airway Pressure: 27 cmH20 (02/17/25 1450)  Plateau Pressure: 0 cmH20 (02/17/25 1450)  Total Ve: 10.2 L/m (02/17/25 1450)  F/VT Ratio<105 (RSBI): (!) 45.06 (02/17/25 1450)    Lines/Drains/Airways       Central Venous Catheter Line  Duration             Percutaneous Central Line - Triple Lumen  02/17/25 1724 Internal Jugular Left <1 day    Percutaneous Central Line - single lumen  02/17/25 1040 Femoral Right <1 day              Drain  Duration                  Urethral Catheter 02/17/25 1621 <1 day              Airway  Duration                  Airway - Non-Surgical 02/17/25 1449 Endotracheal Tube <1 day              Arterial Line  Duration             Arterial Line 02/17/25 1040 Left Femoral <1 day              Peripheral Intravenous Line  Duration                  Peripheral IV - Single Lumen 02/17/25 1545 20 G Anterior;Right Forearm <1 day                    Significant Labs:    CBC/Anemia Profile:  No results for input(s): "WBC", "HGB", "HCT", "PLT", "MCV", "RDW", "IRON", "FERRITIN", "RETIC", "FOLATE", "[id number]", "OCCULTBLOOD" in the last 48 hours.     Chemistries:  No results for input(s): "NA", "K", "CL", "CO2", "BUN", "CREATININE", "CALCIUM", "ALBUMIN", "PROT", "BILITOT", "ALKPHOS", "ALT", "AST", "GLUCOSE", "MG", "PHOS" in the last 48 hours.    All pertinent labs within the past 24 hours have been reviewed.    Significant Imaging:   I have reviewed all pertinent imaging results/findings within the past 24 hours.  "

## 2025-02-17 NOTE — ASSESSMENT & PLAN NOTE
Patient with Hypercapnic and Hypoxic Respiratory failure which is Acute.  she is not on home oxygen. Supplemental oxygen was provided and noted- Vent Mode: A/C  Oxygen Concentration (%):  [] 100  Resp Rate Total:  [22 br/min-31 br/min] 30 br/min  Vt Set:  [450 mL] 450 mL  PEEP/CPAP:  [8 cmH20] 8 cmH20  Mean Airway Pressure:  [15 cmH20] 15 cmH20    .   Signs/symptoms of respiratory failure include- tachypnea, increased work of breathing, and respiratory distress. Contributing diagnoses includes - COPD Labs and images were reviewed. Patient Has recent ABG, which has been reviewed. Will treat underlying causes and adjust management of respiratory failure as follows- CxR and follow up ABG  Vent settings reviewed  S/p 3 amp of bicarbonate per AG  ARDS net protocol  Nebs / inhaled steroids  IV Hydrocortisone in PACU sec to worsening levo requirements.

## 2025-02-17 NOTE — ANESTHESIA PROCEDURE NOTES
Central Line    Diagnosis: Hypotension  Patient location during procedure: PACU  Procedure Urgency: Routine  Procedure start time: 2/17/2025 5:07 PM  Timeout: 2/17/2025 5:04 PM  Procedure end time: 2/17/2025 7:14 AM      Staffing  Authorizing Provider: Roscoe Fenton MD  Performing Provider: Rosoce Fenton MD    Staffing  Performed by: Roscoe Fenton MD  Authorized by: Roscoe Fenton MD    Anesthesiologist was present at the time of the procedure.  Preanesthetic Checklist  Completed: patient identified, IV checked, site marked, risks and benefits discussed, surgical consent, monitors and equipment checked, pre-op evaluation, timeout performed and anesthesia consent given  Indication   Indication: hemodynamic monitoring, vascular access, med administration     Anesthesia   local infiltration    Central Line   Skin Prep: skin prepped with ChloraPrep, skin prep agent completely dried prior to procedure  Sterile Barriers Followed: Yes    All five maximal barriers used- gloves, gown, cap, mask, and large sterile sheet    hand hygiene performed prior to central venous catheter insertion  Location: left internal jugular.   Catheter type: triple lumen  Catheter Size: 7 Fr  Inserted Catheter Length: 15 cm  Ultrasound: vascular probe with ultrasound   Vessel Caliber: medium, patent  Vascular Doppler:  not done, compressibility normal  Needle advanced into vessel with real time Ultrasound guidance.  Guidewire confirmed in vessel.  sterile gel and probe cover used in ultrasound-guided central venous catheter insertion  Manometry: none  Insertion Attempts: 1   Securement:line sutured, chlorhexidine patch, sterile dressing applied and blood return through all ports    Post-Procedure    Adverse Events:none      Guidewire Guidewire removed intact.

## 2025-02-17 NOTE — OP NOTE
ORTHOPAEDIC SURGERY OPERATIVE REPORT    DATE OF SERVICE: 2/17/2025    PRIMARY SURGEON: All Jain MD    Assistant Surgeon: SMA Franklyn. Skilled assistance was medically necessary for this case to help with extremity positioning, soft tissue retractions and instrumentation.     DATE OF SURGERY: 2/17/2025    PATIENT'S NAME: Jenifer Westfall    MEDICAL RECORD NUMBER: [id number]     PREOPERATIVE DIAGNOSES:   1. Left shoulder rotator cuff tear  2. Left shoulder biceps tendinitis, SLAP tear  3. Left shoulder impingement    POSTOPERATIVE DIAGNOSES:   1. Left shoulder rotator cuff tear  2. Left shoulder biceps tendinitis, SLAP tear  3. Left shoulder impingement    PROCEDURE PERFORMED:   1. Left shoulder arthroscopic rotator cuff repair  2. Left shoulder arthroscopic biceps tenodesis  3. Left shoulder subacromial decompression    ANESTHESIA: General plus regional.     IMPLANTS USED:   Implant Name Type Inv. Item Serial No.  Lot No. LRB No. Used Action   SYS SWIVELOCK LNT 4.75BC - [id number]  SYS SWIVELOCK LNT 4.75BC  [employer] [id number] Left 1 Implanted   ANCHOR SWIVELCK KL 4.75X24.5MM - [id number]  ANCHOR SWIVELCK KL 4.75X24.5MM  [employer] [id number] Left 1 Implanted   ANCHOR SWIVELCK KL 4.75X24.5MM - [id number]  ANCHOR SWIVELCK KL 4.75X24.5MM  [employer] [id number] Left 1 Implanted   ANCHOR FIBERTAK SOFT BLUE #2 - [id number]  ANCHOR FIBERTAK SOFT BLUE #2  [employer] [id number] Left 1 Implanted   ANCHOR FIBERTAK SOFT BLUE #2 - [id number]  ANCHOR FIBERTAK SOFT BLUE #2  [employer] [id number] Left 1 Implanted         COMPLICATIONS: None.     POSITION: Beachchair.     BRIEF INDICATIONS: This is a 49 y.o. female who presents with a symptomatic LEFT rotator cuff tear and associated biceps tendonopathy. she has failed conservative treatment measures. We discussed surgical treatment options including risks and benefits. After a detailed explanation of the technical aspects of the procedure, the patient elected to proceed and  signed consent.    OPERATIVE FINDINGS:   Biceps/Labrum: Inflamed tendon with extensive longitudinal tearing, Type 2 SLAP tear  Glenohumeral joint: Glenoid and humeral head with grade 1-2 changes, no focal lesions  Rotator Cuff: Full thickness delaminated tear of the supraspinatus  Subacromial space: inflamed bursal tissue with anterolateral spur    DESCRIPTION OF PROCEDURE:   The patient was identified in the preoperative holding area. The operative upper extremity was marked.  Consent was verified. The patient was then taken for preoperative block. Following this, the patient was taken to the main operating room where she was laid supine on the operative table. General anesthetic was induced. Preoperative time-out was performed verifying the patient, procedure, and preoperative antibiotics. The patient was then positioned in the beachchair position with all bony prominences well padded. The operative upper extremity was then prepped and draped in the usual sterile fashion.     A posterior portal was established with an #11-blade. The arthroscope was inserted into the glenohumeral joint atraumatically. We then made an anterior portal using an outside-in technique with an #18-gauge spinal needle into the rotator interval. We then used an #11-blade for stab incision and then followed this with a straight hemostat to open our anterior portal. We then inserted our arthroscopic probe to probe the joint. We were able to visualize the biceps tendon which was inflamed and deformed with extensive longitudinal tearing. The labrum was probed and demonstrated frayed edges and a type 2 SLAP tear. The shaver was introduced to debride the frayed edges of labrum.    The subscapularis was viewed and probed and found to be intact.     Through the anterior portal we placed a passport cannula.  We then passed a FiberWire suture in a luggage tag configuration through and around the biceps tendon.  A Passer/grasper was then used to  mcbride the biceps tendon distal to the luggage tag and grab a tail again to lock it into place.  This was then loaded into a 4.75mm SwiveLock anchor outside the shoulder joint.  The biceps tendon was then transected proximal to the stitch, and the anchor was advanced to complete the tenodesis.    From the glenohumeral space we were also able to visualize a full thickness delaminated rotator cuff tear.  We then localized a lateral portal under direct visualization with an #18-gauge spinal needle into the cuff tear. We then made a #11-blade stab incision in the lateral shoulder and then through this brought our arthroscopic shaver. We debrided the torn cuff tissue back to a stable rim and also began preparing the tuberosity for anchor placement. We then removed our instruments from the glenohumeral joint and placed the arthroscope from the posterior portal into the subacromial space. We debrided a significant amount of bursal tissue in the subacromial space. We identified the undersurface of the acromion. We used a VAPR to debride the undersurface of the acromion up to the anterior and lateral margins. We identified the CA ligament and we were careful not to remove this. We continued debridement of the bursal tissue, identified the rotator cuff tear, and worked to define the edges more clearly. Next, we did our acromioplasty by burring the anterolateral margin of the acromion then working carefully medially. The portals were switched and the acromioplasty was completed through the posterior portal in the cutting block fashion.The remnants of rotator cuff tissue at the greater tuberosity was debrided and the greater tuberosity preparation was completed by debriding down to bleeding bone.     Once the rotator cuff was prepared, we then placed our medial row anchors.  We used 2 Arthrex 2.6mm FiberTak anchors preloaded with knotless mechanism and FiberTape for the medial row.  We then passed the sutures using the scorpion  device from anterior to posterior.  The knotless mechanism was used to reduce the delaminated articular side tissue. The FiberTapes were passed through the bursal layer and left long for incorporation into a lateral row.    Next, we began debridement of the lateral humerus with a VAPR probe. We debrided down to bony base. We then placed our lateral row anchors, which consisted of 2 Arthrex 4.75mm SwiveLock anchors.  We brought sutures from each of the medial row anchors and tensioned them into the anterior lateral row anchor and then advanced the anchor.  This was repeated for the posterior lateral row anchor. Once we completed this, we took the remaining final arthroscopic pictures.     The portal sites were closed with 3-0 nylon sutures.  A light sterile dressing and sling shoulder immobilizer were applied.  The patient was then woken from anesthesia and brought to PACU in stable condition.    Plan:  Rotator Cuff Protocol  NWB LUE in sling  Ok to be out of sling for pendulums, elbow, wrist ROM  ASA 81mg BID x 2wks for DVT ppx  f/u 10-14 days for suture removal      All Jain MD

## 2025-02-18 NOTE — ASSESSMENT & PLAN NOTE
This patient has shock. The type of shock is distributive due to ? adrenal . The patient has the following evidence of shock: persistent hypotension. The patient will be admitted to an intensive care unit, they will be treated with   CVP  Continue fluid resuscitation  Echo and BNP  Empiric Abx.    2/18 ]  See above

## 2025-02-18 NOTE — HOSPITAL COURSE
2/18 Worsening hypoxia, pulmonary edema and shock.  Worsening lactate. No improvement with bicarb infusion  Events in PACU reviewed.  Serial labs and imaging  Echo ordered and cardiology consulted last night    Son updated at around 8 am and requested to come asap    Echo showed severe LV dilation and EF 20% With no apical movement.   D/w cardiology - not a candidate for IABP or Impella due to severe acidosis.   This is not septic but likely cardiogenic shock and pulmonary edema.     Now with shock liver and MSOF with refractory acidosis    Patient arrested and was resuscitated for 1.05 hours.   At 10:25 she was declared dead.  Fixed dialted pupils, no corneals no spont respiration.  Sons were at the bedside.

## 2025-02-18 NOTE — ASSESSMENT & PLAN NOTE
-Seems to be septic in nature  -Severe LA/metabolic acidosis s/p prolonged post-op hypoxia/resuscitation  -TTE with EF of 20% with apical WMA, suggestive of stress-induced CMPY  -Given current clinical state and multiorgan failure, any invasive CV procedure at this juncture would be futile with increased risks and no benefit  -Supportive care as per primary team  -On abx

## 2025-02-18 NOTE — PROGRESS NOTES
At 905 pt went into Afib RVR with a sustained rate in the 130s. MD notified. Amio bolus started. Pt became bradycardic & lost a pulse. EICU called into room. See code charting.

## 2025-02-18 NOTE — ASSESSMENT & PLAN NOTE
Patient with Hypercapnic and Hypoxic Respiratory failure which is Acute.  she is not on home oxygen. Supplemental oxygen was provided and noted- Vent Mode: A/C  Oxygen Concentration (%):  [] 100  Resp Rate Total:  [22 br/min-69 br/min] 30 br/min  Vt Set:  [350 mL-470 mL] 450 mL  PEEP/CPAP:  [5 cmH20-8 cmH20] 8 cmH20  Mean Airway Pressure:  [7.5 lvW97-39 cmH20] 18 cmH20    .   Signs/symptoms of respiratory failure include- tachypnea, increased work of breathing, and respiratory distress. Contributing diagnoses includes - COPD Labs and images were reviewed. Patient Has recent ABG, which has been reviewed. Will treat underlying causes and adjust management of respiratory failure as follows- CxR and follow up ABG  Vent settings reviewed  S/p 3 amp of bicarbonate per AG  ARDS net protocol  Nebs / inhaled steroids  IV Hydrocortisone in PACU sec to worsening levo requirements.     See above  Vent changes made this am

## 2025-02-18 NOTE — PROGRESS NOTES
Post mortem care done. Pt to go to [facility]. Family given house supervisor number and advised to call whenever they pick a  home. Security notified for patient transport.

## 2025-02-18 NOTE — PROGRESS NOTES
Pharmacist Renal Dose Adjustment Note    Jenifer Westfall is a 49 y.o. female being treated with the medication piperacillin-tazobactam.     Patient Data:    Vital Signs (Most Recent):  Temp: 98.1 °F (36.7 °C) (02/17/25 1800)  Pulse: 98 (02/17/25 1855)  Resp: (!) 22 (02/17/25 1855)  BP: (!) 92/45 (02/17/25 1845)  SpO2: (!) 92 % (02/17/25 1855) Vital Signs (72h Range):  Temp:  [97.1 °F (36.2 °C)-98.1 °F (36.7 °C)]   Pulse:  []   Resp:  [6-42]   BP: ()/()   SpO2:  [75 %-100 %]   Arterial Line BP: ()/()      Recent Labs   Lab 02/17/25  1826   CREATININE 1.0     Serum creatinine: 1 mg/dL 02/17/25 1826  Estimated creatinine clearance: 87 mL/min    Medication: piperacillin-tazobactam 3.375 g IV every 8 hours will be changed to piperacillin-tazobactam 4.5 g IV every 8 hours per pharmacy renal dose adjustment protocol for patients with CrCl greater than 20 mL/min.    Pharmacist's Name: Ivis Erickson PharmD  Pharmacist's Extension: [phone]     Thank you for allowing us to participate in this patient's care.     Ivis Erickson PharmD 02/17/2025 7:33 PM

## 2025-02-18 NOTE — HPI
HPI obtained from chart as patient was intubated/unresponsive    Ms. Westfall is a 49 year old female patient whose current medical conditions include Marfan's, PVD, HTN, DM, MVP, PHTN, tobacco abuse, and hyperlipidemia who presented to [facility] ED yesterday for elective arthroscopic shoulder surgery. Post-surgery, she became persistently hypoxic and required re-intubation and was admitted to ICU. Clinical condition deteriorated overnight. Cardiology consulted to assist with management. Patient seen and examined today, remains critically ill. On pressor support. Unresponsive on vent, off sedation. Febrile. WBC 22,000. LA 11.9. Renal and liver failure noted. Troponin 12. CT of chest with findings concerning for multifocal PNA. TTE reviewed, EF 25% with apical WMA suggestive of stress-induced CMPY. On abx.     Unfortunately, patient coded/passed away shortly after our exam this AM.

## 2025-02-18 NOTE — SUBJECTIVE & OBJECTIVE
Past Medical History:   Diagnosis Date    Arthritis     Cancer     Right lower lobe Adenocarcinoma    COPD (chronic obstructive pulmonary disease)     Diabetes mellitus, type 2     DM (diabetes mellitus)      2017 Insulin x 3 years     GERD (gastroesophageal reflux disease)     Hepatitis C antibody positive in blood 2021    RNA NEGATIVE 3/6/2017, 3/22/22    Hyperlipidemia     Hypertension     IBS (irritable bowel syndrome)     Kidney stone     Marfan syndrome     Mitral valve prolapse     Sleep apnea     moild: no cpap needed       Past Surgical History:   Procedure Laterality Date    ANGIOGRAPHY OF LOWER EXTREMITY N/A 2018    Procedure: ANGIOGRAM, LOWER EXTREMITY- LEFT LEG;  Surgeon: Roscoe Landeros MD;  Location: [facility] CATH LAB;  Service: Peripheral Vascular;  Laterality: N/A;    APPENDECTOMY      ARTHROSCOPIC REPAIR OF ROTATOR CUFF OF SHOULDER Left 2025    Procedure: REPAIR, ROTATOR CUFF, ARTHROSCOPIC;  Surgeon: All Jain MD;  Location: [facility] OR;  Service: Orthopedics;  Laterality: Left;    ARTHROSCOPY OF SHOULDER WITH DECOMPRESSION OF SUBACROMIAL SPACE Left 2025    Procedure: ARTHROSCOPY, SHOULDER, WITH SUBACROMIAL SPACE DECOMPRESSION;  Surgeon: All Jain MD;  Location: [facility] OR;  Service: Orthopedics;  Laterality: Left;    ARTHROSCOPY,SHOULDER,WITH BICEPS TENODESIS Left 2025    Procedure: ARTHROSCOPY,SHOULDER,WITH BICEPS TENODESIS;  Surgeon: All Jain MD;  Location: [facility] OR;  Service: Orthopedics;  Laterality: Left;    BUNIONECTOMY      both feet    BYPASS GRAFT Bilateral     cataract surgery  2019     SECTION      x 2    CHOLECYSTECTOMY      COLONOSCOPY N/A 2020    Procedure: COLONOSCOPY w/ biopsies;  Surgeon: Gio Georges MD;  Location: [facility] ENDO;  Service: Endoscopy;  Laterality: N/A;    ESOPHAGEAL MANOMETRY WITH MEASUREMENT OF IMPEDANCE N/A 2023    Procedure: MANOMETRY, ESOPHAGUS, WITH  IMPEDANCE MEASUREMENT;  Surgeon: Desmond Calderon RN;  Location: [facility];  Service: Endoscopy;  Laterality: N/A;    ESOPHAGOGASTRODUODENOSCOPY N/A 07/31/2019    Procedure: ESOPHAGOGASTRODUODENOSCOPY (EGD);  Surgeon: Jaron Sanders III, MD;  Location: [facility];  Service: Endoscopy;  Laterality: N/A;    ESOPHAGOGASTRODUODENOSCOPY N/A 04/22/2022    Procedure: EGD (ESOPHAGOGASTRODUODENOSCOPY);  Surgeon: Keith Hardwick MD;  Location: [facility];  Service: Gastroenterology;  Laterality: N/A;    ESOPHAGOGASTRODUODENOSCOPY N/A 11/22/2024    Procedure: EGD (ESOPHAGOGASTRODUODENOSCOPY) 10/21-cc rec'd clr may hold plavix 5 days;  Surgeon: Jadyn Santana MD;  Location: [facility];  Service: Endoscopy;  Laterality: N/A;    INJECTION OF ANESTHETIC AGENT AROUND MULTIPLE INTERCOSTAL NERVES Right 1/19/2024    Procedure: BLOCK, NERVE, INTERCOSTAL, 2 OR MORE;  Surgeon: Chente Cabrera MD;  Location: [facility];  Service: Cardiothoracic;  Laterality: Right;    LOBECTOMY-ROBOTIC Right 1/19/2024    Procedure: LOBECTOMY-ROBOTIC;  Surgeon: Chente Cabrera MD;  Location: [facility] OR;  Service: Cardiothoracic;  Laterality: Right;  Lower Lobe    PH MONITORING, ESOPHAGUS, WIRELESS, (OFF REFLUX MEDS) N/A 11/22/2024    Procedure: PH MONITORING, ESOPHAGUS, WIRELESS, (OFF REFLUX MEDS);  Surgeon: Jadyn Santana MD;  Location: [facility];  Service: Endoscopy;  Laterality: N/A;    ROBOT-ASSISTED LAPAROSCOPIC LYMPHADENECTOMY USING DA GOMEZ XI Right 1/19/2024    Procedure: XI ROBOTIC LYMPHADENECTOMY;  Surgeon: Chente Cabrera MD;  Location: [facility] OR;  Service: Cardiothoracic;  Laterality: Right;  Robotic mediastinal lymph node dissection    SELECTIVE INJECTION OF ANESTHETIC AGENT AROUND LUMBAR SPINAL NERVE ROOT BY TRANSFORAMINAL APPROACH Bilateral 11/23/2020    Procedure: Bilateral L5/S1 TF DENY;  Surgeon: Jewel Walters MD;  Location: [facility] PAIN MGT;  Service: Pain Management;  Laterality: Bilateral;    TUBAL LIGATION      VATS,  ROBOT-ASSISTED, OR ROBOT-ASSISTED THORACOTOMY Right 1/19/2024    Procedure: VATS, ROBOT-ASSISTED, OR ROBOT-ASSISTED THORACOTOMY;  Surgeon: Chnete Cabrera MD;  Location: [facility];  Service: Cardiothoracic;  Laterality: Right;  Robot assisted right lower lobectomy and mediastinal lymph node dissection       Review of patient's allergies indicates:   Allergen Reactions    Compazine [prochlorperazine edisylate] Rash    Contrast media Anaphylaxis    Dye Anaphylaxis     Contrast dye    Iodinated contrast media Anaphylaxis     Other reaction(s): anaphylaxis    Levaquin [levofloxacin] Hives and Itching    Prochlorperazine Rash    Metformin Other (See Comments)     Upset stomach    Ibuprofen Other (See Comments) and Nausea And Vomiting     Stomach pain  Stomach pain    Abdominal pain     Methocarbamol Nausea Only       No current facility-administered medications on file prior to encounter.     Current Outpatient Medications on File Prior to Encounter   Medication Sig    atenoloL (TENORMIN) 25 MG tablet Take 1 tablet (25 mg total) by mouth every evening.    atorvastatin (LIPITOR) 80 MG tablet Take 1 tablet (80 mg total) by mouth once daily.    famotidine (PEPCID) 20 MG tablet TAKE 1 TABLET (20 MG TOTAL) BY MOUTH 2 (TWO) TIMES DAILY.    gabapentin (NEURONTIN) 300 MG capsule Take 1 capsule (300 mg total) by mouth every evening.    omeprazole (PRILOSEC) 40 MG capsule TAKE 1 CAPSULE (40 MG TOTAL) BY MOUTH 2 (TWO) TIMES DAILY BEFORE MEALS.    albuterol (PROVENTIL) 2.5 mg /3 mL (0.083 %) nebulizer solution TAKE 3 MLS (2.5 MG TOTAL) BY NEBULIZATION EVERY 4 TO 6 HOURS AS NEEDED FOR WHEEZING OR SHORTNESS OF BREATH.    albuterol (PROVENTIL/VENTOLIN HFA) 90 mcg/actuation inhaler Inhale 2 puffs into the lungs every 6 (six) hours as needed for Wheezing. Rescue    ascorbic acid (VITAMIN C) 100 MG tablet Take 100 mg by mouth once daily.    aspirin (ECOTRIN) 81 MG EC tablet Take 81 mg by mouth every evening.    azelastine (ASTELIN) 137  "mcg (0.1 %) nasal spray 2 sprays by Nasal route daily as needed for Rhinitis.    BD ULTRA-FINE AMERICA PEN NEEDLE 32 gauge x 5/32" Ndle Inject 1 each into the skin 3 (three) times daily.    busPIRone (BUSPAR) 30 MG Tab Take 15 mg by mouth 2 (two) times daily.    butalbital-acetaminophen-caffeine -40 mg (FIORICET, ESGIC) -40 mg per tablet Take 1 tablet by mouth every 4 (four) hours as needed for Pain.    clopidogreL (PLAVIX) 75 mg tablet Take 1 tablet (75 mg total) by mouth once daily.    dapagliflozin propanediol (FARXIGA) 10 mg tablet Take 1 tablet (10 mg total) by mouth once daily.    dicyclomine (BENTYL) 20 mg tablet TAKE 1 TABLET BY MOUTH 3 TIMES DAILY BEFORE MEALS AS NEEDED FOR ABDOMINAL PAIN    evolocumab (REPATHA SURECLICK) 140 mg/mL PnIj Inject 1 mL (140 mg total) into the skin every 14 (fourteen) days.    famotidine (PEPCID) 20 MG tablet Take 1 tablet (20 mg total) by mouth 2 (two) times daily.    finasteride (PROPECIA) 1 mg tablet Take 1 mg by mouth once daily.    FLUoxetine (PROZAC) 40 MG capsule TAKE 1 CAPSULE BY MOUTH ONCE DAILY    fluticasone propionate (FLONASE) 50 mcg/actuation nasal spray 2 sprays by Each Nostril route daily as needed.    gabapentin (NEURONTIN) 300 MG capsule TAKE 3 CAPSULES (900 MG TOTAL) BY MOUTH 3 (THREE) TIMES DAILY.    glipiZIDE (GLUCOTROL) 10 MG tablet Take 1 tablet (10 mg total) by mouth 2 (two) times daily.    isosorbide mononitrate (IMDUR) 30 MG 24 hr tablet TAKE 1 TABLET (30 MG TOTAL) BY MOUTH ONCE DAILY.    LANTUS SOLOSTAR U-100 INSULIN glargine 100 units/mL SubQ pen Inject 40 Units into the skin 2 (two) times a day.    linaCLOtide (LINZESS) 145 mcg Cap capsule Take 1 capsule (145 mcg total) by mouth before breakfast.    lisinopriL (PRINIVIL,ZESTRIL) 5 MG tablet Take 1 tablet (5 mg total) by mouth every evening.    mometasone (ELOCON) 0.1 % solution Apply topically.    NOVOLOG FLEXPEN U-100 INSULIN 100 unit/mL (3 mL) InPn pen Inject into the skin 3 (three) times " "daily with meals.    PEN NEEDLE 31 gauge x 516" Ndle USE 5 TIMES DAILY WITH LANTUS AND HUMALOG    tiotropium (SPIRIVA) 18 mcg inhalation capsule Inhale 1 capsule (18 mcg total) into the lungs once daily. Controller    tiZANidine (ZANAFLEX) 4 MG tablet TAKE 1 TABLET (4 MG TOTAL) BY MOUTH 3 (THREE) TIMES DAILY AS NEEDED (SPASMS).    TRUE METRIX GLUCOSE TEST STRIP Strp     TRULICITY 4.5 mg/0.5 mL pen injector Inject 0.5 mLs as directed every 7 days.    ZINC ORAL Take 1 tablet by mouth once daily. PT UNSURE OF DOSE     Family History       Problem Relation (Age of Onset)    Heart disease Mother, Maternal Aunt, Maternal Uncle    Hypertension Father    Lung cancer Maternal Grandmother    Marfan syndrome Son    No Known Problems Brother, Maternal Grandfather, Paternal Grandmother, Paternal Grandfather    Stroke Maternal Aunt, Maternal Uncle    Thrombophilia Father          Tobacco Use    Smoking status: Former     Current packs/day: 0.00     Average packs/day: 1 pack/day for 24.0 years (24.0 ttl pk-yrs)     Types: Cigarettes     Start date: 1996     Quit date: 2020     Years since quittin.1     Passive exposure: Past    Smokeless tobacco: Never    Tobacco comments:     "once in a blue moon"   Substance and Sexual Activity    Alcohol use: Never    Drug use: No    Sexual activity: Not Currently     Review of Systems   Unable to perform ROS: Acuity of condition     Objective:     Vital Signs (Most Recent):  Temp: (!) 105.3 °F (40.7 °C) (25 0755)  Pulse: (!) 0 (25 1030)  Resp: (!) 4 (25 1030)  BP: (!) 96/50 (25 1010)  SpO2: (!) 86 % (25 1025) Vital Signs (24h Range):  Temp:  [97.1 °F (36.2 °C)-105.6 °F (40.9 °C)] 105.3 °F (40.7 °C)  Pulse:  [0-158] 0  Resp:  [0-117] 4  SpO2:  [54 %-100 %] 86 %  BP: ()/() 96/50  Arterial Line BP: ()/() 73/64     Weight: 101.6 kg (224 lb)  Body mass index is 32.14 kg/m².    SpO2: (!) 86 %         Intake/Output Summary (Last 24 " "hours) at 2/18/2025 1042  Last data filed at 2/18/2025 0701  Gross per 24 hour   Intake 4300 ml   Output 1417 ml   Net 2883 ml       Lines/Drains/Airways       Central Venous Catheter Line  Duration             Percutaneous Central Line - Triple Lumen  02/17/25 1724 Internal Jugular Left <1 day              Drain  Duration                  NG/OG Tube 02/17/25 Gladwin sump 16 Fr. Center mouth 1 day         Urethral Catheter 02/17/25 1621 <1 day              Airway  Duration                  Airway - Non-Surgical 02/17/25 1449 Endotracheal Tube <1 day              Peripheral Intravenous Line  Duration                  Peripheral IV - Single Lumen 02/17/25 1545 20 G Anterior;Right Forearm <1 day                     Physical Exam  Vitals and nursing note reviewed.   Constitutional:       Appearance: She is ill-appearing.      Comments: Intubated   HENT:      Head: Normocephalic and atraumatic.   Cardiovascular:      Rate and Rhythm: Normal rate and regular rhythm.      Heart sounds: S1 normal and S2 normal. No murmur heard.  Pulmonary:      Effort: Pulmonary effort is normal.      Comments: Coarse, diminished BS  Musculoskeletal:      Right lower leg: No edema.      Left lower leg: No edema.   Skin:     General: Skin is dry.      Comments: Some mottling   Psychiatric:      Comments: Unresponsive          Significant Labs: CMP   Recent Labs   Lab 02/17/25  1826 02/18/25  0020 02/18/25  0412 02/18/25  0822    142 143 142   K 4.8 6.0* 5.9* 5.6*    105 106 108   CO2 19* 14* 13* 11*   * 543* 398* 276*   BUN 13 15 20 24*   CREATININE 1.0 1.4 1.8* 2.5*   CALCIUM 7.9* 8.3* 7.6* 7.2*   PROT 5.9*  --  6.1  --    ALBUMIN 2.6*  --  2.9*  --    BILITOT 0.5  --  0.6  --    ALKPHOS 153*  --  198*  --    AST 71*  --  1,186*  --    ALT 57*  --  982*  --    ANIONGAP 16 23* 24* 23*   , CBC   Recent Labs   Lab 02/18/25  0412   WBC 22.68*   HGB 14.4   HCT 48.1      , Troponin No results for input(s): "TROPONINIHS" in " the last 48 hours., and All pertinent lab results from the last 24 hours have been reviewed.    Significant Imaging: Echocardiogram: Transthoracic echo (TTE) complete (Cupid Only):   Results for orders placed or performed during the hospital encounter of 02/17/25   Echo   Result Value Ref Range    BSA 2.24 m2    A4C EF 38 %    LVOT stroke volume 28.1 cm3    LVIDd 4.2 3.5 - 6.0 cm    LV Systolic Volume 62.09 mL    LV Systolic Volume Index 28.4 mL/m2    LVIDs 3.8 2.1 - 4.0 cm    LV Diastolic Volume 77.84 mL    LV Diastolic Volume Index 35.54 mL/m2    LV EDV A4C 52.37 mL    Left Ventricular End Systolic Volume by Teichholz Method 62.09 mL    Left Ventricular End Diastolic Volume by Teichholz Method 77.84 mL    IVS 1.0 0.6 - 1.1 cm    LVOT diameter 2.2 cm    LVOT area 3.8 cm2    FS 9.5 (A) 28 - 44 %    Left Ventricle Relative Wall Thickness 0.52 cm    PW 1.1 0.6 - 1.1 cm    LV mass 147.0 g    LV Mass Index 67.1 g/m2    MV Peak E Surjit 0.65 m/s    TDI LATERAL 0.08 m/s    TDI SEPTAL 0.06 m/s    E/E' ratio 9 m/s    MV Peak A Surjit 0.71 m/s    E/A ratio 0.92     IVRT 59 msec    E wave deceleration time 144 msec    LV SEPTAL E/E' RATIO 10.8 m/s    LV LATERAL E/E' RATIO 8.1 m/s    LVOT peak surjit 0.8 m/s    Left Ventricular Outflow Tract Mean Velocity 0.51 cm/s    Left Ventricular Outflow Tract Mean Gradient 1.26 mmHg    RVOT peak VTI 9.4 cm    TAPSE 1.16 cm    LA size 2.6 cm    Left Atrium Minor Axis 5.1 cm    Left Atrium Major Axis 5.1 cm    RA Major Axis 3.56 cm    AV mean gradient 4 mmHg    AV peak gradient 7 mmHg    Ao peak surjit 1.3 m/s    Ao VTI 13.8 cm    LVOT peak VTI 7.4 cm    AV valve area 2.0 cm²    AV Velocity Ratio 0.62     AV index (prosthetic) 0.54     MAGUE by Velocity Ratio 2.3 cm²    MV stenosis pressure 1/2 time 41.89 ms    MV valve area p 1/2 method 5.25 cm2    PV mean gradient 1 mmHg    RVOT peak surjit 0.53 m/s    STJ 2.62 cm    Ascending aorta 2.63 cm    IVC diameter 1.61 cm    Mean e' 0.07 m/s    ZLVIDS -1.33      ZLVIDD -5.66     SNEHA 15 mL/m2    LA Vol 33 cm3    LA WIDTH 2.9 cm    RA Width 2.1 cm    EF 25 %    Est. RA pres 3 mmHg    Narrative      Left Ventricle: The left ventricle is normal in size. Normal wall   thickness. There is concentric remodeling. Normal wall motion. See diagram   for wall motion findings. There is severely reduced systolic function with   a visually estimated ejection fraction of less than 30%. Ejection fraction   is approximately 25%. Grade I diastolic dysfunction.    Right Ventricle: Normal right ventricular cavity size. Wall thickness   is normal. Systolic function is normal.    IVC/SVC: Normal venous pressure at 3 mmHg.    These findings could represent stress induced cardiomyopathy or   Takotsubo cardiomyopathy      and EKG: Reviewed

## 2025-02-18 NOTE — PROGRESS NOTES
[facility] - Intensive Care ([facility])  Critical Care Medicine  Progress Note    Patient Name: Jenifer Westfall  MRN: [id number]  Admission Date: 2/17/2025  Hospital Length of Stay: 1 days  Code Status: Full Code  Attending Provider: Deidre Alberts MD  Primary Care Provider: Jose Elias [facility]   Principal Problem: <principal problem not specified>    Subjective:     HPI:  Patient scheduled for lt shoulder arthrocopic shoulder repair, biceps tenodesis and subacromal decompression.   In OR required fluid resuscitation and inotropes  Was on RA with normal vitals prior to surgery.  Hx of Pul HTN but normal EF on echo in 2022.  Extubated in PACU but required NIPPV and continued to have labored respiration and was reintubated.   Continue higher pressor requirements.   Hx of pul nodule and VATS, Smoking and COPD.  Inaccurate urine output sec to leak but urine looks dilute. Off sedation she wakes up and follow commands.   Hx of innominate occlusion and PAD.   BP seems to be correlating to cuff.  CVL is being placed at this time.       Hospital/ICU Course:  2/18 Worsening hypoxia, pulmonary edema and shock.  Worsening lactate. No improvement with bicarb infusion  Events in PACU reviewed.  Serial labs and imaging  Echo ordered and cardiology consulted last night    Son updated at around 8 am and requested to come asap    Echo showed severe LV dilation and EF 20% With no apical movement.   D/w cardiology - not a candidate for IABP or Impella due to severe acidosis.   This is not septic but likely cardiogenic shock and pulmonary edema.     Now with shock liver and MSOF with refractory acidosis    Patient arrested and was resuscitated for 1.05 hours.   At 10:25 she was declared dead.  Fixed dialted pupils, no corneals no spont respiration.  Sons were at the bedside.           Objective:     Vital Signs (Most Recent):  Temp: (!) 105.3 °F (40.7 °C) (02/18/25 0755)  Pulse: (!) 0 (02/18/25 1030)  Resp: (!) 4 (02/18/25 1030)  BP: (!)  96/50 (02/18/25 1010)  SpO2: (!) 86 % (02/18/25 1025) Vital Signs (24h Range):  Temp:  [97.1 °F (36.2 °C)-105.6 °F (40.9 °C)] 105.3 °F (40.7 °C)  Pulse:  [0-158] 0  Resp:  [0-117] 4  SpO2:  [54 %-100 %] 86 %  BP: ()/() 96/50  Arterial Line BP: ()/() 73/64     Weight: 101.6 kg (224 lb)  Body mass index is 32.14 kg/m².      Intake/Output Summary (Last 24 hours) at 2/18/2025 1103  Last data filed at 2/18/2025 0701  Gross per 24 hour   Intake 4300 ml   Output 1417 ml   Net 2883 ml        Physical Exam  Vitals and nursing note reviewed.   Constitutional:       General: She is in acute distress.      Appearance: She is ill-appearing and diaphoretic.   HENT:      Head: Normocephalic.   Eyes:      Pupils: Pupils are equal, round, and reactive to light.   Cardiovascular:      Rate and Rhythm: Tachycardia present.      Pulses: Normal pulses.   Pulmonary:      Breath sounds: Rhonchi present.   Abdominal:      Palpations: Abdomen is soft.   Skin:     Capillary Refill: Capillary refill takes less than 2 seconds.           Review of Systems    Vents:  Vent Mode: A/C (02/18/25 1003)  Set Rate: 30 BPM (02/18/25 1003)  Vt Set: 450 mL (02/18/25 1003)  PEEP/CPAP: 8 cmH20 (02/18/25 1003)  Oxygen Concentration (%): 100 (02/18/25 1022)  Peak Airway Pressure: 30 cmH20 (02/18/25 1003)  Plateau Pressure: 0 cmH20 (02/18/25 1003)  Total Ve: 13.4 L/m (02/18/25 1003)  Negative Inspiratory Force (cm H2O): 0 (02/18/25 0939)  F/VT Ratio<105 (RSBI): (!) 66.38 (02/18/25 0939)    Lines/Drains/Airways       Central Venous Catheter Line  Duration             Percutaneous Central Line - Triple Lumen  02/17/25 1724 Internal Jugular Left <1 day              Drain  Duration                  NG/OG Tube 02/17/25 Eduin sump 16 Fr. Center mouth 1 day         Urethral Catheter 02/17/25 1621 <1 day              Airway  Duration                  Airway - Non-Surgical 02/17/25 1449 Endotracheal Tube <1 day              Peripheral  Intravenous Line  Duration                  Peripheral IV - Single Lumen 02/17/25 1545 20 G Anterior;Right Forearm <1 day                    Significant Labs:    CBC/Anemia Profile:  Recent Labs   Lab 02/18/25  0412   WBC 22.68*   HGB 14.4   HCT 48.1      MCV 97   RDW 13.6        Chemistries:  Recent Labs   Lab 02/17/25  1826 02/18/25  0020 02/18/25  0412 02/18/25  0822    142 143 142   K 4.8 6.0* 5.9* 5.6*    105 106 108   CO2 19* 14* 13* 11*   BUN 13 15 20 24*   CREATININE 1.0 1.4 1.8* 2.5*   CALCIUM 7.9* 8.3* 7.6* 7.2*   ALBUMIN 2.6*  --  2.9*  --    PROT 5.9*  --  6.1  --    BILITOT 0.5  --  0.6  --    ALKPHOS 153*  --  198*  --    ALT 57*  --  982*  --    AST 71*  --  1,186*  --    MG  --   --  1.9  --    PHOS  --   --  8.3* 6.9*       All pertinent labs within the past 24 hours have been reviewed.    Significant Imaging:  I have reviewed all pertinent imaging results/findings within the past 24 hours.    ABG  Recent Labs   Lab 02/18/25  0315   PH 7.111*   PO2 99   PCO2 51.1*   HCO3 16.3*   BE -13*     Assessment/Plan:     Pulmonary  Acute respiratory failure with hypoxia and hypercarbia  Patient with Hypercapnic and Hypoxic Respiratory failure which is Acute.  she is not on home oxygen. Supplemental oxygen was provided and noted- Vent Mode: A/C  Oxygen Concentration (%):  [] 100  Resp Rate Total:  [22 br/min-69 br/min] 30 br/min  Vt Set:  [350 mL-470 mL] 450 mL  PEEP/CPAP:  [5 cmH20-8 cmH20] 8 cmH20  Mean Airway Pressure:  [7.5 frY62-31 cmH20] 18 cmH20    .   Signs/symptoms of respiratory failure include- tachypnea, increased work of breathing, and respiratory distress. Contributing diagnoses includes - COPD Labs and images were reviewed. Patient Has recent ABG, which has been reviewed. Will treat underlying causes and adjust management of respiratory failure as follows- CxR and follow up ABG  Vent settings reviewed  S/p 3 amp of bicarbonate per AG  ARDS net protocol  Nebs / inhaled  steroids  IV Hydrocortisone in PACU sec to worsening levo requirements.     See above  Vent changes made this am      Cardiac/Vascular  Shock  This patient has shock. The type of shock is distributive due to ? adrenal . The patient has the following evidence of shock: persistent hypotension. The patient will be admitted to an intensive care unit, they will be treated with   CVP  Continue fluid resuscitation  Echo and BNP  Empiric Abx.    2/18 ]  See above    Pulmonary hypertension  Repeat Echo  CVP    PAD (peripheral artery disease)  Complicating her illness    Innominate artery stenosis  Complicating her illness      Immunology/Multi System  Marfan's syndrome  Complicating her illness    Endocrine  Type 2 diabetes mellitus with circulatory disorder, with long-term current use of insulin  Accucheck and SSI        Critical Care Time: 85 minutes  Critical secondary to Patient has a condition that poses threat to life and bodily function: Severe Respiratory Distress      Critical care was time spent personally by me on the following activities: development of treatment plan with patient or surrogate and bedside caregivers, discussions with consultants, evaluation of patient's response to treatment, examination of patient, ordering and performing treatments and interventions, ordering and review of laboratory studies, ordering and review of radiographic studies, pulse oximetry, re-evaluation of patient's condition. This critical care time did not overlap with that of any other provider or involve time for any procedures.     Deidre Alberts MD  Critical Care Medicine  [facility] - Intensive Care ([facility])

## 2025-02-18 NOTE — SUBJECTIVE & OBJECTIVE
Objective:     Vital Signs (Most Recent):  Temp: (!) 105.3 °F (40.7 °C) (02/18/25 0755)  Pulse: (!) 0 (02/18/25 1030)  Resp: (!) 4 (02/18/25 1030)  BP: (!) 96/50 (02/18/25 1010)  SpO2: (!) 86 % (02/18/25 1025) Vital Signs (24h Range):  Temp:  [97.1 °F (36.2 °C)-105.6 °F (40.9 °C)] 105.3 °F (40.7 °C)  Pulse:  [0-158] 0  Resp:  [0-117] 4  SpO2:  [54 %-100 %] 86 %  BP: ()/() 96/50  Arterial Line BP: ()/() 73/64     Weight: 101.6 kg (224 lb)  Body mass index is 32.14 kg/m².      Intake/Output Summary (Last 24 hours) at 2/18/2025 1103  Last data filed at 2/18/2025 0701  Gross per 24 hour   Intake 4300 ml   Output 1417 ml   Net 2883 ml        Physical Exam  Vitals and nursing note reviewed.   Constitutional:       General: She is in acute distress.      Appearance: She is ill-appearing and diaphoretic.   HENT:      Head: Normocephalic.   Eyes:      Pupils: Pupils are equal, round, and reactive to light.   Cardiovascular:      Rate and Rhythm: Tachycardia present.      Pulses: Normal pulses.   Pulmonary:      Breath sounds: Rhonchi present.   Abdominal:      Palpations: Abdomen is soft.   Skin:     Capillary Refill: Capillary refill takes less than 2 seconds.           Review of Systems    Vents:  Vent Mode: A/C (02/18/25 1003)  Set Rate: 30 BPM (02/18/25 1003)  Vt Set: 450 mL (02/18/25 1003)  PEEP/CPAP: 8 cmH20 (02/18/25 1003)  Oxygen Concentration (%): 100 (02/18/25 1022)  Peak Airway Pressure: 30 cmH20 (02/18/25 1003)  Plateau Pressure: 0 cmH20 (02/18/25 1003)  Total Ve: 13.4 L/m (02/18/25 1003)  Negative Inspiratory Force (cm H2O): 0 (02/18/25 0939)  F/VT Ratio<105 (RSBI): (!) 66.38 (02/18/25 0939)    Lines/Drains/Airways       Central Venous Catheter Line  Duration             Percutaneous Central Line - Triple Lumen  02/17/25 1724 Internal Jugular Left <1 day              Drain  Duration                  NG/OG Tube 02/17/25 [place] sump 16 Fr. Center mouth 1 day         Urethral Catheter  02/17/25 1621 <1 day              Airway  Duration                  Airway - Non-Surgical 02/17/25 1449 Endotracheal Tube <1 day              Peripheral Intravenous Line  Duration                  Peripheral IV - Single Lumen 02/17/25 1545 20 G Anterior;Right Forearm <1 day                    Significant Labs:    CBC/Anemia Profile:  Recent Labs   Lab 02/18/25 0412   WBC 22.68*   HGB 14.4   HCT 48.1      MCV 97   RDW 13.6        Chemistries:  Recent Labs   Lab 02/17/25  1826 02/18/25  0020 02/18/25  0412 02/18/25  0822    142 143 142   K 4.8 6.0* 5.9* 5.6*    105 106 108   CO2 19* 14* 13* 11*   BUN 13 15 20 24*   CREATININE 1.0 1.4 1.8* 2.5*   CALCIUM 7.9* 8.3* 7.6* 7.2*   ALBUMIN 2.6*  --  2.9*  --    PROT 5.9*  --  6.1  --    BILITOT 0.5  --  0.6  --    ALKPHOS 153*  --  198*  --    ALT 57*  --  982*  --    AST 71*  --  1,186*  --    MG  --   --  1.9  --    PHOS  --   --  8.3* 6.9*       All pertinent labs within the past 24 hours have been reviewed.    Significant Imaging:  I have reviewed all pertinent imaging results/findings within the past 24 hours.

## 2025-02-18 NOTE — CONSULTS
[facility] - Intensive Care (Hospital)  Cardiology  Consult Note    Patient Name: Jenifer Westfall  MRN: [id number]  Admission Date: 2/17/2025  Hospital Length of Stay: 1 days  Code Status: Full Code   Attending Provider: Deidre Alberts MD   Consulting Provider: Anabella Mckeon PA-C  Primary Care Physician: Kareen Moctezuma  Principal Problem:<principal problem not specified>    Patient information was obtained from past medical records and ER records.     Inpatient consult to Cardiology  Consult performed by: Anabella Mckeon PA-C  Consult ordered by: Deidre Alberts MD        Subjective:     Chief Complaint:  Shock    HPI:   HPI obtained from chart as patient was intubated/unresponsive    Ms. Westfall is a 49 year old female patient whose current medical conditions include Marfan's, PVD, HTN, DM, MVP, PHTN, tobacco abuse, and hyperlipidemia who presented to [facility] ED yesterday for elective arthroscopic shoulder surgery. Post-surgery, she became persistently hypoxic and required re-intubation and was admitted to ICU. Clinical condition deteriorated overnight. Cardiology consulted to assist with management. Patient seen and examined today, remains critically ill. On pressor support. Unresponsive on vent, off sedation. Febrile. WBC 22,000. LA 11.9. Renal and liver failure noted. Troponin 12. CT of chest with findings concerning for multifocal PNA. TTE reviewed, EF 25% with apical WMA suggestive of stress-induced CMPY. On abx.     Unfortunately, patient coded/passed away shortly after our exam this AM.       Past Medical History:   Diagnosis Date    Arthritis     Cancer     Right lower lobe Adenocarcinoma    COPD (chronic obstructive pulmonary disease)     Diabetes mellitus, type 2     DM (diabetes mellitus) 2009     03/01/2017 Insulin x 3 years 2013    GERD (gastroesophageal reflux disease)     Hepatitis C antibody positive in blood 03/04/2021    RNA NEGATIVE 3/6/2017, 3/22/22    Hyperlipidemia      Hypertension     IBS (irritable bowel syndrome)     Kidney stone     Marfan syndrome     Mitral valve prolapse     Sleep apnea     moild: no cpap needed       Past Surgical History:   Procedure Laterality Date    ANGIOGRAPHY OF LOWER EXTREMITY N/A 2018    Procedure: ANGIOGRAM, LOWER EXTREMITY- LEFT LEG;  Surgeon: Roscoe Landeros MD;  Location: [facility] CATH LAB;  Service: Peripheral Vascular;  Laterality: N/A;    APPENDECTOMY      ARTHROSCOPIC REPAIR OF ROTATOR CUFF OF SHOULDER Left 2025    Procedure: REPAIR, ROTATOR CUFF, ARTHROSCOPIC;  Surgeon: All Jain MD;  Location: [facility];  Service: Orthopedics;  Laterality: Left;    ARTHROSCOPY OF SHOULDER WITH DECOMPRESSION OF SUBACROMIAL SPACE Left 2025    Procedure: ARTHROSCOPY, SHOULDER, WITH SUBACROMIAL SPACE DECOMPRESSION;  Surgeon: All Jain MD;  Location: [facility];  Service: Orthopedics;  Laterality: Left;    ARTHROSCOPY,SHOULDER,WITH BICEPS TENODESIS Left 2025    Procedure: ARTHROSCOPY,SHOULDER,WITH BICEPS TENODESIS;  Surgeon: All Jain MD;  Location: [facility];  Service: Orthopedics;  Laterality: Left;    BUNIONECTOMY      both feet    BYPASS GRAFT Bilateral     cataract surgery  2019     SECTION      x 2    CHOLECYSTECTOMY      COLONOSCOPY N/A 2020    Procedure: COLONOSCOPY w/ biopsies;  Surgeon: Gio Georges MD;  Location: [facility];  Service: Endoscopy;  Laterality: N/A;    ESOPHAGEAL MANOMETRY WITH MEASUREMENT OF IMPEDANCE N/A 2023    Procedure: MANOMETRY, ESOPHAGUS, WITH IMPEDANCE MEASUREMENT;  Surgeon: Desmond Calderon RN;  Location: [facility];  Service: Endoscopy;  Laterality: N/A;    ESOPHAGOGASTRODUODENOSCOPY N/A 2019    Procedure: ESOPHAGOGASTRODUODENOSCOPY (EGD);  Surgeon: Jaron Sanders III, MD;  Location: [facility];  Service: Endoscopy;  Laterality: N/A;    ESOPHAGOGASTRODUODENOSCOPY N/A 2022    Procedure: EGD (ESOPHAGOGASTRODUODENOSCOPY);  Surgeon:  Keith Hardwick MD;  Location: [facility] ENDO;  Service: Gastroenterology;  Laterality: N/A;    ESOPHAGOGASTRODUODENOSCOPY N/A 11/22/2024    Procedure: EGD (ESOPHAGOGASTRODUODENOSCOPY) 10/21-cc rec'd clr may hold plavix 5 days;  Surgeon: Jadyn Santana MD;  Location: [facility] ENDO;  Service: Endoscopy;  Laterality: N/A;    INJECTION OF ANESTHETIC AGENT AROUND MULTIPLE INTERCOSTAL NERVES Right 1/19/2024    Procedure: BLOCK, NERVE, INTERCOSTAL, 2 OR MORE;  Surgeon: Chente Cabrera MD;  Location: [facility] OR;  Service: Cardiothoracic;  Laterality: Right;    LOBECTOMY-ROBOTIC Right 1/19/2024    Procedure: LOBECTOMY-ROBOTIC;  Surgeon: Chente Cabrera MD;  Location: [facility] OR;  Service: Cardiothoracic;  Laterality: Right;  Lower Lobe    PH MONITORING, ESOPHAGUS, WIRELESS, (OFF REFLUX MEDS) N/A 11/22/2024    Procedure: PH MONITORING, ESOPHAGUS, WIRELESS, (OFF REFLUX MEDS);  Surgeon: Jadyn Santana MD;  Location: [facility];  Service: Endoscopy;  Laterality: N/A;    ROBOT-ASSISTED LAPAROSCOPIC LYMPHADENECTOMY USING DA GOMEZ XI Right 1/19/2024    Procedure: XI ROBOTIC LYMPHADENECTOMY;  Surgeon: Chente Cabrera MD;  Location: [facility] OR;  Service: Cardiothoracic;  Laterality: Right;  Robotic mediastinal lymph node dissection    SELECTIVE INJECTION OF ANESTHETIC AGENT AROUND LUMBAR SPINAL NERVE ROOT BY TRANSFORAMINAL APPROACH Bilateral 11/23/2020    Procedure: Bilateral L5/S1 TF DENY;  Surgeon: Jewel Walters MD;  Location: [facility] PAIN MGT;  Service: Pain Management;  Laterality: Bilateral;    TUBAL LIGATION      VATS, ROBOT-ASSISTED, OR ROBOT-ASSISTED THORACOTOMY Right 1/19/2024    Procedure: VATS, ROBOT-ASSISTED, OR ROBOT-ASSISTED THORACOTOMY;  Surgeon: Chente Cabrera MD;  Location: [facility] OR;  Service: Cardiothoracic;  Laterality: Right;  Robot assisted right lower lobectomy and mediastinal lymph node dissection       Review of patient's allergies indicates:   Allergen Reactions    Compazine [prochlorperazine  "edisylate] Rash    Contrast media Anaphylaxis    Dye Anaphylaxis     Contrast dye    Iodinated contrast media Anaphylaxis     Other reaction(s): anaphylaxis    Levaquin [levofloxacin] Hives and Itching    Prochlorperazine Rash    Metformin Other (See Comments)     Upset stomach    Ibuprofen Other (See Comments) and Nausea And Vomiting     Stomach pain  Stomach pain    Abdominal pain     Methocarbamol Nausea Only       No current facility-administered medications on file prior to encounter.     Current Outpatient Medications on File Prior to Encounter   Medication Sig    atenoloL (TENORMIN) 25 MG tablet Take 1 tablet (25 mg total) by mouth every evening.    atorvastatin (LIPITOR) 80 MG tablet Take 1 tablet (80 mg total) by mouth once daily.    famotidine (PEPCID) 20 MG tablet TAKE 1 TABLET (20 MG TOTAL) BY MOUTH 2 (TWO) TIMES DAILY.    gabapentin (NEURONTIN) 300 MG capsule Take 1 capsule (300 mg total) by mouth every evening.    omeprazole (PRILOSEC) 40 MG capsule TAKE 1 CAPSULE (40 MG TOTAL) BY MOUTH 2 (TWO) TIMES DAILY BEFORE MEALS.    albuterol (PROVENTIL) 2.5 mg /3 mL (0.083 %) nebulizer solution TAKE 3 MLS (2.5 MG TOTAL) BY NEBULIZATION EVERY 4 TO 6 HOURS AS NEEDED FOR WHEEZING OR SHORTNESS OF BREATH.    albuterol (PROVENTIL/VENTOLIN HFA) 90 mcg/actuation inhaler Inhale 2 puffs into the lungs every 6 (six) hours as needed for Wheezing. Rescue    ascorbic acid (VITAMIN C) 100 MG tablet Take 100 mg by mouth once daily.    aspirin (ECOTRIN) 81 MG EC tablet Take 81 mg by mouth every evening.    azelastine (ASTELIN) 137 mcg (0.1 %) nasal spray 2 sprays by Nasal route daily as needed for Rhinitis.    BD ULTRA-FINE [place] PEN NEEDLE 32 gauge x 5/32" Ndle Inject 1 each into the skin 3 (three) times daily.    busPIRone (BUSPAR) 30 MG Tab Take 15 mg by mouth 2 (two) times daily.    butalbital-acetaminophen-caffeine -40 mg (FIORICET, ESGIC) -40 mg per tablet Take 1 tablet by mouth every 4 (four) hours as needed " "for Pain.    clopidogreL (PLAVIX) 75 mg tablet Take 1 tablet (75 mg total) by mouth once daily.    dapagliflozin propanediol (FARXIGA) 10 mg tablet Take 1 tablet (10 mg total) by mouth once daily.    dicyclomine (BENTYL) 20 mg tablet TAKE 1 TABLET BY MOUTH 3 TIMES DAILY BEFORE MEALS AS NEEDED FOR ABDOMINAL PAIN    evolocumab (REPATHA SURECLICK) 140 mg/mL PnIj Inject 1 mL (140 mg total) into the skin every 14 (fourteen) days.    famotidine (PEPCID) 20 MG tablet Take 1 tablet (20 mg total) by mouth 2 (two) times daily.    finasteride (PROPECIA) 1 mg tablet Take 1 mg by mouth once daily.    FLUoxetine (PROZAC) 40 MG capsule TAKE 1 CAPSULE BY MOUTH ONCE DAILY    fluticasone propionate (FLONASE) 50 mcg/actuation nasal spray 2 sprays by Each Nostril route daily as needed.    gabapentin (NEURONTIN) 300 MG capsule TAKE 3 CAPSULES (900 MG TOTAL) BY MOUTH 3 (THREE) TIMES DAILY.    glipiZIDE (GLUCOTROL) 10 MG tablet Take 1 tablet (10 mg total) by mouth 2 (two) times daily.    isosorbide mononitrate (IMDUR) 30 MG 24 hr tablet TAKE 1 TABLET (30 MG TOTAL) BY MOUTH ONCE DAILY.    LANTUS SOLOSTAR U-100 INSULIN glargine 100 units/mL SubQ pen Inject 40 Units into the skin 2 (two) times a day.    linaCLOtide (LINZESS) 145 mcg Cap capsule Take 1 capsule (145 mcg total) by mouth before breakfast.    lisinopriL (PRINIVIL,ZESTRIL) 5 MG tablet Take 1 tablet (5 mg total) by mouth every evening.    mometasone (ELOCON) 0.1 % solution Apply topically.    NOVOLOG FLEXPEN U-100 INSULIN 100 unit/mL (3 mL) InPn pen Inject into the skin 3 (three) times daily with meals.    PEN NEEDLE 31 gauge x 5/16" Ndle USE 5 TIMES DAILY WITH LANTUS AND HUMALOG    tiotropium (SPIRIVA) 18 mcg inhalation capsule Inhale 1 capsule (18 mcg total) into the lungs once daily. Controller    tiZANidine (ZANAFLEX) 4 MG tablet TAKE 1 TABLET (4 MG TOTAL) BY MOUTH 3 (THREE) TIMES DAILY AS NEEDED (SPASMS).    TRUE METRIX GLUCOSE TEST STRIP Strp     TRULICITY 4.5 mg/0.5 mL pen " "injector Inject 0.5 mLs as directed every 7 days.    ZINC ORAL Take 1 tablet by mouth once daily. PT UNSURE OF DOSE     Family History       Problem Relation (Age of Onset)    Heart disease Mother, Maternal Aunt, Maternal Uncle    Hypertension Father    Lung cancer Maternal Grandmother    Marfan syndrome Son    No Known Problems Brother, Maternal Grandfather, Paternal Grandmother, Paternal Grandfather    Stroke Maternal Aunt, Maternal Uncle    Thrombophilia Father          Tobacco Use    Smoking status: Former     Current packs/day: 0.00     Average packs/day: 1 pack/day for 24.0 years (24.0 ttl pk-yrs)     Types: Cigarettes     Start date: 1996     Quit date: 2020     Years since quittin.1     Passive exposure: Past    Smokeless tobacco: Never    Tobacco comments:     "once in a blue moon"   Substance and Sexual Activity    Alcohol use: Never    Drug use: No    Sexual activity: Not Currently     Review of Systems   Unable to perform ROS: Acuity of condition     Objective:     Vital Signs (Most Recent):  Temp: (!) 105.3 °F (40.7 °C) (25 0755)  Pulse: (!) 0 (25 1030)  Resp: (!) 4 (25 1030)  BP: (!) 96/50 (25 1010)  SpO2: (!) 86 % (25 1025) Vital Signs (24h Range):  Temp:  [97.1 °F (36.2 °C)-105.6 °F (40.9 °C)] 105.3 °F (40.7 °C)  Pulse:  [0-158] 0  Resp:  [0-117] 4  SpO2:  [54 %-100 %] 86 %  BP: ()/() 96/50  Arterial Line BP: ()/() 73/64     Weight: 101.6 kg (224 lb)  Body mass index is 32.14 kg/m².    SpO2: (!) 86 %         Intake/Output Summary (Last 24 hours) at 2025 1042  Last data filed at 2025 0701  Gross per 24 hour   Intake 4300 ml   Output 1417 ml   Net 2883 ml       Lines/Drains/Airways       Central Venous Catheter Line  Duration             Percutaneous Central Line - Triple Lumen  25 1724 Internal Jugular Left <1 day              Drain  Duration                  NG/OG Tube 25 [place] 16 Fr. Center mouth 1 day       " "  Urethral Catheter 02/17/25 1621 <1 day              Airway  Duration                  Airway - Non-Surgical 02/17/25 1449 Endotracheal Tube <1 day              Peripheral Intravenous Line  Duration                  Peripheral IV - Single Lumen 02/17/25 1545 20 G Anterior;Right Forearm <1 day                     Physical Exam  Vitals and nursing note reviewed.   Constitutional:       Appearance: She is ill-appearing.      Comments: Intubated   HENT:      Head: Normocephalic and atraumatic.   Cardiovascular:      Rate and Rhythm: Normal rate and regular rhythm.      Heart sounds: S1 normal and S2 normal. No murmur heard.  Pulmonary:      Effort: Pulmonary effort is normal.      Comments: Coarse, diminished BS  Musculoskeletal:      Right lower leg: No edema.      Left lower leg: No edema.   Skin:     General: Skin is dry.      Comments: Some mottling   Psychiatric:      Comments: Unresponsive          Significant Labs: CMP   Recent Labs   Lab 02/17/25  1826 02/18/25  0020 02/18/25  0412 02/18/25  0822    142 143 142   K 4.8 6.0* 5.9* 5.6*    105 106 108   CO2 19* 14* 13* 11*   * 543* 398* 276*   BUN 13 15 20 24*   CREATININE 1.0 1.4 1.8* 2.5*   CALCIUM 7.9* 8.3* 7.6* 7.2*   PROT 5.9*  --  6.1  --    ALBUMIN 2.6*  --  2.9*  --    BILITOT 0.5  --  0.6  --    ALKPHOS 153*  --  198*  --    AST 71*  --  1,186*  --    ALT 57*  --  982*  --    ANIONGAP 16 23* 24* 23*   , CBC   Recent Labs   Lab 02/18/25  0412   WBC 22.68*   HGB 14.4   HCT 48.1      , Troponin No results for input(s): "TROPONINIHS" in the last 48 hours., and All pertinent lab results from the last 24 hours have been reviewed.    Significant Imaging: Echocardiogram: Transthoracic echo (TTE) complete (Cupid Only):   Results for orders placed or performed during the hospital encounter of 02/17/25   Echo   Result Value Ref Range    BSA 2.24 m2    A4C EF 38 %    LVOT stroke volume 28.1 cm3    LVIDd 4.2 3.5 - 6.0 cm    LV Systolic Volume " 62.09 mL    LV Systolic Volume Index 28.4 mL/m2    LVIDs 3.8 2.1 - 4.0 cm    LV Diastolic Volume 77.84 mL    LV Diastolic Volume Index 35.54 mL/m2    LV EDV A4C 52.37 mL    Left Ventricular End Systolic Volume by Teichholz Method 62.09 mL    Left Ventricular End Diastolic Volume by Teichholz Method 77.84 mL    IVS 1.0 0.6 - 1.1 cm    LVOT diameter 2.2 cm    LVOT area 3.8 cm2    FS 9.5 (A) 28 - 44 %    Left Ventricle Relative Wall Thickness 0.52 cm    PW 1.1 0.6 - 1.1 cm    LV mass 147.0 g    LV Mass Index 67.1 g/m2    MV Peak E Surjit 0.65 m/s    TDI LATERAL 0.08 m/s    TDI SEPTAL 0.06 m/s    E/E' ratio 9 m/s    MV Peak A Surjit 0.71 m/s    E/A ratio 0.92     IVRT 59 msec    E wave deceleration time 144 msec    LV SEPTAL E/E' RATIO 10.8 m/s    LV LATERAL E/E' RATIO 8.1 m/s    LVOT peak surjit 0.8 m/s    Left Ventricular Outflow Tract Mean Velocity 0.51 cm/s    Left Ventricular Outflow Tract Mean Gradient 1.26 mmHg    RVOT peak VTI 9.4 cm    TAPSE 1.16 cm    LA size 2.6 cm    Left Atrium Minor Axis 5.1 cm    Left Atrium Major Axis 5.1 cm    RA Major Axis 3.56 cm    AV mean gradient 4 mmHg    AV peak gradient 7 mmHg    Ao peak surjit 1.3 m/s    Ao VTI 13.8 cm    LVOT peak VTI 7.4 cm    AV valve area 2.0 cm²    AV Velocity Ratio 0.62     AV index (prosthetic) 0.54     MAGUE by Velocity Ratio 2.3 cm²    MV stenosis pressure 1/2 time 41.89 ms    MV valve area p 1/2 method 5.25 cm2    PV mean gradient 1 mmHg    RVOT peak surjit 0.53 m/s    STJ 2.62 cm    Ascending aorta 2.63 cm    IVC diameter 1.61 cm    Mean e' 0.07 m/s    ZLVIDS -1.33     ZLVIDD -5.66     SNEHA 15 mL/m2    LA Vol 33 cm3    LA WIDTH 2.9 cm    RA Width 2.1 cm    EF 25 %    Est. RA pres 3 mmHg    Narrative      Left Ventricle: The left ventricle is normal in size. Normal wall   thickness. There is concentric remodeling. Normal wall motion. See diagram   for wall motion findings. There is severely reduced systolic function with   a visually estimated ejection fraction of  less than 30%. Ejection fraction   is approximately 25%. Grade I diastolic dysfunction.    Right Ventricle: Normal right ventricular cavity size. Wall thickness   is normal. Systolic function is normal.    IVC/SVC: Normal venous pressure at 3 mmHg.    These findings could represent stress induced cardiomyopathy or   Takotsubo cardiomyopathy      and EKG: Reviewed  Assessment and Plan:     Shock  -Seems to be septic in nature  -Severe LA/metabolic acidosis s/p prolonged post-op hypoxia/resuscitation  -TTE with EF of 20% with apical WMA, suggestive of stress-induced CMPY  -Given current clinical state and multiorgan failure, any invasive CV procedure at this juncture would be futile with increased risks and no benefit  -Supportive care as per primary team  -On abx    Acute respiratory failure with hypoxia and hypercarbia  -Per critical care        Type 2 diabetes mellitus with circulatory disorder, with long-term current use of insulin  -Per primary team        VTE Risk Mitigation (From admission, onward)           Ordered     heparin (porcine) injection 5,000 Units  Every 8 hours         02/17/25 1759     Place sequential compression device  Until discontinued         02/17/25 1759     IP VTE HIGH RISK PATIENT  Once         02/17/25 1759                    Thank you for your consult. I will follow-up with patient. Please contact us if you have any additional questions.    Anabella Mckeon PA-C  Cardiology   [facility] - Intensive Care ([facility])

## 2025-02-18 NOTE — PLAN OF CARE
Pt has minimal neuro status. Weaned prop off and then restarted due to irritation. Left shoulder remains in immobilizer brace. Titrated up on levo as BP remains unstable. Bumex given with increased urine output. CBG >500 gave IV insulin and extra SSI. K 6.0 shifted. CT head and chest completed.

## 2025-02-18 NOTE — DISCHARGE SUMMARY
[facility] ([facility])  Critical Care Medicine  Discharge Summary      Patient Name: Jenifer Westfall  MRN: [id number]  Admission Date: 2/17/2025  Hospital Length of Stay: 1 days  Discharge Date and Time:  02/18/2025 11:06 AM  Attending Physician: Deidre Alberts MD   Discharging Provider: Deidre Alberts MD  [facility]  Reason for Admission: shoulder surgery    HPI:   Patient scheduled for lt shoulder arthrocopic shoulder repair, biceps tenodesis and subacromal decompression.   In OR required fluid resuscitation and inotropes  Was on RA with normal vitals prior to surgery.  Hx of Pul HTN but normal EF on echo in 2022.  Extubated in PACU but required NIPPV and continued to have labored respiration and was reintubated.   Continue higher pressor requirements.   Hx of pul nodule and VATS, Smoking and COPD.  Inaccurate urine output sec to leak but urine looks dilute. Off sedation she wakes up and follow commands.   Hx of innominate occlusion and PAD.   BP seems to be correlating to cuff.  CVL is being placed at this time.       Procedure(s) (LRB):  REPAIR, ROTATOR CUFF, ARTHROSCOPIC (Left)  ARTHROSCOPY, SHOULDER, WITH SUBACROMIAL SPACE DECOMPRESSION (Left)  ARTHROSCOPY,SHOULDER,WITH BICEPS TENODESIS (Left)    Indwelling Lines/Drains at Time of Discharge:   Lines/Drains/Airways       Central Venous Catheter Line  Duration             Percutaneous Central Line - Triple Lumen  02/17/25 1724 Internal Jugular Left <1 day              Drain  Duration                  NG/OG Tube 02/17/25 Stearns sump 16 Fr. Center mouth 1 day         Urethral Catheter 02/17/25 1621 <1 day              Airway  Duration                  Airway - Non-Surgical 02/17/25 1449 Endotracheal Tube <1 day                  Hospital Course:   2/18 Worsening hypoxia, pulmonary edema and shock.  Worsening lactate. No improvement with bicarb infusion  Events in PACU reviewed.  Serial labs and imaging  Echo ordered  and cardiology consulted last night    Son updated at around 8 am and requested to come asap    Echo showed severe LV dilation and EF 20% With no apical movement.   D/w cardiology - not a candidate for IABP or Impella due to severe acidosis.   This is not septic but likely cardiogenic shock and pulmonary edema.     Now with shock liver and MSOF with refractory acidosis    Patient arrested and was resuscitated for 1.05 hours.   At 10:25 she was declared dead.  Fixed dialted pupils, no corneals no spont respiration.  Sons were at the bedside.       Consults (From admission, onward)          Status Ordering Provider     Inpatient consult to Cardiology  Once        Provider:  Brian Delgado MD    Completed DEIDRE ALBERTS     Inpatient consult to Critical Care Medicine  Once        Provider:  Deidre Alberts MD    Acknowledged FELISA AQUINO          Significant Labs:  All pertinent labs within the past 24 hours have been reviewed.    Significant Imaging:  I have reviewed all pertinent imaging results/findings within the past 24 hours.    Pending Diagnostic Studies:       Procedure Component Value Units Date/Time    Basic metabolic panel - if not done in ED [[id number]]     Order Status: Sent Lab Status: No result     Specimen: Blood     Phosphorus [[id number]]     Order Status: Sent Lab Status: No result     Specimen: Blood     US Lower Extremity Veins Bilateral [[id number]] Resulted: 02/18/25 0925    Order Status: Sent Lab Status: In process Updated: 02/18/25 0925          Final Active Diagnoses:    Diagnosis Date Noted POA    Acute respiratory failure with hypoxia and hypercarbia [J96.01, J96.02] 02/17/2025 Yes    Osteoarthritis of left shoulder due to rotator cuff injury [M19.112, S46.002S] 02/17/2025 Yes    Shock [R57.9] 02/17/2025 Yes    Pulmonary hypertension [I27.20] 10/09/2020 Yes     Chronic    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  Service: Pulmonology    Discharged Condition:     Disposition:       Patient Instructions:   No discharge procedures on file.  Medications:  None     Deidre Alberts MD  Critical Care Medicine  [facility] ([facility])

## 2025-02-18 NOTE — NURSING
Spoke with Peri at 10:35am with [facility] 's office. Patient released by . Faxed all requested paperwork to [phone].

## (undated) DEVICE — CAUTERY TIP POLISHER

## (undated) DEVICE — SOL NORMAL USPCA 0.9%

## (undated) DEVICE — ELECTRODE BLADE INSULATED 1 IN

## (undated) DEVICE — TISSUE RETRIEVAL SYS SZ19015MM

## (undated) DEVICE — NDL SPINAL 18GX3.5 SPINOCAN

## (undated) DEVICE — SET CASSETTE TUBE DW OUTFLOW

## (undated) DEVICE — SOL CLEARIFY VISUALIZATION LAP

## (undated) DEVICE — SOL NACL IRR 1000ML BTL

## (undated) DEVICE — DRAPE ARM DAVINCI XI

## (undated) DEVICE — TIP GRASPER FENESTRATED DISP

## (undated) DEVICE — PAD CURAD NONADH W/ TAB 3X4IN

## (undated) DEVICE — KIT ANTIFOG W/SPONG & FLUID

## (undated) DEVICE — PACK SHOULDER DRAPE POUCH

## (undated) DEVICE — CATH THORACIC 24FR ST

## (undated) DEVICE — DRAPE INCISE IOBAN 2 23X17IN

## (undated) DEVICE — APPLICATOR CHLORAPREP ORN 26ML

## (undated) DEVICE — RELOAD SUREFORM 45 4.3 GRN 6R

## (undated) DEVICE — DRAPE INVISISHIELD TWL 18X24IN

## (undated) DEVICE — SUT SILK 0 SUTUPAK SA86H

## (undated) DEVICE — KIT TRIMANO

## (undated) DEVICE — SOL ELECTROLUBE ANTI-STIC

## (undated) DEVICE — SUT X425H ETHIBOND 1-0

## (undated) DEVICE — NDL SCORPION HD MEGALOADER

## (undated) DEVICE — SPONGE COTTON TRAY 4X4IN

## (undated) DEVICE — CONNECTOR Y 3/8X3/8X1/2 STRL

## (undated) DEVICE — CONTAINER SPECIMEN OR STER 4OZ

## (undated) DEVICE — SET PNEUMOCLEAR HEAT HUM SE HF

## (undated) DEVICE — PACK BASIC SETUP SC BR

## (undated) DEVICE — COVER LIGHT HANDLE 80/CA

## (undated) DEVICE — PORT AIRSEAL 12/120MM LPI

## (undated) DEVICE — SUT PERMA HAND SILK BLK 0-0

## (undated) DEVICE — SUT MONOCRYL 4-0 PS-1 UND

## (undated) DEVICE — DRAPE ORTH SPLIT 77X108IN

## (undated) DEVICE — CATH URETHRAL RED RUBBER 12FR

## (undated) DEVICE — KIT TRIMANO CHIN

## (undated) DEVICE — DRAPE THREE-QTR REINF 53X77IN

## (undated) DEVICE — RELOAD SUREFORM 45 2.5 WHT 6R

## (undated) DEVICE — DRESSING MEPORE ISLAND 31/2X4

## (undated) DEVICE — NDL BLUNT W/O FILTER 18GX1.5IN

## (undated) DEVICE — HEADREST ROUND DISP FOAM 9IN

## (undated) DEVICE — DRAPE LAPSCP CHOLE 122X102X78

## (undated) DEVICE — TOWEL OR DISP STRL BLUE 4/PK

## (undated) DEVICE — CATH MULTLMN BLU FLEXTIP 7F 30

## (undated) DEVICE — KITTNER ENDOSCOPIC SINGLE TIP

## (undated) DEVICE — GLOVE SURGICAL LATEX SZ 6.5

## (undated) DEVICE — SUT VICRYL CTD 2-0 GI 27 SH

## (undated) DEVICE — SOCKINETTE IMPERVIOUS 12X48IN

## (undated) DEVICE — IRRIGATOR ENDOSCOPY DISP.

## (undated) DEVICE — DRESSING TRANS 4X4 TEGADERM

## (undated) DEVICE — TUBING MEDI-VAC 20FT .25IN

## (undated) DEVICE — CONNECTOR TUBING STR 5 IN 1

## (undated) DEVICE — CANNULA REDUCER 12-8MM

## (undated) DEVICE — SUT 2/0 30IN SILK BLK BRAI

## (undated) DEVICE — COVER PROXIMA MAYO STAND

## (undated) DEVICE — GOWN POLY REINF BRTH SLV XL

## (undated) DEVICE — SEAL UNIVERSAL 5MM-8MM XI

## (undated) DEVICE — BNDG COFLEX FOAM LF2 ST 4X5YD

## (undated) DEVICE — SUT VICRYL 3-0 27 SH

## (undated) DEVICE — DRAPE STERI LONG

## (undated) DEVICE — BAG INZII TISS RETRV 12/15MM

## (undated) DEVICE — SLING ARM ULTRA III PAD MED

## (undated) DEVICE — CANNULA PASSPORT 8 MM X 4CM.

## (undated) DEVICE — LOOP VESSEL MAXI RED

## (undated) DEVICE — HEMOSTAT SURGICEL 4X8IN

## (undated) DEVICE — DRAPE STERI U-SHAPED 47X51IN

## (undated) DEVICE — DRAPE COLUMN DAVINCI XI

## (undated) DEVICE — PROBE ARTHSCP RF90 D 140MM

## (undated) DEVICE — NDL ECLIPSE SAFETY 18GX1-1/2IN

## (undated) DEVICE — DRESSING CHG FOAM 4MM 1IN

## (undated) DEVICE — STAPLER SUREFORM CRVD TIP 45

## (undated) DEVICE — PORT ACCESS 8MM W/120MM LOW

## (undated) DEVICE — TUBE SMOKE EVAC DUAL LUMEN

## (undated) DEVICE — OBTURATOR BLADELESS 8MM XI CLR

## (undated) DEVICE — BURR OVAL 8 FLUTE 4MMX13CM

## (undated) DEVICE — SYR 10CC LUER LOCK

## (undated) DEVICE — CLOSURE SKIN STERI STRIP 1/2X4

## (undated) DEVICE — COVER TIP CURVED SCISSORS XI

## (undated) DEVICE — MANIFOLD 4 PORT

## (undated) DEVICE — ELECTRODE REM PLYHSV RETURN 9

## (undated) DEVICE — SUT MONOCRYL 4.0 PS2 CP496G

## (undated) DEVICE — DRAPE U SPLIT SHEET 54X76IN

## (undated) DEVICE — CANNULA SEAL 12MM

## (undated) DEVICE — HEMOSTAT SURGICEL FIBRLR 1X2IN

## (undated) DEVICE — ADHESIVE DERMABOND ADVANCED

## (undated) DEVICE — BAG TISS RETRV MONARCH 10MM

## (undated) DEVICE — SUT CTD VICRYL VIL BR UR-6

## (undated) DEVICE — TUBING PUMP ARTHROSCOPY STRL

## (undated) DEVICE — DRAPE INCISE IOBAN 2 23X33IN

## (undated) DEVICE — SET TRI-LUMEN FILTERED TUBE